# Patient Record
Sex: FEMALE | Race: WHITE | NOT HISPANIC OR LATINO | Employment: OTHER | URBAN - METROPOLITAN AREA
[De-identification: names, ages, dates, MRNs, and addresses within clinical notes are randomized per-mention and may not be internally consistent; named-entity substitution may affect disease eponyms.]

---

## 2017-01-18 ENCOUNTER — OFFICE VISIT (OUTPATIENT)
Dept: ENDOCRINOLOGY | Facility: MEDICAL CENTER | Age: 82
End: 2017-01-18
Payer: MEDICARE

## 2017-01-18 ENCOUNTER — TELEPHONE (OUTPATIENT)
Dept: MEDICAL GROUP | Age: 82
End: 2017-01-18

## 2017-01-18 VITALS
SYSTOLIC BLOOD PRESSURE: 118 MMHG | HEIGHT: 68 IN | WEIGHT: 136 LBS | HEART RATE: 61 BPM | DIASTOLIC BLOOD PRESSURE: 70 MMHG | BODY MASS INDEX: 20.61 KG/M2

## 2017-01-18 DIAGNOSIS — E55.9 VITAMIN D INSUFFICIENCY: ICD-10-CM

## 2017-01-18 DIAGNOSIS — S14.109A INJURY OF CERVICAL SPINE, INITIAL ENCOUNTER (HCC): ICD-10-CM

## 2017-01-18 DIAGNOSIS — E21.3 HYPERPARATHYROIDISM (HCC): Primary | ICD-10-CM

## 2017-01-18 DIAGNOSIS — M85.80 OSTEOPENIA: ICD-10-CM

## 2017-01-18 PROCEDURE — 99214 OFFICE O/P EST MOD 30 MIN: CPT | Performed by: INTERNAL MEDICINE

## 2017-01-18 NOTE — TELEPHONE ENCOUNTER
1. Caller Name: Lucita Babcock                                           Call Back Number: [phone] (home)         Patient approves a detailed voicemail message: no    Patient left a detailed message that she was wondering if she is suppose to still be taking her Amlodipine she is not sure why she stopped and was not sure if she should start taking them again

## 2017-01-18 NOTE — MR AVS SNAPSHOT
Lucita Babcock   2017 1:20 PM   Office Visit   MRN: [id number]    Department:  End[facility]inology [facility]   Dept Phone:  [phone]    Description:  Female : 1935   Provider:  Neo Andres M.D.           Allergies as of 2017     Allergen Noted Reactions    Ace Inhibitors 2013   Anaphylaxis    Augmentin 2009       Flu like sx    Erythromycin 2008   Rash    Lisinopril 2009   Anaphylaxis    Penicillins 2008   Hives    Epinephrine 2015   Unspecified    shaking    Percocet [Oxycodone-Acetaminophen] 2013   Vomiting    Sulfa Drugs 2010   Hives      You were diagnosed with     Hyperparathyroidism (CMS-[facility])   [9907048]  -  Primary     Osteopenia   [213250]       Vitamin D insufficiency   [831338]         Vital Signs     Smoking Status                   Former Smoker           Basic Information     Date Of Birth Sex Race Ethnicity Preferred Language    1935 Female White Non- English      Problem List              ICD-10-CM Priority Class Noted - Resolved    Essential hypertension I10 Low  Unknown - Present    Osteopenia M85.80   Unknown - Present    Anxiety F41.9 Low  Unknown - Present    HX: breast cancer Z85.3   2012 - Present    Primary osteoarthritis of both knees M17.0   2012 - Present    Fistula of gallbladder K82.3   2013 - Present    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   2014 - Present    Hypercalcemia E83.52   2015 - Present    Vitamin D insufficiency E55.9   2015 - Present    Chronic venous insufficiency I87.2   2015 - Present    Hyperparathyroidism (CMS-[facility]) E21.3   2015 - Present    Hyperlipidemia E78.5   2016 - Present    History of recurrent UTIs Z87.440   2016 - Present    Knee pain, right M25.561   2016 - Present    Central cord syndrome (CMS-[facility]) S14.129A High  2016 - Present    Elevated troponin R79.89 Low  2016 - Present    Elevated CPK R74.8 Low   7/14/2016 - Present    Weakness of right upper extremity M62.81 High  7/15/2016 - Present    DVT prophylaxis [id number] Medium  7/15/2016 - Present    Trauma T14.90 Low  7/15/2016 - Present    Right wrist pain M25.531 Low  7/16/2016 - Present    Rhabdomyolysis M62.82   7/18/2016 - Present    Upper extremity weakness M62.81   7/18/2016 - Present    Central cervical cord injury, without spinal bony injury, C1-4 (CMS-[facility]) S14.129A   7/27/2016 - Present    Fall W19.XXXA   9/22/2016 - Present    Cervical postlaminectomy syndrome M96.1   10/6/2016 - Present    Chronic neck pain M54.2, G89.29   10/6/2016 - Present    Nausea R11.0   10/18/2016 - Present    Acute conjunctivitis H10.30   10/24/2016 - Present    Preventative health care Z00.00   10/24/2016 - Present    Postmenopausal Z78.0   10/24/2016 - Present    Medication refill Z76.0   11/2/2016 - Present    Medication management Z79.899   11/2/2016 - Present    Gastroesophageal reflux disease without esophagitis K21.9   11/2/2016 - Present    Tension type headache G44.209   11/28/2016 - Present      Health Maintenance        Date Due Completion Dates    IMM DTaP/Tdap/Td Vaccine (1 - Tdap) 2/28/1955 ---    IMM ZOSTER VACCINE 2/28/1996 ---    IMM INFLUENZA (1) 9/1/2016 10/15/2015, 10/15/2012, 11/16/2006, 10/18/2005    MAMMOGRAM 3/14/2017 3/14/2016, 3/12/2015, 3/10/2014, 3/7/2013, 3/6/2012, 2/3/2011, 1/19/2010, 1/19/2010, 7/29/2009, 7/29/2009, 3/23/2009, 3/23/2009, 4/9/2008, 3/31/2008, 3/31/2008, 3/28/2007, 3/28/2007, 3/27/2006, 3/24/2005    IMM PNEUMOCOCCAL 65+ (ADULT) LOW/MEDIUM RISK SERIES (2 of 2 - PPSV23) 7/18/2017 7/18/2016    COLONOSCOPY 3/2/2018 3/2/2015    BONE DENSITY 1/8/2021 1/8/2016, 1/31/2013, 10/20/2008            Current Immunizations     13-VALENT PCV PREVNAR 7/18/2016  2:44 PM    Influenza TIV (IM) 10/15/2012 11:28 AM    Influenza Vaccine Adult HD 10/15/2015    Influenza Vaccine Pediatric 11/16/2006, 10/18/2005    Pneumococcal Vaccine (UF)Historical Data  11/16/2006      Below and/or attached are the medications your provider expects you to take. Review all of your home medications and newly ordered medications with your provider and/or pharmacist. Follow medication instructions as directed by your provider and/or pharmacist. Please keep your medication list with you and share with your provider. Update the information when medications are discontinued, doses are changed, or new medications (including over-the-counter products) are added; and carry medication information at all times in the event of emergency situations     Allergies:  ACE INHIBITORS - Anaphylaxis     AUGMENTIN - (reactions not documented)     ERYTHROMYCIN - Rash     LISINOPRIL - Anaphylaxis     PENICILLINS - Hives     EPINEPHRINE - Unspecified     PERCOCET - Vomiting     SULFA DRUGS - Hives               Medications  Valid as of: January 18, 2017 -  2:15 PM    Generic Name Brand Name Tablet Size Instructions for use    Acetaminophen (Suspension) TYLENOL 160 MG/5ML Take 15 mg/kg by mouth every 6 hours as needed.        ALPRAZolam (Tab) XANAX 0.25 MG TAKE 1 TABLET ORALLY AT BEDTIME AS NEEDED FOR SLEEP OR ANXIETY        Bacitracin-Polymyxin B (Ointment) POLYSPORIN 500-80113 UNIT/GM Apply 1 Each to affected area(s) 2 times a day.        Bethanechol Chloride (Tab) URECHOLINE 25 MG Take 1 Tab by mouth 3 times a day.        Calcium Carbonate-Vitamin D (Tab) OSCAL 500 +D 500-200 MG-UNIT Take 1 Tab by mouth 2 times a day, with meals.        Cholecalciferol (Tab) Cholecalciferol 2000 UNIT Take 1 Tab by mouth every day.        Cholecalciferol (Cap) CVS D3 2000 UNITS Take 1 Cap by mouth every day.        K Ph[place]-Sod Phos Di & Mono (Tab) K-PHOS-NEUTRAL, PHOSPHA 250 NEUTRAL 155-852-130 MG Take 1 Tab by mouth 2 times a day.        K Phos [place]-Sod Phos Di & Mono (Tab) PHOSPHA 250 NEUTRAL 155-852-130 MG TAKE 1 TAB BY MOUTH 2 TIMES A DAY.        Magnesium Oxide (Tab) MAGNESIUM-OXIDE 400 (241.3 MG) MG TAKE 1 TAB  BY MOUTH 2 TIMES A DAY FOR 30 DAYS.        Metoprolol Tartrate (Tab) LOPRESSOR 100 MG TAKE 1 TABLET BY MOUTH 2 TIMES A DAY.        Multiple Vitamins-Minerals (Tab) THERAGRAN-M  Take 1 Tab by mouth every day.        Omeprazole (CAPSULE DELAYED RELEASE) PRILOSEC 20 MG Take 1 Cap by mouth every day.        Polymyxin B-Trimethoprim (Solution) POLYTRIM 66268-6.1 UNIT/ML-% Place 1 Drop in both eyes every 4 hours.        Probiotic Product (Cap) Probiotic  Take  by mouth.        Sertraline HCl (Tab) ZOLOFT 50 MG Take 1 Tab by mouth every day.        Sertraline HCl (Tab) ZOLOFT 25 MG Take 1 Tab by mouth every day.        Teriparatide (Recombinant) (Solution) Teriparatide (Recombinant) 600 MCG/2.4ML Inject 20 mcg as instructed every day.        Teriparatide (Recombinant) (Solution) Teriparatide (Recombinant) 600 MCG/2.4ML Inject  as instructed.        Timolol Maleate (Solution) TIMOPTIC 0.5 % Place 1 Drop in both eyes every day.        .                 Medicines prescribed today were sent to:     [id number] - [place] - [address]    [address] [place] [address]    Phone: [phone] Fax: [phone]    Open 24 Hours?: No      Medication refill instructions:       If your prescription bottle indicates you have medication refills left, it is not necessary to call your provider’s office. Please contact your pharmacy and they will refill your medication.    If your prescription bottle indicates you do not have any refills left, you may request refills at any time through one of the following ways: The online [employer] system (except Urgent Care), by calling your provider’s office, or by asking your pharmacy to contact your provider’s office with a refill request. Medication refills are processed only during regular business hours and may not be available until the next business day. Your provider may request additional information or to have a follow-up visit with you prior to refilling your medication.     *Please Note: Medication refills are assigned a new Rx number when refilled electronically. Your pharmacy may indicate that no refills were authorized even though a new prescription for the same medication is available at the pharmacy. Please request the medicine by name with the pharmacy before contacting your provider for a refill.        Your To Do List     Future Labs/Procedures Complete By Expires    COMP METABOLIC PANEL  As directed 7/21/2017    VITAMIN D,25 HYDROXY  As directed 1/18/2018      Other Notes About Your Plan     Please assess the following diagnosis:  E21.3-hyperparathyroidism    -this patient has been on Zoloft since 11/2012, with diagnosis of anxiety and depression. ? If treating major depressive disorder, single episode, unspecified degree, code F32.9    Patient report to me that she was treated with Zoloft initially that she was depressed when she lost her  and she also has mild anxiety. Medication helps ocontrolled her mood. Now she denied feeling depressed. She is continuously taking it for her anxiety.  [place]                    [employer] Access Code: Activation code not generated  Current MyChart Status: Active

## 2017-01-18 NOTE — TELEPHONE ENCOUNTER
According to her chart the only blood pressure medication she should be on is metoprolol 100 mg twice a day. So no she should not still be taking her amlodipine.  Stephanie Gallardo M.D. covering for Farooq Sloan M.D.

## 2017-01-19 RX ORDER — METOPROLOL TARTRATE 100 MG/1
TABLET ORAL
Qty: 60 TAB | Refills: 0 | Status: SHIPPED | OUTPATIENT
Start: 2017-01-19 | End: 2017-02-15 | Stop reason: SDUPTHER

## 2017-01-19 NOTE — PROGRESS NOTES
Chief Complaint   Patient presents with   • Hyperparathyroidism     ?        HPI:      1. Question hyperparathyroidism.    I have seen the patient in the past for osteopenia and suspected hyperparathyroidism.  We never did establish that as a firm diagnosis.  She did have elevated parathyroid hormone but was not hypercalcemic.  Also a sestamibi parathyroid scan in 2015 was negative for identifying a potential adenoma.      Bone density a year ago, January 2016, indicated osteopenia.      In July last year, she had a syncopal episode at home which caused her to fall forward, hitting her face on the wall and then to the floor. She was unable to get off the floor for about ten hours until her daughter found her and rescued her to the hospital.  She had an element of rhabdomyolysis with elevated CPK.  She had C-spine injury which was ultimately diagnosed as having a simple cord syndrome and underwent a C-spine decompression and laminectomy.  She recovered from that beautifully and has healed very well.  As I speak with her today, she moves her head about spontaneously and without stiffness or obvious pain.  Apparently her rehab has been intensive and successful.      At some point, in August, she was started on Forteo I think through the rehab program.  I didn’t realize that when she was with me earlier today so I didn’t ask her if she was still taking it but I presume she is.      I have recent chemistries in August and September and she does not have hypercalcemia.  In August her calciums were 9.9 and 9.6.  In September when accounting for an albumin in the 3.8-3.6 range, her calciums are 10.5 and 10.3.  A parathyroid hormone level done last May prior to her injury was 92.7 and prior to that a year ago, 73 which is upper normal.  Her vitamin D last May was 30 and her PTH was elevated.      My plan is to update her chemistry panel and calciums.  Also get a vitamin D level and another parathyroid hormone level.  If that  is elevated, I probably will repeat her sestamibi parathyroid scan.  For this encounter I spent considerable time with patient reviewing her history and recent course and spent about 45 minutes reviewing the extensive hospital records related to her injury and recovery.   ROS:  Still recovering from her significant neck injury and surgery. She is making good progress. She is living independently again. Not yet driving.  Still a bit unsteady on her feet and I think somewhat insecure walking  All other systems reported as negative or unchanged since last exam        Allergies:   Allergies   Allergen Reactions   • Ace Inhibitors Anaphylaxis   • Augmentin      Flu like sx   • Erythromycin Rash   • Lisinopril Anaphylaxis   • Penicillins Hives   • Epinephrine Unspecified     shaking   • Percocet [Oxycodone-Acetaminophen] Vomiting   • Sulfa Drugs Hives       Current medicines including changes today:  Current Outpatient Prescriptions   Medication Sig Dispense Refill   • MAGNESIUM-OXIDE 400 (241.3 MG) MG Tab tablet TAKE 1 TAB BY MOUTH 2 TIMES A DAY FOR 30 DAYS. 60 Tab 0   • PHOSPHA 250 NEUTRAL 155-852-130 MG tablet TAKE 1 TAB BY MOUTH 2 TIMES A DAY. 60 Tab 1   • metoprolol (LOPRESSOR) 100 MG Tab TAKE 1 TABLET BY MOUTH 2 TIMES A DAY. 90 Tab 0   • alprazolam (XANAX) 0.25 MG Tab TAKE 1 TABLET ORALLY AT BEDTIME AS NEEDED FOR SLEEP OR ANXIETY 60 Tab 0   • phosphorus (K-PHOS-NEUTRAL, PHOSPHA 250 NEUTRAL) 155-852-130 MG tablet Take 1 Tab by mouth 2 times a day. 180 Tab 0   • omeprazole (PRILOSEC) 20 MG delayed-release capsule Take 1 Cap by mouth every day. 90 Cap 2   • polymixin-trimethoprim (POLYTRIM) 79217-1.1 UNIT/ML-% Solution Place 1 Drop in both eyes every 4 hours. 10 mL 0   • calcium-vitamin D (OSCAL 500 +D) 500-200 MG-UNIT Tab Take 1 Tab by mouth 2 times a day, with meals. 100 Tab 3   • sertraline (ZOLOFT) 25 MG tablet Take 1 Tab by mouth every day. 30 Tab 5   • bethanechol (URECHOLINE) 25 MG Tab Take 1 Tab by mouth 3  "times a day. 90 Tab 0   • vitamin D 2000 UNIT Tab Take 1 Tab by mouth every day. 30 Tab    • timolol (TIMOPTIC) 0.5 % Solution Place 1 Drop in both eyes every day. 1 Bottle 3   • therapeutic multivitamin-minerals (THERAGRAN-M) Tab Take 1 Tab by mouth every day. 30 Tab 11   • acetaminophen (TYLENOL) 160 MG/5ML Suspension Take 15 mg/kg by mouth every 6 hours as needed.     • bacitracin-polymyxin b (POLYSPORIN) 500-65772 UNIT/GM Ointment Apply 1 Each to affected area(s) 2 times a day. 1 Tube 0   • Teriparatide, Recombinant, (FORTEO) 600 MCG/2.4ML Solution Inject  as instructed.     • Probiotic Cap Take  by mouth.     • sertraline (ZOLOFT) 50 MG Tab Take 1 Tab by mouth every day. 30 Tab 5   • CVS D3 2000 UNITS Cap Take 1 Cap by mouth every day.  0   • Teriparatide, Recombinant, (FORTEO) 600 MCG/2.4ML Solution Inject 20 mcg as instructed every day. 1 PEN 0     No current facility-administered medications for this visit.        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   appears underweight but otherwise healthy for age    Skin   appropriate for sex and age    HEENT  unremarkable    Neck   no palpable nodules. I can't feel her thyroid. It might be substernal    Heart  regular    Extremities  no edema    Neuro  gait and station " normal               Alert coherent and conversant. Well-oriented    Psych  appropriate, pleasant, calm    ASSESSMENT AND RECOMMENDATIONS    1. Osteopenia           Patient has been started on Forteo in August 2016 during her rehabilitation. Following her C-spine injury and surgery in July  - COMP METABOLIC PANEL; Future    2. Vitamin D insufficiency              Reassess  - VITAMIN D,25 HYDROXY; Future    3. Hyperparathyroidism (CMS-HCC)              Questionable diagnosis could be reassessed  - PTH INTACT    4. Injury of cervical spine, initial encounter (CMS-HCC) July 2016             Central cord syndrome with C-spine decompression/laminectomy      DISPOSITION: Return in about 6 months (around 7/18/2017).       Neo Andres M.D.    Copies to: Farooq Sloan M.D. [phone]

## 2017-01-19 NOTE — TELEPHONE ENCOUNTER
Phone Number Called: [phone] (home)       Message: left message to call back    Left Message for patient to call back: yes

## 2017-01-30 ENCOUNTER — PATIENT OUTREACH (OUTPATIENT)
Dept: HEALTH INFORMATION MANAGEMENT | Facility: OTHER | Age: 82
End: 2017-01-30

## 2017-01-31 NOTE — PROGRESS NOTES
Outcome: SCHEDULED CM CALL    Attempt # FIRST    Care Coordination Enrollment (Attempt to Enroll in Care Coordination)   1. Is patient appropriate: YES; NO; YES   2. If does not qualify, why? NA  3. Is patient interested: YES; NO; YES  4. If not interested, declined reason: NA    Spoke with Lucita regarding [facility] program enrollment.      Is the patient interested in [facility]? Yes     Has the patient completed the [place] Questionnaire? Yes     Has the patient completed the New Member Activation Checklist? Yes     Patient Interested in Care Coordination:  Report Name: [facility] CM  Total Score from [place] Questionnaire:0-2 MINIMAL  9-Point Screening for Care Management    Scoring Scale:   0-2 Minimal   3-6 Moderate  7-9 Severe     NPQ Score:  0.75      Background:SURGERY ON DISCS, MIGRAINES, HAS A CARE GIVER EVERY DAY UNTIL NOON, HAS A CANE,     Telephone Visit with Care Manager scheduled for:     Care Gap Scheduling (Attempt to Schedule EACH Overdue Care Gap!)  Health Maintenance Due   Topic Date Due   • IMM DTaP/Tdap/Td Vaccine (1 - Tdap) SCHEDULED   • IMM ZOSTER VACCINE  SCHEDULED   • IMM INFLUENZA (1) 11/22/2016         [employer] Activation: ACTIVE  Already Active/Declined/Sent Activation Code

## 2017-02-01 ENCOUNTER — OFFICE VISIT (OUTPATIENT)
Dept: MEDICAL GROUP | Age: 82
End: 2017-02-01
Payer: MEDICARE

## 2017-02-01 VITALS
TEMPERATURE: 98.1 F | WEIGHT: 136 LBS | HEART RATE: 72 BPM | OXYGEN SATURATION: 93 % | SYSTOLIC BLOOD PRESSURE: 124 MMHG | BODY MASS INDEX: 21.35 KG/M2 | HEIGHT: 67 IN | DIASTOLIC BLOOD PRESSURE: 72 MMHG

## 2017-02-01 DIAGNOSIS — E21.3 HYPERPARATHYROIDISM (HCC): ICD-10-CM

## 2017-02-01 DIAGNOSIS — S14.109D INJURY OF CERVICAL SPINE, SUBSEQUENT ENCOUNTER (HCC): ICD-10-CM

## 2017-02-01 DIAGNOSIS — G89.29 CHRONIC NECK PAIN: ICD-10-CM

## 2017-02-01 DIAGNOSIS — Z23 NEED FOR TDAP VACCINATION: ICD-10-CM

## 2017-02-01 DIAGNOSIS — M54.2 CHRONIC NECK PAIN: ICD-10-CM

## 2017-02-01 DIAGNOSIS — I10 ESSENTIAL HYPERTENSION: ICD-10-CM

## 2017-02-01 DIAGNOSIS — M25.561 RIGHT KNEE PAIN, UNSPECIFIED CHRONICITY: ICD-10-CM

## 2017-02-01 PROCEDURE — 90471 IMMUNIZATION ADMIN: CPT | Performed by: FAMILY MEDICINE

## 2017-02-01 PROCEDURE — 90715 TDAP VACCINE 7 YRS/> IM: CPT | Performed by: FAMILY MEDICINE

## 2017-02-01 PROCEDURE — 99214 OFFICE O/P EST MOD 30 MIN: CPT | Mod: 25 | Performed by: FAMILY MEDICINE

## 2017-02-01 ASSESSMENT — PATIENT HEALTH QUESTIONNAIRE - PHQ9: CLINICAL INTERPRETATION OF PHQ2 SCORE: 0

## 2017-02-01 NOTE — PROGRESS NOTES
This medical record contains text that has been entered with the assistance of computer voice recognition and dictation software.  Therefore, it may contain unintended errors in text, spelling, punctuation, or grammar    Chief Complaint   Patient presents with   • Immunizations     tdap pending       Lucita Zulma Babcock is a 81 y.o. female here evaluation and management of: immunizations, routine follow up      HPI:     Injury of cervical spine (CMS-HCC)  Since her decompression/laminectomy she is recovering very well. She is able to turn head to both sides approximately 150°, normal flexion and extension. She feels she is ready to drive again.    Hyperlipidemia  Patient states that has not been complying with diet, she continues to enjoy ice cream and desserts.    Hyperparathyroidism  Continues to be managed by Dr. Andres  Normal recent PTH and calcium levels        Knee pain, right  She continues to walk with a cane especially outside where it's icy. She does not want to proceed with TKR, continues to use elipticle at home with no problem    Essential hypertension  Her BP is controlled with metoprolol 100 mg twice a day. She brought in a home blood pressure log which showed a normal range.   The patient is  on a Baby ASPIRIN and a  STATIN.  The patient has been tollerating the BP meds without any issues. No tunnel vision, no cough, no changes in vision, no lightheadedness, no fatigue, no syncopal or presyncopal episodes, no edema, no new rashes. Patient also  denied chest pain, headache, change in vision, dyspnea on exertion, shortness of breath, PND, back pain, numbness or tingling anywhere. He is tolerating his medication well, he denies any lightheadedness, no tunnel vision, no syncopal episodes, no fatigue, no leg weakness no falls.    Current medicines (including changes today)  Current Outpatient Prescriptions   Medication Sig Dispense Refill   • Misc. Devices Misc Please give patient shingles vaccine 1  Application 0   • MAGNESIUM-OXIDE 400 (241.3 MG) MG Tab tablet TAKE 1 TAB BY MOUTH 2 TIMES A DAY FOR 30 DAYS. 60 Tab 0   • metoprolol (LOPRESSOR) 100 MG Tab TAKE 1 TABLET BY MOUTH 2 TIMES A DAY. 60 Tab 0   • PHOSPHA 250 NEUTRAL 155-852-130 MG tablet TAKE 1 TAB BY MOUTH 2 TIMES A DAY. 60 Tab 1   • alprazolam (XANAX) 0.25 MG Tab TAKE 1 TABLET ORALLY AT BEDTIME AS NEEDED FOR SLEEP OR ANXIETY 60 Tab 0   • phosphorus (K-PHOS-NEUTRAL, PHOSPHA 250 NEUTRAL) 155-852-130 MG tablet Take 1 Tab by mouth 2 times a day. 180 Tab 0   • omeprazole (PRILOSEC) 20 MG delayed-release capsule Take 1 Cap by mouth every day. 90 Cap 2   • acetaminophen (TYLENOL) 160 MG/5ML Suspension Take 15 mg/kg by mouth every 6 hours as needed.     • polymixin-trimethoprim (POLYTRIM) 67399-4.1 UNIT/ML-% Solution Place 1 Drop in both eyes every 4 hours. 10 mL 0   • bacitracin-polymyxin b (POLYSPORIN) 500-12735 UNIT/GM Ointment Apply 1 Each to affected area(s) 2 times a day. 1 Tube 0   • calcium-vitamin D (OSCAL 500 +D) 500-200 MG-UNIT Tab Take 1 Tab by mouth 2 times a day, with meals. 100 Tab 3   • Teriparatide, Recombinant, (FORTEO) 600 MCG/2.4ML Solution Inject  as instructed.     • Probiotic Cap Take  by mouth.     • sertraline (ZOLOFT) 50 MG Tab Take 1 Tab by mouth every day. 30 Tab 5   • sertraline (ZOLOFT) 25 MG tablet Take 1 Tab by mouth every day. 30 Tab 5   • bethanechol (URECHOLINE) 25 MG Tab Take 1 Tab by mouth 3 times a day. 90 Tab 0   • CVS D3 2000 UNITS Cap Take 1 Cap by mouth every day.  0   • Teriparatide, Recombinant, (FORTEO) 600 MCG/2.4ML Solution Inject 20 mcg as instructed every day. 1 PEN 0   • vitamin D 2000 UNIT Tab Take 1 Tab by mouth every day. 30 Tab    • timolol (TIMOPTIC) 0.5 % Solution Place 1 Drop in both eyes every day. 1 Bottle 3   • therapeutic multivitamin-minerals (THERAGRAN-M) Tab Take 1 Tab by mouth every day. 30 Tab 11     No current facility-administered medications for this visit.     She  has a past medical history  "of Hypertension; Osteopenia; Sarcoid (CMS-HCC); Depression; Anxiety; Breast cancer (CMS-HCC) (); Aneurysm (CMS-HCC); Other specified disorder of intestines; Indigestion; Anesthesia; Arthritis; Cancer (CMS-HCC) (); Dental disorder; UTI (urinary tract infection) (); CATARACT; and Injury of cervical spine (CMS-HCC) (2016). She also has no past medical history of Stroke (CMS-HCC), Seizure (CMS-HCC), Diabetes (CMS-HCC), or Pacemaker.  She  has past surgical history that includes vein stripping; mass excision general (); breast biopsy (08); abdominal hysterectomy total (); cataract phaco with iol (2010); colonoscopy (); ercp in or (2012); samuel by laparoscopy (2013); lumpectomy (); radiation therapy plan simple (); cataract phaco with iol (2014); and cervical disk and fusion anterior (2016).  Social History   Substance Use Topics   • Smoking status: Former Smoker -- 0.50 packs/day for 45 years     Quit date: 10/09/1969   • Smokeless tobacco: Never Used   • Alcohol Use: 0.6 oz/week     1 Glasses of wine per week      Comment: 1/daily     Social History     Social History Narrative     Family History   Problem Relation Age of Onset   • Hypertension Mother    • Stroke Mother    • Cancer Neg Hx    • Diabetes Neg Hx    • Heart Disease Neg Hx    • Other Father      Dementia   • Other Sister      BIpolar   • Other Sister      Bipolar   • Other Sister      THyroid disease     Family Status   Relation Status Death Age   • Mother     • Son       suicide   • Father     • Sister     • Sister Alive    • Sister Alive    • Daughter Alive    • Daughter Alive          ROS  Please see hpi    All other systems reviewed and are negative     Objective:     Blood pressure 124/72, pulse 72, temperature 36.7 °C (98.1 °F), height 1.714 m (5' 7.48\"), weight 61.689 kg (136 lb), SpO2 93 %. Body mass index is 21 kg/(m^2).  Physical " Exam:    Constitutional: Alert, no distress.  Skin: Warm, dry, good turgor, no rashes in visible areas.  Eye: Equal, round and reactive, conjunctiva clear, lids normal.  ENMT: Lips without lesions, good dentition, oropharynx clear.  Neck: Trachea midline, no masses, no thyromegaly. No cervical or supraclavicular lymphadenopathy.  Respiratory: Unlabored respiratory effort, lungs clear to auscultation, no wheezes, no ronchi.  Cardiovascular: Normal S1, S2, no murmur, no edema.  Abdomen: Soft, non-tender, no masses, no hepatosplenomegaly.  Psych: Alert and oriented x3, normal affect and mood.  NECK--able to turn head 150 degrees to both sides, normal flexion and extension, cervical spine NTT        Assessment and Plan:   The following treatment plan was discussed, again this medical record contains text that has been entered with the assistance of computer voice recognition and dictation software.  Therefore, it may contain unintended errors in text, spelling, punctuation, or grammar      1. Need for Tdap vaccination  Given today    - TDAP VACCINE =>8YO IM  - Misc. Devices Misc; Please give patient shingles vaccine  Dispense: 1 Application; Refill: 0    2. Injury of cervical spine, subsequent encounter (CMS-[facility])  Much improved  I believe she can drive now    3. Hyperparathyroidism (CMS-[facility])  Stable and managed by Dr. Andres    5. Right knee pain, unspecified chronicity  Continue to use elipticle  Remain as active as possible    6. Essential hypertension  It is finally controlled with metoprolol 100mg po bid        Followup: Return in about 3 months (around 5/1/2017) for Reevaluation.

## 2017-02-01 NOTE — MR AVS SNAPSHOT
"Lucita Babcock   2017 8:20 AM   Office Visit   MRN: [id number]    Department:  [address] [place] [address]   Dept Phone:  [phone]    Description:  Female : 1935   Provider:  Farooq Sloan M.D.           Reason for Visit     Immunizations tdap pending      Allergies as of 2017     Allergen Noted Reactions    Ace Inhibitors 2013   Anaphylaxis    Augmentin 2009       Flu like sx    Erythromycin 2008   Rash    Lisinopril 2009   Anaphylaxis    Penicillins 2008   Hives    Epinephrine 2015   Unspecified    shaking    Percocet [Oxycodone-Acetaminophen] 2013   Vomiting    Sulfa Drugs 2010   Hives      You were diagnosed with     Need for Tdap vaccination   [658969]       Chronic neck pain   [071613]       Injury of cervical spine, subsequent encounter (CMS-HCC)   [1863951]       Hyperparathyroidism (CMS-HCC)   [1566566]         Vital Signs     Blood Pressure Pulse Temperature Height Weight Body Mass Index    124/72 mmHg 72 36.7 °C (98.1 °F) 1.714 m (5' 7.48\") 61.689 kg (136 lb) 21.00 kg/m2    Oxygen Saturation Smoking Status                93% Former Smoker          Basic Information     Date Of Birth Sex Race Ethnicity Preferred Language    1935 Female White Non- English      Your appointments     2017  9:00 AM   CARE MANAGEMENT OUTREACH with Jess Shelton R.N.   Population Health (--)    [address] [facility] [address] [phone]            Mar 22, 2017  1:00 PM   Established Patient with Neo Andres M.D.   [facility] ([facility])    [address], [address]  [facility] [address] [phone]           You will be receiving a confirmation call a few days before your appointment from our automated call confirmation system.              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   12/1/2014 - Present    Hypercalcemia E83.52   11/2/2015 - Present    Vitamin D insufficiency E55.9   11/5/2015 - Present    Chronic venous insufficiency I87.2   11/18/2015 - Present    Hyperparathyroidism (CMS-HCC) E21.3   12/2/2015 - Present    Hyperlipidemia E78.5   5/24/2016 - Present    History of recurrent UTIs Z87.440   5/24/2016 - Present    Knee pain, right M25.561   6/22/2016 - Present    Central cord syndrome (CMS-HCC) S14.129A High  7/14/2016 - Present    Elevated troponin R79.89 Low  7/14/2016 - Present    Elevated CPK R74.8 Low  7/14/2016 - Present    Weakness of right upper extremity M62.81 High  7/15/2016 - Present    DVT prophylaxis [id number] Medium  7/15/2016 - Present    Trauma T14.90 Low  7/15/2016 - Present    Right wrist pain M25.531 Low  7/16/2016 - Present    Rhabdomyolysis M62.82   7/18/2016 - Present    Upper extremity weakness M62.81   7/18/2016 - Present    Central cervical cord injury, without spinal bony injury, C1-4 (CMS-HCC) S14.129A   7/27/2016 - Present    Fall W19.XXXA   9/22/2016 - Present    Cervical postlaminectomy syndrome M96.1   10/6/2016 - Present    Chronic neck pain M54.2, G89.29   10/6/2016 - Present    Nausea R11.0   10/18/2016 - Present    Acute conjunctivitis H10.30   10/24/2016 - Present    Preventative health care Z00.00   10/24/2016 - Present    Postmenopausal Z78.0   10/24/2016 - Present    Medication refill Z76.0   11/2/2016 - Present    Medication management Z79.899   11/2/2016 - Present    Gastroesophageal reflux disease without esophagitis K21.9   11/2/2016 - Present    Tension type headache G44.209   11/28/2016 - Present    Injury of cervical spine (CMS-[facility]) S14.109A   7/1/2016 - Present         Health Maintenance        Date Due Completion Dates    IMM DTaP/Tdap/Td Vaccine (1 - Tdap) 2/28/1955 ---    IMM ZOSTER VACCINE 2/28/1996 ---    MAMMOGRAM 3/14/2017 3/14/2016, 3/12/2015, 3/10/2014, 3/7/2013, 3/6/2012, 2/3/2011, 1/19/2010, 1/19/2010, 7/29/2009, 7/29/2009, 3/23/2009, 3/23/2009, 4/9/2008, 3/31/2008, 3/31/2008, 3/28/2007, 3/28/2007, 3/27/2006, 3/24/2005    IMM PNEUMOCOCCAL 65+ (ADULT) LOW/MEDIUM RISK SERIES (2 of 2 - PPSV23) 7/18/2017 7/18/2016    COLONOSCOPY 3/2/2018 3/2/2015    BONE DENSITY 1/8/2021 1/8/2016, 1/31/2013, 10/20/2008            Current Immunizations     13-VALENT PCV PREVNAR 7/18/2016  2:44 PM    Influenza TIV (IM) 10/15/2012 11:28 AM    Influenza Vaccine Adult HD 11/22/2016, 10/15/2015    Influenza Vaccine Pediatric 11/16/2006, 10/18/2005    Pneumococcal Vaccine (UF)Historical Data 11/16/2006    Tdap Vaccine  Incomplete      Below and/or attached are the medications your provider expects you to take. Review all of your home medications and newly ordered medications with your provider and/or pharmacist. Follow medication instructions as directed by your provider and/or pharmacist. Please keep your medication list with you and share with your provider. Update the information when medications are discontinued, doses are changed, or new medications (including over-the-counter products) are added; and carry medication information at all times in the event of emergency situations     Allergies:  ACE INHIBITORS - Anaphylaxis     AUGMENTIN - (reactions not documented)     ERYTHROMYCIN - Rash     LISINOPRIL - Anaphylaxis     PENICILLINS - Hives     EPINEPHRINE - Unspecified     PERCOCET - Vomiting     SULFA DRUGS - Hives               Medications  Valid as of: February 01, 2017 -  8:46 AM    Generic Name Brand Name Tablet Size Instructions for use    Acetaminophen (Suspension) TYLENOL 160 MG/5ML Take 15 mg/kg by mouth every 6 hours as needed.        ALPRAZolam (Tab) XANAX 0.25 MG TAKE 1 TABLET  ORALLY AT BEDTIME AS NEEDED FOR SLEEP OR ANXIETY        Bacitracin-Polymyxin B (Ointment) POLYSPORIN 500-59947 UNIT/GM Apply 1 Each to affected area(s) 2 times a day.        Bethanechol Chloride (Tab) URECHOLINE 25 MG Take 1 Tab by mouth 3 times a day.        Calcium Carbonate-Vitamin D (Tab) OSCAL 500 +D 500-200 MG-UNIT Take 1 Tab by mouth 2 times a day, with meals.        Cholecalciferol (Tab) Cholecalciferol 2000 UNIT Take 1 Tab by mouth every day.        Cholecalciferol (Cap) CVS D3 2000 UNITS Take 1 Cap by mouth every day.        K Phos [place]ette-Sod Phos Di & Mono (Tab) K-PHOS-NEUTRAL, PHOSPHA 250 NEUTRAL 155-852-130 MG Take 1 Tab by mouth 2 times a day.        K Phos Payette-Sod Phos Di & Mono (Tab) PHOSPHA 250 NEUTRAL 155-852-130 MG TAKE 1 TAB BY MOUTH 2 TIMES A DAY.        Magnesium Oxide (Tab) MAGNESIUM-OXIDE 400 (241.3 MG) MG TAKE 1 TAB BY MOUTH 2 TIMES A DAY FOR 30 DAYS.        Metoprolol Tartrate (Tab) LOPRESSOR 100 MG TAKE 1 TABLET BY MOUTH 2 TIMES A DAY.        Misc. Devices (Misc) Misc. Devices  Please give patient shingles vaccine        Multiple Vitamins-Minerals (Tab) THERAGRAN-M  Take 1 Tab by mouth every day.        Omeprazole (CAPSULE DELAYED RELEASE) PRILOSEC 20 MG Take 1 Cap by mouth every day.        Polymyxin B-Trimethoprim (Solution) POLYTRIM 73505-9.1 UNIT/ML-% Place 1 Drop in both eyes every 4 hours.        Probiotic Product (Cap) Probiotic  Take  by mouth.        Sertraline HCl (Tab) ZOLOFT 50 MG Take 1 Tab by mouth every day.        Sertraline HCl (Tab) ZOLOFT 25 MG Take 1 Tab by mouth every day.        Teriparatide (Recombinant) (Solution) Teriparatide (Recombinant) 600 MCG/2.4ML Inject 20 mcg as instructed every day.        Teriparatide (Recombinant) (Solution) Teriparatide (Recombinant) 600 MCG/2.4ML Inject  as instructed.        Timolol Maleate (Solution) TIMOPTIC 0.5 % Place 1 Drop in both eyes every day.        .                 Medicines prescribed today were sent to:     [facility]  [id number] - [place], [place] - [address]    [address] [place] [address]    Phone: [phone] Fax: [phone]    Open 24 Hours?: No      Medication refill instructions:       If your prescription bottle indicates you have medication refills left, it is not necessary to call your provider’s office. Please contact your pharmacy and they will refill your medication.    If your prescription bottle indicates you do not have any refills left, you may request refills at any time through one of the following ways: The online [employer] system (except Urgent Care), by calling your provider’s office, or by asking your pharmacy to contact your provider’s office with a refill request. Medication refills are processed only during regular business hours and may not be available until the next business day. Your provider may request additional information or to have a follow-up visit with you prior to refilling your medication.   *Please Note: Medication refills are assigned a new Rx number when refilled electronically. Your pharmacy may indicate that no refills were authorized even though a new prescription for the same medication is available at the pharmacy. Please request the medicine by name with the pharmacy before contacting your provider for a refill.        Other Notes About Your Plan     Please assess the following diagnosis:  E21.3-hyperparathyroidism    -this patient has been on Zoloft since 11/2012, with diagnosis of anxiety and depression. ? If treating major depressive disorder, single episode, unspecified degree, code F32.9    Patient report to me that she was treated with Zoloft initially that she was depressed when she lost her  and she also has mild anxiety. Medication helps ocontrolled her mood. Now she denied feeling depressed. She is continuously taking it for her anxiety.  [place]                    [employer] Access Code: Activation code not generated  Current [employer] Status: Active

## 2017-02-01 NOTE — ASSESSMENT & PLAN NOTE
Patient states that has not been complying with diet, she continues to enjoy ice cream and desserts.

## 2017-02-01 NOTE — ASSESSMENT & PLAN NOTE
Since her decompression/laminectomy she is recovering very well. She is able to turn head to both sides approximately 150°, normal flexion and extension. She feels she is ready to drive again.

## 2017-02-01 NOTE — ASSESSMENT & PLAN NOTE
She continues to walk with a cane especially outside where it's icy. She does not want to proceed with TKR, continues to use elipticle at home with no problem

## 2017-02-01 NOTE — ASSESSMENT & PLAN NOTE
Her BP is controlled with metoprolol 100 mg twice a day. She brought in a home blood pressure log which showed a normal range.   The patient is  on a Baby ASPIRIN and a  STATIN.  The patient has been tollerating the BP meds without any issues. No tunnel vision, no cough, no changes in vision, no lightheadedness, no fatigue, no syncopal or presyncopal episodes, no edema, no new rashes. Patient also  denied chest pain, headache, change in vision, dyspnea on exertion, shortness of breath, PND, back pain, numbness or tingling anywhere. He is tolerating his medication well, he denies any lightheadedness, no tunnel vision, no syncopal episodes, no fatigue, no leg weakness no falls.

## 2017-02-02 ENCOUNTER — PATIENT OUTREACH (OUTPATIENT)
Dept: HEALTH INFORMATION MANAGEMENT | Facility: OTHER | Age: 82
End: 2017-02-02

## 2017-02-03 NOTE — PROGRESS NOTES
2/2/17  -  Outcome:CONFIRMED WEB IZ.         Care Gap Scheduling (Attempt to Schedule EACH Overdue Care Gap!)  Health Maintenance Due   Topic Date Due   • IMM ZOSTER VACCINE  Pt wants vaccine but needs to know the cost first.         [employer] Activation:  Already Active

## 2017-02-07 ENCOUNTER — PATIENT OUTREACH (OUTPATIENT)
Dept: HEALTH INFORMATION MANAGEMENT | Facility: OTHER | Age: 82
End: 2017-02-07

## 2017-02-07 ASSESSMENT — ENCOUNTER SYMPTOMS
OCCASIONAL FEELINGS OF UNSTEADINESS: 0
LOSS OF SENSATION IN FEET: 0
DEPRESSION: 0

## 2017-02-07 ASSESSMENT — PATIENT HEALTH QUESTIONNAIRE - PHQ9: CLINICAL INTERPRETATION OF PHQ2 SCORE: 0

## 2017-02-07 NOTE — PATIENT INSTRUCTIONS
Fall Prevention in the Home   Falls can cause injuries and can affect people from all age groups. There are many simple things that you can do to make your home safe and to help prevent falls.  WHAT CAN I DO ON THE OUTSIDE OF MY HOME?  · Regularly repair the edges of walkways and driveways and fix any cracks.  · Remove high doorway thresholds.  · Trim any shrubbery on the main path into your home.  · Use bright outdoor lighting.  · Clear walkways of debris and clutter, including tools and rocks.  · Regularly check that handrails are securely fastened and in good repair. Both sides of any steps should have handrails.  · Install guardrails along the edges of any raised decks or porches.  · Have leaves, snow, and ice cleared regularly.  · Use sand or salt on walkways during winter months.  · In the garage, clean up any spills right away, including grease or oil spills.  WHAT CAN I DO IN THE BATHROOM?  · Use night lights.  · Install grab bars by the toilet and in the tub and shower. Do not use towel bars as grab bars.  · Use non-skid mats or decals on the floor of the tub or shower.  · If you need to sit down while you are in the shower, use a plastic, non-slip stool..  · Keep the floor dry. Immediately clean up any water that spills on the floor.  · Remove soap buildup in the tub or shower on a regular basis.  · Attach bath mats securely with double-sided non-slip rug tape.  · Remove throw rugs and other tripping hazards from the floor.  WHAT CAN I DO IN THE BEDROOM?  · Use night lights.  · Make sure that a bedside light is easy to reach.  · Do not use oversized bedding that drapes onto the floor.  · Have a firm chair that has side arms to use for getting dressed.  · Remove throw rugs and other tripping hazards from the floor.  WHAT CAN I DO IN THE KITCHEN?   · Clean up any spills right away.  · Avoid walking on wet floors.  · Place frequently used items in easy-to-reach places.  · If you need to reach for something  above you, use a sturdy step stool that has a grab bar.  · Keep electrical cables out of the way.  · Do not use floor polish or wax that makes floors slippery. If you have to use wax, make sure that it is non-skid floor wax.  · Remove throw rugs and other tripping hazards from the floor.  WHAT CAN I DO IN THE STAIRWAYS?  · Do not leave any items on the stairs.  · Make sure that there are handrails on both sides of the stairs. Fix handrails that are broken or loose. Make sure that handrails are as long as the stairways.  · Check any carpeting to make sure that it is firmly attached to the stairs. Fix any carpet that is loose or worn.  · Avoid having throw rugs at the top or bottom of stairways, or secure the rugs with carpet tape to prevent them from moving.  · Make sure that you have a light switch at the top of the stairs and the bottom of the stairs. If you do not have them, have them installed.  WHAT ARE SOME OTHER FALL PREVENTION TIPS?  · Wear closed-toe shoes that fit well and support your feet. Wear shoes that have rubber soles or low heels.  · When you use a stepladder, make sure that it is completely opened and that the sides are firmly locked. Have someone hold the ladder while you are using it. Do not climb a closed stepladder.  · Add color or contrast paint or tape to grab bars and handrails in your home. Place contrasting color strips on the first and last steps.  · Use mobility aids as needed, such as canes, walkers, scooters, and crutches.  · Turn on lights if it is dark. Replace any light bulbs that burn out.  · Set up furniture so that there are clear paths. Keep the furniture in the same spot.  · Fix any uneven floor surfaces.  · Choose a carpet design that does not hide the edge of steps of a stairway.  · Be aware of any and all pets.  · Review your medicines with your healthcare provider. Some medicines can cause dizziness or changes in blood pressure, which increase your risk of falling.  Talk  with your health care provider about other ways that you can decrease your risk of falls. This may include working with a physical therapist or  to improve your strength, balance, and endurance.     This information is not intended to replace advice given to you by your health care provider. Make sure you discuss any questions you have with your health care provider.     Document Released: 12/08/2003 Document Revised: 05/03/2016 Document Reviewed: 01/22/2016  Elsevier Interactive Patient Education ©2016 [employer]

## 2017-02-07 NOTE — PROGRESS NOTES
CC outreach call to patient. Referral received from outreach team.  CC spoke to patient and provided information on CC role and program for chronic care management. Patient is agreeable to enroll in the program.  Reports her major health problem happened after a fall in July. Reports she had neck surgery by Dr. Taylor. Per notes in Epic:   Central cord syndrome, s/p Ant C4-C7 decompression fusion.  Patient reports she believes the fall occurred because of dizziness related to her blood pressure medications. States she is no longer taking her B/P medications in the morning. Reports she has not had problems with metoprolol. Reports she does not feel dizzy from her medications.   Patient reports she lives alone but has hired caregivers with [employer] coming in 5 days a week from 8:00am-12:00pm.  Reports she also has friends and a daughter that assist with her care as needed.  Patient reports she is doing great and is almost back to baseline. Reports she isn't driving yet but was released to drive by her PCP. States she is going to wait until the weather improves before resuming driving.  Patient reports history of knee pain. States she will take tylenol periodically for the pain.   Patient reports she has Life Alert emergency system.   Plan:  CC will enroll patient in chronic care management. Provide contact telephone number. Reviewed care plan and LPOC with patient. Next outreach in 30 days. Sooner if needed.

## 2017-02-23 ENCOUNTER — HOSPITAL ENCOUNTER (OUTPATIENT)
Dept: RADIOLOGY | Facility: MEDICAL CENTER | Age: 82
End: 2017-02-23
Attending: NEUROLOGICAL SURGERY
Payer: MEDICARE

## 2017-02-23 ENCOUNTER — TELEPHONE (OUTPATIENT)
Dept: MEDICAL GROUP | Age: 82
End: 2017-02-23

## 2017-02-23 DIAGNOSIS — M48.02 SPINAL STENOSIS IN CERVICAL REGION: ICD-10-CM

## 2017-02-23 PROCEDURE — 72050 X-RAY EXAM NECK SPINE 4/5VWS: CPT

## 2017-02-23 NOTE — TELEPHONE ENCOUNTER
ANNUAL WELLNESS VISIT PRE-VISIT PLANNING     1.  Reviewed last PCP office visit assessment and plan notes: Yes    2.  If any orders were placed last visit do we have Results/Consult Notes?        •  Labs? Yes order for 5/1/17 appointment       •  Imaging? Yes order mammogram for 5/1/17 appointment       •  Referrals? No     3.  Patient Care Coordination Note was updated with diagnosis information:  Yes    4.  Patient is due for these Health Maintenance Topics:   Health Maintenance Due   Topic Date Due   • IMM ZOSTER VACCINE  02/28/1996   • Annual Wellness Visit  02/05/2017             5.  Immunizations were updated in [facility]! using WebIZ?: Yes       •  Web Iz Recommendations:  Hep A, Hep B, Zoster       •  Is patient due for Tdap/Shingles? Yes.  If yes, was patient alerted of copay? Yes    6.  Patient has:       •   Diabetes: no       •   COPD: no       •   CHF: no       •   Depression: no    7.  Updated Care Team with [facility] and all specialists?        •   Gait devices, O2, CPAP, etc: yes        •   Eye professional: yes       •   Other specialists (GYN, cardiology, endo, etc): yes    8.  Is patient in need of any refills prior to office visit? No       •    Separate refill encounter created?: no    9.  Patient was informed to arrive 15 min prior to their scheduled appointment and bring in their medication bottles? yes    10.  Patient was advised: “This is a free wellness visit. The provider will screen for medical conditions to help you stay healthy. If you have other concerns to address you may be asked to discuss these at a separate visit or there may be an additional fee.”  Yes

## 2017-03-02 ENCOUNTER — OFFICE VISIT (OUTPATIENT)
Dept: MEDICAL GROUP | Age: 82
End: 2017-03-02
Payer: MEDICARE

## 2017-03-02 ENCOUNTER — HOSPITAL ENCOUNTER (OUTPATIENT)
Dept: LAB | Facility: MEDICAL CENTER | Age: 82
End: 2017-03-02
Attending: FAMILY MEDICINE
Payer: MEDICARE

## 2017-03-02 VITALS
WEIGHT: 137 LBS | BODY MASS INDEX: 21.5 KG/M2 | HEART RATE: 76 BPM | OXYGEN SATURATION: 93 % | SYSTOLIC BLOOD PRESSURE: 118 MMHG | DIASTOLIC BLOOD PRESSURE: 80 MMHG | TEMPERATURE: 97.1 F | HEIGHT: 67 IN

## 2017-03-02 DIAGNOSIS — E83.52 HYPERCALCEMIA: ICD-10-CM

## 2017-03-02 DIAGNOSIS — Z00.00 MEDICARE ANNUAL WELLNESS VISIT, INITIAL: ICD-10-CM

## 2017-03-02 DIAGNOSIS — Z13.6 ENCOUNTER FOR ABDOMINAL AORTIC ANEURYSM (AAA) SCREENING: ICD-10-CM

## 2017-03-02 DIAGNOSIS — Z23 NEED FOR VACCINATION: ICD-10-CM

## 2017-03-02 DIAGNOSIS — Z23 NEED FOR ZOSTER VACCINE: ICD-10-CM

## 2017-03-02 LAB
ALBUMIN SERPL BCP-MCNC: 4.2 G/DL (ref 3.2–4.9)
ALBUMIN/GLOB SERPL: 1.3 G/DL
ALP SERPL-CCNC: 73 U/L (ref 30–99)
ALT SERPL-CCNC: 13 U/L (ref 2–50)
ANION GAP SERPL CALC-SCNC: 7 MMOL/L (ref 0–11.9)
AST SERPL-CCNC: 22 U/L (ref 12–45)
BILIRUB SERPL-MCNC: 0.5 MG/DL (ref 0.1–1.5)
BUN SERPL-MCNC: 13 MG/DL (ref 8–22)
CALCIUM SERPL-MCNC: 10.7 MG/DL (ref 8.5–10.5)
CHLORIDE SERPL-SCNC: 103 MMOL/L (ref 96–112)
CO2 SERPL-SCNC: 28 MMOL/L (ref 20–33)
CREAT SERPL-MCNC: 0.56 MG/DL (ref 0.5–1.4)
GLOBULIN SER CALC-MCNC: 3.2 G/DL (ref 1.9–3.5)
GLUCOSE SERPL-MCNC: 77 MG/DL (ref 65–99)
POTASSIUM SERPL-SCNC: 4.6 MMOL/L (ref 3.6–5.5)
PROT SERPL-MCNC: 7.4 G/DL (ref 6–8.2)
SODIUM SERPL-SCNC: 138 MMOL/L (ref 135–145)

## 2017-03-02 PROCEDURE — 36415 COLL VENOUS BLD VENIPUNCTURE: CPT

## 2017-03-02 PROCEDURE — 90736 HZV VACCINE LIVE SUBQ: CPT | Performed by: FAMILY MEDICINE

## 2017-03-02 PROCEDURE — 80053 COMPREHEN METABOLIC PANEL: CPT

## 2017-03-02 PROCEDURE — 90471 IMMUNIZATION ADMIN: CPT | Performed by: FAMILY MEDICINE

## 2017-03-02 ASSESSMENT — PATIENT HEALTH QUESTIONNAIRE - PHQ9: CLINICAL INTERPRETATION OF PHQ2 SCORE: 0

## 2017-03-02 ASSESSMENT — PAIN SCALES - GENERAL: PAINLEVEL: NO PAIN

## 2017-03-02 NOTE — PROGRESS NOTES
Chief Complaint   Patient presents with   • Annual Wellness Visit         HPI:  Lucita is a 82 y.o. female here for Medicare Annual Wellness Visit        Patient Active Problem List    Diagnosis Date Noted   • Weakness of right upper extremity 07/15/2016     Priority: High   • Central cord syndrome (CMS-HCC) 07/14/2016     Priority: High   • DVT prophylaxis 07/15/2016     Priority: Medium   • Right wrist pain 07/16/2016     Priority: Low   • Trauma 07/15/2016     Priority: Low   • Elevated troponin 07/14/2016     Priority: Low   • Elevated CPK 07/14/2016     Priority: Low   • Essential hypertension      Priority: Low   • Anxiety      Priority: Low   • Tension type headache 11/28/2016   • Medication refill 11/02/2016   • Medication management 11/02/2016   • Gastroesophageal reflux disease without esophagitis 11/02/2016   • Acute conjunctivitis 10/24/2016   • Preventative health care 10/24/2016   • Postmenopausal 10/24/2016   • Nausea 10/18/2016   • Cervical postlaminectomy syndrome 10/06/2016   • Chronic neck pain 10/06/2016   • Fall 09/22/2016   • Central cervical cord injury, without spinal bony injury, C1-4 (CMS-HCC) 07/27/2016   • Rhabdomyolysis 07/18/2016   • Upper extremity weakness 07/18/2016   • Injury of cervical spine (CMS-HCC) 07/01/2016   • Knee pain, right 06/22/2016   • Hyperlipidemia 05/24/2016   • History of recurrent UTIs 05/24/2016   • Hyperparathyroidism (CMS-HCC) 12/02/2015   • Chronic venous insufficiency 11/18/2015   • Vitamin D insufficiency 11/05/2015   • Hypercalcemia 11/02/2015   • Dilation of thoracic aorta (CMS-HCC) 12/01/2014   • Fistula of gallbladder 01/08/2013   • HX: breast cancer 05/09/2012   • Primary osteoarthritis of both knees 05/09/2012   • Osteopenia        Current Outpatient Prescriptions   Medication Sig Dispense Refill   • MAGNESIUM-OXIDE 400 (241.3 MG) MG Tab tablet TAKE 1 TABLET BY MOUTH 2 TIMES A DAY 60 Tab 0   • metoprolol (LOPRESSOR) 100 MG Tab TAKE 1 TABLET BY MOUTH 2  TIMES A DAY. 60 Tab 0   • Misc. Devices Misc Please give patient shingles vaccine 1 Application 0   • PHOSPHA 250 NEUTRAL 155-852-130 MG tablet TAKE 1 TAB BY MOUTH 2 TIMES A DAY. 60 Tab 1   • phosphorus (K-PHOS-NEUTRAL, PHOSPHA 250 NEUTRAL) 155-852-130 MG tablet Take 1 Tab by mouth 2 times a day. 180 Tab 0   • acetaminophen (TYLENOL) 160 MG/5ML Suspension Take 15 mg/kg by mouth every 6 hours as needed.     • polymixin-trimethoprim (POLYTRIM) 14069-7.1 UNIT/ML-% Solution Place 1 Drop in both eyes every 4 hours. 10 mL 0   • calcium-vitamin D (OSCAL 500 +D) 500-200 MG-UNIT Tab Take 1 Tab by mouth 2 times a day, with meals. 100 Tab 3   • Probiotic Cap Take  by mouth.     • sertraline (ZOLOFT) 50 MG Tab Take 1 Tab by mouth every day. 30 Tab 5   • bethanechol (URECHOLINE) 25 MG Tab Take 1 Tab by mouth 3 times a day. 90 Tab 0   • [facility] D3 2000 UNITS Cap Take 1 Cap by mouth every day.  0   • vitamin D 2000 UNIT Tab Take 1 Tab by mouth every day. 30 Tab    • timolol (TIMOPTIC) 0.5 % Solution Place 1 Drop in both eyes every day. 1 Bottle 3   • therapeutic multivitamin-minerals (THERAGRAN-M) Tab Take 1 Tab by mouth every day. 30 Tab 11   • alprazolam (XANAX) 0.25 MG Tab TAKE 1 TABLET ORALLY AT BEDTIME AS NEEDED FOR SLEEP OR ANXIETY (Patient not taking: Reported on 3/2/2017) 60 Tab 0   • omeprazole (PRILOSEC) 20 MG delayed-release capsule Take 1 Cap by mouth every day. (Patient not taking: Reported on 3/2/2017) 90 Cap 2   • bacitracin-polymyxin b (POLYSPORIN) 500-23917 UNIT/GM Ointment Apply 1 Each to affected area(s) 2 times a day. (Patient not taking: Reported on 3/2/2017) 1 Tube 0   • Teriparatide, Recombinant, (FORTEO) 600 MCG/2.4ML Solution Inject  as instructed.     • sertraline (ZOLOFT) 25 MG tablet Take 1 Tab by mouth every day. (Patient not taking: Reported on 3/2/2017) 30 Tab 5   • Teriparatide, Recombinant, (FORTEO) 600 MCG/2.4ML Solution Inject 20 mcg as instructed every day. (Patient not taking: Reported on 3/2/2017)  1 PEN 0     No current facility-administered medications for this visit.        The patient reports adherence to this regimen   Current supplements as per medication list.   Chronic narcotic pain medicines: no    Allergies: Ace inhibitors; Augmentin; Erythromycin; Lisinopril; Penicillins; Epinephrine; Percocet; and Sulfa drugs    Current social contact/activities: book club, luis daniel,      Is patient current with immunizations?  no   If no, due for shingles YES, will give today    She  reports that she quit smoking about 47 years ago. She has never used smokeless tobacco. She reports that she drinks about 0.6 oz of alcohol per week. She reports that she does not use illicit drugs.  Counseling given: Yes        DPA/Advanced Directive:  Patient does not have an advanced directive.  If not on file, instructed to bring in a copy to scan into her chart. If no advanced directive exists, a packet and workshop information was provided    ROS:    Gait: Uses a cane   Ostomy: no   Other tubes: no   Amputations: no   Chronic oxygen use no   Last eye exam 2/4/2016   : Denies incontinence.   Depression Screening    Little interest or pleasure in doing things?  0 - not at all  Feeling down, depressed, or hopeless?  0 - not at all  Patient Health Questionnaire Score: 0    If depressive symptoms identified deferred to follow up visit unless specifically addressed in assessment and plan.    Screening for Cognitive Impairment    Three Minute Recall (banana, sunrise, fence)  3/3 Apple , pam, table  Draw clock face with all 12 numbers set to the hand to show 10 minutes past 11 o'clock  1 5/5  Cognitive concerns identified deferred for follow up unless specifically addressed in assessment and plan.    Fall Risk Assessment    Has the patient had two or more falls in the last year or any fall with injury in the last year?  No    Safety Assessment    Throw rugs on floor.  No  Handrails on all stairs.  Yes  Good lighting in all hallways.   Yes  Difficulty hearing.  No  Patient counseled about all safety risks that were identified.    Functional Assessment ADLs    Are there any barriers preventing you from cooking for yourself or meeting nutritional needs?  No.    Are there any barriers preventing you from driving safely or obtaining transportation?  No.    Are there any barriers preventing you from using a telephone or calling for help?  No.    Are there any barriers preventing you from shopping?  No.    Are there any barriers preventing you from taking care of your own finances?  No.    Are there any barriers preventing you from managing your medications?  No.    Are currently engaging any exercise or physical activity?  Yes.  Stationary bike, PT exercises    Health Maintenance Summary                IMM ZOSTER VACCINE Overdue 2/28/1996     Annual Wellness Visit Overdue 2/5/2017      Done 2/5/2016 Visit Dx: Medicare annual wellness visit, subsequent     Patient has more history with this topic...    MAMMOGRAM Next Due 3/14/2017      Done 3/14/2016 MA-SCREEN MAMMO W/CAD-BILAT     Patient has more history with this topic...    IMM PNEUMOCOCCAL 65+ (ADULT) LOW/MEDIUM RISK SERIES Next Due 7/18/2017      Done 7/18/2016 Imm Admin: Pneumococcal Conjugate Vaccine (Prevnar/PCV-13)    COLONOSCOPY Next Due 3/2/2018      Done 3/2/2015 AMB REFERRAL TO GI FOR COLONOSCOPY    BONE DENSITY Next Due 1/8/2021      Done 1/8/2016 DS-BONE DENSITY STUDY (DEXA)     Patient has more history with this topic...    IMM DTaP/Tdap/Td Vaccine Next Due 2/1/2027      Done 2/1/2017 Imm Admin: Tdap Vaccine          Patient Care Team:  Farooq Sloan M.D. as PCP - General (Family Medicine)  Neo Andres M.D. as Consulting Physician (Endocrinology)  Barbra Allen M.D. as Consulting Physician (Ophthalmology)  Jay Dubois M.D. as Consulting Physician (Gastroenterology)  Kamlesh Grider D.P.M. as Consulting Physician (Podiatry)  Jess Shelton R.N. as Care  Manager  Niles Khan M.D. as Consulting Physician (Neurosurgery)    Social History   Substance Use Topics   • Smoking status: Former Smoker -- 0.50 packs/day for 45 years     Quit date: 10/09/1969   • Smokeless tobacco: Never Used   • Alcohol Use: 0.6 oz/week     1 Glasses of wine per week      Comment: ocassionally     Family History   Problem Relation Age of Onset   • Hypertension Mother    • Stroke Mother    • Cancer Neg Hx    • Diabetes Neg Hx    • Heart Disease Neg Hx    • Other Father      Dementia   • Other Sister      BIpolar   • Other Sister      Bipolar   • Other Sister      THyroid disease     She  has a past medical history of Hypertension; Osteopenia; Sarcoid (CMS-HCC); Depression; Anxiety; Breast cancer (CMS-HCC) (2008); Aneurysm (CMS-HCC); Other specified disorder of intestines; Indigestion; Anesthesia; Arthritis; Cancer (CMS-HCC) (2008); Dental disorder; UTI (urinary tract infection) (); CATARACT; and Injury of cervical spine (CMS-HCC) (July 2016). She also has no past medical history of Stroke (CMS-HCC), Seizure (CMS-HCC), Diabetes (CMS-HCC), or Pacemaker.   Past Surgical History   Procedure Laterality Date   • Vein stripping       R leg   • Mass excision general  7/08     chest mass biopsy sarcoid   • Breast biopsy  5/27/08     Performed by KENRICK HOWELL at SURGERY SAME DAY [facility] ORS   • Abdominal hysterectomy total  1997   • Cataract phaco with iol  8/11/2010     Performed by AVANI ALLEN at SURGERY SAME DAY [facility] ORS   • Colonoscopy  2007     2 polyps   • Ercp in or  12/28/2012     Performed by Jay Dubois M.D. at SURGERY [facility] ORS   • Kimmie by laparoscopy  1/8/2013     Performed by Kenrick Howell M.D. at SURGERY [facility] ORS   • Lumpectomy  2008     right   • Pr radiation therapy plan simple  2008     right   • Cataract phaco with iol  8/27/2014     Performed by Avani Allen M.D. at SURGERY SAME DAY [facility]   • Cervical disk and fusion anterior   7/27/2016     Procedure: CERVICAL DISK AND FUSION ANTERIOR C4-7;  Surgeon: Niles Khan M.D.;  Location: SURGERY [facility];  Service:            Exam:     There were no vitals taken for this visit. There is no weight on file to calculate BMI.    Hearing good.    Dentition good  Alert, oriented in no acute distress.  Eye contact is good, speech goal directed, affect calm      Assessment and Plan. The following treatment and monitoring plan is recommended:      No diagnosis found.     Annual wellness visit      Services needed: Caregiver assistance, patient has care giver visit every wk (VISITING [facility]) (cost's her $410dollars)  Health Care Screening recommendations as per orders if indicated.  Referrals offered: PT/OT/Nutrition counseling/Behavioral Health/Smoking cessation as per orders if indicated.    Discussion today about general wellness and lifestyle habits:    · Prevent falls and reduce trip hazards; Cautioned about securing or removing rugs.  · Have a working fire alarm and carbon monoxide detector;   · Engage in regular physical activity and social activities       Follow-up: No Follow-up on file.

## 2017-03-02 NOTE — MR AVS SNAPSHOT
Lucita Babcock   3/2/2017 10:00 AM   Office Visit   MRN: [id number]    Department:  [address] [place] [address]   Dept Phone:  [phone]    Description:  Female : 1935   Provider:  Farooq Sloan M.D.; [facility]            Reason for Visit     Annual Wellness Visit           Allergies as of 3/2/2017     Allergen Noted Reactions    Ace Inhibitors 2013   Anaphylaxis    Augmentin 2009       Flu like sx    Erythromycin 2008   Rash    Lisinopril 2009   Anaphylaxis    Penicillins 2008   Hives    Epinephrine 2015   Unspecified    shaking    Percocet [Oxycodone-Acetaminophen] 2013   Vomiting    Sulfa Drugs 2010   Hives      You were diagnosed with     Need for vaccination   [738814]       Encounter for abdominal aortic aneurysm (AAA) screening   [6622926]       Need for zoster vaccine   [024849]       Hypercalcemia   [275.42.ICD-9-CM]         Vital Signs     Smoking Status                   Former Smoker           Basic Information     Date Of Birth Sex Race Ethnicity Preferred Language    1935 Female White Non- English      Your appointments     Mar 22, 2017  1:00 PM   Established Patient with Neo Andres M.D.   [facility] ([facility]    [address], [address]  [facility] [address] [phone]           You will be receiving a confirmation call a few days before your appointment from our automated call confirmation system.            2017  3:40 PM   MA SCRN10 with S CATIE MG 1   [facility] ([place])    [address]  [place] [address] [phone]           No deodorant, powder, perfume or lotion under the arm or breast area.            May 01, 2017  1:40 PM   Established Patient with Farooq Sloan M.D.   [address]    [address]  [place] [id number] [phone]           You will be receiving a confirmation call a few days before your appointment from our automated call confirmation system.              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   12/1/2014 - Present    Hypercalcemia E83.52   11/2/2015 - Present    Vitamin D insufficiency E55.9   11/5/2015 - Present    Chronic venous insufficiency I87.2   11/18/2015 - Present    Hyperparathyroidism (CMS-HCC) E21.3   12/2/2015 - Present    Hyperlipidemia E78.5   5/24/2016 - Present    History of recurrent UTIs Z87.440   5/24/2016 - Present    Knee pain, right M25.561   6/22/2016 - Present    Central cord syndrome (CMS-HCC) S14.129A High  7/14/2016 - Present    Elevated troponin R79.89 Low  7/14/2016 - Present    Elevated CPK R74.8 Low  7/14/2016 - Present    Weakness of right upper extremity M62.81 High  7/15/2016 - Present    DVT prophylaxis [id number] Medium  7/15/2016 - Present    Trauma T14.90 Low  7/15/2016 - Present    Right wrist pain M25.531 Low  7/16/2016 - Present    Rhabdomyolysis M62.82   7/18/2016 - Present    Upper extremity weakness M62.81   7/18/2016 - Present    Central cervical cord injury, without spinal bony injury, C1-4 (CMS-HCC) S14.129A   7/27/2016 - Present    Fall W19.XXXA   9/22/2016 - Present    Cervical postlaminectomy syndrome M96.1   10/6/2016 - Present    Chronic neck pain M54.2, G89.29   10/6/2016 - Present    Nausea R11.0   10/18/2016 - Present    Acute conjunctivitis H10.30   10/24/2016 - Present    Preventative health care Z00.00   10/24/2016 - Present    Postmenopausal Z78.0   10/24/2016 - Present    Medication refill Z76.0    11/2/2016 - Present    Medication management Z79.899   11/2/2016 - Present    Gastroesophageal reflux disease without esophagitis K21.9   11/2/2016 - Present    Tension type headache G44.209   11/28/2016 - Present    Injury of cervical spine (CMS-HCC) S14.109A   7/1/2016 - Present    Encounter for abdominal aortic aneurysm (AAA) screening Z13.6   3/2/2017 - Present      Health Maintenance        Date Due Completion Dates    MAMMOGRAM 3/14/2017 3/14/2016, 3/12/2015, 3/10/2014, 3/7/2013, 3/6/2012, 2/3/2011, 1/19/2010, 1/19/2010, 7/29/2009, 7/29/2009, 3/23/2009, 3/23/2009, 4/9/2008, 3/31/2008, 3/31/2008, 3/28/2007, 3/28/2007, 3/27/2006, 3/24/2005    IMM PNEUMOCOCCAL 65+ (ADULT) LOW/MEDIUM RISK SERIES (2 of 2 - PPSV23) 7/18/2017 7/18/2016    COLONOSCOPY 3/2/2018 3/2/2015    BONE DENSITY 1/8/2021 1/8/2016, 1/31/2013, 10/20/2008    IMM DTaP/Tdap/Td Vaccine (2 - Td) 2/1/2027 2/1/2017            Current Immunizations     13-VALENT PCV PREVNAR 7/18/2016  2:44 PM    Influenza TIV (IM) 10/15/2012 11:28 AM    Influenza Vaccine Adult HD 11/22/2016, 10/15/2015    Influenza Vaccine Pediatric 11/16/2006, 10/18/2005    Pneumococcal Vaccine (UF)Historical Data 11/16/2006    SHINGLES VACCINE 3/2/2017    Tdap Vaccine 2/1/2017      Below and/or attached are the medications your provider expects you to take. Review all of your home medications and newly ordered medications with your provider and/or pharmacist. Follow medication instructions as directed by your provider and/or pharmacist. Please keep your medication list with you and share with your provider. Update the information when medications are discontinued, doses are changed, or new medications (including over-the-counter products) are added; and carry medication information at all times in the event of emergency situations     Allergies:  ACE INHIBITORS - Anaphylaxis     AUGMENTIN - (reactions not documented)     ERYTHROMYCIN - Rash     LISINOPRIL - Anaphylaxis     PENICILLINS -  Hives     EPINEPHRINE - Unspecified     PERCOCET - Vomiting     SULFA DRUGS - Hives               Medications  Valid as of: March 02, 2017 - 10:57 AM    Generic Name Brand Name Tablet Size Instructions for use    Acetaminophen (Suspension) TYLENOL 160 MG/5ML Take 15 mg/kg by mouth every 6 hours as needed.        ALPRAZolam (Tab) XANAX 0.25 MG TAKE 1 TABLET ORALLY AT BEDTIME AS NEEDED FOR SLEEP OR ANXIETY        Bacitracin-Polymyxin B (Ointment) POLYSPORIN 500-43121 UNIT/GM Apply 1 Each to affected area(s) 2 times a day.        Bethanechol Chloride (Tab) URECHOLINE 25 MG Take 1 Tab by mouth 3 times a day.        Calcium Carbonate-Vitamin D (Tab) OSCAL 500 +D 500-200 MG-UNIT Take 1 Tab by mouth 2 times a day, with meals.        Cholecalciferol (Tab) Cholecalciferol 2000 UNIT Take 1 Tab by mouth every day.        Cholecalciferol (Cap) CVS D3 2000 UNITS Take 1 Cap by mouth every day.        K Phos [place]-Sod Phos Di & Mono (Tab) K-PHOS-NEUTRAL, PHOSPHA 250 NEUTRAL 155-852-130 MG Take 1 Tab by mouth 2 times a day.        K Phos [place]-Sod Phos Di & Mono (Tab) PHOSPHA 250 NEUTRAL 155-852-130 MG TAKE 1 TAB BY MOUTH 2 TIMES A DAY.        Magnesium Oxide (Tab) MAGNESIUM-OXIDE 400 (241.3 MG) MG TAKE 1 TABLET BY MOUTH 2 TIMES A DAY        Metoprolol Tartrate (Tab) LOPRESSOR 100 MG TAKE 1 TABLET BY MOUTH 2 TIMES A DAY.        Misc. Devices (Misc) Misc. Devices  Please give patient shingles vaccine        Multiple Vitamins-Minerals (Tab) THERAGRAN-M  Take 1 Tab by mouth every day.        Omeprazole (CAPSULE DELAYED RELEASE) PRILOSEC 20 MG Take 1 Cap by mouth every day.        Polymyxin B-Trimethoprim (Solution) POLYTRIM 79397-5.1 UNIT/ML-% Place 1 Drop in both eyes every 4 hours.        Probiotic Product (Cap) Probiotic  Take  by mouth.        Sertraline HCl (Tab) ZOLOFT 50 MG Take 1 Tab by mouth every day.        Sertraline HCl (Tab) ZOLOFT 25 MG Take 1 Tab by mouth every day.        Teriparatide (Recombinant) (Solution)  Teriparatide (Recombinant) 600 MCG/2.4ML Inject 20 mcg as instructed every day.        Teriparatide (Recombinant) (Solution) Teriparatide (Recombinant) 600 MCG/2.4ML Inject  as instructed.        Timolol Maleate (Solution) TIMOPTIC 0.5 % Place 1 Drop in both eyes every day.        .                 Medicines prescribed today were sent to:     [facility]Y #[id number] - [place], [place] - [address]    [address] [place] [address]    Phone: [phone] Fax: [phone]    Open 24 Hours?: No      Medication refill instructions:       If your prescription bottle indicates you have medication refills left, it is not necessary to call your provider’s office. Please contact your pharmacy and they will refill your medication.    If your prescription bottle indicates you do not have any refills left, you may request refills at any time through one of the following ways: The online [employer] system (except Urgent Care), by calling your provider’s office, or by asking your pharmacy to contact your provider’s office with a refill request. Medication refills are processed only during regular business hours and may not be available until the next business day. Your provider may request additional information or to have a follow-up visit with you prior to refilling your medication.   *Please Note: Medication refills are assigned a new Rx number when refilled electronically. Your pharmacy may indicate that no refills were authorized even though a new prescription for the same medication is available at the pharmacy. Please request the medicine by name with the pharmacy before contacting your provider for a refill.        Your To Do List     Future Labs/Procedures Complete By Expires    COMP METABOLIC PANEL  As directed 3/3/2018    US-ABDOMEN LIMITED  As directed 3/2/2018      Other Notes About Your Plan     Please assess the following diagnosis:  E21.3-hyperparathyroidism    -this patient has been on Zoloft since 11/2012, with  diagnosis of anxiety and depression. ? If treating major depressive disorder, single episode, unspecified degree, code F32.9    Patient report to me that she was treated with Zoloft initially that she was depressed when she lost her  and she also has mild anxiety. Medication helps ocontrolled her mood. Now she denied feeling depressed. She is continuously taking it for her anxiety.  NC                    [employer] Access Code: Activation code not generated  Current [employer] Status: Active

## 2017-03-07 ENCOUNTER — PATIENT OUTREACH (OUTPATIENT)
Dept: HEALTH INFORMATION MANAGEMENT | Facility: OTHER | Age: 82
End: 2017-03-07

## 2017-03-07 NOTE — PROGRESS NOTES
"RN Care Coordinator outreach call to patient for follow-up. CC spoke to patient and reviewed LPOC.  Patient reports she had recent visit with Dr. Sloan. States she is scheduled to have ultrasound of the aorta at the end of the month. States she isn't sure if she needs this test. Reports cardiologist said \"my heart was fine\". Patient states she will get exam completed as ordered.    Reports she is scheduled to see urologist on Friday. Reports she follows with urology for history of UTI. Reports she did have some abdominal cramping and lower pain in her abdomen over the weekend. States \"I feel fine now\". Reports no pain or burning with urination. Patient will plan on keeping appointment for Friday. If she notices any pain or problems with abdominal pain she will contact urology office before Friday.    Patient reports she is scheduled to see orthopedic provider on 4/4/17 for osteoarthritis of knees. States she has been trying to keep as active as possible and is walking and doing her exercise bike as tolerated. States she is using her cane to assist with mobility. Reports no recent falls.    Patient continues to have caregivers with visiting [place] helping her daily. States she hasn't resumed driving yet but plans on driving in the future.     Plan:  CC will follow-up in 30 days. Education reinforced on using cane at all times. Discussed follow-up with appointments as scheduled, sooner if needed. Patient voiced understanding.   "

## 2017-03-10 ENCOUNTER — HOSPITAL ENCOUNTER (OUTPATIENT)
Facility: MEDICAL CENTER | Age: 82
End: 2017-03-10
Attending: PHYSICIAN ASSISTANT
Payer: MEDICARE

## 2017-03-10 PROCEDURE — 87086 URINE CULTURE/COLONY COUNT: CPT

## 2017-03-12 LAB
BACTERIA UR CULT: NORMAL
SIGNIFICANT IND 70042: NORMAL
SOURCE SOURCE: NORMAL

## 2017-03-20 ENCOUNTER — TELEPHONE (OUTPATIENT)
Dept: MEDICAL GROUP | Age: 82
End: 2017-03-20

## 2017-03-20 NOTE — TELEPHONE ENCOUNTER
1. Caller Name: Lucita                                         Call Back Number: [phone] (home)       Patient approves a detailed voicemail message: no    2. What are the patient's symptoms (location & severity)? diarrhea    3. Is this a new symptom Yes    4. When did it start? One week ago    5. Action taken per Active Symptom Guide: patient states she has quit taking phospha yesterday.  Once she stopped, she states that she has not noticed diarrhea.  Please advise.

## 2017-03-20 NOTE — TELEPHONE ENCOUNTER
I spoke with patient and addressed all concerns. Her diarrhea is resolved, she was encouraged to obtain adequate hydration with water.  If diarrhea returns , she will come in to clinic.       Robbie Sloan MD  Diplomat, [facility]  [address] [place] [id number]

## 2017-03-20 NOTE — TELEPHONE ENCOUNTER
1. Caller Name: Lucita                                         Call Back Number: [phone] (home)       Patient approves a detailed voicemail message: no    Patient states she cancelled her abdomen US due to the other imaging test she just recently had done.  Patient wants to inform Dr. Sloan of this cancellation and if he is okay with that.  Please advise.

## 2017-03-21 ENCOUNTER — HOSPITAL ENCOUNTER (OUTPATIENT)
Dept: LAB | Facility: MEDICAL CENTER | Age: 82
End: 2017-03-21
Attending: INTERNAL MEDICINE
Payer: MEDICARE

## 2017-03-21 DIAGNOSIS — M85.80 OSTEOPENIA: ICD-10-CM

## 2017-03-21 DIAGNOSIS — E55.9 VITAMIN D INSUFFICIENCY: ICD-10-CM

## 2017-03-21 LAB
25(OH)D3 SERPL-MCNC: 22 NG/ML (ref 30–100)
ALBUMIN SERPL BCP-MCNC: 4.4 G/DL (ref 3.2–4.9)
ALBUMIN/GLOB SERPL: 1.4 G/DL
ALP SERPL-CCNC: 77 U/L (ref 30–99)
ALT SERPL-CCNC: 14 U/L (ref 2–50)
ANION GAP SERPL CALC-SCNC: 5 MMOL/L (ref 0–11.9)
AST SERPL-CCNC: 20 U/L (ref 12–45)
BILIRUB SERPL-MCNC: 0.5 MG/DL (ref 0.1–1.5)
BUN SERPL-MCNC: 10 MG/DL (ref 8–22)
CALCIUM SERPL-MCNC: 10.8 MG/DL (ref 8.5–10.5)
CHLORIDE SERPL-SCNC: 101 MMOL/L (ref 96–112)
CO2 SERPL-SCNC: 32 MMOL/L (ref 20–33)
CREAT SERPL-MCNC: 0.56 MG/DL (ref 0.5–1.4)
GLOBULIN SER CALC-MCNC: 3.2 G/DL (ref 1.9–3.5)
GLUCOSE SERPL-MCNC: 69 MG/DL (ref 65–99)
POTASSIUM SERPL-SCNC: 3.9 MMOL/L (ref 3.6–5.5)
PROT SERPL-MCNC: 7.6 G/DL (ref 6–8.2)
PTH-INTACT SERPL-MCNC: 67.3 PG/ML (ref 14–72)
SODIUM SERPL-SCNC: 138 MMOL/L (ref 135–145)

## 2017-03-21 PROCEDURE — 80053 COMPREHEN METABOLIC PANEL: CPT

## 2017-03-21 PROCEDURE — 83970 ASSAY OF PARATHORMONE: CPT

## 2017-03-21 PROCEDURE — 36415 COLL VENOUS BLD VENIPUNCTURE: CPT

## 2017-03-21 PROCEDURE — 82306 VITAMIN D 25 HYDROXY: CPT

## 2017-03-22 ENCOUNTER — OFFICE VISIT (OUTPATIENT)
Dept: ENDOCRINOLOGY | Facility: MEDICAL CENTER | Age: 82
End: 2017-03-22
Payer: MEDICARE

## 2017-03-22 VITALS
SYSTOLIC BLOOD PRESSURE: 120 MMHG | HEIGHT: 67 IN | HEART RATE: 60 BPM | WEIGHT: 135 LBS | BODY MASS INDEX: 21.19 KG/M2 | OXYGEN SATURATION: 91 % | DIASTOLIC BLOOD PRESSURE: 70 MMHG

## 2017-03-22 DIAGNOSIS — E21.3 HYPERPARATHYROIDISM (HCC): ICD-10-CM

## 2017-03-22 DIAGNOSIS — E55.9 VITAMIN D DEFICIENCY: ICD-10-CM

## 2017-03-22 DIAGNOSIS — E83.52 HYPERCALCEMIA: Primary | ICD-10-CM

## 2017-03-22 PROCEDURE — G8482 FLU IMMUNIZE ORDER/ADMIN: HCPCS | Performed by: INTERNAL MEDICINE

## 2017-03-22 PROCEDURE — 1101F PT FALLS ASSESS-DOCD LE1/YR: CPT | Performed by: INTERNAL MEDICINE

## 2017-03-22 PROCEDURE — 1036F TOBACCO NON-USER: CPT | Performed by: INTERNAL MEDICINE

## 2017-03-22 PROCEDURE — 4040F PNEUMOC VAC/ADMIN/RCVD: CPT | Performed by: INTERNAL MEDICINE

## 2017-03-22 PROCEDURE — 99214 OFFICE O/P EST MOD 30 MIN: CPT | Performed by: INTERNAL MEDICINE

## 2017-03-22 PROCEDURE — G8432 DEP SCR NOT DOC, RNG: HCPCS | Performed by: INTERNAL MEDICINE

## 2017-03-22 PROCEDURE — G8420 CALC BMI NORM PARAMETERS: HCPCS | Performed by: INTERNAL MEDICINE

## 2017-03-22 NOTE — MR AVS SNAPSHOT
"        Lucita Babcock   3/22/2017 1:00 PM   Office Visit   MRN: [id number]    Department:  Endocrinology Med [facility]   Dept Phone:  [phone]    Description:  Female : 1935   Provider:  Neo Andres M.D.           Allergies as of 3/22/2017     Allergen Noted Reactions    Ace Inhibitors 2013   Anaphylaxis    Augmentin 2009       Flu like sx    Erythromycin 2008   Rash    Lisinopril 2009   Anaphylaxis    Penicillins 2008   Hives    Epinephrine 2015   Unspecified    shaking    Percocet [Oxycodone-Acetaminophen] 2013   Vomiting    Sulfa Drugs 2010   Hives      You were diagnosed with     Hypercalcemia   [275.42.ICD-9-CM]  -  Primary     Hyperparathyroidism (CMS-HCC)   [2622966]       Vitamin D deficiency   [0529749]         Vital Signs     Blood Pressure Pulse Height Weight Body Mass Index Oxygen Saturation    120/70 mmHg 60 1.702 m (5' 7\") 61.236 kg (135 lb) 21.14 kg/m2 91%    Smoking Status                   Former Smoker           Basic Information     Date Of Birth Sex Race Ethnicity Preferred Language    1935 Female White Non- English      Your appointments     Mar 23, 2017  9:20 AM   Established Patient with Farooq Sloan M.D.   [facility] [address] [place] [address] [phone]           You will be receiving a confirmation call a few days before your appointment from our automated call confirmation system.            2017 11:30 AM   US AORTA 30 with VISTA US 2   IMAGING VISTA ([place])    [address] [facility] [address] [phone]           Adults: Fasting 8 hours. Water is okay. Encourage patient to take medications Children under 12 years: Fasting 4-6 hours Infants: Skip 1 feeding            2017  3:40 PM   MA SCRN10 with S MAXIMUSARRAN MG 1   [facility]AM[facility] ([place])    [address]  [facility] [address]   "   [phone]           No deodorant, powder, perfume or lotion under the arm or breast area.            May 01, 2017  1:40 PM   Established Patient with Farooq Sloan M.D.   [facility] [address] ([facility])    [address]  [place] [address] [phone]           You will be receiving a confirmation call a few days before your appointment from our automated call confirmation system.            Sep 25, 2017  1:20 PM   Established Patient with Neo Andres M.D.   [facility] & Endocrinology ([facility])    [address]  [place] [address] [phone]           You will be receiving a confirmation call a few days before your appointment from our automated call confirmation system.              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   12/1/2014 - Present    Hypercalcemia E83.52   11/2/2015 - Present    Vitamin D insufficiency E55.9   11/5/2015 - Present    Chronic venous insufficiency I87.2   11/18/2015 - Present    Hyperparathyroidism (CMS-HCC) E21.3   12/2/2015 - Present    Hyperlipidemia E78.5   5/24/2016 - Present    History of recurrent UTIs Z87.440   5/24/2016 - Present    Knee pain, right M25.561   6/22/2016 - Present    Central cord syndrome (CMS-[facility]) S14.129A High  7/14/2016 - Present    Elevated troponin R79.89 Low  7/14/2016 - Present    Elevated CPK R74.8 Low  7/14/2016 - Present    Trauma T14.90 Low  7/15/2016 - Present    Right wrist pain M25.531 Low  7/16/2016 - Present    Rhabdomyolysis M62.82   7/18/2016 - Present    Upper extremity weakness M62.81   7/18/2016  - Present    Central cervical cord injury, without spinal bony injury, C1-4 (CMS-HCC) S14.129A   7/27/2016 - Present    Fall W19.XXXA   9/22/2016 - Present    Cervical postlaminectomy syndrome M96.1   10/6/2016 - Present    Chronic neck pain M54.2, G89.29   10/6/2016 - Present    Nausea R11.0   10/18/2016 - Present    Acute conjunctivitis H10.30   10/24/2016 - Present    Preventative health care Z00.00   10/24/2016 - Present    Postmenopausal Z78.0   10/24/2016 - Present    Medication refill Z76.0   11/2/2016 - Present    Gastroesophageal reflux disease without esophagitis K21.9   11/2/2016 - Present    Tension type headache G44.209   11/28/2016 - Present    Injury of cervical spine (CMS-HCC) S14.109A   7/1/2016 - Present    Encounter for abdominal aortic aneurysm (AAA) screening Z13.6   3/2/2017 - Present    Medicare annual wellness visit, initial Z00.00   3/2/2017 - Present    Vitamin D deficiency E55.9   3/22/2017 - Present      Health Maintenance        Date Due Completion Dates    MAMMOGRAM 3/14/2017 3/14/2016, 3/12/2015, 3/10/2014, 3/7/2013, 3/6/2012, 2/3/2011, 1/19/2010, 1/19/2010, 7/29/2009, 7/29/2009, 3/23/2009, 3/23/2009, 4/9/2008, 3/31/2008, 3/31/2008, 3/28/2007, 3/28/2007, 3/27/2006, 3/24/2005    IMM PNEUMOCOCCAL 65+ (ADULT) LOW/MEDIUM RISK SERIES (2 of 2 - PPSV23) 7/18/2017 7/18/2016    COLONOSCOPY 3/2/2018 3/2/2015    BONE DENSITY 1/8/2021 1/8/2016, 1/31/2013, 10/20/2008    IMM DTaP/Tdap/Td Vaccine (2 - Td) 2/1/2027 2/1/2017            Current Immunizations     13-VALENT PCV PREVNAR 7/18/2016  2:44 PM    Influenza TIV (IM) 10/15/2012 11:28 AM    Influenza Vaccine Adult HD 11/22/2016, 10/15/2015    Influenza Vaccine Pediatric 11/16/2006, 10/18/2005    Pneumococcal Vaccine (UF)Historical Data 11/16/2006    SHINGLES VACCINE 3/2/2017    Tdap Vaccine 2/1/2017      Below and/or attached are the medications your provider expects you to take. Review all of your home medications and newly ordered medications  with your provider and/or pharmacist. Follow medication instructions as directed by your provider and/or pharmacist. Please keep your medication list with you and share with your provider. Update the information when medications are discontinued, doses are changed, or new medications (including over-the-counter products) are added; and carry medication information at all times in the event of emergency situations     Allergies:  ACE INHIBITORS - Anaphylaxis     AUGMENTIN - (reactions not documented)     ERYTHROMYCIN - Rash     LISINOPRIL - Anaphylaxis     PENICILLINS - Hives     EPINEPHRINE - Unspecified     PERCOCET - Vomiting     SULFA DRUGS - Hives               Medications  Valid as of: March 22, 2017 -  1:28 PM    Generic Name Brand Name Tablet Size Instructions for use    Acetaminophen (Suspension) TYLENOL 160 MG/5ML Take 15 mg/kg by mouth every 6 hours as needed.        Bethanechol Chloride (Tab) URECHOLINE 25 MG Take 1 Tab by mouth 3 times a day.        Calcium Carbonate-Vitamin D (Tab) OSCAL 500 +D 500-200 MG-UNIT Take 1 Tab by mouth 2 times a day, with meals.        Cholecalciferol (Tab) Cholecalciferol 2000 UNIT Take 1 Tab by mouth every day.        Cholecalciferol (Cap) CVS D3 2000 UNITS Take 1 Cap by mouth every day.        K Phos [place]-Sod Phos Di & Mono (Tab) K-PHOS-NEUTRAL, PHOSPHA 250 NEUTRAL 155-852-130 MG Take 1 Tab by mouth 2 times a day.        K Phos Christian-Sod Phos Di & Mono (Tab) PHOSPHA 250 NEUTRAL 155-852-130 MG TAKE 1 TAB BY MOUTH 2 TIMES A DAY.        Magnesium Oxide (Tab) MAGNESIUM-OXIDE 400 (241.3 MG) MG TAKE 1 TABLET BY MOUTH 2 TIMES A DAY        Metoprolol Tartrate (Tab) LOPRESSOR 100 MG TAKE 1 TABLET BY MOUTH 2 TIMES A DAY.        Multiple Vitamins-Minerals (Tab) THERAGRAN-M  Take 1 Tab by mouth every day.        Omeprazole (CAPSULE DELAYED RELEASE) PRILOSEC 20 MG Take 1 Cap by mouth every day.        Probiotic Product (Cap) Probiotic  Take  by mouth.        Sertraline HCl (Tab) ZOLOFT  50 MG Take 1 Tab by mouth every day.        Timolol Maleate (Solution) TIMOPTIC 0.5 % Place 1 Drop in both eyes every day.        .                 Medicines prescribed today were sent to:     [facility]/PHARMACY #[id number] - [place] - [address]    [address] [place] [address]    Phone: [phone] Fax: [phone]    Open 24 Hours?: No      Medication refill instructions:       If your prescription bottle indicates you have medication refills left, it is not necessary to call your provider’s office. Please contact your pharmacy and they will refill your medication.    If your prescription bottle indicates you do not have any refills left, you may request refills at any time through one of the following ways: The online [email or web address] system (except Urgent Care), by calling your provider’s office, or by asking your pharmacy to contact your provider’s office with a refill request. Medication refills are processed only during regular business hours and may not be available until the next business day. Your provider may request additional information or to have a follow-up visit with you prior to refilling your medication.   *Please Note: Medication refills are assigned a new Rx number when refilled electronically. Your pharmacy may indicate that no refills were authorized even though a new prescription for the same medication is available at the pharmacy. Please request the medicine by name with the pharmacy before contacting your provider for a refill.        Your To Do List     Future Labs/Procedures Complete By Expires    ALBUMIN  As directed 3/22/2018    CALCIUM (CA)  As directed 3/22/2018    PTH INTACT (PTH ONLY)  As directed 3/23/2018    VITAMIN D,25 HYDROXY  As directed 3/22/2018      Other Notes About Your Plan     Please assess the following diagnosis:  E21.3-hyperparathyroidism    -this patient has been on Zoloft since 11/2012, with diagnosis of anxiety and depression. ? If treating major depressive  disorder, single episode, unspecified degree, code F32.9    Patient report to me that she was treated with Zoloft initially that she was depressed when she lost her  and she also has mild anxiety. Medication helps ocontrolled her mood. Now she denied feeling depressed. She is continuously taking it for her anxiety.  NC                    MyChart Access Code: Activation code not generated  Current MyChart Status: Active

## 2017-03-22 NOTE — PROGRESS NOTES
Chief Complaint   Patient presents with   • Hyperparathyroidism      ?        HPI:        1. Hyperparathyroidism.    This issue keeps coming up from time to time.  On occasion, parathyroid hormone has been elevated, other times normal and the same with calcium going up and down.  On this occasion her adjusted calcium is high normal at 10.5 and her parathyroid hormone is normal at 67 (14-72). Perhaps we might consider this level of parathyroid hormone inappropriately high for a high normal calcium. Possibly it should be at the lower end of the range.  Interesting that after her neck injury and surgery, she was given Forteo daily for about two months.  She has not had any since October last year.     2. Vitamin D deficiency.    She is taking small doses of vitamin D combined with calcium every other day.  She was cautioned not to use vitamin D I think while in the hospital. I think they were concerned about hypercalcemia.  I am not altogether certain about that but that is what she was told.  Now her vitamin D level is down to 22.  I advised that she use at least 1000 units per day and we will check it next time I see her.    3. Hypercalcemia.    On her recent chemistry panel, her albumin is 4.4 and calcium is 10.8 and this corrects for albumin down to 10.5 (8.5-10.5).  She is taking a small calcium supplement probably about 600-800 mg every other day.      Bone density was very good for her age in 2016.  She is still in the osteopenia category and did not have a significant decrease in bone density compared to the previous.  We will repeat her bone density next year.     ROS:  The patient is in good spirits and is feeling very well. Her neck is healed without significant residual.  Both knees are troublesome. She is going to seek an opinion from a different orthopedic surgeon to see what might be done short of arthroplasty  All other systems reported as negative or unchanged since last exam      Allergies:  "  Allergies   Allergen Reactions   • Ace Inhibitors Anaphylaxis   • Augmentin      Flu like sx   • Erythromycin Rash   • Lisinopril Anaphylaxis   • Penicillins Hives   • Epinephrine Unspecified     shaking   • Percocet [Oxycodone-Acetaminophen] Vomiting   • Sulfa Drugs Hives       Current medicines including changes today:  Current Outpatient Prescriptions   Medication Sig Dispense Refill   • metoprolol (LOPRESSOR) 100 MG Tab TAKE 1 TABLET BY MOUTH 2 TIMES A DAY. 60 Tab 0   • MAGNESIUM-OXIDE 400 (241.3 MG) MG Tab tablet TAKE 1 TABLET BY MOUTH 2 TIMES A DAY 60 Tab 0   • PHOSPHA 250 NEUTRAL 155-852-130 MG tablet TAKE 1 TAB BY MOUTH 2 TIMES A DAY. 60 Tab 1   • phosphorus (K-PHOS-NEUTRAL, PHOSPHA 250 NEUTRAL) 155-852-130 MG tablet Take 1 Tab by mouth 2 times a day. 180 Tab 0   • Probiotic Cap Take  by mouth.     • sertraline (ZOLOFT) 50 MG Tab Take 1 Tab by mouth every day. 30 Tab 5   • timolol (TIMOPTIC) 0.5 % Solution Place 1 Drop in both eyes every day. 1 Bottle 3   • therapeutic multivitamin-minerals (THERAGRAN-M) Tab Take 1 Tab by mouth every day. 30 Tab 11   • omeprazole (PRILOSEC) 20 MG delayed-release capsule Take 1 Cap by mouth every day. (Patient not taking: Reported on 3/2/2017) 90 Cap 2   • acetaminophen (TYLENOL) 160 MG/5ML Suspension Take 15 mg/kg by mouth every 6 hours as needed.     • calcium-vitamin D (OSCAL 500 +D) 500-200 MG-UNIT Tab Take 1 Tab by mouth 2 times a day, with meals. 100 Tab 3   • bethanechol (URECHOLINE) 25 MG Tab Take 1 Tab by mouth 3 times a day. 90 Tab 0   • CVS D3 2000 UNITS Cap Take 1 Cap by mouth every day.  0   • vitamin D 2000 UNIT Tab Take 1 Tab by mouth every day. 30 Tab      No current facility-administered medications for this visit.        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   appears healthy in no distress    Skin   appropriate for sex and age    HEENT  unremarkable    Neck   no adenopathy    Lungs  clear    Heart  regular    Extremities  no edema    Neuro  gait and station normal    Psych  appropriate    ASSESSMENT AND RECOMMENDATIONS    1. Hyperparathyroidism (CMS-HCC)            Current level of PTH normal despite low vitamin D  - PTH INTACT (PTH ONLY); Future    2. Vitamin D deficiency          Increase vitamin D supplement to 1000 units per day  - VITAMIN D,25 HYDROXY; Future              3. Hypercalcemia, intermittent             High normal now on low-dose supplement  - CALCIUM (CA); Future  - ALBUMIN; Future      DISPOSITION:   Return in 6 months       Neo Andres M.D.    Copies to: Farooq Sloan M.D. [phone]  "

## 2017-03-23 NOTE — TELEPHONE ENCOUNTER
Farooq Sloan M.D.  Ana Gordon, Med Ass't        Caller: Unspecified (3 days ago, 11:54 AM)                     I spoke with patient and she agreed to proceed with US.

## 2017-03-24 ENCOUNTER — APPOINTMENT (OUTPATIENT)
Dept: RADIOLOGY | Facility: MEDICAL CENTER | Age: 82
End: 2017-03-24
Attending: FAMILY MEDICINE
Payer: MEDICARE

## 2017-03-27 ENCOUNTER — OFFICE VISIT (OUTPATIENT)
Dept: MEDICAL GROUP | Age: 82
End: 2017-03-27
Payer: MEDICARE

## 2017-03-27 VITALS
WEIGHT: 137.8 LBS | BODY MASS INDEX: 21.63 KG/M2 | OXYGEN SATURATION: 99 % | SYSTOLIC BLOOD PRESSURE: 118 MMHG | TEMPERATURE: 98.2 F | HEIGHT: 67 IN | DIASTOLIC BLOOD PRESSURE: 70 MMHG | HEART RATE: 64 BPM

## 2017-03-27 DIAGNOSIS — K59.1 FUNCTIONAL DIARRHEA: ICD-10-CM

## 2017-03-27 DIAGNOSIS — K21.9 GASTROESOPHAGEAL REFLUX DISEASE WITHOUT ESOPHAGITIS: ICD-10-CM

## 2017-03-27 DIAGNOSIS — I10 ESSENTIAL HYPERTENSION: ICD-10-CM

## 2017-03-27 DIAGNOSIS — F41.9 ANXIETY: ICD-10-CM

## 2017-03-27 PROBLEM — R19.7 DIARRHEA: Status: ACTIVE | Noted: 2017-03-27

## 2017-03-27 PROCEDURE — 99214 OFFICE O/P EST MOD 30 MIN: CPT | Performed by: FAMILY MEDICINE

## 2017-03-27 PROCEDURE — G8432 DEP SCR NOT DOC, RNG: HCPCS | Performed by: FAMILY MEDICINE

## 2017-03-27 PROCEDURE — 4040F PNEUMOC VAC/ADMIN/RCVD: CPT | Performed by: FAMILY MEDICINE

## 2017-03-27 PROCEDURE — G8482 FLU IMMUNIZE ORDER/ADMIN: HCPCS | Performed by: FAMILY MEDICINE

## 2017-03-27 PROCEDURE — 1101F PT FALLS ASSESS-DOCD LE1/YR: CPT | Performed by: FAMILY MEDICINE

## 2017-03-27 PROCEDURE — G8420 CALC BMI NORM PARAMETERS: HCPCS | Performed by: FAMILY MEDICINE

## 2017-03-27 PROCEDURE — 1036F TOBACCO NON-USER: CPT | Performed by: FAMILY MEDICINE

## 2017-03-27 RX ORDER — POLYMYXIN B SULFATE AND TRIMETHOPRIM 1; 10000 MG/ML; [USP'U]/ML
1 SOLUTION OPHTHALMIC EVERY 4 HOURS
Qty: 10 ML | Refills: 0 | Status: SHIPPED | OUTPATIENT
Start: 2017-03-27 | End: 2018-07-09

## 2017-03-27 NOTE — MR AVS SNAPSHOT
"Lucita Babcock   3/27/2017 9:40 AM   Office Visit   MRN: [id number]    Department:  [address] [place] [address]   Dept Phone:  [phone]    Description:  Female : 1935   Provider:  Farooq Sloan M.D.           Allergies as of 3/27/2017     Allergen Noted Reactions    Ace Inhibitors 2013   Anaphylaxis    Augmentin 2009       Flu like sx    Erythromycin 2008   Rash    Lisinopril 2009   Anaphylaxis    Penicillins 2008   Hives    Epinephrine 2015   Unspecified    shaking    Percocet [Oxycodone-Acetaminophen] 2013   Vomiting    Sulfa Drugs 2010   Hives      You were diagnosed with     Functional diarrhea   [564.5.ICD-9-CM]       Gastroesophageal reflux disease without esophagitis   [535807]         Vital Signs     Blood Pressure Pulse Temperature Height Weight Body Mass Index    118/70 mmHg 64 36.8 °C (98.2 °F) 1.702 m (5' 7.01\") 62.506 kg (137 lb 12.8 oz) 21.58 kg/m2    Oxygen Saturation Smoking Status                99% Former Smoker          Basic Information     Date Of Birth Sex Race Ethnicity Preferred Language    1935 Female White Non- English      Your appointments     2017 11:30 AM   US AORTA 30 with VISTA US 2   [facility] ([place]    [address]  [facility] [id number] [phone]           Adults: Fasting 8 hours. Water is okay. Encourage patient to take medications Children under 12 years: Fasting 4-6 hours Infants: Skip 1 feeding            2017  3:40 PM   MA [id number] with PATTIE HADDAD MG 1   [facility] ([place])    [address]  [place] [address] [phone]           No deodorant, powder, perfume or lotion under the arm or breast area.            Sep 25, 2017  1:20 PM   Established Patient with Neo Andres M.D.   [facility] ([facility])    [address]  [place] [address] [phone]          "    You will be receiving a confirmation call a few days before your appointment from our automated call confirmation system.            Oct 02, 2017 10:00 AM   Established Patient with Farooq Sloan M.D.   [facility] [address] ([facility])    [address] [address]   [phone]           You will be receiving a confirmation call a few days before your appointment from our automated call confirmation system.              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   12/1/2014 - Present    Hypercalcemia E83.52   11/2/2015 - Present    Vitamin D insufficiency E55.9   11/5/2015 - Present    Chronic venous insufficiency I87.2   11/18/2015 - Present    Hyperparathyroidism (CMS-HCC) E21.3   12/2/2015 - Present    Hyperlipidemia E78.5   5/24/2016 - Present    History of recurrent UTIs Z87.440   5/24/2016 - Present    Knee pain, right M25.561   6/22/2016 - Present    Central cord syndrome (CMS-HCC) S14.129A High  7/14/2016 - Present    Elevated troponin R79.89 Low  7/14/2016 - Present    Elevated CPK R74.8 Low  7/14/2016 - Present    Trauma T14.90 Low  7/15/2016 - Present    Right wrist pain M25.531 Low  7/16/2016 - Present    Rhabdomyolysis M62.82   7/18/2016 - Present    Upper extremity weakness M62.81   7/18/2016 - Present    Central cervical cord injury, without spinal bony injury, C1-4 (CMS-HCC) S14.129A   7/27/2016 - Present    Fall W19.XXXA   9/22/2016 - Present    Cervical postlaminectomy syndrome M96.1   10/6/2016 - Present    Chronic neck pain M54.2, G89.29   10/6/2016 - Present    Nausea R11.0   10/18/2016 -  Present    Acute conjunctivitis H10.30   10/24/2016 - Present    Preventative health care Z00.00   10/24/2016 - Present    Postmenopausal Z78.0   10/24/2016 - Present    Medication refill Z76.0   11/2/2016 - Present    Gastroesophageal reflux disease without esophagitis K21.9   11/2/2016 - Present    Tension type headache G44.209   11/28/2016 - Present    Injury of cervical spine (CMS-HCC) S14.109A   7/1/2016 - Present    Encounter for abdominal aortic aneurysm (AAA) screening Z13.6   3/2/2017 - Present    Medicare annual wellness visit, initial Z00.00   3/2/2017 - Present    Vitamin D deficiency E55.9   3/22/2017 - Present    Diarrhea R19.7   3/27/2017 - Present      Health Maintenance        Date Due Completion Dates    MAMMOGRAM 3/14/2017 3/14/2016, 3/12/2015, 3/10/2014, 3/7/2013, 3/6/2012, 2/3/2011, 1/19/2010, 1/19/2010, 7/29/2009, 7/29/2009, 3/23/2009, 3/23/2009, 4/9/2008, 3/31/2008, 3/31/2008, 3/28/2007, 3/28/2007, 3/27/2006, 3/24/2005    IMM PNEUMOCOCCAL 65+ (ADULT) LOW/MEDIUM RISK SERIES (2 of 2 - PPSV23) 7/18/2017 7/18/2016    COLONOSCOPY 3/2/2018 3/2/2015    BONE DENSITY 1/8/2021 1/8/2016, 1/31/2013, 10/20/2008    IMM DTaP/Tdap/Td Vaccine (2 - Td) 2/1/2027 2/1/2017            Current Immunizations     13-VALENT PCV PREVNAR 7/18/2016  2:44 PM    Influenza TIV (IM) 10/15/2012 11:28 AM    Influenza Vaccine Adult HD 11/22/2016, 10/15/2015    Influenza Vaccine Pediatric 11/16/2006, 10/18/2005    Pneumococcal Vaccine (UF)Historical Data 11/16/2006    SHINGLES VACCINE 3/2/2017    Tdap Vaccine 2/1/2017      Below and/or attached are the medications your provider expects you to take. Review all of your home medications and newly ordered medications with your provider and/or pharmacist. Follow medication instructions as directed by your provider and/or pharmacist. Please keep your medication list with you and share with your provider. Update the information when medications are discontinued, doses are changed, or  new medications (including over-the-counter products) are added; and carry medication information at all times in the event of emergency situations     Allergies:  ACE INHIBITORS - Anaphylaxis     AUGMENTIN - (reactions not documented)     ERYTHROMYCIN - Rash     LISINOPRIL - Anaphylaxis     PENICILLINS - Hives     EPINEPHRINE - Unspecified     PERCOCET - Vomiting     SULFA DRUGS - Hives               Medications  Valid as of: March 27, 2017 -  9:56 AM    Generic Name Brand Name Tablet Size Instructions for use    Acetaminophen (Suspension) TYLENOL 160 MG/5ML Take 15 mg/kg by mouth every 6 hours as needed.        Bethanechol Chloride (Tab) URECHOLINE 25 MG Take 1 Tab by mouth 3 times a day.        Calcium Carbonate-Vitamin D (Tab) OSCAL 500 +D 500-200 MG-UNIT Take 1 Tab by mouth 2 times a day, with meals.        Cholecalciferol (Tab) Cholecalciferol 2000 UNIT Take 1 Tab by mouth every day.        Cholecalciferol (Cap) CVS D3 2000 UNITS Take 1 Cap by mouth every day.        Cholecalciferol (Tab) cholecalciferol 1000 UNIT Take 1,000 Units by mouth every day.        K Phos [place]-Sod Phos Di & Mono (Tab) K-PHOS-NEUTRAL, PHOSPHA 250 NEUTRAL 155-852-130 MG Take 1 Tab by mouth 2 times a day.        K Phos [place]-Sod Phos Di & Mono (Tab) PHOSPHA 250 NEUTRAL 155-852-130 MG TAKE 1 TAB BY MOUTH 2 TIMES A DAY.        Magnesium Oxide (Tab) MAGNESIUM-OXIDE 400 (241.3 MG) MG TAKE 1 TABLET BY MOUTH 2 TIMES A DAY        Metoprolol Tartrate (Tab) LOPRESSOR 100 MG TAKE 1 TABLET BY MOUTH 2 TIMES A DAY.        Multiple Vitamins-Minerals (Tab) THERAGRAN-M  Take 1 Tab by mouth every day.        Omeprazole (CAPSULE DELAYED RELEASE) PRILOSEC 20 MG Take 1 Cap by mouth every day.        Polymyxin B-Trimethoprim (Solution) POLYTRIM 93945-4.1 UNIT/ML-% Place 1 Drop in both eyes every 4 hours.        Probiotic Product (Cap) Probiotic  Take  by mouth.        Sertraline HCl (Tab) ZOLOFT 50 MG Take 1 Tab by mouth every day.        Timolol Maleate  (Solution) TIMOPTIC 0.5 % Place 1 Drop in both eyes every day.        .                 Medicines prescribed today were sent to:     [id number] - [place], [place] - [address]    [address] [place] [address]    Phone: [phone] Fax: [phone]    Open 24 Hours?: No      Medication refill instructions:       If your prescription bottle indicates you have medication refills left, it is not necessary to call your provider’s office. Please contact your pharmacy and they will refill your medication.    If your prescription bottle indicates you do not have any refills left, you may request refills at any time through one of the following ways: The online [place] system (except Urgent Care), by calling your provider’s office, or by asking your pharmacy to contact your provider’s office with a refill request. Medication refills are processed only during regular business hours and may not be available until the next business day. Your provider may request additional information or to have a follow-up visit with you prior to refilling your medication.   *Please Note: Medication refills are assigned a new Rx number when refilled electronically. Your pharmacy may indicate that no refills were authorized even though a new prescription for the same medication is available at the pharmacy. Please request the medicine by name with the pharmacy before contacting your provider for a refill.        Other Notes About Your Plan     Please assess the following diagnosis:  E21.3-hyperparathyroidism    -this patient has been on Zoloft since 11/2012, with diagnosis of anxiety and depression. ? If treating major depressive disorder, single episode, unspecified degree, code F32.9    Patient report to me that she was treated with Zoloft initially that she was depressed when she lost her  and she also has mild anxiety. Medication helps ocontrolled her mood. Now she denied feeling depressed. She is continuously taking  it for her anxiety.  NC                    Split[employer] Access Code: Activation code not generated  Current [employer] Status: Active

## 2017-03-27 NOTE — ASSESSMENT & PLAN NOTE
New     The patient is an 82-year-old female who presents to clinic with a chief complaint of loose watery stool ×1 day. She denies any recent antibiotic use, no recent camping trips, no recent picnics, no new restaurants. She denies any fever no blood in the stool no abdominal pain. She states she took Imodium and believes his symptoms are improving. She is able to tolerate by mouth fluids.

## 2017-03-27 NOTE — ASSESSMENT & PLAN NOTE
She states is she is not taking her omeprazole because she does not have any heartburn anymore. Patient denies any difficulty swallowing, no regurgitation, no eructation, no weight loss, no nocturnal asthma no food getting stuck in the chest.

## 2017-03-27 NOTE — PROGRESS NOTES
This medical record contains text that has been entered with the assistance of computer voice recognition and dictation software.  Therefore, it may contain unintended errors in text, spelling, punctuation, or grammar    No chief complaint on file.      Lucita Babcock is a 82 y.o. female here evaluation and management of: Diarrhea, routine visit      HPI:     Diarrhea      New     The patient is an 82-year-old female who presents to clinic with a chief complaint of loose watery stool ×1 day. She denies any recent antibiotic use, no recent camping trips, no recent picnics, no new restaurants. She denies any fever no blood in the stool no abdominal pain. She states she took Imodium and believes his symptoms are improving. She is able to tolerate by mouth fluids.    Gastroesophageal reflux disease without esophagitis  She states is she is not taking her omeprazole because she does not have any heartburn anymore. Patient denies any difficulty swallowing, no regurgitation, no eructation, no weight loss, no nocturnal asthma no food getting stuck in the chest.      Anxiety  Patient has been stable on her Zoloft 50 mg by mouth daily. She has been compliant, denies any suicidal or homicidal ideations, she has good support through her daughters. They do not live with her, she does have visiting caregivers known as the visiting angels.    Essential hypertension  We have finally got her blood pressure control with metoprolol 100 mg by mouth twice a day. The patient has been tollerating the BP meds without any issues. No tunnel vision, no cough, no changes in vision, no lightheadedness, no fatigue, no syncopal or presyncopal episodes, no edema, no new rashes.       Current medicines (including changes today)  Current Outpatient Prescriptions   Medication Sig Dispense Refill   • vitamin D (CHOLECALCIFEROL) 1000 UNIT Tab Take 1,000 Units by mouth every day.     • polymixin-trimethoprim (POLYTRIM) 17882-2.1 UNIT/ML-% Solution Place  1 Drop in both eyes every 4 hours. 10 mL 0   • metoprolol (LOPRESSOR) 100 MG Tab TAKE 1 TABLET BY MOUTH 2 TIMES A DAY. 60 Tab 0   • MAGNESIUM-OXIDE 400 (241.3 MG) MG Tab tablet TAKE 1 TABLET BY MOUTH 2 TIMES A DAY 60 Tab 0   • phosphorus (K-PHOS-NEUTRAL, PHOSPHA 250 NEUTRAL) 155-852-130 MG tablet Take 1 Tab by mouth 2 times a day. 180 Tab 0   • calcium-vitamin D (OSCAL 500 +D) 500-200 MG-UNIT Tab Take 1 Tab by mouth 2 times a day, with meals. 100 Tab 3   • sertraline (ZOLOFT) 50 MG Tab Take 1 Tab by mouth every day. 30 Tab 5   • timolol (TIMOPTIC) 0.5 % Solution Place 1 Drop in both eyes every day. 1 Bottle 3   • therapeutic multivitamin-minerals (THERAGRAN-M) Tab Take 1 Tab by mouth every day. 30 Tab 11   • PHOSPHA 250 NEUTRAL 155-852-130 MG tablet TAKE 1 TAB BY MOUTH 2 TIMES A DAY. 60 Tab 1   • omeprazole (PRILOSEC) 20 MG delayed-release capsule Take 1 Cap by mouth every day. (Patient not taking: Reported on 3/2/2017) 90 Cap 2   • acetaminophen (TYLENOL) 160 MG/5ML Suspension Take 15 mg/kg by mouth every 6 hours as needed.     • Probiotic Cap Take  by mouth.     • bethanechol (URECHOLINE) 25 MG Tab Take 1 Tab by mouth 3 times a day. 90 Tab 0   • CVS D3 2000 UNITS Cap Take 1 Cap by mouth every day.  0   • vitamin D 2000 UNIT Tab Take 1 Tab by mouth every day. (Patient not taking: Reported on 3/27/2017) 30 Tab      No current facility-administered medications for this visit.     She  has a past medical history of Hypertension; Osteopenia; Sarcoid (CMS-HCC); Depression; Anxiety; Breast cancer (CMS-HCC) (2008); Aneurysm (CMS-HCC); Other specified disorder of intestines; Indigestion; Anesthesia; Arthritis; Cancer (CMS-HCC) (2008); Dental disorder; UTI (urinary tract infection) (); CATARACT; and Injury of cervical spine (CMS-HCC) (July 2016). She also has no past medical history of Stroke (CMS-HCC), Seizure (CMS-HCC), Diabetes (CMS-HCC), or Pacemaker.  She  has past surgical history that includes vein stripping;  "mass excision general (); breast biopsy (08); abdominal hysterectomy total (); cataract phaco with iol (2010); colonoscopy (); ercp in or (2012); samuel by laparoscopy (2013); lumpectomy (); radiation therapy plan simple (); cataract phaco with iol (2014); and cervical disk and fusion anterior (2016).  Social History   Substance Use Topics   • Smoking status: Former Smoker -- 0.50 packs/day for 45 years     Quit date: 10/09/1969   • Smokeless tobacco: Never Used   • Alcohol Use: 0.6 oz/week     1 Glasses of wine per week      Comment: ocassionally     Social History     Social History Narrative     Family History   Problem Relation Age of Onset   • Hypertension Mother    • Stroke Mother    • Cancer Neg Hx    • Diabetes Neg Hx    • Heart Disease Neg Hx    • Other Father      Dementia   • Other Sister      BIpolar   • Other Sister      Bipolar   • Other Sister      THyroid disease     Family Status   Relation Status Death Age   • Mother     • Son       suicide   • Father     • Sister     • Sister Alive    • Sister Alive    • Daughter Alive    • Daughter Alive          ROS  Please see history of present illness    All other systems reviewed and are negative     Objective:     Blood pressure 118/70, pulse 64, temperature 36.8 °C (98.2 °F), height 1.702 m (5' 7.01\"), weight 62.506 kg (137 lb 12.8 oz), SpO2 99 %. Body mass index is 21.58 kg/(m^2).  Physical Exam:    Constitutional: Alert, no distress.  Skin: Warm, dry, good turgor, no rashes in visible areas.  Eye: Equal, round and reactive, conjunctiva clear, lids normal.  ENMT: Lips without lesions, good dentition, oropharynx clear.  Neck: Trachea midline, no masses, no thyromegaly. No cervical or supraclavicular lymphadenopathy.  Respiratory: Unlabored respiratory effort, lungs clear to auscultation, no wheezes, no ronchi.  Cardiovascular: Normal S1, S2, no murmur, no edema.  Abdomen: " Soft, non-tender, no masses, no hepatosplenomegaly.  Psych: Alert and oriented x3, normal affect and mood.  BACK--2mm round raised,         Assessment and Plan:   The following treatment plan was discussed, again this medical record contains text that has been entered with the assistance of computer voice recognition and dictation software.  Therefore, it may contain unintended errors in text, spelling, punctuation, or grammar      1. Functional diarrhea  It was explained to patient that most cases of acute infectious gastroenteritis are viral and 90 percent are due to [place] virus. No indication for stool studies at this point, Instructed patient to hydrate po,  no signs of volume depletion,  good hygiene precautions given.        2. Gastroesophageal reflux disease without esophagitis  Resolved, not taking PPI    3. Anxiety  Patient has been stable with current management  We will make no changes for now      4. Essential hypertension  Patient has been stable with current management  We will make no changes for now    5. Pimple on back  We proceed with watchful waiting, she was given pilonidal cyst precautions      Followup: Return in about 6 months (around 9/27/2017) for Reevaluation.

## 2017-03-27 NOTE — ASSESSMENT & PLAN NOTE
Patient has been stable on her Zoloft 50 mg by mouth daily. She has been compliant, denies any suicidal or homicidal ideations, she has good support through her daughters. They do not live with her, she does have visiting caregivers known as the visiting angels.

## 2017-03-27 NOTE — ASSESSMENT & PLAN NOTE
We have finally got her blood pressure control with metoprolol 100 mg by mouth twice a day. The patient has been tollerating the BP meds without any issues. No tunnel vision, no cough, no changes in vision, no lightheadedness, no fatigue, no syncopal or presyncopal episodes, no edema, no new rashes.

## 2017-04-05 ENCOUNTER — TELEPHONE (OUTPATIENT)
Dept: MEDICAL GROUP | Age: 82
End: 2017-04-05

## 2017-04-05 NOTE — TELEPHONE ENCOUNTER
1. Caller Name: Lucita                                         Call Back Number: [phone] (home)       Patient approves a detailed voicemail message: no    Patient states she was to inform Dr. Sloan if her BP diastolic was in the 60's.  Patient states she takes med BID (morning and dinnertime).  Patient states her BP is fine in the morning (this morning was 136/71) but when taking the med at dinnertime her diastolic will drop to the 60's.  Patient states around 8 p.m. Her BP will be fine again.  Patient would like to know what Dr. Sloan suggest.  Please advise.

## 2017-04-06 NOTE — TELEPHONE ENCOUNTER
Farooq Sloan M.D.  Ana Gordon, Med Ass't        Caller: Unspecified (Yesterday, 8:24 AM)                     I called patient and left a voicemail message regarding results and plan of action.       Robbie Sloan MD   Diplomat, [facility]   [address] [place] [id number]

## 2017-04-06 NOTE — TELEPHONE ENCOUNTER
1. Caller Name: Lucita                                         Call Back Number: [phone] (home) or [phone]      Patient approves a detailed voicemail message: yes    Patient states she has been monitoring her BP and feeling concerned.  Please see readings below:    4/5/17  Around 7 p.m. 136/65    Around 9 p.m. 134/73 took metoprolol     4/6/17  8:30 a.m. 141/85 took metoprolol  10:27 a.m. 140/69  1:23 p.m. 133/61

## 2017-04-10 ENCOUNTER — TELEPHONE (OUTPATIENT)
Dept: MEDICAL GROUP | Age: 82
End: 2017-04-10

## 2017-04-10 ENCOUNTER — PATIENT OUTREACH (OUTPATIENT)
Dept: HEALTH INFORMATION MANAGEMENT | Facility: OTHER | Age: 82
End: 2017-04-10

## 2017-04-10 ENCOUNTER — TELEPHONE (OUTPATIENT)
Dept: HEALTH INFORMATION MANAGEMENT | Facility: OTHER | Age: 82
End: 2017-04-10

## 2017-04-10 DIAGNOSIS — Z78.0 POSTMENOPAUSAL: ICD-10-CM

## 2017-04-10 RX ORDER — IBUPROFEN 200 MG
200 TABLET ORAL
COMMUNITY
End: 2018-08-23

## 2017-04-10 NOTE — PROGRESS NOTES
Outbound call to Lucita for medication reconciliation.    Updated allergy and medication lists.    Patient demonstrates adherence to medication schedule and understanding of indications for medications.    Lucita is experiencing some lightheadedness with metoprolol. I encouraged patient to monitor HR and to take this medication with a meal. Advised patient to stand up slowly when sitting or laying flat. If lightheadedness continues, patient is to address with PCP.     Lucita monitors her BP twice daily. Most recent home BP reading is 136/73.     Patient feels satisfied with medication regimen.      Patient can afford her medications.    Lucita does not use tobacco.     Lucita is up to date with her immunizations.     Lab Results   Component Value Date/Time    CHOLESTEROL,* 05/19/2016 09:22 AM    * 05/19/2016 09:22 AM    HDL 86 05/19/2016 09:22 AM    TRIGLYCERIDES 86 05/19/2016 09:22 AM         Plan: Most recent lipid profile shows elevated LDL, may want to consider addition of a statin. Will send message to PCP.     Melania Schaefer, PHARMD

## 2017-04-10 NOTE — TELEPHONE ENCOUNTER
1. Caller Name: Lucita                                          Call Back Number: [phone] (home)         Patient approves a detailed voicemail message: no    Patient states she has been feeling dizzy possible due to her medication metoprolol.  She wants to know if this is normal with her medication.  Please advise.

## 2017-04-10 NOTE — TELEPHONE ENCOUNTER
Dear Dr. Sloan,     I had the pleasure to review medications with your patient Lucita. Upon conducting a review, it is noted that her LDL was elevated on lab work obtained May 2016. May want to consider addition of a statin.     Thank you for your consideration,   Melania Schaefer, PHARMD  [facility] Clinical Pharmacist  [address] [place] [address] [id number] [phone]

## 2017-04-14 ENCOUNTER — APPOINTMENT (OUTPATIENT)
Dept: RADIOLOGY | Facility: MEDICAL CENTER | Age: 82
End: 2017-04-14
Attending: FAMILY MEDICINE
Payer: MEDICARE

## 2017-04-14 ENCOUNTER — TELEPHONE (OUTPATIENT)
Dept: MEDICAL GROUP | Age: 82
End: 2017-04-14

## 2017-04-14 NOTE — TELEPHONE ENCOUNTER
Phone Number Called: [phone] (home)     Message: Patient states her BP was 193/91 on 4/13/17.  Patient states she started taking magnesium oxide and brought her BP down to 140's.  Patient states since she has started the magnesium oxide, her BP has been in a good range.  Patient wants to know if Dr. Sloan is okay with her taking magnesium oxide.  Please advise.    Left Message for patient to call back: no

## 2017-04-17 ENCOUNTER — TELEPHONE (OUTPATIENT)
Dept: MEDICAL GROUP | Age: 82
End: 2017-04-17

## 2017-04-17 NOTE — TELEPHONE ENCOUNTER
1. Caller Name: Lucita                                         Call Back Number: [phone] (home)       Patient approves a detailed voicemail message: no    2. What are the patient's symptoms (location & severity)? diarrhea    3. Is this a new symptom Yes    4. When did it start? 4/17/17    5. Action taken per Active Symptom Guide: Same day appointment scheduled  Scheduled for 4/18/17    6. Patient agrees to recommended action per active symptom guide     PATIENT IS REQUESTING DR. CHANG TO CALL HER BEFORE THE END OF THE WORK DAY.  PLEASE ADVISE.

## 2017-04-18 ENCOUNTER — OFFICE VISIT (OUTPATIENT)
Dept: MEDICAL GROUP | Age: 82
End: 2017-04-18
Payer: MEDICARE

## 2017-04-18 ENCOUNTER — APPOINTMENT (OUTPATIENT)
Dept: RADIOLOGY | Facility: MEDICAL CENTER | Age: 82
End: 2017-04-18
Attending: FAMILY MEDICINE
Payer: MEDICARE

## 2017-04-18 VITALS
WEIGHT: 136.2 LBS | HEART RATE: 72 BPM | HEIGHT: 67 IN | BODY MASS INDEX: 21.38 KG/M2 | SYSTOLIC BLOOD PRESSURE: 120 MMHG | TEMPERATURE: 98.5 F | DIASTOLIC BLOOD PRESSURE: 72 MMHG | OXYGEN SATURATION: 97 %

## 2017-04-18 DIAGNOSIS — M25.561 CHRONIC PAIN OF RIGHT KNEE: ICD-10-CM

## 2017-04-18 DIAGNOSIS — R19.7 DIARRHEA, UNSPECIFIED TYPE: ICD-10-CM

## 2017-04-18 DIAGNOSIS — G89.29 CHRONIC PAIN OF RIGHT KNEE: ICD-10-CM

## 2017-04-18 PROCEDURE — 99214 OFFICE O/P EST MOD 30 MIN: CPT | Performed by: FAMILY MEDICINE

## 2017-04-18 PROCEDURE — G8420 CALC BMI NORM PARAMETERS: HCPCS | Performed by: FAMILY MEDICINE

## 2017-04-18 PROCEDURE — 4040F PNEUMOC VAC/ADMIN/RCVD: CPT | Performed by: FAMILY MEDICINE

## 2017-04-18 PROCEDURE — 1036F TOBACCO NON-USER: CPT | Performed by: FAMILY MEDICINE

## 2017-04-18 PROCEDURE — 1101F PT FALLS ASSESS-DOCD LE1/YR: CPT | Performed by: FAMILY MEDICINE

## 2017-04-18 PROCEDURE — G8432 DEP SCR NOT DOC, RNG: HCPCS | Performed by: FAMILY MEDICINE

## 2017-04-18 NOTE — MR AVS SNAPSHOT
"Lucita Babcock   2017 2:40 PM   Office Visit   MRN: [id number]    Department:  [address] [place] [address]   Dept Phone:  [phone]    Description:  Female : 1935   Provider:  Farooq Sloan M.D.           Allergies as of 2017     Allergen Noted Reactions    Ace Inhibitors 2013   Anaphylaxis    Augmentin 2009       Flu like sx    Erythromycin 2008   Rash    Lisinopril 2009   Anaphylaxis    Penicillins 2008   Hives    Epinephrine 2015   Unspecified    shaking    Percocet [Oxycodone-Acetaminophen] 2013   Vomiting    Sulfa Drugs 2010   Hives      You were diagnosed with     Diarrhea, unspecified type   [6934881]       Chronic pain of right knee   [[id number]]         Vital Signs     Blood Pressure Pulse Temperature Height Weight Body Mass Index    120/72 mmHg 72 36.9 °C (98.5 °F) 1.702 m (5' 7.01\") 61.78 kg (136 lb 3.2 oz) 21.33 kg/m2    Oxygen Saturation Smoking Status                97% Former Smoker          Basic Information     Date Of Birth Sex Race Ethnicity Preferred Language    1935 Female White Non- English      Your appointments     May 23, 2017 11:50 AM   MA SCRN10 with S CATIE MG 1   [facility]    [address]  [place] [id number] [phone]           No deodorant, powder, perfume or lotion under the arm or breast area.            Sep 25, 2017  1:20 PM   Established Patient with Neo Andres M.D.   [facility] ([facility])    [address]  [place] [address] [phone]           You will be receiving a confirmation call a few days before your appointment from our automated call confirmation system.            Oct 02, 2017 10:00 AM   Established Patient with Farooq Sloan M.D.   [facility] [address] [place] [address]    [address]  [place] [address] [phone]      "     You will be receiving a confirmation call a few days before your appointment from our automated call confirmation system.              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   12/1/2014 - Present    Hypercalcemia E83.52   11/2/2015 - Present    Vitamin D insufficiency E55.9   11/5/2015 - Present    Chronic venous insufficiency I87.2   11/18/2015 - Present    Hyperparathyroidism (CMS-HCC) E21.3   12/2/2015 - Present    Hyperlipidemia E78.5   5/24/2016 - Present    History of recurrent UTIs Z87.440   5/24/2016 - Present    Knee pain, right M25.561   6/22/2016 - Present    Central cord syndrome (CMS-HCC) S14.129A High  7/14/2016 - Present    Elevated troponin R79.89 Low  7/14/2016 - Present    Elevated CPK R74.8 Low  7/14/2016 - Present    Trauma T14.90 Low  7/15/2016 - Present    Right wrist pain M25.531 Low  7/16/2016 - Present    Rhabdomyolysis M62.82   7/18/2016 - Present    Upper extremity weakness M62.81   7/18/2016 - Present    Central cervical cord injury, without spinal bony injury, C1-4 (CMS-HCC) S14.129A   7/27/2016 - Present    Fall W19.XXXA   9/22/2016 - Present    Cervical postlaminectomy syndrome M96.1   10/6/2016 - Present    Chronic neck pain M54.2, G89.29   10/6/2016 - Present    Nausea R11.0   10/18/2016 - Present    Acute conjunctivitis H10.30   10/24/2016 - Present    Preventative health care Z00.00   10/24/2016 - Present    Postmenopausal Z78.0   10/24/2016 - Present    Medication refill Z76.0   11/2/2016 - Present    Gastroesophageal reflux disease without esophagitis K21.9   11/2/2016 - Present    Tension type headache  G44.209   11/28/2016 - Present    Injury of cervical spine (CMS-HCC) S14.109A   7/1/2016 - Present    Encounter for abdominal aortic aneurysm (AAA) screening Z13.6   3/2/2017 - Present    Medicare annual wellness visit, initial Z00.00   3/2/2017 - Present    Vitamin D deficiency E55.9   3/22/2017 - Present    Diarrhea R19.7   3/27/2017 - Present      Health Maintenance        Date Due Completion Dates    MAMMOGRAM 3/14/2017 3/14/2016, 3/12/2015, 3/10/2014, 3/7/2013, 3/6/2012, 2/3/2011, 1/19/2010, 1/19/2010, 7/29/2009, 7/29/2009, 3/23/2009, 3/23/2009, 4/9/2008, 3/31/2008, 3/31/2008, 3/28/2007, 3/28/2007, 3/27/2006, 3/24/2005    IMM PNEUMOCOCCAL 65+ (ADULT) LOW/MEDIUM RISK SERIES (2 of 2 - PPSV23) 7/18/2017 7/18/2016    COLONOSCOPY 3/2/2018 3/2/2015    BONE DENSITY 1/8/2021 1/8/2016, 1/31/2013, 10/20/2008    IMM DTaP/Tdap/Td Vaccine (2 - Td) 2/1/2027 2/1/2017            Current Immunizations     13-VALENT PCV PREVNAR 7/18/2016  2:44 PM    Influenza TIV (IM) 10/15/2012 11:28 AM    Influenza Vaccine Adult HD 11/22/2016, 10/15/2015    Influenza Vaccine Pediatric 11/16/2006, 10/18/2005    Pneumococcal Vaccine (UF)Historical Data 11/16/2006    SHINGLES VACCINE 3/2/2017    Tdap Vaccine 2/1/2017      Below and/or attached are the medications your provider expects you to take. Review all of your home medications and newly ordered medications with your provider and/or pharmacist. Follow medication instructions as directed by your provider and/or pharmacist. Please keep your medication list with you and share with your provider. Update the information when medications are discontinued, doses are changed, or new medications (including over-the-counter products) are added; and carry medication information at all times in the event of emergency situations     Allergies:  ACE INHIBITORS - Anaphylaxis     AUGMENTIN - (reactions not documented)     ERYTHROMYCIN - Rash     LISINOPRIL - Anaphylaxis     PENICILLINS - Hives      EPINEPHRINE - Unspecified     PERCOCET - Vomiting     SULFA DRUGS - Hives               Medications  Valid as of: April 18, 2017 -  4:07 PM    Generic Name Brand Name Tablet Size Instructions for use    Acetaminophen (Suspension) TYLENOL 160 MG/5ML Take 15 mg/kg by mouth every 6 hours as needed.        Bethanechol Chloride (Tab) URECHOLINE 25 MG Take 1 Tab by mouth 3 times a day.        Calcium Carbonate-Vitamin D   Take 1 tablet by mouth every day.        Cholecalciferol (Tab) cholecalciferol 1000 UNIT Take 1,000 Units by mouth every day.        Ibuprofen (Tab) MOTRIN 200 MG Take 200 mg by mouth 1 time daily as needed. For pain        Magnesium Oxide (Tab) MAGNESIUM-OXIDE 400 (241.3 MG) MG         Metoprolol Tartrate (Tab) LOPRESSOR 100 MG TAKE 1 TABLET BY MOUTH 2 TIMES A DAY.        Polymyxin B-Trimethoprim (Solution) POLYTRIM 23628-8.1 UNIT/ML-% Place 1 Drop in both eyes every 4 hours.        Sertraline HCl (Tab) ZOLOFT 50 MG Take 1 Tab by mouth every day.        Timolol Maleate (Solution) TIMOPTIC 0.5 % Place 1 Drop in both eyes every day.        .                 Medicines prescribed today were sent to:     [id number] - [place], [place] - [address]    [address] [address]    Phone: [phone] Fax: [phone]    Open 24 Hours?: No      Medication refill instructions:       If your prescription bottle indicates you have medication refills left, it is not necessary to call your provider’s office. Please contact your pharmacy and they will refill your medication.    If your prescription bottle indicates you do not have any refills left, you may request refills at any time through one of the following ways: The online [employer] system (except Urgent Care), by calling your provider’s office, or by asking your pharmacy to contact your provider’s office with a refill request. Medication refills are processed only during regular business hours and may not be available until the next business  day. Your provider may request additional information or to have a follow-up visit with you prior to refilling your medication.   *Please Note: Medication refills are assigned a new Rx number when refilled electronically. Your pharmacy may indicate that no refills were authorized even though a new prescription for the same medication is available at the pharmacy. Please request the medicine by name with the pharmacy before contacting your provider for a refill.        Other Notes About Your Plan     Please assess the following diagnosis:  E21.3-hyperparathyroidism    -this patient has been on Zoloft since 11/2012, with diagnosis of anxiety and depression. ? If treating major depressive disorder, single episode, unspecified degree, code F32.9    Patient report to me that she was treated with Zoloft initially that she was depressed when she lost her  and she also has mild anxiety. Medication helps ocontrolled her mood. Now she denied feeling depressed. She is continuously taking it for her anxiety.  NC                    [employer] Access Code: Activation code not generated  Current MyChart Status: Active

## 2017-04-18 NOTE — PROGRESS NOTES
This medical record contains text that has been entered with the assistance of computer voice recognition and dictation software.  Therefore, it may contain unintended errors in text, spelling, punctuation, or grammar    No chief complaint on file.      Lucita Babcock is a 82 y.o. female here evaluation and management of: diarrhea, knee pain      HPI:     Diarrhea  Patient states that she was eating dinner with her boyfriend today and had profuse diarrhea. She thinks it could be related to her magnesium supplements. Denies any blood in the stool, she was given Imodium which resolved the diarrhea. She is able to tolerate by mouth, no nausea no vomiting no longer has diarrhea.    Knee pain, right  Patient continues to have some right knee pain. She is using a cane for stability however she can walk without the cane. She is able to use her elliptical machine at home with no problem. She states her orthopedic physician recommends that she has TKR. She would like to wait a couple more months.      Current medicines (including changes today)  Current Outpatient Prescriptions   Medication Sig Dispense Refill   • metoprolol (LOPRESSOR) 100 MG Tab TAKE 1 TABLET BY MOUTH 2 TIMES A DAY. 60 Tab 0   • Calcium Carbonate-Vitamin D (OS-THIAGO 500 + D PO) Take 1 tablet by mouth every day.     • ibuprofen (MOTRIN) 200 MG Tab Take 200 mg by mouth 1 time daily as needed. For pain     • vitamin D (CHOLECALCIFEROL) 1000 UNIT Tab Take 1,000 Units by mouth every day.     • polymixin-trimethoprim (POLYTRIM) 23543-0.1 UNIT/ML-% Solution Place 1 Drop in both eyes every 4 hours. 10 mL 0   • sertraline (ZOLOFT) 50 MG Tab Take 1 Tab by mouth every day. 30 Tab 5   • bethanechol (URECHOLINE) 25 MG Tab Take 1 Tab by mouth 3 times a day. 90 Tab 0   • timolol (TIMOPTIC) 0.5 % Solution Place 1 Drop in both eyes every day. 1 Bottle 3   • MAGNESIUM-OXIDE 400 (241.3 MG) MG Tab tablet      • acetaminophen (TYLENOL) 160 MG/5ML Suspension Take 15 mg/kg by  mouth every 6 hours as needed.       No current facility-administered medications for this visit.     She  has a past medical history of Hypertension; Osteopenia; Sarcoid (CMS-HCC); Depression; Anxiety; Breast cancer (CMS-HCC) (); Aneurysm (CMS-HCC); Other specified disorder of intestines; Indigestion; Anesthesia; Arthritis; Cancer (CMS-HCC) (); Dental disorder; UTI (urinary tract infection) (); CATARACT; and Injury of cervical spine (CMS-HCC) (2016). She also has no past medical history of Stroke (CMS-HCC), Seizure (CMS-HCC), Diabetes (CMS-HCC), or Pacemaker.  She  has past surgical history that includes vein stripping; mass excision general (); breast biopsy (08); abdominal hysterectomy total (); cataract phaco with iol (2010); colonoscopy (); ercp in or (2012); samuel by laparoscopy (2013); lumpectomy (); radiation therapy plan simple (); cataract phaco with iol (2014); and cervical disk and fusion anterior (2016).  Social History   Substance Use Topics   • Smoking status: Former Smoker -- 0.50 packs/day for 45 years     Quit date: 10/09/1969   • Smokeless tobacco: Never Used   • Alcohol Use: 0.6 oz/week     1 Glasses of wine per week      Comment: ocassionally     Social History     Social History Narrative     Family History   Problem Relation Age of Onset   • Hypertension Mother    • Stroke Mother    • Cancer Neg Hx    • Diabetes Neg Hx    • Heart Disease Neg Hx    • Other Father      Dementia   • Other Sister      BIpolar   • Other Sister      Bipolar   • Other Sister      THyroid disease     Family Status   Relation Status Death Age   • Mother     • Son       suicide   • Father     • Sister     • Sister Alive    • Sister Alive    • Daughter Alive    • Daughter Alive          ROS    All other systems reviewed and are negative     Objective:     Blood pressure 120/72, pulse 72, temperature 36.9 °C (98.5 °F),  "height 1.702 m (5' 7.01\"), weight 61.78 kg (136 lb 3.2 oz), SpO2 97 %. Body mass index is 21.33 kg/(m^2).  Physical Exam:    Constitutional: Alert, no distress.  Skin: Warm, dry, good turgor, no rashes in visible areas.  Eye: Equal, round and reactive, conjunctiva clear, lids normal.  ENMT: Lips without lesions, good dentition, oropharynx clear.  Neck: Trachea midline, no masses, no thyromegaly. No cervical or supraclavicular lymphadenopathy.  Respiratory: Unlabored respiratory effort, lungs clear to auscultation, no wheezes, no ronchi.  Cardiovascular: Normal S1, S2, no murmur, no edema.  Abdomen: Soft, non-tender, no masses, no hepatosplenomegaly.  Psych: Alert and oriented x3, normal affect and mood.  MSK--walks with normal gait, with and without cane        Assessment and Plan:   The following treatment plan was discussed, again this medical record contains text that has been entered with the assistance of computer voice recognition and dictation software.  Therefore, it may contain unintended errors in text, spelling, punctuation, or grammar      1. Diarrhea, unspecified type  Resolved      2. Chronic pain of right knee  Continue conservative management for now        Followup: Return in about 3 months (around 7/18/2017) for Reevaluation.           "

## 2017-04-18 NOTE — ASSESSMENT & PLAN NOTE
Patient continues to have some right knee pain. She is using a cane for stability however she can walk without the cane. She is able to use her elliptical machine at home with no problem. She states her orthopedic physician recommends that she has TKR. She would like to wait a couple more months.

## 2017-04-18 NOTE — ASSESSMENT & PLAN NOTE
Patient states that she was eating dinner with her boyfriend today and had profuse diarrhea. She thinks it could be related to her magnesium supplements. Denies any blood in the stool, she was given Imodium which resolved the diarrhea. She is able to tolerate by mouth, no nausea no vomiting no longer has diarrhea.

## 2017-04-19 ENCOUNTER — PATIENT OUTREACH (OUTPATIENT)
Dept: HEALTH INFORMATION MANAGEMENT | Facility: OTHER | Age: 82
End: 2017-04-19

## 2017-04-19 ASSESSMENT — ENCOUNTER SYMPTOMS: OCCASIONAL FEELINGS OF UNSTEADINESS: 0

## 2017-04-19 NOTE — PROGRESS NOTES
"CC received return call from patient. Patient reports recent visit with her PCP. States \"I had a reaction to the magnesium\". Reports she had a problem with diarrhea but states it is better today. Reports PCP changed some of her medications. Patient reports she will be needing knee surgery in the future related to chronic knee pain.     Patient reports she continues to have help coming to her house to assist with care 3 times a week. Reports she is utilizing [facility] M-W-Friday from 9:00am-12:00pm. Reports this is getting very expensive and she is questioning if there are other services she would qualify for based on her income. CC will send referral to  to determine if patient qualifies for any programs that could assist with her care in the home. Patient states her income is approximately $3000.00 a month.     Plan: referral to  for assistance with resources.    "

## 2017-04-19 NOTE — TELEPHONE ENCOUNTER
Farooq Sloan M.D.  Ana Gordon, Med Ass't        Caller: Unspecified (1 week ago)                     I called patient.

## 2017-04-20 ENCOUNTER — PATIENT OUTREACH (OUTPATIENT)
Dept: HEALTH INFORMATION MANAGEMENT | Facility: OTHER | Age: 82
End: 2017-04-20

## 2017-04-20 NOTE — PROGRESS NOTES
New referral to  from CC RN. CC RN indicates that pt needs assistance in the home. Pt is currently receiving services with [facility], but it has become too expensive for pt.    LSW outreach phone call to pt to introduce self and assist. No answer. LSW left voicemail for return phone call.

## 2017-04-24 ENCOUNTER — PATIENT OUTREACH (OUTPATIENT)
Dept: HEALTH INFORMATION MANAGEMENT | Facility: OTHER | Age: 82
End: 2017-04-24

## 2017-04-24 NOTE — PROGRESS NOTES
"Incoming telephone call to LSW. Pt returning LSW's previous phone call.    LSW introduced self and discussed referral from CC RN, Nikita. Pt reports that she does have assistance with [facility] for 9 hours weekly. PT reports that she pays approximately $300/weekly for their assistance. LSW inquired pt's monthly gross income. Pt reports that it is approximately $3,000/monthly, but pt did not know exact amount. LSW inquired what tasks [facility] assist with. Pt reports that they assist with Laundry, Transportation and Shopping. Pt reports that her washer/dryer are on the second floor and that pt has someone coming this week to move them into the garage. Pt reports that she will be able to do her own laundry once the machines are brought down stairs. Pt reports that now that the weather is better, she will be able to drive herself around. LSW discussed the option of utilizing [facility] in the future, should she not be able to drive. Pt reports not being interested at this time.    LSW discussed with pt that pt is over the income threshold for [facility], along with need requirements. LSW discussed the homemaker program through [facility], but discussed with pt that there is a long wait list. PT was worried about having any strangers in the home, as she reports that she is a \"private\" program. LSW discussed that a referral can be made and that pt can speak with the representative before making any decisions. Pt agreeable.    LSW inquired about pt's support system. Pt reports that she has a daughter and son-in-law who live in [place], but pt reports they travel often and that the drive is \"unsafe\", so pt does not depend on daughter often. LSW inquired if pt is part of a [employer] or community group and may be able to assist. Pt reports [facility] [employer] [facility], but that they do not have groups that assist there. PT reports she does have friends through [employer] that may be willing to " assist and that her  is a good friend of hers, who checks on pt regularly.    LSW inquired if pt was able to afford medications. Pt reports that she has not had any issues affording medications.    LSW and pt discussed that pt may be able to have [facility] assist for less hours due to laundry being moved and pt's ability to drive, now that the weather is better.     LSW to make referral to homemaker program for pt.    LSW reminded pt of contact information and encouraged pt to contact with any questions or concerns. Pt agreeable.

## 2017-05-01 NOTE — TELEPHONE ENCOUNTER
Please let patient know that Dr Sloan is out of office and he will be back next week. If she feels well and her BP is well controlled while she is taking magnesium oxide, she may continue it. She can discuss with Dr Sloan again next week when he is back.    Monserrat Dubois M.D., covering for Dr. Sloan.

## 2017-05-01 NOTE — TELEPHONE ENCOUNTER
Phone Number Called: [phone] (home)     Message: Pt notified of Dr. Dubois's message.    Left Message for patient to call back: no

## 2017-05-09 ENCOUNTER — TELEPHONE (OUTPATIENT)
Dept: MEDICAL GROUP | Age: 82
End: 2017-05-09

## 2017-05-09 NOTE — TELEPHONE ENCOUNTER
ESTABLISHED PATIENT PRE-VISIT PLANNING     Note: Patient will not be contacted if there is no indication to call.     1.  Reviewed note from last office visit with PCP and/or other med group provider: Yes    2.  If any orders were placed at last visit, do we have Results/Consult Notes?        •  Labs - Labs were not ordered at last office visit.       •  Imaging - Imaging was not ordered at last office visit.       •  Referrals - No referrals were ordered at last office visit.    3.  Immunizations were updated in [facility] using WebIZ?: Yes       •  Web Iz Recommendations: HEPATITIS A  HEPATITIS B TD    4.  Patient is due for the following Health Maintenance Topics:   Health Maintenance Due   Topic Date Due   • MAMMOGRAM  03/14/2017       - Patient has completed FLU, PREVNAR (PCV13) , TDAP and ZOSTAVAX (Shingles) Immunization(s) per WebIZ. Chart has been updated.    5.  Patient was not informed to arrive 15 min prior to their scheduled appointment and bring in their medication bottles.

## 2017-05-10 ENCOUNTER — OFFICE VISIT (OUTPATIENT)
Dept: MEDICAL GROUP | Age: 82
End: 2017-05-10
Payer: MEDICARE

## 2017-05-10 VITALS
RESPIRATION RATE: 16 BRPM | HEIGHT: 67 IN | BODY MASS INDEX: 21.19 KG/M2 | DIASTOLIC BLOOD PRESSURE: 70 MMHG | HEART RATE: 100 BPM | OXYGEN SATURATION: 98 % | TEMPERATURE: 99 F | SYSTOLIC BLOOD PRESSURE: 122 MMHG | WEIGHT: 135 LBS

## 2017-05-10 DIAGNOSIS — M25.561 CHRONIC PAIN OF RIGHT KNEE: ICD-10-CM

## 2017-05-10 DIAGNOSIS — E55.9 VITAMIN D DEFICIENCY: ICD-10-CM

## 2017-05-10 DIAGNOSIS — G89.29 CHRONIC PAIN OF RIGHT KNEE: ICD-10-CM

## 2017-05-10 DIAGNOSIS — I10 ESSENTIAL HYPERTENSION: ICD-10-CM

## 2017-05-10 PROCEDURE — 1100F PTFALLS ASSESS-DOCD GE2>/YR: CPT | Performed by: FAMILY MEDICINE

## 2017-05-10 PROCEDURE — 99214 OFFICE O/P EST MOD 30 MIN: CPT | Performed by: FAMILY MEDICINE

## 2017-05-10 PROCEDURE — 3288F FALL RISK ASSESSMENT DOCD: CPT | Performed by: FAMILY MEDICINE

## 2017-05-10 PROCEDURE — G8432 DEP SCR NOT DOC, RNG: HCPCS | Performed by: FAMILY MEDICINE

## 2017-05-10 PROCEDURE — 4040F PNEUMOC VAC/ADMIN/RCVD: CPT | Performed by: FAMILY MEDICINE

## 2017-05-10 PROCEDURE — 1036F TOBACCO NON-USER: CPT | Performed by: FAMILY MEDICINE

## 2017-05-10 PROCEDURE — G8420 CALC BMI NORM PARAMETERS: HCPCS | Performed by: FAMILY MEDICINE

## 2017-05-10 PROCEDURE — 0518F FALL PLAN OF CARE DOCD: CPT | Mod: 8P | Performed by: FAMILY MEDICINE

## 2017-05-10 NOTE — PROGRESS NOTES
This medical record contains text that has been entered with the assistance of computer voice recognition and dictation software.  Therefore, it may contain unintended errors in text, spelling, punctuation, or grammar    Chief Complaint   Patient presents with   • Hypertension     evaluation        Lucita Tobin is a 82 y.o. female here evaluation and management of: htn, OA of right knee, vitamin d def.      HPI:     Essential hypertension  Her blood pressure continues to be in normal range with metoprolol 100 mg by mouth twice a day. She is also given. Parameters for blood pressure lower than 120/70. She measures her blood pressure prior to taking her metoprolol. Sometimes she skips doses. The patient has been tollerating the BP meds without any issues. No tunnel vision, no cough, no changes in vision, no lightheadedness, no fatigue, no syncopal or presyncopal episodes, no edema, no new rashes.     Knee pain, right  Patient states that her orthopedic physician recommends that she has TKR. She plans on proceeding with this in the fall. She can walk but she has walk with a cane due to popping/locking sometimes. She can walk without a limp. She is able to the elliptical at home.    Vitamin D deficiency  Patient has been taking vitamin D 1000 units by mouth daily. She states that she is compliant with her medication dose.    Results for LUCITA TOBIN (MRN [id number]) as of 5/10/2017 10:17   Ref. Range 3/21/2017 09:44   25-Hydroxy   Vitamin D 25 Latest Ref Range:  ng/mL 22 (L)     Current medicines (including changes today)  Current Outpatient Prescriptions   Medication Sig Dispense Refill   • MAGNESIUM-OXIDE 400 (241.3 MG) MG Tab tablet      • metoprolol (LOPRESSOR) 100 MG Tab TAKE 1 TABLET BY MOUTH 2 TIMES A DAY. 60 Tab 0   • Calcium Carbonate-Vitamin D (OS-THIAGO 500 + D PO) Take 1 tablet by mouth every day.     • ibuprofen (MOTRIN) 200 MG Tab Take 200 mg by mouth 1 time daily as needed. For pain     • vitamin D  (CHOLECALCIFEROL) 1000 UNIT Tab Take 1,000 Units by mouth every day.     • polymixin-trimethoprim (POLYTRIM) 18984-0.1 UNIT/ML-% Solution Place 1 Drop in both eyes every 4 hours. 10 mL 0   • acetaminophen (TYLENOL) 160 MG/5ML Suspension Take 15 mg/kg by mouth every 6 hours as needed.     • sertraline (ZOLOFT) 50 MG Tab Take 1 Tab by mouth every day. 30 Tab 5   • bethanechol (URECHOLINE) 25 MG Tab Take 1 Tab by mouth 3 times a day. 90 Tab 0   • timolol (TIMOPTIC) 0.5 % Solution Place 1 Drop in both eyes every day. 1 Bottle 3   • omeprazole (PRILOSEC) 20 MG delayed-release capsule TAKE 1 CAP BY MOUTH 2 TIMES A DAY. (Patient not taking: Reported on 5/10/2017) 180 Cap 0     No current facility-administered medications for this visit.     She  has a past medical history of Hypertension; Osteopenia; Sarcoid (CMS-HCC); Depression; Anxiety; Breast cancer (CMS-HCC) (2008); Aneurysm (CMS-HCC); Other specified disorder of intestines; Indigestion; Anesthesia; Arthritis; Cancer (CMS-HCC) (2008); Dental disorder; UTI (urinary tract infection) (); CATARACT; and Injury of cervical spine (CMS-HCC) (July 2016). She also has no past medical history of Stroke (CMS-HCC), Seizure (CMS-HCC), Diabetes (CMS-HCC), or Pacemaker.  She  has past surgical history that includes vein stripping; mass excision general (7/08); breast biopsy (5/27/08); abdominal hysterectomy total (1997); cataract phaco with iol (8/11/2010); colonoscopy (2007); ercp in or (12/28/2012); samuel by laparoscopy (1/8/2013); lumpectomy (2008); radiation therapy plan simple (2008); cataract phaco with iol (8/27/2014); and cervical disk and fusion anterior (7/27/2016).  Social History   Substance Use Topics   • Smoking status: Former Smoker -- 0.50 packs/day for 45 years     Quit date: 10/09/1969   • Smokeless tobacco: Never Used   • Alcohol Use: 0.6 oz/week     1 Glasses of wine per week      Comment: ocassionally     Social History     Social History Narrative  "    Family History   Problem Relation Age of Onset   • Hypertension Mother    • Stroke Mother    • Cancer Neg Hx    • Diabetes Neg Hx    • Heart Disease Neg Hx    • Other Father      Dementia   • Other Sister      BIpolar   • Other Sister      Bipolar   • Other Sister      THyroid disease     Family Status   Relation Status Death Age   • Mother     • Son       suicide   • Father     • Sister     • Sister Alive    • Sister Alive    • Daughter Alive    • Daughter Alive          ROS  Please see hpi    All other systems reviewed and are negative     Objective:     Blood pressure 122/70, pulse 100, temperature 37.2 °C (99 °F), resp. rate 16, height 1.702 m (5' 7.01\"), weight 61.236 kg (135 lb), SpO2 98 %. Body mass index is 21.14 kg/(m^2).  Physical Exam:    Constitutional: Alert, no distress.  Skin: Warm, dry, good turgor, no rashes in visible areas.  Eye: Equal, round and reactive, conjunctiva clear, lids normal.  ENMT: Lips without lesions, good dentition, oropharynx clear.  Neck: Trachea midline, no masses, no thyromegaly. No cervical or supraclavicular lymphadenopathy.  Respiratory: Unlabored respiratory effort, lungs clear to auscultation, no wheezes, no ronchi.  Cardiovascular: Normal S1, S2, no murmur, no edema.  Abdomen: Soft, non-tender, no masses, no hepatosplenomegaly.  Psych: Alert and oriented x3, normal affect and mood.          Assessment and Plan:   The following treatment plan was discussed, again this medical record contains text that has been entered with the assistance of computer voice recognition and dictation software.  Therefore, it may contain unintended errors in text, spelling, punctuation, or grammar      1. Essential hypertension  Patient has been stable with current management  We will make no changes for now      2. Chronic pain of right knee  Continue using elipiticle  Increase strength of periarticular muscles    3. Vitamin D deficiency  Continue vitamin D " supplementation increased to 3000 units daily      HEALTH MAINTENANCE:    Followup: Return in about 3 months (around 8/10/2017) for Reevaluation.

## 2017-05-10 NOTE — ASSESSMENT & PLAN NOTE
Her blood pressure continues to be in normal range with metoprolol 100 mg by mouth twice a day. She is also given. Parameters for blood pressure lower than 120/70. She measures her blood pressure prior to taking her metoprolol. Sometimes she skips doses. The patient has been tollerating the BP meds without any issues. No tunnel vision, no cough, no changes in vision, no lightheadedness, no fatigue, no syncopal or presyncopal episodes, no edema, no new rashes.

## 2017-05-10 NOTE — MR AVS SNAPSHOT
"        Lucita Babcock   5/10/2017 9:40 AM   Office Visit   MRN: [id number]    Department:  [address] [place] [address]   Dept Phone:  [phone]    Description:  Female : 1935   Provider:  Farooq Sloan M.D.           Reason for Visit     Hypertension evaluation       Allergies as of 5/10/2017     Allergen Noted Reactions    Ace Inhibitors 2013   Anaphylaxis    Augmentin 2009       Flu like sx    Erythromycin 2008   Rash    Lisinopril 2009   Anaphylaxis    Penicillins 2008   Hives    Epinephrine 2015   Unspecified    shaking    Percocet [Oxycodone-Acetaminophen] 2013   Vomiting    Sulfa Drugs 2010   Hives      You were diagnosed with     Essential hypertension   [7244838]       Chronic pain of right knee   [7527131]       Vitamin D deficiency   [1741292]         Vital Signs     Blood Pressure Pulse Temperature Respirations Height Weight    122/70 mmHg 100 37.2 °C (99 °F) 16 1.702 m (5' 7.01\") 61.236 kg (135 lb)    Body Mass Index Oxygen Saturation Smoking Status             21.14 kg/m2 98% Former Smoker         Basic Information     Date Of Birth Sex Race Ethnicity Preferred Language    1935 Female White Non- English      Your appointments     May 19, 2017  1:30 PM   CARE MANAGEMENT OUTREACH with Jess Shelton R.N.   Population Health (--)    [address] [facility] [id number] [phone]           ***IMPORTANT**** This is a phone visit only. Do not come into the office. The Care Manager will call you at the scheduled time for Care Manager Telephone Visit.            May 23, 2017 11:50 AM   MA SCRN10 with PATTIE HADDAD MG 1   [facility] IMAGING [facility] MAMMOGRAPHY ([place])    [address]  [facility] [address] [phone]           No deodorant, powder, perfume or lotion under the arm or breast area.            Aug 14, 2017 11:00 AM   Established Patient with Farooq Sloan M.D.   [facility] " [address] ([facility])    [address] [facility] [id number] [phone]           You will be receiving a confirmation call a few days before your appointment from our automated call confirmation system.            Sep 25, 2017  1:20 PM   Established Patient with Neo Andres M.D.   [facility] ([facility]    [address], [address]  [facility] [address] [phone]           You will be receiving a confirmation call a few days before your appointment from our automated call confirmation system.            Oct 02, 2017 10:00 AM   Established Patient with Farooq Sloan M.D.   [facility] [address] ([facility])    [address] [facility] [address] [phone]           You will be receiving a confirmation call a few days before your appointment from our automated call confirmation system.              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   12/1/2014 - Present    Hypercalcemia E83.52   11/2/2015 - Present    Vitamin D insufficiency E55.9   11/5/2015 - Present    Chronic venous insufficiency I87.2   11/18/2015 - Present    Hyperparathyroidism (CMS-[facility]) E21.3   12/2/2015 - Present    Hyperlipidemia E78.5   5/24/2016 - Present    History of recurrent UTIs Z87.440   5/24/2016 - Present    Knee pain, right M25.561   6/22/2016 - Present    Central cord syndrome (CMS-[facility]) S14.129A High  7/14/2016 - Present    Elevated troponin R74.8 Low  7/14/2016 - Present    Elevated CPK R74.8 Low  7/14/2016 - Present    Trauma T14.90 Low  7/15/2016 - Present    Right wrist pain M25.531  Low  7/16/2016 - Present    Rhabdomyolysis M62.82   7/18/2016 - Present    Upper extremity weakness R29.898   7/18/2016 - Present    Central cervical cord injury, without spinal bony injury, C1-4 (CMS-HCC) S14.129A   7/27/2016 - Present    Fall W19.XXXA   9/22/2016 - Present    Cervical postlaminectomy syndrome M96.1   10/6/2016 - Present    Chronic neck pain M54.2, G89.29   10/6/2016 - Present    Nausea R11.0   10/18/2016 - Present    Acute conjunctivitis H10.30   10/24/2016 - Present    Preventative health care Z00.00   10/24/2016 - Present    Postmenopausal Z78.0   10/24/2016 - Present    Medication refill Z76.0   11/2/2016 - Present    Gastroesophageal reflux disease without esophagitis K21.9   11/2/2016 - Present    Tension type headache G44.209   11/28/2016 - Present    Injury of cervical spine (CMS-HCC) S14.109A   7/1/2016 - Present    Encounter for abdominal aortic aneurysm (AAA) screening Z13.6   3/2/2017 - Present    Medicare annual wellness visit, initial Z00.00   3/2/2017 - Present    Vitamin D deficiency E55.9   3/22/2017 - Present    Diarrhea R19.7   3/27/2017 - Present      Health Maintenance        Date Due Completion Dates    MAMMOGRAM 3/14/2017 3/14/2016, 3/12/2015, 3/10/2014, 3/7/2013, 3/6/2012, 2/3/2011, 1/19/2010, 1/19/2010, 7/29/2009, 7/29/2009, 3/23/2009, 3/23/2009, 4/9/2008, 3/31/2008, 3/31/2008, 3/28/2007, 3/28/2007, 3/27/2006, 3/24/2005    IMM PNEUMOCOCCAL 65+ (ADULT) LOW/MEDIUM RISK SERIES (2 of 2 - PPSV23) 7/18/2017 7/18/2016    COLONOSCOPY 3/2/2018 3/2/2015    BONE DENSITY 1/8/2021 1/8/2016, 1/31/2013, 10/20/2008    IMM DTaP/Tdap/Td Vaccine (2 - Td) 2/1/2027 2/1/2017            Current Immunizations     13-VALENT PCV PREVNAR 7/18/2016  2:44 PM    Influenza TIV (IM) 10/15/2012 11:28 AM    Influenza Vaccine Adult HD 11/22/2016, 10/15/2015    Influenza Vaccine Pediatric 11/16/2006, 10/18/2005    Pneumococcal Vaccine (UF)Historical Data 11/16/2006    SHINGLES VACCINE 3/2/2017    Tdap Vaccine  2/1/2017      Below and/or attached are the medications your provider expects you to take. Review all of your home medications and newly ordered medications with your provider and/or pharmacist. Follow medication instructions as directed by your provider and/or pharmacist. Please keep your medication list with you and share with your provider. Update the information when medications are discontinued, doses are changed, or new medications (including over-the-counter products) are added; and carry medication information at all times in the event of emergency situations     Allergies:  ACE INHIBITORS - Anaphylaxis     AUGMENTIN - (reactions not documented)     ERYTHROMYCIN - Rash     LISINOPRIL - Anaphylaxis     PENICILLINS - Hives     EPINEPHRINE - Unspecified     PERCOCET - Vomiting     SULFA DRUGS - Hives               Medications  Valid as of: May 10, 2017 - 10:24 AM    Generic Name Brand Name Tablet Size Instructions for use    Acetaminophen (Suspension) TYLENOL 160 MG/5ML Take 15 mg/kg by mouth every 6 hours as needed.        Bethanechol Chloride (Tab) URECHOLINE 25 MG Take 1 Tab by mouth 3 times a day.        Calcium Carbonate-Vitamin D   Take 1 tablet by mouth every day.        Cholecalciferol (Tab) cholecalciferol 1000 UNIT Take 1,000 Units by mouth every day.        Ibuprofen (Tab) MOTRIN 200 MG Take 200 mg by mouth 1 time daily as needed. For pain        Magnesium Oxide (Tab) MAGNESIUM-OXIDE 400 (241.3 MG) MG         Metoprolol Tartrate (Tab) LOPRESSOR 100 MG TAKE 1 TABLET BY MOUTH 2 TIMES A DAY.        Omeprazole (CAPSULE DELAYED RELEASE) PRILOSEC 20 MG TAKE 1 CAP BY MOUTH 2 TIMES A DAY.        Polymyxin B-Trimethoprim (Solution) POLYTRIM 16207-2.1 UNIT/ML-% Place 1 Drop in both eyes every 4 hours.        Sertraline HCl (Tab) ZOLOFT 50 MG Take 1 Tab by mouth every day.        Timolol Maleate (Solution) TIMOPTIC 0.5 % Place 1 Drop in both eyes every day.        .                 Medicines prescribed today  were sent to:     [id number] - [place], [place] - [address]    [address] [place] [address]    Phone: [phone] Fax: [phone]    Open 24 Hours?: No      Medication refill instructions:       If your prescription bottle indicates you have medication refills left, it is not necessary to call your provider’s office. Please contact your pharmacy and they will refill your medication.    If your prescription bottle indicates you do not have any refills left, you may request refills at any time through one of the following ways: The online shenzhoufu system (except Urgent Care), by calling your provider’s office, or by asking your pharmacy to contact your provider’s office with a refill request. Medication refills are processed only during regular business hours and may not be available until the next business day. Your provider may request additional information or to have a follow-up visit with you prior to refilling your medication.   *Please Note: Medication refills are assigned a new Rx number when refilled electronically. Your pharmacy may indicate that no refills were authorized even though a new prescription for the same medication is available at the pharmacy. Please request the medicine by name with the pharmacy before contacting your provider for a refill.        Other Notes About Your Plan     Please assess the following diagnosis:  E21.3-hyperparathyroidism    -this patient has been on Zoloft since 11/2012, with diagnosis of anxiety and depression. ? If treating major depressive disorder, single episode, unspecified degree, code F32.9    Patient report to me that she was treated with Zoloft initially that she was depressed when she lost her  and she also has mild anxiety. Medication helps ocontrolled her mood. Now she denied feeling depressed. She is continuously taking it for her anxiety.  [place]                    shenzhoufu Access Code: Activation code not generated  Current shenzhoufu  Status: Active

## 2017-05-10 NOTE — ASSESSMENT & PLAN NOTE
Patient has been taking vitamin D 1000 units by mouth daily. She states that she is compliant with her medication dose.    Results for KEANU TOBIN (MRN [id number]) as of 5/10/2017 10:17   Ref. Range 3/21/2017 09:44   25-Hydroxy   Vitamin D 25 Latest Ref Range:  ng/mL 22 (L)

## 2017-05-10 NOTE — ASSESSMENT & PLAN NOTE
Patient states that her orthopedic physician recommends that she has TKR. She plans on proceeding with this in the fall. She can walk but she has walk with a cane due to popping/locking sometimes. She can walk without a limp. She is able to the elliptical at home.

## 2017-05-11 RX ORDER — METOPROLOL TARTRATE 100 MG/1
TABLET ORAL
Qty: 60 TAB | Refills: 0 | Status: SHIPPED | OUTPATIENT
Start: 2017-05-11 | End: 2017-08-08

## 2017-05-19 ENCOUNTER — PATIENT OUTREACH (OUTPATIENT)
Dept: HEALTH INFORMATION MANAGEMENT | Facility: OTHER | Age: 82
End: 2017-05-19

## 2017-05-19 NOTE — PROGRESS NOTES
"RN outreach call.  CC reviewed LPOC. Patient reports she has been \"doing pretty good\". Reports no recent falls. States she is using her cane to assist with mobility.  States she is going to eventually need to have knee replacement surgery. Reports she is following with orthopedics.  Patient reports she resumed driving short distances. States she still has [facility] coming to her house three times a week.   Plan: CC will continue to follow. CC provided patient with contact telephone number.   "

## 2017-05-23 ENCOUNTER — HOSPITAL ENCOUNTER (OUTPATIENT)
Dept: RADIOLOGY | Facility: MEDICAL CENTER | Age: 82
End: 2017-05-23
Attending: FAMILY MEDICINE
Payer: MEDICARE

## 2017-05-23 DIAGNOSIS — Z13.9 SCREENING: ICD-10-CM

## 2017-05-23 PROCEDURE — 77063 BREAST TOMOSYNTHESIS BI: CPT

## 2017-06-02 ENCOUNTER — OFFICE VISIT (OUTPATIENT)
Dept: MEDICAL GROUP | Age: 82
End: 2017-06-02
Payer: MEDICARE

## 2017-06-02 VITALS
WEIGHT: 135 LBS | BODY MASS INDEX: 21.19 KG/M2 | HEART RATE: 59 BPM | TEMPERATURE: 98.4 F | SYSTOLIC BLOOD PRESSURE: 124 MMHG | DIASTOLIC BLOOD PRESSURE: 70 MMHG | OXYGEN SATURATION: 97 % | HEIGHT: 67 IN

## 2017-06-02 DIAGNOSIS — R53.83 FATIGUE, UNSPECIFIED TYPE: ICD-10-CM

## 2017-06-02 DIAGNOSIS — F41.9 ANXIETY: ICD-10-CM

## 2017-06-02 DIAGNOSIS — I10 ESSENTIAL HYPERTENSION: ICD-10-CM

## 2017-06-02 DIAGNOSIS — K21.9 GASTROESOPHAGEAL REFLUX DISEASE WITHOUT ESOPHAGITIS: ICD-10-CM

## 2017-06-02 PROCEDURE — 1036F TOBACCO NON-USER: CPT | Performed by: FAMILY MEDICINE

## 2017-06-02 PROCEDURE — 1100F PTFALLS ASSESS-DOCD GE2>/YR: CPT | Performed by: FAMILY MEDICINE

## 2017-06-02 PROCEDURE — G8432 DEP SCR NOT DOC, RNG: HCPCS | Performed by: FAMILY MEDICINE

## 2017-06-02 PROCEDURE — G8420 CALC BMI NORM PARAMETERS: HCPCS | Performed by: FAMILY MEDICINE

## 2017-06-02 PROCEDURE — 4040F PNEUMOC VAC/ADMIN/RCVD: CPT | Performed by: FAMILY MEDICINE

## 2017-06-02 PROCEDURE — 3288F FALL RISK ASSESSMENT DOCD: CPT | Performed by: FAMILY MEDICINE

## 2017-06-02 PROCEDURE — 99214 OFFICE O/P EST MOD 30 MIN: CPT | Performed by: FAMILY MEDICINE

## 2017-06-02 PROCEDURE — 0518F FALL PLAN OF CARE DOCD: CPT | Mod: 8P | Performed by: FAMILY MEDICINE

## 2017-06-02 RX ORDER — METOPROLOL SUCCINATE 50 MG/1
50 TABLET, EXTENDED RELEASE ORAL DAILY
Qty: 90 TAB | Refills: 0 | Status: SHIPPED | OUTPATIENT
Start: 2017-06-02 | End: 2017-08-08

## 2017-06-02 NOTE — ASSESSMENT & PLAN NOTE
Patient states that she can even eat at [employer] have no problems. She takes her omeprazole only when she needs it. She does not take it daily anymore. She denies any regurgitation of food getting stuck in the chest, eructation no difficulty swallowing no pain on swallowing.

## 2017-06-02 NOTE — ASSESSMENT & PLAN NOTE
Patient states that over the past couple of weeks she's been noticing that she becomes very tired in the afternoon. She is wondering if it does cause by her metoprolol 100 mg by mouth twice a day. She was tolerating just fine couple months ago. She would like to decrease the dose to see if it helps. She denies any syncopal episodes or presyncopal episodes.

## 2017-06-02 NOTE — ASSESSMENT & PLAN NOTE
The patient has been taking metoprolol 100 mg by mouth twice a day with good control of her blood pressure. We had issues and difficulties controlling it in the past however we found the right dose and she's been stable for a couple months now. However just over the past couple weeks she isn't complaining of severe fatigue in the afternoon and was wondering if this could be a side effect of her metoprolol.

## 2017-06-02 NOTE — ASSESSMENT & PLAN NOTE
50mg is enough, decreased from 100mg due to feeling confusion/dizziness when she was on the 100 mg dose. Overall she doesn't feel depressed or anxious. She does feel lonely she lives alone her boyfriend lives one [address] and cannot calm overall time. Her daughter lives in [place] and another daughter lives in [facility]. She's not sure if she can care for her dog because she would not be able to walk the dog due to her right knee pain. She denies any suicidal or homicidal ideations

## 2017-06-02 NOTE — PROGRESS NOTES
This medical record contains text that has been entered with the assistance of computer voice recognition and dictation software.  Therefore, it may contain unintended errors in text, spelling, punctuation, or grammar    Chief Complaint   Patient presents with   • Other     see reason for visit       Lucita Babcock is a 82 y.o. female here evaluation and management of: Fatigue, anxiety, GERD, hypertension      HPI:     Anxiety  50mg is enough, decreased from 100mg due to feeling confusion/dizziness when she was on the 100 mg dose. Overall she doesn't feel depressed or anxious. She does feel lonely she lives alone her boyfriend lives one [place] and cannot calm overall time. Her daughter lives in [place] and another daughter lives in [facility]. She's not sure if she can care for her dog because she would not be able to walk the dog due to her right knee pain. She denies any suicidal or homicidal ideations    Fatigue  Patient states that over the past couple of weeks she's been noticing that she becomes very tired in the afternoon. She is wondering if it does cause by her metoprolol 100 mg by mouth twice a day. She was tolerating just fine couple months ago. She would like to decrease the dose to see if it helps. She denies any syncopal episodes or presyncopal episodes.    Essential hypertension  The patient has been taking metoprolol 100 mg by mouth twice a day with good control of her blood pressure. We had issues and difficulties controlling it in the past however we found the right dose and she's been stable for a couple months now. However just over the past couple weeks she isn't complaining of severe fatigue in the afternoon and was wondering if this could be a side effect of her metoprolol.    Gastroesophageal reflux disease without esophagitis  Patient states that she can even eat at [employer] have no problems. She takes her omeprazole only when she needs it. She does not take it daily anymore. She denies  any regurgitation of food getting stuck in the chest, eructation no difficulty swallowing no pain on swallowing.      Current medicines (including changes today)  Current Outpatient Prescriptions   Medication Sig Dispense Refill   • metoprolol SR (TOPROL XL) 50 MG TABLET SR 24 HR Take 1 Tab by mouth every day. 90 Tab 0   • MAGNESIUM-OXIDE 400 (241.3 MG) MG Tab tablet TAKE 1 TABLET BY MOUTH 2 TIMES A DAY 60 Tab 0   • metoprolol (LOPRESSOR) 100 MG Tab TAKE 1 TABLET BY MOUTH 2 TIMES A DAY. 60 Tab 0   • omeprazole (PRILOSEC) 20 MG delayed-release capsule TAKE 1 CAP BY MOUTH 2 TIMES A DAY. 180 Cap 0   • MAGNESIUM-OXIDE 400 (241.3 MG) MG Tab tablet      • Calcium Carbonate-Vitamin D (OS-THIAGO 500 + D PO) Take 1 tablet by mouth every day.     • ibuprofen (MOTRIN) 200 MG Tab Take 200 mg by mouth 1 time daily as needed. For pain     • vitamin D (CHOLECALCIFEROL) 1000 UNIT Tab Take 1,000 Units by mouth every day.     • polymixin-trimethoprim (POLYTRIM) 44720-5.1 UNIT/ML-% Solution Place 1 Drop in both eyes every 4 hours. 10 mL 0   • acetaminophen (TYLENOL) 160 MG/5ML Suspension Take 15 mg/kg by mouth every 6 hours as needed.     • sertraline (ZOLOFT) 50 MG Tab Take 1 Tab by mouth every day. 30 Tab 5   • bethanechol (URECHOLINE) 25 MG Tab Take 1 Tab by mouth 3 times a day. 90 Tab 0   • timolol (TIMOPTIC) 0.5 % Solution Place 1 Drop in both eyes every day. 1 Bottle 3     No current facility-administered medications for this visit.     She  has a past medical history of Hypertension; Osteopenia; Sarcoid (CMS-HCC); Depression; Anxiety; Breast cancer (CMS-HCC) (2008); Aneurysm (CMS-HCC); Other specified disorder of intestines; Indigestion; Anesthesia; Arthritis; Cancer (CMS-HCC) (2008); Dental disorder; UTI (urinary tract infection) (); CATARACT; and Injury of cervical spine (CMS-HCC) (July 2016). She also has no past medical history of Stroke (CMS-HCC), Seizure (CMS-HCC), Diabetes (CMS-HCC), or Pacemaker.  She  has past  "surgical history that includes vein stripping; mass excision general (); breast biopsy (08); abdominal hysterectomy total (); cataract phaco with iol (2010); colonoscopy (); ercp in or (2012); samuel by laparoscopy (2013); lumpectomy (); radiation therapy plan simple (); cataract phaco with iol (2014); and cervical disk and fusion anterior (2016).  Social History   Substance Use Topics   • Smoking status: Former Smoker -- 0.50 packs/day for 45 years     Quit date: 10/09/1969   • Smokeless tobacco: Never Used   • Alcohol Use: 0.6 oz/week     1 Glasses of wine per week      Comment: ocassionally     Social History     Social History Narrative     Family History   Problem Relation Age of Onset   • Hypertension Mother    • Stroke Mother    • Cancer Neg Hx    • Diabetes Neg Hx    • Heart Disease Neg Hx    • Other Father      Dementia   • Other Sister      BIpolar   • Other Sister      Bipolar   • Other Sister      THyroid disease     Family Status   Relation Status Death Age   • Mother     • Son       suicide   • Father     • Sister     • Sister Alive    • Sister Alive    • Daughter Alive    • Daughter Alive          ROS  Please see history of present illness    All other systems reviewed and are negative     Objective:     Blood pressure 124/70, pulse 59, temperature 36.9 °C (98.4 °F), height 1.702 m (5' 7.01\"), weight 61.236 kg (135 lb), SpO2 97 %. Body mass index is 21.14 kg/(m^2).  Physical Exam:    Constitutional: Alert, no distress.  Skin: Warm, dry, good turgor, no rashes in visible areas.  Eye: Equal, round and reactive, conjunctiva clear, lids normal.  ENMT: Lips without lesions, good dentition, oropharynx clear.  Neck: Trachea midline, no masses, no thyromegaly. No cervical or supraclavicular lymphadenopathy.  Respiratory: Unlabored respiratory effort, lungs clear to auscultation, no wheezes, no ronchi.  Cardiovascular: Normal S1, " S2, no murmur, no edema.  Abdomen: Soft, non-tender, no masses, no hepatosplenomegaly.  Psych: Alert and oriented x3, normal affect and mood.  Gait--- she can walk without her cane, but prefers to walk with it to prevent falling        Assessment and Plan:   The following treatment plan was discussed, again this medical record contains text that has been entered with the assistance of computer voice recognition and dictation software.  Therefore, it may contain unintended errors in text, spelling, punctuation, or grammar      1. Essential hypertension  We will decrease metoprolol to 50 mg twice a day  She will make a 2 week BP log sent back to me  She will also notice if the fatigue has improved    - metoprolol SR (TOPROL XL) 50 MG TABLET SR 24 HR; Take 1 Tab by mouth every day.  Dispense: 90 Tab; Refill: 0    2. Anxiety  I recommended that she join more social groups  That meet more frequently than just once a month  She will ask her daughter to help her join    3. Fatigue, unspecified type  This is likely due to her metoprolol  We will decrease to 50 g by mouth twice a day  And monitor BP closely    4. Gastroesophageal reflux disease without esophagitis  Patient has been stable with current management  We will make no changes for now      HEALTH MAINTENANCE: Up-to-date    Followup: Return in about 3 months (around 9/2/2017) for Reevaluation.

## 2017-06-08 ENCOUNTER — TELEPHONE (OUTPATIENT)
Dept: MEDICAL GROUP | Age: 82
End: 2017-06-08

## 2017-06-08 NOTE — TELEPHONE ENCOUNTER
FYI ONLY    Phone Number Called: [phone] (home)     Message: Patient informed of Dr. Sloan's message, verbalized understanding.    Left Message for patient to call back: no

## 2017-06-08 NOTE — TELEPHONE ENCOUNTER
1. Caller Name: Lucita Babcock                                         Call Back Number: [phone] (home)       Patient approves a detailed voicemail message: N\A    Patient called regarding metoprolol SR (TOPROL XL) 50 MG TABLET SR 24 HR, stating she had a reaction from it. Shortly after she took the medication she almost passed out and her blood pressure got really high and then dropped low. Patient is wondering if tomorrow she should go back to taking the metoprolol 100 MG instead of the 50 MG SR. Please advise.

## 2017-06-08 NOTE — TELEPHONE ENCOUNTER
Farooq Sloan M.D.  Julia Lawrence, [facility]        Caller: Unspecified (Today, 4:30 PM)                     Yes go back to taking the original medication, and monitor BP.

## 2017-06-13 RX ORDER — METOPROLOL TARTRATE 100 MG/1
TABLET ORAL
Qty: 60 TAB | Refills: 0 | Status: SHIPPED | OUTPATIENT
Start: 2017-06-13 | End: 2017-07-11 | Stop reason: SDUPTHER

## 2017-06-22 ENCOUNTER — OFFICE VISIT (OUTPATIENT)
Dept: MEDICAL GROUP | Age: 82
End: 2017-06-22
Payer: MEDICARE

## 2017-06-22 VITALS
HEIGHT: 67 IN | BODY MASS INDEX: 20.94 KG/M2 | DIASTOLIC BLOOD PRESSURE: 68 MMHG | WEIGHT: 133.4 LBS | TEMPERATURE: 98.4 F | HEART RATE: 62 BPM | OXYGEN SATURATION: 96 % | SYSTOLIC BLOOD PRESSURE: 122 MMHG

## 2017-06-22 DIAGNOSIS — F41.9 ANXIETY: ICD-10-CM

## 2017-06-22 DIAGNOSIS — K58.1 IRRITABLE BOWEL SYNDROME WITH CONSTIPATION: ICD-10-CM

## 2017-06-22 DIAGNOSIS — I10 ESSENTIAL HYPERTENSION: ICD-10-CM

## 2017-06-22 PROCEDURE — 99214 OFFICE O/P EST MOD 30 MIN: CPT | Performed by: FAMILY MEDICINE

## 2017-06-22 NOTE — PROGRESS NOTES
This medical record contains text that has been entered with the assistance of computer voice recognition and dictation software.  Therefore, it may contain unintended errors in text, spelling, punctuation, or grammar    Chief Complaint   Patient presents with   • Other     see reason for visit       Lucita Babcock is a 82 y.o. female here evaluation and management of: Irritable bowel syndrome, hypertension, anxiety      HPI:     Anxiety  Sprinkler pipe broke, will cost atleast 2000 dollars    She began to have some stomach ace with constipation   Started one week ago, has resolved with BM  No SI, no HI    Essential hypertension      Blood pressure has been controlled but now patient complains of being fatigued in the middle of the day. Otherwise The patient has been tollerating the metoprolol 100 mg twice a day/  No tunnel vision, no cough, no changes in vision, no lightheadedness, does have the fatigue, no syncopal or presyncopal episodes, no edema, no new rashes. Will try 1/2 tab in am and full 100mg at night    Irritable bowel syndrome with constipation  Patient states that she notices that she gets constipated has abdominal pain/cramping every time she gets stress in her life. For example the water pipe broke and the sprinkler system which cost her $2000 this led to the above-stated symptoms for a week. She has had a bowel movement since and symptom has resolved.      Current medicines (including changes today)  Current Outpatient Prescriptions   Medication Sig Dispense Refill   • psyllium (METAMUCIL SMOOTH TEXTURE) 28 % packet Take 1 Packet by mouth 2 times a day. 30 Each 3   • metoprolol (LOPRESSOR) 100 MG Tab TAKE 1 TABLET BY MOUTH 2 TIMES A DAY. 60 Tab 0   • metoprolol SR (TOPROL XL) 50 MG TABLET SR 24 HR Take 1 Tab by mouth every day. 90 Tab 0   • MAGNESIUM-OXIDE 400 (241.3 MG) MG Tab tablet TAKE 1 TABLET BY MOUTH 2 TIMES A DAY 60 Tab 0   • metoprolol (LOPRESSOR) 100 MG Tab TAKE 1 TABLET BY MOUTH 2 TIMES A  DAY. 60 Tab 0   • omeprazole (PRILOSEC) 20 MG delayed-release capsule TAKE 1 CAP BY MOUTH 2 TIMES A DAY. 180 Cap 0   • MAGNESIUM-OXIDE 400 (241.3 MG) MG Tab tablet      • Calcium Carbonate-Vitamin D (OS-THIAGO 500 + D PO) Take 1 tablet by mouth every day.     • ibuprofen (MOTRIN) 200 MG Tab Take 200 mg by mouth 1 time daily as needed. For pain     • vitamin D (CHOLECALCIFEROL) 1000 UNIT Tab Take 1,000 Units by mouth every day.     • polymixin-trimethoprim (POLYTRIM) 81324-3.1 UNIT/ML-% Solution Place 1 Drop in both eyes every 4 hours. 10 mL 0   • acetaminophen (TYLENOL) 160 MG/5ML Suspension Take 15 mg/kg by mouth every 6 hours as needed.     • sertraline (ZOLOFT) 50 MG Tab Take 1 Tab by mouth every day. 30 Tab 5   • bethanechol (URECHOLINE) 25 MG Tab Take 1 Tab by mouth 3 times a day. 90 Tab 0   • timolol (TIMOPTIC) 0.5 % Solution Place 1 Drop in both eyes every day. 1 Bottle 3     No current facility-administered medications for this visit.     She  has a past medical history of Hypertension; Osteopenia; Sarcoid (CMS-HCC); Depression; Anxiety; Breast cancer (CMS-HCC) (2008); Aneurysm (CMS-HCC); Other specified disorder of intestines; Indigestion; Anesthesia; Arthritis; Cancer (CMS-HCC) (2008); Dental disorder; UTI (urinary tract infection) (); CATARACT; and Injury of cervical spine (CMS-HCC) (July 2016). She also has no past medical history of Stroke (CMS-HCC), Seizure (CMS-HCC), Diabetes (CMS-HCC), or Pacemaker.  She  has past surgical history that includes vein stripping; mass excision general (7/08); breast biopsy (5/27/08); abdominal hysterectomy total (1997); cataract phaco with iol (8/11/2010); colonoscopy (2007); ercp in or (12/28/2012); samuel by laparoscopy (1/8/2013); lumpectomy (2008); radiation therapy plan simple (2008); cataract phaco with iol (8/27/2014); and cervical disk and fusion anterior (7/27/2016).  Social History   Substance Use Topics   • Smoking status: Former Smoker -- 0.50 packs/day  "for 45 years     Quit date: 10/09/1969   • Smokeless tobacco: Never Used   • Alcohol Use: 0.6 oz/week     1 Glasses of wine per week      Comment: ocassionally     Social History     Social History Narrative     Family History   Problem Relation Age of Onset   • Hypertension Mother    • Stroke Mother    • Cancer Neg Hx    • Diabetes Neg Hx    • Heart Disease Neg Hx    • Other Father      Dementia   • Other Sister      BIpolar   • Other Sister      Bipolar   • Other Sister      THyroid disease     Family Status   Relation Status Death Age   • Mother     • Son       suicide   • Father     • Sister     • Sister Alive    • Sister Alive    • Daughter Alive    • Daughter Alive          ROS  See history of present illness    All other systems reviewed and are negative     Objective:     Blood pressure 122/68, pulse 62, temperature 36.9 °C (98.4 °F), height 1.702 m (5' 7.01\"), weight 60.51 kg (133 lb 6.4 oz), SpO2 96 %. Body mass index is 20.89 kg/(m^2).  Physical Exam:      Eye: Equal, round and reactive, conjunctiva clear, lids normal.  ENMT: Lips without lesions, good dentition, oropharynx clear.  Neck: Trachea midline, no masses, no thyromegaly. No cervical or supraclavicular lymphadenopathy.  Respiratory: Unlabored respiratory effort, lungs clear to auscultation, no wheezes, no ronchi.  Cardiovascular: Normal S1, S2, no murmur, no edema.  Abdomen: Soft, non-tender, no masses, no hepatosplenomegaly, negative Bunch sign, no rebound tenderness, no peritoneal signs  Psych: Alert and oriented x3, normal affect and mood.          Assessment and Plan:   The following treatment plan was discussed      1. Anxiety  Patient has been stable with current management  We will make no changes for now      2. Irritable bowel syndrome with constipation    - psyllium (METAMUCIL SMOOTH TEXTURE) 28 % packet; Take 1 Packet by mouth 2 times a day.  Dispense: 30 Each; Refill: 3    3. Essential " hypertension  Will change to 1/2 tab o metoprolol 100mg in AM  Then one full tab at night  Patient was given instructions to make a two-week blood pressure log  To measure his blood pressure morning and evening same time  Then send results back to us  We will consider specific management at that time        HEALTH MAINTENANCE: up to date    Instructed to Follow up in clinic or ER for worsening symptoms, difficulty breathing, lack of expected recovery, or should new symptoms or problems arise.    Followup: Return in about 4 weeks (around 7/20/2017) for Reevaluation, BP Check.       Once again this medical record contains text that has been entered with the assistance of computer voice recognition and dictation software.  Therefore, it may contain unintended errors in text, spelling, punctuation, or grammar

## 2017-06-22 NOTE — ASSESSMENT & PLAN NOTE
Josesito johnson, will cost atleast 2000 dollars    She began to have some stomach ace with constipation   Started one week ago, has resolved with BM  No SI, no HI

## 2017-06-22 NOTE — ASSESSMENT & PLAN NOTE
Blood pressure has been controlled but now patient complains of being fatigued in the middle of the day. Otherwise The patient has been tollerating the metoprolol 100 mg twice a day/  No tunnel vision, no cough, no changes in vision, no lightheadedness, does have the fatigue, no syncopal or presyncopal episodes, no edema, no new rashes. Will try 1/2 tab in am and full 100mg at night

## 2017-06-22 NOTE — ASSESSMENT & PLAN NOTE
Patient states that she notices that she gets constipated has abdominal pain/cramping every time she gets stress in her life. For example the water pipe broke and the sprinkler system which cost her $2000 this led to the above-stated symptoms for a week. She has had a bowel movement since and symptom has resolved.

## 2017-06-22 NOTE — MR AVS SNAPSHOT
"Lucita Babcock   2017 10:20 AM   Office Visit   MRN: [id number]    Department:  [address] [place] [address]   Dept Phone:  [phone]    Description:  Female : 1935   Provider:  Farooq Sloan M.D.           Reason for Visit     Other see reason for visit      Allergies as of 2017     Allergen Noted Reactions    Ace Inhibitors 2013   Anaphylaxis    Augmentin 2009       Flu like sx    Erythromycin 2008   Rash    Lisinopril 2009   Anaphylaxis    Penicillins 2008   Hives    Epinephrine 2015   Unspecified    shaking    Percocet [Oxycodone-Acetaminophen] 2013   Vomiting    Sulfa Drugs 2010   Hives      You were diagnosed with     Anxiety   [353907]       Irritable bowel syndrome with constipation   [547277]       Essential hypertension   [7094635]         Vital Signs     Blood Pressure Pulse Temperature Height Weight Body Mass Index    122/68 mmHg 62 36.9 °C (98.4 °F) 1.702 m (5' 7.01\") 60.51 kg (133 lb 6.4 oz) 20.89 kg/m2    Oxygen Saturation Smoking Status                96% Former Smoker          Basic Information     Date Of Birth Sex Race Ethnicity Preferred Language    1935 Female White Non- English      Your appointments     Aug 14, 2017 11:00 AM   Established Patient with Farooq Sloan M.D.   [address]    [address] [facility] [address]  [facility] [address] [phone]           You will be receiving a confirmation call a few days before your appointment from our automated call confirmation system.            Sep 25, 2017  1:20 PM   Established Patient with Neo Andres M.D.   [facility] ([facility])    [address], [address]  [facility] [address] [phone]           You will be receiving a confirmation call a few days before your appointment from our automated call confirmation system.            Oct 02, 2017 10:00 AM   Established Patient with " Farooq Sloan M.D.   [facility] [address] [facility] ([facility])    [address] [place] [address] [phone]           You will be receiving a confirmation call a few days before your appointment from our automated call confirmation system.              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   12/1/2014 - Present    Hypercalcemia E83.52   11/2/2015 - Present    Vitamin D insufficiency E55.9   11/5/2015 - Present    Chronic venous insufficiency I87.2   11/18/2015 - Present    Hyperparathyroidism (CMS-HCC) E21.3   12/2/2015 - Present    Hyperlipidemia E78.5   5/24/2016 - Present    History of recurrent UTIs Z87.440   5/24/2016 - Present    Knee pain, right M25.561   6/22/2016 - Present    Central cord syndrome (CMS-HCC) S14.129A High  7/14/2016 - Present    Elevated troponin R74.8 Low  7/14/2016 - Present    Elevated CPK R74.8 Low  7/14/2016 - Present    Trauma T14.90 Low  7/15/2016 - Present    Right wrist pain M25.531 Low  7/16/2016 - Present    Rhabdomyolysis M62.82   7/18/2016 - Present    Upper extremity weakness R29.898   7/18/2016 - Present    Central cervical cord injury, without spinal bony injury, C1-4 (CMS-HCC) S14.129A   7/27/2016 - Present    Fall W19.XXXA   9/22/2016 - Present    Cervical postlaminectomy syndrome M96.1   10/6/2016 - Present    Chronic neck pain M54.2, G89.29   10/6/2016 - Present    Nausea R11.0   10/18/2016 - Present    Acute conjunctivitis H10.30   10/24/2016 - Present    Preventative health care Z00.00   10/24/2016 - Present    Postmenopausal Z78.0   10/24/2016 - Present    Medication  refill Z76.0   11/2/2016 - Present    Gastroesophageal reflux disease without esophagitis K21.9   11/2/2016 - Present    Tension type headache G44.209   11/28/2016 - Present    Injury of cervical spine (CMS-HCC) S14.109A   7/1/2016 - Present    Encounter for abdominal aortic aneurysm (AAA) screening Z13.6   3/2/2017 - Present    Medicare annual wellness visit, initial Z00.00   3/2/2017 - Present    Vitamin D deficiency E55.9   3/22/2017 - Present    Diarrhea R19.7   3/27/2017 - Present    Fatigue R53.83   6/2/2017 - Present    Irritable bowel syndrome with constipation K58.1   6/22/2017 - Present      Health Maintenance        Date Due Completion Dates    IMM PNEUMOCOCCAL 65+ (ADULT) LOW/MEDIUM RISK SERIES (2 of 2 - PPSV23) 7/18/2017 7/18/2016    COLONOSCOPY 3/2/2018 3/2/2015    MAMMOGRAM 5/23/2018 5/23/2017, 3/14/2016, 3/12/2015, 3/10/2014, 3/7/2013, 3/6/2012, 2/3/2011, 1/19/2010, 1/19/2010, 7/29/2009, 7/29/2009, 3/23/2009, 3/23/2009, 4/9/2008, 3/31/2008, 3/31/2008, 3/28/2007, 3/28/2007, 3/27/2006, 3/24/2005    BONE DENSITY 1/8/2021 1/8/2016, 1/31/2013, 10/20/2008    IMM DTaP/Tdap/Td Vaccine (2 - Td) 2/1/2027 2/1/2017            Current Immunizations     13-VALENT PCV PREVNAR 7/18/2016  2:44 PM    Influenza TIV (IM) 10/15/2012 11:28 AM    Influenza Vaccine Adult HD 11/22/2016, 10/15/2015    Influenza Vaccine Pediatric 11/16/2006, 10/18/2005    Pneumococcal Vaccine (UF)Historical Data 11/16/2006    SHINGLES VACCINE 3/2/2017    Tdap Vaccine 2/1/2017      Below and/or attached are the medications your provider expects you to take. Review all of your home medications and newly ordered medications with your provider and/or pharmacist. Follow medication instructions as directed by your provider and/or pharmacist. Please keep your medication list with you and share with your provider. Update the information when medications are discontinued, doses are changed, or new medications (including over-the-counter products) are  added; and carry medication information at all times in the event of emergency situations     Allergies:  ACE INHIBITORS - Anaphylaxis     AUGMENTIN - (reactions not documented)     ERYTHROMYCIN - Rash     LISINOPRIL - Anaphylaxis     PENICILLINS - Hives     EPINEPHRINE - Unspecified     PERCOCET - Vomiting     SULFA DRUGS - Hives               Medications  Valid as of: June 22, 2017 - 11:20 AM    Generic Name Brand Name Tablet Size Instructions for use    Acetaminophen (Suspension) TYLENOL 160 MG/5ML Take 15 mg/kg by mouth every 6 hours as needed.        Bethanechol Chloride (Tab) URECHOLINE 25 MG Take 1 Tab by mouth 3 times a day.        Calcium Carbonate-Vitamin D   Take 1 tablet by mouth every day.        Cholecalciferol (Tab) cholecalciferol 1000 UNIT Take 1,000 Units by mouth every day.        Ibuprofen (Tab) MOTRIN 200 MG Take 200 mg by mouth 1 time daily as needed. For pain        Magnesium Oxide (Tab) MAGNESIUM-OXIDE 400 (241.3 MG) MG         Magnesium Oxide (Tab) MAGNESIUM-OXIDE 400 (241.3 MG) MG TAKE 1 TABLET BY MOUTH 2 TIMES A DAY        Metoprolol Succinate (TABLET SR 24 HR) TOPROL XL 50 MG Take 1 Tab by mouth every day.        Metoprolol Tartrate (Tab) LOPRESSOR 100 MG TAKE 1 TABLET BY MOUTH 2 TIMES A DAY.        Metoprolol Tartrate (Tab) LOPRESSOR 100 MG TAKE 1 TABLET BY MOUTH 2 TIMES A DAY.        Omeprazole (CAPSULE DELAYED RELEASE) PRILOSEC 20 MG TAKE 1 CAP BY MOUTH 2 TIMES A DAY.        Polymyxin B-Trimethoprim (Solution) POLYTRIM 06954-2.1 UNIT/ML-% Place 1 Drop in both eyes every 4 hours.        Psyllium (Pack) METAMUCIL SMOOTH TEXTURE 28 % Take 1 Packet by mouth 2 times a day.        Sertraline HCl (Tab) ZOLOFT 50 MG Take 1 Tab by mouth every day.        Timolol Maleate (Solution) TIMOPTIC 0.5 % Place 1 Drop in both eyes every day.        .                 Medicines prescribed today were sent to:     [id number] - [place] - [address]    [address] [address]     Phone: [phone] Fax: [phone]    Open 24 Hours?: No      Medication refill instructions:       If your prescription bottle indicates you have medication refills left, it is not necessary to call your provider’s office. Please contact your pharmacy and they will refill your medication.    If your prescription bottle indicates you do not have any refills left, you may request refills at any time through one of the following ways: The online [email or web address] system (except Urgent Care), by calling your provider’s office, or by asking your pharmacy to contact your provider’s office with a refill request. Medication refills are processed only during regular business hours and may not be available until the next business day. Your provider may request additional information or to have a follow-up visit with you prior to refilling your medication.   *Please Note: Medication refills are assigned a new Rx number when refilled electronically. Your pharmacy may indicate that no refills were authorized even though a new prescription for the same medication is available at the pharmacy. Please request the medicine by name with the pharmacy before contacting your provider for a refill.        Other Notes About Your Plan     Please assess the following diagnosis:  E21.3-hyperparathyroidism    -this patient has been on Zoloft since 11/2012, with diagnosis of anxiety and depression. ? If treating major depressive disorder, single episode, unspecified degree, code F32.9    Patient report to me that she was treated with Zoloft initially that she was depressed when she lost her  and she also has mild anxiety. Medication helps ocontrolled her mood. Now she denied feeling depressed. She is continuously taking it for her anxiety.  [place]                    [email or web address] Access Code: Activation code not generated  Current [email or web address] Status: Active

## 2017-06-23 ENCOUNTER — PATIENT OUTREACH (OUTPATIENT)
Dept: HEALTH INFORMATION MANAGEMENT | Facility: OTHER | Age: 82
End: 2017-06-23

## 2017-06-23 NOTE — PROGRESS NOTES
CC outreach call to patient.    Reviewed LPOC with patient.  Patient reports no recent falls.  States she is feeling good and has resumed some driving. States she has in home care 3 times a week for 1/2 days.  Reports recent visit with PCP.  CC will graduate patient as goals met.  Patient does have CC contact information if needs change.

## 2017-07-11 RX ORDER — METOPROLOL TARTRATE 100 MG/1
TABLET ORAL
Qty: 60 TAB | Refills: 0 | Status: SHIPPED | OUTPATIENT
Start: 2017-07-11 | End: 2017-08-08 | Stop reason: SDUPTHER

## 2017-07-12 NOTE — TELEPHONE ENCOUNTER
Phone Number Called: [phone] (home)     Message: Called patient notified of medication sent to the pharmacy    Left Message for patient to call back: N\A

## 2017-07-20 ENCOUNTER — TELEPHONE (OUTPATIENT)
Dept: MEDICAL GROUP | Age: 82
End: 2017-07-20

## 2017-08-08 RX ORDER — METOPROLOL TARTRATE 100 MG/1
TABLET ORAL
Qty: 60 TAB | Refills: 0 | Status: SHIPPED | OUTPATIENT
Start: 2017-08-08 | End: 2017-08-21

## 2017-08-14 ENCOUNTER — APPOINTMENT (OUTPATIENT)
Dept: MEDICAL GROUP | Age: 82
End: 2017-08-14
Payer: MEDICARE

## 2017-08-18 ENCOUNTER — TELEPHONE (OUTPATIENT)
Dept: MEDICAL GROUP | Age: 82
End: 2017-08-18

## 2017-08-18 NOTE — TELEPHONE ENCOUNTER
ESTABLISHED PATIENT PRE-VISIT PLANNING     Note: Patient will not be contacted if there is no indication to call.     1.  Reviewed notes from the last few office visits within the medical group: Yes    2.  If any orders were placed at last visit or intended to be done for this visit (i.e. 6 mos follow-up), do we have Results/Consult Notes?        •  Labs - Labs were not ordered at last office visit.       •  Imaging - Imaging was not ordered at last office visit.       •  Referrals - No referrals were ordered at last office visit.    3. Is this appointment scheduled as a Hospital Follow-Up? No    4.  Immunizations were updated in Epic using WebIZ?: Epic matches WebIZ       •  Web [employer] Recommendations: HEPATITIS A , HEPATITIS B and TD    5.  Patient is due for the following Health Maintenance Topics:   Health Maintenance Due   Topic Date Due   • IMM PNEUMOCOCCAL 65+ (ADULT) LOW/MEDIUM RISK SERIES (2 of 2 - PPSV23) 07/18/2017           6.  Patient was NOT informed to arrive 15 min prior to their scheduled appointment and bring in their medication bottles.

## 2017-08-21 ENCOUNTER — OFFICE VISIT (OUTPATIENT)
Dept: MEDICAL GROUP | Age: 82
End: 2017-08-21
Payer: MEDICARE

## 2017-08-21 ENCOUNTER — APPOINTMENT (OUTPATIENT)
Dept: MEDICAL GROUP | Age: 82
End: 2017-08-21
Payer: MEDICARE

## 2017-08-21 VITALS
SYSTOLIC BLOOD PRESSURE: 120 MMHG | OXYGEN SATURATION: 94 % | BODY MASS INDEX: 21.5 KG/M2 | HEART RATE: 76 BPM | WEIGHT: 137 LBS | HEIGHT: 67 IN | TEMPERATURE: 98.6 F | DIASTOLIC BLOOD PRESSURE: 68 MMHG

## 2017-08-21 DIAGNOSIS — Z23 NEED FOR VACCINATION: ICD-10-CM

## 2017-08-21 DIAGNOSIS — R26.89 BALANCE PROBLEM: ICD-10-CM

## 2017-08-21 DIAGNOSIS — R42 DIZZINESS: ICD-10-CM

## 2017-08-21 DIAGNOSIS — I10 ESSENTIAL HYPERTENSION: ICD-10-CM

## 2017-08-21 PROCEDURE — 99215 OFFICE O/P EST HI 40 MIN: CPT | Mod: 25 | Performed by: PHYSICIAN ASSISTANT

## 2017-08-21 PROCEDURE — G0009 ADMIN PNEUMOCOCCAL VACCINE: HCPCS | Performed by: PHYSICIAN ASSISTANT

## 2017-08-21 PROCEDURE — 90732 PPSV23 VACC 2 YRS+ SUBQ/IM: CPT | Performed by: PHYSICIAN ASSISTANT

## 2017-08-21 NOTE — PROGRESS NOTES
Subjective:     Chief Complaint   Patient presents with   • Hypertension     Wants a different BP med. SHe gets light headed, dizzyness and fatigue     Lucita Babcock is a 82 y.o. female here today for evaluation and management of:    Dizziness/HTN  New to me.  Reports dizziness started approx Sept of last year after being [facility] from rehabilitation [facility] after having surgery for broken neck.  Reports upon discharge her blood pressure medication was changed from bystolic to metoprolol.  Since she's been discharge she is in a constant state of dizziness.  Describes it as though she has had a couple drinks.  She has been working with her PCP to reduce her blood pressure dosing as she is adamant it is the metoprolol despite recent reduction in dosing by half (down from 100 mg BID to 50 mg BID) not impacting symptoms at all.   Does reports that her dizziness is better after eating.   She checks her blood pressure pretty regularly at home. Reports readings are  ranging in 120-146 over 64-83. Instructed to hold pm dose of metoprolol if diastolic reading is below 70.  Carotid US was performed with hospitalization last year which revealed very mild plaque carotid bifurcation.  Under care of endocrinology for hyperparathyroidism.  Reports that after her neck surgery and hospitalization last year she has not had any elevation to her PTH.  They think it is correlated to Forteo daily that she had for about 2 months.  Most recent calcium was slightly elevated but she is also supplementing about 600 mg daily.      ROS   Denies any recent fevers or chills. No nausea or vomiting. No diarrhea. No chest pains or shortness of breath. No lower extremity edema.    Allergies   Allergen Reactions   • Ace Inhibitors Anaphylaxis   • Augmentin      Flu like sx   • Erythromycin Rash   • Lisinopril Anaphylaxis   • Penicillins Hives   • Epinephrine Unspecified     shaking   • Percocet [Oxycodone-Acetaminophen] Vomiting   • Sulfa Drugs  Hives       Current medicines (including changes today)  Current Outpatient Prescriptions   Medication Sig Dispense Refill   • metoprolol (LOPRESSOR) 25 MG Tab 1 tab in the AM and 2 tabs in the PM 90 Tab 0   • MAGNESIUM-OXIDE 400 (241.3 MG) MG Tab tablet TAKE 1 TABLET BY MOUTH 2 TIMES A DAY 60 Tab 0   • sertraline (ZOLOFT) 50 MG Tab TAKE 1 TABLET BY MOUTH EVERY DAY. 90 Tab 0   • psyllium (METAMUCIL SMOOTH TEXTURE) 28 % packet Take 1 Packet by mouth 2 times a day. 30 Each 3   • Calcium Carbonate-Vitamin D (OS-THIAGO 500 + D PO) Take 1 tablet by mouth every day.     • vitamin D (CHOLECALCIFEROL) 1000 UNIT Tab Take 1,000 Units by mouth every day.     • acetaminophen (TYLENOL) 160 MG/5ML Suspension Take 15 mg/kg by mouth every 6 hours as needed.     • bethanechol (URECHOLINE) 25 MG Tab Take 1 Tab by mouth 3 times a day. 90 Tab 0   • timolol (TIMOPTIC) 0.5 % Solution Place 1 Drop in both eyes every day. 1 Bottle 3   • Linaclotide 290 MCG Cap Take 1 Cap by mouth every day. (Patient not taking: Reported on 8/21/2017) 60 Cap 0   • omeprazole (PRILOSEC) 20 MG delayed-release capsule TAKE 1 CAP BY MOUTH 2 TIMES A DAY. (Patient not taking: Reported on 8/21/2017) 180 Cap 0   • ibuprofen (MOTRIN) 200 MG Tab Take 200 mg by mouth 1 time daily as needed. For pain     • polymixin-trimethoprim (POLYTRIM) 50300-5.1 UNIT/ML-% Solution Place 1 Drop in both eyes every 4 hours. (Patient not taking: Reported on 8/21/2017) 10 mL 0     No current facility-administered medications for this visit.       Patient Active Problem List    Diagnosis Date Noted   • Central cord syndrome (CMS-HCC) 07/14/2016     Priority: High   • Essential hypertension      Priority: Low   • Anxiety      Priority: Low   • Irritable bowel syndrome with constipation 06/22/2017   • Fatigue 06/02/2017   • Diarrhea 03/27/2017   • Vitamin D deficiency 03/22/2017   • Tension type headache 11/28/2016   • Gastroesophageal reflux disease without esophagitis 11/02/2016   •  "Postmenopausal 10/24/2016   • Nausea 10/18/2016   • Cervical postlaminectomy syndrome 10/06/2016   • Chronic neck pain 10/06/2016   • Fall 09/22/2016   • Central cervical cord injury, without spinal bony injury, C1-4 (CMS-HCC) 07/27/2016   • Upper extremity weakness 07/18/2016   • Injury of cervical spine (CMS-HCC) 07/01/2016   • Knee pain, right 06/22/2016   • Hyperlipidemia 05/24/2016   • History of recurrent UTIs 05/24/2016   • Hyperparathyroidism (CMS-HCC) 12/02/2015   • Chronic venous insufficiency 11/18/2015   • Hypercalcemia 11/02/2015   • Dilation of thoracic aorta (CMS-HCC) 12/01/2014   • Fistula of gallbladder 01/08/2013   • HX: breast cancer 05/09/2012   • Primary osteoarthritis of both knees 05/09/2012   • Osteopenia        Family History   Problem Relation Age of Onset   • Hypertension Mother    • Stroke Mother    • Cancer Neg Hx    • Diabetes Neg Hx    • Heart Disease Neg Hx    • Other Father      Dementia   • Other Sister      BIpolar   • Other Sister      Bipolar   • Other Sister      THyroid disease          Objective:     Blood pressure 120/68, pulse 76, temperature 37 °C (98.6 °F), height 1.702 m (5' 7\"), weight 62.143 kg (137 lb), SpO2 94 %. Body mass index is 21.45 kg/(m^2).     Physical Exam:  Gen: Well developed, well nourished in no acute distress. Staggered gait slightly ataxic. Ambulating with cane.   Skin: Pink, warm, and dry  HEENT: conjunctiva non-injected, sclera non-icteric. EOMs intact.   Nasal mucosa without edema nor erythema. No facial tenderness  Pinna normal. TM pearly gray.   Oral mucous membranes pink and moist with no lesions.  Neck: Supple, trachea midline. No adenopathy or masses in the neck or supraclavicular regions.  Lungs: Effort is normal. Clear to auscultation bilaterally with good excursion.  CV: regular rate and rhythm.  Abdomen: soft, nontender, + BS. No HSM.  No CVAT  Ext: no edema, color normal, vascularity normal, temperature normal  Alert and oriented Eye " contact is good, speech goal directed, affect calm  Component      Latest Ref Rng 9/3/2016 3/2/2017 3/21/2017           5:47 AM 11:15 AM  9:44 AM   Glucose      65 - 99 mg/dL 83 77 69   Reviewed and discussed above labs with patient in office.     Assessment and Plan:   The following treatment plan was discussed:     1. Dizziness - see below    2. Essential hypertension- see below  metoprolol (LOPRESSOR) 25 MG Tab   3. Balance problem -see below REFERRAL TO PHYSICAL THERAPY Reason for Therapy: Eval/Treat/Report   4. Need for vaccination -VIS given. Informed consent obtained. Vaccine administered in office.   Pneumococal Polysaccharide Vaccine 23-Valent =>1yo SQ/IM     - We will reduce metoprolol to 25 mg in the morning and 50 mg at night.  We discussed the most recent J[employer] guidelines which suggest patients over the age of 60 to have a little bit higher systolic readings.  I am not convinced that the metoprolol is causing her dizziness as it is has been unchanged despite reducing dose and with some improvement in symptoms with eating.  However, since she is already dizzy and has some unstable D due to need for knee replacements, I feel it would be wise to back off her blood pressure medication to some degree to worsening of dizziness and reduce risk for falls.  - We discussed her most recent labs she completed for her endocrinologist back in March.  She does not think that they were fasting labs and her glucose was noted to be 69.  Advised her and her daughter to purchase an over-the-counter glucometer to check her blood sugar randomly throughout the.  Also encouraged her to keep more comprehensive BP log-- note symptoms and time. Encouraged at least twice daily and then PRN as symptoms worsen. Also on log, encouraged she track blood glucose readings.  Discussed symptoms of hypoglycemia as well as management. Patient is not eating consistently throughout the day.  Encouraged six small meals, discussed orange juice if  needed, and hard candies to have on hand.   - In general, her gait is affected by her need for knee replacements but is not fit for surgery. We will refer to physical therapy for some balance training.   -Any change or worsening of signs or symptoms, patient encouraged to follow-up or report to emergency room for further evaluation. Patient verbalizes understanding and agrees. Additional ED precautions: seek emergency evaluation for symptoms including but not limited to : crushing chest pain, chest pain associated with difficulty breathing, nausea, or sweats, heart rate irregular or too fast to count.     Followup: Return in about 2 weeks (around 9/4/2017) for BP follow-up with Dr. Sloan, sooner if needed.         Patient was seen for 45 minutes face to face of which > 50% of appointment time was spent on counseling and coordination of care regarding the above.

## 2017-08-21 NOTE — MR AVS SNAPSHOT
"        Lucita Babcock   2017 9:50 AM   Office Visit   MRN: [id number]    Department:  [address] [place] [address]   Dept Phone:  [phone]    Description:  Female : 1935   Provider:  Luna Sainz PA-C           Reason for Visit     Hypertension Wants a different BP med. SHe gets light headed, dizzyness and fatigue      Allergies as of 2017     Allergen Noted Reactions    Ace Inhibitors 2013   Anaphylaxis    Augmentin 2009       Flu like sx    Erythromycin 2008   Rash    Lisinopril 2009   Anaphylaxis    Penicillins 2008   Hives    Epinephrine 2015   Unspecified    shaking    Percocet [Oxycodone-Acetaminophen] 2013   Vomiting    Sulfa Drugs 2010   Hives      You were diagnosed with     Dizziness   [601185]       Essential hypertension   [5545148]       Need for vaccination   [744174]       Balance problem   [720065]         Vital Signs     Blood Pressure Pulse Temperature Height Weight Body Mass Index    120/68 mmHg 76 37 °C (98.6 °F) 1.702 m (5' 7\") 62.143 kg (137 lb) 21.45 kg/m2    Oxygen Saturation Smoking Status                94% Former Smoker          Basic Information     Date Of Birth Sex Race Ethnicity Preferred Language    1935 Female White Non- English      Your appointments     Sep 12, 2017 10:00 AM   Established Patient with Farooq Sloan M.D.   [facility] [address] [place] [address]    [address]  [facility] [address] [phone]           You will be receiving a confirmation call a few days before your appointment from our automated call confirmation system.            Sep 25, 2017  1:20 PM   Established Patient with Neo Andres M.D.   [facility] ([facility])    [address]  [facility] [address] [phone]           You will be receiving a confirmation call a few days before your appointment from our automated call confirmation system.              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   12/1/2014 - Present    Hypercalcemia E83.52   11/2/2015 - Present    Vitamin D insufficiency E55.9   11/5/2015 - Present    Chronic venous insufficiency I87.2   11/18/2015 - Present    Hyperparathyroidism (CMS-HCC) E21.3   12/2/2015 - Present    Hyperlipidemia E78.5   5/24/2016 - Present    History of recurrent UTIs Z87.440   5/24/2016 - Present    Knee pain, right M25.561   6/22/2016 - Present    Central cord syndrome (CMS-HCC) S14.129A High  7/14/2016 - Present    Elevated troponin R74.8 Low  7/14/2016 - Present    Elevated CPK R74.8 Low  7/14/2016 - Present    Trauma T14.90 Low  7/15/2016 - Present    Right wrist pain M25.531 Low  7/16/2016 - Present    Rhabdomyolysis M62.82   7/18/2016 - Present    Upper extremity weakness R29.898   7/18/2016 - Present    Central cervical cord injury, without spinal bony injury, C1-4 (CMS-HCC) S14.129A   7/27/2016 - Present    Fall W19.XXXA   9/22/2016 - Present    Cervical postlaminectomy syndrome M96.1   10/6/2016 - Present    Chronic neck pain M54.2, G89.29   10/6/2016 - Present    Nausea R11.0   10/18/2016 - Present    Acute conjunctivitis H10.30   10/24/2016 - Present    Preventative health care Z00.00   10/24/2016 - Present    Postmenopausal Z78.0   10/24/2016 - Present    Medication refill Z76.0   11/2/2016 - Present    Gastroesophageal reflux disease without esophagitis K21.9   11/2/2016 - Present    Tension type headache G44.209   11/28/2016 - Present    Injury of cervical spine (CMS-HCC) S14.109A   7/1/2016 - Present    Encounter for abdominal aortic  aneurysm (AAA) screening Z13.6   3/2/2017 - Present    Medicare annual wellness visit, initial Z00.00   3/2/2017 - Present    Vitamin D deficiency E55.9   3/22/2017 - Present    Diarrhea R19.7   3/27/2017 - Present    Fatigue R53.83   6/2/2017 - Present    Irritable bowel syndrome with constipation K58.1   6/22/2017 - Present      Health Maintenance        Date Due Completion Dates    IMM PNEUMOCOCCAL 65+ (ADULT) LOW/MEDIUM RISK SERIES (2 of 2 - PPSV23) 7/18/2017 7/18/2016    IMM INFLUENZA (1) 9/1/2017 11/22/2016, 10/15/2015, 10/15/2012, 11/16/2006, 10/18/2005    COLONOSCOPY 3/2/2018 3/2/2015    MAMMOGRAM 5/23/2018 5/23/2017, 3/14/2016, 3/12/2015, 3/10/2014, 3/7/2013, 3/6/2012, 2/3/2011, 1/19/2010, 1/19/2010, 7/29/2009, 7/29/2009, 3/23/2009, 3/23/2009, 4/9/2008, 3/31/2008, 3/31/2008, 3/28/2007, 3/28/2007, 3/27/2006, 3/24/2005    BONE DENSITY 1/8/2021 1/8/2016, 1/31/2013, 10/20/2008    IMM DTaP/Tdap/Td Vaccine (2 - Td) 2/1/2027 2/1/2017            Current Immunizations     13-VALENT PCV PREVNAR 7/18/2016  2:44 PM    Influenza TIV (IM) 10/15/2012 11:28 AM    Influenza Vaccine Adult HD 11/22/2016, 10/15/2015    Influenza Vaccine Pediatric 11/16/2006, 10/18/2005    Pneumococcal Vaccine (UF)Historical Data 11/16/2006    Pneumococcal polysaccharide vaccine (PPSV-23)  Incomplete    SHINGLES VACCINE 3/2/2017    Tdap Vaccine 2/1/2017      Below and/or attached are the medications your provider expects you to take. Review all of your home medications and newly ordered medications with your provider and/or pharmacist. Follow medication instructions as directed by your provider and/or pharmacist. Please keep your medication list with you and share with your provider. Update the information when medications are discontinued, doses are changed, or new medications (including over-the-counter products) are added; and carry medication information at all times in the event of emergency situations     Allergies:  ACE INHIBITORS -  Anaphylaxis     AUGMENTIN - (reactions not documented)     ERYTHROMYCIN - Rash     LISINOPRIL - Anaphylaxis     PENICILLINS - Hives     EPINEPHRINE - Unspecified     PERCOCET - Vomiting     SULFA DRUGS - Hives               Medications  Valid as of: August 21, 2017 - 10:44 AM    Generic Name Brand Name Tablet Size Instructions for use    Acetaminophen (Suspension) TYLENOL 160 MG/5ML Take 15 mg/kg by mouth every 6 hours as needed.        Bethanechol Chloride (Tab) URECHOLINE 25 MG Take 1 Tab by mouth 3 times a day.        Calcium Carbonate-Vitamin D   Take 1 tablet by mouth every day.        Cholecalciferol (Tab) cholecalciferol 1000 UNIT Take 1,000 Units by mouth every day.        Ibuprofen (Tab) MOTRIN 200 MG Take 200 mg by mouth 1 time daily as needed. For pain        Linaclotide (Cap) Linaclotide 290 MCG Take 1 Cap by mouth every day.        Magnesium Oxide (Tab) MAGNESIUM-OXIDE 400 (241.3 Mg) MG TAKE 1 TABLET BY MOUTH 2 TIMES A DAY        Metoprolol Tartrate (Tab) LOPRESSOR 25 MG 1 tab in the AM and 2 tabs in the PM        Omeprazole (CAPSULE DELAYED RELEASE) PRILOSEC 20 MG TAKE 1 CAP BY MOUTH 2 TIMES A DAY.        Polymyxin B-Trimethoprim (Solution) POLYTRIM 20765-8.1 UNIT/ML-% Place 1 Drop in both eyes every 4 hours.        Psyllium (Pack) METAMUCIL SMOOTH TEXTURE 28 % Take 1 Packet by mouth 2 times a day.        Sertraline HCl (Tab) ZOLOFT 50 MG TAKE 1 TABLET BY MOUTH EVERY DAY.        Timolol Maleate (Solution) TIMOPTIC 0.5 % Place 1 Drop in both eyes every day.        .                 Medicines prescribed today were sent to:     [id number] - [place] - [address]    [address] [place] [address]    Phone: [phone] Fax: [phone]    Open 24 Hours?: No      Medication refill instructions:       If your prescription bottle indicates you have medication refills left, it is not necessary to call your provider’s office. Please contact your pharmacy and they will refill your  medication.    If your prescription bottle indicates you do not have any refills left, you may request refills at any time through one of the following ways: The online [employer] system (except Urgent Care), by calling your provider’s office, or by asking your pharmacy to contact your provider’s office with a refill request. Medication refills are processed only during regular business hours and may not be available until the next business day. Your provider may request additional information or to have a follow-up visit with you prior to refilling your medication.   *Please Note: Medication refills are assigned a new Rx number when refilled electronically. Your pharmacy may indicate that no refills were authorized even though a new prescription for the same medication is available at the pharmacy. Please request the medicine by name with the pharmacy before contacting your provider for a refill.        Referral     A referral request has been sent to our patient care coordination department. Please allow 3-5 business days for us to process this request and contact you either by phone or mail. If you do not hear from us by the 5th business day, please call us at [phone].        Other Notes About Your Plan     Please assess the following diagnosis:  E21.3-hyperparathyroidism    -this patient has been on Zoloft since 11/2012, with diagnosis of anxiety and depression. ? If treating major depressive disorder, single episode, unspecified degree, code F32.9    Patient report to me that she was treated with Zoloft initially that she was depressed when she lost her  and she also has mild anxiety. Medication helps ocontrolled her mood. Now she denied feeling depressed. She is continuously taking it for her anxiety.  [place]                    [employer] Access Code: Activation code not generated  Current [employer] Status: Active

## 2017-08-22 PROBLEM — Z00.00 MEDICARE ANNUAL WELLNESS VISIT, INITIAL: Status: RESOLVED | Noted: 2017-03-02 | Resolved: 2017-08-22

## 2017-08-22 PROBLEM — Z13.6 ENCOUNTER FOR ABDOMINAL AORTIC ANEURYSM (AAA) SCREENING: Status: RESOLVED | Noted: 2017-03-02 | Resolved: 2017-08-22

## 2017-09-05 ENCOUNTER — OFFICE VISIT (OUTPATIENT)
Dept: MEDICAL GROUP | Age: 82
End: 2017-09-05
Payer: MEDICARE

## 2017-09-05 ENCOUNTER — HOSPITAL ENCOUNTER (OUTPATIENT)
Dept: LAB | Facility: MEDICAL CENTER | Age: 82
End: 2017-09-05
Attending: FAMILY MEDICINE
Payer: MEDICARE

## 2017-09-05 VITALS
SYSTOLIC BLOOD PRESSURE: 140 MMHG | HEIGHT: 67 IN | OXYGEN SATURATION: 94 % | WEIGHT: 136.2 LBS | HEART RATE: 77 BPM | BODY MASS INDEX: 21.38 KG/M2 | TEMPERATURE: 99.3 F | DIASTOLIC BLOOD PRESSURE: 80 MMHG

## 2017-09-05 DIAGNOSIS — L82.0 INFLAMED SEBORRHEIC KERATOSIS: ICD-10-CM

## 2017-09-05 DIAGNOSIS — I10 ESSENTIAL HYPERTENSION: ICD-10-CM

## 2017-09-05 DIAGNOSIS — K52.9 GASTROENTERITIS: ICD-10-CM

## 2017-09-05 DIAGNOSIS — R42 DIZZY: ICD-10-CM

## 2017-09-05 LAB
ANION GAP SERPL CALC-SCNC: 8 MMOL/L (ref 0–11.9)
BUN SERPL-MCNC: 10 MG/DL (ref 8–22)
CALCIUM SERPL-MCNC: 10.9 MG/DL (ref 8.5–10.5)
CHLORIDE SERPL-SCNC: 101 MMOL/L (ref 96–112)
CO2 SERPL-SCNC: 28 MMOL/L (ref 20–33)
CREAT SERPL-MCNC: 0.48 MG/DL (ref 0.5–1.4)
ERYTHROCYTE [DISTWIDTH] IN BLOOD BY AUTOMATED COUNT: 44.1 FL (ref 35.9–50)
GFR SERPL CREATININE-BSD FRML MDRD: >60 ML/MIN/1.73 M 2
GLUCOSE SERPL-MCNC: 84 MG/DL (ref 65–99)
HCT VFR BLD AUTO: 44.1 % (ref 37–47)
HGB BLD-MCNC: 14.6 G/DL (ref 12–16)
MCH RBC QN AUTO: 31.2 PG (ref 27–33)
MCHC RBC AUTO-ENTMCNC: 33.1 G/DL (ref 33.6–35)
MCV RBC AUTO: 94.2 FL (ref 81.4–97.8)
PLATELET # BLD AUTO: 274 K/UL (ref 164–446)
PMV BLD AUTO: 9.3 FL (ref 9–12.9)
POTASSIUM SERPL-SCNC: 4.1 MMOL/L (ref 3.6–5.5)
RBC # BLD AUTO: 4.68 M/UL (ref 4.2–5.4)
SODIUM SERPL-SCNC: 137 MMOL/L (ref 135–145)
WBC # BLD AUTO: 7.9 K/UL (ref 4.8–10.8)

## 2017-09-05 PROCEDURE — 80048 BASIC METABOLIC PNL TOTAL CA: CPT

## 2017-09-05 PROCEDURE — 17110 DESTRUCTION B9 LES UP TO 14: CPT | Performed by: FAMILY MEDICINE

## 2017-09-05 PROCEDURE — 85027 COMPLETE CBC AUTOMATED: CPT

## 2017-09-05 PROCEDURE — 99214 OFFICE O/P EST MOD 30 MIN: CPT | Mod: 25 | Performed by: FAMILY MEDICINE

## 2017-09-05 PROCEDURE — 36415 COLL VENOUS BLD VENIPUNCTURE: CPT

## 2017-09-05 RX ORDER — BETHANECHOL CHLORIDE 25 MG/1
25 TABLET ORAL
Qty: 90 TAB | Refills: 0 | Status: ON HOLD | OUTPATIENT
Start: 2017-09-05 | End: 2021-02-22

## 2017-09-05 NOTE — ASSESSMENT & PLAN NOTE
For the past 2 days patient's been complaining of feeling lightheaded/dizzy. She denies any falling or syncopal episodes. She currently is on metoprolol 50 mg by mouth twice a day. She also noticed that she's had some GI upset little bit of nausea. Denies any chest pain no vomiting or diaphoresis, no dyspnea on exertion.

## 2017-09-05 NOTE — PROGRESS NOTES
This medical record contains text that has been entered with the assistance of computer voice recognition and dictation software.  Therefore, it may contain unintended errors in text, spelling, punctuation, or grammar    Chief Complaint   Patient presents with   • Other     see reason for visit       Lucita Babcock is a 82 y.o. female here evaluation and management of: lightheadedness, ISK      HPI:     Inflamed seborrheic keratosis  Patient has been complaining that these oval lesions have been irritating,bleeding when washing, flaking,and causing discomfort so is interested in removal.        Dizzy  For the past 2 days patient's been complaining of feeling lightheaded/dizzy. She denies any falling or syncopal episodes. She currently is on metoprolol 50 mg by mouth twice a day. She also noticed that she's had some GI upset little bit of nausea. Denies any chest pain no vomiting or diaphoresis, no dyspnea on exertion.    Current medicines (including changes today)  Current Outpatient Prescriptions   Medication Sig Dispense Refill   • bethanechol (URECHOLINE) 25 MG Tab Take 1 Tab by mouth every 24 hours as needed. 90 Tab 0   • metoprolol (LOPRESSOR) 25 MG Tab 1 tab in the AM and 2 tabs in the PM 90 Tab 0   • MAGNESIUM-OXIDE 400 (241.3 MG) MG Tab tablet TAKE 1 TABLET BY MOUTH 2 TIMES A DAY 60 Tab 0   • sertraline (ZOLOFT) 50 MG Tab TAKE 1 TABLET BY MOUTH EVERY DAY. 90 Tab 0   • psyllium (METAMUCIL SMOOTH TEXTURE) 28 % packet Take 1 Packet by mouth 2 times a day. 30 Each 3   • Calcium Carbonate-Vitamin D (OS-THIAGO 500 + D PO) Take 1 tablet by mouth every day.     • vitamin D (CHOLECALCIFEROL) 1000 UNIT Tab Take 1,000 Units by mouth every day.     • acetaminophen (TYLENOL) 160 MG/5ML Suspension Take 15 mg/kg by mouth every 6 hours as needed.     • timolol (TIMOPTIC) 0.5 % Solution Place 1 Drop in both eyes every day. 1 Bottle 3   • omeprazole (PRILOSEC) 20 MG delayed-release capsule TAKE 1 CAP BY MOUTH 2 TIMES A DAY.  (Patient not taking: Reported on 2017) 180 Cap 0   • ibuprofen (MOTRIN) 200 MG Tab Take 200 mg by mouth 1 time daily as needed. For pain     • polymixin-trimethoprim (POLYTRIM) 92150-0.1 UNIT/ML-% Solution Place 1 Drop in both eyes every 4 hours. (Patient not taking: Reported on 2017) 10 mL 0     No current facility-administered medications for this visit.      She  has a past medical history of Anesthesia; Aneurysm (CMS-HCC); Anxiety; Arthritis; Breast cancer (CMS-HCC) (); Cancer (CMS-HCC) (); CATARACT; Dental disorder; Depression; Hypertension; Indigestion; Injury of cervical spine (CMS-HCC) (2016); Osteopenia; Other specified disorder of intestines; Sarcoid (CMS-HCC); and UTI (urinary tract infection) (). She also has no past medical history of Diabetes (CMS-HCC); Pacemaker; Seizure (CMS-HCC); or Stroke (CMS-HCC).  She  has a past surgical history that includes vein stripping; mass excision general (); breast biopsy (08); abdominal hysterectomy total (); cataract phaco with iol (2010); colonoscopy (); ercp in or (2012); samuel by laparoscopy (2013); lumpectomy (); radiation therapy plan simple (); cataract phaco with iol (2014); and cervical disk and fusion anterior (2016).  Social History   Substance Use Topics   • Smoking status: Former Smoker     Packs/day: 0.50     Years: 45.00     Quit date: 10/9/1969   • Smokeless tobacco: Never Used   • Alcohol use No      Comment: ocassionally     Social History     Social History Narrative   • No narrative on file     Family History   Problem Relation Age of Onset   • Hypertension Mother    • Stroke Mother    • Cancer Neg Hx    • Diabetes Neg Hx    • Heart Disease Neg Hx    • Other Father      Dementia   • Other Sister      BIpolar   • Other Sister      Bipolar   • Other Sister      THyroid disease     Family Status   Relation Status   • Mother    • Son     suicide   • Father  "   • Sister    • Sister Alive   • Sister Alive   • Daughter Alive   • Daughter Alive         ROS    Please see hpi     All other systems reviewed and are negative     Objective:     Blood pressure 140/80, pulse 77, temperature 37.4 °C (99.3 °F), height 1.702 m (5' 7.01\"), weight 61.8 kg (136 lb 3.2 oz), SpO2 94 %. Body mass index is 21.33 kg/m².  Physical Exam:        SKIN EXAM  Several well-demarcated, round/oval lesions with a dull, verrucous surface and irregular stuck-on appearance on back and chest      multiple lesions on bilateral forearms, hands, and chest, with evidence of of solar damage present , spotty hyperpigmentation, scattered telangiectasias, and  Xerosis      PROCEDURE: CRYOTHERAPY  Discussed risks and benefits of cryotherapy including but not limited to scarring, hyperpigmentation, hypopigmentation, hypertrophic scarring, keiloid scarring, incomplete or no resolution of lesions treated,pain, undesirable cosemetic result, blistering, potential need for additional treatment including more invasive treatment. Patient expresses understanding and verbally acknowledges risks and consent to treatment. 2  applications of cryotherapy with 3 second freeze thaw cycle was applied to all lesions 5 ISK's. Patient tolerated procedure well. There were no complications. Aftercare instructions given.      Eye: Equal, round and reactive, conjunctiva clear, lids normal.  ENMT: Lips without lesions, good dentition, oropharynx clear.  Neck: Trachea midline, no masses, no thyromegaly. No cervical or supraclavicular lymphadenopathy.  Respiratory: Unlabored respiratory effort, lungs clear to auscultation, no wheezes, no ronchi.  Cardiovascular: Normal S1, S2, no murmur, no edema.  Abdomen: Soft, non-tender, no masses, no hepatosplenomegaly.  Psych: Alert and oriented x3, normal affect and mood.          Assessment and Plan:   The following treatment plan was discussed      1. Gastroenteritis    It was " explained to patient that most cases of acute infectious gastroenteritis are viral and 90 percent are due to [place] virus. No indication for stool studies at this point, Instructed patient to hydrate po,  no signs of volume depletion,  good hygiene precautions given.      - BASIC METABOLIC PANEL; Future  - CBC WITHOUT DIFFERENTIAL    2. Essential hypertension    I reiterated that she should be taking one tablet in the morning  Then 2 tablets at night  We also reviewed hold instructions    - metoprolol (LOPRESSOR) 25 MG Tab; 1 tab in the AM and 2 tabs in the PM  Dispense: 90 Tab; Refill: 0    3. Inflamed seborrheic keratosis  Patient tollerrated procedure well  There were no adverse events  Patient was given post procedure precautions       4. Dizzy  May be due to current meds  She was taking 2 tablets of metoprolol in the morning and at night  We will decrease to one in the a.m. and 2 at night  Also decrease bethanechol one tablet daily    Patient was given instructions to make a two-week blood pressure log  To measure his blood pressure morning and evening same time  Then send results back to us  We will consider specific management at that time          HEALTH MAINTENANCE:     Instructed to Follow up in clinic or ER for worsening symptoms, difficulty breathing, lack of expected recovery, or should new symptoms or problems arise.    Followup: Return in about 4 weeks (around 10/3/2017) for Reevaluation.       Once again this medical record contains text that has been entered with the assistance of computer voice recognition and dictation software.  Therefore, it may contain unintended errors in text, spelling, punctuation, or grammar

## 2017-09-05 NOTE — ASSESSMENT & PLAN NOTE
Patient has been complaining that these oval lesions have been irritating,bleeding when washing, flaking,and causing discomfort so is interested in removal.

## 2017-09-11 ENCOUNTER — PATIENT MESSAGE (OUTPATIENT)
Dept: MEDICAL GROUP | Age: 82
End: 2017-09-11

## 2017-09-11 ENCOUNTER — APPOINTMENT (OUTPATIENT)
Dept: PHYSICAL THERAPY | Facility: MEDICAL CENTER | Age: 82
End: 2017-09-11
Attending: PHYSICIAN ASSISTANT
Payer: MEDICARE

## 2017-09-12 ENCOUNTER — OFFICE VISIT (OUTPATIENT)
Dept: MEDICAL GROUP | Age: 82
End: 2017-09-12
Payer: MEDICARE

## 2017-09-12 ENCOUNTER — APPOINTMENT (OUTPATIENT)
Dept: MEDICAL GROUP | Age: 82
End: 2017-09-12
Payer: MEDICARE

## 2017-09-12 VITALS
BODY MASS INDEX: 21.35 KG/M2 | HEART RATE: 73 BPM | TEMPERATURE: 99.1 F | WEIGHT: 136 LBS | DIASTOLIC BLOOD PRESSURE: 78 MMHG | HEIGHT: 67 IN | OXYGEN SATURATION: 94 % | SYSTOLIC BLOOD PRESSURE: 142 MMHG

## 2017-09-12 DIAGNOSIS — Z23 NEED FOR VACCINATION: ICD-10-CM

## 2017-09-12 DIAGNOSIS — I10 ESSENTIAL HYPERTENSION: ICD-10-CM

## 2017-09-12 DIAGNOSIS — L82.0 INFLAMED SEBORRHEIC KERATOSIS: ICD-10-CM

## 2017-09-12 DIAGNOSIS — R42 DIZZY: ICD-10-CM

## 2017-09-12 PROCEDURE — 99214 OFFICE O/P EST MOD 30 MIN: CPT | Mod: 25 | Performed by: FAMILY MEDICINE

## 2017-09-12 PROCEDURE — 17110 DESTRUCTION B9 LES UP TO 14: CPT | Performed by: FAMILY MEDICINE

## 2017-09-12 RX ORDER — NEBIVOLOL 5 MG/1
5 TABLET ORAL DAILY
Qty: 45 TAB | Refills: 0 | Status: SHIPPED | OUTPATIENT
Start: 2017-09-12 | End: 2017-09-13 | Stop reason: SDUPTHER

## 2017-09-12 NOTE — PROGRESS NOTES
This medical record contains text that has been entered with the assistance of computer voice recognition and dictation software.  Therefore, it may contain unintended errors in text, spelling, punctuation, or grammar    Chief Complaint   Patient presents with   • Other     see reason for visit       Lucita Babcock is a 82 y.o. female here evaluation and management of: dizziness, htn, isk      HPI:     Inflamed seborrheic keratosis  Patient has been complaining that these oval lesions have been irritating,bleeding when washing, flaking,and causing discomfort so is interested in removal.          Essential hypertension  Although we had difficulty controlling Lucita's blood pressure we finally found a form that worked. She has been taking metoprolol 25 mg in the morning and 50 mg at night. However she is now complaining of severe dizziness lightheadedness which prevents her from going outside the house and is causing very poor quality her lifestyle. She would like to go back on Bystolic that she took previously she states.    She could not tolerate amlodipine due to peripheral edema  She could not tolerate hydrochlorothiazide due to her long issue with hypercalcemia  Angioedema was noted with Ace inhibitors per patient    Dizzy  D the patient continues to complain of severe lightheadedness/dizziness and presyncopal episodes. She wants to change her metoprolol back to buy systolics she states that she did better with Bystolic. The dizziness is preventing her from leaving her house and causing severe dysfunction her life.    Current medicines (including changes today)  Current Outpatient Prescriptions   Medication Sig Dispense Refill   • MAGNESIUM-OXIDE 400 (241.3 Mg) MG Tab tablet TAKE 1 TABLET BY MOUTH 2 TIMES A DAY 60 Tab 0   • nebivolol (BYSTOLIC) 5 MG Tab tablet Take 1 Tab by mouth every day. 45 Tab 0   • bethanechol (URECHOLINE) 25 MG Tab Take 1 Tab by mouth every 24 hours as needed. 90 Tab 0   • sertraline  (ZOLOFT) 50 MG Tab TAKE 1 TABLET BY MOUTH EVERY DAY. 90 Tab 0   • psyllium (METAMUCIL SMOOTH TEXTURE) 28 % packet Take 1 Packet by mouth 2 times a day. 30 Each 3   • Calcium Carbonate-Vitamin D (OS-THIAGO 500 + D PO) Take 1 tablet by mouth every day.     • vitamin D (CHOLECALCIFEROL) 1000 UNIT Tab Take 1,000 Units by mouth every day.     • acetaminophen (TYLENOL) 160 MG/5ML Suspension Take 15 mg/kg by mouth every 6 hours as needed.     • timolol (TIMOPTIC) 0.5 % Solution Place 1 Drop in both eyes every day. 1 Bottle 3   • omeprazole (PRILOSEC) 20 MG delayed-release capsule TAKE 1 CAP BY MOUTH 2 TIMES A DAY. (Patient not taking: Reported on 8/21/2017) 180 Cap 0   • ibuprofen (MOTRIN) 200 MG Tab Take 200 mg by mouth 1 time daily as needed. For pain     • polymixin-trimethoprim (POLYTRIM) 19375-3.1 UNIT/ML-% Solution Place 1 Drop in both eyes every 4 hours. (Patient not taking: Reported on 8/21/2017) 10 mL 0     No current facility-administered medications for this visit.      She  has a past medical history of Anesthesia; Aneurysm (CMS-HCC); Anxiety; Arthritis; Breast cancer (CMS-HCC) (2008); Cancer (CMS-HCC) (2008); CATARACT; Dental disorder; Depression; Hypertension; Indigestion; Injury of cervical spine (CMS-HCC) (July 2016); Osteopenia; Other specified disorder of intestines; Sarcoid (CMS-HCC); and UTI (urinary tract infection) (). She also has no past medical history of Diabetes (CMS-HCC); Pacemaker; Seizure (CMS-HCC); or Stroke (CMS-HCC).  She  has a past surgical history that includes vein stripping; mass excision general (7/08); breast biopsy (5/27/08); abdominal hysterectomy total (1997); cataract phaco with iol (8/11/2010); colonoscopy (2007); ercp in or (12/28/2012); samuel by laparoscopy (1/8/2013); lumpectomy (2008); radiation therapy plan simple (2008); cataract phaco with iol (8/27/2014); and cervical disk and fusion anterior (7/27/2016).  Social History   Substance Use Topics   • Smoking status:  "Former Smoker     Packs/day: 0.50     Years: 45.00     Quit date: 10/9/1969   • Smokeless tobacco: Never Used   • Alcohol use No      Comment: ocassionally     Social History     Social History Narrative   • No narrative on file     Family History   Problem Relation Age of Onset   • Hypertension Mother    • Stroke Mother    • Cancer Neg Hx    • Diabetes Neg Hx    • Heart Disease Neg Hx    • Other Father      Dementia   • Other Sister      BIpolar   • Other Sister      Bipolar   • Other Sister      THyroid disease     Family Status   Relation Status   • Mother    • Son     suicide   • Father    • Sister    • Sister Alive   • Sister Alive   • Daughter Alive   • Daughter Alive         ROS    Please see hpi     All other systems reviewed and are negative     Objective:     Blood pressure 142/78, pulse 73, temperature 37.3 °C (99.1 °F), height 1.702 m (5' 7.01\"), weight 61.7 kg (136 lb), SpO2 94 %. Body mass index is 21.3 kg/m².  Physical Exam:      SKIN EXAM  Several well-demarcated, round/oval lesions with a dull, verrucous surface and irregular stuck-on appearance on back and chest      multiple lesions on bilateral forearms, hands, and chest, with evidence of of solar damage present , spotty hyperpigmentation, scattered telangiectasias, and  Xerosis      PROCEDURE: CRYOTHERAPY  Discussed risks and benefits of cryotherapy including but not limited to scarring, hyperpigmentation, hypopigmentation, hypertrophic scarring, keiloid scarring, incomplete or no resolution of lesions treated,pain, undesirable cosemetic result, blistering, potential need for additional treatment including more invasive treatment. Patient expresses understanding and verbally acknowledges risks and consent to treatment. 2  applications of cryotherapy with 3 second freeze thaw cycle was applied to   all SK's. Patient tolerated procedure well. There were no complications. Aftercare instructions given.      Eye: " Equal, round and reactive, conjunctiva clear, lids normal.  ENMT: Lips without lesions, good dentition, oropharynx clear.  Neck: Trachea midline, no masses, no thyromegaly. No cervical or supraclavicular lymphadenopathy.  Respiratory: Unlabored respiratory effort, lungs clear to auscultation, no wheezes, no ronchi.  Cardiovascular: Normal S1, S2, no murmur, no edema.  Abdomen: Soft, non-tender, no masses, no hepatosplenomegaly.  Psych: Alert and oriented x3, normal affect and mood.          Assessment and Plan:   The following treatment plan was discussed      1. Need for vaccination  Refused for now    - INFLUENZA VACCINE, HIGH DOSE (65+ ONLY)  - nebivolol (BYSTOLIC) 5 MG Tab tablet; Take 1 Tab by mouth every day.  Dispense: 45 Tab; Refill: 0    2. Inflamed seborrheic keratosis  Patient tollerrated procedure well  There were no adverse events  Patient was given post procedure precautions       3. Essential hypertension  Stop metoprolol  Start bystolic 5mg  Patient was given instructions to make a two-week blood pressure log  To measure his blood pressure morning and evening same time  Then send results back to us  We will consider specific management at that time      4. Dizzy  Same as #3  She believes that she did not experience dizziness with Bystolic.        HEALTH MAINTENANCE: No    Instructed to Follow up in clinic or ER for worsening symptoms, difficulty breathing, lack of expected recovery, or should new symptoms or problems arise.    Followup: Return in about 2 weeks (around 9/26/2017).       Once again this medical record contains text that has been entered with the assistance of computer voice recognition and dictation software.  Therefore, it may contain unintended errors in text, spelling, punctuation, or grammar

## 2017-09-12 NOTE — TELEPHONE ENCOUNTER
"From: Lucita Babcock  To: Farooq Sloan M.D.  Sent: 9/11/2017 3:06 PM PDT  Subject: Procedure Question    Hi Dr. Sloan - Didn't need metropolol until 2:30pm today (that was my (\"a.m.\") pill. Bottom number stayed in the sixties until 2:30pm.    At 2:30pm it was 150/82 - I took metropolol (1). Also 1/4 xanax. Now it is 135/75. I have a 1pm appt. with you tomorrow. Lucita"

## 2017-09-12 NOTE — ASSESSMENT & PLAN NOTE
Although we had difficulty controlling Lucita's blood pressure we finally found a form that worked. She has been taking metoprolol 25 mg in the morning and 50 mg at night. However she is now complaining of severe dizziness lightheadedness which prevents her from going outside the house and is causing very poor quality her lifestyle. She would like to go back on Bystolic that she took previously she states.    She could not tolerate amlodipine due to peripheral edema  She could not tolerate hydrochlorothiazide due to her long issue with hypercalcemia  Angioedema was noted with Ace inhibitors per patient

## 2017-09-12 NOTE — ASSESSMENT & PLAN NOTE
D the patient continues to complain of severe lightheadedness/dizziness and presyncopal episodes. She wants to change her metoprolol back to buy systolics she states that she did better with Bystolic. The dizziness is preventing her from leaving her house and causing severe dysfunction her life.

## 2017-09-13 ENCOUNTER — PATIENT MESSAGE (OUTPATIENT)
Dept: MEDICAL GROUP | Age: 82
End: 2017-09-13

## 2017-09-13 ENCOUNTER — TELEPHONE (OUTPATIENT)
Dept: MEDICAL GROUP | Age: 82
End: 2017-09-13

## 2017-09-13 DIAGNOSIS — Z23 NEED FOR VACCINATION: ICD-10-CM

## 2017-09-13 RX ORDER — NEBIVOLOL 5 MG/1
5 TABLET ORAL DAILY
Qty: 90 TAB | Refills: 0 | Status: SHIPPED | OUTPATIENT
Start: 2017-09-13 | End: 2017-10-04

## 2017-09-13 NOTE — TELEPHONE ENCOUNTER
Lucita Zulma Babcock  [phone] (home)     Pt has to pay a $45 co pay for a 30 day Rx, for the nebivolol she was prescribed a 45 day supply. She is requesting to get either a 60 or 90 day Rx so it could be less expensive.

## 2017-09-13 NOTE — TELEPHONE ENCOUNTER
From: Lucita Babcock  To: Farooq Sloan M.D.  Sent: 9/13/2017 9:11 AM PDT  Subject: Procedure Question    Hi Dr. Suárez - Last evening at 8pm took bystolic - woke up at 10:00pm - it was 153/83 - took 1/2 of med. Woke up at 6:30am - it was 120/67. Dizziness is a little less, but still present. Took 1/2 of magnesium after breakfast. So far, so good.    Thanks, Lucita

## 2017-09-14 ENCOUNTER — PATIENT MESSAGE (OUTPATIENT)
Dept: MEDICAL GROUP | Age: 82
End: 2017-09-14

## 2017-09-15 NOTE — TELEPHONE ENCOUNTER
From: Lucita Babcock  To: Farooq Sloan M.D.  Sent: 9/14/2017 6:05 PM PDT  Subject: Procedure Question    Dr. Suárez - Hopefully this will continue! Thanks for your care! Lucita

## 2017-09-25 ENCOUNTER — APPOINTMENT (OUTPATIENT)
Dept: ENDOCRINOLOGY | Facility: MEDICAL CENTER | Age: 82
End: 2017-09-25
Payer: MEDICARE

## 2017-10-02 ENCOUNTER — OFFICE VISIT (OUTPATIENT)
Dept: ENDOCRINOLOGY | Facility: MEDICAL CENTER | Age: 82
End: 2017-10-02
Payer: MEDICARE

## 2017-10-02 ENCOUNTER — HOSPITAL ENCOUNTER (OUTPATIENT)
Dept: LAB | Facility: MEDICAL CENTER | Age: 82
End: 2017-10-02
Attending: INTERNAL MEDICINE
Payer: MEDICARE

## 2017-10-02 VITALS
OXYGEN SATURATION: 92 % | DIASTOLIC BLOOD PRESSURE: 80 MMHG | HEART RATE: 78 BPM | SYSTOLIC BLOOD PRESSURE: 138 MMHG | BODY MASS INDEX: 20.61 KG/M2 | HEIGHT: 68 IN | WEIGHT: 136 LBS

## 2017-10-02 DIAGNOSIS — E55.9 VITAMIN D DEFICIENCY: ICD-10-CM

## 2017-10-02 DIAGNOSIS — E83.52 HYPERCALCEMIA: ICD-10-CM

## 2017-10-02 DIAGNOSIS — E21.3 HYPERPARATHYROIDISM (HCC): ICD-10-CM

## 2017-10-02 LAB
25(OH)D3 SERPL-MCNC: 21 NG/ML (ref 30–100)
ALBUMIN SERPL BCP-MCNC: 4.4 G/DL (ref 3.2–4.9)
CALCIUM SERPL-MCNC: 11.2 MG/DL (ref 8.5–10.5)
PTH-INTACT SERPL-MCNC: 83.3 PG/ML (ref 14–72)

## 2017-10-02 PROCEDURE — 82040 ASSAY OF SERUM ALBUMIN: CPT

## 2017-10-02 PROCEDURE — 36415 COLL VENOUS BLD VENIPUNCTURE: CPT

## 2017-10-02 PROCEDURE — 99213 OFFICE O/P EST LOW 20 MIN: CPT | Performed by: INTERNAL MEDICINE

## 2017-10-02 PROCEDURE — 83970 ASSAY OF PARATHORMONE: CPT

## 2017-10-02 PROCEDURE — 82310 ASSAY OF CALCIUM: CPT

## 2017-10-02 PROCEDURE — 82306 VITAMIN D 25 HYDROXY: CPT

## 2017-10-02 NOTE — PROGRESS NOTES
Chief Complaint   Patient presents with   • Hyperparathyroidism     hypercalcemia        HPI:         1. Hypercalcemia.    The patient is not taking calcium supplements but does eat reasonable calcium sources.  Calcium levels have been mildly elevated, most recent on September 5 the calcium was 10.9 but no albumin level with it.  In March, calcium 10.8 with an albumin of 4.4 which corrects to a calcium of about 10.5 which is upper normal.  Similarly a year ago, calcium 10.3 with an albumin 3.8 so this corrects back up to about an albumin of 10.4.  PTH levels have been mildly elevated right along until March of this past year when her PTH was 67 (14-72).  Prior to that 92, and a year ago 73 and two years ago 74.  So she may have mild hyperparathyroidism.     Her bone density done in January of this year was very favorable with a T-score at the lumbar spine of 1.8 and at the hip -1.9.  FRAX calculation unfavorable with a ten year probability of hip fracture at 8.7%.  Apart from that, her bone density does not call for a specific medication over and above what she is doing with regular exercise and vitamin D.    Plan is to update her calcium and albumin level again this time with a PTH and vitamin D and report by MyChart or by phone.    ROS:  Having trouble with blood pressure and blood pressure medicine. She occasionally has nausea and dizziness. She is working with PCP to get that corrected.  All other systems reported as negative or unchanged since last exam      Allergies:   Allergies   Allergen Reactions   • Ace Inhibitors Anaphylaxis   • Augmentin      Flu like sx   • Erythromycin Rash   • Lisinopril Anaphylaxis   • Penicillins Hives   • Epinephrine Unspecified     shaking   • Percocet [Oxycodone-Acetaminophen] Vomiting   • Sulfa Drugs Hives       Current medicines including changes today:  Current Outpatient Prescriptions   Medication Sig Dispense Refill   • nebivolol (BYSTOLIC) 5 MG Tab tablet Take 1 Tab by  "mouth every day. 90 Tab 0   • MAGNESIUM-OXIDE 400 (241.3 Mg) MG Tab tablet TAKE 1 TABLET BY MOUTH 2 TIMES A DAY 60 Tab 0   • bethanechol (URECHOLINE) 25 MG Tab Take 1 Tab by mouth every 24 hours as needed. 90 Tab 0   • sertraline (ZOLOFT) 50 MG Tab TAKE 1 TABLET BY MOUTH EVERY DAY. 90 Tab 0   • psyllium (METAMUCIL SMOOTH TEXTURE) 28 % packet Take 1 Packet by mouth 2 times a day. 30 Each 3   • Calcium Carbonate-Vitamin D (OS-THIAGO 500 + D PO) Take 1 tablet by mouth every day.     • vitamin D (CHOLECALCIFEROL) 1000 UNIT Tab Take 1,000 Units by mouth every day.     • polymixin-trimethoprim (POLYTRIM) 15376-2.1 UNIT/ML-% Solution Place 1 Drop in both eyes every 4 hours. 10 mL 0   • acetaminophen (TYLENOL) 160 MG/5ML Suspension Take 15 mg/kg by mouth every 6 hours as needed.     • timolol (TIMOPTIC) 0.5 % Solution Place 1 Drop in both eyes every day. 1 Bottle 3   • omeprazole (PRILOSEC) 20 MG delayed-release capsule TAKE 1 CAP BY MOUTH 2 TIMES A DAY. (Patient not taking: Reported on 8/21/2017) 180 Cap 0   • ibuprofen (MOTRIN) 200 MG Tab Take 200 mg by mouth 1 time daily as needed. For pain       No current facility-administered medications for this visit.         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   appears healthy     Skin   " appropriate for sex and age    HEENT  unremarkable    Neck   thyroid gland is small and difficult to palpate. No palpable nodules in the thyroid bed or elsewhere in the neck or supraclavicular areas.    Heart  regular    Extremities  no edema    Neuro  gait and station normal    Psych  appropriate    ASSESSMENT AND RECOMMENDATIONS    1. Hypercalcemia            - ALBUMIN; Future  - CALCIUM (CA); Future    2. Hyperparathyroidism (CMS-HCC)              Probably mild hyperparathyroidism. I think asymptomatic but I'm not positive. Occasionally has nausea. Blood pressures a problem  - PTH INTACT (PTH ONLY); Future    3. Vitamin D deficiency  Try and get Melania Martínez or her nurse on the phone  - VITAMIN D,25 HYDROXY; Future      DISPOSITION:   Follow-up lab by my chart. If stable return in 6 months.       Neo Andres M.D.    Copies to: Robbie Trivedi M.D. [phone]

## 2017-10-04 ENCOUNTER — OFFICE VISIT (OUTPATIENT)
Dept: MEDICAL GROUP | Age: 82
End: 2017-10-04
Payer: MEDICARE

## 2017-10-04 VITALS
OXYGEN SATURATION: 95 % | WEIGHT: 136.2 LBS | HEART RATE: 76 BPM | DIASTOLIC BLOOD PRESSURE: 82 MMHG | HEIGHT: 68 IN | BODY MASS INDEX: 20.64 KG/M2 | TEMPERATURE: 99.1 F | SYSTOLIC BLOOD PRESSURE: 146 MMHG

## 2017-10-04 DIAGNOSIS — I10 ESSENTIAL HYPERTENSION: ICD-10-CM

## 2017-10-04 DIAGNOSIS — Z23 NEED FOR VACCINATION: ICD-10-CM

## 2017-10-04 PROCEDURE — 99214 OFFICE O/P EST MOD 30 MIN: CPT | Performed by: FAMILY MEDICINE

## 2017-10-04 RX ORDER — AMLODIPINE BESYLATE 5 MG/1
5 TABLET ORAL DAILY
Qty: 60 TAB | Refills: 0 | Status: SHIPPED | OUTPATIENT
Start: 2017-10-04 | End: 2017-12-26 | Stop reason: SDUPTHER

## 2017-10-04 NOTE — ASSESSMENT & PLAN NOTE
Patient continues to complain about lightheadedness and dizziness from Bystolic 5 mg by mouth daily. She could not tolerate metoprolol because of the same side effects, lisinopril led to cough as well as angioedema she states. Hydrochlorothiazide was contraindicated due to her long-time issue with hypercalcemia. Amlodipine caused poor cosmetic result due to peripheral edema. She states the lightheadedness is causing dysfunction in her life. She wants to change the medication again.

## 2017-10-04 NOTE — PROGRESS NOTES
This medical record contains text that has been entered with the assistance of computer voice recognition and dictation software.  Therefore, it may contain unintended errors in text, spelling, punctuation, or grammar    Chief Complaint   Patient presents with   • Other     see reason for visit       Lucita Babcock is a 82 y.o. female here evaluation and management of: Change blood pressure medication, lightheadedness      HPI:     Essential hypertension  Patient continues to complain about lightheadedness and dizziness from Bystolic 5 mg by mouth daily. She could not tolerate metoprolol because of the same side effects, lisinopril led to cough as well as angioedema she states. Hydrochlorothiazide was contraindicated due to her long-time issue with hypercalcemia. Amlodipine caused poor cosmetic result due to peripheral edema. She states the lightheadedness is causing dysfunction in her life. She wants to change the medication again.    Current medicines (including changes today)  Current Outpatient Prescriptions   Medication Sig Dispense Refill   • amlodipine (NORVASC) 5 MG Tab Take 1 Tab by mouth every day. 60 Tab 0   • MAGNESIUM-OXIDE 400 (241.3 Mg) MG Tab tablet TAKE 1 TABLET BY MOUTH 2 TIMES A DAY 60 Tab 0   • bethanechol (URECHOLINE) 25 MG Tab Take 1 Tab by mouth every 24 hours as needed. 90 Tab 0   • sertraline (ZOLOFT) 50 MG Tab TAKE 1 TABLET BY MOUTH EVERY DAY. 90 Tab 0   • psyllium (METAMUCIL SMOOTH TEXTURE) 28 % packet Take 1 Packet by mouth 2 times a day. 30 Each 3   • Calcium Carbonate-Vitamin D (OS-THIAGO 500 + D PO) Take 1 tablet by mouth every day.     • vitamin D (CHOLECALCIFEROL) 1000 UNIT Tab Take 1,000 Units by mouth every day.     • polymixin-trimethoprim (POLYTRIM) 13625-3.1 UNIT/ML-% Solution Place 1 Drop in both eyes every 4 hours. 10 mL 0   • acetaminophen (TYLENOL) 160 MG/5ML Suspension Take 15 mg/kg by mouth every 6 hours as needed.     • timolol (TIMOPTIC) 0.5 % Solution Place 1 Drop in both  eyes every day. 1 Bottle 3   • omeprazole (PRILOSEC) 20 MG delayed-release capsule TAKE 1 CAP BY MOUTH 2 TIMES A DAY. (Patient not taking: Reported on 2017) 180 Cap 0   • ibuprofen (MOTRIN) 200 MG Tab Take 200 mg by mouth 1 time daily as needed. For pain       No current facility-administered medications for this visit.      She  has a past medical history of Anesthesia; Aneurysm (CMS-HCC); Anxiety; Arthritis; Breast cancer (CMS-HCC) (); Cancer (CMS-HCC) (); CATARACT; Dental disorder; Depression; Hypertension; Indigestion; Injury of cervical spine (CMS-HCC) (2016); Osteopenia; Other specified disorder of intestines; Sarcoid (CMS-HCC); and UTI (urinary tract infection) (). She also has no past medical history of Diabetes (CMS-HCC); Pacemaker; Seizure (CMS-HCC); or Stroke (CMS-HCC).  She  has a past surgical history that includes vein stripping; mass excision general (); breast biopsy (08); abdominal hysterectomy total (); cataract phaco with iol (2010); colonoscopy (); ercp in or (2012); samuel by laparoscopy (2013); lumpectomy (); radiation therapy plan simple (); cataract phaco with iol (2014); and cervical disk and fusion anterior (2016).  Social History   Substance Use Topics   • Smoking status: Former Smoker     Packs/day: 0.50     Years: 45.00     Quit date: 10/9/1969   • Smokeless tobacco: Never Used   • Alcohol use No      Comment: ocassionally     Social History     Social History Narrative   • No narrative on file     Family History   Problem Relation Age of Onset   • Hypertension Mother    • Stroke Mother    • Cancer Neg Hx    • Diabetes Neg Hx    • Heart Disease Neg Hx    • Other Father      Dementia   • Other Sister      BIpolar   • Other Sister      Bipolar   • Other Sister      THyroid disease     Family Status   Relation Status   • Mother    • Son     suicide   • Father    • Sister    • Sister  "Alive   • Sister Alive   • Daughter Alive   • Daughter Alive         ROS    Please see hpi     All other systems reviewed and are negative     Objective:     Blood pressure 146/82, pulse 76, temperature 37.3 °C (99.1 °F), height 1.715 m (5' 7.52\"), weight 61.8 kg (136 lb 3.2 oz), SpO2 95 %. Body mass index is 21 kg/m².  Physical Exam:    Constitutional: Alert, no distress.  Skin: Warm, dry, good turgor, no rashes in visible areas.  Eye: Equal, round and reactive, conjunctiva clear, lids normal.  ENMT: Lips without lesions, good dentition, oropharynx clear.  Neck: Trachea midline, no masses, no thyromegaly. No cervical or supraclavicular lymphadenopathy.  Respiratory: Unlabored respiratory effort, lungs clear to auscultation, no wheezes, no ronchi.  Cardiovascular: Normal S1, S2, no murmur, no edema.  Abdomen: Soft, non-tender, no masses, no hepatosplenomegaly.  Psych: Alert and oriented x3, normal affect and mood.          Assessment and Plan:   The following treatment plan was discussed      1. Need for vaccination  She states that she had this done at her pharmacy  Last wk    - INFLUENZA VACCINE, HIGH DOSE (65+ ONLY)    2. Essential hypertension  We will try amlodipine again  She states the peripheral edema was nothing compared to lightheadedness of the beta blockers  All side effects explained    Patient was given instructions to make a two-week blood pressure log  To measure his blood pressure morning and evening same time  Then send results back to us  We will consider specific management at that time          HEALTH MAINTENANCE:up to date    Instructed to Follow up in clinic or ER for worsening symptoms, difficulty breathing, lack of expected recovery, or should new symptoms or problems arise.    Followup: Return in about 2 weeks (around 10/18/2017) for BP Check, 2 week BP log.       Once again this medical record contains text that has been entered with the assistance of computer voice recognition and " dictation software.  Therefore, it may contain unintended errors in text, spelling, punctuation, or grammar

## 2017-10-09 DIAGNOSIS — E21.3 HYPERPARATHYROIDISM (HCC): ICD-10-CM

## 2017-10-09 DIAGNOSIS — E55.9 VITAMIN D DEFICIENCY: ICD-10-CM

## 2017-10-09 DIAGNOSIS — E83.52 HYPERCALCEMIA: ICD-10-CM

## 2017-10-11 ENCOUNTER — TELEPHONE (OUTPATIENT)
Dept: ENDOCRINOLOGY | Facility: MEDICAL CENTER | Age: 82
End: 2017-10-11

## 2017-10-11 NOTE — TELEPHONE ENCOUNTER
Pt called and she just want to thank you for all your hard work and she mentioned that she is very happy with your care.    Thank you  Muna

## 2017-10-13 ENCOUNTER — PATIENT MESSAGE (OUTPATIENT)
Dept: MEDICAL GROUP | Age: 82
End: 2017-10-13

## 2017-10-17 DIAGNOSIS — R42 DIZZINESS: ICD-10-CM

## 2017-10-18 DIAGNOSIS — I10 ESSENTIAL HYPERTENSION: ICD-10-CM

## 2017-11-01 ENCOUNTER — HOSPITAL ENCOUNTER (OUTPATIENT)
Dept: LAB | Facility: MEDICAL CENTER | Age: 82
End: 2017-11-01
Attending: INTERNAL MEDICINE
Payer: MEDICARE

## 2017-11-01 DIAGNOSIS — E83.52 HYPERCALCEMIA: ICD-10-CM

## 2017-11-01 DIAGNOSIS — E21.3 HYPERPARATHYROIDISM (HCC): ICD-10-CM

## 2017-11-01 DIAGNOSIS — E55.9 VITAMIN D DEFICIENCY: ICD-10-CM

## 2017-11-01 LAB
25(OH)D3 SERPL-MCNC: 18 NG/ML (ref 30–100)
ALBUMIN SERPL BCP-MCNC: 4.2 G/DL (ref 3.2–4.9)
CALCIUM SERPL-MCNC: 10.9 MG/DL (ref 8.5–10.5)
PTH-INTACT SERPL-MCNC: 68.9 PG/ML (ref 14–72)

## 2017-11-01 PROCEDURE — 83970 ASSAY OF PARATHORMONE: CPT

## 2017-11-01 PROCEDURE — 82310 ASSAY OF CALCIUM: CPT

## 2017-11-01 PROCEDURE — 82040 ASSAY OF SERUM ALBUMIN: CPT

## 2017-11-01 PROCEDURE — 36415 COLL VENOUS BLD VENIPUNCTURE: CPT

## 2017-11-01 PROCEDURE — 82306 VITAMIN D 25 HYDROXY: CPT

## 2017-11-04 ENCOUNTER — TELEPHONE (OUTPATIENT)
Dept: ENDOCRINOLOGY | Facility: MEDICAL CENTER | Age: 82
End: 2017-11-04

## 2017-11-04 DIAGNOSIS — E83.52 HYPERCALCEMIA: ICD-10-CM

## 2017-11-04 DIAGNOSIS — E55.9 VITAMIN D DEFICIENCY: Primary | ICD-10-CM

## 2017-11-04 DIAGNOSIS — E21.3 HYPERPARATHYROIDISM (HCC): ICD-10-CM

## 2017-11-04 NOTE — TELEPHONE ENCOUNTER
Telephone conversation with patient    Recent lab tests reviewed done November 1.    Calcium is still marginally elevated at 10.9 when corrected for albumin 4.2 is 10.7 (upper normal 10.5). At the same time parathyroid hormone is 68.9 (14-72). Vitamin D is 18. She is taking 2000 international units of vitamin D 3. I want to increase her vitamin D level but to do it slowly just in case it adversely affects her calcium level. She will increase vitamin D to 4000 units per day.    These levels are not too concerning because she does not have kidney stones and her bone density was excellent in January of this year.    We will recheck her lab in 2 months.    Neo Andres M.D.

## 2017-11-07 ENCOUNTER — TELEPHONE (OUTPATIENT)
Dept: MEDICAL GROUP | Age: 82
End: 2017-11-07

## 2017-11-07 DIAGNOSIS — M85.80 OSTEOPENIA, UNSPECIFIED LOCATION: ICD-10-CM

## 2017-11-07 NOTE — TELEPHONE ENCOUNTER
1. Caller Name: Elda Olguin                                         CallBack Number: [phone] (home)         Patient approves a detailed voicemail message: N\A    Patient's daughter Elda called asking if an order can be placed for a full body diagnostic scan. Patient had a big fall in July 2016 and they just want to make sure everything is okay.

## 2017-11-08 NOTE — TELEPHONE ENCOUNTER
Phone Number Called: [phone] (home)     Message: Spoke with Elda, pt daughter, she stated Lucita already had a bone density scan done and they were wanting a full body diagnostic test ordered but she will call back with the exact name of what they are looking for.    Left Message for patient to call back: no

## 2017-11-13 ENCOUNTER — HOSPITAL ENCOUNTER (OUTPATIENT)
Facility: MEDICAL CENTER | Age: 82
End: 2017-11-13
Attending: PHYSICIAN ASSISTANT
Payer: MEDICARE

## 2017-11-13 PROCEDURE — 87086 URINE CULTURE/COLONY COUNT: CPT

## 2017-11-16 LAB
BACTERIA UR CULT: NORMAL
SIGNIFICANT IND 70042: NORMAL
SOURCE SOURCE: NORMAL

## 2017-11-28 ENCOUNTER — PATIENT OUTREACH (OUTPATIENT)
Dept: HEALTH INFORMATION MANAGEMENT | Facility: OTHER | Age: 82
End: 2017-11-28

## 2017-11-28 NOTE — PROGRESS NOTES
Attempt #:1    WebIZ Checked & Epic Updated: Yes  · WebIZ Recommendations: Patient is up to date on all vaccines  · Is patient due for Tdap? NO  · Is patient due for Shingles? NO  [facility] Verified: yes  Verify PCP: yes    Communication Preference Obtained: yes     Review Care Team: yes    Annual Wellness Visit Scheduling  1. Scheduling Status:Scheduled        Care Gap Scheduling (Attempt to Schedule EACH Overdue Care Gap!)     There are no preventive care reminders to display for this patient.     Scheduled patient for Annual Wellness Visit/hcc update       [employer] Activation: already active  [employer] Enriqueta: no  Virtual Visits: no  Opt In to Text Messages: no

## 2017-11-28 NOTE — PROGRESS NOTES
Outcome: Left Message    Please transfer to Patient Outreach Team at [phone] when patient returns call.    WebIZ Checked & Epic Updated:  yes    [facility] Verified: yes    Attempt # 1

## 2017-11-30 ENCOUNTER — OFFICE VISIT (OUTPATIENT)
Dept: MEDICAL GROUP | Age: 82
End: 2017-11-30
Payer: MEDICARE

## 2017-11-30 VITALS
BODY MASS INDEX: 20.61 KG/M2 | TEMPERATURE: 99.7 F | OXYGEN SATURATION: 95 % | WEIGHT: 136 LBS | HEART RATE: 78 BPM | DIASTOLIC BLOOD PRESSURE: 82 MMHG | HEIGHT: 68 IN | SYSTOLIC BLOOD PRESSURE: 136 MMHG

## 2017-11-30 DIAGNOSIS — J06.9 VIRAL URI: ICD-10-CM

## 2017-11-30 DIAGNOSIS — N30.00 ACUTE CYSTITIS WITHOUT HEMATURIA: ICD-10-CM

## 2017-11-30 PROCEDURE — 99214 OFFICE O/P EST MOD 30 MIN: CPT | Performed by: FAMILY MEDICINE

## 2017-11-30 RX ORDER — ASCORBIC ACID 500 MG
500 TABLET ORAL 3 TIMES DAILY
Qty: 90 TAB | Refills: 0 | Status: SHIPPED | OUTPATIENT
Start: 2017-11-30 | End: 2018-03-06 | Stop reason: SDUPTHER

## 2017-11-30 NOTE — ASSESSMENT & PLAN NOTE
The patient states that she has woke up today feeling a little bit ill, she complained of generalized malaise, feeling feverish although temperature was unmeasured. She denies any cough, no new rashes, no new joint pain, no severe headache no change in vision no difficulty swallowing, no nausea or vomiting. She denies any chest pain, no shortness of breath or dyspnea on exertion, no contact with other sick humans.

## 2017-11-30 NOTE — PROGRESS NOTES
This medical record contains text that has been entered with the assistance of computer voice recognition and dictation software.  Therefore, it may contain unintended errors in text, spelling, punctuation, or grammar    Chief Complaint   Patient presents with   • Other     see reason for visit       Lucita Babcock is a 82 y.o. female here evaluation and management of: URI      HPI:     Viral URI  The patient states that she has woke up today feeling a little bit ill, she complained of generalized malaise, feeling feverish although temperature was unmeasured. She denies any cough, no new rashes, no new joint pain, no severe headache no change in vision no difficulty swallowing, no nausea or vomiting. She denies any chest pain, no shortness of breath or dyspnea on exertion, no contact with other sick humans.    Current medicines (including changes today)  Current Outpatient Prescriptions   Medication Sig Dispense Refill   • ascorbic acid (VITAMIN C) 500 MG tablet Take 1 Tab by mouth 3 times a day. 90 Tab 0   • metoprolol SR (TOPROL XL) 25 MG TABLET SR 24 HR Take 1 Tab by mouth every day. 60 Tab 0   • amlodipine (NORVASC) 5 MG Tab Take 1 Tab by mouth every day. 60 Tab 0   • MAGNESIUM-OXIDE 400 (241.3 Mg) MG Tab tablet TAKE 1 TABLET BY MOUTH 2 TIMES A DAY 60 Tab 0   • bethanechol (URECHOLINE) 25 MG Tab Take 1 Tab by mouth every 24 hours as needed. 90 Tab 0   • psyllium (METAMUCIL SMOOTH TEXTURE) 28 % packet Take 1 Packet by mouth 2 times a day. 30 Each 3   • Calcium Carbonate-Vitamin D (OS-THIAGO 500 + D PO) Take 1 tablet by mouth every day.     • vitamin D (CHOLECALCIFEROL) 1000 UNIT Tab Take 1,000 Units by mouth every day.     • polymixin-trimethoprim (POLYTRIM) 08752-9.1 UNIT/ML-% Solution Place 1 Drop in both eyes every 4 hours. 10 mL 0   • acetaminophen (TYLENOL) 160 MG/5ML Suspension Take 15 mg/kg by mouth every 6 hours as needed.     • timolol (TIMOPTIC) 0.5 % Solution Place 1 Drop in both eyes every day. 1 Bottle  3   • MAGNESIUM-OXIDE 400 (241.3 Mg) MG Tab tablet TAKE 1 TABLET BY MOUTH 2 TIMES A DAY 60 Tab 0   • sertraline (ZOLOFT) 50 MG Tab TAKE 1 TABLET BY MOUTH EVERY DAY. 90 Tab 0   • metoprolol (LOPRESSOR) 25 MG Tab 1 tab in the AM and 2 tabs in the PM 90 Tab 0   • omeprazole (PRILOSEC) 20 MG delayed-release capsule TAKE 1 CAP BY MOUTH 2 TIMES A DAY. (Patient not taking: Reported on 8/21/2017) 180 Cap 0   • ibuprofen (MOTRIN) 200 MG Tab Take 200 mg by mouth 1 time daily as needed. For pain       No current facility-administered medications for this visit.      She  has a past medical history of Anesthesia; Aneurysm (CMS-HCC); Anxiety; Arthritis; Breast cancer (CMS-HCC) (2008); Cancer (CMS-HCC) (2008); CATARACT; Dental disorder; Depression; Hypertension; Indigestion; Injury of cervical spine (CMS-HCC) (July 2016); Osteopenia; Other specified disorder of intestines; Sarcoid (CMS-HCC); and UTI (urinary tract infection) (). She also has no past medical history of Diabetes (CMS-HCC); Pacemaker; Seizure (CMS-HCC); or Stroke (CMS-HCC).  She  has a past surgical history that includes vein stripping; mass excision general (7/08); breast biopsy (5/27/08); abdominal hysterectomy total (1997); cataract phaco with iol (8/11/2010); colonoscopy (2007); ercp in or (12/28/2012); samuel by laparoscopy (1/8/2013); lumpectomy (2008); pr radiation therapy plan simple (2008); cataract phaco with iol (8/27/2014); and cervical disk and fusion anterior (7/27/2016).  Social History   Substance Use Topics   • Smoking status: Former Smoker     Packs/day: 0.50     Years: 45.00     Quit date: 10/9/1969   • Smokeless tobacco: Never Used   • Alcohol use No      Comment: ocassionally     Social History     Social History Narrative   • No narrative on file     Family History   Problem Relation Age of Onset   • Hypertension Mother    • Stroke Mother    • Cancer Neg Hx    • Diabetes Neg Hx    • Heart Disease Neg Hx    • Other Father      Dementia   •  "Other Sister      BIpolar   • Other Sister      Bipolar   • Other Sister      THyroid disease     Family Status   Relation Status   • Mother    • Son     suicide   • Father    • Sister    • Sister Alive   • Sister Alive   • Daughter Alive   • Daughter Alive         ROS    Please see hpi     All other systems reviewed and are negative     Objective:     Blood pressure 136/82, pulse 78, temperature 37.6 °C (99.7 °F), height 1.715 m (5' 7.52\"), weight 61.7 kg (136 lb), SpO2 95 %. Body mass index is 20.97 kg/m².  Physical Exam:    Constitutional: Alert, no distress.  Skin: Warm, dry, good turgor, no rashes in visible areas.  Eye: Equal, round and reactive, conjunctiva clear, lids normal.  ENMT: Lips without lesions, good dentition, oropharynx clear.  Neck: Trachea midline, no masses, no thyromegaly. No cervical or supraclavicular lymphadenopathy.  Respiratory: Unlabored respiratory effort, lungs clear to auscultation, no wheezes, no ronchi.  Cardiovascular: Normal S1, S2, no murmur, no edema.  Abdomen: Soft, non-tender, no masses, no hepatosplenomegaly.  Psych: Alert and oriented x3, normal affect and mood.          Assessment and Plan:   The following treatment plan was discussed      1. Viral URI  The patient is nontoxic in appearance  Afebrile and  hemodynamically stable  There is no clinical indication for antibiotics or imaging  We will proceed with conservative management    The patient was given instructions to RTC if no improvement with conservative management        - ascorbic acid (VITAMIN C) 500 MG tablet; Take 1 Tab by mouth 3 times a day.  Dispense: 90 Tab; Refill: 0    2. Acute cystitis without hematuria    - URINALYSIS,CULTURE IF INDICATED; Future        HEALTH MAINTENANCE:    Instructed to Follow up in clinic or ER for worsening symptoms, difficulty breathing, lack of expected recovery, or should new symptoms or problems arise.    Followup: Return in about 4 weeks (around " 12/28/2017) for Reevaluation.       Once again this medical record contains text that has been entered with the assistance of computer voice recognition and dictation software.  Therefore, it may contain unintended errors in text, spelling, punctuation, or grammar

## 2017-12-06 DIAGNOSIS — I10 ESSENTIAL HYPERTENSION: ICD-10-CM

## 2017-12-07 RX ORDER — METOPROLOL SUCCINATE 25 MG/1
TABLET, EXTENDED RELEASE ORAL
Qty: 60 TAB | Refills: 0 | Status: SHIPPED | OUTPATIENT
Start: 2017-12-07 | End: 2017-12-14 | Stop reason: SDUPTHER

## 2017-12-08 ENCOUNTER — TELEPHONE (OUTPATIENT)
Dept: MEDICAL GROUP | Age: 82
End: 2017-12-08

## 2017-12-08 NOTE — TELEPHONE ENCOUNTER
Phone Number Called: [phone] (home)       Message: left message for patient to call regarding [facility] PVP    Left Message for patient to call back: yes

## 2017-12-11 ENCOUNTER — HOSPITAL ENCOUNTER (OUTPATIENT)
Dept: LAB | Facility: MEDICAL CENTER | Age: 82
End: 2017-12-11
Attending: INTERNAL MEDICINE
Payer: MEDICARE

## 2017-12-11 DIAGNOSIS — E83.52 HYPERCALCEMIA: ICD-10-CM

## 2017-12-11 DIAGNOSIS — E21.3 HYPERPARATHYROIDISM (HCC): ICD-10-CM

## 2017-12-11 DIAGNOSIS — E55.9 VITAMIN D DEFICIENCY: ICD-10-CM

## 2017-12-11 LAB
25(OH)D3 SERPL-MCNC: 31 NG/ML (ref 30–100)
ALBUMIN SERPL BCP-MCNC: 4.3 G/DL (ref 3.2–4.9)
CALCIUM SERPL-MCNC: 11.3 MG/DL (ref 8.5–10.5)
PTH-INTACT SERPL-MCNC: 72.5 PG/ML (ref 14–72)

## 2017-12-11 PROCEDURE — 82040 ASSAY OF SERUM ALBUMIN: CPT

## 2017-12-11 PROCEDURE — 82306 VITAMIN D 25 HYDROXY: CPT

## 2017-12-11 PROCEDURE — 36415 COLL VENOUS BLD VENIPUNCTURE: CPT

## 2017-12-11 PROCEDURE — 83970 ASSAY OF PARATHORMONE: CPT

## 2017-12-11 PROCEDURE — 82310 ASSAY OF CALCIUM: CPT

## 2017-12-14 ENCOUNTER — TELEPHONE (OUTPATIENT)
Dept: MEDICAL GROUP | Age: 82
End: 2017-12-14

## 2017-12-14 DIAGNOSIS — I10 ESSENTIAL HYPERTENSION: ICD-10-CM

## 2017-12-14 RX ORDER — METOPROLOL SUCCINATE 25 MG/1
TABLET, EXTENDED RELEASE ORAL
Qty: 60 TAB | Refills: 1 | Status: SHIPPED | OUTPATIENT
Start: 2017-12-14 | End: 2018-02-21 | Stop reason: SDUPTHER

## 2017-12-14 NOTE — TELEPHONE ENCOUNTER
1. Caller Name: Lucita Babcock                                         Call Back Number: [phone] (home)         Patient approves a detailed voicemail message: N\A    Patient called and stated she has been taking 2 pills of metoprolol some days and it has become an issue with the insurance because the current rx states to take one pill once a day. She is wondering if she can have the rx changed to say 1-2 a day and have the day supply increased. Please advise if the rx can be updated.

## 2017-12-16 DIAGNOSIS — E21.3 HYPERPARATHYROIDISM (HCC): ICD-10-CM

## 2017-12-26 RX ORDER — AMLODIPINE BESYLATE 5 MG/1
5 TABLET ORAL DAILY
Qty: 90 TAB | Refills: 1 | Status: SHIPPED | OUTPATIENT
Start: 2017-12-26 | End: 2018-02-08

## 2017-12-29 ENCOUNTER — HOSPITAL ENCOUNTER (OUTPATIENT)
Dept: RADIOLOGY | Facility: MEDICAL CENTER | Age: 82
End: 2017-12-29
Attending: INTERNAL MEDICINE
Payer: MEDICARE

## 2017-12-29 DIAGNOSIS — E21.3 HYPERPARATHYROIDISM (HCC): ICD-10-CM

## 2017-12-29 PROCEDURE — 76536 US EXAM OF HEAD AND NECK: CPT

## 2017-12-30 DIAGNOSIS — E21.3 HYPERPARATHYROIDISM (HCC): ICD-10-CM

## 2017-12-30 DIAGNOSIS — M85.80 OSTEOPENIA, UNSPECIFIED LOCATION: Primary | ICD-10-CM

## 2017-12-30 DIAGNOSIS — E83.52 HYPERCALCEMIA: ICD-10-CM

## 2018-01-09 ENCOUNTER — NON-PROVIDER VISIT (OUTPATIENT)
Dept: MEDICAL GROUP | Age: 83
End: 2018-01-09
Payer: MEDICARE

## 2018-01-09 VITALS — DIASTOLIC BLOOD PRESSURE: 72 MMHG | SYSTOLIC BLOOD PRESSURE: 138 MMHG

## 2018-01-09 DIAGNOSIS — F51.01 PRIMARY INSOMNIA: ICD-10-CM

## 2018-01-09 RX ORDER — ALPRAZOLAM 0.25 MG/1
0.25 TABLET ORAL NIGHTLY PRN
Qty: 30 TAB | Refills: 0 | Status: SHIPPED
Start: 2018-01-09 | End: 2018-02-08

## 2018-01-09 NOTE — TELEPHONE ENCOUNTER
Was the patient seen in the last year in this department? Yes     Does patient have an active prescription for medications requested? No     Received Request Via: Patient through [employer]

## 2018-01-09 NOTE — PROGRESS NOTES
Lucita Babcock is a 82 y.o. female here for a non-provider visit for BP check.    Vitals:    01/09/18 1410 01/09/18 1415   BP: 140/70 138/72     If abnormal, was an in office provider notified today? Yes - Pt scheduled with Dr. Dubois tomorrow.  Routed to PCP? Yes

## 2018-01-10 ENCOUNTER — NON-PROVIDER VISIT (OUTPATIENT)
Dept: MEDICAL GROUP | Age: 83
End: 2018-01-10
Payer: MEDICARE

## 2018-01-10 VITALS — DIASTOLIC BLOOD PRESSURE: 60 MMHG | SYSTOLIC BLOOD PRESSURE: 138 MMHG

## 2018-01-10 NOTE — PROGRESS NOTES
Lucita Babcock is a 82 y.o. female here for a non-provider visit for BP check    Vitals:    01/10/18 1530   BP: 138/60     If abnormal, was an in office provider notified today? Yes  Routed to PCP? Yes

## 2018-01-12 ENCOUNTER — APPOINTMENT (OUTPATIENT)
Dept: MEDICAL GROUP | Age: 83
End: 2018-01-12
Payer: MEDICARE

## 2018-01-23 ENCOUNTER — HOSPITAL ENCOUNTER (OUTPATIENT)
Dept: RADIOLOGY | Facility: MEDICAL CENTER | Age: 83
End: 2018-01-23
Attending: INTERNAL MEDICINE
Payer: MEDICARE

## 2018-01-23 DIAGNOSIS — E83.52 HYPERCALCEMIA: ICD-10-CM

## 2018-01-23 DIAGNOSIS — E21.3 HYPERPARATHYROIDISM (HCC): ICD-10-CM

## 2018-01-23 DIAGNOSIS — M85.80 OSTEOPENIA, UNSPECIFIED LOCATION: ICD-10-CM

## 2018-01-23 PROCEDURE — 77080 DXA BONE DENSITY AXIAL: CPT

## 2018-01-29 ENCOUNTER — TELEPHONE (OUTPATIENT)
Dept: ENDOCRINOLOGY | Facility: MEDICAL CENTER | Age: 83
End: 2018-01-29

## 2018-01-31 ENCOUNTER — OFFICE VISIT (OUTPATIENT)
Dept: ENDOCRINOLOGY | Facility: MEDICAL CENTER | Age: 83
End: 2018-01-31
Payer: MEDICARE

## 2018-01-31 VITALS
HEART RATE: 87 BPM | SYSTOLIC BLOOD PRESSURE: 124 MMHG | BODY MASS INDEX: 21.37 KG/M2 | HEIGHT: 68 IN | WEIGHT: 141 LBS | DIASTOLIC BLOOD PRESSURE: 66 MMHG | OXYGEN SATURATION: 94 %

## 2018-01-31 DIAGNOSIS — M85.80 OSTEOPENIA, UNSPECIFIED LOCATION: ICD-10-CM

## 2018-01-31 DIAGNOSIS — E21.3 HYPERPARATHYROIDISM (HCC): ICD-10-CM

## 2018-01-31 DIAGNOSIS — E83.52 HYPERCALCEMIA: Primary | ICD-10-CM

## 2018-01-31 PROCEDURE — 99214 OFFICE O/P EST MOD 30 MIN: CPT | Performed by: INTERNAL MEDICINE

## 2018-01-31 NOTE — PROGRESS NOTES
Chief Complaint   Patient presents with   • Hyperparathyroidism        HPI:    This is been a difficult problem for decision making. The patient has very mild hypercalcemia. Most recently calcium is 11.3 with albumin 4.3 which calculates a free calcium 11.1 which is been one of the highest levels for her. Her parathyroid hormone is marginal at 72 (14-72). In the face of hypercalcemia we can say this is a definitely abnormal parathyroid hormone. Vitamin D level is adequate at 31. Her bone density has declined over the past 2 years by 4 0.8% in the lumbar spine and 8.4% in right hip. She still is in the osteopenia category. Her previous parathyroid nuclear medicine scan was negative for demonstrating an adenoma. Her current soft tissue ultrasound shows a small mass posterior to the right lobe of thyroid which could be a parathyroid adenoma.    Additionally she does have multiple thyroid nodules which have not been biopsied. There is a 1.4 cm nodule in the left lobe and a 1.1 cm nodule in the right lobe. If she had parathyroid surgery we would probably do a thyroidectomy and settle both problems.    However, the problem is she does not want surgery. She has a bad knee that might require a knee replacement which has got her attention right now.    An alternative approach would be to give her a medication like alendronate to preserve her bone density and do FNA biopsies of the 2 thyroid nodules for reassurance.    I'm going to have her do another nuclear medicine parathyroid scan if that shows a definite adenoma that would indicate a relatively limited surgery that might be done to solve that particular problem. If the nuclear medicine scan is negative then I think we will do the other 2 alternatives mentioned above.    ROS:  Orthopedic problems are limiting activity principally right knee which needs replacement but not right now.    All other systems reported as negative or unchanged since last exam      Allergies:    Allergies   Allergen Reactions   • Ace Inhibitors Anaphylaxis   • Augmentin      Flu like sx   • Erythromycin Rash   • Lisinopril Anaphylaxis   • Penicillins Hives   • Epinephrine Unspecified     shaking   • Percocet [Oxycodone-Acetaminophen] Vomiting   • Sulfa Drugs Hives       Current medicines including changes today:  Current Outpatient Prescriptions   Medication Sig Dispense Refill   • sertraline (ZOLOFT) 50 MG Tab TAKE 1 TABLET ORALLY DAILY 90 Tab 0   • alprazolam (XANAX) 0.25 MG Tab Take 1 Tab by mouth at bedtime as needed for Sleep for up to 30 days. 30 Tab 0   • metoprolol SR (TOPROL XL) 25 MG TABLET SR 24 HR Take 1 tab po q24h, if BP not at goal may take 2nd tab (max 50mg in 24 hours) 60 Tab 1   • MAGNESIUM-OXIDE 400 (241.3 Mg) MG Tab tablet TAKE 1 TABLET BY MOUTH 2 TIMES A DAY 60 Tab 0   • ascorbic acid (VITAMIN C) 500 MG tablet Take 1 Tab by mouth 3 times a day. 90 Tab 0   • bethanechol (URECHOLINE) 25 MG Tab Take 1 Tab by mouth every 24 hours as needed. 90 Tab 0   • psyllium (METAMUCIL SMOOTH TEXTURE) 28 % packet Take 1 Packet by mouth 2 times a day. 30 Each 3   • Calcium Carbonate-Vitamin D (OS-THIAGO 500 + D PO) Take 1 tablet by mouth every day.     • vitamin D (CHOLECALCIFEROL) 1000 UNIT Tab Take 4,000 Units by mouth every day.     • polymixin-trimethoprim (POLYTRIM) 99942-9.1 UNIT/ML-% Solution Place 1 Drop in both eyes every 4 hours. 10 mL 0   • acetaminophen (TYLENOL) 160 MG/5ML Suspension Take 15 mg/kg by mouth every 6 hours as needed.     • amlodipine (NORVASC) 5 MG Tab TAKE 1 TAB BY MOUTH EVERY DAY. 90 Tab 1   • omeprazole (PRILOSEC) 20 MG delayed-release capsule TAKE 1 CAP BY MOUTH 2 TIMES A DAY. (Patient not taking: Reported on 8/21/2017) 180 Cap 0   • ibuprofen (MOTRIN) 200 MG Tab Take 200 mg by mouth 1 time daily as needed. For pain     • timolol (TIMOPTIC) 0.5 % Solution Place 1 Drop in both eyes every day. 1 Bottle 3     No current facility-administered medications for this visit.      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   appears healthy     Skin   appropriate for sex and age    HEENT  unremarkable    Neck   Thyroid gland is palpable but not definite nodules in the gland or elsewhere in the neck for a clavicular areas.    Heart  regular    Extremities  no edema    Neuro  gait and station normal    Psych  appropriate      ASSESSMENT AND RECOMMENDATIONS    1. Hyperparathyroidism (CMS-HCC)    - NM-PARATHYROID (SESTAMIBI) SCAN; Future    2. Hypercalcemia            Probably asymptomatic but always difficult to know until surgical removal of presumed parathyroid adenoma      3. Osteopenia, unspecified location             If she elects not to do a parathyroidectomy then medication is indicated      DISPOSITION: follow-up parathyroid scan by phone and further discussion of the above       Neo Andres M.D.    Copies to: Robbie Trivedi M.D. [phone]  "

## 2018-02-08 ENCOUNTER — OFFICE VISIT (OUTPATIENT)
Dept: MEDICAL GROUP | Age: 83
End: 2018-02-08
Payer: MEDICARE

## 2018-02-08 VITALS
DIASTOLIC BLOOD PRESSURE: 82 MMHG | HEIGHT: 68 IN | OXYGEN SATURATION: 95 % | TEMPERATURE: 99 F | WEIGHT: 140.6 LBS | HEART RATE: 85 BPM | BODY MASS INDEX: 21.31 KG/M2 | SYSTOLIC BLOOD PRESSURE: 146 MMHG

## 2018-02-08 DIAGNOSIS — I10 ESSENTIAL HYPERTENSION: ICD-10-CM

## 2018-02-08 DIAGNOSIS — F41.9 ANXIETY: ICD-10-CM

## 2018-02-08 DIAGNOSIS — J06.9 VIRAL UPPER RESPIRATORY TRACT INFECTION: ICD-10-CM

## 2018-02-08 DIAGNOSIS — F51.01 PRIMARY INSOMNIA: ICD-10-CM

## 2018-02-08 LAB
FLUAV+FLUBV AG SPEC QL IA: NEGATIVE
INT CON NEG: NEGATIVE
INT CON POS: POSITIVE

## 2018-02-08 PROCEDURE — 99214 OFFICE O/P EST MOD 30 MIN: CPT | Performed by: FAMILY MEDICINE

## 2018-02-08 PROCEDURE — 87804 INFLUENZA ASSAY W/OPTIC: CPT | Performed by: FAMILY MEDICINE

## 2018-02-08 RX ORDER — SERTRALINE HYDROCHLORIDE 100 MG/1
100 TABLET, FILM COATED ORAL DAILY
Qty: 90 TAB | Refills: 0 | Status: SHIPPED | OUTPATIENT
Start: 2018-02-08 | End: 2018-06-22

## 2018-02-08 RX ORDER — CLONIDINE HYDROCHLORIDE 0.1 MG/1
TABLET ORAL
Qty: 10 TAB | Refills: 0 | Status: ON HOLD | OUTPATIENT
Start: 2018-02-08 | End: 2018-09-07

## 2018-02-08 NOTE — ASSESSMENT & PLAN NOTE
The patient states that her anxiety recently worsened when she realized that 2 of her friends diagnosed with some type of cancer. She states that this sometimes her heart race and she thinks the world will end.  She has been taking 75 mg of Zoloft. 10 Xanax 0.25 mg as needed.

## 2018-02-08 NOTE — ASSESSMENT & PLAN NOTE
Metoprolol 25mg XL po q24h    Patient states she recently had to call the paramedics because her blood pressure at home was 200/100. She took extra pill of metoprolol and Xanax which normalized her blood pressure.    The patient has been tollerating the BP meds without any issues. No tunnel vision, no cough, no changes in vision, no lightheadedness, no fatigue, no syncopal or presyncopal episodes, no edema, no new rashes.

## 2018-02-08 NOTE — PROGRESS NOTES
This medical record contains text that has been entered with the assistance of computer voice recognition and dictation software.  Therefore, it may contain unintended errors in text, spelling, punctuation, or grammar    Chief Complaint   Patient presents with   • Hypertension       Lucita Babcock is a 82 y.o. female here evaluation and management of: anxiety, HTN, URI      HPI:     Anxiety  The patient states that her anxiety recently worsened when she realized that 2 of her friends diagnosed with some type of cancer. She states that this sometimes her heart race and she thinks the world will end.  She has been taking 75 mg of Zoloft. 10 Xanax 0.25 mg as needed.    Essential hypertension  Metoprolol 25mg XL po q24h    Patient states she recently had to call the paramedics because her blood pressure at home was 200/100. She took extra pill of metoprolol and Xanax which normalized her blood pressure.    The patient has been tollerating the BP meds without any issues. No tunnel vision, no cough, no changes in vision, no lightheadedness, no fatigue, no syncopal or presyncopal episodes, no edema, no new rashes.       Viral upper respiratory tract infection  She states that she is really feeling hot and had a headache yesterday which was resolved with Tylenol. She states that she is wondering if this could be related to the flu. She denies any cough or shortness of breath and production of sputum, no neck ache, myalgias no generalized malaise.    Current medicines (including changes today)  Current Outpatient Prescriptions   Medication Sig Dispense Refill   • cloNIDine (CATAPRES) 0.1 MG Tab Take one tab po bp greater than 180/95, may repeat one tab in 30 minutes po q24h 10 Tab 0   • sertraline (ZOLOFT) 50 MG Tab TAKE 1 TABLET ORALLY DAILY 90 Tab 0   • alprazolam (XANAX) 0.25 MG Tab Take 1 Tab by mouth at bedtime as needed for Sleep for up to 30 days. 30 Tab 0   • metoprolol SR (TOPROL XL) 25 MG TABLET SR 24 HR Take 1 tab  po q24h, if BP not at goal may take 2nd tab (max 50mg in 24 hours) 60 Tab 1   • MAGNESIUM-OXIDE 400 (241.3 Mg) MG Tab tablet TAKE 1 TABLET BY MOUTH 2 TIMES A DAY 60 Tab 0   • ascorbic acid (VITAMIN C) 500 MG tablet Take 1 Tab by mouth 3 times a day. 90 Tab 0   • bethanechol (URECHOLINE) 25 MG Tab Take 1 Tab by mouth every 24 hours as needed. 90 Tab 0   • psyllium (METAMUCIL SMOOTH TEXTURE) 28 % packet Take 1 Packet by mouth 2 times a day. 30 Each 3   • omeprazole (PRILOSEC) 20 MG delayed-release capsule TAKE 1 CAP BY MOUTH 2 TIMES A DAY. (Patient not taking: Reported on 8/21/2017) 180 Cap 0   • Calcium Carbonate-Vitamin D (OS-TIHAGO 500 + D PO) Take 1 tablet by mouth every day.     • ibuprofen (MOTRIN) 200 MG Tab Take 200 mg by mouth 1 time daily as needed. For pain     • vitamin D (CHOLECALCIFEROL) 1000 UNIT Tab Take 4,000 Units by mouth every day.     • polymixin-trimethoprim (POLYTRIM) 95439-0.1 UNIT/ML-% Solution Place 1 Drop in both eyes every 4 hours. 10 mL 0   • acetaminophen (TYLENOL) 160 MG/5ML Suspension Take 15 mg/kg by mouth every 6 hours as needed.     • timolol (TIMOPTIC) 0.5 % Solution Place 1 Drop in both eyes every day. 1 Bottle 3     No current facility-administered medications for this visit.      She  has a past medical history of Anesthesia; Aneurysm (CMS-HCC); Anxiety; Arthritis; Breast cancer (CMS-HCC) (2008); Cancer (CMS-HCC) (2008); CATARACT; Dental disorder; Depression; Hypertension; Indigestion; Injury of cervical spine (CMS-HCC) (July 2016); Osteopenia; Other specified disorder of intestines; Sarcoid (CMS-HCC); and UTI (urinary tract infection) (). She also has no past medical history of Diabetes (CMS-HCC); Pacemaker; Seizure (CMS-HCC); or Stroke (CMS-HCC).  She  has a past surgical history that includes vein stripping; mass excision general (7/08); breast biopsy (5/27/08); abdominal hysterectomy total (1997); cataract phaco with iol (8/11/2010); colonoscopy (2007); ercp in or  "(2012); samuel by laparoscopy (2013); lumpectomy (); pr radiation therapy plan simple (); cataract phaco with iol (2014); and cervical disk and fusion anterior (2016).  Social History   Substance Use Topics   • Smoking status: Former Smoker     Packs/day: 0.50     Years: 45.00     Quit date: 10/9/1969   • Smokeless tobacco: Never Used   • Alcohol use No      Comment: ocassionally     Social History     Social History Narrative   • No narrative on file     Family History   Problem Relation Age of Onset   • Hypertension Mother    • Stroke Mother    • Cancer Neg Hx    • Diabetes Neg Hx    • Heart Disease Neg Hx    • Other Father      Dementia   • Other Sister      BIpolar   • Other Sister      Bipolar   • Other Sister      THyroid disease     Family Status   Relation Status   • Mother    • Son     suicide   • Father    • Sister    • Sister Alive   • Sister Alive   • Daughter Alive   • Daughter Alive         ROS    Please see hpi     All other systems reviewed and are negative     Objective:     Blood pressure 146/82, pulse 85, temperature 37.2 °C (99 °F), height 1.715 m (5' 7.52\"), weight 63.8 kg (140 lb 9.6 oz), SpO2 95 %. Body mass index is 21.68 kg/m².  Physical Exam:    Constitutional: Alert, no distress.  Skin: Warm, dry, good turgor, no rashes in visible areas.  Eye: Equal, round and reactive, conjunctiva clear, lids normal.  ENMT: Lips without lesions, good dentition, oropharynx clear.  Neck: Trachea midline, no masses, no thyromegaly. No cervical or supraclavicular lymphadenopathy.  Respiratory: Unlabored respiratory effort, lungs clear to auscultation, no wheezes, no ronchi.  Cardiovascular: Normal S1, S2, no murmur, no edema.  Abdomen: Soft, non-tender, no masses, no hepatosplenomegaly.  Psych: Alert and oriented x3, normal affect and mood.          Assessment and Plan:   The following treatment plan was discussed      1. Viral upper respiratory tract " infection        The patient is nontoxic in appearance  Afebrile and  hemodynamically stable  There is no clinical indication for antibiotics or imaging  We will proceed with conservative management      - POCT Influenza A/B    2. Essential hypertension    We will add clonidine 0.1 mg for very elevated home blood pressures of greater than 180/95  Hypotensive precautions given, as well as rebound hypertension    Continue metoprolol 25mg XL    - cloNIDine (CATAPRES) 0.1 MG Tab; Take one tab po bp greater than 180/95, may repeat one tab in 30 minutes po q24h  Dispense: 10 Tab; Refill: 0    3. Anxiety  Increase zoloft to 100 mg po q24h  Xanax prn          HEALTH MAINTENANCE:    Instructed to Follow up in clinic or ER for worsening symptoms, difficulty breathing, lack of expected recovery, or should new symptoms or problems arise.    Followup: Return in about 2 weeks (around 2/22/2018) for Reevaluation.       Once again this medical record contains text that has been entered with the assistance of computer voice recognition and dictation software.  Therefore, it may contain unintended errors in text, spelling, punctuation, or grammar

## 2018-02-08 NOTE — ASSESSMENT & PLAN NOTE
She states that she is really feeling hot and had a headache yesterday which was resolved with Tylenol. She states that she is wondering if this could be related to the flu. She denies any cough or shortness of breath and production of sputum, no neck ache, myalgias no generalized malaise.

## 2018-02-21 DIAGNOSIS — I10 ESSENTIAL HYPERTENSION: ICD-10-CM

## 2018-02-22 ENCOUNTER — TELEPHONE (OUTPATIENT)
Dept: MEDICAL GROUP | Age: 83
End: 2018-02-22

## 2018-02-22 NOTE — TELEPHONE ENCOUNTER
1. Caller Name: Lucita Babcock                                           Call Back Number: [phone] (home)         Patient approves a detailed voicemail message: N\A    Patient called and wants a call back from Dr. Sloan.  Message given.

## 2018-02-23 RX ORDER — METOPROLOL SUCCINATE 25 MG/1
TABLET, EXTENDED RELEASE ORAL
Qty: 60 TAB | Refills: 0 | Status: SHIPPED | OUTPATIENT
Start: 2018-02-23 | End: 2018-03-25 | Stop reason: SDUPTHER

## 2018-02-26 DIAGNOSIS — F51.01 PRIMARY INSOMNIA: ICD-10-CM

## 2018-02-27 RX ORDER — ALPRAZOLAM 0.25 MG/1
TABLET ORAL
Qty: 30 TAB | Refills: 0 | Status: SHIPPED
Start: 2018-02-27 | End: 2018-03-27

## 2018-02-27 NOTE — TELEPHONE ENCOUNTER
Prescription faxed to:    [id number] - [place] - [address]  [address] [place] [address]  Phone: [phone] Fax: [phone]  .

## 2018-03-06 DIAGNOSIS — J06.9 VIRAL URI: ICD-10-CM

## 2018-03-07 ENCOUNTER — HOSPITAL ENCOUNTER (OUTPATIENT)
Dept: RADIOLOGY | Facility: MEDICAL CENTER | Age: 83
End: 2018-03-07
Attending: NEUROLOGICAL SURGERY
Payer: MEDICARE

## 2018-03-07 DIAGNOSIS — M48.02 SPINAL STENOSIS IN CERVICAL REGION: ICD-10-CM

## 2018-03-07 PROCEDURE — 72050 X-RAY EXAM NECK SPINE 4/5VWS: CPT

## 2018-03-07 RX ORDER — LORATADINE 10 MG
TABLET ORAL
Qty: 90 TAB | Refills: 0 | Status: ON HOLD | OUTPATIENT
Start: 2018-03-07 | End: 2018-09-07

## 2018-03-13 ENCOUNTER — PHYSICAL THERAPY (OUTPATIENT)
Dept: PHYSICAL THERAPY | Facility: REHABILITATION | Age: 83
End: 2018-03-13
Attending: OTOLARYNGOLOGY
Payer: MEDICARE

## 2018-03-13 DIAGNOSIS — R42 DIZZINESS AND GIDDINESS: ICD-10-CM

## 2018-03-13 PROCEDURE — 97162 PT EVAL MOD COMPLEX 30 MIN: CPT

## 2018-03-13 PROCEDURE — G8979 MOBILITY GOAL STATUS: HCPCS | Mod: CI

## 2018-03-13 PROCEDURE — 95992 CANALITH REPOSITIONING PROC: CPT

## 2018-03-13 PROCEDURE — G8978 MOBILITY CURRENT STATUS: HCPCS | Mod: CK

## 2018-03-13 ASSESSMENT — ENCOUNTER SYMPTOMS
PAIN SCALE AT LOWEST: 2
PAIN SCALE: 4
PAIN SCALE AT HIGHEST: 6

## 2018-03-13 NOTE — OP THERAPY EVALUATION
"  Outpatient Physical Therapy  VESTIBULAR EVALUATION    [facility] Physical Therapy [address]  [address]  [place] [address]  Phone:  [phone]  Fax:  [phone]    Date of Evaluation: 2018    Patient: Lucita Babcock  YOB: 1935  MRN: [id number]     Referring Provider: Perry Daniel M.D.  [address] [place] [address]   Referring Diagnosis: Dizziness and giddiness [R42]     Time Calculation  Start time: 1400  Stop time: 1435 Time Calculation (min): 35 minutes     Physical Therapy Occurrence Codes    Date physical therapy care plan established or reviewed:  3/13/18          Chief Complaint: Dizziness    Visit Diagnoses     ICD-10-CM   1. Dizziness and giddiness R42         History of Present Illness:     Mechanism of injury:  84 y/o reports frequent episodes light-headedness with hx significant high BP, 11/2 years ago fell striking her head resulting in cervical surgery  Symptoms:     Current symptom ratin    At best symptom ratin    At worst symptom ratin    Symptom comments:  DGI 45/100= Mod sx  Diagnostic Tests:     Diagnostic tests comments:  States ENG (-)  Patient goals:     Other patient goals:  No light headedness      Past Medical History:   Diagnosis Date   • Anesthesia     nausea   • Aneurysm (CMS-HCC)     \"arch over the heart\"   • Anxiety    • Arthritis     neck and right knee   • Breast cancer (CMS-HCC)     DCIS Rt breast   • Cancer (CMS-HCC)     breast   • CATARACT     removed left eye and right eye   • Dental disorder     upper partial   • Depression    • Hypertension    • Indigestion    • Injury of cervical spine (CMS-HCC) 2016    C-spine decompression/laminectomy   • Osteopenia    • Other specified disorder of intestines     diarreha   • Sarcoid (CMS-HCC)    • UTI (urinary tract infection) -     Past Surgical History:   Procedure Laterality Date   • CERVICAL DISK AND FUSION ANTERIOR  2016    Procedure: CERVICAL " DISK AND FUSION ANTERIOR C4-7;  Surgeon: Niles Khan M.D.;  Location: SURGERY [facility];  Service:    • CATARACT PHACO WITH IOL  8/27/2014    Performed by Avani Allen M.D. at SURGERY SAME DAY [facility]   • AMBROCIO BY LAPAROSCOPY  1/8/2013    Performed by Kenrick Howell M.D. at SURGERY [facility]   • ERCP IN OR  12/28/2012    Performed by Jay Dubois M.D. at SURGERY [facility]   • CATARACT PHACO WITH IOL  8/11/2010    Performed by AVANI ALLEN at SURGERY SAME DAY [facility]   • MASS EXCISION GENERAL  7/08    chest mass biopsy sarcoid   • BREAST BIOPSY  5/27/08    Performed by KENRICK HOWELL at SURGERY SAME DAY [facility]   • LUMPECTOMY  2008    right   • PB RADIATION THERAPY PLAN SIMPLE  2008    right   • COLONOSCOPY  2007    2 polyps   • ABDOMINAL HYSTERECTOMY TOTAL  1997   • VEIN STRIPPING      R leg     Social History   Substance Use Topics   • Smoking status: Former Smoker     Packs/day: 0.50     Years: 45.00     Quit date: 10/9/1969   • Smokeless tobacco: Never Used   • Alcohol use No      Comment: ocassionally     Family and Occupational History     Social History   • Marital status:      Spouse name: N/A   • Number of children: N/A   • Years of education: N/A       Objective:    Vital Signs:     Comments: Sao2 88% HR70, /72  Oculomotor Exam:         Details: No spontaneous nystagmus central gaze          Details: No spontaneous nystagmus eccentric gaze    No saccadic eye movements    Smooth pursuit present    Convergence:        Normal convergence 7 cm    No skew  Vestibulo-Ocular Exam:     Normal head-shaking nystagmus test  Normal head thrust test  BPPV Exam:     Abnormal Lamy-Hallpike        Latency (sec): 1        Duration (sec): 15    Comments: Mild upbeat torsional  Cervicogenic Dizziness Exam:     Normal neck torsion test        Therapeutic Treatments and Modalities:     1. Canalith Repositioning (CPT 59936), (R) x2    Time-based treatments/modalities:  30' eval. CRM            Assessment:     Functional impairments:  Positive BPPV (right)    Assessment details:  (R) posterior canalithiesis form of BPPV  Goals:     Short term goals:  1)less dizziness with movement. 2) resolve BPPV 3) will assess balance next visit    Short term goal time span:  1-2 weeks    Long term goals:  1) no significant vertigo. 2) decrease fall risk    Long term goal time span:  2-4 weeks  Plan:     Planned therapy interventions:  Canalith Repositioning (CPT 43580) and Neuromuscular Re-education (CPT 88863)     Plan details:  [id number]      Functional Limitation G-Codes and Severity Modifiers  PT Functional Assessment Tool Used: DHI   Current: Mobility: Current (): [id number]   Goal: Mobility: Goal (): [id number]       Referring provider co-signature:  I have reviewed this plan of care and my co-signature certifies the need for services.  Certification Dates:   From 3/13/18    To 4/13/18    Physician Signature: ________________________________ Date: ______________

## 2018-03-21 ENCOUNTER — PHYSICAL THERAPY (OUTPATIENT)
Dept: PHYSICAL THERAPY | Facility: REHABILITATION | Age: 83
End: 2018-03-21
Attending: OTOLARYNGOLOGY
Payer: MEDICARE

## 2018-03-21 DIAGNOSIS — R42 DIZZINESS AND GIDDINESS: ICD-10-CM

## 2018-03-21 PROCEDURE — 97140 MANUAL THERAPY 1/> REGIONS: CPT

## 2018-03-21 PROCEDURE — 95992 CANALITH REPOSITIONING PROC: CPT

## 2018-03-21 NOTE — OP THERAPY DAILY TREATMENT
"  Outpatient Physical Therapy  DAILY TREATMENT     [facility] Physical [address].  [address]  [place] [address]  Phone:  [phone]  Fax:  [phone]    Date: 03/21/2018    Patient: Lucita Babcock  YOB: 1935  MRN: [id number]     Time Calculation  Start time: 1000  Stop time: 1025 Time Calculation (min): 25 minutes     Chief Complaint: Dizziness    Visit #: 2    SUBJECTIVE:  Feels a little light headed but no spinning    OBJECTIVE:  Current objective measures: Very mild BPPV (R) latenct 2\" duration 4\".Clearedwith CRMx3 Also states less lightheaded with manual C-traction          Therapeutic Treatments and Modalities:     1. Canalith Repositioning (CPT 12309), (R) x 3    Therapeutic Treatment and Modalities Summary: Manual traction c-spine    Time-based treatments/modalities:  Manual therapy minutes (CPT 97797): 8 minutes     ASSESSMENT:   Response to treatment: BPPV cleaed with tx. Some lightheadedness cervico-genic(?)    PLAN/RECOMMENDATIONS:   Plan for treatment: therapy treatment to continue next visit.  Planned interventions for next visit: canalith repositioning (CPT 08461) and manual therapy (CPT 36718).      "

## 2018-03-22 DIAGNOSIS — F41.9 ANXIETY: ICD-10-CM

## 2018-03-22 RX ORDER — SERTRALINE HYDROCHLORIDE 100 MG/1
100 TABLET, FILM COATED ORAL DAILY
Qty: 90 TAB | Refills: 0 | OUTPATIENT
Start: 2018-03-22

## 2018-03-25 DIAGNOSIS — I10 ESSENTIAL HYPERTENSION: ICD-10-CM

## 2018-03-27 ENCOUNTER — PHYSICAL THERAPY (OUTPATIENT)
Dept: PHYSICAL THERAPY | Facility: REHABILITATION | Age: 83
End: 2018-03-27
Attending: OTOLARYNGOLOGY
Payer: MEDICARE

## 2018-03-27 DIAGNOSIS — R42 DIZZINESS AND GIDDINESS: ICD-10-CM

## 2018-03-27 PROCEDURE — 95992 CANALITH REPOSITIONING PROC: CPT

## 2018-03-27 RX ORDER — METOPROLOL SUCCINATE 25 MG/1
TABLET, EXTENDED RELEASE ORAL
Qty: 60 TAB | Refills: 0 | Status: SHIPPED | OUTPATIENT
Start: 2018-03-27 | End: 2018-04-29 | Stop reason: SDUPTHER

## 2018-03-27 NOTE — OP THERAPY DAILY TREATMENT
"  Outpatient Physical Therapy  DAILY TREATMENT     [facility] Physical [address].  [address]  [place] [address]  Phone:  [phone]  Fax:  [phone]    Date: 03/27/2018    Patient: Lucita Babcock  YOB: 1935  MRN: [id number]     Time Calculation  Start time: 1315  Stop time: 1330 Time Calculation (min): 15 minutes     Chief Complaint: Dizziness    Visit #: 3    SUBJECTIVE:  15' late.  got lost    OBJECTIVE:  Current objective measures: (R) ibrahima hallpike (-). However, (L) head position produced significant upbeat-torsional nystagmus, latenct 1\" duration 10\". Pt was dizzy after and although improved, her friend advised to assist her as she is more of a fall risk. Pt rested and felt better          Therapeutic Treatments and Modalities:     1. Canalith Repositioning (CPT 98444), (L)    Time-based treatments/modalities:              ASSESSMENT:   Response to treatment:  See above    PLAN/RECOMMENDATIONS:   Plan for treatment: therapy treatment to continue next visit.  Planned interventions for next visit: canalith repositioning (CPT 17899).      "

## 2018-03-30 ENCOUNTER — TELEPHONE (OUTPATIENT)
Dept: MEDICAL GROUP | Age: 83
End: 2018-03-30

## 2018-03-30 DIAGNOSIS — F41.9 ANXIETY: ICD-10-CM

## 2018-03-30 NOTE — TELEPHONE ENCOUNTER
"2. Rx  paperwork received from [facility] requiring provider signature.     3. All appropriate fields completed by Medical Assistant: YES    4. Paperwork Fax received stating :\"Pt started in December taking 1&1/2 Tablets (75mg) QD.  Is Pt to go back to 1 (50mg) AD or continue on 1&1/2 (75mg).\"  Fax: [phone]    "

## 2018-04-03 ENCOUNTER — APPOINTMENT (OUTPATIENT)
Dept: PHYSICAL THERAPY | Facility: REHABILITATION | Age: 83
End: 2018-04-03
Attending: OTOLARYNGOLOGY
Payer: MEDICARE

## 2018-04-03 ENCOUNTER — OFFICE VISIT (OUTPATIENT)
Dept: MEDICAL GROUP | Age: 83
End: 2018-04-03
Payer: MEDICARE

## 2018-04-03 VITALS
SYSTOLIC BLOOD PRESSURE: 112 MMHG | OXYGEN SATURATION: 96 % | HEART RATE: 72 BPM | BODY MASS INDEX: 21.07 KG/M2 | WEIGHT: 139 LBS | DIASTOLIC BLOOD PRESSURE: 66 MMHG | HEIGHT: 68 IN | TEMPERATURE: 98.8 F

## 2018-04-03 DIAGNOSIS — R14.0 BLOATING: ICD-10-CM

## 2018-04-03 DIAGNOSIS — F41.9 ANXIETY: ICD-10-CM

## 2018-04-03 DIAGNOSIS — I10 ESSENTIAL HYPERTENSION: ICD-10-CM

## 2018-04-03 PROCEDURE — 99214 OFFICE O/P EST MOD 30 MIN: CPT | Performed by: FAMILY MEDICINE

## 2018-04-03 ASSESSMENT — PATIENT HEALTH QUESTIONNAIRE - PHQ9: CLINICAL INTERPRETATION OF PHQ2 SCORE: 0

## 2018-04-03 NOTE — ASSESSMENT & PLAN NOTE
Taking 75mg po q24h (50mg tabs 1 and half tab)  States her anxiety worsens during the holidays  We increased her Zoloft to 100 mg last time she did well however she stated it made her feel dizzy. She has good support with her daughter who often visits, also has a caretaker that she sees weekly.  She denies any suicidal or homicidal ideations

## 2018-04-03 NOTE — ASSESSMENT & PLAN NOTE
NEW PROBLEM    Patient states that she has been getting bloated, recently her daughter instructed her to try gluten-free food. She states that seems to work today was the first time she got a good response. She denies any nausea no vomiting, no blood in the stool, no diarrhea.

## 2018-04-03 NOTE — ASSESSMENT & PLAN NOTE
The patient has been taking metoprolol 25 mg XL at night.  We have tried many doses also taking it many different times a day this seems to be the most appropriate. The patient has been tollerating the BP meds without any issues. No tunnel vision, no cough, no changes in vision, no lightheadedness, no fatigue, no syncopal or presyncopal episodes, no edema, no new rashes.

## 2018-04-03 NOTE — PROGRESS NOTES
This medical record contains text that has been entered with the assistance of computer voice recognition and dictation software.  Therefore, it may contain unintended errors in text, spelling, punctuation, or grammar    Chief Complaint   Patient presents with   • GI Problem       Lucita Babcock is a 83 y.o. female here evaluation and management of: anxiety, bloating, htn      HPI:     Anxiety  Taking 75mg po q24h (50mg tabs 1 and half tab)  States her anxiety worsens during the holidays  We increased her Zoloft to 100 mg last time she did well however she stated it made her feel dizzy. She has good support with her daughter who often visits, also has a caretaker that she sees weekly.  She denies any suicidal or homicidal ideations        Bloating  NEW PROBLEM    Patient states that she has been getting bloated, recently her daughter instructed her to try gluten-free food. She states that seems to work today was the first time she got a good response. She denies any nausea no vomiting, no blood in the stool, no diarrhea.    Essential hypertension  The patient has been taking metoprolol 25 mg XL at night.  We have tried many doses also taking it many different times a day this seems to be the most appropriate. The patient has been tollerating the BP meds without any issues. No tunnel vision, no cough, no changes in vision, no lightheadedness, no fatigue, no syncopal or presyncopal episodes, no edema, no new rashes.     Current medicines (including changes today)  Current Outpatient Prescriptions   Medication Sig Dispense Refill   • metoprolol SR (TOPROL XL) 25 MG TABLET SR 24 HR TAKE 1 TAB ORALLY EVERY 24 HOURS IF BP NOT AT GOAL TAKE 1 ADDITIONAL TABLET MAX 2 TABS IN 24 HOURS 60 Tab 0   • sertraline (ZOLOFT) 50 MG Tab TAKE 1 TABLET ORALLY DAILY 90 Tab 0   • [facility] VITAMIN C 500 MG tablet TAKE 1 TABLET BY MOUTH 3 TIMES A DAY. 90 Tab 0   • MAGNESIUM-OXIDE 400 (241.3 Mg) MG Tab tablet TAKE 1 TABLET BY MOUTH 2 TIMES A DAY  60 Tab 0   • bethanechol (URECHOLINE) 25 MG Tab Take 1 Tab by mouth every 24 hours as needed. 90 Tab 0   • Calcium Carbonate-Vitamin D (OS-THIAGO 500 + D PO) Take 1 tablet by mouth every day.     • vitamin D (CHOLECALCIFEROL) 1000 UNIT Tab Take 4,000 Units by mouth every day.     • cloNIDine (CATAPRES) 0.1 MG Tab Take one tab po bp greater than 180/95, may repeat one tab in 30 minutes po q24h 10 Tab 0   • sertraline (ZOLOFT) 100 MG Tab Take 1 Tab by mouth every day. 90 Tab 0   • psyllium (METAMUCIL SMOOTH TEXTURE) 28 % packet Take 1 Packet by mouth 2 times a day. 30 Each 3   • omeprazole (PRILOSEC) 20 MG delayed-release capsule TAKE 1 CAP BY MOUTH 2 TIMES A DAY. 180 Cap 0   • ibuprofen (MOTRIN) 200 MG Tab Take 200 mg by mouth 1 time daily as needed. For pain     • polymixin-trimethoprim (POLYTRIM) 42060-6.1 UNIT/ML-% Solution Place 1 Drop in both eyes every 4 hours. (Patient not taking: Reported on 4/3/2018) 10 mL 0   • acetaminophen (TYLENOL) 160 MG/5ML Suspension Take 15 mg/kg by mouth every 6 hours as needed.     • timolol (TIMOPTIC) 0.5 % Solution Place 1 Drop in both eyes every day. (Patient not taking: Reported on 4/3/2018) 1 Bottle 3     No current facility-administered medications for this visit.      She  has a past medical history of Anesthesia; Aneurysm (CMS-HCC); Anxiety; Arthritis; Breast cancer (CMS-HCC) (2008); Cancer (CMS-HCC) (2008); CATARACT; Dental disorder; Depression; Hypertension; Indigestion; Injury of cervical spine (CMS-HCC) (July 2016); Osteopenia; Other specified disorder of intestines; Sarcoid (CMS-HCC); and UTI (urinary tract infection) (). She also has no past medical history of Diabetes (CMS-HCC); Pacemaker; Seizure (CMS-HCC); or Stroke (CMS-HCC).  She  has a past surgical history that includes vein stripping; mass excision general (7/08); breast biopsy (5/27/08); abdominal hysterectomy total (1997); cataract phaco with iol (8/11/2010); colonoscopy (2007); ercp in or (12/28/2012);  "samuel by laparoscopy (2013); lumpectomy (); pr radiation therapy plan simple (); cataract phaco with iol (2014); and cervical disk and fusion anterior (2016).  Social History   Substance Use Topics   • Smoking status: Former Smoker     Packs/day: 0.50     Years: 45.00     Quit date: 10/9/1969   • Smokeless tobacco: Never Used   • Alcohol use No      Comment: ocassionally     Social History     Social History Narrative   • No narrative on file     Family History   Problem Relation Age of Onset   • Hypertension Mother    • Stroke Mother    • Other Father      Dementia   • Other Sister      BIpolar   • Other Sister      Bipolar   • Other Sister      THyroid disease   • Cancer Neg Hx    • Diabetes Neg Hx    • Heart Disease Neg Hx      Family Status   Relation Status   • Mother    • Son     suicide   • Father    • Sister    • Sister Alive   • Sister Alive   • Daughter Alive   • Daughter Alive   • Neg Hx          ROS    Please see hpi     All other systems reviewed and are negative     Objective:     Blood pressure 112/66, pulse 72, temperature 37.1 °C (98.8 °F), height 1.715 m (5' 7.52\"), weight 63 kg (139 lb), SpO2 96 %, not currently breastfeeding. Body mass index is 21.44 kg/m².  Physical Exam:    Constitutional: Alert, no distress.  Skin: Warm, dry, good turgor, no rashes in visible areas.  Eye: Equal, round and reactive, conjunctiva clear, lids normal.  ENMT: Lips without lesions, good dentition, oropharynx clear.  Neck: Trachea midline, no masses, no thyromegaly. No cervical or supraclavicular lymphadenopathy.  Respiratory: Unlabored respiratory effort, lungs clear to auscultation, no wheezes, no ronchi.  Cardiovascular: Normal S1, S2, no murmur, no edema.  Abdomen: Soft, non-tender, no masses, no hepatosplenomegaly.  Psych: Alert and oriented x3, normal affect and mood.          Assessment and Plan:   The following treatment plan was discussed      1. " Bloating  Continue to avoid gluten since it appears to work  Increase walking daily  Adequate fluid intake    2. Essential hypertension  Patient has been stable with current management  We will make no changes for now      3. Anxiety  Patient has been stable with current management  We will make no changes for now          HEALTH MAINTENANCE:    Instructed to Follow up in clinic or ER for worsening symptoms, difficulty breathing, lack of expected recovery, or should new symptoms or problems arise.    Followup: Return in about 3 months (around 7/3/2018) for Reevaluation.       Once again this medical record contains text that has been entered with the assistance of computer voice recognition and dictation software.  Therefore, it may contain unintended errors in text, spelling, punctuation, or grammar

## 2018-04-06 ENCOUNTER — APPOINTMENT (OUTPATIENT)
Dept: MEDICAL GROUP | Age: 83
End: 2018-04-06
Payer: MEDICARE

## 2018-04-09 ENCOUNTER — APPOINTMENT (OUTPATIENT)
Dept: ENDOCRINOLOGY | Facility: MEDICAL CENTER | Age: 83
End: 2018-04-09
Payer: MEDICARE

## 2018-04-10 ENCOUNTER — PHYSICAL THERAPY (OUTPATIENT)
Dept: PHYSICAL THERAPY | Facility: REHABILITATION | Age: 83
End: 2018-04-10
Attending: OTOLARYNGOLOGY
Payer: MEDICARE

## 2018-04-10 DIAGNOSIS — R42 DIZZINESS AND GIDDINESS: ICD-10-CM

## 2018-04-10 PROCEDURE — 97110 THERAPEUTIC EXERCISES: CPT

## 2018-04-10 PROCEDURE — 97112 NEUROMUSCULAR REEDUCATION: CPT

## 2018-04-10 NOTE — OP THERAPY DAILY TREATMENT
Outpatient Physical Therapy  DAILY TREATMENT     [facility] Physical [address]  [address]  [place] [address]  Phone:  [phone]  Fax:  [phone]    Date: 04/10/2018    Patient: Lucita Babcock  YOB: 1935  MRN: [id number]     Time Calculation  Start time: 1300  Stop time: 1330 Time Calculation (min): 30 minutes     Chief Complaint: Dizziness    Visit #: 4    SUBJECTIVE:  States lightheaded today. Also anxious. Did not want to do Epley today, just work ojn balance    OBJECTIVE:  Current objective measures: Able to stand on foam with close (S)          Therapeutic Exercises (CPT 55682):     1. Rocker board E/o e/c, A/P lateral    2. Ball circles    3. Cone stepping    4. Nu step , level 3   8', 5'      Time-based treatments/modalities:  Therapeutic exercise minutes (CPT 54875): 15 minutes  Neuromusc re-ed, balance, coor, post minutes (CPT 26462): 15 minutes           ASSESSMENT:   Response to treatment: States actually felt a little less dizzy after tx    PLAN/RECOMMENDATIONS:   Plan for treatment: therapy treatment to continue next visit.  Planned interventions for next visit: canalith repositioning (CPT 48362) and neuromuscular re-education (CPT 51983).

## 2018-04-16 ENCOUNTER — OFFICE VISIT (OUTPATIENT)
Dept: ENDOCRINOLOGY | Facility: MEDICAL CENTER | Age: 83
End: 2018-04-16
Payer: MEDICARE

## 2018-04-16 VITALS
OXYGEN SATURATION: 94 % | BODY MASS INDEX: 21.07 KG/M2 | WEIGHT: 139 LBS | DIASTOLIC BLOOD PRESSURE: 78 MMHG | HEIGHT: 68 IN | SYSTOLIC BLOOD PRESSURE: 140 MMHG | HEART RATE: 78 BPM

## 2018-04-16 DIAGNOSIS — M85.80 OSTEOPENIA, UNSPECIFIED LOCATION: ICD-10-CM

## 2018-04-16 DIAGNOSIS — E21.3 HYPERPARATHYROIDISM (HCC): Primary | ICD-10-CM

## 2018-04-16 DIAGNOSIS — E83.52 HYPERCALCEMIA: ICD-10-CM

## 2018-04-16 PROCEDURE — 99214 OFFICE O/P EST MOD 30 MIN: CPT | Performed by: INTERNAL MEDICINE

## 2018-04-17 NOTE — PROGRESS NOTES
Chief Complaint   Patient presents with   • Osteopenia   • Hyperparathyroidism     hypercalcemia        HPI:    See assessment and recommendations below    ROS:   Balance remains a significant problem since her head and neck injury.    All other systems reported as negative or unchanged since last exam      Allergies:   Allergies   Allergen Reactions   • Ace Inhibitors Anaphylaxis   • Augmentin      Flu like sx   • Erythromycin Rash   • Lisinopril Anaphylaxis   • Penicillins Hives   • Epinephrine Unspecified     shaking   • Percocet [Oxycodone-Acetaminophen] Vomiting   • Sulfa Drugs Hives       Current medicines including changes today:  Current Outpatient Prescriptions   Medication Sig Dispense Refill   • metoprolol SR (TOPROL XL) 25 MG TABLET SR 24 HR TAKE 1 TAB ORALLY EVERY 24 HOURS IF BP NOT AT GOAL TAKE 1 ADDITIONAL TABLET MAX 2 TABS IN 24 HOURS 60 Tab 0   • sertraline (ZOLOFT) 50 MG Tab TAKE 1 TABLET ORALLY DAILY 90 Tab 0   • [facility]S VITAMIN C 500 MG tablet TAKE 1 TABLET BY MOUTH 3 TIMES A DAY. 90 Tab 0   • MAGNESIUM-OXIDE 400 (241.3 Mg) MG Tab tablet TAKE 1 TABLET BY MOUTH 2 TIMES A DAY 60 Tab 0   • bethanechol (URECHOLINE) 25 MG Tab Take 1 Tab by mouth every 24 hours as needed. 90 Tab 0   • Calcium Carbonate-Vitamin D (OS-THIAGO 500 + D PO) Take 1 tablet by mouth every day.     • vitamin D (CHOLECALCIFEROL) 1000 UNIT Tab Take 4,000 Units by mouth every day.     • cloNIDine (CATAPRES) 0.1 MG Tab Take one tab po bp greater than 180/95, may repeat one tab in 30 minutes po q24h 10 Tab 0   • sertraline (ZOLOFT) 100 MG Tab Take 1 Tab by mouth every day. 90 Tab 0   • psyllium (METAMUCIL SMOOTH TEXTURE) 28 % packet Take 1 Packet by mouth 2 times a day. 30 Each 3   • omeprazole (PRILOSEC) 20 MG delayed-release capsule TAKE 1 CAP BY MOUTH 2 TIMES A DAY. 180 Cap 0   • ibuprofen (MOTRIN) 200 MG Tab Take 200 mg by mouth 1 time daily as needed. For pain     • polymixin-trimethoprim (POLYTRIM) 12140-3.1 UNIT/ML-% Solution Place 1  "Drop in both eyes every 4 hours. (Patient not taking: Reported on 4/3/2018) 10 mL 0   • acetaminophen (TYLENOL) 160 MG/5ML Suspension Take 15 mg/kg by mouth every 6 hours as needed.     • timolol (TIMOPTIC) 0.5 % Solution Place 1 Drop in both eyes every day. (Patient not taking: Reported on 4/3/2018) 1 Bottle 3     No current facility-administered medications for this visit.         Past Medical History:   Diagnosis Date   • Anesthesia     nausea   • Aneurysm (CMS-HCC)     \"arch over the heart\"   • Anxiety    • Arthritis     neck and right knee   • Breast cancer (CMS-HCC) 2008    DCIS Rt breast   • Cancer (CMS-HCC) 2008    breast   • CATARACT     removed left eye and right eye   • Dental disorder     upper partial   • Depression    • Hypertension    • Indigestion    • Injury of cervical spine (CMS-HCC) July 2016    C-spine decompression/laminectomy   • Osteopenia    • Other specified disorder of intestines     diarreha   • Sarcoid (CMS-HCC)    • UTI (urinary tract infection)        PHYSICAL EXAM:    /78   Pulse 78   Ht 1.715 m (5' 7.5\")   Wt 63 kg (139 lb)   SpO2 94%   BMI 21.45 kg/m²      Gen.   appears healthy for age    Skin   appropriate for sex and age    HEENT  unremarkable    Neck   no palpable thyroid or other nodules in the neck or supraclavicular area    Heart  regular    Extremities  no edema    Neuro  gait and station normal    Psych  appropriate      ASSESSMENT AND RECOMMENDATIONS    1. Osteopenia,             Bone density done in January indicates that she is still in the osteopenic category but with a 4.8% decline in the lumbar spine and an 8.4 decline in the right femur over 2 years. Her FRAX calculation is significant with a 10 year probability for hip fracture of 9.7%.             I suspect this is aggravated by diagnosis # 3             If she does have hyperparathyroidism and we can't successfully get her through surgery we should consider medical management.  - VITAMIN D,25 " HYDROXY; Future    2. Hypercalcemia           Significant hypercalcemia as of last blood test with an adjusted calcium of 11.1  - COMP METABOLIC PANEL; Future  - PTH INTACT (PTH ONLY); Future    3. Hyperparathyroidism (CMS-HCC)             Parathyroid hormone levels have been elevated over the past year at 72.5, 83, and 92. Although not very elevated it is significantly elevated for having hypercalcemia. We will update that lab now          I will look again with a parathyroid nuclear medicine scan for solitary adenoma that might be removed with minimally invasive surgery.  - PTH INTACT (PTH ONLY); Future  - NM-PARATHYROID (SESTAMIBI) SCAN; Future      DISPOSITION: Return in one month       Neo Andres M.D.    Copies to: Robbie Trivedi M.D. [phone]

## 2018-04-24 ENCOUNTER — PHYSICAL THERAPY (OUTPATIENT)
Dept: PHYSICAL THERAPY | Facility: REHABILITATION | Age: 83
End: 2018-04-24
Attending: OTOLARYNGOLOGY
Payer: MEDICARE

## 2018-04-24 DIAGNOSIS — R42 DIZZINESS AND GIDDINESS: ICD-10-CM

## 2018-04-24 PROCEDURE — 95992 CANALITH REPOSITIONING PROC: CPT

## 2018-04-24 NOTE — OP THERAPY DAILY TREATMENT
Outpatient Physical Therapy  DAILY TREATMENT     [facility] Physical Therapy [address]  [address]  [place] [address]  Phone:  [phone]  Fax:  [phone]    Date: 04/24/2018    Patient: Lucita Babcock  YOB: 1935  MRN: [id number]     Time Calculation  Start time: 1400  Stop time: 1420 Time Calculation (min): 20 minutes     Chief Complaint: Dizziness    Visit #: 5    SUBJECTIVE:  States has been having episodes spining    OBJECTIVE:  Current objective measures: + (L) canalithiesis.           Therapeutic Treatments and Modalities:     1. Canalith Repositioning (CPT 63407), (L)    Therapeutic Treatment and Modalities Summary: Mild during manauver. However, very dizzy when sat up. Only wanted to do 1x today    Time-based treatments/modalities:              ASSESSMENT:   Response to treatment: Nothing on (R) but + (L)    PLAN/RECOMMENDATIONS:   Plan for treatment: therapy treatment to continue next visit.  Planned interventions for next visit: canalith repositioning (CPT 13564).

## 2018-04-29 DIAGNOSIS — I10 ESSENTIAL HYPERTENSION: ICD-10-CM

## 2018-05-01 ENCOUNTER — TELEPHONE (OUTPATIENT)
Dept: MEDICAL GROUP | Age: 83
End: 2018-05-01

## 2018-05-01 ENCOUNTER — PHYSICAL THERAPY (OUTPATIENT)
Dept: PHYSICAL THERAPY | Facility: REHABILITATION | Age: 83
End: 2018-05-01
Attending: OTOLARYNGOLOGY
Payer: MEDICARE

## 2018-05-01 DIAGNOSIS — R42 DIZZINESS AND GIDDINESS: ICD-10-CM

## 2018-05-01 PROCEDURE — 95992 CANALITH REPOSITIONING PROC: CPT

## 2018-05-01 RX ORDER — METOPROLOL SUCCINATE 25 MG/1
TABLET, EXTENDED RELEASE ORAL
Qty: 60 TAB | Refills: 0 | Status: SHIPPED | OUTPATIENT
Start: 2018-05-01 | End: 2018-05-29 | Stop reason: SDUPTHER

## 2018-05-01 NOTE — OP THERAPY DAILY TREATMENT
Outpatient Physical Therapy  DAILY TREATMENT     [facility] Physical Therapy [address]  [address]  [place] [address]  Phone:  [phone]  Fax:  [phone]    Date: 05/01/2018    Patient: Lucita Babcock  YOB: 1935  MRN: [id number]     Time Calculation  Start time: 1430  Stop time: 1500 Time Calculation (min): 30 minutes     Chief Complaint: Dizziness    Visit #: 6    SUBJECTIVE:   States dizzy today    OBJECTIVE:  Current objective measures: (R) ibrahima hallpike (-)but had brief vertigo position 3. Cleared with CRM          Therapeutic Treatments and Modalities:     1. Canalith Repositioning (CPT 99194), both R/L, (R) cleared with CRM    Time-based treatments/modalities:                ASSESSMENT:   Response to treatment: cleared with CRM x2    PLAN/RECOMMENDATIONS:   Plan for treatment: therapy treatment to continue next visit.  Planned interventions for next visit: canalith repositioning (CPT 29619).

## 2018-05-01 NOTE — TELEPHONE ENCOUNTER
Future Appointments       Provider Department Center    5/1/2018 2:30 PM Neo Ulloa, PT [facility] [address]    5/4/2018 9:40 AM Robbie Trivedi M.D. [facility] [address]    5/8/2018 12:00 PM [facility] RIVERA S. Rivera    5/8/2018 2:00 PM Neo Ulloa, PT [facility] [address] [facility] [address]    5/8/2018 3:00 PM [facility] [place] S. Rivera    5/15/2018 2:00 PM Neo Ulloa, PT [facility] [address] [facility] [address]    5/16/2018 10:00 AM Neo Andres M.D. [facility] S. Rivera    5/29/2018 11:40 AM S CATIE [facility] MAMMOGRAPHY [facility] PATIENT PRE-VISIT PLANNING     Note: Patient will not be contacted if there is no indication to call.     1.  Reviewed notes from the last few office visits within the medical group: Yes    2.  If any orders were placed at last visit or intended to be done for this visit (i.e. 6 mos follow-up), do we have Results/Consult Notes?        •  Labs - unknown   Note: If patient appointment is for lab review and patient did not complete labs, check with provider if OK to reschedule patient until labs completed.       •  Imaging - scheduled       •  Referrals - No referrals were ordered at last office visit.    3. Is this appointment scheduled as a Hospital Follow-Up? No    4.  Immunizations were updated in Epic using WebIZ?: Epic matches WebIZ       •  Web Iz Recommendations: Patient is up to date on all vaccines    5.  Patient is due for the following Health Maintenance Topics:   Health Maintenance Due   Topic Date Due   • COLONOSCOPY  03/02/2018           6.  MDX printed for Provider? YES    7.  Patient was NOT informed to arrive 15 min prior to their scheduled appointment and bring in their medication bottles.

## 2018-05-04 ENCOUNTER — OFFICE VISIT (OUTPATIENT)
Dept: MEDICAL GROUP | Age: 83
End: 2018-05-04
Payer: MEDICARE

## 2018-05-04 VITALS
OXYGEN SATURATION: 92 % | DIASTOLIC BLOOD PRESSURE: 64 MMHG | WEIGHT: 138 LBS | SYSTOLIC BLOOD PRESSURE: 118 MMHG | HEIGHT: 68 IN | HEART RATE: 78 BPM | TEMPERATURE: 98.6 F | BODY MASS INDEX: 20.92 KG/M2

## 2018-05-04 DIAGNOSIS — N64.59 INVERSION OF NIPPLE: ICD-10-CM

## 2018-05-04 DIAGNOSIS — Z85.3 HX: BREAST CANCER: ICD-10-CM

## 2018-05-04 PROCEDURE — 99213 OFFICE O/P EST LOW 20 MIN: CPT | Performed by: FAMILY MEDICINE

## 2018-05-04 NOTE — PROGRESS NOTES
This medical record contains text that has been entered with the assistance of computer voice recognition and dictation software.  Therefore, it may contain unintended errors in text, spelling, punctuation, or grammar    Chief Complaint   Patient presents with   • Breast Problem     RT nipple inverted       Lucita Babcock is a 83 y.o. female here evaluation and management of: nipple inversion concern      HPI:     Inversion of nipple  The patient is a 83-year-old female who presents to clinic with a chief complaint of inversion of right nipple approximately 3 weeks ago. She does have a history of breast cancer in the breast, she denies any nipple discharge. Denies any breast pain no new lumps.    S/p right Mastectomy followed by radiation therapy in 2008. In remission.    Current medicines (including changes today)  Current Outpatient Prescriptions   Medication Sig Dispense Refill   • sertraline (ZOLOFT) 50 MG Tab TAKE 1 TABLET ORALLY DAILY 90 Tab 0   • [facility] VITAMIN C 500 MG tablet TAKE 1 TABLET BY MOUTH 3 TIMES A DAY. 90 Tab 0   • cloNIDine (CATAPRES) 0.1 MG Tab Take one tab po bp greater than 180/95, may repeat one tab in 30 minutes po q24h 10 Tab 0   • MAGNESIUM-OXIDE 400 (241.3 Mg) MG Tab tablet TAKE 1 TABLET BY MOUTH 2 TIMES A DAY 60 Tab 0   • bethanechol (URECHOLINE) 25 MG Tab Take 1 Tab by mouth every 24 hours as needed. 90 Tab 0   • psyllium (METAMUCIL SMOOTH TEXTURE) 28 % packet Take 1 Packet by mouth 2 times a day. 30 Each 3   • Calcium Carbonate-Vitamin D (OS-THIAGO 500 + D PO) Take 1 tablet by mouth every day.     • vitamin D (CHOLECALCIFEROL) 1000 UNIT Tab Take 4,000 Units by mouth every day.     • metoprolol SR (TOPROL XL) 25 MG TABLET SR 24 HR TAKE 1 TAB ORALLY EVERY 24 HOURS IF BP NOT AT GOAL TAKE 1 ADDITIONAL TABLET MAX 2 TABS IN 24 HOURS 60 Tab 0   • MAGNESIUM-OXIDE 400 (241.3 Mg) MG Tab tablet TAKE 1 TABLET BY MOUTH 2 TIMES A DAY 60 Tab 0   • sertraline (ZOLOFT) 100 MG Tab Take 1 Tab by mouth every  day. 90 Tab 0   • omeprazole (PRILOSEC) 20 MG delayed-release capsule TAKE 1 CAP BY MOUTH 2 TIMES A DAY. (Patient not taking: Reported on 5/4/2018) 180 Cap 0   • ibuprofen (MOTRIN) 200 MG Tab Take 200 mg by mouth 1 time daily as needed. For pain     • polymixin-trimethoprim (POLYTRIM) 20352-1.1 UNIT/ML-% Solution Place 1 Drop in both eyes every 4 hours. (Patient not taking: Reported on 4/3/2018) 10 mL 0   • acetaminophen (TYLENOL) 160 MG/5ML Suspension Take 15 mg/kg by mouth every 6 hours as needed.     • timolol (TIMOPTIC) 0.5 % Solution Place 1 Drop in both eyes every day. (Patient not taking: Reported on 4/3/2018) 1 Bottle 3     No current facility-administered medications for this visit.      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 She also has no past medical history of Diabetes ([facility]); Pacemaker; Seizure ([facility]); or Stroke ([facility]).  She  has a past surgical history that includes vein stripping; mass excision general (7/08); breast biopsy (5/27/08); abdominal hysterectomy total (1997); cataract phaco with iol (8/11/2010); colonoscopy (2007); ercp in or (12/28/2012); samuel by laparoscopy (1/8/2013); lumpectomy (2008); pr radiation therapy plan simple (2008); cataract phaco with iol (8/27/2014); and cervical disk and fusion anterior (7/27/2016).  Social History   Substance Use Topics   • Smoking status: Former Smoker     Packs/day: 0.50     Years: 45.00     Quit date: 10/9/1969   • Smokeless tobacco: Never Used   • Alcohol use No      Comment: ocassionally     Social History     Social History Narrative   • No narrative on file     Family History   Problem Relation Age of Onset   • Hypertension Mother    •  "Stroke Mother    • Other Father      Dementia   • Other Sister      BIpolar   • Other Sister      Bipolar   • Other Sister      THyroid disease   • Cancer Neg Hx    • Diabetes Neg Hx    • Heart Disease Neg Hx      Family Status   Relation Status   • Mother    • Son     suicide   • Father    • Sister    • Sister Alive   • Sister Alive   • Daughter Alive   • Daughter Alive   • Neg Hx          ROS    Please see hpi     All other systems reviewed and are negative     Objective:     Blood pressure 118/64, pulse 78, temperature 37 °C (98.6 °F), height 1.715 m (5' 7.5\"), weight 62.6 kg (138 lb), SpO2 92 %, not currently breastfeeding. Body mass index is 21.29 kg/m².  Physical Exam:    Constitutional: Alert, no distress.  Skin: Warm, dry, good turgor, no rashes in visible areas.  Eye: Equal, round and reactive, conjunctiva clear, lids normal.  ENMT: Lips without lesions, good dentition, oropharynx clear.  Neck: Trachea midline, no masses, no thyromegaly. No cervical or supraclavicular lymphadenopathy.  Respiratory: Unlabored respiratory effort, lungs clear to auscultation, no wheezes, no ronchi.  Cardiovascular: Normal S1, S2, no murmur, no edema.  Abdomen: Soft, non-tender, no masses, no hepatosplenomegaly.  Psych: Alert and oriented x3, normal affect and mood.    Female MA at beside during pap     Breast:    NO asymmetry,  No skin changes, no discharge, no dimpling, no masses palpated, no axillary lymphadenopathy, no supraclavicular lymphadenopathy, no dimpling or retraction, edema, ulceration, erythema, or eczematous appearance, such as scaly, thickened, raw skin noted          Assessment and Plan:   The following treatment plan was discussed      1. Inversion of nipple    We will proceed with mammogram    Mammogram ordered (urgent)    HEALTH MAINTENANCE:    Instructed to Follow up in clinic or ER for worsening symptoms, difficulty breathing, lack of expected recovery, or should new " symptoms or problems arise.    Followup: Return in about 2 weeks (around 5/18/2018) for Reevaluation.       Once again this medical record contains text that has been entered with the assistance of computer voice recognition and dictation software.  Therefore, it may contain unintended errors in text, spelling, punctuation, or grammar

## 2018-05-04 NOTE — ASSESSMENT & PLAN NOTE
The patient is a 83-year-old female who presents to clinic with a chief complaint of inversion of right nipple approximately 3 weeks ago. She does have a history of breast cancer in the breast, she denies any nipple discharge. Denies any breast pain no new lumps.    S/p right Mastectomy followed by radiation therapy in 2008. In remission.

## 2018-05-08 ENCOUNTER — APPOINTMENT (OUTPATIENT)
Dept: RADIOLOGY | Facility: MEDICAL CENTER | Age: 83
End: 2018-05-08
Attending: INTERNAL MEDICINE
Payer: MEDICARE

## 2018-05-08 ENCOUNTER — APPOINTMENT (OUTPATIENT)
Dept: PHYSICAL THERAPY | Facility: REHABILITATION | Age: 83
End: 2018-05-08
Attending: OTOLARYNGOLOGY
Payer: MEDICARE

## 2018-05-15 ENCOUNTER — PHYSICAL THERAPY (OUTPATIENT)
Dept: PHYSICAL THERAPY | Facility: REHABILITATION | Age: 83
End: 2018-05-15
Attending: OTOLARYNGOLOGY
Payer: MEDICARE

## 2018-05-15 DIAGNOSIS — R42 DIZZINESS AND GIDDINESS: ICD-10-CM

## 2018-05-15 PROCEDURE — 95992 CANALITH REPOSITIONING PROC: CPT

## 2018-05-15 NOTE — OP THERAPY DAILY TREATMENT
Outpatient Physical Therapy  DAILY TREATMENT     [facility] [address]  [address]  [place] [address]  Phone:  [phone]  Fax:  [phone]    Date: 05/15/2018    Patient: Lucita Babcock  YOB: 1935  MRN: [id number]     Time Calculation  Start time: 1400  Stop time: 1425 Time Calculation (min): 25 minutes     Chief Complaint: Dizziness    Visit #: 8    SUBJECTIVE:  States she felt very dizzy today.States may have UTI    OBJECTIVE:  Current objective measures: Very mild upbeat torsional on (L) Cleared after CRMx3. Still unstable after tx. Caregiver assisted with ambulation          Therapeutic Treatments and Modalities:     1. Canalith Repositioning (CPT 24718), x3    Therapeutic Treatment and Modalities Summary: (L) + BPPV . No sx on (R)    Time-based treatments/modalities:              ASSESSMENT:   Response to treatment:(L) still present. Much better than initially but still present. Gait is still unstable    PLAN/RECOMMENDATIONS:   Plan for treatment: therapy treatment to continue next visit.  Planned interventions for next visit: canalith repositioning (CPT 49519).

## 2018-05-16 ENCOUNTER — APPOINTMENT (OUTPATIENT)
Dept: ENDOCRINOLOGY | Facility: MEDICAL CENTER | Age: 83
End: 2018-05-16
Payer: MEDICARE

## 2018-05-16 DIAGNOSIS — J06.9 VIRAL URI: ICD-10-CM

## 2018-05-17 ENCOUNTER — HOSPITAL ENCOUNTER (OUTPATIENT)
Dept: LAB | Facility: MEDICAL CENTER | Age: 83
End: 2018-05-17
Attending: PHYSICIAN ASSISTANT
Payer: MEDICARE

## 2018-05-17 PROCEDURE — 87086 URINE CULTURE/COLONY COUNT: CPT

## 2018-05-17 RX ORDER — LORATADINE 10 MG
TABLET ORAL
Qty: 90 TAB | Refills: 0 | Status: SHIPPED | OUTPATIENT
Start: 2018-05-17 | End: 2018-06-22

## 2018-05-17 NOTE — PROGRESS NOTES
Outcome: Requested A Call Back     Please transfer to Patient Outreach Team at [phone] when patient returns call.      [facility] Verified: yes    Attempt # 1

## 2018-05-18 LAB
AMBIGUOUS DTTM AMBI4: NORMAL
SIGNIFICANT IND 70042: NORMAL
SITE SITE: NORMAL
SOURCE SOURCE: NORMAL

## 2018-05-20 LAB
BACTERIA UR CULT: ABNORMAL
BACTERIA UR CULT: ABNORMAL
SIGNIFICANT IND 70042: ABNORMAL
SITE SITE: ABNORMAL
SOURCE SOURCE: ABNORMAL

## 2018-05-22 ENCOUNTER — PATIENT MESSAGE (OUTPATIENT)
Dept: MEDICAL GROUP | Age: 83
End: 2018-05-22

## 2018-05-22 ENCOUNTER — OFFICE VISIT (OUTPATIENT)
Dept: MEDICAL GROUP | Age: 83
End: 2018-05-22
Payer: MEDICARE

## 2018-05-22 ENCOUNTER — APPOINTMENT (OUTPATIENT)
Dept: PHYSICAL THERAPY | Facility: REHABILITATION | Age: 83
End: 2018-05-22
Attending: OTOLARYNGOLOGY
Payer: MEDICARE

## 2018-05-22 ENCOUNTER — HOSPITAL ENCOUNTER (OUTPATIENT)
Dept: RADIOLOGY | Facility: MEDICAL CENTER | Age: 83
End: 2018-05-22
Attending: FAMILY MEDICINE
Payer: MEDICARE

## 2018-05-22 VITALS
BODY MASS INDEX: 20.61 KG/M2 | DIASTOLIC BLOOD PRESSURE: 68 MMHG | HEART RATE: 73 BPM | SYSTOLIC BLOOD PRESSURE: 138 MMHG | WEIGHT: 136 LBS | OXYGEN SATURATION: 93 % | HEIGHT: 68 IN | TEMPERATURE: 100.4 F

## 2018-05-22 DIAGNOSIS — R14.0 BLOATING: ICD-10-CM

## 2018-05-22 DIAGNOSIS — R10.13 DYSPEPSIA: ICD-10-CM

## 2018-05-22 DIAGNOSIS — K59.01 SLOW TRANSIT CONSTIPATION: ICD-10-CM

## 2018-05-22 PROCEDURE — 74018 RADEX ABDOMEN 1 VIEW: CPT

## 2018-05-22 PROCEDURE — 99215 OFFICE O/P EST HI 40 MIN: CPT | Performed by: FAMILY MEDICINE

## 2018-05-22 RX ORDER — DOCUSATE SODIUM 100 MG/1
100 CAPSULE, LIQUID FILLED ORAL 2 TIMES DAILY
Qty: 60 CAP | Refills: 1 | Status: SHIPPED | OUTPATIENT
Start: 2018-05-22 | End: 2018-08-23

## 2018-05-22 RX ORDER — ONDANSETRON 4 MG/1
4 TABLET, FILM COATED ORAL EVERY 8 HOURS PRN
Qty: 20 TAB | Refills: 0 | Status: SHIPPED | OUTPATIENT
Start: 2018-05-22 | End: 2019-03-25

## 2018-05-22 RX ORDER — SIMETHICONE 80 MG
80 TABLET,CHEWABLE ORAL EVERY 6 HOURS PRN
Qty: 30 TAB | Refills: 3 | Status: SHIPPED | OUTPATIENT
Start: 2018-05-22 | End: 2018-06-22

## 2018-05-22 NOTE — PROGRESS NOTES
This medical record contains text that has been entered with the assistance of computer voice recognition and dictation software.  Therefore, it may contain unintended errors in text, spelling, punctuation, or grammar    Chief Complaint   Patient presents with   • Pain   • Bloated   • Nausea         Lucita Babcock is a 83 y.o. female here evaluation and management of: belly upset      HPI:     Dyspepsia  NEW PROBLEM    The patient is an 83-year-old female who presents to clinic with chief complaint of abdominal discomfort associated with gas buildup as well as distention.  She states that she is currently being treated for urinary tract infection and is wondering if this is related to that or not.  She states that she was tried on ciprofloxacin but this was not tolerated so her UTI was treated with Septra.  Denies any chest pain, she does have some mild nausea no vomiting, no headaches no changes in vision no numbness or tingling anywhere.    Current medicines (including changes today)  Current Outpatient Prescriptions   Medication Sig Dispense Refill   • simethicone (MYLICON) 80 MG Chew Tab Take 1 Tab by mouth every 6 hours as needed for Flatulence. 30 Tab 3   • ondansetron (ZOFRAN) 4 MG Tab tablet Take 1 Tab by mouth every 8 hours as needed for Nausea/Vomiting. 20 Tab 0   • docusate sodium (COLACE) 100 MG Cap Take 1 Cap by mouth 2 times a day. 60 Cap 1   • psyllium (METAMUCIL SMOOTH TEXTURE) 28 % packet Take 1 Packet by mouth 2 times a day. 30 Each 3   • sertraline (ZOLOFT) 50 MG Tab TAKE 1 TABLET ORALLY DAILY 90 Tab 0   • [facility]S VITAMIN C 500 MG tablet TAKE 1 TABLET BY MOUTH 3 TIMES A DAY. 90 Tab 0   • cloNIDine (CATAPRES) 0.1 MG Tab Take one tab po bp greater than 180/95, may repeat one tab in 30 minutes po q24h 10 Tab 0   • MAGNESIUM-OXIDE 400 (241.3 Mg) MG Tab tablet TAKE 1 TABLET BY MOUTH 2 TIMES A DAY 60 Tab 0   • bethanechol (URECHOLINE) 25 MG Tab Take 1 Tab by mouth every 24 hours as needed. 90 Tab 0   •  psyllium (METAMUCIL SMOOTH TEXTURE) 28 % packet Take 1 Packet by mouth 2 times a day. 30 Each 3   • omeprazole (PRILOSEC) 20 MG delayed-release capsule TAKE 1 CAP BY MOUTH 2 TIMES A DAY. 180 Cap 0   • Calcium Carbonate-Vitamin D (OS-THIAGO 500 + D PO) Take 1 tablet by mouth every day.     • vitamin D (CHOLECALCIFEROL) 1000 UNIT Tab Take 4,000 Units by mouth every day.     • CVS VITAMIN C 500 MG tablet TAKE 1 TABLET BY MOUTH 3 TIMES A DAY. 90 Tab 0   • metoprolol SR (TOPROL XL) 25 MG TABLET SR 24 HR TAKE 1 TAB ORALLY EVERY 24 HOURS IF BP NOT AT GOAL TAKE 1 ADDITIONAL TABLET MAX 2 TABS IN 24 HOURS 60 Tab 0   • MAGNESIUM-OXIDE 400 (241.3 Mg) MG Tab tablet TAKE 1 TABLET BY MOUTH 2 TIMES A DAY 60 Tab 0   • sertraline (ZOLOFT) 100 MG Tab Take 1 Tab by mouth every day. 90 Tab 0   • ibuprofen (MOTRIN) 200 MG Tab Take 200 mg by mouth 1 time daily as needed. For pain     • polymixin-trimethoprim (POLYTRIM) 93874-5.1 UNIT/ML-% Solution Place 1 Drop in both eyes every 4 hours. (Patient not taking: Reported on 4/3/2018) 10 mL 0   • acetaminophen (TYLENOL) 160 MG/5ML Suspension Take 15 mg/kg by mouth every 6 hours as needed.     • timolol (TIMOPTIC) 0.5 % Solution Place 1 Drop in both eyes every day. (Patient not taking: Reported on 4/3/2018) 1 Bottle 3     No current facility-administered medications for this visit.      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 She also has no past medical history of Diabetes ([facility]); Pacemaker; Seizure ([facility]); or Stroke ([facility]).  She  has a past surgical history that includes vein stripping; mass excision general (7/08); breast biopsy (5/27/08); abdominal hysterectomy total (1997); cataract  "phaco with iol (2010); colonoscopy (); ercp in or (2012); samuel by laparoscopy (2013); lumpectomy (); pr radiation therapy plan simple (); cataract phaco with iol (2014); and cervical disk and fusion anterior (2016).  Social History   Substance Use Topics   • Smoking status: Former Smoker     Packs/day: 0.50     Years: 45.00     Quit date: 10/9/1969   • Smokeless tobacco: Never Used   • Alcohol use No      Comment: ocassionally     Social History     Social History Narrative   • No narrative on file     Family History   Problem Relation Age of Onset   • Hypertension Mother    • Stroke Mother    • Other Father      Dementia   • Other Sister      BIpolar   • Other Sister      Bipolar   • Other Sister      THyroid disease   • Cancer Neg Hx    • Diabetes Neg Hx    • Heart Disease Neg Hx      Family Status   Relation Status   • Mother    • Son     suicide   • Father    • Sister    • Sister Alive   • Sister Alive   • Daughter Alive   • Daughter Alive   • Neg Hx          ROS    Please see hpi     All other systems reviewed and are negative     Objective:     Blood pressure 138/68, pulse 73, temperature 38 °C (100.4 °F), height 1.715 m (5' 7.5\"), weight 61.7 kg (136 lb), SpO2 93 %, not currently breastfeeding. Body mass index is 20.99 kg/m².  Physical Exam:    Constitutional: Alert, no distress.  Skin: Warm, dry, good turgor, no rashes in visible areas.  Eye: Equal, round and reactive, conjunctiva clear, lids normal.  ENMT: Lips without lesions, good dentition, oropharynx clear.  Neck: Trachea midline, no masses, no thyromegaly. No cervical or supraclavicular lymphadenopathy.  Respiratory: Unlabored respiratory effort, lungs clear to auscultation, no wheezes, no ronchi.  Cardiovascular: Normal S1, S2, no murmur, no edema.  Abdomen: Soft, non-tender, no masses, no hepatosplenomegaly.  Psych: Alert and oriented x3, normal affect and mood.      2018 1:49 " PM    HISTORY/REASON FOR EXAM:  Constipation and bloating and abdominal pain.      TECHNIQUE/EXAM DESCRIPTION AND NUMBER OF VIEWS:  1 views of the abdomen.    COMPARISON: None    FINDINGS:  There is no evidence of bowel obstruction.  There is no evidence of free intraperitoneal air.  No abnormal calcifications are seen. Surgical clips are noted in the right upper quadrant. Volume of stool in the colon appears mildly prominent.   Impression       No evidence of bowel obstruction.  Possible constipation.         Reading Provider Reading Date   Kain Albarado M.D. May 22, 2018             Assessment and Plan:   The following treatment plan was discussed      1. Constipation  Dx abdomen revealed stool  Encouraged patient to increase fluids in the diet  Avoid meat for the next 2 weeks  Increase fiber in your diet in addition to adding Metamucil and docusate      - [id number] VIEW; Future  - simethicone (MYLICON) 80 MG Chew Tab; Take 1 Tab by mouth every 6 hours as needed for Flatulence.  Dispense: 30 Tab; Refill: 3          Over 40 minutes spent with patient face to face, greater than 50% time spent with plan/cordination of care as above in my A/P.  We discussed the causes of constipation and the treatments for constipation.  We also discussed conservative management and other ways of treating it besides medication.  We also discussed her home life and socialization, she does have a caretaker who sees her 3 times weekly for 8 hours a day.      HEALTH MAINTENANCE:    Instructed to Follow up in clinic or ER for worsening symptoms, difficulty breathing, lack of expected recovery, or should new symptoms or problems arise.    Followup: Return in about 2 weeks (around 6/5/2018) for Reevaluation.       Once again this medical record contains text that has been entered with the assistance of computer voice recognition and dictation software.  Therefore, it may contain unintended errors in text, spelling, punctuation, or  grammar

## 2018-05-22 NOTE — ASSESSMENT & PLAN NOTE
NEW PROBLEM    The patient is an 83-year-old female who presents to clinic with chief complaint of abdominal discomfort associated with gas buildup as well as distention.  She states that she is currently being treated for urinary tract infection and is wondering if this is related to that or not.  She states that she was tried on ciprofloxacin but this was not tolerated so her UTI was treated with Septra.  Denies any chest pain, she does have some mild nausea no vomiting, no headaches no changes in vision no numbness or tingling anywhere.

## 2018-05-22 NOTE — TELEPHONE ENCOUNTER
1. Caller Name: Lucita Babcock                                           Call Back Number: [phone] (home)         Patient approves a detailed voicemail message: yes    Pt also called the office.  Pt states her pain is between 8-10 in addition to nausea and bloating. Pt would like to come in around 1:00pm.

## 2018-05-24 ENCOUNTER — TELEPHONE (OUTPATIENT)
Dept: MEDICAL GROUP | Age: 83
End: 2018-05-24

## 2018-05-24 NOTE — TELEPHONE ENCOUNTER
1. Caller Name: Lucita Babcock                                           Call Back Number: [phone] (home)         Patient approves a detailed voicemail message: yes    Pt states she has not yet had a bowel movement, despite the new medication she was given.  Pt's nause and pain are getting worse.  She is wondering if there is anything else she can try to get some relief.

## 2018-05-24 NOTE — TELEPHONE ENCOUNTER
1. Caller Name: Lucita Babcock                                           Call Back Number: [phone] (home)         Patient approves a detailed voicemail message: N\A    Pt was notified to use OTC Mag-Ox.  If the discomfort persists and there is no bowel movement, Pt can come back and see PCP.

## 2018-05-29 DIAGNOSIS — I10 ESSENTIAL HYPERTENSION: ICD-10-CM

## 2018-05-29 DIAGNOSIS — K59.00 CONSTIPATION, UNSPECIFIED CONSTIPATION TYPE: ICD-10-CM

## 2018-05-29 RX ORDER — METOPROLOL SUCCINATE 25 MG/1
TABLET, EXTENDED RELEASE ORAL
Qty: 60 TAB | Refills: 3 | Status: SHIPPED | OUTPATIENT
Start: 2018-05-29 | End: 2018-06-13 | Stop reason: SDUPTHER

## 2018-05-30 ENCOUNTER — PHYSICAL THERAPY (OUTPATIENT)
Dept: PHYSICAL THERAPY | Facility: REHABILITATION | Age: 83
End: 2018-05-30
Attending: OTOLARYNGOLOGY
Payer: MEDICARE

## 2018-05-30 DIAGNOSIS — R42 DIZZINESS AND GIDDINESS: ICD-10-CM

## 2018-05-30 PROCEDURE — 95992 CANALITH REPOSITIONING PROC: CPT

## 2018-05-30 RX ORDER — ONDANSETRON 4 MG/1
4 TABLET, FILM COATED ORAL EVERY 4 HOURS PRN
Qty: 20 TAB | Refills: 0 | Status: SHIPPED | OUTPATIENT
Start: 2018-05-30 | End: 2018-06-21 | Stop reason: SDUPTHER

## 2018-05-30 NOTE — OP THERAPY DAILY TREATMENT
Outpatient Physical Therapy  DAILY TREATMENT     [facility] Physical Therapy [address]  [address]  [place] [address]  Phone:  [phone]  Fax:  [phone]    Date: 05/30/2018    Patient: Lucita Babcock  YOB: 1935  MRN: [id number]     Time Calculation  Start time: 1030  Stop time: 1100 Time Calculation (min): 30 minutes     Chief Complaint: Dizziness    Visit #: 9    SUBJECTIVE:  States dizziness cont. As well  As anxiety    OBJECTIVE:  Current objective measures: + (L) BPPV but very mild. Cleared with CRM . Performed x3          Therapeutic Treatments and Modalities:     1. Canalith Repositioning (CPT 47028), (L)    Time-based treatments/modalities:            BPPV still present but much improved. Because BPPV was initially so severe it was interfering with general strength and balance program . At this point, I would like to concentrate on conditioning and balance program and Tx BPPV P.R.N.    ASSESSMENT:   Response to treatment: see above    PLAN/RECOMMENDATIONS:   Plan for treatment: therapy treatment to continue next visit.  Planned interventions for next visit: canalith repositioning (CPT 08895), neuromuscular re-education (CPT 54216) and therapeutic exercise (CPT 42345).

## 2018-05-30 NOTE — OP THERAPY PROGRESS SUMMARY
"  Outpatient Physical Therapy  PROGRESS SUMMARY NOTE      [facility] Physical Therapy [place] [address]  [address]  [place] [address]  Phone:  [phone]  Fax:  [phone]    Date of Visit: 05/30/2018    Patient: Lucita Babcock  YOB: 1935  MRN: [id number]     Referring Provider: Perry Daniel M.D.  [address] [place] [address]   Referring Diagnosis Dizziness and giddiness [R42]     Visit Diagnoses     ICD-10-CM   1. Dizziness and giddiness R42       Rehab Potential: fair    Physical Therapy Occurrence Codes    Date physical therapy care plan established or reviewed:  5/30/18          Cert Period: Progress Report Period: 3/21/18 to 5/30/18    Functional Limitation G-Codes and Severity Modifiers      Current status:  N/A   Goal status:  N/A         Goals:   Short Term Goals:   1) Pt will be able to tolerate nu step machine 10' level 4  2) Pt will be able to walk 300 ' with supervision only (vs.contact assist) 3) Pt will be able to up/down 6 steps with handrail (S)  Short term goal time span:  2-4 weeks      Long Term Goals:    1) improve DGI score/decrease fall risk 2) decrease fear of falling 3)Pt will be able to stand partial tandem E/C 20-30\"  Long term goal time span:  4-6 weeks    Plan:   Planned therapy interventions:  Gait Training (CPT 94414), Canalith Repositioning (CPT 37226), Neuromuscular Re-education (CPT 69241) and Therapeutic Exercise (CPT 58828)  Frequency:  2x week  Duration in weeks:  4  Plan details:  2x3-4 weeks depending on progress   Pt seen for 9 visits so far. She has been seen for severe (L) BPPV due to fall. At this point , it is still present but much improved and so I would like to concentrate efforts on improving overall strength, balance and conditioning as she was independent prior to fall.      Referring provider co-signature:  I have reviewed this plan of care and my co-signature certifies the need for services.    Physician Signature: " ________________________________ Date: ______________

## 2018-06-01 DIAGNOSIS — F51.01 PRIMARY INSOMNIA: ICD-10-CM

## 2018-06-01 RX ORDER — ALPRAZOLAM 0.25 MG/1
TABLET ORAL
Qty: 30 TAB | Refills: 0 | Status: SHIPPED
Start: 2018-06-01 | End: 2018-07-01

## 2018-06-05 ENCOUNTER — PHYSICAL THERAPY (OUTPATIENT)
Dept: PHYSICAL THERAPY | Facility: REHABILITATION | Age: 83
End: 2018-06-05
Attending: OTOLARYNGOLOGY
Payer: MEDICARE

## 2018-06-05 DIAGNOSIS — R42 DIZZINESS AND GIDDINESS: ICD-10-CM

## 2018-06-05 PROCEDURE — 97110 THERAPEUTIC EXERCISES: CPT

## 2018-06-05 NOTE — OP THERAPY DAILY TREATMENT
"  Outpatient Physical Therapy  DAILY TREATMENT     [facility] Physical [address].  [address]  [place] [address]  Phone:  [phone]  Fax:  [phone]    Date: 06/05/2018    Patient: Lucita Babcock  YOB: 1935  MRN: [id number]     Time Calculation  Start time: 1358  Stop time: 1430 Time Calculation (min): 32 minutes     Chief Complaint: Weakness and Vertigo    Visit #: 10    SUBJECTIVE:  No new complaints.  Still a little lightheaded.  Vertigo is \"minimal\"    OBJECTIVE:        Therapeutic Exercises (CPT 16081):     1. NuStep, L3 x 10 min, avg 43 spm    2. Bridges, 2x15    3. Ball rolls, x 20    4. STS , x 8, Poor ant WS, weak posterior chain causes push off with knees on chair.     5. Ankle sways, x 2 min, SBA due to poor confidence with EC.  Poor posterior vs ant WS in this position      Time-based treatments/modalities:  Therapeutic exercise minutes (CPT 03040): 32 minutes       ASSESSMENT:   Response to treatment: Transition to strength/endurance based program well tolerated.  Fatigues quickly.     PLAN/RECOMMENDATIONS:   Plan for treatment: therapy treatment to continue next visit.  Planned interventions for next visit: continue with current treatment. Consider fall recovery, step ups.       "

## 2018-06-06 ENCOUNTER — HOSPITAL ENCOUNTER (OUTPATIENT)
Dept: RADIOLOGY | Facility: MEDICAL CENTER | Age: 83
End: 2018-06-06
Attending: FAMILY MEDICINE
Payer: MEDICARE

## 2018-06-06 DIAGNOSIS — Z85.3 HX: BREAST CANCER: ICD-10-CM

## 2018-06-06 DIAGNOSIS — N64.59 INVERSION OF NIPPLE: ICD-10-CM

## 2018-06-06 PROCEDURE — G0279 TOMOSYNTHESIS, MAMMO: HCPCS

## 2018-06-07 ENCOUNTER — PHYSICAL THERAPY (OUTPATIENT)
Dept: PHYSICAL THERAPY | Facility: REHABILITATION | Age: 83
End: 2018-06-07
Attending: OTOLARYNGOLOGY
Payer: MEDICARE

## 2018-06-07 DIAGNOSIS — R42 DIZZINESS AND GIDDINESS: ICD-10-CM

## 2018-06-07 PROCEDURE — 97110 THERAPEUTIC EXERCISES: CPT

## 2018-06-07 NOTE — OP THERAPY DAILY TREATMENT
Outpatient Physical Therapy  DAILY TREATMENT     [facility] Physical Therapy [address].  [address]  [place] [address]  Phone:  [phone]  Fax:  [phone]    Date: 06/07/2018    Patient: Lucita Babcock  YOB: 1935  MRN: [id number]     Time Calculation  Start time: 1405  Stop time: 1440 Time Calculation (min): 35 minutes     Chief Complaint: Loss Of Balance    Visit #: 11    SUBJECTIVE:  Felt some muscle soreness after last session, but otherwise did ok.  Presents with her dtr today.     OBJECTIVE:  Current objective measures: motion sensitivity after rising from supine position.         Therapeutic Exercises (CPT 14230):     1. NuStep, L3 x 10 min    2. Bridges, 2x15    3. 90/90 TA alt lift, x 10 ea    4. STS , x 10    5. Ankle sways, x 2 min    6. [place], L2 x 15    7. Rows standing on airex (CGA), L2 x 15    8. Lateral stepping, x 2 laps HHA    9. Weaving laterally, x 2 laps HHA      Time-based treatments/modalities:  Therapeutic exercise minutes (CPT 42162): 35 minutes       ASSESSMENT:   Response to treatment: Improved confidence with ant WS for STS.  No assistance today and excellent form.  Difficulty holding neutral on airex- leans posterior during row task.     PLAN/RECOMMENDATIONS:   Plan for treatment: therapy treatment to continue next visit.  Planned interventions for next visit: continue with current treatment.

## 2018-06-12 ENCOUNTER — PHYSICAL THERAPY (OUTPATIENT)
Dept: PHYSICAL THERAPY | Facility: REHABILITATION | Age: 83
End: 2018-06-12
Attending: OTOLARYNGOLOGY
Payer: MEDICARE

## 2018-06-12 DIAGNOSIS — R42 DIZZINESS AND GIDDINESS: ICD-10-CM

## 2018-06-12 PROCEDURE — 97110 THERAPEUTIC EXERCISES: CPT

## 2018-06-12 NOTE — OP THERAPY DAILY TREATMENT
Outpatient Physical Therapy  DAILY TREATMENT     [facility] Physical [address].  [address]  [place] [address]  Phone:  [phone]  Fax:  [phone]    Date: 06/12/2018    Patient: Lucita Babcock  YOB: 1935  MRN: [id number]     Time Calculation  Start time: 1401  Stop time: 1430 Time Calculation (min): 29 minutes     Chief Complaint: Weakness and Loss Of Balance    Visit #: 12    SUBJECTIVE:  Didn't sleep well last night.  Tired and low motivation today. Questioning the source of her lightheadedness.     OBJECTIVE:        Therapeutic Exercises (CPT 01578):     1. NuStep, L3 x 10 min    2. Bridges, 2x15    3. Shuttle , 5C x 20    4. Airex balance, EC x 1 min.     5. Ankle sways, x 2 min    6. Pullbacks, L2 x 15    7. Rows, L2 x 15      Time-based treatments/modalities:  Therapeutic exercise minutes (CPT 79284): 29 minutes       ASSESSMENT:   Response to treatment: Limited therex tolerance today.  Pt has some concerns regarding meds causing lightheadedness.  To discuss with MD.      PLAN/RECOMMENDATIONS:   Plan for treatment: therapy treatment to continue next visit.  Planned interventions for next visit: continue with current treatment.

## 2018-06-13 ENCOUNTER — OFFICE VISIT (OUTPATIENT)
Dept: MEDICAL GROUP | Age: 83
End: 2018-06-13
Payer: MEDICARE

## 2018-06-13 VITALS
WEIGHT: 137 LBS | OXYGEN SATURATION: 91 % | HEIGHT: 68 IN | BODY MASS INDEX: 20.76 KG/M2 | TEMPERATURE: 98.8 F | DIASTOLIC BLOOD PRESSURE: 60 MMHG | HEART RATE: 72 BPM | SYSTOLIC BLOOD PRESSURE: 112 MMHG

## 2018-06-13 DIAGNOSIS — I10 ESSENTIAL HYPERTENSION: ICD-10-CM

## 2018-06-13 DIAGNOSIS — F41.9 ANXIETY: ICD-10-CM

## 2018-06-13 DIAGNOSIS — R42 DIZZINESS: ICD-10-CM

## 2018-06-13 PROCEDURE — 99214 OFFICE O/P EST MOD 30 MIN: CPT | Performed by: FAMILY MEDICINE

## 2018-06-13 RX ORDER — METOPROLOL SUCCINATE 25 MG/1
TABLET, EXTENDED RELEASE ORAL
Qty: 60 TAB | Refills: 3 | Status: SHIPPED | OUTPATIENT
Start: 2018-06-13 | End: 2018-06-22

## 2018-06-13 NOTE — PROGRESS NOTES
This medical record contains text that has been entered with the assistance of computer voice recognition and dictation software.  Therefore, it may contain unintended errors in text, spelling, punctuation, or grammar    Chief Complaint   Patient presents with   • Dizziness         Lucita Babcock is a 83 y.o. female here evaluation and management of: dizziness, anxiety, htn      HPI:     Dizziness  The patient states for the past 2 or 3 days she has been feeling lightheaded and dizzy.  She states her blood pressure has been on the lower side ranging from 115/ 5/86.  She does complain of shortness of breath, but  no chest pain no dyspnea on exertion.  She is currently on metoprolol 25 mg XL p.o. daily she has been taking the medication at night.      Anxiety  Patient states that she is taking Zoloft 75 mg p.o. q. daily.  She does feel lonely at times she lives alone but her daughter is often able to come and visit but not every week.  She denies any suicidal or homicidal ideations.    Essential hypertension  We had a difficult time controlling her blood pressure she could not tolerate several medications including calcium channel blockers ACE inhibitors hydrochlorothiazide.  We tried different beta-blockers but only metoprolol 25 mg was effective.  But now she is been complaining of dizziness.    Current medicines (including changes today)  Current Outpatient Prescriptions   Medication Sig Dispense Refill   • metoprolol SR (TOPROL XL) 25 MG TABLET SR 24 HR Take 1/2 tab po q24h 60 Tab 3   • sertraline (ZOLOFT) 50 MG Tab TAKE 1 TABLET ORALLY DAILY 90 Tab 0   • [facility] VITAMIN C 500 MG tablet TAKE 1 TABLET BY MOUTH 3 TIMES A DAY. 90 Tab 0   • cloNIDine (CATAPRES) 0.1 MG Tab Take one tab po bp greater than 180/95, may repeat one tab in 30 minutes po q24h 10 Tab 0   • MAGNESIUM-OXIDE 400 (241.3 Mg) MG Tab tablet TAKE 1 TABLET BY MOUTH 2 TIMES A DAY 60 Tab 0   • bethanechol (URECHOLINE) 25 MG Tab Take 1 Tab by mouth  every 24 hours as needed. 90 Tab 0   • psyllium (METAMUCIL SMOOTH TEXTURE) 28 % packet Take 1 Packet by mouth 2 times a day. 30 Each 3   • omeprazole (PRILOSEC) 20 MG delayed-release capsule TAKE 1 CAP BY MOUTH 2 TIMES A DAY. 180 Cap 0   • Calcium Carbonate-Vitamin D (OS-THIAGO 500 + D PO) Take 1 tablet by mouth every day.     • vitamin D (CHOLECALCIFEROL) 1000 UNIT Tab Take 4,000 Units by mouth every day.     • acetaminophen (TYLENOL) 160 MG/5ML Suspension Take 15 mg/kg by mouth every 6 hours as needed.     • ALPRAZolam (XANAX) 0.25 MG Tab TAKE 1 TABLET ORALLY AT BEDTIME AS NEEDED FOR SLEEP FOR UP TO 30 DAYS 30 Tab 0   • ondansetron (ZOFRAN) 4 MG Tab tablet Take 1 Tab by mouth every four hours as needed for Nausea/Vomiting. 20 Tab 0   • MAGNESIUM-OXIDE 400 (241.3 Mg) MG Tab tablet TAKE 1 TABLET BY MOUTH TWICE A DAY 60 Tab 0   • simethicone (MYLICON) 80 MG Chew Tab Take 1 Tab by mouth every 6 hours as needed for Flatulence. 30 Tab 3   • ondansetron (ZOFRAN) 4 MG Tab tablet Take 1 Tab by mouth every 8 hours as needed for Nausea/Vomiting. 20 Tab 0   • docusate sodium (COLACE) 100 MG Cap Take 1 Cap by mouth 2 times a day. 60 Cap 1   • psyllium (METAMUCIL SMOOTH TEXTURE) 28 % packet Take 1 Packet by mouth 2 times a day. 30 Each 3   • CVS VITAMIN C 500 MG tablet TAKE 1 TABLET BY MOUTH 3 TIMES A DAY. 90 Tab 0   • sertraline (ZOLOFT) 100 MG Tab Take 1 Tab by mouth every day. 90 Tab 0   • ibuprofen (MOTRIN) 200 MG Tab Take 200 mg by mouth 1 time daily as needed. For pain     • polymixin-trimethoprim (POLYTRIM) 48282-8.1 UNIT/ML-% Solution Place 1 Drop in both eyes every 4 hours. (Patient not taking: Reported on 4/3/2018) 10 mL 0   • timolol (TIMOPTIC) 0.5 % Solution Place 1 Drop in both eyes every day. (Patient not taking: Reported on 4/3/2018) 1 Bottle 3     No current facility-administered medications for this visit.      She  has a past medical history of Anesthesia; Aneurysm (HCC); Anxiety; Arthritis; Breast cancer (HCC)  "(); Cancer (HCC) (); CATARACT; Dental disorder; Depression; Hypertension; Indigestion; Injury of cervical spine (HCC) (2016); Osteopenia; Other specified disorder of intestines; Sarcoid (HCC); and UTI (urinary tract infection) (). She also has no past medical history of Diabetes (HCC); Pacemaker; Seizure (HCC); or Stroke (HCC).  She  has a past surgical history that includes vein stripping; mass excision general (); breast biopsy (08); abdominal hysterectomy total (); cataract phaco with iol (2010); colonoscopy (); ercp in or (2012); samuel by laparoscopy (2013); lumpectomy (); pr radiation therapy plan simple (); cataract phaco with iol (2014); and cervical disk and fusion anterior (2016).  Social History   Substance Use Topics   • Smoking status: Former Smoker     Packs/day: 0.50     Years: 45.00     Quit date: 10/9/1969   • Smokeless tobacco: Never Used   • Alcohol use No      Comment: ocassionally     Social History     Social History Narrative   • No narrative on file     Family History   Problem Relation Age of Onset   • Hypertension Mother    • Stroke Mother    • Other Father      Dementia   • Other Sister      BIpolar   • Other Sister      Bipolar   • Other Sister      THyroid disease   • Cancer Neg Hx    • Diabetes Neg Hx    • Heart Disease Neg Hx      Family Status   Relation Status   • Mother    • Son     suicide   • Father    • Sister    • Sister Alive   • Sister Alive   • Daughter Alive   • Daughter Alive   • Neg Hx          ROS    Please see hpi     All other systems reviewed and are negative     Objective:     Blood pressure 112/60, pulse 72, temperature 37.1 °C (98.8 °F), height 1.715 m (5' 7.5\"), weight 62.1 kg (137 lb), SpO2 91 %, not currently breastfeeding. Body mass index is 21.14 kg/m².  Physical Exam:    Constitutional: Alert, no distress.  Skin: Warm, dry, good turgor, no rashes in visible " areas.  Eye: Equal, round and reactive, conjunctiva clear, lids normal.  ENMT: Lips without lesions, good dentition, oropharynx clear.  Neck: Trachea midline, no masses, no thyromegaly. No cervical or supraclavicular lymphadenopathy.  Respiratory: Unlabored respiratory effort, lungs clear to auscultation, no wheezes, no ronchi.  Cardiovascular: Normal S1, S2, no murmur, no edema.  Abdomen: Soft, non-tender, no masses, no hepatosplenomegaly.  Psych: Alert and oriented x3, normal affect and mood.          Assessment and Plan:   The following treatment plan was discussed      1. Essential hypertension    Change to 1/2 tab po qhs  Patient was given instructions to make a two-week blood pressure log  To measure his blood pressure morning and evening same time  Then send results back to us  We will consider specific management at that time    - metoprolol SR (TOPROL XL) 25 MG TABLET SR 24 HR; Take 1/2 tab po q24h  Dispense: 60 Tab; Refill: 3    2. Dizziness    Lower dose to 1/2 tab po q24h    - REFERRAL TO CARDIOLOGY    3. Anxiety  Recommended that he family come in to be with her  No change in meds        HEALTH MAINTENANCE:    Instructed to Follow up in clinic or ER for worsening symptoms, difficulty breathing, lack of expected recovery, or should new symptoms or problems arise.    Followup: Return in about 2 weeks (around 6/27/2018) for Reevaluation, 2 week BP log.       Once again this medical record contains text that has been entered with the assistance of computer voice recognition and dictation software.  Therefore, it may contain unintended errors in text, spelling, punctuation, or grammar

## 2018-06-13 NOTE — ASSESSMENT & PLAN NOTE
We had a difficult time controlling her blood pressure she could not tolerate several medications including calcium channel blockers ACE inhibitors hydrochlorothiazide.  We tried different beta-blockers but only metoprolol 25 mg was effective.  But now she is been complaining of dizziness.

## 2018-06-13 NOTE — ASSESSMENT & PLAN NOTE
Patient states that she is taking Zoloft 75 mg p.o. q. daily.  She does feel lonely at times she lives alone but her daughter is often able to come and visit but not every week.  She denies any suicidal or homicidal ideations.

## 2018-06-13 NOTE — ASSESSMENT & PLAN NOTE
The patient states for the past 2 or 3 days she has been feeling lightheaded and dizzy.  She states her blood pressure has been on the lower side ranging from 115/ 5/86.  She does complain of shortness of breath, but  no chest pain no dyspnea on exertion.  She is currently on metoprolol 25 mg XL p.o. daily she has been taking the medication at night.

## 2018-06-14 ENCOUNTER — PHYSICAL THERAPY (OUTPATIENT)
Dept: PHYSICAL THERAPY | Facility: REHABILITATION | Age: 83
End: 2018-06-14
Attending: OTOLARYNGOLOGY
Payer: MEDICARE

## 2018-06-14 DIAGNOSIS — R42 DIZZINESS AND GIDDINESS: ICD-10-CM

## 2018-06-14 PROCEDURE — 95992 CANALITH REPOSITIONING PROC: CPT

## 2018-06-14 PROCEDURE — 97110 THERAPEUTIC EXERCISES: CPT | Mod: XU

## 2018-06-14 NOTE — OP THERAPY DAILY TREATMENT
"  Outpatient Physical Therapy  DAILY TREATMENT     [facility] Physical [address].  [address]  [place] [address]  Phone:  [phone]  Fax:  [phone]    Date: 06/14/2018    Patient: Lucita Babcock  YOB: 1935  MRN: [id number]     Time Calculation  Start time: 1031  Stop time: 1103 Time Calculation (min): 32 minutes     Chief Complaint: Vertigo and Weakness    Visit #: 13    SUBJECTIVE:  Had MD appt.  Did decide to change BP meds.  Waiting to see if the lightheadedness decreases.  Dtr is here.  Pt working through just some exercises daily.  Full program only when dtr can assist.     OBJECTIVE:      Therapeutic Exercises (CPT 00668):     1. NuStep, L3 x 8 min    Therapeutic Treatments and Modalities:     1. Canalith Repositioning (CPT 28827), L eply x 1.  HO given for home maneuvre with dtr assist.      Time-based treatments/modalities:  Therapeutic exercise minutes (CPT 56805): 8 minutes       ASSESSMENT:   Response to treatment: Cont torsional nystagmus in L ibrahima hallpike lasting 8\". Looks to be at baseline that was noted previously.  Lightheadedness likely does have a vestibular component, but will monitor response to med change.     PLAN/RECOMMENDATIONS:   Plan for treatment: therapy treatment to continue next visit.  Planned interventions for next visit: continue with current treatment. Gaining indep with caregiver assist with HEP.  Work toward d/c.       "

## 2018-06-19 ENCOUNTER — APPOINTMENT (OUTPATIENT)
Dept: PHYSICAL THERAPY | Facility: REHABILITATION | Age: 83
End: 2018-06-19
Attending: OTOLARYNGOLOGY
Payer: MEDICARE

## 2018-06-21 RX ORDER — ONDANSETRON 4 MG/1
4 TABLET, FILM COATED ORAL EVERY 4 HOURS PRN
Qty: 20 TAB | Refills: 0 | Status: SHIPPED | OUTPATIENT
Start: 2018-06-21 | End: 2018-06-22

## 2018-06-22 ENCOUNTER — OFFICE VISIT (OUTPATIENT)
Dept: CARDIOLOGY | Facility: MEDICAL CENTER | Age: 83
End: 2018-06-22
Payer: MEDICARE

## 2018-06-22 ENCOUNTER — PHYSICAL THERAPY (OUTPATIENT)
Dept: PHYSICAL THERAPY | Facility: REHABILITATION | Age: 83
End: 2018-06-22
Attending: OTOLARYNGOLOGY
Payer: MEDICARE

## 2018-06-22 VITALS
BODY MASS INDEX: 20.88 KG/M2 | SYSTOLIC BLOOD PRESSURE: 110 MMHG | DIASTOLIC BLOOD PRESSURE: 62 MMHG | OXYGEN SATURATION: 94 % | WEIGHT: 137.8 LBS | HEIGHT: 68 IN | HEART RATE: 74 BPM

## 2018-06-22 DIAGNOSIS — I10 ESSENTIAL HYPERTENSION: ICD-10-CM

## 2018-06-22 DIAGNOSIS — R42 DIZZINESS AND GIDDINESS: ICD-10-CM

## 2018-06-22 DIAGNOSIS — I34.0 NON-RHEUMATIC MITRAL REGURGITATION: ICD-10-CM

## 2018-06-22 PROCEDURE — 99214 OFFICE O/P EST MOD 30 MIN: CPT | Performed by: INTERNAL MEDICINE

## 2018-06-22 PROCEDURE — 97110 THERAPEUTIC EXERCISES: CPT

## 2018-06-22 RX ORDER — DILTIAZEM HYDROCHLORIDE 240 MG/1
240 CAPSULE, COATED, EXTENDED RELEASE ORAL DAILY
Qty: 30 CAP | Refills: 11 | Status: SHIPPED | OUTPATIENT
Start: 2018-06-22 | End: 2018-07-09

## 2018-06-22 ASSESSMENT — ENCOUNTER SYMPTOMS
CHILLS: 0
PND: 0
EYE DISCHARGE: 0
PALPITATIONS: 0
FEVER: 0
HEADACHES: 0
COUGH: 0
NAUSEA: 0
NERVOUS/ANXIOUS: 1
SHORTNESS OF BREATH: 0
BLURRED VISION: 0
HEARTBURN: 0
DIZZINESS: 1
MYALGIAS: 0
DEPRESSION: 0
BRUISES/BLEEDS EASILY: 0

## 2018-06-22 NOTE — PROGRESS NOTES
"Chief Complaint   Patient presents with   • Dizziness       Subjective:   Lucita Babcock is a 83 y.o. female who presents today referred here by Dr. Sloan for follow-up of hypertension and mitral regurgitation.  I saw this lady after neck fracture couple years ago.  She now has extreme anxiety, but possibly posttraumatic stress disorder.  She lives alone but has constant visitors including her daughter.  Prior to her trauma with her neck she was taking Bystolic with good blood pressure control.  She has been on a variety of drugs since then for blood pressure control.  Currently taking 100 mg of long-acting metoprolol a day and reports lightheadedness and dizziness which also seem to occur with bystolic  Also amlodipine caused unacceptable edema  Lisinopril caused angioedema    She did have mitral regurgitation and her last echo in November 2016 demonstrated mild leakage and the PA pressure of 30 mmHg.    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   • Cancer ([facility]) 2008    breast   • CATARACT     removed left eye and right eye   • Dental disorder     upper partial   • Depression    • Hypertension    • Indigestion    • Injury of cervical spine ([facility]) July 2016    C-spine decompression/laminectomy   • Osteopenia    • Other specified disorder of intestines     diarreha   • Sarcoid ([facility])    • UTI (urinary tract infection)      Past Surgical History:   Procedure Laterality Date   • CERVICAL DISK AND FUSION ANTERIOR  7/27/2016    Procedure: CERVICAL DISK AND FUSION ANTERIOR C4-7;  Surgeon: Niles Khan M.D.;  Location: SURGERY [facility];  Service:    • CATARACT PHACO WITH IOL  8/27/2014    Performed by Barbra Allen M.D. at SURGERY SAME DAY [facility]   • AMBROCIO BY LAPAROSCOPY  1/8/2013    Performed by Neo Howell M.D. at [facility] [place] ORS   • ERCP IN OR  12/28/2012    Performed by Jay Dubois M.D. at SURGERY [facility] ORS   • CATARACT PHACO WITH IOL  8/11/2010    Performed by AVANI INGRAM at SURGERY SAME DAY [facility] ORS   • MASS EXCISION GENERAL  7/08    chest mass biopsy sarcoid   • BREAST BIOPSY  5/27/08    Performed by KENRICK LOPEZ at SURGERY SAME DAY [facility] ORS   • LUMPECTOMY  2008    right   • PB RADIATION THERAPY PLAN SIMPLE  2008    right   • COLONOSCOPY  2007    2 polyps   • ABDOMINAL HYSTERECTOMY TOTAL  1997   • VEIN STRIPPING      R leg     Family History   Problem Relation Age of Onset   • Hypertension Mother    • Stroke Mother    • Other Father      Dementia   • Other Sister      BIpolar   • Other Sister      Bipolar   • Other Sister      THyroid disease   • Cancer Neg Hx    • Diabetes Neg Hx    • Heart Disease Neg Hx      Social History     Social History   • Marital status:      Spouse name: N/A   • Number of children: N/A   • Years of education: N/A     Occupational History   • Not on file.     Social History Main Topics   • Smoking status: Former Smoker     Packs/day: 0.50     Years: 45.00     Quit date: 10/9/1969   • Smokeless tobacco: Never Used   • Alcohol use No      Comment: ocassionally   • Drug use: No   • Sexual activity: Not Currently     Partners: Male      Comment:       Other Topics Concern   • Not on file     Social History Narrative   • No narrative on file     Allergies   Allergen Reactions   • Ace Inhibitors Anaphylaxis   • Augmentin      Flu like sx   • Erythromycin Rash   • Lisinopril Anaphylaxis   • Penicillins Hives   • Epinephrine Unspecified     shaking   • Percocet [Oxycodone-Acetaminophen] Vomiting   • Sulfa Drugs Hives     Outpatient Encounter Prescriptions as of 6/22/2018   Medication Sig Dispense Refill   • DILTIAZem CD (DILTIAZEM CD) 240 MG CAPSULE SR 24 HR Take 1 Cap by mouth every day. 30 Cap 11   • ondansetron (ZOFRAN) 4 MG Tab tablet Take 1 Tab by mouth every 8 hours as  needed for Nausea/Vomiting. 20 Tab 0   • sertraline (ZOLOFT) 50 MG Tab TAKE 1 TABLET ORALLY DAILY (Patient taking differently: Take 75 mg by mouth every day. TAKE 1 TABLET ORALLY DAILY) 90 Tab 0   • [facility]S VITAMIN C 500 MG tablet TAKE 1 TABLET BY MOUTH 3 TIMES A DAY. 90 Tab 0   • MAGNESIUM-OXIDE 400 (241.3 Mg) MG Tab tablet TAKE 1 TABLET BY MOUTH 2 TIMES A DAY 60 Tab 0   • bethanechol (URECHOLINE) 25 MG Tab Take 1 Tab by mouth every 24 hours as needed. 90 Tab 0   • psyllium (METAMUCIL SMOOTH TEXTURE) 28 % packet Take 1 Packet by mouth 2 times a day. 30 Each 3   • Calcium Carbonate-Vitamin D (OS-THIAGO 500 + D PO) Take 1 tablet by mouth every day.     • vitamin D (CHOLECALCIFEROL) 1000 UNIT Tab Take 4,000 Units by mouth every day.     • [DISCONTINUED] ondansetron (ZOFRAN) 4 MG Tab tablet TAKE 1 TAB BY MOUTH EVERY FOUR HOURS AS NEEDED FOR NAUSEA/VOMITING. 20 Tab 0   • [DISCONTINUED] MAGNESIUM-OXIDE 400 (241.3 Mg) MG Tab tablet TAKE 1 TABLET BY MOUTH TWICE A DAY 60 Tab 2   • [DISCONTINUED] metoprolol SR (TOPROL XL) 25 MG TABLET SR 24 HR Take 1/2 tab po q24h 60 Tab 3   • ALPRAZolam (XANAX) 0.25 MG Tab TAKE 1 TABLET ORALLY AT BEDTIME AS NEEDED FOR SLEEP FOR UP TO 30 DAYS 30 Tab 0   • [DISCONTINUED] MAGNESIUM-OXIDE 400 (241.3 Mg) MG Tab tablet TAKE 1 TABLET BY MOUTH TWICE A DAY 60 Tab 0   • docusate sodium (COLACE) 100 MG Cap Take 1 Cap by mouth 2 times a day. 60 Cap 1   • [DISCONTINUED] simethicone (MYLICON) 80 MG Chew Tab Take 1 Tab by mouth every 6 hours as needed for Flatulence. 30 Tab 3   • [DISCONTINUED] psyllium (METAMUCIL SMOOTH TEXTURE) 28 % packet Take 1 Packet by mouth 2 times a day. 30 Each 3   • [DISCONTINUED] [facility]S VITAMIN C 500 MG tablet TAKE 1 TABLET BY MOUTH 3 TIMES A DAY. 90 Tab 0   • cloNIDine (CATAPRES) 0.1 MG Tab Take one tab po bp greater than 180/95, may repeat one tab in 30 minutes po q24h 10 Tab 0   • [DISCONTINUED] sertraline (ZOLOFT) 100 MG Tab Take 1 Tab by mouth every day. 90 Tab 0   • [DISCONTINUED]  "omeprazole (PRILOSEC) 20 MG delayed-release capsule TAKE 1 CAP BY MOUTH 2 TIMES A DAY. 180 Cap 0   • ibuprofen (MOTRIN) 200 MG Tab Take 200 mg by mouth 1 time daily as needed. For pain     • polymixin-trimethoprim (POLYTRIM) 95436-3.1 UNIT/ML-% Solution Place 1 Drop in both eyes every 4 hours. (Patient not taking: Reported on 4/3/2018) 10 mL 0   • acetaminophen (TYLENOL) 160 MG/5ML Suspension Take 15 mg/kg by mouth every 6 hours as needed.     • [DISCONTINUED] timolol (TIMOPTIC) 0.5 % Solution Place 1 Drop in both eyes every day. 1 Bottle 3     No facility-administered encounter medications on file as of 6/22/2018.      Review of Systems   Constitutional: Negative for chills, fever and malaise/fatigue.   Eyes: Negative for blurred vision and discharge.   Respiratory: Negative for cough and shortness of breath.    Cardiovascular: Negative for chest pain, palpitations, leg swelling and PND.   Gastrointestinal: Negative for heartburn and nausea.   Genitourinary: Negative for dysuria and urgency.   Musculoskeletal: Negative for myalgias.   Skin: Negative for itching and rash.   Neurological: Positive for dizziness. Negative for headaches.   Endo/Heme/Allergies: Negative for environmental allergies. Does not bruise/bleed easily.   Psychiatric/Behavioral: Negative for depression. The patient is nervous/anxious.         Objective:   /62   Pulse 74   Ht 1.715 m (5' 7.5\")   Wt 62.5 kg (137 lb 12.8 oz)   SpO2 94%   BMI 21.26 kg/m²     Physical Exam   Constitutional: She is oriented to person, place, and time.   Pleasant but anxious lady accompanied by her daughter   Neck: No JVD present.   Cardiovascular: Normal rate and regular rhythm.  Exam reveals no gallop and no friction rub.    Murmur heard.   Systolic murmur is present with a grade of 1/6   Murmur is at the apex and heard only in the left lateral decubitus position   Pulmonary/Chest: Effort normal and breath sounds normal.   Abdominal: Soft. "   Musculoskeletal: She exhibits edema.   Neurological: She is alert and oriented to person, place, and time.   Skin: Skin is warm and dry.       Assessment:     1. Essential hypertension     2. Non-rheumatic mitral regurgitation         Medical Decision Making:  Today's Assessment / Status / Plan:   I do not think she needs follow-up on her mitral regurgitation.  With respect to hypertension, stop metoprolol  Prescribe diltiazem 240 mg per day  Follow-up in 2 weeks or sooner as needed

## 2018-06-22 NOTE — LETTER
June 22, 2018    Perry Daniel M.D.  [address] [place] [address]      Re:  Lucita Babcock          Dear Dr. Daniel,    It was a pleasure seeing your patient, Lucita Babcock, on 6/22/2018 for her final therapy visit.     Please find enclosed a copy of the patient's discharge summary from outpatient physical therapy services.          On behalf of the staff at [facility], we would like to thank you for your referrals.  We appreciate your confidence in our clinic and will continue to work hard to provide the best outcomes for your patients.    We sincerely enjoy treating your patients and hope that you have been happy with their care.  Thank you again, and please call with any questions, concerns or ways that we can best meet your needs.        Sincerely,          Catalina Navarro, PT, DPT    No Recipients              Outpatient Physical Therapy  DISCHARGE SUMMARY NOTE      [address]  [address]  [facility] [address]  Phone:  [phone]  Fax:  [phone]    Date of Visit: 06/22/2018    Patient: Lucita Babcock  YOB: 1935  MRN: [id number]     Referring Provider: Perry Daniel M.D.  [address] [place] [address]   Referring Diagnosis Dizziness and giddiness [R42]     Physical Therapy Occurrence Codes    Date of onset of impairment:  6/5/15   Date physical therapy care plan established or reviewed:  5/30/18   Date physical therapy treatment started:  5/30/18          Your patient is being discharged from Physical Therapy with the following comments:   · Goals partially met    Comments:  Ms. Babcock was seen for 14 PT sessions treating her dizziness and imbalance.  Treatment mainly aimed to resolve the L BPPV.  She showed positive response to treatment, but not 100% resolution of the BPPV.  Due to plateau, tx since last PN was aimed at strength and balance training.  She has met 4 of 6 goals from PN.  Shows improved confidence in  balance and ability to walk indep with SPC, but still has good and bad days related to the lightheadedness.  Anxiety does appear to be a contributing factor.  Pt is not showing further progress or need for PT.  She is indep with HEP with encouragement and guarding from caregivers.      Recommendations:  D/C to HEP.  May call with further questions or return if status changes.     Thank you for the referral!    Catalina Navarro, PT, DPT    Date: 6/22/2018

## 2018-06-22 NOTE — LETTER
[facility]   [address]   [address]   [place] [address]  Phone: [phone]  Fax: [phone]              Lucita Zulma Babcock  1935    Encounter Date: 6/22/2018    Neil Guadalupe M.D.          PROGRESS NOTE:  No notes on file      Robbei Trivedi M.D.  [address] [place] [address] [place] [address]  VIA In Basket

## 2018-06-22 NOTE — OP THERAPY DAILY TREATMENT
Outpatient Physical Therapy  DAILY TREATMENT     [facility] [address]  [address]  [place] [address]  Phone:  [phone]  Fax:  [phone]    Date: 06/22/2018    Patient: Lucita Babcock  YOB: 1935  MRN: [id number]     Time Calculation  Start time: 1100  Stop time: 1130 Time Calculation (min): 30 minutes     Chief Complaint: Loss Of Balance and Vertigo    Visit #: 14    SUBJECTIVE:  Presents with dtr.  BP had gone up with med change and has gone back to a full pill.  Is taking more xanax to see if that might help her lightheadedness.  Today is a fairly good day.  Dtr reports pt has little motivation to do her HEP indep, but does complete with caregiver assist.  Did the epley last week x 2 and seemed to help    OBJECTIVE:        Therapeutic Exercises (CPT 35686):     1. NuStep, L3 x 10 min    2. Bridges, 2x15    3. 90/90 TA alt lift, x 10 ea    4. STS , x 10    5. Ankle sways, x 2 min    6. [place], L2 x 15    7. Rows standing on airex (CGA), L2 x 15    8. Lateral stepping, x 2 laps HHA    9. Weaving laterally, x 2 laps HHA      Time-based treatments/modalities:  Therapeutic exercise minutes (CPT 39995): 30 minutes       ASSESSMENT:   Response to treatment: Pt is indep with HEP with help from caregivers.  Her progress has plateaued.  Ok to d/c    PLAN/RECOMMENDATIONS:   Plan for treatment: no further treatment needed.  Planned interventions for next visit: none, d/c.

## 2018-06-22 NOTE — OP THERAPY DISCHARGE SUMMARY
Outpatient Physical Therapy  DISCHARGE SUMMARY NOTE      [facility] [place] [address]  [address]  [place] [address]  Phone:  [phone]  Fax:  [phone]    Date of Visit: 06/22/2018    Patient: Lucita Babcock  YOB: 1935  MRN: [id number]     Referring Provider: Perry Daniel M.D.  [address] [place] [address]   Referring Diagnosis Dizziness and giddiness [R42]     Physical Therapy Occurrence Codes    Date of onset of impairment:  6/5/15   Date physical therapy care plan established or reviewed:  5/30/18   Date physical therapy treatment started:  5/30/18          Your patient is being discharged from Physical Therapy with the following comments:   · Goals partially met    Comments:  Ms. Babcock was seen for 14 PT sessions treating her dizziness and imbalance.  Treatment mainly aimed to resolve the L BPPV.  She showed positive response to treatment, but not 100% resolution of the BPPV.  Due to plateau, tx since last PN was aimed at strength and balance training.  She has met 4 of 6 goals from PN.  Shows improved confidence in balance and ability to walk indep with SPC, but still has good and bad days related to the lightheadedness.  Anxiety does appear to be a contributing factor.  Pt is not showing further progress or need for PT.  She is indep with HEP with encouragement and guarding from caregivers.      Recommendations:  D/C to HEP.  May call with further questions or return if status changes.     Thank you for the referral!    Catalina Navarro, PT, DPT    Date: 6/22/2018

## 2018-06-25 NOTE — PROGRESS NOTES
1. Attempt #: 2    2. HealthConnect Verified: yes    3. Verify PCP: yes    4. Care Team Updated:       •   DME Company (gait device, O2, CPAP, etc.): NO       •   Other Specialists (eye doctor, derm, GYN, cardiology, endo, etc): NO    5.  Reviewed/Updated the following with patient:       •   Communication Preference Obtained? YES       •   Preferred Pharmacy? NO       •   Preferred Lab? NO       •   Family History (document living status of immediate family members and if + hx of cancer, diabetes, hypertension, hyperlipidemia, heart attack, stroke) NO    6. LIN TV Activation: already active    7. LIN TV Enriqueta: no        9. Care Gap Scheduling (Attempt to Schedule EACH Overdue Care Gap!)     There are no preventive care reminders to display for this patient.     Patient declined Colonoscopy/FIT.    10. Patient was NOT advised: “This is a free wellness visit. The provider will screen for medical conditions to help you stay healthy. If you have other concerns to address you may be asked to discuss these at a separate visit or there may be an additional fee.”     11. Patient was NOT informed to arrive 15 min prior to their scheduled appointment and bring in their medication bottles.

## 2018-07-05 ENCOUNTER — TELEPHONE (OUTPATIENT)
Dept: CARDIOLOGY | Facility: MEDICAL CENTER | Age: 83
End: 2018-07-05

## 2018-07-05 NOTE — TELEPHONE ENCOUNTER
medication concerns   Received: Today   Message Contents   Jossie Vázquez R.N.   Phone Number: [phone]             DEVIN/tiera     Pt calling to explain what concerns & worries she has about taking diltiazem.  She is already on metoprolol 25 mg as prescribed by Robbie Trivedi M.D.   Please call Lucita [phone].      Contacted patient.  She states she's apprehensive to take the Diltiazem after she read it has the same side effects as metoprolol.  Patient can't tolerate metoprolol side effects (light-headedness/dizziness).  Explained that although BP meds may have same side effects doesn't mean she will experience the same effects.  Advised to start new medication as prescribed.  Patient states she would like to wait to discuss this further with APN at her next follow up visit on 7/9.

## 2018-07-09 ENCOUNTER — OFFICE VISIT (OUTPATIENT)
Dept: CARDIOLOGY | Facility: MEDICAL CENTER | Age: 83
End: 2018-07-09
Payer: MEDICARE

## 2018-07-09 VITALS
WEIGHT: 136 LBS | SYSTOLIC BLOOD PRESSURE: 120 MMHG | BODY MASS INDEX: 20.61 KG/M2 | HEART RATE: 72 BPM | OXYGEN SATURATION: 97 % | DIASTOLIC BLOOD PRESSURE: 64 MMHG | HEIGHT: 68 IN

## 2018-07-09 DIAGNOSIS — M96.1 CERVICAL POSTLAMINECTOMY SYNDROME: ICD-10-CM

## 2018-07-09 DIAGNOSIS — E78.2 MIXED HYPERLIPIDEMIA: ICD-10-CM

## 2018-07-09 DIAGNOSIS — I10 ESSENTIAL HYPERTENSION: ICD-10-CM

## 2018-07-09 DIAGNOSIS — F41.9 ANXIETY: ICD-10-CM

## 2018-07-09 DIAGNOSIS — S14.121S: ICD-10-CM

## 2018-07-09 PROBLEM — N30.00 ACUTE CYSTITIS: Status: RESOLVED | Noted: 2017-11-30 | Resolved: 2018-07-09

## 2018-07-09 PROBLEM — R19.7 DIARRHEA: Status: RESOLVED | Noted: 2017-03-27 | Resolved: 2018-07-09

## 2018-07-09 PROBLEM — J06.9 VIRAL UPPER RESPIRATORY TRACT INFECTION: Status: RESOLVED | Noted: 2018-02-08 | Resolved: 2018-07-09

## 2018-07-09 PROBLEM — J06.9 VIRAL URI: Status: RESOLVED | Noted: 2017-11-30 | Resolved: 2018-07-09

## 2018-07-09 PROBLEM — R14.0 BLOATING: Status: RESOLVED | Noted: 2018-04-03 | Resolved: 2018-07-09

## 2018-07-09 PROCEDURE — 99214 OFFICE O/P EST MOD 30 MIN: CPT | Performed by: NURSE PRACTITIONER

## 2018-07-09 RX ORDER — DILTIAZEM HYDROCHLORIDE 120 MG/1
120 CAPSULE, COATED, EXTENDED RELEASE ORAL DAILY
Qty: 30 CAP | Refills: 6 | Status: SHIPPED | OUTPATIENT
Start: 2018-07-09 | End: 2018-07-25

## 2018-07-09 ASSESSMENT — ENCOUNTER SYMPTOMS
NAUSEA: 0
ORTHOPNEA: 0
PND: 0
BRUISES/BLEEDS EASILY: 0
DIZZINESS: 1
HEADACHES: 0
LOSS OF CONSCIOUSNESS: 0
FEVER: 0
CHILLS: 0
PALPITATIONS: 0
ABDOMINAL PAIN: 0
MYALGIAS: 0
COUGH: 0
SHORTNESS OF BREATH: 0

## 2018-07-09 NOTE — LETTER
"     [facility]   [address]   [address]   [place] [address]  Phone: [phone]  Fax: [phone]              Lucita Babcock  1935    Encounter Date: 7/9/2018    SHIRLEY Pinon          PROGRESS NOTE:  Chief Complaint   Patient presents with   • Follow-Up   • HTN (Controlled)       Subjective:   Lucita Babcock is a 83 y.o. female who presents today for two week follow-up of hypertension.    Lucita is an 83 year old female with history of hypertension, hyperlipidemia, and anxiety, previously followed by Dr. Guadalupe. She was first seen, after a fall, which required neck surgery.    At last follow-up, Metoprolol was stopped, and Diltiazem 240mg was added. She is here today for follow-up.    BP is better, but she is quite dizzy and lightheaded; she also has a lot of nausea, but she admits to being very anxious too. No chest pain, pressure or discomfort; no palpitations; no shortness of breath, orthopnea or PND; no syncope; no edema.    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   • Cancer (HCC) 2008    breast   • CATARACT     removed left eye and right eye   • Dental disorder     upper partial   • Depression    • Hypertension    • Indigestion    • Injury of cervical spine (HCC) July 2016    C-spine decompression/laminectomy   • Osteopenia    • Other specified disorder of intestines     diarreha   • Sarcoid (HCC)    • UTI (urinary tract infection)      Past Surgical History:   Procedure Laterality Date   • CERVICAL DISK AND FUSION ANTERIOR  7/27/2016    Procedure: CERVICAL DISK AND FUSION ANTERIOR C4-7;  Surgeon: Niles Khan M.D.;  Location: SURGERY [facility];  Service:    • CATARACT PHACO WITH IOL  8/27/2014    Performed by Barbra Allen M.D. at SURGERY SAME " DAY [facility] ORS   • AMBROCIO BY LAPAROSCOPY  1/8/2013    Performed by Kenrick Howell M.D. at SURGERY [facility] ORS   • ERCP IN OR  12/28/2012    Performed by Jay Dubois M.D. at SURGERY [facility] ORS   • CATARACT PHACO WITH IOL  8/11/2010    Performed by AVANI INGRAM at SURGERY SAME DAY [facility] ORS   • MASS EXCISION GENERAL  7/08    chest mass biopsy sarcoid   • BREAST BIOPSY  5/27/08    Performed by KENRICK HOWELL at SURGERY SAME DAY [facility] ORS   • LUMPECTOMY  2008    right   • PB RADIATION THERAPY PLAN SIMPLE  2008    right   • COLONOSCOPY  2007    2 polyps   • ABDOMINAL HYSTERECTOMY TOTAL  1997   • VEIN STRIPPING      R leg     Family History   Problem Relation Age of Onset   • Hypertension Mother    • Stroke Mother    • Other Father      Dementia   • Other Sister      BIpolar   • Other Sister      Bipolar   • Other Sister      THyroid disease   • Cancer Neg Hx    • Diabetes Neg Hx    • Heart Disease Neg Hx      Social History     Social History   • Marital status:      Spouse name: N/A   • Number of children: N/A   • Years of education: N/A     Occupational History   • Not on file.     Social History Main Topics   • Smoking status: Former Smoker     Packs/day: 0.50     Years: 45.00     Quit date: 10/9/1969   • Smokeless tobacco: Never Used   • Alcohol use No      Comment: ocassionally   • Drug use: No   • Sexual activity: Not Currently     Partners: Male      Comment:       Other Topics Concern   • Not on file     Social History Narrative   • No narrative on file     Allergies   Allergen Reactions   • Ace Inhibitors Anaphylaxis   • Augmentin      Flu like sx   • Erythromycin Rash   • Lisinopril Anaphylaxis   • Penicillins Hives   • Epinephrine Unspecified     shaking   • Percocet [Oxycodone-Acetaminophen] Vomiting   • Sulfa Drugs Hives     Outpatient Encounter Prescriptions as of 7/9/2018   Medication Sig Dispense Refill   • DILTIAZem CD (CARDIZEM CD) 120 MG CAPSULE SR 24 HR Take 1 Cap by  mouth every day. 30 Cap 6   • ondansetron (ZOFRAN) 4 MG Tab tablet Take 1 Tab by mouth every 8 hours as needed for Nausea/Vomiting. 20 Tab 0   • sertraline (ZOLOFT) 50 MG Tab TAKE 1 TABLET ORALLY DAILY (Patient taking differently: Take 75 mg by mouth every day. TAKE 1 TABLET ORALLY DAILY) 90 Tab 0   • CVS VITAMIN C 500 MG tablet TAKE 1 TABLET BY MOUTH 3 TIMES A DAY. 90 Tab 0   • Calcium Carbonate-Vitamin D (OS-THIAGO 500 + D PO) Take 1 tablet by mouth every day.     • vitamin D (CHOLECALCIFEROL) 1000 UNIT Tab Take 4,000 Units by mouth every day.     • [DISCONTINUED] DILTIAZem CD (DILTIAZEM CD) 240 MG CAPSULE SR 24 HR Take 1 Cap by mouth every day. 30 Cap 11   • docusate sodium (COLACE) 100 MG Cap Take 1 Cap by mouth 2 times a day. 60 Cap 1   • cloNIDine (CATAPRES) 0.1 MG Tab Take one tab po bp greater than 180/95, may repeat one tab in 30 minutes po q24h 10 Tab 0   • MAGNESIUM-OXIDE 400 (241.3 Mg) MG Tab tablet TAKE 1 TABLET BY MOUTH 2 TIMES A DAY 60 Tab 0   • bethanechol (URECHOLINE) 25 MG Tab Take 1 Tab by mouth every 24 hours as needed. 90 Tab 0   • [DISCONTINUED] psyllium (METAMUCIL SMOOTH TEXTURE) 28 % packet Take 1 Packet by mouth 2 times a day. (Patient not taking: Reported on 7/9/2018) 30 Each 3   • ibuprofen (MOTRIN) 200 MG Tab Take 200 mg by mouth 1 time daily as needed. For pain     • [DISCONTINUED] polymixin-trimethoprim (POLYTRIM) 58836-0.1 UNIT/ML-% Solution Place 1 Drop in both eyes every 4 hours. (Patient not taking: Reported on 4/3/2018) 10 mL 0   • acetaminophen (TYLENOL) 160 MG/5ML Suspension Take 15 mg/kg by mouth every 6 hours as needed.       No facility-administered encounter medications on file as of 7/9/2018.      Review of Systems   Constitutional: Positive for malaise/fatigue. Negative for chills and fever.   Respiratory: Negative for cough and shortness of breath.    Cardiovascular: Negative for chest pain, palpitations, orthopnea, leg swelling and PND.   Gastrointestinal: Negative for  "abdominal pain and nausea.   Musculoskeletal: Negative for myalgias.   Neurological: Positive for dizziness. Negative for loss of consciousness and headaches.   Endo/Heme/Allergies: Does not bruise/bleed easily.        Objective:   /64   Pulse 72   Ht 1.715 m (5' 7.5\")   Wt 61.7 kg (136 lb)   SpO2 97%   BMI 20.99 kg/m²      Physical Exam   Constitutional: She is oriented to person, place, and time. She appears well-developed and well-nourished.   Tall, thin (BMI 20.99)  She is quite anxious   HENT:   Head: Normocephalic.   Eyes: EOM are normal.   Neck: Normal range of motion. Neck supple. No JVD present.   Cardiovascular: Normal rate, regular rhythm and normal heart sounds.    Pulmonary/Chest: Effort normal and breath sounds normal. No respiratory distress. She has no wheezes. She has no rales.   Abdominal: Soft. Bowel sounds are normal. She exhibits no distension. There is no tenderness.   Musculoskeletal: Normal range of motion. She exhibits no edema.   Neurological: She is alert and oriented to person, place, and time.   Skin: Skin is warm and dry. No rash noted.   Psychiatric: She has a normal mood and affect.       Assessment:     1. Essential hypertension  DILTIAZem CD (CARDIZEM CD) 120 MG CAPSULE SR 24 HR   2. Mixed hyperlipidemia     3. Cervical postlaminectomy syndrome     4. Central cervical cord injury, without spinal bony injury, C1-4, sequela (HCC)     5. Anxiety         Medical Decision Making:  Today's Assessment / Status / Plan:     1. Hypertension, treated. Repeat BP by me in 102/60, so will decrease Diltiazem from 240mg to 120mg once daily. FU with me in 3-4 weeks.    2. Hyperlipidemia, not currently on any medications.    3. Fall, with subsequent neck surgery, resolved. She is still quite anxious about this event.    4. Anxiety, treated with Zoloft.    Plan as above. FU in 3-4 weeks for BP recheck. Monitor BP at home too.    Collaborating MD: Collins Manning              "

## 2018-07-09 NOTE — PROGRESS NOTES
"Chief Complaint   Patient presents with   • Follow-Up   • HTN (Controlled)       Subjective:   Lucita Babcock is a 83 y.o. female who presents today for two week follow-up of hypertension.    Lucita is an 83 year old female with history of hypertension, hyperlipidemia, and anxiety, previously followed by Dr. Guadalupe. She was first seen, after a fall, which required neck surgery.    At last follow-up, Metoprolol was stopped, and Diltiazem 240mg was added. She is here today for follow-up.    BP is better, but she is quite dizzy and lightheaded; she also has a lot of nausea, but she admits to being very anxious too. No chest pain, pressure or discomfort; no palpitations; no shortness of breath, orthopnea or PND; no syncope; no edema.    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   • Cancer (HCC) 2008    breast   • CATARACT     removed left eye and right eye   • Dental disorder     upper partial   • Depression    • Hypertension    • Indigestion    • Injury of cervical spine (HCC) July 2016    C-spine decompression/laminectomy   • Osteopenia    • Other specified disorder of intestines     diarreha   • Sarcoid (HCC)    • UTI (urinary tract infection)      Past Surgical History:   Procedure Laterality Date   • CERVICAL DISK AND FUSION ANTERIOR  7/27/2016    Procedure: CERVICAL DISK AND FUSION ANTERIOR C4-7;  Surgeon: Niles Khan M.D.;  Location: [facility];  Service:    • CATARACT PHACO WITH IOL  8/27/2014    Performed by Avani Allen M.D. at SURGERY SAME DAY [facility] ORS   • AMBROCIO BY LAPAROSCOPY  1/8/2013    Performed by Neo Howell M.D. at SURGERY [facility]   • ERCP IN OR  12/28/2012    Performed by Jay Dubois M.D. at SURGERY [facility]   • CATARACT PHACO WITH IOL  8/11/2010    Performed by AVANI ALLEN at " SURGERY SAME DAY [facility] ORS   • MASS EXCISION GENERAL  7/08    chest mass biopsy sarcoid   • BREAST BIOPSY  5/27/08    [facility]Z [facility]   • LUMPECTOMY  2008    right   • PB RADIATION THERAPY PLAN SIMPLE  2008    right   • COLONOSCOPY  2007    2 polyps   • ABDOMINAL HYSTERECTOMY TOTAL  1997   • VEIN STRIPPING      R leg     Family History   Problem Relation Age of Onset   • Hypertension Mother    • Stroke Mother    • Other Father      Dementia   • Other Sister      BIpolar   • Other Sister      Bipolar   • Other Sister      THyroid disease   • Cancer Neg Hx    • Diabetes Neg Hx    • Heart Disease Neg Hx      Social History     Social History   • Marital status:      Spouse name: N/A   • Number of children: N/A   • Years of education: N/A     Occupational History   • Not on file.     Social History Main Topics   • Smoking status: Former Smoker     Packs/day: 0.50     Years: 45.00     Quit date: 10/9/1969   • Smokeless tobacco: Never Used   • Alcohol use No      Comment: ocassionally   • Drug use: No   • Sexual activity: Not Currently     Partners: Male      Comment:       Other Topics Concern   • Not on file     Social History Narrative   • No narrative on file     Allergies   Allergen Reactions   • Ace Inhibitors Anaphylaxis   • Augmentin      Flu like sx   • Erythromycin Rash   • Lisinopril Anaphylaxis   • Penicillins Hives   • Epinephrine Unspecified     shaking   • Percocet [Oxycodone-Acetaminophen] Vomiting   • Sulfa Drugs Hives     Outpatient Encounter Prescriptions as of 7/9/2018   Medication Sig Dispense Refill   • DILTIAZem CD (CARDIZEM CD) 120 MG CAPSULE SR 24 HR Take 1 Cap by mouth every day. 30 Cap 6   • ondansetron (ZOFRAN) 4 MG Tab tablet Take 1 Tab by mouth every 8 hours as needed for Nausea/Vomiting. 20 Tab 0   • sertraline (ZOLOFT) 50 MG Tab TAKE 1 TABLET ORALLY DAILY (Patient taking differently: Take 75 mg by mouth every day. TAKE 1 TABLET  "ORALLY DAILY) 90 Tab 0   • CVS VITAMIN C 500 MG tablet TAKE 1 TABLET BY MOUTH 3 TIMES A DAY. 90 Tab 0   • Calcium Carbonate-Vitamin D (OS-THIAGO 500 + D PO) Take 1 tablet by mouth every day.     • vitamin D (CHOLECALCIFEROL) 1000 UNIT Tab Take 4,000 Units by mouth every day.     • [DISCONTINUED] DILTIAZem CD (DILTIAZEM CD) 240 MG CAPSULE SR 24 HR Take 1 Cap by mouth every day. 30 Cap 11   • docusate sodium (COLACE) 100 MG Cap Take 1 Cap by mouth 2 times a day. 60 Cap 1   • cloNIDine (CATAPRES) 0.1 MG Tab Take one tab po bp greater than 180/95, may repeat one tab in 30 minutes po q24h 10 Tab 0   • MAGNESIUM-OXIDE 400 (241.3 Mg) MG Tab tablet TAKE 1 TABLET BY MOUTH 2 TIMES A DAY 60 Tab 0   • bethanechol (URECHOLINE) 25 MG Tab Take 1 Tab by mouth every 24 hours as needed. 90 Tab 0   • [DISCONTINUED] psyllium (METAMUCIL SMOOTH TEXTURE) 28 % packet Take 1 Packet by mouth 2 times a day. (Patient not taking: Reported on 7/9/2018) 30 Each 3   • ibuprofen (MOTRIN) 200 MG Tab Take 200 mg by mouth 1 time daily as needed. For pain     • [DISCONTINUED] polymixin-trimethoprim (POLYTRIM) 42150-3.1 UNIT/ML-% Solution Place 1 Drop in both eyes every 4 hours. (Patient not taking: Reported on 4/3/2018) 10 mL 0   • acetaminophen (TYLENOL) 160 MG/5ML Suspension Take 15 mg/kg by mouth every 6 hours as needed.       No facility-administered encounter medications on file as of 7/9/2018.      Review of Systems   Constitutional: Positive for malaise/fatigue. Negative for chills and fever.   Respiratory: Negative for cough and shortness of breath.    Cardiovascular: Negative for chest pain, palpitations, orthopnea, leg swelling and PND.   Gastrointestinal: Negative for abdominal pain and nausea.   Musculoskeletal: Negative for myalgias.   Neurological: Positive for dizziness. Negative for loss of consciousness and headaches.   Endo/Heme/Allergies: Does not bruise/bleed easily.        Objective:   /64   Pulse 72   Ht 1.715 m (5' 7.5\")   " Wt 61.7 kg (136 lb)   SpO2 97%   BMI 20.99 kg/m²     Physical Exam   Constitutional: She is oriented to person, place, and time. She appears well-developed and well-nourished.   Tall, thin (BMI 20.99)  She is quite anxious   HENT:   Head: Normocephalic.   Eyes: EOM are normal.   Neck: Normal range of motion. Neck supple. No JVD present.   Cardiovascular: Normal rate, regular rhythm and normal heart sounds.    Pulmonary/Chest: Effort normal and breath sounds normal. No respiratory distress. She has no wheezes. She has no rales.   Abdominal: Soft. Bowel sounds are normal. She exhibits no distension. There is no tenderness.   Musculoskeletal: Normal range of motion. She exhibits no edema.   Neurological: She is alert and oriented to person, place, and time.   Skin: Skin is warm and dry. No rash noted.   Psychiatric: She has a normal mood and affect.       Assessment:     1. Essential hypertension  DILTIAZem CD (CARDIZEM CD) 120 MG CAPSULE SR 24 HR   2. Mixed hyperlipidemia     3. Cervical postlaminectomy syndrome     4. Central cervical cord injury, without spinal bony injury, C1-4, sequela (HCC)     5. Anxiety         Medical Decision Making:  Today's Assessment / Status / Plan:     1. Hypertension, treated. Repeat BP by me in 102/60, so will decrease Diltiazem from 240mg to 120mg once daily. FU with me in 3-4 weeks.    2. Hyperlipidemia, not currently on any medications.    3. Fall, with subsequent neck surgery, resolved. She is still quite anxious about this event.    4. Anxiety, treated with Zoloft.    Plan as above. FU in 3-4 weeks for BP recheck. Monitor BP at home too.    Collaborating MD: Collins

## 2018-07-10 ENCOUNTER — APPOINTMENT (OUTPATIENT)
Dept: MEDICAL GROUP | Age: 83
End: 2018-07-10
Payer: MEDICARE

## 2018-07-12 ENCOUNTER — TELEPHONE (OUTPATIENT)
Dept: CARDIOLOGY | Facility: MEDICAL CENTER | Age: 83
End: 2018-07-12

## 2018-07-12 DIAGNOSIS — R11.0 NAUSEA: ICD-10-CM

## 2018-07-12 RX ORDER — ONDANSETRON 4 MG/1
4 TABLET, FILM COATED ORAL EVERY 4 HOURS PRN
Qty: 20 TAB | Refills: 0 | Status: SHIPPED | OUTPATIENT
Start: 2018-07-12 | End: 2018-07-25 | Stop reason: SDUPTHER

## 2018-07-12 NOTE — TELEPHONE ENCOUNTER
Was the patient seen in the last year in this department? YES      Does patient have an active prescription for medications requested? YES     Received Request Via: PHARM

## 2018-07-12 NOTE — TELEPHONE ENCOUNTER
----- Message from Zayda Rodriguez sent at 7/12/2018  2:27 PM PDT -----  Regarding: Patient having reaction to Diltiazem  AB/Norma    Patient said that she is having a strong reaction to Diltiazem and wants a call back at [phone].

## 2018-07-12 NOTE — TELEPHONE ENCOUNTER
"She says that she has had very good luck with the Diltiazem helping with her BP and does not want to go off of the drug; however, she is also getting indigestion, nausea, and pains in her stomach in the am with breakfast and again at lunchtime.  Her stomach feels \"like it swells up and turns hard, and then about 1/2 hour later she is lightheaded and nauseated.\"  She takes Zofran but that is not helping.  The nausea goes away by 3 pm and she takes her Diltiazem at hs.    The symptoms have been more tolerable on the 120 dose than the 240.  BP has been 132/66 and she is very happy about that.      She does not have a solution about what she should do (as I said above, she does not want to go off of the drug).  I told her I will discuss with Rea and let her know.      "

## 2018-07-13 RX ORDER — DILTIAZEM HYDROCHLORIDE 60 MG/1
TABLET, FILM COATED ORAL
Qty: 90 TAB | Refills: 0 | Status: SHIPPED | OUTPATIENT
Start: 2018-07-13 | End: 2018-08-23

## 2018-07-13 NOTE — TELEPHONE ENCOUNTER
She says the symptoms are a bit better today.  She has had a problem with constipation and is pending a colonscopy.  I encouraged her to see GI or talk to them about the issues. She will comply and is going to stay on the Diltiazem if she can.

## 2018-07-20 ENCOUNTER — HOSPITAL ENCOUNTER (OUTPATIENT)
Dept: LAB | Facility: MEDICAL CENTER | Age: 83
End: 2018-07-20
Attending: NURSE PRACTITIONER
Payer: MEDICARE

## 2018-07-20 PROCEDURE — 82784 ASSAY IGA/IGD/IGG/IGM EACH: CPT

## 2018-07-20 PROCEDURE — 36415 COLL VENOUS BLD VENIPUNCTURE: CPT

## 2018-07-20 PROCEDURE — 83516 IMMUNOASSAY NONANTIBODY: CPT

## 2018-07-22 LAB
IGA SERPL-MCNC: 183 MG/DL (ref 68–408)
TTG IGA SER IA-ACNC: 1 U/ML (ref 0–3)

## 2018-07-24 ENCOUNTER — TELEPHONE (OUTPATIENT)
Dept: MEDICAL GROUP | Age: 83
End: 2018-07-24

## 2018-07-24 NOTE — TELEPHONE ENCOUNTER
Phone Number Called: [phone]    Message: Pt's last thyroid and calcium tests were done on 12/11/2017.  Pt's calcium was elevated at 11.3mg/dL - normal range is 8.5-10.5mg/dL.  Pt's parathyroid was overactive and had a value of 72.5pg/mL - the normal range is 14.0-72.0pg/mL.  There is an active order is that has not been completed yet for a PTH check that can be done at any [facility] lab.     Left Message for patient to call back: yes

## 2018-07-24 NOTE — TELEPHONE ENCOUNTER
VOICEMAIL  1. Caller Name:Elad Ribera  Address: [address]            [id number] [place] [address] [place]  Home Phone: [phone]  Mobile Phone: [phone]  Relation: Daughter      2. Message: Pt has an appointment with a surgeon to talk about thyroid surgery.  Pt's daughter would like to know when her last calcium and thyroid tests were and what the values were.     3. Patient approves office to leave a detailed voicemail/MyChart message: yes

## 2018-07-25 ENCOUNTER — OFFICE VISIT (OUTPATIENT)
Dept: MEDICAL GROUP | Age: 83
End: 2018-07-25
Payer: MEDICARE

## 2018-07-25 ENCOUNTER — TELEPHONE (OUTPATIENT)
Dept: MEDICAL GROUP | Age: 83
End: 2018-07-25

## 2018-07-25 VITALS
HEIGHT: 68 IN | OXYGEN SATURATION: 95 % | DIASTOLIC BLOOD PRESSURE: 56 MMHG | HEART RATE: 77 BPM | WEIGHT: 137 LBS | BODY MASS INDEX: 20.76 KG/M2 | SYSTOLIC BLOOD PRESSURE: 102 MMHG | TEMPERATURE: 99.8 F

## 2018-07-25 DIAGNOSIS — R42 DIZZINESS: ICD-10-CM

## 2018-07-25 DIAGNOSIS — R11.0 NAUSEA: ICD-10-CM

## 2018-07-25 PROCEDURE — 99214 OFFICE O/P EST MOD 30 MIN: CPT | Performed by: FAMILY MEDICINE

## 2018-07-25 NOTE — LETTER
July 25, 2018         Patient: Lucita Babcock   YOB: 1935   Date of Visit: 7/25/2018           To Whom it May Concern:    Hold vitamin D for 1 wk

## 2018-07-25 NOTE — ASSESSMENT & PLAN NOTE
Patient states that ever since she started vitamin D supplementation she has been feeling dizzy.  She states she looked up online and she saw that all of the side effects were similar to what she was experiencing with vitamin D.  We have cut her blood pressure and a half from diltiazem 120 mg to 60 mg daily.  She has not had any syncopal episodes.

## 2018-07-25 NOTE — ASSESSMENT & PLAN NOTE
Patient has a chronic history of nausea, she states this nausea started when she started her vitamin D supplementation.  She states that so she has not been feeling dizzy, she has not passed out nor is she had her head.  She states she has been adequately hydrating.

## 2018-07-25 NOTE — TELEPHONE ENCOUNTER
1. Caller Name: Lucita Babcock                                           Call Back Number: [phone] (home)         Patient approves a detailed voicemail message: N\A    Pt would like some clarification on her recent lab results and has some questions she would like to talk to her PCP about.     Pt did not want to discuss anything with MA.

## 2018-07-25 NOTE — PROGRESS NOTES
This medical record contains text that has been entered with the assistance of computer voice recognition and dictation software.  Therefore, it may contain unintended errors in text, spelling, punctuation, or grammar    Chief Complaint   Patient presents with   • Dizziness         Lucita Babcock is a 83 y.o. female here evaluation and management of: nausea and dizziness      HPI:     Dizziness  Patient states that ever since she started vitamin D supplementation she has been feeling dizzy.  She states she looked up online and she saw that all of the side effects were similar to what she was experiencing with vitamin D.  We have cut her blood pressure and a half from diltiazem 120 mg to 60 mg daily.  She has not had any syncopal episodes.    Nausea  Patient has a chronic history of nausea, she states this nausea started when she started her vitamin D supplementation.  She states that so she has not been feeling dizzy, she has not passed out nor is she had her head.  She states she has been adequately hydrating.    Current medicines (including changes today)  Current Outpatient Prescriptions   Medication Sig Dispense Refill   • DILTIAZem (CARDIZEM) 60 MG Tab Take one tab po q24h STOP METOPROLOL 90 Tab 0   • ondansetron (ZOFRAN) 4 MG Tab tablet Take 1 Tab by mouth every 8 hours as needed for Nausea/Vomiting. 20 Tab 0   • sertraline (ZOLOFT) 50 MG Tab TAKE 1 TABLET ORALLY DAILY (Patient taking differently: Take 75 mg by mouth every day. TAKE 1 TABLET ORALLY DAILY) 90 Tab 0   • MAGNESIUM-OXIDE 400 (241.3 Mg) MG Tab tablet TAKE 1 TABLET BY MOUTH 2 TIMES A DAY 60 Tab 0   • ondansetron (ZOFRAN) 4 MG Tab tablet TAKE 1 TAB BY MOUTH EVERY FOUR HOURS AS NEEDED FOR NAUSEA/VOMITING. 20 Tab 0   • docusate sodium (COLACE) 100 MG Cap Take 1 Cap by mouth 2 times a day. 60 Cap 1   • [employer] VITAMIN C 500 MG tablet TAKE 1 TABLET BY MOUTH 3 TIMES A DAY. 90 Tab 0   • cloNIDine (CATAPRES) 0.1 MG Tab Take one tab po bp greater than 180/95,  may repeat one tab in 30 minutes po q24h 10 Tab 0   • bethanechol (URECHOLINE) 25 MG Tab Take 1 Tab by mouth every 24 hours as needed. 90 Tab 0   • Calcium Carbonate-Vitamin D (OS-THIAGO 500 + D PO) Take 1 tablet by mouth every day.     • ibuprofen (MOTRIN) 200 MG Tab Take 200 mg by mouth 1 time daily as needed. For pain     • vitamin D (CHOLECALCIFEROL) 1000 UNIT Tab Take 4,000 Units by mouth every day.     • acetaminophen (TYLENOL) 160 MG/5ML Suspension Take 15 mg/kg by mouth every 6 hours as needed.       No current facility-administered medications for this visit.      She  has a past medical history of Anesthesia; Aneurysm ([facility]); Anxiety; Arthritis; Breast cancer ([facility]) (2008); Cancer (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); and UTI (urinary tract infection) (). She also has no past medical history of Diabetes ([facility]); Pacemaker; Seizure ([facility]); or Stroke ([facility]).  She  has a past surgical history that includes vein stripping; mass excision general (7/08); breast biopsy (5/27/08); abdominal hysterectomy total (1997); cataract phaco with iol (8/11/2010); colonoscopy (2007); ercp in or (12/28/2012); samuel by laparoscopy (1/8/2013); lumpectomy (2008); pr radiation therapy plan simple (2008); cataract phaco with iol (8/27/2014); and cervical disk and fusion anterior (7/27/2016).  Social History   Substance Use Topics   • Smoking status: Former Smoker     Packs/day: 0.50     Years: 45.00     Quit date: 10/9/1969   • Smokeless tobacco: Never Used   • Alcohol use No      Comment: ocassionally     Social History     Social History Narrative   • No narrative on file     Family History   Problem Relation Age of Onset   • Hypertension Mother    • Stroke Mother    • Other Father         Dementia   • Other Sister         BIpolar   • Other Sister         Bipolar   • Other Sister   "       THyroid disease   • Cancer Neg Hx    • Diabetes Neg Hx    • Heart Disease Neg Hx      Family Status   Relation Status   • Mo    • Son         suicide   • Fa    • Sis    • Sis Alive   • Sis Alive   • Marcelino Alive   • Marcelino Alive   • Neg Hx (Not Specified)         ROS    Please see hpi     All other systems reviewed and are negative     Objective:     Blood pressure 136/68, pulse 77, temperature 37.7 °C (99.8 °F), height 1.715 m (5' 7.5\"), weight 62.1 kg (137 lb), SpO2 95 %, not currently breastfeeding. Body mass index is 21.14 kg/m².  Physical Exam:    Constitutional: Alert, no distress.  Skin: Warm, dry, good turgor, no rashes in visible areas.  Eye: Equal, round and reactive, conjunctiva clear, lids normal.  ENMT: Lips without lesions, good dentition, oropharynx clear.  Neck: Trachea midline, no masses, no thyromegaly. No cervical or supraclavicular lymphadenopathy.  Respiratory: Unlabored respiratory effort, lungs clear to auscultation, no wheezes, no ronchi.  Cardiovascular: Normal S1, S2, no murmur, no edema.  Abdomen: Soft, non-tender, no masses, no hepatosplenomegaly.  Psych: Alert and oriented x3, normal affect and mood.    Blood pressure laying down 136/68                               Sitting down 108/62                                                     Standing up 102/56    HR was not obtained    Assessment and Plan:   The following treatment plan was discussed      1. Dizziness  We will decrease diltiazem further to 30mg (half tab of 60mg)  Patient  was given instructions to make a two-week home BP log  Then provide this to our clinic, we will compare to our office manual measurement       instructed patient that the BP should be taken with the patient in a seated position with the back supported and legs uncrossed. The diastolic pressure may be higher by 6 mmHg if the back is unsupported, and the systolic pressure may be raised by 2 to 8 mmHg if the legs are " crossed.  The arm should be supported at the level of the heart . If the arm is allowed to hang down unsupported, the measured BP will be elevated by 10 to 12 mmHg due to the added hydrostatic pressure induced by gravity        We will then consider appropriate treatment        2. Nausea  We will hold vitamin D for now  As she is convinced this what caused this  Although that is an unlikely cause    3. Postural hypotension    We will decrease diltiazem further to 30 mg p.o. Daily  She was also given the following lifestyle modifications    #1Arising slowly, in stages, from supine to seated to standing. This maneuver is most important in the morning, when orthostatic tolerance is lowest.    #2 Avoiding straining, coughing, and walking in hot weather; these activities reduce venous return and worsen orthostatic hypotension.    #3 Maintaining hydration and avoiding over-heating.    #4 Raising the head of the bed 10 to 20 degrees decreases renal perfusion, thereby activating the renin-angiotensin-aldosterone system and decreasing nocturnal diuresis, which can be pronounced in these patients. These changes relieve orthostatic hypotension by expanding extracellular fluid volume and may reduce end organ damage by reducing supine hypertension.         HEALTH MAINTENANCE:    Instructed to Follow up in clinic or ER for worsening symptoms, difficulty breathing, lack of expected recovery, or should new symptoms or problems arise.    Followup: Return in about 4 weeks (around 8/22/2018) for Reevaluation, With PCP.       Once again this medical record contains text that has been entered with the assistance of computer voice recognition and dictation software.  Therefore, it may contain unintended errors in text, spelling, punctuation, or grammar

## 2018-07-26 NOTE — TELEPHONE ENCOUNTER
Phone Number Called: [phone] (home)       Message: Diltiazam was further reduced to 30mg.  Please cut the 60mg in half and take one half.  Also, Dr. Sloan would like you to keep a 2 week BP log and bring it in when it is finished.     Left Message for patient to call back: yes

## 2018-07-26 NOTE — TELEPHONE ENCOUNTER
VOICEMAIL  1. Caller Name: Lucita Babcock                        Call Back Number: [phone] (home)       2. Message: Pt is unclear about new diltiazam doasage    3. Patient approves office to leave a detailed voicemail/MyChart message: yes

## 2018-07-27 ENCOUNTER — TELEPHONE (OUTPATIENT)
Dept: MEDICAL GROUP | Age: 83
End: 2018-07-27

## 2018-07-27 DIAGNOSIS — E83.52 HYPERCALCEMIA: ICD-10-CM

## 2018-07-27 DIAGNOSIS — E02 SUBCLINICAL IODINE-DEFICIENCY HYPOTHYROIDISM: ICD-10-CM

## 2018-07-27 DIAGNOSIS — E21.3 HYPERPARATHYROIDISM (HCC): ICD-10-CM

## 2018-07-27 NOTE — TELEPHONE ENCOUNTER
1. Caller Name: Lucita Babcock                                           Call Back Number: [phone] (home)         Patient approves a detailed voicemail message: yes    2. SPECIFIC Action To Be Taken: Orders pending, please sign.    3. Diagnosis/Clinical Reason for Request: hyperparathyroidism    4. Specialty & Provider Name/Lab/Imaging Location: [facility] lab and imaging    5. Is appointment scheduled for requested order/referral: no    Patient was informed they will receive a return phone call from the office ONLY if there are any questions before processing their request. Advised to call back if they haven't received a call from the referral department in 5 days.    Lab will not complete orders that were ordered in April.

## 2018-07-30 ENCOUNTER — HOSPITAL ENCOUNTER (OUTPATIENT)
Dept: LAB | Facility: MEDICAL CENTER | Age: 83
End: 2018-07-30
Attending: PHYSICIAN ASSISTANT
Payer: MEDICARE

## 2018-07-30 ENCOUNTER — TELEPHONE (OUTPATIENT)
Dept: ENDOCRINOLOGY | Facility: MEDICAL CENTER | Age: 83
End: 2018-07-30

## 2018-07-30 PROCEDURE — 87086 URINE CULTURE/COLONY COUNT: CPT

## 2018-07-30 NOTE — TELEPHONE ENCOUNTER
1. Caller Name: Elda Guymon                   Call Back Number: [phone]        Patient approves a detailed voicemail message: yes      Elda called and LVM in regards to Lucita. She wanted to know if she needed to have any labs done. She does not have any scheduled appointments with you at this time.

## 2018-07-31 LAB
AMBIGUOUS DTTM AMBI4: NORMAL
SIGNIFICANT IND 70042: NORMAL
SITE SITE: NORMAL
SOURCE SOURCE: NORMAL

## 2018-08-03 ENCOUNTER — HOSPITAL ENCOUNTER (OUTPATIENT)
Dept: RADIOLOGY | Facility: MEDICAL CENTER | Age: 83
End: 2018-08-03
Attending: INTERNAL MEDICINE
Payer: MEDICARE

## 2018-08-03 DIAGNOSIS — K59.09 CHRONIC CONSTIPATION: ICD-10-CM

## 2018-08-03 DIAGNOSIS — R68.81 EARLY SATIETY: ICD-10-CM

## 2018-08-03 DIAGNOSIS — R63.4 LOSS OF WEIGHT: ICD-10-CM

## 2018-08-03 PROCEDURE — A9541 TC99M SULFUR COLLOID: HCPCS

## 2018-08-06 ENCOUNTER — TELEPHONE (OUTPATIENT)
Dept: ENDOCRINOLOGY | Facility: MEDICAL CENTER | Age: 83
End: 2018-08-06

## 2018-08-07 ENCOUNTER — HOSPITAL ENCOUNTER (OUTPATIENT)
Dept: RADIOLOGY | Facility: MEDICAL CENTER | Age: 83
End: 2018-08-07
Attending: SURGERY
Payer: MEDICARE

## 2018-08-07 ENCOUNTER — TELEPHONE (OUTPATIENT)
Dept: MEDICAL GROUP | Age: 83
End: 2018-08-07

## 2018-08-07 DIAGNOSIS — E07.9 THYROID MASS: ICD-10-CM

## 2018-08-07 PROCEDURE — 10022 IR-FINE NEEDLE ASPIR W/GUIDE(FL): CPT

## 2018-08-07 PROCEDURE — 88112 CYTOPATH CELL ENHANCE TECH: CPT

## 2018-08-07 ASSESSMENT — PAIN SCALES - GENERAL: PAINLEVEL_OUTOF10: 1

## 2018-08-07 NOTE — TELEPHONE ENCOUNTER
VOICEMAIL  1. Caller Name: Lucita Babcock                        Call Back Number: [phone] (home)       2. Message: Pt needs clarification on upcoming appointments.    3. Patient approves office to leave a detailed voicemail/MyChart message: yes

## 2018-08-07 NOTE — PROGRESS NOTES
"Patient given [facility] \"Preventing the Spread of Infection\" brochure upon arrival.     US guided Left thyroid nodule fine needle aspiration done by Dr. Jurado; Left anterior aspect of neck access site; 1 jar of cytolyt obtained and sent to pathology lab; pt tolerated the procedure well; pt hemodynamically stable pre/intra/post procedure; all questions and concerns answered prior to being d/c; patient provided with appropriate education for procedure; pt d/c home.  "

## 2018-08-07 NOTE — TELEPHONE ENCOUNTER
Spoke with patient about her decline in the bone density. I indicated I am interested in pursuing the hyperparathyroid question again. She has a suspicious nodule showing up on her ultrasound that could be the parathyroid adenoma. I see that a nuclear medicine parathyroid scan has been ordered. Also she is going to have an FNA thyroid nodule. I will try to keep my eye out for these reports.    Neo Andres M.D.

## 2018-08-07 NOTE — TELEPHONE ENCOUNTER
Phone Number Called: [phone] (home)       Message: Please call [phone] or message on Yi De for clarification.     Left Message for patient to call back: yes

## 2018-08-09 ENCOUNTER — TELEPHONE (OUTPATIENT)
Dept: MEDICAL GROUP | Age: 83
End: 2018-08-09

## 2018-08-13 ENCOUNTER — HOSPITAL ENCOUNTER (OUTPATIENT)
Dept: RADIOLOGY | Facility: MEDICAL CENTER | Age: 83
End: 2018-08-13
Attending: FAMILY MEDICINE
Payer: MEDICARE

## 2018-08-13 ENCOUNTER — TELEPHONE (OUTPATIENT)
Dept: ENDOCRINOLOGY | Facility: MEDICAL CENTER | Age: 83
End: 2018-08-13

## 2018-08-13 DIAGNOSIS — E83.52 HYPERCALCEMIA: ICD-10-CM

## 2018-08-13 DIAGNOSIS — E21.3 HYPERPARATHYROIDISM (HCC): ICD-10-CM

## 2018-08-13 PROCEDURE — A9500 TC99M SESTAMIBI: HCPCS

## 2018-08-14 ENCOUNTER — TELEPHONE (OUTPATIENT)
Dept: ENDOCRINOLOGY | Facility: MEDICAL CENTER | Age: 83
End: 2018-08-14

## 2018-08-14 NOTE — TELEPHONE ENCOUNTER
"Telephone conversation with patient    Her thyroid nodule FNA biopsy is \"benign\". I discussed the limitations of the method and the nodule still have to be followed.    Sestamibi parathyroid scan does not find a probable parathyroid adenoma.    She has taken a long time to get around to doing these tests. We have no updated lab since last December. She is going to be seeing Dr. Devora Gonsalez and I think Dr. Gonsalez is going to probably want to update her lab before making any decisions. I will wait for that discussion.    Neo Andres M.D.  "

## 2018-08-16 ENCOUNTER — TELEPHONE (OUTPATIENT)
Dept: MEDICAL GROUP | Age: 83
End: 2018-08-16

## 2018-08-16 NOTE — TELEPHONE ENCOUNTER
1. Caller Name: Lucita Babcock                                           Call Back Number: [phone] (home)         Patient approves a detailed voicemail message: yes    Pt dropped off a BP log 7/26 - 8/8/18. See

## 2018-08-18 DIAGNOSIS — R11.0 NAUSEA: ICD-10-CM

## 2018-08-21 RX ORDER — ONDANSETRON 4 MG/1
4 TABLET, FILM COATED ORAL EVERY 8 HOURS PRN
Qty: 20 TAB | Refills: 0 | Status: SHIPPED | OUTPATIENT
Start: 2018-08-21 | End: 2018-08-23

## 2018-08-23 ENCOUNTER — HOSPITAL ENCOUNTER (OUTPATIENT)
Dept: LAB | Facility: MEDICAL CENTER | Age: 83
End: 2018-08-23
Attending: PHYSICIAN ASSISTANT
Payer: MEDICARE

## 2018-08-23 ENCOUNTER — TELEPHONE (OUTPATIENT)
Dept: ENDOCRINOLOGY | Facility: MEDICAL CENTER | Age: 83
End: 2018-08-23

## 2018-08-23 DIAGNOSIS — Z01.810 PRE-OPERATIVE CARDIOVASCULAR EXAMINATION: ICD-10-CM

## 2018-08-23 DIAGNOSIS — Z01.812 PRE-OPERATIVE LABORATORY EXAMINATION: ICD-10-CM

## 2018-08-23 LAB
ANION GAP SERPL CALC-SCNC: 8 MMOL/L (ref 0–11.9)
BUN SERPL-MCNC: 13 MG/DL (ref 8–22)
CALCIUM SERPL-MCNC: 11.6 MG/DL (ref 8.5–10.5)
CHLORIDE SERPL-SCNC: 101 MMOL/L (ref 96–112)
CO2 SERPL-SCNC: 31 MMOL/L (ref 20–33)
CREAT SERPL-MCNC: 0.59 MG/DL (ref 0.5–1.4)
EKG IMPRESSION: NORMAL
ERYTHROCYTE [DISTWIDTH] IN BLOOD BY AUTOMATED COUNT: 45.3 FL (ref 35.9–50)
GLUCOSE SERPL-MCNC: 102 MG/DL (ref 65–99)
HCT VFR BLD AUTO: 45.3 % (ref 37–47)
HGB BLD-MCNC: 14.2 G/DL (ref 12–16)
MCH RBC QN AUTO: 29.8 PG (ref 27–33)
MCHC RBC AUTO-ENTMCNC: 31.3 G/DL (ref 33.6–35)
MCV RBC AUTO: 95 FL (ref 81.4–97.8)
PLATELET # BLD AUTO: 276 K/UL (ref 164–446)
PMV BLD AUTO: 9.3 FL (ref 9–12.9)
POTASSIUM SERPL-SCNC: 4.2 MMOL/L (ref 3.6–5.5)
RBC # BLD AUTO: 4.77 M/UL (ref 4.2–5.4)
SODIUM SERPL-SCNC: 140 MMOL/L (ref 135–145)
WBC # BLD AUTO: 9.3 K/UL (ref 4.8–10.8)

## 2018-08-23 PROCEDURE — 36415 COLL VENOUS BLD VENIPUNCTURE: CPT

## 2018-08-23 PROCEDURE — 93005 ELECTROCARDIOGRAM TRACING: CPT

## 2018-08-23 PROCEDURE — 80048 BASIC METABOLIC PNL TOTAL CA: CPT

## 2018-08-23 PROCEDURE — 87086 URINE CULTURE/COLONY COUNT: CPT

## 2018-08-23 PROCEDURE — 85027 COMPLETE CBC AUTOMATED: CPT

## 2018-08-23 PROCEDURE — 93010 ELECTROCARDIOGRAM REPORT: CPT | Performed by: INTERNAL MEDICINE

## 2018-08-24 NOTE — TELEPHONE ENCOUNTER
Telephone conversation with Dr. Gonsalez and patient    Hypercalcemia persists. PTH levels are  high normal and slightly elevated persistently which is definitely abnormal for hypercalcemia. Ultrasound shows a suggestive nodule that could be a parathyroid adenoma. We are hoping that that is the case and removal of a single adenoma will be all it's necessary and therefore allow limited surgery. Patient is very anxious to get on with it. I did emphasize that we don't know for sure what will be found until the surgery is done and we don't know which of her symptoms will improve until successful surgery is done.    Neo Andres M.D.

## 2018-09-07 ENCOUNTER — HOSPITAL ENCOUNTER (OUTPATIENT)
Facility: MEDICAL CENTER | Age: 83
End: 2018-09-07
Attending: SURGERY | Admitting: SURGERY
Payer: MEDICARE

## 2018-09-07 VITALS
OXYGEN SATURATION: 94 % | RESPIRATION RATE: 18 BRPM | TEMPERATURE: 97.7 F | HEIGHT: 67 IN | WEIGHT: 135.14 LBS | BODY MASS INDEX: 21.21 KG/M2 | HEART RATE: 83 BPM

## 2018-09-07 DIAGNOSIS — G89.18 POSTOPERATIVE PAIN: ICD-10-CM

## 2018-09-07 LAB
COLLECT TME BLD: 1128 HH:MM
COLLECT TME BLD: 1150 HH:MM
COLLECT TME BLD: 1156 HH:MM
COLLECT TME BLD: 1229 HH:MM
PTH-INTACT IO % DIF SERPL: 44 %
PTH-INTACT IO % DIF SERPL: 50 %
PTH-INTACT IO % DIF SERPL: 60 %
PTH-INTACT SP1 SERPL-MCNC: 109.2 PG/ML (ref 14–72)
PTH-INTACT SP2 SERPL-MCNC: 60.3 PG/ML
PTH-INTACT SP3 SERPL-MCNC: 61.6 PG/ML
PTH-INTACT SP4 SERPL-MCNC: 54.2 PG/ML
PTH-INTACT SP5 SERPL-MCNC: 43.2 PG/ML

## 2018-09-07 PROCEDURE — 83970 ASSAY OF PARATHORMONE: CPT | Mod: 91

## 2018-09-07 PROCEDURE — 700101 HCHG RX REV CODE 250

## 2018-09-07 PROCEDURE — 700102 HCHG RX REV CODE 250 W/ 637 OVERRIDE(OP)

## 2018-09-07 PROCEDURE — 160002 HCHG RECOVERY MINUTES (STAT): Performed by: SURGERY

## 2018-09-07 PROCEDURE — 160009 HCHG ANES TIME/MIN: Performed by: SURGERY

## 2018-09-07 PROCEDURE — 160035 HCHG PACU - 1ST 60 MINS PHASE I: Performed by: SURGERY

## 2018-09-07 PROCEDURE — 160036 HCHG PACU - EA ADDL 30 MINS PHASE I: Performed by: SURGERY

## 2018-09-07 PROCEDURE — 88305 TISSUE EXAM BY PATHOLOGIST: CPT | Mod: 59

## 2018-09-07 PROCEDURE — 160029 HCHG SURGERY MINUTES - 1ST 30 MINS LEVEL 4: Performed by: SURGERY

## 2018-09-07 PROCEDURE — 700111 HCHG RX REV CODE 636 W/ 250 OVERRIDE (IP)

## 2018-09-07 PROCEDURE — 160048 HCHG OR STATISTICAL LEVEL 1-5: Performed by: SURGERY

## 2018-09-07 PROCEDURE — A9270 NON-COVERED ITEM OR SERVICE: HCPCS

## 2018-09-07 PROCEDURE — 88331 PATH CONSLTJ SURG 1 BLK 1SPC: CPT | Mod: 91

## 2018-09-07 PROCEDURE — 501838 HCHG SUTURE GENERAL: Performed by: SURGERY

## 2018-09-07 PROCEDURE — 502594 HCHG SCISSOR HANDLE, HARMONIC ACE: Performed by: SURGERY

## 2018-09-07 PROCEDURE — 160041 HCHG SURGERY MINUTES - EA ADDL 1 MIN LEVEL 4: Performed by: SURGERY

## 2018-09-07 RX ORDER — REMIFENTANIL HYDROCHLORIDE 1 MG/ML
INJECTION, POWDER, LYOPHILIZED, FOR SOLUTION INTRAVENOUS
Status: DISCONTINUED
Start: 2018-09-07 | End: 2018-09-07 | Stop reason: HOSPADM

## 2018-09-07 RX ORDER — HYDROMORPHONE HYDROCHLORIDE 2 MG/ML
INJECTION, SOLUTION INTRAMUSCULAR; INTRAVENOUS; SUBCUTANEOUS
Status: COMPLETED
Start: 2018-09-07 | End: 2018-09-07

## 2018-09-07 RX ORDER — SODIUM CHLORIDE, SODIUM LACTATE, POTASSIUM CHLORIDE, CALCIUM CHLORIDE 600; 310; 30; 20 MG/100ML; MG/100ML; MG/100ML; MG/100ML
INJECTION, SOLUTION INTRAVENOUS CONTINUOUS
Status: DISCONTINUED | OUTPATIENT
Start: 2018-09-07 | End: 2018-09-07 | Stop reason: HOSPADM

## 2018-09-07 RX ORDER — MEPERIDINE HYDROCHLORIDE 25 MG/ML
INJECTION INTRAMUSCULAR; INTRAVENOUS; SUBCUTANEOUS
Status: COMPLETED
Start: 2018-09-07 | End: 2018-09-07

## 2018-09-07 RX ORDER — HYDROCODONE BITARTRATE AND ACETAMINOPHEN 5; 325 MG/1; MG/1
1-2 TABLET ORAL EVERY 4 HOURS PRN
Qty: 30 TAB | Refills: 0 | Status: SHIPPED | OUTPATIENT
Start: 2018-09-07 | End: 2018-09-14

## 2018-09-07 RX ADMIN — SODIUM CHLORIDE, SODIUM LACTATE, POTASSIUM CHLORIDE, CALCIUM CHLORIDE: 600; 310; 30; 20 INJECTION, SOLUTION INTRAVENOUS at 09:50

## 2018-09-07 RX ADMIN — MEPERIDINE HYDROCHLORIDE 12.5 MG: 25 INJECTION INTRAMUSCULAR; INTRAVENOUS; SUBCUTANEOUS at 14:42

## 2018-09-07 RX ADMIN — FENTANYL CITRATE 50 MCG: 50 INJECTION, SOLUTION INTRAMUSCULAR; INTRAVENOUS at 13:36

## 2018-09-07 RX ADMIN — FENTANYL CITRATE 25 MCG: 50 INJECTION, SOLUTION INTRAMUSCULAR; INTRAVENOUS at 13:45

## 2018-09-07 RX ADMIN — HYDROCODONE BITARTRATE AND ACETAMINOPHEN 30 ML: 2.5; 108 SOLUTION ORAL at 14:40

## 2018-09-07 RX ADMIN — HYDROMORPHONE HYDROCHLORIDE 0.5 MG: 2 INJECTION INTRAMUSCULAR; INTRAVENOUS; SUBCUTANEOUS at 13:50

## 2018-09-07 RX ADMIN — FENTANYL CITRATE 25 MCG: 50 INJECTION, SOLUTION INTRAMUSCULAR; INTRAVENOUS at 13:30

## 2018-09-07 ASSESSMENT — PAIN SCALES - GENERAL
PAINLEVEL_OUTOF10: 8
PAINLEVEL_OUTOF10: 4
PAINLEVEL_OUTOF10: 9
PAINLEVEL_OUTOF10: 8
PAINLEVEL_OUTOF10: 8
PAINLEVEL_OUTOF10: 0
PAINLEVEL_OUTOF10: 1
PAINLEVEL_OUTOF10: 9
PAINLEVEL_OUTOF10: 6
PAINLEVEL_OUTOF10: 8

## 2018-09-07 NOTE — OR NURSING
1353-Assumed care, Slightly moaning, but medicated for pain.  RR even, unlabored.  Dressing to anterior mid neck area, D/I.  Daughter at bedside.    1400-Resting    1415-A/A/O x3 ice to neck    1430-Taking ice cream PO    1440-Medicated for C/O pain    1500-Resting    1510-Void large amount per bedpan.  States comfort    1540-D/C instructions discussed & signed, questions answered    1550-Daughter in, dressing slowly per request    1610-up to W/C gait steady with assist    1620-D/C via W/C to car.  States comfort

## 2018-09-07 NOTE — OR NURSING
1440 Report from CARLOS A Jansen for break coverage. Pt medicated per MAR for shivering. Pt complains of nausea - aromatherapy provided. Pt placed on room air.    1501 Report back to CARLOS A Jansen.

## 2018-09-07 NOTE — OR NURSING
1320 Patient arrived from OR on [facility]. Report received from RN and Anesthesia. Patient's VS WNL, patient arousable, stable, breathing even/non labored.   1330 Per  Corby, may remove arterial line. Pt c/o sever pain, fentanyl given as ordered  1350 Pt stated that pain is still severe and not improving dilaudid IV given.  1351 Report to RN Justyna

## 2018-09-07 NOTE — PROGRESS NOTES
Narcotic  check, risk stratification, and patient informed consent undertaken in the office, does not need to be repeated in hospital setting.    Devora Gonsalez M.D.  [facility]  [phone]

## 2018-09-07 NOTE — OP REPORT
Operative Report     Date: 9/7/2018    Surgeon: Devora Gonsalez M.D.     Assistant: SLADE Chavira    Anesthesiologist: Corby DE PAZ    Pre-operative Diagnosis: E 21.0 primary hyperparathyroidism     Post-operative Diagnosis: same, right superior parathyroid adenoma     Procedure:   1. right minimally invasive parathyroid exploration with intraoperative PTH monitoring (18048 Parathyroidectomy or exploration of parathyroid)  2. unilateral recurrent laryngeal nerve monitoring     Findings: Enlarged right superior parathyroid adenoma. Intraoperative PTH monitoring was performed with levels drawn at appropriate intervals. There was a 60% drop from the highest level, with the final level being 41 picograms/deciliter.     Procedure in detail: The patient was identified in the pre-operative holding area and brought to the operating room. Correct side and site were identified. GETA was smoothly induced. The patient was prepped and draped in the usual sterile fashion.     With the patient in the supine position, the head of the bed slightly elevated, the neck slightly extended, and the patient intubated with a NIM endotracheal tube, the precordial electrodes were placed by the surgical assistant, who then monitored the recurrent laryngeal nerve throughout the procedure.     The neck was prepared with betadiene and draped in the usual fashion. The neck was entered through a short lower transverse cervical incision and carried down through the platysma. Subplatysmal flaps were dissected superiorly and inferiorly down to the sternal notch. The midline cervical fascia was incised down to the capsule of the thyroid.   The strap muscles were mobilized off the right thyroid lobe, and with careful dissection we identified an enlarged appearing right superior gland. The vascular pedicles were divided with the Ligasure device. The gland was sent for immediate pathologic exam, which revealed a hypercellular enlarged parathyroid gland.      The inferior gland was slightly enlarged and globular, but much less so than the superior. A portion of this was sent for biopsy, which revealed normocellular parathyroid tissue.    Intraoperative PTH monitoring was performed with levels drawn at appropriate intervals. There was a 60% drop from the highest level, with the final level being right picograms/deciliter.     The integrity of the right laryngeal nerve was documented. Small pieces of Surgicel Fibrillar were placed in the right side of the neck. The midline cervical fascia was reapproximated with running 3-0 Vicryl. Platysma was reapproximated with interrupted 3-0 Vicryl. The skin was closed with running monocryl.     The patient was awakened from general anesthetic, and was taken to the recovery room in stable condition.     Sponge and needle counts were correct at the end of the case.     Specimen:   1. right superior parathyroid adenoma   2. Portion right inferior parathyroid    EBL: minimal     Dispo: stable, extubated, to PACU     Devora Gonsalez M.D.   [facility]   [phone]

## 2018-09-07 NOTE — DISCHARGE INSTRUCTIONS
ACTIVITY: Rest and take it easy for the first 24 hours.  A responsible adult is recommended to remain with you during that time.  It is normal to feel sleepy.  We encourage you to not do anything that requires balance, judgment or coordination.    MILD FLU-LIKE SYMPTOMS ARE NORMAL. YOU MAY EXPERIENCE GENERALIZED MUSCLE ACHES, THROAT IRRITATION, HEADACHE AND/OR SOME NAUSEA.    FOR 24 HOURS DO NOT:  Drive, operate machinery or run household appliances.  Drink beer or alcoholic beverages.   Make important decisions or sign legal documents.    SPECIAL INSTRUCTIONS: *FOLLOW DR SOLARES'S INSTRUCTIONS**    DIET: To avoid nausea, slowly advance diet as tolerated, avoiding spicy or greasy foods for the first day.  Add more substantial food to your diet according to your physician's instructions.  Babies can be fed formula or breast milk as soon as they are hungry.  INCREASE FLUIDS AND FIBER TO AVOID CONSTIPATION.    SURGICAL DRESSING/BATHING: *KEEP CLEAN & DRY, MAY SHOWER, BUT NO SWIMMING, BATHS, OR HOT TUB ( DO NOT SUBMERGE INCISION LINE IN WATER)*MAY CHANGE DRESSING TO BANDAID IF IT  BECOMES WET*    FOLLOW-UP APPOINTMENT:  A follow-up appointment should be arranged with your doctor  *TUESDAY 9/18/18 130PM**;PLEASE HAVE CALCIUM LEVEL CHECKED 2 DAYS PRIOR     You should CALL YOUR PHYSICIAN if you develop:  Fever greater than 101 degrees F.  Pain not relieved by medication, or persistent nausea or vomiting.  Excessive bleeding (blood soaking through dressing) or unexpected drainage from the wound.  Extreme redness or swelling around the incision site, drainage of pus or foul smelling drainage.  Inability to urinate or empty your bladder within 8 hours.  Problems with breathing or chest pain.    You should call 911 if you develop problems with breathing or chest pain.  If you are unable to contact your doctor or surgical center, you should go to the nearest emergency room or urgent care center.  Physician's telephone #:  *[phone]**    If any questions arise, call your doctor.  If your doctor is not available, please feel free to call the Surgical Center at [phone].  The Center is open Monday through Friday from 7AM to 7PM.  You can also call the HEALTH HOTLINE open 24 hours/day, 7 days/week and speak to a nurse at [phone], or toll free at [phone].    A registered nurse may call you a few days after your surgery to see how you are doing after your procedure.    MEDICATIONS: Resume taking daily medication.  Take prescribed pain medication with food.  If no medication is prescribed, you may take non-aspirin pain medication if needed.  PAIN MEDICATION CAN BE VERY CONSTIPATING.  Take a stool softener or laxative such as senokot, pericolace, or milk of magnesia if needed.    Prescription given for *NORCO 5/325, & CALCIUM CARBONATE*ANTACID (TUMS ULTRA 1000)*.  Last pain medication given at **hydrocodone with APAP (7.5/325) given at 240pm*.    If your physician has prescribed pain medication that includes Acetaminophen (Tylenol), do not take additional Acetaminophen (Tylenol) while taking the prescribed medication.    Depression / Suicide Risk    As you are discharged from this [facility], it is important to learn how to keep safe from harming yourself.    Recognize the warning signs:  · Abrupt changes in personality, positive or negative- including increase in energy   · Giving away possessions  · Change in eating patterns- significant weight changes-  positive or negative  · Change in sleeping patterns- unable to sleep or sleeping all the time   · Unwillingness or inability to communicate  · Depression  · Unusual sadness, discouragement and loneliness  · Talk of wanting to die  · Neglect of personal appearance   · Rebelliousness- reckless behavior  · Withdrawal from people/activities they love  · Confusion- inability to concentrate     If you or a loved one observes any of these behaviors or has concerns  about self-harm, here's what you can do:  · Talk about it- your feelings and reasons for harming yourself  · Remove any means that you might use to hurt yourself (examples: pills, rope, extension cords, firearm)  · Get professional help from the community (Mental Health, Substance Abuse, psychological counseling)  · Do not be alone:Call your Safe Contact- someone whom you trust who will be there for you.  · Call your local CRISIS HOTLINE 578-8279 or 590-522-6553  · Call your local Children's Mobile Crisis Response Team [place] (566) 219-8863 or www.[employer]  · Call the toll free National Suicide Prevention Hotlines   · National Suicide Prevention Lifeline 253-435-AFRI (7162)  · National Hope Line Network 800-SUICIDE (166-5218)

## 2018-09-07 NOTE — PROGRESS NOTES
Post-Operative Discharge Instructions for Parathyroidectomy     ACTIVITY:   • Most patients are able to return to a full-time work schedule in 1-2 weeks; however this may vary according to your job. It may take longer to return to heavy physical or other demanding work, or shorter if you are feeling well.    • Do NOT drive a car until you are able to turn the neck side to side, which may take 1-2 weeks.     • Do NOT drive while you are taking pain medicines.     DIET:   • You may have temporary throat discomfort or difficulty swallowing. This is due to the surgery around your larynx (voice box) and esophagus (swallowing tube). These symptoms will gradually improve over the course of several weeks.     • Drink and eat foods that can be swallowed easily, e.g. juice, soup, gelatin, applesauce, scrambled eggs or mashed potatoes.     • You may be able to return to your usual diet in a couple of days.     INCISION CARE:   • You may remove the outer dressing 48 hours after surgery.    • You may shower 48 hours after surgery but please do not swim or soak in a tub for at least 2 weeks. After you finish showering, just pat your incision dry. If it is draining clear fluid, you can cover it with a dry dressing (such as gauze). Do NOT scrub with soap or washcloth for the first 10 days.     • Mild swelling at the incision site will go away in 4-6 weeks. The pink line will slowly fade to white during the next 6-12 months.     • Use a sunscreen (SPF#30 or higher) or wear a scarf for protection if in the sun for the first 6 months to a year as the sun can darken your scar.     • You may begin to use a hypoallergenic moisturizing cream (no vitamin E, Mederma, or other “scar” creams) along the incision after 2 weeks.     COMMON PROBLEMS:   • Numbness of the skin under the chin or above the incision is normal and should go away in a few weeks.     • You may feel a lump or pressure in your throat sensation when swallowing for a few  weeks.     • Your incision may feel itchy while it heals. Avoid rubbing or scratching if possible.     • You may feel neck stiffness, tightness, a pulling feeling, mild aching chest discomfort, headache, ear pain or congestion. Take a mild pain medicine such as Tylenol or Advil. Put heat on the area using a hot water bottle, heating pad or warm shower.     • Some people prefer to sleep with an extra pillow for the first few days after the surgery, this helps keep swelling around your incision to a minimum.     • Your voice may be hoarse or weak. Pitch or tone may change. You may have difficulty singing. This usually goes back to normal over 6 weeks to 6 months.     • After surgery, you may notice a change in your mood, emotional ups and downs, depression, irritability or fatigue and weakness. These changes will get better as time passes.     • You do not need to be at bed rest, being active is normally well tolerated within reason.     MEDICATIONS:   • Take 2000 mg of TUMS over-the-counter after your surgery (two tablets). If you begin experiencing symptoms of low calcium such as numbness or tingling in your fingertips or around your mouth, you may increase the amount of TUMS you take up to 10 tablets per day. If symptoms persist on 10 TUMS a day, call my office to inform me.     Please note that it is common for the calcium level to be low following removal of the parathyroid, and you may experience numbness or tingling, which is a sign of low calcium. If this happens, it should improve when you take your calcium supplements. If it does not, please call your doctor.     • Please resume your pre-hospital medications. You should follow-up with your primary care physician regarding new prescriptions and refills.     • We will supply you with a prescription for a mild narcotic pain medication. You are not required to take it. If you do take it, please do not drive or drink alcohol as these in combination may make you  drowsy. Most patients do not need strong pain medicine by the time you leave the hospital. You can take Tylenol (acetaminophen) or ibuprofen (e.g. Advil) as needed.     • If you are taking supplemental calcium or narcotics, you may also want to take a stool softener, such as over-the-counter Colace or Senna, to avoid constipation. Stay hydrated by drinking some extra water.     LABORATORY DRAW:  You have been provided with a prescription to have you calcium levels drawn prior to your follow up visit. Have your calcium level checked at your laboratory of choice.     CALL YOUR DOCTOR IF:   • Call your doctor or go to the Emergency Room if you have fever (temperature greater than 100.5), chills, lightheadedness, shortness of breath, difficulty breathing, nausea, vomiting, numbness or tingling in your fingers, hands, or mouth, muscle spasms, or if you notice signs of wound infection (redness, tenderness, or drainage from the incision). Please also call or go to the Emergency Room if you have any other urgent concerns.     FOLLOW-UP:   With Dr. Gonsalez in one week, call to schedule, [phone].    Devora Gonsalez M.D.  [facility]    [address]  [place] [address]

## 2018-09-11 ENCOUNTER — HOSPITAL ENCOUNTER (OUTPATIENT)
Dept: LAB | Facility: MEDICAL CENTER | Age: 83
End: 2018-09-11
Attending: SURGERY
Payer: MEDICARE

## 2018-09-11 LAB — CALCIUM SERPL-MCNC: 11.6 MG/DL (ref 8.5–10.5)

## 2018-09-11 PROCEDURE — 36415 COLL VENOUS BLD VENIPUNCTURE: CPT

## 2018-09-11 PROCEDURE — 82310 ASSAY OF CALCIUM: CPT

## 2018-09-14 ENCOUNTER — HOSPITAL ENCOUNTER (OUTPATIENT)
Dept: LAB | Facility: MEDICAL CENTER | Age: 83
End: 2018-09-14
Attending: SURGERY
Payer: MEDICARE

## 2018-09-14 LAB — CALCIUM SERPL-MCNC: 10.5 MG/DL (ref 8.5–10.5)

## 2018-09-14 PROCEDURE — 36415 COLL VENOUS BLD VENIPUNCTURE: CPT

## 2018-09-14 PROCEDURE — 82310 ASSAY OF CALCIUM: CPT

## 2018-09-17 DIAGNOSIS — R11.0 NAUSEA: ICD-10-CM

## 2018-09-18 RX ORDER — ONDANSETRON 4 MG/1
4 TABLET, FILM COATED ORAL EVERY 8 HOURS PRN
Qty: 20 TAB | Refills: 0 | Status: SHIPPED | OUTPATIENT
Start: 2018-09-18 | End: 2018-10-09 | Stop reason: SDUPTHER

## 2018-09-21 ENCOUNTER — HOSPITAL ENCOUNTER (OUTPATIENT)
Dept: LAB | Facility: MEDICAL CENTER | Age: 83
End: 2018-09-21
Attending: SURGERY
Payer: MEDICARE

## 2018-09-21 LAB — CALCIUM SERPL-MCNC: 10.3 MG/DL (ref 8.5–10.5)

## 2018-09-21 PROCEDURE — 36415 COLL VENOUS BLD VENIPUNCTURE: CPT

## 2018-09-21 PROCEDURE — 82310 ASSAY OF CALCIUM: CPT

## 2018-09-29 DIAGNOSIS — J06.9 VIRAL URI: ICD-10-CM

## 2018-10-01 RX ORDER — LORATADINE 10 MG
TABLET ORAL
Qty: 90 TAB | Refills: 0 | Status: SHIPPED | OUTPATIENT
Start: 2018-10-01 | End: 2018-11-05 | Stop reason: SDUPTHER

## 2018-10-09 ENCOUNTER — PATIENT OUTREACH (OUTPATIENT)
Dept: HEALTH INFORMATION MANAGEMENT | Facility: OTHER | Age: 83
End: 2018-10-09

## 2018-10-09 DIAGNOSIS — R11.0 NAUSEA: ICD-10-CM

## 2018-10-09 NOTE — PROGRESS NOTES
1. Attempt #:1 patient wants to know when zoster will be available    2. HealthConnect Verified: yes  Effective Date: 1/1/2018     3. Verify PCP: yes    5. WebIZ Checked & Epic Updated: Yes   Td (adult), adsorbed   Influenza w/preserv.   Zoster Walter (Shingrix)   WebIZ Recommendations: FLU, TD and SHINGRIX (Shingles)  · Is patient due for Tdap? NO  · Is patient due for Shingles? YES. Patient was not notified of copay/out of pocket cost.    6. Reviewed/Updated the following with patient:       •   Communication Preference Obtained? YES    7. Annual Wellness Visit Scheduling  · Scheduling Status: Not scheduled, will discuss with provider.     9. Care Gap Scheduling (Attempt to Schedule EACH Overdue Care Gap!)     Health Maintenance Due   Topic Date Due   • IMM ZOSTER VACCINES (2 of 3) 04/27/2017   • Annual Wellness Visit  08/23/2018   • IMM INFLUENZA (1) 09/01/2018        Scheduled patient for Immunizations: FLU

## 2018-10-10 RX ORDER — ONDANSETRON 4 MG/1
4 TABLET, FILM COATED ORAL EVERY 8 HOURS PRN
Qty: 20 TAB | Refills: 0 | Status: SHIPPED | OUTPATIENT
Start: 2018-10-10 | End: 2019-01-22

## 2018-10-11 ENCOUNTER — TELEPHONE (OUTPATIENT)
Dept: MEDICAL GROUP | Age: 83
End: 2018-10-11

## 2018-10-12 NOTE — TELEPHONE ENCOUNTER
1. Caller Name: Lucita Babcock                                           Call Back Number: [phone] (home)         Patient approves a detailed voicemail message: N\A    Pt has increased her diltiazem to 1 tab daily.  Pt took 1 tab during the afternoon, and her blood pressure was 177/82 at 4:30pm.  Pt says she thinks her higher blood pressure is due to being in the house all day and not being able to get out.  She says taking 1/4 or 1/2 of a xanax helps when she is feeling especially anxious.     Pt is wondering if she should continue taking 1 tab of diltiazem every night.

## 2018-10-12 NOTE — TELEPHONE ENCOUNTER
1. Caller Name: Lucita Babcock                                           Call Back Number: [phone] (home)         Patient approves a detailed voicemail message: yes    Pt informed of PCP's note.    Pt states she took a whole diltiazem last night and this morning her BP was 174/82.  Pt took an additional 1/2 of a diltiazem tab, and it brought it down to 143/74.    Pt was advised to try breathing deeply for 30 seconds, and taking BP again in 2 minutes.    Pt will keep provider informed whether this helps or not.    Pt is also wondering if there is a medication to help with her anxiety that different than the xanax that she is taking currently.

## 2018-10-12 NOTE — TELEPHONE ENCOUNTER
VOICEMAIL  1. Caller Name: Lucita Babcock                        Call Back Number: [phone] (home)       2. Message: Pt has been experiencing higher BP than usual, and has increased her diltiazem to 1 tab daily.   Pt's BP was 173/88 in the morning and 168/82 in the afternoon of 10/11/18.    3. Patient approves office to leave a detailed voicemail/MyChart message: N\A

## 2018-10-16 NOTE — TELEPHONE ENCOUNTER
Phone Number Called: [phone] (home)       Message: Was unable to LVM for patient, VM not set up, unable to let patient know of message below.     Left Message for patient to call back: no

## 2018-10-17 NOTE — TELEPHONE ENCOUNTER
1. Caller Name: Lucita Babcock                                           Call Back Number: [phone] (home)         Patient approves a detailed voicemail message: yes    Pt advised of provider's message.  Pt is ok with stopping xanax and trying valium as the xanax makes her too sleepy

## 2018-10-23 ENCOUNTER — TELEPHONE (OUTPATIENT)
Dept: MEDICAL GROUP | Age: 83
End: 2018-10-23

## 2018-10-23 NOTE — TELEPHONE ENCOUNTER
ESTABLISHED PATIENT PRE-VISIT PLANNING     Note: Patient will not be contacted if there is no indication to call.     1.  Reviewed notes from the last few office visits within the medical group: Yes    2.  If any orders were placed at last visit or intended to be done for this visit (i.e. 6 mos follow-up), do we have Results/Consult Notes?        •  Labs -    Note: If patient appointment is for lab review and patient did not complete labs, check with provider if OK to reschedule patient until labs completed.       •  Imaging - Imaging was not ordered at last office visit.       •  Referrals - Referral ordered, patient has NOT been seen.    3. Is this appointment scheduled as a Hospital Follow-Up? No    4.  Immunizations were updated in Epic using WebIZ?: Epic matches WebIZ       •  Web Iz Recommendations: FLU    5.  Patient is due for the following Health Maintenance Topics:   Health Maintenance Due   Topic Date Due   • IMM ZOSTER VACCINES (2 of 3) 04/27/2017   • Annual Wellness Visit  08/23/2018   • IMM INFLUENZA (1) 09/01/2018       - Patient is up-to-date on all Health Maintenance topics. No records have been requested at this time.    6.  MDX printed for Provider? YES    7.  Patient was NOT informed to arrive 15 min prior to their scheduled appointment and bring in their medication bottles.

## 2018-10-24 ENCOUNTER — OFFICE VISIT (OUTPATIENT)
Dept: MEDICAL GROUP | Age: 83
End: 2018-10-24
Payer: MEDICARE

## 2018-10-24 VITALS
HEIGHT: 67 IN | OXYGEN SATURATION: 93 % | BODY MASS INDEX: 21.35 KG/M2 | HEART RATE: 78 BPM | SYSTOLIC BLOOD PRESSURE: 136 MMHG | WEIGHT: 136 LBS | DIASTOLIC BLOOD PRESSURE: 66 MMHG | TEMPERATURE: 99.3 F

## 2018-10-24 DIAGNOSIS — Z23 NEED FOR VACCINATION: ICD-10-CM

## 2018-10-24 DIAGNOSIS — R42 DIZZINESS: ICD-10-CM

## 2018-10-24 PROCEDURE — 90662 IIV NO PRSV INCREASED AG IM: CPT | Performed by: FAMILY MEDICINE

## 2018-10-24 PROCEDURE — 99213 OFFICE O/P EST LOW 20 MIN: CPT | Mod: 25 | Performed by: FAMILY MEDICINE

## 2018-10-24 PROCEDURE — G0008 ADMIN INFLUENZA VIRUS VAC: HCPCS | Performed by: FAMILY MEDICINE

## 2018-10-24 RX ORDER — ALPRAZOLAM 0.25 MG/1
TABLET ORAL
COMMUNITY
End: 2018-11-20 | Stop reason: SDUPTHER

## 2018-10-24 NOTE — PROGRESS NOTES
This medical record contains text that has been entered with the assistance of computer voice recognition and dictation software.  Therefore, it may contain unintended errors in text, spelling, punctuation, or grammar    Chief Complaint   Patient presents with   • Dizziness   • Anxiety         Lucita Babcock is a 83 y.o. female here evaluation and management of: dizziness      HPI:     Dizziness  Patient continues to complain of dizziness when she takes her blood pressure medications.  We have tried many different medications many categories she seems to be doing the best on diltiazem 60 mg p.o. daily  She does have improvement when she does her vertigo exercises.  She has been more sedentary lately and not getting out as much.  She notices she becomes more dizzy when she is isolated and feels lonely.  Her daughter who is accompanied her today states that it all appears to be related to anxiety.  Which is worsens when she does not exercise.    Current medicines (including changes today)  Current Outpatient Prescriptions   Medication Sig Dispense Refill   • ALPRAZolam (XANAX) 0.25 MG Tab alprazolam 0.25 mg tablet   1 tablet by mouth daily at bedtime     • DILTIAZem (CARDIZEM) 30 MG Tab Take 1 Tab by mouth every day. (Patient taking differently: Take 60 mg by mouth every day.) 90 Tab 0   • [employer] VITAMIN C 500 MG tablet TAKE 1 TABLET BY MOUTH THREE TIMES A DAY 90 Tab 0   • Calcium Carbonate Antacid (TUMS ULTRA 1000) 1000 MG Chew Tab Take 2 Tabs by mouth every morning. 60 Tab 3   • sertraline (ZOLOFT) 50 MG Tab TAKE 1 TABLET ORALLY DAILY 90 Tab 0   • ondansetron (ZOFRAN) 4 MG Tab tablet Take 1 Tab by mouth every 8 hours as needed for Nausea/Vomiting. 20 Tab 0   • bethanechol (URECHOLINE) 25 MG Tab Take 1 Tab by mouth every 24 hours as needed. 90 Tab 0   • acetaminophen (TYLENOL) 160 MG/5ML Suspension Take 15 mg/kg by mouth every 6 hours as needed.     • ondansetron (ZOFRAN) 4 MG Tab tablet TAKE 1 TAB BY MOUTH EVERY 8  HOURS AS NEEDED FOR NAUSEA/VOMITING. (Patient not taking: Reported on 10/24/2018) 20 Tab 0     No current facility-administered medications for this visit.      She  has a past medical history of Anesthesia; Aneurysm (HCC); Anxiety; Arthritis; Breast cancer (HCC) (); Cancer (HCC) (); CATARACT; Dental disorder; Depression; Heart burn; Hypertension; Indigestion; Injury of cervical spine (HCC) (2016); Osteopenia; 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HCC); Sarcoid (HCC); Urinary bladder disorder; and UTI (urinary tract infection).  She  has a past surgical history that includes vein stripping; mass excision general (); breast biopsy (08); abdominal hysterectomy total (); cataract phaco with iol (2010); colonoscopy (); ercp in or (2012); samuel by laparoscopy (2013); lumpectomy (); pr radiation therapy plan simple (); cataract phaco with iol (2014); cervical disk and fusion anterior (2016); and parathyroid exploration (2018).  Social History   Substance Use Topics   • Smoking status: Former Smoker     Packs/day: 0.50     Years: 45.00     Quit date: 10/9/1969   • Smokeless tobacco: Never Used   • Alcohol use No     Social History     Social History Narrative   • No narrative on file     Family History   Problem Relation Age of Onset   • Hypertension Mother    • Stroke Mother    • Other Father         Dementia   • Other Sister         BIpolar   • Other Sister         Bipolar   • Other Sister         THyroid disease   • Cancer Neg Hx    • Diabetes Neg Hx    • Heart Disease Neg Hx      Family Status   Relation Status   • Mo    • Son         suicide   • Fa    • Sis    • Sis Alive   • Sis Alive   • Marcelino Alive   • Marcelino Alive   • Neg Hx (Not Specified)         ROS    Please see hpi     All other systems reviewed and are negative     Objective:     Blood pressure 136/66, pulse 78, temperature 37.4 °C (99.3 °F), temperature source Temporal, height  "1.702 m (5' 7\"), weight 61.7 kg (136 lb), SpO2 93 %, not currently breastfeeding. Body mass index is 21.3 kg/m².  Physical Exam:    Constitutional: Alert, no distress.  Skin: Warm, dry, good turgor, no rashes in visible areas.  Eye: Equal, round and reactive, conjunctiva clear, lids normal.  ENMT: Lips without lesions, good dentition, oropharynx clear.  Neck: Trachea midline, no masses, no thyromegaly. No cervical or supraclavicular lymphadenopathy.  Respiratory: Unlabored respiratory effort, lungs clear to auscultation, no wheezes, no ronchi.  Cardiovascular: Normal S1, S2, no murmur, no edema.  Abdomen: Soft, non-tender, no masses, no hepatosplenomegaly.  Psych: Alert and oriented x3, normal affect and mood.          Assessment and Plan:   The following treatment plan was discussed      1. Need for vaccination  Vaccine was administered today without adverse event.    - INFLUENZA VACCINE, HIGH DOSE (65+ ONLY)    2. Dizziness  Will obtain MRI of head  Continue repositioning techniques    - MR-BRAIN-W/O; Future        HEALTH MAINTENANCE:    Instructed to Follow up in clinic or ER for worsening symptoms, difficulty breathing, lack of expected recovery, or should new symptoms or problems arise.    Followup: Return in about 2 months (around 12/24/2018) for Reevaluation.       Once again this medical record contains text that has been entered with the assistance of computer voice recognition and dictation software.  Therefore, it may contain unintended errors in text, spelling, punctuation, or grammar         "

## 2018-10-24 NOTE — ASSESSMENT & PLAN NOTE
Patient continues to complain of dizziness when she takes her blood pressure medications.  We have tried many different medications many categories she seems to be doing the best on diltiazem 60 mg p.o. daily  She does have improvement when she does her vertigo exercises.  She has been more sedentary lately and not getting out as much.  She notices she becomes more dizzy when she is isolated and feels lonely.  Her daughter who is accompanied her today states that it all appears to be related to anxiety.  Which is worsens when she does not exercise.

## 2018-10-29 DIAGNOSIS — J06.9 VIRAL URI: ICD-10-CM

## 2018-10-30 RX ORDER — ASCORBIC ACID 500 MG
500 TABLET ORAL 3 TIMES DAILY
Qty: 90 TAB | Refills: 0 | Status: SHIPPED | OUTPATIENT
Start: 2018-10-30 | End: 2019-08-15 | Stop reason: SDUPTHER

## 2018-11-05 DIAGNOSIS — J06.9 VIRAL URI: ICD-10-CM

## 2018-11-06 DIAGNOSIS — I10 ESSENTIAL HYPERTENSION: ICD-10-CM

## 2018-11-06 RX ORDER — ASCORBIC ACID 500 MG
TABLET ORAL
Qty: 90 TAB | Refills: 0 | Status: SHIPPED | OUTPATIENT
Start: 2018-11-06 | End: 2019-03-25

## 2018-11-13 ENCOUNTER — HOSPITAL ENCOUNTER (OUTPATIENT)
Dept: RADIOLOGY | Facility: MEDICAL CENTER | Age: 83
End: 2018-11-13
Attending: FAMILY MEDICINE
Payer: MEDICARE

## 2018-11-13 DIAGNOSIS — R42 DIZZINESS: ICD-10-CM

## 2018-11-13 PROCEDURE — 70551 MRI BRAIN STEM W/O DYE: CPT

## 2018-11-14 ENCOUNTER — TELEPHONE (OUTPATIENT)
Dept: MEDICAL GROUP | Age: 83
End: 2018-11-14

## 2018-11-14 NOTE — TELEPHONE ENCOUNTER
1. Caller Name: Lucita Babcock                                           Call Back Number: [phone] (home)         Patient approves a detailed voicemail message: yes    Pt had imaging done on 11/13/18 and they advised her that she would receive the results today from her PCP.  Pt is very anxious to get these results.

## 2018-11-15 NOTE — PROGRESS NOTES
I discussed results and plan with patient, who stated understanding.       Robbie Sloan MD  [facility], [address] [address]

## 2018-11-15 NOTE — TELEPHONE ENCOUNTER
Phone Number Called: [phone] (home)       Message: Called spoke with patient to inform of her results. Patient thankful for call back.     Left Message for patient to call back: no

## 2018-11-15 NOTE — TELEPHONE ENCOUNTER
The radiologist stated that it was normal for her age.  That she had age-related changes in there.

## 2018-11-20 ENCOUNTER — TELEPHONE (OUTPATIENT)
Dept: MEDICAL GROUP | Age: 83
End: 2018-11-20

## 2018-11-20 DIAGNOSIS — F41.9 ANXIETY: ICD-10-CM

## 2018-11-20 DIAGNOSIS — R29.898 UPPER EXTREMITY WEAKNESS: ICD-10-CM

## 2018-11-20 NOTE — TELEPHONE ENCOUNTER
1. Caller Name: Lucita Babcock                                           Call Back Number: [phone] (home)         Patient approves a detailed voicemail message: yes    2. SPECIFIC Action To Be Taken: Referral pending, please sign.    3. Diagnosis/Clinical Reason for Request: upper extremity weakness    4. Specialty & Provider Name/Lab/Imaging Location: occupational therapy    5. Is appointment scheduled for requested order/referral: no    Patient was informed they will receive a return phone call from the office ONLY if there are any questions before processing their request. Advised to call back if they haven't received a call from the referral department in 5 days.

## 2018-11-22 NOTE — TELEPHONE ENCOUNTER
Was the patient seen in the last year in this department? Yes lov 10/24/18    Does patient have an active prescription for medications requested? No     Received Request Via: Pharmacy

## 2018-11-27 DIAGNOSIS — F41.9 ANXIETY: ICD-10-CM

## 2018-11-27 RX ORDER — ALPRAZOLAM 0.25 MG/1
0.25 TABLET ORAL NIGHTLY PRN
Qty: 30 TAB | Refills: 2 | Status: SHIPPED
Start: 2018-11-27 | End: 2018-12-27

## 2019-01-08 ENCOUNTER — TELEPHONE (OUTPATIENT)
Dept: MEDICAL GROUP | Age: 84
End: 2019-01-08

## 2019-01-09 ENCOUNTER — OCCUPATIONAL THERAPY (OUTPATIENT)
Dept: OCCUPATIONAL THERAPY | Facility: REHABILITATION | Age: 84
End: 2019-01-09
Attending: FAMILY MEDICINE
Payer: MEDICARE

## 2019-01-09 DIAGNOSIS — R29.898 UPPER EXTREMITY WEAKNESS: ICD-10-CM

## 2019-01-09 PROCEDURE — 97166 OT EVAL MOD COMPLEX 45 MIN: CPT

## 2019-01-09 PROCEDURE — 97530 THERAPEUTIC ACTIVITIES: CPT

## 2019-01-09 ASSESSMENT — BALANCE ASSESSMENTS
BALANCE - STANDING STATIC: FAIR +
BALANCE - SITTING STATIC: GOOD
BALANCE - SITTING DYNAMIC: GOOD
BALANCE - STANDING DYNAMIC: FAIR

## 2019-01-09 ASSESSMENT — ENCOUNTER SYMPTOMS: PAIN SCALE: 0

## 2019-01-09 ASSESSMENT — ACTIVITIES OF DAILY LIVING (ADL): POOR_BALANCE: 1

## 2019-01-09 NOTE — OP THERAPY EVALUATION
"  Outpatient Occupational Therapy  INITIAL EVALUATION    [facility] Occupational Therapy [address]  [address]  [place] [address]  Phone:  [phone]  Fax:  [phone]    Date of Evaluation: 2019    Patient: Lucita Babcock  YOB: 1935  MRN: [id number]     Referring Provider: [place] [address] [address]   Referring Diagnosis Upper extremity weakness [R29.898]     Time Calculation  Start time: 1030  Stop time: 1130 Time Calculation (min): 60 minutes     Occupational Therapy Occurrence Codes    Date of onset of impairment:  18   Date of occupational therapy care plan established or reviewed:  19   Date of occupational therapy treatment started:  19          Chief Complaint: Extremity Weakness and Loss Of Balance    Visit Diagnoses     ICD-10-CM   1. Upper extremity weakness R29.898       Subjective   History of Present Illness:     Date of onset:  2018    Date of surgery:  2018    History of chief complaint:  Pt reports progressive weakness and dizziness since thyroid surgery on 18 affecting her ability to perform IADLs and functional mobility    Prior level of function:  Pt has required intermittent supervision at home    Pain:     Current pain ratin    Activity Tolerance:     Current activity tolerance / Recreational activities:  Watches TV, spends time with family    Work:  Retired     Social Support:     Lives in:  Multiple-level home (stays on 1st floor)    Lives with:  Alone    Hand Dominance:     Hand dominance:  Left    Diagnostic Tests:     MRI studies: normal      Treatments:     Treatments to date:  PT in     Patient Goals:     Patient goals for therapy:  \"To be less dizzy and stronger\"      Past Medical History:   Diagnosis Date   • Anesthesia     nausea (pt declines)    • Aneurysm (HCC)     \"arch over the heart\"   • Anxiety    • Arthritis    • 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   • Cancer (HCC) 2008    breast   • CATARACT     bilat IOL   • Dental disorder     upper partial   • Depression    • Heart burn    • Hypertension    • Indigestion    • Injury of cervical spine (HCC) July 2016    C-spine decompression/laminectomy   • Osteopenia    • 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HCC)    • Sarcoid (HCC)    • Urinary bladder disorder     frequency    • UTI (urinary tract infection)     recurrent     Past Surgical History:   Procedure Laterality Date   • PARATHYROID EXPLORATION  9/7/2018    Procedure: PARATHYROID EXPLORATION- MINIMALLY INVASIVE,   NIMS RECURRENT LARYNGEAL NERVE MONITORING RIGHT;  Surgeon: Devora Gonsalez M.D.;  Location: SURGERY SAME DAY [facility];  Service: General   • CERVICAL DISK AND FUSION ANTERIOR  7/27/2016    Procedure: CERVICAL DISK AND FUSION ANTERIOR C4-7;  Surgeon: Niles Khan M.D.;  Location: SURGERY [facility];  Service:    • CATARACT PHACO WITH IOL  8/27/2014    Performed by Avani Allen M.D. at SURGERY SAME DAY [facility]   • AMBROCIO BY LAPAROSCOPY  1/8/2013    Performed by Kenrick Howell M.D. at SURGERY [facility]   • ERCP IN OR  12/28/2012    Performed by Jay Dubois M.D. at SURGERY [facility]   • CATARACT PHACO WITH IOL  8/11/2010    Performed by AVANI ALLEN at SURGERY SAME DAY [facility]   • MASS EXCISION GENERAL  7/08    chest mass biopsy sarcoid   • BREAST BIOPSY  5/27/08    Performed by KENRICK HOWELL at SURGERY SAME DAY [facility]   • LUMPECTOMY  2008    right   • PB RADIATION THERAPY PLAN SIMPLE  2008    right   • COLONOSCOPY  2007    2 polyps   • ABDOMINAL HYSTERECTOMY TOTAL  1997   • VEIN STRIPPING      R leg       Precautions: fall       Objective   Observation and functional movement:  Well nourished appearing elderly female in no apparent distress.  FHP, rounded shoulders.    Range of motion and strength:    Active range of motion is within functional limits.    Strength is within functional limits.    BUE strength 4/5.    strength: Left: 35lbs; Right: 32lbs    Sensation and reflexes:     Sensation is intact.    Palpation and joint mobility:     No tenderness to palpation noted.    Special tests:      Left [place]-Hallpike: positive for upbeating leftward torsional nystagmus of short duration along with subjective c/o spinning sensation.  Right Desi-Hallpike: negative.  Repeated Left Desi-Hallpike with no further symptoms.      Balance:     Sitting (static): Good    Sitting (dynamic): Good    Standing (static): Fair +    Standing (dynamic): Fair    Romberg EO/EC: negative for LOB, mild postural sway    Gait:      Mod I with quad cane    Coordination and tone:     Coordination is intact.    Tone is normal.    Basic self care and IADL's:     Independent with all self care.    Independent feeding, grooming, UB/LB dressing, occasionally supervised standing shower with grab bar.  Independent simple meal prep.  Hired help for heavy cleaning.  Has PCA 12 hours/wk (M/Tu/Wed/Fri) primarily for homemaking.  Mod I funct mobility with quad cane.    Cognition and visual perception:     A & O x 4, able to follow 3-step directions.  Mild impairments in attention and memory. Fair insight, mildly anxious affect.  Full ocular ROM.  Eyeglasses worn for reading/distance.             Therapeutic Treatments and Modalities:    1. Therapeutic Activities (CPT 37449)    Therapeutic Treatments and Modalities Summary: Left Desi-Hallpike: positive for upbeating leftward torsional nystagmus of short duration along with subjective c/o spinning sensation.  Right Desi-Hallpike: negative.  Repeated Left [place]-Hallpike with no further symptoms.    Time-based treatments/modalities:  Therapeutic activity minutes (CPT 64136): 15 minutes        Assessment, Response and Plan:   Impairments: activity intolerance, impaired functional mobility, impaired balance, impaired physical strength, lacks appropriate home exercise program and safety issue    Other Impairments:   Vertigo  Assessment details:  Pt is an 83 y.o female who presents to outpatient OT functioning below baseline secondary to reports progressive weakness and dizziness since thyroid surgery on 9/7/18 affecting her ability to perform IADLs and functional mobility.  Pt tested positive for left posterior semi-circular canal BPPV, canalithiasis type.  Upon re-test to left side, no further symptoms were present, therefore she did not require a CRM.  Pt will benefit from further vestibular and balance therapy to reduce her risk of falling and improve her quality of life.    Prognosis: good    Goals:   Short Term Goals:   See LTG's    Long Term Goals:   Pt will demonstrate ability to change positions from supine <-> sit in bed without reports of dizziness.  Pt will demonstrate ability to transport objects from bottom to top kitchen shelves without reports of dizziness  Pt will walk across parking lot while incorporating head turns with minimal postural adjustments and without LOB.  Pt will increase her bilateral  strength to perform IADLs without fatigue  Pt/family will be independent HEP for strengthening and habituation exercises  Long term goal timespan:  4-6 weeks    Plan:   Therapy options:  Occupational therapy treatment to continue  Planned therapy interventions:  Therapeutic Activities (CPT 02485), Therapeutic Exercise (CPT 49754), Neuromuscular Re-education (CPT 01099) and Functional Training, Self Care (CPT 32461)  Other planned therapy interventions:  Vestibular therapy  Frequency:  1x week  Duration in weeks:  6  Duration in visits:  6  Discussed with:  Patient and caregiver      Functional Limitations and Severity Modifiers  OT Functional Assessment Tool Used: UEFI  OT Functional Assessment Score: 58/80   Current:     Goal:         Referring provider co-signature:  I have reviewed this plan of care and my co-signature certifies the need for services.  Certification Dates:   From 1/9/19    To  3/6/19    Physician Signature: ________________________________ Date: ______________

## 2019-01-09 NOTE — TELEPHONE ENCOUNTER
VOICEMAIL  1. Caller Name: Lucita Babcock                        Call Back Number: [phone] (home)       2. Message: Pt left VM stating she has a rash on her chest.  Pt is unsure if it is from anxiety and would like to know what she can do for it.  Pt says she is down to 1 zoloft daily as well.    If Pt needs to be seen in clinic- is it ok to double book or schedule first available?     3. Patient approves office to leave a detailed voicemail/MyChart message: yes

## 2019-01-11 NOTE — TELEPHONE ENCOUNTER
1. Caller Name: Lucita Babcock                                           Call Back Number: [phone] (home)         Patient approves a detailed voicemail message: N\A    Pt notified of provider's note.  Pt says the rash is starting to dissipate and she does not  Feel it is necessary to come in at this time.  Pt advised if symptoms worsen that she can come in on Tuesday when PCP is in the office, or seek immediate medical attention

## 2019-01-16 ENCOUNTER — OCCUPATIONAL THERAPY (OUTPATIENT)
Dept: OCCUPATIONAL THERAPY | Facility: REHABILITATION | Age: 84
End: 2019-01-16
Attending: FAMILY MEDICINE
Payer: MEDICARE

## 2019-01-16 DIAGNOSIS — R42 VERTIGO: ICD-10-CM

## 2019-01-16 DIAGNOSIS — R29.898 UPPER EXTREMITY WEAKNESS: ICD-10-CM

## 2019-01-16 PROCEDURE — 97530 THERAPEUTIC ACTIVITIES: CPT

## 2019-01-16 NOTE — OP THERAPY DAILY TREATMENT
"  Outpatient Occupational Therapy  DAILY TREATMENT     [facility] Occupational [address].  [address]  [place] [address]  Phone:  [phone]  Fax:  [phone]    Date: 01/16/2019    Patient: Lucita Babcock  YOB: 1935  MRN: [id number]     Time Calculation  Start time: 1000  Stop time: 1100 Time Calculation (min): 60 minutes     Chief Complaint: Loss Of Balance (vertigo)    Visit #: 2    SUBJECTIVE: \"I still feel a little lightheaded\"    OBJECTIVE:  Current objective measures:   Positive for left posterior SCC BPPV, canalithiasis type        Therapeutic Treatments and Modalities:    1. Therapeutic Activities (CPT 32576)    Therapeutic Treatments and Modalities Summary: Left [place]-Hallpike: observable upbeating leftward torsional nystagmus of short duration along with subjective c/o spinning sensation.  Right Desi-Hallpike: negative  Performed CRM for left posterior SCC BPPV, canalithiasis type.  Upon re-test, negative for observable nystagmus or c/o spinning sensation except when returning to upright position.  Repeated CRM x 2 combined with mastoid stimulation.  Issued and instructed pt/PCA on Manjarrez Daroff habituation exercises to perform 3 cycles each day as long as there is a second person present.    Time-based treatments/modalities:  Therapeutic activity minutes (CPT 17029): 60 minutes        ASSESSMENT:   Response to treatment: Pt returns with a positive test for left posterior SCC BPPV, canalithiasis type.  Performed CRM x 3 with mastoid stimulation with success.  Mild reports of dizziness and moderate \"fatigue\" following maneuvers.  Limited cervical ROM and high level of anxiety appear to be contributing factors.  Issued HEP with good understanding.    PLAN/RECOMMENDATIONS:   Plan for treatment: therapy treatment to continue next visit.  Planned interventions for next visit: continue with current treatment, neuromuscular re-education (CPT 65067), therapeutic activities " (CPT 22797) and therapeutic exercise (CPT 68265)

## 2019-01-22 ENCOUNTER — OFFICE VISIT (OUTPATIENT)
Dept: MEDICAL GROUP | Age: 84
End: 2019-01-22
Payer: MEDICARE

## 2019-01-22 VITALS
OXYGEN SATURATION: 92 % | DIASTOLIC BLOOD PRESSURE: 62 MMHG | SYSTOLIC BLOOD PRESSURE: 132 MMHG | BODY MASS INDEX: 22.44 KG/M2 | TEMPERATURE: 98.9 F | WEIGHT: 143 LBS | HEART RATE: 77 BPM | HEIGHT: 67 IN

## 2019-01-22 DIAGNOSIS — I10 ESSENTIAL HYPERTENSION: ICD-10-CM

## 2019-01-22 DIAGNOSIS — H81.13 BENIGN PAROXYSMAL POSITIONAL VERTIGO DUE TO BILATERAL VESTIBULAR DISORDER: ICD-10-CM

## 2019-01-22 PROCEDURE — 99214 OFFICE O/P EST MOD 30 MIN: CPT | Performed by: FAMILY MEDICINE

## 2019-01-22 ASSESSMENT — PATIENT HEALTH QUESTIONNAIRE - PHQ9: CLINICAL INTERPRETATION OF PHQ2 SCORE: 0

## 2019-01-22 NOTE — ASSESSMENT & PLAN NOTE
The patient states that she has been going to physical therapy for vestibular rehabilitation.  She states that she felt worse after the first time and she is wondering if she should go again.  She usually gets dizzy upon turning her head right.  She is able to walk without any dizziness.  She does have hypertensive meds specifically diltiazem 30 mg 2 tablets a day.

## 2019-01-22 NOTE — PROGRESS NOTES
This medical record contains text that has been entered with the assistance of computer voice recognition and dictation software.  Therefore, it may contain unintended errors in text, spelling, punctuation, or grammar    Chief Complaint   Patient presents with   • Vertigo         Lucita Babcock is a 83 y.o. 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  She states that she felt worse after the first time and she is wondering if she should go again.  She usually gets dizzy upon turning her head right.  She is able to walk without any dizziness.  She does have hypertensive meds specifically diltiazem 30 mg 2 tablets a day.    Essential hypertension  Patient has been able to tolerate diltiazem 30 mg 2 tablets a day.  We have had a difficult time controlling her blood pressure and avoiding undesirable side effects.  We have tried ACE inhibitors , ARBS, BB, clonidine, all were untollerable.    Current medicines (including changes today)  Current Outpatient Prescriptions   Medication Sig Dispense Refill   • DILTIAZem (CARDIZEM) 30 MG Tab Take 2 Tabs by mouth every day. 180 Tab 1   • Diclofenac Sodium 1 % Gel AAA bid prn 50 g 1   • sertraline (ZOLOFT) 50 MG Tab TAKE 1 TABLET ORALLY DAILY 90 Tab 0   • ascorbic acid (ASCORBIC ACID) 500 MG Tab TAKE 1 TABLET BY MOUTH THREE TIMES A DAY 90 Tab 0   • ascorbic acid (VITAMIN C) 500 MG tablet Take 1 Tab by mouth 3 times a day. 90 Tab 0   • Calcium Carbonate Antacid (TUMS ULTRA 1000) 1000 MG Chew Tab Take 2 Tabs by mouth every morning. 60 Tab 3   • ondansetron (ZOFRAN) 4 MG Tab tablet Take 1 Tab by mouth every 8 hours as needed for Nausea/Vomiting. 20 Tab 0   • bethanechol (URECHOLINE) 25 MG Tab Take 1 Tab by mouth every 24 hours as needed. 90 Tab 0   • acetaminophen (TYLENOL) 160 MG/5ML Suspension  "Take 15 mg/kg by mouth every 6 hours as needed.       No current facility-administered medications for this visit.      She  has a past medical history of Anesthesia; Aneurysm (HCC); Anxiety; Arthritis; Breast cancer (HCC) (); Cancer (HCC) (); CATARACT; Dental disorder; Depression; Heart burn; Hypertension; Indigestion; Injury of cervical spine (HCC) (2016); Osteopenia; 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HCC); Sarcoid (HCC); Urinary bladder disorder; and UTI (urinary tract infection).  She  has a past surgical history that includes vein stripping; mass excision general (); breast biopsy (08); abdominal hysterectomy total (); cataract phaco with iol (2010); colonoscopy (); ercp in or (2012); samuel by laparoscopy (2013); lumpectomy (); pr radiation therapy plan simple (); cataract phaco with iol (2014); cervical disk and fusion anterior (2016); and parathyroid exploration (2018).  Social History   Substance Use Topics   • Smoking status: Former Smoker     Packs/day: 0.50     Years: 45.00     Quit date: 10/9/1969   • Smokeless tobacco: Never Used   • Alcohol use No     Social History     Social History Narrative   • No narrative on file     Family History   Problem Relation Age of Onset   • Hypertension Mother    • Stroke Mother    • Other Father         Dementia   • Other Sister         BIpolar   • Other Sister         Bipolar   • Other Sister         THyroid disease   • Cancer Neg Hx    • Diabetes Neg Hx    • Heart Disease Neg Hx      Family Status   Relation Status   • Mo    • Son         suicide   • Fa    • Sis    • Sis Alive   • Sis Alive   • Marcelino Alive   • Marcelino Alive   • Neg Hx (Not Specified)         ROS    Please see hpi     All other systems reviewed and are negative     Objective:     Blood pressure 132/62, pulse 77, temperature 37.2 °C (98.9 °F), temperature source Temporal, height 1.702 m (5' 7\"), weight 64.9 kg (143 lb), " SpO2 92 %, not currently breastfeeding. Body mass index is 22.4 kg/m².  Physical Exam:    Constitutional: Alert, no distress.  Skin: Warm, dry, good turgor, no rashes in visible areas.  Eye: Equal, round and reactive, conjunctiva clear, lids normal.  ENMT: Lips without lesions, good dentition, oropharynx clear.  Neck: Trachea midline, no masses, no thyromegaly. No cervical or supraclavicular lymphadenopathy.  Respiratory: Unlabored respiratory effort, lungs clear to auscultation, no wheezes, no ronchi.  Cardiovascular: Normal S1, S2, no murmur, no edema.  Abdomen: Soft, non-tender, no masses, no hepatosplenomegaly.  Psych: Alert and oriented x3, normal affect and mood.          Assessment and Plan:   The following treatment plan was discussed      1. Essential hypertension    Patient has been stable with current management  We will make no changes for now    - DILTIAZem (CARDIZEM) 30 MG Tab; Take 2 Tabs by mouth every day.  Dispense: 180 Tab; Refill: 1    2. Benign paroxysmal positional vertigo due to bilateral vestibular disorder  We reviewed several particle repositioning maneuvers   Such as modified eppaulina, [place], Deandra      Instructed  to take meclizine as prescribed  Encouraged her to continue PT for vestibular rehabilitation          Instructed to Follow up in clinic or ER for worsening symptoms, difficulty breathing, lack of expected recovery, or should new symptoms or problems arise.    Followup: Return in about 3 months (around 4/22/2019) for Reevaluation.       Once again this medical record contains text that has been entered with the assistance of computer voice recognition and dictation software.  Therefore, it may contain unintended errors in text, spelling, punctuation, or grammar

## 2019-01-22 NOTE — ASSESSMENT & PLAN NOTE
Patient has been able to tolerate diltiazem 30 mg 2 tablets a day.  We have had a difficult time controlling her blood pressure and avoiding undesirable side effects.  We have tried ACE inhibitors , ARBS, BB, clonidine, all were untollerable.

## 2019-01-23 ENCOUNTER — OCCUPATIONAL THERAPY (OUTPATIENT)
Dept: OCCUPATIONAL THERAPY | Facility: REHABILITATION | Age: 84
End: 2019-01-23
Attending: FAMILY MEDICINE
Payer: MEDICARE

## 2019-01-23 ENCOUNTER — TELEPHONE (OUTPATIENT)
Dept: MEDICAL GROUP | Age: 84
End: 2019-01-23

## 2019-01-23 DIAGNOSIS — R29.898 UPPER EXTREMITY WEAKNESS: ICD-10-CM

## 2019-01-23 DIAGNOSIS — R42 VERTIGO: ICD-10-CM

## 2019-01-23 PROCEDURE — 97112 NEUROMUSCULAR REEDUCATION: CPT

## 2019-01-23 NOTE — TELEPHONE ENCOUNTER
ESTABLISHED PATIENT PRE-VISIT PLANNING     Patient was NOT contacted to complete PVP.     Note: Patient will not be contacted if there is no indication to call.     1.  Reviewed notes from the last few office visits within the medical group: Yes    2.  If any orders were placed at last visit or intended to be done for this visit (i.e. 6 mos follow-up), do we have Results/Consult Notes?        •  Labs - Labs were not ordered at last office visit.   Note: If patient appointment is for lab review and patient did not complete labs, check with provider if OK to reschedule patient until labs completed.       •  Imaging - Imaging was not ordered at last office visit.       •  Referrals - Referral ordered, patient has NOT been seen.    3. Is this appointment scheduled as a Hospital Follow-Up? No    4.  Immunizations were updated in Epic using WebIZ?: Epic matches WebIZ       •  Web Iz Recommendations: SHINGRIX (Shingles)    5.  Patient is due for the following Health Maintenance Topics:   Health Maintenance Due   Topic Date Due   • IMM ZOSTER VACCINES (2 of 3) 04/27/2017   • COLONOSCOPY  03/02/2018   • Annual Wellness Visit  08/23/2018           6. Orders for overdue Health Maintenance topics pended in Pre-Charting? YES    7.  AHA (MDX) form printed for Provider? YES    8.  Patient was NOT informed to arrive 15 min prior to their scheduled appointment and bring in their medication bottles.

## 2019-01-23 NOTE — OP THERAPY DAILY TREATMENT
"  Outpatient Occupational Therapy  DAILY TREATMENT     [facility] Occupational Therapy [address].  [address]  [place] [address]  Phone:  [phone]  Fax:  [phone]    Date: 01/23/2019    Patient: Lucita Babcock  YOB: 1935  MRN: [id number]     Time Calculation  Start time: 1000  Stop time: 1100 Time Calculation (min): 60 minutes     Chief Complaint: Other (vertigo) and Loss Of Balance    Visit #: 3    SUBJECTIVE: \"I felt very lightheaded and tired after last visit for 2 days but now it's better\"    OBJECTIVE:  Current objective measures:   Positive for left posterior semicircular canal BPPV, canalithiasis type        Therapeutic Treatments and Modalities:    1. Neuromuscular Re-education (CPT 28406)    Therapeutic Treatments and Modalities Summary: Pt's daughter was present for entire session for education and to assist in carryover of home program.  Performed 3 cycles of Manjarrez-Daroff habituation exercises with a positive response of upbeating leftward torsional nystagmus of short duration along with subjective c/o spinning sensation during the first cycle.  Upon second and third cycle of exercise, there were no further symptoms.  Pt required \"hands-on\" head for correct positioning upon side-lying.   Performed left Desi-Hallpike: negative for nystagmus or subjective c/o spinning sensation.  Performed CRM for left posterior SCC BPPV, canalithiasis type per protocol with no reports of dizziness.    Time-based treatments/modalities:  Neuromusc re-ed minutes (CPT 75819): 60 minutes        ASSESSMENT:   Response to treatment:  While pt tested positive for left P-SCC BPPV, canalithiasis type during the initial Manjarrez-Daroff exercise, there were no further reports of spinning sensation or observable nystagmus on subsequent cycles nor upon left [place]-Hallpike.  Pt/dtr instructed on correct technique for pt to perform B-D exercises 1-2x day but only with supervision for safety reasons.  Pt's " daughter observed the correct technique for performing a CRM should her vertigo persist despite consistent performance of habituation exercises.  Pt/dtr educated on BPPV, treatment, as well as the importance of pt incorporating more head/trunk movements during her daily routine as she has been avoiding bending and moving her head due to anxiety, which is a contributing factor to her dizziness.  Pt/dtr will benefit from further training for home management and habituation exercises.    PLAN/RECOMMENDATIONS:   Plan for treatment: therapy treatment to continue next visit.  Planned interventions for next visit: continue with current treatment, functional training/self care (CPT 06720), neuromuscular re-education (CPT 02332) and therapeutic activities (CPT 49424)

## 2019-01-30 ENCOUNTER — OFFICE VISIT (OUTPATIENT)
Dept: BEHAVIORAL HEALTH | Facility: CLINIC | Age: 84
End: 2019-01-30
Payer: MEDICARE

## 2019-01-30 VITALS
WEIGHT: 142 LBS | HEART RATE: 68 BPM | HEIGHT: 68 IN | DIASTOLIC BLOOD PRESSURE: 70 MMHG | SYSTOLIC BLOOD PRESSURE: 130 MMHG | BODY MASS INDEX: 21.52 KG/M2

## 2019-01-30 DIAGNOSIS — F06.4 ANXIETY DISORDER DUE TO MEDICAL CONDITION: ICD-10-CM

## 2019-01-30 PROCEDURE — 99204 OFFICE O/P NEW MOD 45 MIN: CPT | Performed by: PSYCHIATRY & NEUROLOGY

## 2019-01-30 RX ORDER — ALPRAZOLAM 0.25 MG/1
0.25 TABLET ORAL NIGHTLY PRN
COMMUNITY
End: 2019-04-16 | Stop reason: SDUPTHER

## 2019-01-30 ASSESSMENT — PATIENT HEALTH QUESTIONNAIRE - PHQ9
CLINICAL INTERPRETATION OF PHQ2 SCORE: 1
5. POOR APPETITE OR OVEREATING: 0 - NOT AT ALL
SUM OF ALL RESPONSES TO PHQ QUESTIONS 1-9: 2

## 2019-01-30 NOTE — PROGRESS NOTES
INITIAL PSYCHIATRY EVALUATION      Chief Complaint   Patient presents with   • Anxiety         History Of Present Illness:  Lucita Babcock is a 83 y.o. old female with history of anxiety disorder,spinal stenosis, hypertension, chronic neck pain, osteopenia comes in today for evaluation of anxiety.  She reports onset of anxiety about 2 years ago.  She had a fall which caused neck pain and subsequent dizziness.  She is in physical therapy for her dizziness and has noticed a slow but steady improvement in her symptoms.  She has been physically active for most of her life and was really involved in her [employer] until she had a fall 2 years ago.  She quit driving and has home health assistance about 4 days a week.  She misses driving and not being able to go to [employer].  She lives alone and has good support from her boyfriend and HER-2 daughters.  She has been on Xanax for the last 2 years and takes half a tablet 2-3 times a week when she is feeling dizzy and anxious.  She denies struggling with anxiety prior to 2 years ago.  She has been on Zoloft for the last 6 years.  She was  to her  for over 50 years and he  about 6 years ago.  She had some sadness when he passed away and was started on Zoloft by her then PCP.  She has noticed benefit on her mood as well as her anxiety.  She denies any current or recent symptoms of depression.  She denies any side effects that she has noticed from either Zoloft or Xanax.  She uses a walker or cane for ambulation and denies any falls in the last 2 years.  She is independent in taking her medications as well as handling her finances.  She does need assistance with driving because of her dizziness.  She does notice some problem with recollecting information but denies any impairments in her ADLs or IADLs.  She is hopeful that she will be able to go back to driving once her dizziness has completely resolved.  She likes to spend her time at home watching TV or being on the  "computer since she is not physically active.  She denies prior or current symptoms of hypomania, luma or psychosis.  She denies having active or passive thoughts of wanting to hurt herself or others.    Current psychiatric medications - Zoloft 50 mg (x 5-6 years ago), Xanax 0.25 mg daily as needed for anxiety    Past Psychiatric History:  Denies history of suicide attempt or prior inpatient psychiatry hospitalization.  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   • Cancer (HCC) 2008    breast   • CATARACT     bilat IOL   • Dental disorder     upper partial   • Depression    • Heart burn    • Hypertension    • Indigestion    • Injury of cervical spine ([facility]) July 2016    C-spine decompression/laminectomy   • Osteopenia    • 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HCC)    • Sarcoid (HCC)    • Urinary bladder disorder     frequency    • UTI (urinary tract infection)     recurrent     Past Surgical History:   Procedure Laterality Date   • PARATHYROID EXPLORATION  9/7/2018    Procedure: PARATHYROID EXPLORATION- MINIMALLY INVASIVE,   NIMS RECURRENT LARYNGEAL NERVE MONITORING RIGHT;  Surgeon: Devora Gonsalez M.D.;  Location: SURGERY SAME DAY [facility];  Service: General   • CERVICAL DISK AND FUSION ANTERIOR  7/27/2016    Procedure: CERVICAL DISK AND FUSION ANTERIOR C4-7;  Surgeon: Niles Khan M.D.;  Location: [facility];  Service:    • CATARACT PHACO WITH IOL  8/27/2014    Performed by Avani Allen M.D. at SURGERY SAME DAY [facility]   • AMBROCIO BY LAPAROSCOPY  1/8/2013    Performed by Neo Howell M.D. at SURGERY [facility]   • ERCP IN OR  12/28/2012    Performed by Jay Dubois M.D. at [facility]   • CATARACT PHACO WITH IOL  8/11/2010    Performed by AVANI ALLEN at " SURGERY SAME DAY [facility] ORS   • MASS EXCISION GENERAL      chest mass biopsy sarcoid   • BREAST BIOPSY  08    Performed by KENRICK LOPEZ at SURGERY SAME DAY [facility] ORS   • LUMPECTOMY  2008    right   • PB RADIATION THERAPY PLAN SIMPLE      right   • COLONOSCOPY      2 polyps   • ABDOMINAL HYSTERECTOMY TOTAL     • VEIN STRIPPING      R leg       Family Psychiatric History:  Sister () - bipolar mood disorder  Sister - bipolar mood disorder  Son () - bipolar mood disorder, suicide attempt at age 32    Substance Use/Addiction History:  Alcohol - Denies  Nicotine - Denies  Illicit drugs - Denies    Social History:  She has been  and  x 1. She was  to her  for 50+ years until he passed away 6 years ago. She had 3 biological kids.  She lost one daughter at birth due to complications.  Her son committed suicide when he was 32 years old.  She and her  adopted a daughter when she was 2 months old.  Her biological daughter lives in [place] and her adopted daughter lives in [place] and are extremely supportive.  She lives alone in [place].  She has been in a relationship for the last 2 years and her boyfriend lives in [place].    Allergies:  Ace inhibitors; Augmentin; Erythromycin; Lisinopril; Penicillins; Epinephrine; Percocet [oxycodone-acetaminophen]; and Sulfa drugs    Medications:  Current Outpatient Prescriptions   Medication Sig Dispense Refill   • ALPRAZolam (XANAX) 0.25 MG Tab Take 0.25 mg by mouth at bedtime as needed for Sleep.     • DILTIAZem (CARDIZEM) 30 MG Tab Take 2 Tabs by mouth every day. 180 Tab 1   • sertraline (ZOLOFT) 50 MG Tab TAKE 1 TABLET ORALLY DAILY 90 Tab 0   • ascorbic acid (ASCORBIC ACID) 500 MG Tab TAKE 1 TABLET BY MOUTH THREE TIMES A DAY 90 Tab 0   • ascorbic acid (VITAMIN C) 500 MG tablet Take 1 Tab by mouth 3 times a day. 90 Tab 0   • Calcium Carbonate Antacid (TUMS ULTRA 1000) 1000 MG Chew Tab  "Take 2 Tabs by mouth every morning. 60 Tab 3   • bethanechol (URECHOLINE) 25 MG Tab Take 1 Tab by mouth every 24 hours as needed. 90 Tab 0   • acetaminophen (TYLENOL) 160 MG/5ML Suspension Take 15 mg/kg by mouth every 6 hours as needed.     • Diclofenac Sodium 1 % Gel AAA bid prn (Patient not taking: Reported on 1/30/2019) 50 g 1   • ondansetron (ZOFRAN) 4 MG Tab tablet Take 1 Tab by mouth every 8 hours as needed for Nausea/Vomiting. (Patient not taking: Reported on 1/30/2019) 20 Tab 0     No current facility-administered medications for this visit.        Review of Symptoms:  Constitutional - Positive for infrequent fatigue  Eyes - Negative for blurry vision  HEENT - Negative for sore throat  Respiratory - Negative for shortness of breath, cough  CVS - Negative for chest pain, palpitations  GI - Negative for nausea, vomiting, abdominal pain, diarrhea, constipation  Skin - Negative for rash  Musculoskeletal - Positive for neck pain, right knee pain  Neurological - Negative for headaches. Positive for infrequent dizziness   Psychiatric - Positive for infrequent anxiety    Physical Examination:  Vital signs: /70 (BP Location: Left arm, Patient Position: Sitting)   Pulse 68   Ht 1.727 m (5' 8\")   Wt 64.4 kg (142 lb)   BMI 21.59 kg/m²     Musculoskeletal: Slow but stable gait, with assistance of a cane. No abnormal movements.     Mental Status Evaluation:   General: Elderly white female, dressed in casual attire, good grooming and hygiene, in no apparent distress, calm and cooperative, good eye contact, no psychomotor agitation or retardation  Orientation: Alert and oriented to person, place and time  Recent and remote memory: Intact  Attention span and concentration: Intact  Speech: Spontaneous, normal rate, rhythm and tone  Thought Process: Linear, logical and goal directed  Thought Content: Denies suicidal or homicidal ideations, intent or plan  Perception: Denies auditory or visual hallucinations. No " "delusions noted  Associations: Intact  Language: Appropriate  Fund of knowledge and vocabulary: Adequate  Mood: \"good\"  Affect: Euthymic, mood congruent  Insight: Good  Judgment: Good    Depression screening:  Depression Screen (PHQ-2/PHQ-9) 4/3/2018 1/22/2019 1/30/2019   PHQ-2 Total Score - - -   PHQ-2 Total Score 0 0 1   PHQ-9 Total Score - - 2       Interpretation of PHQ-9 Total Score   Score Severity   1-4 No Depression   5-9 Mild Depression   10-14 Moderate Depression   15-19 Moderately Severe Depression   20-27 Severe Depression    Medical Records/Labs/Diagnostic Tests Reviewed:  [place]  records - appropriate refills, no abuse suspected     Impression:  83-year-old female with anxiety symptoms secondary to dizziness which started about 2 years ago after she had a fall.  She has been on Zoloft for about 6 years which was started after she lost her  of over 50 years.  She uses half a tablet of Xanax few times a week for her anxiety related to dizziness.    1. 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  Continue Zoloft 50 mg daily for anxiety.  She is doing good in regards to both her anxiety and dizziness and is benefiting from Zoloft.  I do not recommend increasing the dose as a higher dose could worsen her dizziness.  2.  Continue Xanax 0.125 mg daily as needed for anxiety.  3.  Provided supportive psychotherapy (> 16 minutes).  Discussed her anxiety and advised her to be more active throughout the day especially when her dizziness is under control and to take new hobbies like reading to minimize her anxiety symptoms.  4.  She feels comfortable following up with her PCP - Dr. Sloan for further management but has been advised to schedule a follow-up appointment if she notices any decline in her symptoms in the future.    Return to clinic in on as-needed basis or sooner if symptoms worsen    The proposed treatment plan was discussed with the patient who was provided the opportunity to ask " questions and make suggestions regarding alternative treatment. Patient verbalized understanding and expressed agreement with the plan.     Genny Kat M.D.  01/30/19    CC:   Robbie Trivedi M.D.    This note was created using voice recognition software (Dragon). The accuracy of the dictation is limited by the abilities of the software. I have reviewed the note prior to signing, however some errors in grammar and context are still possible. If you have any questions related to this note please do not hesitate to contact our office.

## 2019-02-04 ENCOUNTER — OCCUPATIONAL THERAPY (OUTPATIENT)
Dept: OCCUPATIONAL THERAPY | Facility: REHABILITATION | Age: 84
End: 2019-02-04
Attending: FAMILY MEDICINE
Payer: MEDICARE

## 2019-02-04 DIAGNOSIS — R42 VERTIGO: ICD-10-CM

## 2019-02-04 DIAGNOSIS — R29.898 UPPER EXTREMITY WEAKNESS: ICD-10-CM

## 2019-02-04 PROCEDURE — 97112 NEUROMUSCULAR REEDUCATION: CPT

## 2019-02-04 NOTE — OP THERAPY DAILY TREATMENT
"  Outpatient Occupational Therapy  DAILY TREATMENT     [facility] Occupational [address]  [address]  [place] [address]  Phone:  [phone]  Fax:  [phone]    Date: 02/04/2019    Patient: Lucita Babcock  YOB: 1935  MRN: [id number]     Time Calculation  Start time: 1000  Stop time: 1100 Time Calculation (min): 60 minutes     Chief Complaint: Loss Of Balance (vertigo)    Visit #: 4    SUBJECTIVE: \"I did 2 maneuvers with my caregiver and was only dizzy once\"    OBJECTIVE:  Current objective measures:   Left Fairhope-Hallpike: negative for observable nystagmus or subjective reports of spinning sensation.  Right Desi-Hallpike: positive for mild upbeating rightward torsional nystagmus of short duration along with subjective reports of spinning sensation.        Therapeutic Treatments and Modalities:    1. Neuromuscular Re-education (CPT 58109)    Therapeutic Treatments and Modalities Summary: Manjarrez-Daroff habituation exercises x 3 cycles with mild brief observable upbeating rightward torsional nystagmus of short duration along with subjective c/o spinning sensation during the first right sidelying position.  Continued exercise to complete 3 cycles with no further symptoms.  Head position when performed these exercises on her own was not optimal and mod cues were required for coordination of head/trunk movements.  Tested in Desi-Hallpike following exercises, see above.  Performed CRM for right posterior SCC BPPV, canalithiasis type.   Initiated Florencio-Cooksey habituation exercises for eye movements up/down, left/right, head movements up/down, left/right all at slow speed x 20 reps, and shoulder shrugs x 20 reps.  To perform for 10 minutes 2 x day.    Time-based treatments/modalities:  Neuromusc re-ed minutes (CPT 10655): 60 minutes        ASSESSMENT:   Response to treatment:  Pt returns with mild signs and symptoms of now right posterior semi-circular canal BPPV, canalithiasis " type.  She responded well to CRM and Manjarrez-Daroff habituation exercises.  Due to the frequent re-occurrence of vertigo, pt needs to perform B-D exercises at least 1x day, preferably supervised level, to maintain her SCC's free of calcium carbonate crystals.  Discussed this with her PCA and reviewed new habituation exercises with good understanding.    PLAN/RECOMMENDATIONS:   Plan for treatment: therapy treatment to continue next visit.  Planned interventions for next visit: continue with current treatment, functional training/self care (CPT 34173), neuromuscular re-education (CPT 89926) and therapeutic activities (CPT 26413)

## 2019-02-06 ENCOUNTER — TELEPHONE (OUTPATIENT)
Dept: MEDICAL GROUP | Age: 84
End: 2019-02-06

## 2019-02-06 NOTE — TELEPHONE ENCOUNTER
called Dr. Ohara office. They will fax over last eye exam from 1/21/18. They stated patient has not scheduled appt for this year.

## 2019-02-06 NOTE — TELEPHONE ENCOUNTER
"1. Caller Name: Lucita Babcock                                           Call Back Number: [phone] (home)         Patient approves a detailed voicemail message: yes    Spoke with Pt about her DMV paperwork.  Pt states she was recently seen at the \"eye doctor\" and we can get her eye exam information from them    "

## 2019-02-06 NOTE — TELEPHONE ENCOUNTER
1. Caller Name: Dr. Jayy Allen's office                                           Call Back Number: [phone]      Patient approves a detailed voicemail message: N\A    Spoke with Dr Allen's office.  Pt has a prescription for glasses, but it is .  PT needs an updated eye exam     Pt needs to be called to come in for an MA visit to have an eye exam done in office so her DMV paperwork can be completed.  She can have paperwork once this is done.

## 2019-02-11 ENCOUNTER — OCCUPATIONAL THERAPY (OUTPATIENT)
Dept: OCCUPATIONAL THERAPY | Facility: REHABILITATION | Age: 84
End: 2019-02-11
Attending: FAMILY MEDICINE
Payer: MEDICARE

## 2019-02-11 DIAGNOSIS — R42 VERTIGO: ICD-10-CM

## 2019-02-11 DIAGNOSIS — R29.898 UPPER EXTREMITY WEAKNESS: ICD-10-CM

## 2019-02-11 PROCEDURE — 97530 THERAPEUTIC ACTIVITIES: CPT

## 2019-02-11 PROCEDURE — 97112 NEUROMUSCULAR REEDUCATION: CPT

## 2019-02-11 NOTE — OP THERAPY DAILY TREATMENT
"  Outpatient Occupational Therapy  DAILY TREATMENT     [facility] Occupational [address].  [address]  [place] [address]  Phone:  [phone]  Fax:  [phone]    Date: 02/11/2019    Patient: Lucita Babcock  YOB: 1935  MRN: [id number]     Time Calculation  Start time: 0940  Stop time: 1035 Time Calculation (min): 55 minutes     Chief Complaint: Loss Of Balance (vertigo)    Visit #: 5    SUBJECTIVE: \"My dizziness seems less\"    OBJECTIVE:  Current objective measures:   Loki-Alexander exercise: mild c/o brief spinning sensation when in side-lying position left and right.        Therapeutic Treatments and Modalities:    1. Neuromuscular Re-education (CPT 86363)    2. Therapeutic Activities (CPT 41262)    Therapeutic Treatments and Modalities Summary: Cawthorne-Cooksey habituation exercises for eye movements up/down, left/right x 20 reps, head movements up/down, left/right slow speed x 10 reps, and shoulder shrugs x 20 reps.  Seated position bending forward to  cones from floor in semi-circular pattern, return to upright while rotating trunk to place cones to left/right side of mat 2 cycles each side. Standing at counter with unilateral UE support to  cones with RUE from floor to place on overhead shelf, then return to floor using 10 cones.  Repeated sequence with LUE.  Functional mobility while incorporating horizontal then vertical head turns every 3 steps, 2 cycles each with moderate postural adjustments.  Michela habituation exercises reviewed with PCA per her request.  Pt required mod cues for correct technique.  Time-based treatments/modalities:  Therapeutic activity minutes (CPT 59473): 30 minutes  Neuromusc re-ed minutes (CPT 48893): 25 minutes      ASSESSMENT:   Response to treatment:  Pt making gains in her postural stabilization and spatial orientation in response to graded habituation exercises with decreased reports of dizziness.  Michela " exercises reviewed per PCA request.  Pt required mod cues to perform correctly.  Once technique was explained further for pt/PCA, both verbalized good understanding of head position.  Instructed to perform 2 x day as well as encourage more head/trunk movements during her daily routine as per above.  HEP updated with good understanding.    PLAN/RECOMMENDATIONS:   Plan for treatment: therapy treatment to continue next visit.  Planned interventions for next visit: continue with current treatment, neuromuscular re-education (CPT 60630) and therapeutic activities (CPT 21236)

## 2019-02-18 ENCOUNTER — APPOINTMENT (OUTPATIENT)
Dept: OCCUPATIONAL THERAPY | Facility: REHABILITATION | Age: 84
End: 2019-02-18
Attending: FAMILY MEDICINE
Payer: MEDICARE

## 2019-03-19 ENCOUNTER — TELEPHONE (OUTPATIENT)
Dept: MEDICAL GROUP | Age: 84
End: 2019-03-19

## 2019-03-19 NOTE — TELEPHONE ENCOUNTER
ESTABLISHED PATIENT PRE-VISIT PLANNING     Patient was NOT contacted to complete PVP.     Note: Patient will not be contacted if there is no indication to call.     1.  Reviewed notes from the last few office visits within the medical group: Yes    2.  If any orders were placed at last visit or intended to be done for this visit (i.e. 6 mos follow-up), do we have Results/Consult Notes?        •  Labs - Labs were not ordered at last office visit.   Note: If patient appointment is for lab review and patient did not complete labs, check with provider if OK to reschedule patient until labs completed.       •  Imaging - Imaging was not ordered at last office visit.       •  Referrals - No referrals were ordered at last office visit.    3. Is this appointment scheduled as a Hospital Follow-Up? No    4.  Immunizations were updated in Epic using WebIZ?: Epic matches WebIZ       •  Web Iz Recommendations: SHINGRIX (Shingles)    5.  Patient is due for the following Health Maintenance Topics:   Health Maintenance Due   Topic Date Due   • IMM ZOSTER VACCINES (2 of 3) 04/27/2017   • COLONOSCOPY  03/02/2018   • Annual Wellness Visit  08/23/2018           6. Orders for overdue Health Maintenance topics pended in Pre-Charting? N\A    7.  AHA (MDX) form printed for Provider? YES    8.  Patient was NOT informed to arrive 15 min prior to their scheduled appointment and bring in their medication bottles.

## 2019-03-25 ENCOUNTER — OFFICE VISIT (OUTPATIENT)
Dept: MEDICAL GROUP | Age: 84
End: 2019-03-25
Payer: MEDICARE

## 2019-03-25 VITALS
SYSTOLIC BLOOD PRESSURE: 138 MMHG | DIASTOLIC BLOOD PRESSURE: 66 MMHG | BODY MASS INDEX: 22.46 KG/M2 | TEMPERATURE: 99.6 F | HEIGHT: 68 IN | HEART RATE: 71 BPM | OXYGEN SATURATION: 93 % | WEIGHT: 148.2 LBS

## 2019-03-25 DIAGNOSIS — F06.4 ANXIETY DISORDER DUE TO MEDICAL CONDITION: ICD-10-CM

## 2019-03-25 DIAGNOSIS — I10 ESSENTIAL HYPERTENSION: Primary | ICD-10-CM

## 2019-03-25 PROBLEM — R42 DIZZINESS: Status: RESOLVED | Noted: 2017-09-05 | Resolved: 2019-03-25

## 2019-03-25 PROCEDURE — 99214 OFFICE O/P EST MOD 30 MIN: CPT | Performed by: FAMILY MEDICINE

## 2019-03-25 RX ORDER — SERTRALINE HYDROCHLORIDE 100 MG/1
100 TABLET, FILM COATED ORAL DAILY
Qty: 90 TAB | Refills: 1 | Status: SHIPPED | OUTPATIENT
Start: 2019-03-25 | End: 2019-09-16 | Stop reason: SDUPTHER

## 2019-03-25 NOTE — PROGRESS NOTES
Subjective:   CC: HTN     HPI:     Lucita Babcock is a 84 y.o. female who is an established patient of Dr. Sloan, presents with the following concerns:     For her hypertension, she takes two 30 mg diltiazem at night as directed by PCP. However, pt obscessively checked her BP at home multiple times per day and takes additional Diltiazem (up to 4 pills per day) for elevated BP. Per chart review, she has tried lisinopril, losartan, clonidine without improvement. At 12:00 pm today her systolic blood pressure was 156 and she took another diltiazem. Her blood pressure is 138/66 in the exam room today. She states she takes her blood pressure readings often because she lives by herself and this makes her very anxious. The more anxiety she has, the more frequent she checks her BP. She brought BP logs for review today. Not surprisingly, her BP readings are mostly elevated.     She has hx of HANY, currently taking Zoloft 50 mg qd. She tried psychology in the past, but she was told that she is doing ok. However, her daughter states that she is on edge all the time. She lives alone and concerns of elevated BP leading to falling or stroke. She tries to call everyone to help calming her down. Her daughter lives in [facility] [place] and unable to be with her all the time. She takes Xanax as needed for anxiety. She is active, but does not exercises regularly. She is interested in different therapist for counseling.     Current medicines (including changes today)  Current Outpatient Prescriptions   Medication Sig Dispense Refill   • Cholecalciferol (VITAMIN D PO) Take  by mouth.     • sertraline (ZOLOFT) 100 MG Tab Take 1 Tab by mouth every day. 90 Tab 1   • ALPRAZolam (XANAX) 0.25 MG Tab Take 0.25 mg by mouth at bedtime as needed for Sleep.     • DILTIAZem (CARDIZEM) 30 MG Tab Take 2 Tabs by mouth every day. 180 Tab 1   • ascorbic acid (VITAMIN C) 500 MG tablet Take 1 Tab by mouth 3 times a day. 90 Tab 0   • bethanechol (URECHOLINE)  "25 MG Tab Take 1 Tab by mouth every 24 hours as needed. 90 Tab 0   • acetaminophen (TYLENOL) 160 MG/5ML Suspension Take 15 mg/kg by mouth every 6 hours as needed.       No current facility-administered medications for this visit.      She  has a past medical history of Anesthesia; Aneurysm (HCC); Anxiety; Arthritis; Breast cancer (HCC) (2008); Cancer (HCC) (2008); CATARACT; Dental disorder; Depression; Heart burn; Hypertension; Indigestion; Injury of cervical spine ([facility]) (July 2016); Osteopenia; 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[facility]); Sarcoid ([facility]); Urinary bladder disorder; and UTI (urinary tract infection).    I personally reviewed patient's problem list, allergies, medications, family hx, social hx with patient and update EPIC.     REVIEW OF SYSTEMS:  CONSTITUTIONAL:  Denies night sweats, fatigue, malaise, lethargy, fever or chills.  RESPIRATORY:  Denies cough, wheeze, hemoptysis, or shortness of breath.  CARDIOVASCULAR:  Denies chest pains, palpitations, pedal edema     Objective:     Blood pressure 138/66, pulse 71, temperature 37.6 °C (99.6 °F), temperature source Temporal, height 1.727 m (5' 8\"), weight 67.2 kg (148 lb 3.2 oz), SpO2 93 %, not currently breastfeeding. Body mass index is 22.53 kg/m².    Physical Exam:  Constitutional: awake, alert, in no distress. Uses a cane for ambulatory aid.  Skin: Warm, dry, good turgor, no rashes, bruises, ulcers in visible areas.  Eye: conjunctiva clear, lids neg for edema or lesions.  ENMT: TM and auditory canals wnl. Oral and nasal mucosa wnl. Lips without lesions, good dentition, oropharynx clear.  Neck: Trachea midline, no masses, no thyromegaly. No cervical or supraclavicular lymphadenopathy  Respiratory: Unlabored respiratory effort, lungs clear to auscultation, no wheezes, no rales.  Cardiovascular: Normal S1, S2, no murmur, no pedal edema.  Psych: Oriented x3, affect and mood wnl, intact judgement and insight.       Assessment and Plan:   The following treatment plan was " discussed    1. Anxiety disorder due to medical condition  Chronic, poorly control, currently taking Zoloft 50 mg qd and Xanax 0.25 mg PRN. Plans:   - increased Zoloft to 100 mg qd.   - REFERRAL TO BEHAVIORAL HEALTH  - regular exercise, well-balanced diet, multi-vitamin daily, weight loss, adequate sleep (8 hours per day), meditation, stress management.   - Avoid excessive consumption of stimulants, alcohol, illicit drugs, tobacco    2. Essential hypertension  Chronic, currently taking Diltiazem 60 mg qd. Her BP is 138/66. Pt checks her blood pressure multiple times per day due to fear of elevated BP leading to stroke or fall. Poorly controlled anxiety is likely contributing to elevated BP. Plans:   - treat anxiety as above  - avoid frequent BP check as it does not improve quality of life but leads to more frequent OV and ER visits.   - continue Diltiazem 60 mg qd as directed by PCP.   - regular exercises, pt's daughter will sign her up for susan chi.   - Pt was counseled on dietary modification, smoking cessation, and avoidance of excessive alcohol consumption.    - f/u with PCP PRN.     Danna Lozano M.D.    Followup: Return for As needed.    Please note that this dictation was created using voice recognition software and/or scribes. I have made every reasonable attempt to correct obvious errors, but I expect that there are errors of grammar and possibly content that I did not discover before finalizing the note.     Esequiel WHITE (Scribe), am scribing for, and in the presence of, Danna Lozano M.D.    Electronically signed by: Esequiel Edge (Santiibe), 3/25/2019    Danna WHITE M.D. personally performed the services described in this documentation, as scribed by Esequiel Edge in my presence, and it is both accurate and complete.

## 2019-04-04 ENCOUNTER — TELEPHONE (OUTPATIENT)
Dept: MEDICAL GROUP | Age: 84
End: 2019-04-04

## 2019-04-04 NOTE — TELEPHONE ENCOUNTER
ESTABLISHED PATIENT PRE-VISIT PLANNING     Patient was NOT contacted to complete PVP.     Note: Patient will not be contacted if there is no indication to call.     1.  Reviewed notes from the last few office visits within the medical group: Yes    2.  If any orders were placed at last visit or intended to be done for this visit (i.e. 6 mos follow-up), do we have Results/Consult Notes?        •  Labs - Labs were not ordered at last office visit.   Note: If patient appointment is for lab review and patient did not complete labs, check with provider if OK to reschedule patient until labs completed.       •  Imaging - Imaging was not ordered at last office visit.       •  Referrals - Referral ordered, patient has NOT been seen.    3. Is this appointment scheduled as a Hospital Follow-Up? No    4.  Immunizations were updated in Epic using WebIZ?: Epic matches WebIZ       •  Web Iz Recommendations: SHINGRIX (Shingles)    5.  Patient is due for the following Health Maintenance Topics:   Health Maintenance Due   Topic Date Due   • IMM ZOSTER VACCINES (2 of 3) 04/27/2017   • COLONOSCOPY  03/02/2018   • Annual Wellness Visit  08/23/2018           6. Orders for overdue Health Maintenance topics pended in Pre-Charting? N\A    7.  AHA (MDX) form printed for Provider? YES    8.  Patient was NOT informed to arrive 15 min prior to their scheduled appointment and bring in their medication bottles.

## 2019-04-09 ENCOUNTER — OCCUPATIONAL THERAPY (OUTPATIENT)
Dept: OCCUPATIONAL THERAPY | Facility: REHABILITATION | Age: 84
End: 2019-04-09
Attending: FAMILY MEDICINE
Payer: MEDICARE

## 2019-04-09 DIAGNOSIS — R29.898 UPPER EXTREMITY WEAKNESS: ICD-10-CM

## 2019-04-09 DIAGNOSIS — R42 VERTIGO: ICD-10-CM

## 2019-04-09 PROCEDURE — 97112 NEUROMUSCULAR REEDUCATION: CPT

## 2019-04-09 NOTE — OP THERAPY DAILY TREATMENT
"  Outpatient Occupational Therapy  DAILY TREATMENT     [facility] Occupational Therapy [address]  [address]  [place] [address]  Phone:  [phone]  Fax:  [phone]    Date: 04/09/2019    Patient: Lucita Babcock  YOB: 1935  MRN: [id number]     Time Calculation  Start time: 0200  Stop time: 0300 Time Calculation (min): 60 minutes     Chief Complaint: Loss Of Balance (vertigo)    Visit #: 6    SUBJECTIVE: \"I've been doing the exercises and they help a lot\", \"I haven't had any spinning, just a little lightheadedness once in a while\"    OBJECTIVE:  Current objective measures:   Left [place]-Hallpike: positive for observable upbeating leftward torsional nystagmus of short duration along with subjective reports of spinning sensation.  Right [place]-Hallpike: negative for observable nystagmus or subjective reports of spinning sensation.   strength: 32lbs bilaterally        Therapeutic Treatments and Modalities:    1. Neuromuscular Re-education (CPT 71071)    Therapeutic Treatments and Modalities Summary: Performed CRM for left posterior SCC BPPV, canalithiasis type with a positive response.  No further observable nystagmus or reports of spinning sensation upon initiation or completion of CRM.  Postural stabilization exercises: static standing on non-compliant surface feet apart/feet together EO/EC x 30 seconds, feet apart EO/EC with static changes in head orientation left/right x 30 seconds with mild postural sway during EC, standing on compliant surface feet apart arms crossed EO/EC x 30 seconds with mild postural sway during EC.  Locomotor balance via walking while incorporating slow horizontal heads turns with mild postural adjustments.       Time-based treatments/modalities:  Neuromusc re-ed minutes (CPT 91684): 60 minutes        ASSESSMENT:   Response to treatment: Pt returns to outpatient OT after approximately 2 months due to scheduling conflicts and weather conditions.  Pt tested " positive for left posterior semi-circular canal BPPV, canalithiasis type with a positive response to CRM x 1.  Pt also making progress with her standing and locomotor postural stabilization in response to graded challenges to her visual, somatosensory, and vestibular systems.  Pt with a noticeable decrease in overall anxiety.  Reviewed HEP with pt/caregiver with good understanding including daily performance of Manjarrez-Daroff habituation exercises.  Anticipate d/c next visit.    PLAN/RECOMMENDATIONS:   Plan for treatment: therapy treatment to continue next visit.  Planned interventions for next visit: continue with current treatment, neuromuscular re-education (CPT 23797) and therapeutic activities (CPT 41257)

## 2019-04-15 ENCOUNTER — OCCUPATIONAL THERAPY (OUTPATIENT)
Dept: OCCUPATIONAL THERAPY | Facility: REHABILITATION | Age: 84
End: 2019-04-15
Attending: FAMILY MEDICINE
Payer: MEDICARE

## 2019-04-15 DIAGNOSIS — R29.898 UPPER EXTREMITY WEAKNESS: ICD-10-CM

## 2019-04-15 DIAGNOSIS — R42 VERTIGO: ICD-10-CM

## 2019-04-15 PROCEDURE — 97530 THERAPEUTIC ACTIVITIES: CPT

## 2019-04-15 NOTE — OP THERAPY DISCHARGE SUMMARY
Outpatient Occupational Therapy  DISCHARGE SUMMARY NOTE    [facility] Occupational Therapy [address]  [address]  [place] [address]  Phone:  [phone]  Fax:  [phone]    Date of Visit: 04/15/2019    Patient: Lucita Babcock  YOB: 1935  MRN: [id number]     Referring Provider: [place] [address] [address]   Referring Diagnosis: Other symptoms and signs involving the musculoskeletal system [R29.898]     Occupational Therapy Occurrence Codes    Date of onset of impairment:  9/7/18   Date of occupational therapy care plan established or reviewed:  1/9/19   Date of occupational therapy treatment started:  1/9/19          Functional Limitations and Severity Modifiers  OT Functional Assessment Tool Used: UEFI  OT Functional Assessment Score: 72/80   Goal:     Discharge:         Your patient is being discharged from Occupational Therapy with the following comments:   · Goals met    Comments:  Pt has actively participated in skilled OT program and has made good progress to fully meet the LTG's set in her initial POC. Pt was primarily treated for recurrent left posterior semi-circular canal BPPV, canalitiasis type and 1 episode of right P-SCC.  Pt was also treated for disuse dysequilibrium, which was further affected by her baseline high level of anxiety.  While pt's intermittent vertigo is in part due to cervical spine limitations, she has responded very well to both CRM's performed by therapist and Manjarrez-Daroff habituation exercises.  Pt/caregiver are independent with her HEP and she is safe for d/c.     Limitations Remaining:  See daily note from 4/15/19 for details    Recommendations:  D/C to HEP.  D/C OT.    Olivre Wilson MS,OTR/L    Date: 4/15/2019

## 2019-04-16 DIAGNOSIS — F41.9 ANXIETY DISORDER, UNSPECIFIED TYPE: ICD-10-CM

## 2019-04-16 RX ORDER — ALPRAZOLAM 0.25 MG/1
0.25 TABLET ORAL NIGHTLY PRN
Qty: 30 TAB | Refills: 0 | Status: SHIPPED
Start: 2019-04-16 | End: 2019-05-16

## 2019-04-17 ENCOUNTER — APPOINTMENT (OUTPATIENT)
Dept: MEDICAL GROUP | Age: 84
End: 2019-04-17
Payer: MEDICARE

## 2019-05-03 RX ORDER — GABAPENTIN 100 MG/1
CAPSULE ORAL
Qty: 90 CAP | Refills: 0 | Status: SHIPPED | OUTPATIENT
Start: 2019-05-03 | End: 2019-05-14

## 2019-05-14 ENCOUNTER — OFFICE VISIT (OUTPATIENT)
Dept: MEDICAL GROUP | Age: 84
End: 2019-05-14
Payer: MEDICARE

## 2019-05-14 VITALS
HEIGHT: 68 IN | BODY MASS INDEX: 22.28 KG/M2 | DIASTOLIC BLOOD PRESSURE: 68 MMHG | WEIGHT: 147 LBS | SYSTOLIC BLOOD PRESSURE: 134 MMHG | HEART RATE: 75 BPM | OXYGEN SATURATION: 97 % | TEMPERATURE: 98 F

## 2019-05-14 DIAGNOSIS — I10 ESSENTIAL HYPERTENSION: ICD-10-CM

## 2019-05-14 DIAGNOSIS — H81.13 BENIGN PAROXYSMAL POSITIONAL VERTIGO DUE TO BILATERAL VESTIBULAR DISORDER: ICD-10-CM

## 2019-05-14 PROCEDURE — 99214 OFFICE O/P EST MOD 30 MIN: CPT | Performed by: FAMILY MEDICINE

## 2019-05-14 NOTE — ASSESSMENT & PLAN NOTE
Patient has tried several different medications including ACE inhibitors beta-blockers hydrochlorothiazide calcium channel blockers, she had difficulty tolerating them.  She has had difficult complaints of lightheadedness uncontrolled blood pressure headaches dizziness and cough.  We currently have her on diltiazem 60 mg p.o. nightly.  She had been complaining of some lightheadedness/vertigo symptoms.  She denies any chest pain, no shortness of breath no headaches.  She has had vertigo for several years and has done well with repositioning techniques with physical therapy.  She denies any new rashes, no new joint pain, no syncopal or episodes.

## 2019-05-14 NOTE — PROGRESS NOTES
This medical record contains text that has been entered with the assistance of computer voice recognition and dictation software.  Therefore, it may contain unintended errors in text, spelling, punctuation, or grammar    Chief Complaint   Patient presents with   • Vertigo         Lucita Babcock is a 84 y.o. female here evaluation and management of: dizziness, and bp      HPI:     Essential hypertension  Patient has tried several different medications including ACE inhibitors beta-blockers hydrochlorothiazide calcium channel blockers, she had difficulty tolerating them.  She has had difficult complaints of lightheadedness uncontrolled blood pressure headaches dizziness and cough.  We currently have her on diltiazem 60 mg p.o. nightly.  She had been complaining of some lightheadedness/vertigo symptoms.  She denies any chest pain, no shortness of breath no headaches.  She has had vertigo for several years and has done well with repositioning techniques with physical therapy.  She denies any new rashes, no new joint pain, no syncopal or episodes.    Benign paroxysmal positional vertigo due to bilateral vestibular disorder  Patient states that some of her vertigo symptoms have returned.  She states that she usually gets dizzy now turning her head to the right even if he sitting down or laying down in bed.  She denies any headache no nausea or vomiting no syncopal episodes.  She states that physical therapy exercises in the past for repositioning techniques were helpful.  Denies any changes in vision, no numbness or tingling anywhere, no weakness in any extremity.    Current medicines (including changes today)  Current Outpatient Prescriptions   Medication Sig Dispense Refill   • DILTIAZem (CARDIZEM) 30 MG Tab Take 1 Tab by mouth every day. 90 Tab 0   • Cholecalciferol (VITAMIN D PO) Take  by mouth.     • sertraline (ZOLOFT) 100 MG Tab Take 1 Tab by mouth every day. 90 Tab 1   • ascorbic acid (VITAMIN C) 500 MG tablet  Take 1 Tab by mouth 3 times a day. 90 Tab 0   • bethanechol (URECHOLINE) 25 MG Tab Take 1 Tab by mouth every 24 hours as needed. 90 Tab 0   • ALPRAZolam (XANAX) 0.25 MG Tab Take 1 Tab by mouth at bedtime as needed for Sleep for up to 30 days. 30 Tab 0   • acetaminophen (TYLENOL) 160 MG/5ML Suspension Take 15 mg/kg by mouth every 6 hours as needed.       No current facility-administered medications for this visit.      She  has a past medical history of Anesthesia; Aneurysm ([facility]); Anxiety; Arthritis; Breast cancer ([facility]) (2008); Cancer ([facility]) (2008); CATARACT; Dental disorder; Depression; Heart burn; Hypertension; Indigestion; Injury of cervical spine ([facility]) (July 2016); Osteopenia; 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[facility]); Sarcoid ([facility]); Urinary bladder disorder; and UTI (urinary tract infection).  She  has a past surgical history that includes vein stripping; mass excision general (7/08); breast biopsy (5/27/08); abdominal hysterectomy total (1997); cataract phaco with iol (8/11/2010); colonoscopy (2007); ercp in or (12/28/2012); samuel by laparoscopy (1/8/2013); lumpectomy (2008); pr radiation therapy plan simple (2008); cataract phaco with iol (8/27/2014); cervical disk and fusion anterior (7/27/2016); and parathyroid exploration (9/7/2018).  Social History   Substance Use Topics   • Smoking status: Former Smoker     Packs/day: 0.50     Years: 45.00     Quit date: 10/9/1969   • Smokeless tobacco: Never Used   • Alcohol use No     Social History     Social History Narrative   • No narrative on file     Family History   Problem Relation Age of Onset   • Hypertension Mother    • Stroke Mother    • Suicide Attempts Son    • Bipolar disorder Son    • Other Father         Dementia   • Other Sister         BIpolar   • Bipolar disorder Sister    • Other Sister         Bipolar   • Other Sister         THyroid disease   • Bipolar disorder Sister    • Cancer Neg Hx    • Diabetes Neg Hx    • Heart Disease Neg Hx      Family Status   Relation  "Status   • Mo    • Son  at age 32        suicide   • Fa    • Sis    • Sis Alive   • Sis Alive   • Marcelino Alive   • Marcelino Alive   • Marcelino         at birth   • Neg Hx (Not Specified)         ROS    Please see hpi     All other systems reviewed and are negative     Objective:     /68 (BP Location: Left arm, Patient Position: Sitting, BP Cuff Size: Adult)   Pulse 75   Temp 36.7 °C (98 °F) (Temporal)   Ht 1.727 m (5' 8\")   Wt 66.7 kg (147 lb)   SpO2 97%  Body mass index is 22.35 kg/m².  Physical Exam:    Constitutional: Alert, no distress.  Skin: normal skin turgor  Eye: Equal, round and reactive, conjunctiva clear, lids normal.  ENMT: Lips without lesions, good dentition, oropharynx clear.  Neck: Trachea midline, no masses, no thyromegaly. No cervical or supraclavicular lymphadenopathy.  Respiratory: Unlabored respiratory effort, lungs clear to auscultation, no wheezes, no ronchi.  Cardiovascular: Normal S1, S2, no murmur, no edema  Abdomen: Soft, non-tender, no masses, no hepatosplenomegaly.  Psych: Alert and oriented x3, normal affect and mood.          Assessment and Plan:   The following treatment plan was discussed      1. Essential hypertension  Given her recent dizziness  We will decrease Cardizem to 30 mg nightly.  She was instructed to make a 2-week blood pressure log.      - DILTIAZem (CARDIZEM) 30 MG Tab; Take 1 Tab by mouth every day.  Dispense: 90 Tab; Refill: 0    2. Benign paroxysmal positional vertigo due to bilateral vestibular disorder  We reviewed several particle repositioning maneuvers   Such as modified eply, Semont, and Manjarrez-Zaidoff  She will also benefit from vestibular rehabilitation in physical therapy.  Instructed  to take meclizine as prescribed  Establish care in PT for vestibular rehabilitation    - REFERRAL TO PHYSICAL THERAPY Reason for Therapy: Eval/Treat/Report      Instructed to Follow up in clinic or ER for worsening symptoms, difficulty " breathing, lack of expected recovery, or should new symptoms or problems arise.    Followup: Return in about 2 weeks (around 5/28/2019) for 2 week BP log.       Once again this medical record contains text that has been entered with the assistance of computer voice recognition and dictation software.  Therefore, it may contain unintended errors in text, spelling, punctuation, or grammar

## 2019-05-14 NOTE — ASSESSMENT & PLAN NOTE
Patient states that some of her vertigo symptoms have returned.  She states that she usually gets dizzy now turning her head to the right even if he sitting down or laying down in bed.  She denies any headache no nausea or vomiting no syncopal episodes.  She states that physical therapy exercises in the past for repositioning techniques were helpful.  Denies any changes in vision, no numbness or tingling anywhere, no weakness in any extremity.

## 2019-05-28 RX ORDER — GABAPENTIN 100 MG/1
CAPSULE ORAL
Qty: 90 CAP | Refills: 0 | Status: SHIPPED | OUTPATIENT
Start: 2019-05-28 | End: 2019-07-25

## 2019-06-17 ENCOUNTER — APPOINTMENT (OUTPATIENT)
Dept: PHYSICAL THERAPY | Facility: REHABILITATION | Age: 84
End: 2019-06-17
Attending: FAMILY MEDICINE
Payer: MEDICARE

## 2019-06-18 ENCOUNTER — TELEPHONE (OUTPATIENT)
Dept: MEDICAL GROUP | Age: 84
End: 2019-06-18

## 2019-06-18 ENCOUNTER — PHYSICAL THERAPY (OUTPATIENT)
Dept: PHYSICAL THERAPY | Facility: REHABILITATION | Age: 84
End: 2019-06-18
Attending: FAMILY MEDICINE
Payer: MEDICARE

## 2019-06-18 DIAGNOSIS — H81.13 BENIGN PAROXYSMAL POSITIONAL VERTIGO DUE TO BILATERAL VESTIBULAR DISORDER: ICD-10-CM

## 2019-06-18 PROCEDURE — 95992 CANALITH REPOSITIONING PROC: CPT

## 2019-06-18 PROCEDURE — 92540 BASIC VESTIBULAR EVALUATION: CPT

## 2019-06-18 ASSESSMENT — ENCOUNTER SYMPTOMS
PAIN SCALE AT HIGHEST: 8
PAIN SCALE AT LOWEST: 3
PAIN SCALE: 5

## 2019-06-18 NOTE — TELEPHONE ENCOUNTER
Phone Number Called: [phone] (home)       Call outcome: spoke to patient regarding message below

## 2019-06-18 NOTE — OP THERAPY EVALUATION
Outpatient Physical Therapy  VESTIBULAR EVALUATION    [address]  [address]  [place] [address]  Phone:  [phone]  Fax:  [phone]    Date of Evaluation: 2019    Patient: Lucita Babcock  YOB: 1935  MRN: [id number]     Referring Provider: No referring provider defined for this encounter.   Referring Diagnosis: Dizziness and giddiness [R42]     Time Calculation  Start time: 1114  Stop time: 1159 Time Calculation (min): 45 minutes     Physical Therapy Occurrence Codes    Date of onset of impairment:  19   Date physical therapy care plan established or reviewed:  19   Date physical therapy treatment started:  19          Chief Complaint: Vertigo    Visit Diagnoses     ICD-10-CM   1. Benign paroxysmal positional vertigo due to bilateral vestibular disorder H81.13         History of Present Illness:     Mechanism of injury:  This is a known patient returning to  with complaint of vertigo.  She was seen last from March-2018 for 14 sessions treating vertigo and imbalance. She was noted to have mixed bilateral posterior canalithasis type BPPV, which improved with CRM but did not resolve.  She was discharged with continued imbalance and need for AD/caregiver support.  Was treated again in 2019 with OT regarding the BPPV.      Ms. Babcock returns today reporting her vertigo has worsened since d/c from OT.        Prior level of function:  Sedentary.  Caregiver support 3-4 hrs/day. Community ambulator with SPC. 4WW when at home alone.   Symptoms:     Current symptom ratin    At best symptom rating:  3    At worst symptom ratin    Exacerbated by: Triggers: anxiety (being alone), unable to I.D a specific movement trigger.    Alleviating factors: Having company, the CRM, 'prayers', drinking tea, xanax.    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   • Cancer (HCC) 2008    breast   • CATARACT     bilat IOL   • Dental disorder     upper partial   • Depression    • Heart burn    • Hypertension    • Indigestion    • Injury of cervical spine (HCC) July 2016    C-spine decompression/laminectomy   • Osteopenia    • 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HCC)    • Sarcoid (HCC)    • Urinary bladder disorder     frequency    • UTI (urinary tract infection)     recurrent     Past Surgical History:   Procedure Laterality Date   • PARATHYROID EXPLORATION  9/7/2018    Procedure: PARATHYROID EXPLORATION- MINIMALLY INVASIVE,   NIMS RECURRENT LARYNGEAL NERVE MONITORING RIGHT;  Surgeon: Devora Gonsalez M.D.;  Location: SURGERY SAME DAY [facility];  Service: General   • CERVICAL DISK AND FUSION ANTERIOR  7/27/2016    Procedure: CERVICAL DISK AND FUSION ANTERIOR C4-7;  Surgeon: Niles Khan M.D.;  Location: SURGERY [facility];  Service:    • CATARACT PHACO WITH IOL  8/27/2014    Performed by Avani Allen M.D. at SURGERY SAME DAY [facility]   • AMBROCIO BY LAPAROSCOPY  1/8/2013    Performed by Kenrick Howell M.D. at SURGERY [facility]   • ERCP IN OR  12/28/2012    Performed by Jay Dubois M.D. at SURGERY [facility]   • CATARACT PHACO WITH IOL  8/11/2010    Performed by AVANI ALLEN at SURGERY SAME DAY [facility]   • MASS EXCISION GENERAL  7/08    chest mass biopsy sarcoid   • BREAST BIOPSY  5/27/08    Performed by KENRICK HOWELL at SURGERY SAME DAY [facility]   • LUMPECTOMY  2008    right   • PB RADIATION THERAPY PLAN SIMPLE  2008    right   • COLONOSCOPY  2007    2 polyps   • ABDOMINAL " "HYSTERECTOMY TOTAL  1997   • VEIN STRIPPING      R leg     Social History   Substance Use Topics   • Smoking status: Former Smoker     Packs/day: 0.50     Years: 45.00     Quit date: 10/9/1969   • Smokeless tobacco: Never Used   • Alcohol use No     Family and Occupational History     Social History   • Marital status:      Spouse name: N/A   • Number of children: N/A   • Years of education: N/A       Objective:    Gait:     Assessment: wide-based gait, improved gait with assistive device and difficulty with concurrent head rotation  Vestibulospinal Exam:     Dynamic Gait Index: 16/24    Fall Risk: yes (has life alert)  Oculomotor Exam:         Details: No spontaneous nystagmus central gaze          Details: No spontaneous nystagmus eccentric gaze    No saccadic eye movements    Smooth pursuit present    Convergence:        Normal convergence    No skew  Active Range of Motion:     Active range of motion comments: Limited c/s AROM 50% due to chronic fusion.   Limb Ataxia Exam:     Finger-to-Nose:         Intention tremor: none    Dysdiadochokinesia: none.  Strength Exam:     Comments: LE strength grossly 4/5.   Reflex Exam:       Deep Tendon Reflexes:         Left L4 (Patellar): normal (2+)        Right L4 (Patellar): normal (2+)  Sensation Tests:     Left Light Touch Sensation:         All left lumbar dermatomes intact      Right Light Touch Sensation:         All right lumbar dermatomes intact      Vestibulo-Ocular Exam:   Normal head thrust test  BPPV Exam:     Abnormal [place]-Hallpike        Findings: positive bilateral    Comments: L hallpike: 3\" latent, 10\" duration, L upward torsional  R hallpike: 2\" latent, 20\" duration, mild R upward torsional        Therapeutic Treatments and Modalities:     1. Canalith Repositioning (CPT 50262), L epley completed x 2.  Home instruction provided for activity/sleep modifications post tx.     Time-based treatments/modalities:            Assessment:     Functional " impairments:  Positive BPPV (left), positive BPPV (right), gait abnormality/instability, decreased utilization of VIS/vest/somato, decreased range of motion/strength, decreased limits of stability and decreased postural stability    Assessment details:  Ms. Babcock is an 84 y.o female who returns to PT for treatment of her vertigo.  She has been seen for 2 previous treatment episodes for the same problem, always consistent with BPPV and never fully resolving with CRM, but finds the problem more manageable with treatment.  Vestibular evaluation today again revealed L>R posterior canalithasis type BPPV.  She does show increased instability, risk of falls and anxiety related to the problem.  Nystagmus did resolve with CRM today. A trial of skilled PT services are indicated to address the mentioned functional limitations and enhance QOL, but may be limited due to previous PT history.         Prognosis: fair    Goals:     Short term goals:  - Resolve nystagmus with L hallpike  - Resolve nystagmus with R hallpike  - Able to VORx1 for 1 min without increased symptoms.     Short term goal time span:  1-2 weeks    Long term goals:  - Improve DHI at least 20%  - DGI of at least 19/24 with SPC  - Indep with HEP    Long term goal time span:  6-8 weeks  Plan:     Therapy options:  Physical therapy treatment to continue    Planned therapy interventions:  Canalith Repositioning (CPT 12298), Gait Training (CPT 68433), Functional Training, Self Care (CPT 63225), Neuromuscular Re-education (CPT 80904), Therapeutic Exercise (CPT 04349) and Therapeutic Activities (CPT 86018)    Frequency: 1-2x/week.    Duration in weeks:  8    Discussed with:  Patient      Functional Limitations and Severity Modifiers    DHI= 68%    Referring provider co-signature:  I have reviewed this plan of care and my co-signature certifies the need for services.  Certification Dates:   From 6/18/19     To 8/13/19    Physician Signature:  ________________________________ Date: ______________

## 2019-06-18 NOTE — TELEPHONE ENCOUNTER
VOICEMAIL  1. Caller Name: Lucita Babcock      Call Back Number: [phone] (home)       2. Message: Pt left voicemail stating that she's been having nausea and dizziness in results of the setraline. Followed up with patient and she wants to know to go back to 50 mg instead of 100mg.     3. Patient approves office to leave a detailed voicemail/MyChart message: N\A

## 2019-06-21 ENCOUNTER — APPOINTMENT (OUTPATIENT)
Dept: PHYSICAL THERAPY | Facility: REHABILITATION | Age: 84
End: 2019-06-21
Payer: MEDICARE

## 2019-06-22 DIAGNOSIS — I10 ESSENTIAL HYPERTENSION: ICD-10-CM

## 2019-06-24 ENCOUNTER — APPOINTMENT (OUTPATIENT)
Dept: PHYSICAL THERAPY | Facility: REHABILITATION | Age: 84
End: 2019-06-24
Payer: MEDICARE

## 2019-06-27 ENCOUNTER — APPOINTMENT (OUTPATIENT)
Dept: PHYSICAL THERAPY | Facility: REHABILITATION | Age: 84
End: 2019-06-27
Payer: MEDICARE

## 2019-06-28 ENCOUNTER — HOSPITAL ENCOUNTER (OUTPATIENT)
Dept: RADIOLOGY | Facility: MEDICAL CENTER | Age: 84
End: 2019-06-28
Attending: FAMILY MEDICINE
Payer: MEDICARE

## 2019-06-28 DIAGNOSIS — Z12.31 VISIT FOR SCREENING MAMMOGRAM: ICD-10-CM

## 2019-06-28 PROCEDURE — 77063 BREAST TOMOSYNTHESIS BI: CPT

## 2019-07-01 ENCOUNTER — APPOINTMENT (OUTPATIENT)
Dept: PHYSICAL THERAPY | Facility: REHABILITATION | Age: 84
End: 2019-07-01
Payer: MEDICARE

## 2019-07-05 ENCOUNTER — APPOINTMENT (OUTPATIENT)
Dept: PHYSICAL THERAPY | Facility: REHABILITATION | Age: 84
End: 2019-07-05
Payer: MEDICARE

## 2019-07-09 ENCOUNTER — APPOINTMENT (OUTPATIENT)
Dept: PHYSICAL THERAPY | Facility: REHABILITATION | Age: 84
End: 2019-07-09
Attending: FAMILY MEDICINE
Payer: MEDICARE

## 2019-07-12 ENCOUNTER — HOSPITAL ENCOUNTER (OUTPATIENT)
Dept: RADIOLOGY | Facility: MEDICAL CENTER | Age: 84
End: 2019-07-12
Attending: INTERNAL MEDICINE
Payer: MEDICARE

## 2019-07-12 DIAGNOSIS — R14.0 ABDOMINAL DISTENTION: ICD-10-CM

## 2019-07-12 PROCEDURE — 74021 RADEX ABDOMEN 3+ VIEWS: CPT

## 2019-07-13 ENCOUNTER — APPOINTMENT (OUTPATIENT)
Dept: RADIOLOGY | Facility: MEDICAL CENTER | Age: 84
End: 2019-07-13
Attending: EMERGENCY MEDICINE
Payer: MEDICARE

## 2019-07-13 ENCOUNTER — HOSPITAL ENCOUNTER (EMERGENCY)
Facility: MEDICAL CENTER | Age: 84
End: 2019-07-13
Attending: EMERGENCY MEDICINE
Payer: MEDICARE

## 2019-07-13 VITALS
WEIGHT: 140 LBS | RESPIRATION RATE: 16 BRPM | DIASTOLIC BLOOD PRESSURE: 59 MMHG | OXYGEN SATURATION: 95 % | BODY MASS INDEX: 21.22 KG/M2 | TEMPERATURE: 99.1 F | HEART RATE: 77 BPM | HEIGHT: 68 IN | SYSTOLIC BLOOD PRESSURE: 139 MMHG

## 2019-07-13 DIAGNOSIS — K59.00 CONSTIPATION, UNSPECIFIED CONSTIPATION TYPE: ICD-10-CM

## 2019-07-13 DIAGNOSIS — R11.0 NAUSEA: ICD-10-CM

## 2019-07-13 DIAGNOSIS — N30.00 ACUTE CYSTITIS WITHOUT HEMATURIA: ICD-10-CM

## 2019-07-13 LAB
ALBUMIN SERPL BCP-MCNC: 4.3 G/DL (ref 3.2–4.9)
ALBUMIN/GLOB SERPL: 1.5 G/DL
ALP SERPL-CCNC: 58 U/L (ref 30–99)
ALT SERPL-CCNC: 17 U/L (ref 2–50)
ANION GAP SERPL CALC-SCNC: 10 MMOL/L (ref 0–11.9)
APPEARANCE UR: ABNORMAL
AST SERPL-CCNC: 18 U/L (ref 12–45)
BACTERIA #/AREA URNS HPF: ABNORMAL /HPF
BASOPHILS # BLD AUTO: 0.4 % (ref 0–1.8)
BASOPHILS # BLD: 0.03 K/UL (ref 0–0.12)
BILIRUB SERPL-MCNC: 0.4 MG/DL (ref 0.1–1.5)
BILIRUB UR QL STRIP.AUTO: NEGATIVE
BUN SERPL-MCNC: 12 MG/DL (ref 8–22)
CALCIUM SERPL-MCNC: 10.5 MG/DL (ref 8.5–10.5)
CHLORIDE SERPL-SCNC: 103 MMOL/L (ref 96–112)
CO2 SERPL-SCNC: 28 MMOL/L (ref 20–33)
COLOR UR: YELLOW
CREAT SERPL-MCNC: 0.56 MG/DL (ref 0.5–1.4)
EKG IMPRESSION: NORMAL
EOSINOPHIL # BLD AUTO: 0.15 K/UL (ref 0–0.51)
EOSINOPHIL NFR BLD: 2.1 % (ref 0–6.9)
EPI CELLS #/AREA URNS HPF: NEGATIVE /HPF
ERYTHROCYTE [DISTWIDTH] IN BLOOD BY AUTOMATED COUNT: 44.7 FL (ref 35.9–50)
GLOBULIN SER CALC-MCNC: 2.9 G/DL (ref 1.9–3.5)
GLUCOSE SERPL-MCNC: 91 MG/DL (ref 65–99)
GLUCOSE UR STRIP.AUTO-MCNC: NEGATIVE MG/DL
HCT VFR BLD AUTO: 41.8 % (ref 37–47)
HGB BLD-MCNC: 13.4 G/DL (ref 12–16)
HYALINE CASTS #/AREA URNS LPF: ABNORMAL /LPF
IMM GRANULOCYTES # BLD AUTO: 0.01 K/UL (ref 0–0.11)
IMM GRANULOCYTES NFR BLD AUTO: 0.1 % (ref 0–0.9)
KETONES UR STRIP.AUTO-MCNC: NEGATIVE MG/DL
LEUKOCYTE ESTERASE UR QL STRIP.AUTO: ABNORMAL
LIPASE SERPL-CCNC: 28 U/L (ref 11–82)
LYMPHOCYTES # BLD AUTO: 1.32 K/UL (ref 1–4.8)
LYMPHOCYTES NFR BLD: 18.6 % (ref 22–41)
MCH RBC QN AUTO: 29.8 PG (ref 27–33)
MCHC RBC AUTO-ENTMCNC: 32.1 G/DL (ref 33.6–35)
MCV RBC AUTO: 93.1 FL (ref 81.4–97.8)
MICRO URNS: ABNORMAL
MONOCYTES # BLD AUTO: 0.4 K/UL (ref 0–0.85)
MONOCYTES NFR BLD AUTO: 5.6 % (ref 0–13.4)
NEUTROPHILS # BLD AUTO: 5.2 K/UL (ref 2–7.15)
NEUTROPHILS NFR BLD: 73.2 % (ref 44–72)
NITRITE UR QL STRIP.AUTO: POSITIVE
NRBC # BLD AUTO: 0 K/UL
NRBC BLD-RTO: 0 /100 WBC
PH UR STRIP.AUTO: 8 [PH]
PLATELET # BLD AUTO: 252 K/UL (ref 164–446)
PMV BLD AUTO: 9.3 FL (ref 9–12.9)
POTASSIUM SERPL-SCNC: 3.7 MMOL/L (ref 3.6–5.5)
PROT SERPL-MCNC: 7.2 G/DL (ref 6–8.2)
PROT UR QL STRIP: NEGATIVE MG/DL
RBC # BLD AUTO: 4.49 M/UL (ref 4.2–5.4)
RBC # URNS HPF: ABNORMAL /HPF
RBC UR QL AUTO: ABNORMAL
SODIUM SERPL-SCNC: 141 MMOL/L (ref 135–145)
SP GR UR STRIP.AUTO: 1.01
T4 FREE SERPL-MCNC: 1.03 NG/DL (ref 0.53–1.43)
TSH SERPL DL<=0.005 MIU/L-ACNC: 0.6 UIU/ML (ref 0.38–5.33)
UROBILINOGEN UR STRIP.AUTO-MCNC: 0.2 MG/DL
WBC # BLD AUTO: 7.1 K/UL (ref 4.8–10.8)
WBC #/AREA URNS HPF: ABNORMAL /HPF

## 2019-07-13 PROCEDURE — A9270 NON-COVERED ITEM OR SERVICE: HCPCS | Performed by: EMERGENCY MEDICINE

## 2019-07-13 PROCEDURE — 74177 CT ABD & PELVIS W/CONTRAST: CPT

## 2019-07-13 PROCEDURE — 99285 EMERGENCY DEPT VISIT HI MDM: CPT

## 2019-07-13 PROCEDURE — 87086 URINE CULTURE/COLONY COUNT: CPT

## 2019-07-13 PROCEDURE — 84443 ASSAY THYROID STIM HORMONE: CPT

## 2019-07-13 PROCEDURE — 84439 ASSAY OF FREE THYROXINE: CPT

## 2019-07-13 PROCEDURE — 93005 ELECTROCARDIOGRAM TRACING: CPT | Performed by: EMERGENCY MEDICINE

## 2019-07-13 PROCEDURE — 51798 US URINE CAPACITY MEASURE: CPT

## 2019-07-13 PROCEDURE — 700105 HCHG RX REV CODE 258: Performed by: EMERGENCY MEDICINE

## 2019-07-13 PROCEDURE — 83690 ASSAY OF LIPASE: CPT

## 2019-07-13 PROCEDURE — 700111 HCHG RX REV CODE 636 W/ 250 OVERRIDE (IP): Performed by: EMERGENCY MEDICINE

## 2019-07-13 PROCEDURE — 81001 URINALYSIS AUTO W/SCOPE: CPT

## 2019-07-13 PROCEDURE — 93005 ELECTROCARDIOGRAM TRACING: CPT

## 2019-07-13 PROCEDURE — 36415 COLL VENOUS BLD VENIPUNCTURE: CPT

## 2019-07-13 PROCEDURE — 700102 HCHG RX REV CODE 250 W/ 637 OVERRIDE(OP): Performed by: EMERGENCY MEDICINE

## 2019-07-13 PROCEDURE — 80053 COMPREHEN METABOLIC PANEL: CPT

## 2019-07-13 PROCEDURE — 96365 THER/PROPH/DIAG IV INF INIT: CPT | Mod: XU

## 2019-07-13 PROCEDURE — 700117 HCHG RX CONTRAST REV CODE 255: Performed by: EMERGENCY MEDICINE

## 2019-07-13 PROCEDURE — 85025 COMPLETE CBC W/AUTO DIFF WBC: CPT

## 2019-07-13 RX ORDER — CEFDINIR 300 MG/1
300 CAPSULE ORAL 2 TIMES DAILY
Qty: 14 CAP | Refills: 0 | Status: SHIPPED | OUTPATIENT
Start: 2019-07-13 | End: 2019-07-20

## 2019-07-13 RX ADMIN — IOHEXOL 100 ML: 350 INJECTION, SOLUTION INTRAVENOUS at 13:08

## 2019-07-13 RX ADMIN — MAGNESIUM CITRATE 296 ML: 1.75 LIQUID ORAL at 13:47

## 2019-07-13 RX ADMIN — CEFTRIAXONE SODIUM 2 G: 2 INJECTION, POWDER, FOR SOLUTION INTRAMUSCULAR; INTRAVENOUS at 12:59

## 2019-07-13 NOTE — ED PROVIDER NOTES
"ED Provider Note    Scribed for Gertrudis Pereira M.D. by Cherry Leahy. 7/13/2019  10:46 AM    Primary care provider: Robbie Trivedi M.D.  Means of arrival: ambulance  History obtained from: patient  History limited by: none  CHIEF COMPLAINT  Chief Complaint   Patient presents with   • Abdominal Pain   • Bloating       [facility]  Lucita Babcock is a 84 y.o. female who presents to the ED with worsening abdominal pain, bloating, and nausea onset 3 years ago after a cervical spine surgery, but worse over the last 2 weeks. The patient states that she broke her neck 3 years ago and since then has had symptoms of intermittent abdominal distension, abdominal pain, and nausea.  She also reports that she has had vertigo since her neck surgery.  She also reports decreased bowel movements, reduced appetite, and occasional mild dysuria.  She says she has a bowel movement every day but only \"a small amount comes out.\"  Her nausea is worsened by positional changes such as lying down. She denies chest pain, hematochezia, cloudy urine, foul-smelling urine, dramatic weight loss, fever, chlils, melena, depression.  She called her GI doctor yesterday to tell him she was having increased bloating and nausea, and her gastroenterologist ordered an X-ray, which showed constipation. She has a colonoscopy scheduled for next month. She states that she called EMS 2 days ago and was given Zofran sublingually. The patient also states she used to get migraines with associated abdominal pain, would resolve after emesis and rest.The patient states that she is homebound, doesn't move much. She does not go out much due to pain in her knee as well as falling risk. She had a parathyroid gland removed last fall. Her daughter also reports some increase in memory issues over the last several weeks.  She notes that she has a caregiver who takes care of her 3 times per week.  Patient is a poor historian.      REVIEW OF SYSTEMS  See HPI for further " "details. Pertinent positives include: abdominal pain, bloating, vertigo, nausea, decreased appetite. Pertinent negatives include no: chest pain, fever, chlils, melena, hematochezia, cloudy urine, foul-smelling urine, dramatic weight loss, or depression.  All other systems are negative.    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   • Cancer (HCC) 2008    breast   • CATARACT     bilat IOL   • Dental disorder     upper partial   • Depression    • Heart burn    • Hypertension    • Indigestion    • Injury of cervical spine (HCC) July 2016    C-spine decompression/laminectomy   • Osteopenia    • 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HCC)    • Sarcoid (HCC)    • Urinary bladder disorder     frequency    • UTI (urinary tract infection)     recurrent       FAMILY HISTORY  Family History   Problem Relation Age of Onset   • Hypertension Mother    • Stroke Mother    • Suicide Attempts Son    • Bipolar disorder Son    • Other Father         Dementia   • Other Sister         BIpolar   • Bipolar disorder Sister    • Other Sister         Bipolar   • Other Sister         THyroid disease   • Bipolar disorder Sister    • Cancer Neg Hx    • Diabetes Neg Hx    • Heart Disease Neg Hx        SOCIAL HISTORY  Social History     Social History   • Marital status:      Spouse name: N/A   • Number of children: N/A   • Years of education: N/A     Social History Main Topics   • Smoking status: Former Smoker     Packs/day: 0.50     Years: 45.00     Quit date: 10/9/1969   • Smokeless tobacco: Never Used   • Alcohol use No   • Drug use: No   • Sexual activity: Not Currently     Partners: Male      Comment:       Other Topics Concern   • None noted     Social History Narrative   • None noted       SURGICAL HISTORY  Past Surgical History:   Procedure Laterality Date   • " PARATHYROID EXPLORATION  9/7/2018    Procedure: PARATHYROID EXPLORATION- MINIMALLY INVASIVE,   NIMS RECURRENT LARYNGEAL NERVE MONITORING RIGHT;  Surgeon: Devora Gonsalez M.D.;  Location: SURGERY SAME DAY [facility];  Service: General   • CERVICAL DISK AND FUSION ANTERIOR  7/27/2016    Procedure: CERVICAL DISK AND FUSION ANTERIOR C4-7;  Surgeon: Niles Khan M.D.;  Location: SURGERY [facility];  Service:    • CATARACT PHACO WITH IOL  8/27/2014    Performed by Avani Allen M.D. at SURGERY SAME DAY [facility] ORS   • AMBROCIO BY LAPAROSCOPY  1/8/2013    Performed by Kenrick Howell M.D. at SURGERY [facility]   • ERCP IN OR  12/28/2012    Performed by Jay Dubois M.D. at SURGERY [facility]   • CATARACT PHACO WITH IOL  8/11/2010    Performed by AVANI ALLEN at SURGERY SAME DAY [facility]   • MASS EXCISION GENERAL  7/08    chest mass biopsy sarcoid   • BREAST BIOPSY  5/27/08    Performed by KENRICK HOWELL at SURGERY SAME DAY [facility]   • LUMPECTOMY  2008    right   • PB RADIATION THERAPY PLAN SIMPLE  2008    right   • COLONOSCOPY  2007    2 polyps   • ABDOMINAL HYSTERECTOMY TOTAL  1997   • VEIN STRIPPING      R leg       CURRENT MEDICATIONS  No current facility-administered medications for this encounter.     Current Outpatient Prescriptions:   •  cefdinir (OMNICEF) 300 MG Cap, Take 1 Cap by mouth 2 times a day for 7 days., Disp: 14 Cap, Rfl: 0  •  DILTIAZem (CARDIZEM) 30 MG Tab, TAKE 2 TABLETS BY MOUTH EVERY DAY, Disp: 180 Tab, Rfl: 2  •  gabapentin (NEURONTIN) 100 MG Cap, TAKE ONE CAP ORALLY TWICE DAILY, IF NONAFFECTIVE YOU MAY TAKE 1 CAP 3 TIMES DAILY, Disp: 90 Cap, Rfl: 0  •  DILTIAZem (CARDIZEM) 30 MG Tab, Take 1 Tab by mouth every day., Disp: 90 Tab, Rfl: 0  •  Cholecalciferol (VITAMIN D PO), Take  by mouth., Disp: , Rfl:   •  sertraline (ZOLOFT) 100 MG Tab, Take 1 Tab by mouth every day., Disp: 90 Tab, Rfl: 1  •  ascorbic acid (VITAMIN C) 500 MG tablet, Take 1 Tab by mouth 3 times a  "day., Disp: 90 Tab, Rfl: 0  •  bethanechol (URECHOLINE) 25 MG Tab, Take 1 Tab by mouth every 24 hours as needed., Disp: 90 Tab, Rfl: 0  •  acetaminophen (TYLENOL) 160 MG/5ML Suspension, Take 15 mg/kg by mouth every 6 hours as needed., Disp: , Rfl:       ALLERGIES  Allergies   Allergen Reactions   • Ace Inhibitors Anaphylaxis   • Augmentin      Flu like sx   • Erythromycin Rash   • Lisinopril Anaphylaxis   • Penicillins Hives   • Epinephrine Unspecified     shaking   • Percocet [Oxycodone-Acetaminophen] Vomiting   • Sulfa Drugs Hives       PHYSICAL EXAM  VITAL SIGNS: /61   Pulse 65   Temp 37.3 °C (99.1 °F) (Temporal)   Resp 16   Ht 1.727 m (5' 8\")   Wt 63.5 kg (140 lb)   SpO2 100%   BMI 21.29 kg/m²    Constitutional: Well developed, well nourished; No acute distress; Non-toxic appearance.   HENT: Normocephalic, atraumatic; Bilateral external ears normal; Oropharynx with moist mucous membranes; No erythema or exudates in the posterior oropharynx.   Eyes: PERRL, EOMI, Conjunctiva normal. No discharge.   Neck:  Supple, nontender midline; No stridor; No nuchal rigidity.   Lymphatic: No cervical lymphadenopathy noted.   Cardiovascular: Regular rate and rhythm without murmurs, rubs, or gallop.   Thorax & Lungs: No respiratory distress, breath sounds clear to auscultation bilaterally without wheezing, rales or rhonchi. Nontender chest wall. No crepitus or subcutaneous air  Abdomen: Firmness in upper abdomen which may be palpable stool, nontender, bowel sounds normal. No obvious masses; No pulsatile masses; no rebound, guarding, or peritoneal signs.   Skin: Good color; warm and dry without rash or petechia.  Back: Nontender, No CVA tenderness.   Extremities: Distal radial, dorsalis pedis, posterior tibial pulses are equal bilaterally; No edema; Nontender calves or saphenous, No cyanosis, No clubbing.   Musculoskeletal: Good range of motion in all major joints. No tenderness to palpation or major deformities " noted.   Neurologic: Alert & oriented x 4, clear speech      EKG  EKG reviewed by me as shown below.    LABS/RADIOLOGY/PROCEDURES  Results for orders placed or performed during the hospital encounter of 07/13/19   CBC WITH DIFFERENTIAL   Result Value Ref Range    WBC 7.1 4.8 - 10.8 K/uL    RBC 4.49 4.20 - 5.40 M/uL    Hemoglobin 13.4 12.0 - 16.0 g/dL    Hematocrit 41.8 37.0 - 47.0 %    MCV 93.1 81.4 - 97.8 fL    MCH 29.8 27.0 - 33.0 pg    MCHC 32.1 (L) 33.6 - 35.0 g/dL    RDW 44.7 35.9 - 50.0 fL    Platelet Count 252 164 - 446 K/uL    MPV 9.3 9.0 - 12.9 fL    Neutrophils-Polys 73.20 (H) 44.00 - 72.00 %    Lymphocytes 18.60 (L) 22.00 - 41.00 %    Monocytes 5.60 0.00 - 13.40 %    Eosinophils 2.10 0.00 - 6.90 %    Basophils 0.40 0.00 - 1.80 %    Immature Granulocytes 0.10 0.00 - 0.90 %    Nucleated RBC 0.00 /100 WBC    Neutrophils (Absolute) 5.20 2.00 - 7.15 K/uL    Lymphs (Absolute) 1.32 1.00 - 4.80 K/uL    Monos (Absolute) 0.40 0.00 - 0.85 K/uL    Eos (Absolute) 0.15 0.00 - 0.51 K/uL    Baso (Absolute) 0.03 0.00 - 0.12 K/uL    Immature Granulocytes (abs) 0.01 0.00 - 0.11 K/uL    NRBC (Absolute) 0.00 K/uL   COMP METABOLIC PANEL   Result Value Ref Range    Sodium 141 135 - 145 mmol/L    Potassium 3.7 3.6 - 5.5 mmol/L    Chloride 103 96 - 112 mmol/L    Co2 28 20 - 33 mmol/L    Anion Gap 10.0 0.0 - 11.9    Glucose 91 65 - 99 mg/dL    Bun 12 8 - 22 mg/dL    Creatinine 0.56 0.50 - 1.40 mg/dL    Calcium 10.5 8.5 - 10.5 mg/dL    AST(SGOT) 18 12 - 45 U/L    ALT(SGPT) 17 2 - 50 U/L    Alkaline Phosphatase 58 30 - 99 U/L    Total Bilirubin 0.4 0.1 - 1.5 mg/dL    Albumin 4.3 3.2 - 4.9 g/dL    Total Protein 7.2 6.0 - 8.2 g/dL    Globulin 2.9 1.9 - 3.5 g/dL    A-G Ratio 1.5 g/dL   LIPASE   Result Value Ref Range    Lipase 28 11 - 82 U/L   URINALYSIS,CULTURE IF INDICATED   Result Value Ref Range    Color Yellow     Character Cloudy (A)     Specific Gravity 1.010 <1.035    Ph 8.0 5.0 - 8.0    Glucose Negative Negative mg/dL     Ketones Negative Negative mg/dL    Protein Negative Negative mg/dL    Bilirubin Negative Negative    Urobilinogen, Urine 0.2 Negative    Nitrite Positive (A) Negative    Leukocyte Esterase Large (A) Negative    Occult Blood Trace (A) Negative    Micro Urine Req Microscopic    TSH   Result Value Ref Range    TSH 0.600 0.380 - 5.330 uIU/mL   ESTIMATED GFR   Result Value Ref Range    GFR If African American >60 >60 mL/min/1.73 m 2    GFR If Non African American >60 >60 mL/min/1.73 m 2   FREE THYROXINE   Result Value Ref Range    Free T-4 1.03 0.53 - 1.43 ng/dL   URINE MICROSCOPIC (W/UA)   Result Value Ref Range    WBC Packed (A) /hpf    RBC 0-2 /hpf    Bacteria Moderate (A) None /hpf    Epithelial Cells Negative /hpf    Hyaline Cast 0-2 /lpf   EKG (NOW)   Result Value Ref Range    Report       [facility] Emergency Dept.    Test Date:  2019  Pt Name:    KEANU TOBIN                   Department: ER  MRN:        [id number]                      Room:       [facility]  Gender:     Female                       Technician: [id number]  :        [id number]                   Requested By:ER TRIAGE PROTOCOL  Order #:    [id number]                    Reading MD: Gertrudis Pereira    Measurements  Intervals                                Axis  Rate:       66                           P:          60  RI:         212                          QRS:        73  QRSD:       80                           T:          74  QT:         408  QTc:        428    Interpretive Statements  SINUS RHYTHM rate of 66  Normal axis  1st degree AV block  T wave inversion V2  No ST elevation or depression  BORDERLINE AV CONDUCTION DELAY  NONSPECIFIC T ABNORMALITIES, ANT-LAT LEADS  Compared to ECG 2018 10:32:19  T-wave abnormality now present  First degree AV block no longer present    Electronically S igned On 2019 12:07:44 PDT by Gertrudis Pereira       *Note: Due to a large number of results and/or encounters for the requested time period, some  results have not been displayed. A complete set of results can be found in Results Review.           CT-ABDOMEN-PELVIS WITH   Final Result      1.  No acute intra-abdominal findings.      2.  Moderate to large amount of colonic stool.      3.  Diverticulosis.      4.  Fat-containing umbilical hernia.      5.  Atherosclerosis.      6.  Stable indeterminate small splenic lesion, possibly hemangioma.          COURSE & MEDICAL DECISION MAKING  Pertinent Labs & Imaging studies reviewed. (See chart for details)    10:46 AM - Patient seen and examined at bedside. Discussed plan of care, including imaging and laboratory studies. Patient agrees to the plan of care. Ordered for CT-abdomen pelvis, EKG, TSH, CBC w/, CMP, lipase, UA to evaluate her symptoms.     11:48 AM - I ordered urine microscopic. I also ordered Rocephin 2 g in  mL    1:20 PM - I ordered magnesium citrate solution 296 mL,     3:27 PM - Recheck: Patient re-evaluated at beside. Patient reports feeling improved. Discussed patient's condition and treatment plan. Patient's lab and radiology results discussed, including her CAT scan which shows constipation as well as UTI. At this time the patient states had a UTI 3 months ago. Patient informed me she has an appointment with her urologist this upcoming Tuesday. I informed the patient to return for any new or worsening symptoms such as fever, shaking chills, increased vomiting. I discussed the plan for discharge as outline below.The patient understood and is in agreement.     Patient presents to the ER complaining of abdominal bloating and nausea for the last 3 years since she underwent a cervical spine surgery after falling and breaking her neck.  She reports that over the last 2 weeks her abdominal bloating and nausea is gotten worse.  She also says that over the last 2 weeks she has been having lower abdominal cramping.  She contacted Dr. Dubois, her gastroenterologist, yesterday, and he ordered a plain  "x-ray.  Plain x-ray was performed and showed constipation.  Her daughter states that she has not been feeling well over the last several weeks.  She has been more fatigued than usual.  Occasionally the last 2 weeks she is been getting more forgetful.  Patient denies vomiting.  No fevers or chills.  She has no abdominal pain here in the ER.  No complaints of back pain.  She denies urinary symptoms.  However, urinalysis is positive for a urinary tract infection.  She has history of urinary tract infections and states that she is under the care of urologist for \"not completely being able to empty her bladder.\"  She has been given medications for this bladder issue but states she does not take them as prescribed.  She supposed to take them 3 times a day but only takes them once a day.  Here in the ER we did a bladder scan.  After voiding she had a residual of about 250.  Shortly after the repeat bladder scan she went to the bathroom again and was able to urinate out some more urine.  At this time I do not think she is retaining per se especially with her history, and I do not think she needs a Lopez catheter.  Patient was given IV Rocephin here in the ER for her UTI.  With respect to her intermittent abdominal cramping over the last 2 weeks with increase in her chronic bloating and nausea, CT scan was performed which revealed moderate to large amount of stool, otherwise unremarkable.  Patient was given some magnesium citrate here in the ER.  She has been instructed to use some Dulcolax suppositories and get on a stool softener such as Colace after she evacuates her bowels with the magnesium citrate.  She is set to have a colonoscopy with Dr. Dubois next month.  I have asked that she follow-up with Dr. Dubois regarding her constipation issues as he may have some other suggestions for her to help avoid constipation in the future.  With respect to her urinary tract infection, plan is to send her home with Omnicef.  She has an " appointment with her urologist this coming Tuesday (3 days from now.)  She is well-appearing overall.  Her vital signs are normal and stable.  She is not septic or toxic.  She is not in any distress.  She has no abdominal pain upon reevaluation and has not had any pain while here in the ER.  Her daughters go to be staying with her tonight.  She would like to go home and she is comfortable doing so.  She has been given strict return precautions and discharge instructions.  Patient and daughter both understand treatment plan and follow-up understanding that if she gets worse in any way she is to return to ER immediately.    The patient will return for new or worsening symptoms and is stable at the time of discharge.    The patient is referred to a primary physician for blood pressure management, diabetic screening, and for all other preventative health concerns.    DISPOSITION:  Patient will be discharged home in stable condition.    FOLLOW UP:  Robbie Trivedi M.D.  [address] [place] [address] [address] [place] [address]  [phone]            OUTPATIENT MEDICATIONS:  Discharge Medication List as of 7/13/2019  4:09 PM      START taking these medications    Details   cefdinir (OMNICEF) 300 MG Cap Take 1 Cap by mouth 2 times a day for 7 days., Disp-14 Cap, R-0, Print Rx Paper               FINAL IMPRESSION  1. Acute cystitis without hematuria Acute   2. Constipation, unspecified constipation type Acute   3. Nausea Acute      This dictation has been created using voice recognition software. The accuracy of the dictation is limited by the abilities of the software. I expect there may be some errors of grammar and possibly content. I made every attempt to manually correct the errors within my dictation. However, errors related to voice recognition software may still exist and should be interpreted within the appropriate context.     I, Cherry Leahy (Scribe), am scribing for, and in the presence of, Gertrudis Pereira,  M.D..    Electronically signed by: Cherry Leahy (Scribe), 7/13/2019    IGertrudis M.D. personally performed the services described in this documentation, as scribed by Cherry Leahy in my presence, and it is both accurate and complete. C    The note accurately reflects work and decisions made by me.  Gertrudis Pereira  7/13/2019  6:04 PM

## 2019-07-13 NOTE — ED TRIAGE NOTES
BIB [facility] to rm 24  Chief Complaint   Patient presents with   • Abdominal Pain   • Bloating     Sx for 2 days, last BM was yesterday morning, denies any vomiting or diarrhea, VSS, currently denies any abd pain, but does feel bloated.

## 2019-07-13 NOTE — DISCHARGE INSTRUCTIONS
Return to ER for increased bloating, worsening pain, back pain, fever over 100.4, shaking chills, worsening nausea, vomiting, pain with urination, blood in urine, or for any concerns.     Increase the amount of fruits, vegetables and fiber in diet.     Follow up with your urologist as scheduled on Tuesday. Please tell them that a urine culture is pending.     Also follow up with your regular doctor on Monday. Please call for appt.

## 2019-07-15 LAB
BACTERIA UR CULT: NORMAL
SIGNIFICANT IND 70042: NORMAL
SITE SITE: NORMAL
SOURCE SOURCE: NORMAL

## 2019-07-16 ENCOUNTER — APPOINTMENT (OUTPATIENT)
Dept: LAB | Facility: MEDICAL CENTER | Age: 84
End: 2019-07-16
Attending: INTERNAL MEDICINE
Payer: MEDICARE

## 2019-07-16 ENCOUNTER — PATIENT OUTREACH (OUTPATIENT)
Dept: HEALTH INFORMATION MANAGEMENT | Facility: OTHER | Age: 84
End: 2019-07-16

## 2019-07-16 ENCOUNTER — HOSPITAL ENCOUNTER (OUTPATIENT)
Facility: MEDICAL CENTER | Age: 84
End: 2019-07-16
Attending: INTERNAL MEDICINE
Payer: MEDICARE

## 2019-07-16 ENCOUNTER — HOSPITAL ENCOUNTER (OUTPATIENT)
Dept: LAB | Facility: MEDICAL CENTER | Age: 84
End: 2019-07-16
Attending: PHYSICIAN ASSISTANT
Payer: MEDICARE

## 2019-07-16 LAB
C DIFF DNA SPEC QL NAA+PROBE: NEGATIVE
C DIFF TOX GENS STL QL NAA+PROBE: NEGATIVE

## 2019-07-16 PROCEDURE — 87493 C DIFF AMPLIFIED PROBE: CPT

## 2019-07-16 PROCEDURE — 87086 URINE CULTURE/COLONY COUNT: CPT

## 2019-07-16 NOTE — PROGRESS NOTES
1. HealthConnect Verified: yes    2. Verify PCP: yes    3. Review and add  to Care Team: yes    5. Reviewed/Updated the following with patient:       •   Communication Preference Obtained? YES  • MyChart Activation: already active       •   E-Mail Address Obtained? YES       •   Appointment Day and Time Preferences? no       •   Preferred Pharmacy? YES       •   Preferred Lab? YES    informe of care gaps

## 2019-07-17 ENCOUNTER — TELEPHONE (OUTPATIENT)
Dept: MEDICAL GROUP | Age: 84
End: 2019-07-17

## 2019-07-17 NOTE — TELEPHONE ENCOUNTER
1. Caller Name: ARCHIE                                           Call Back Number: [phone]        Patient approves a detailed voicemail message: N\A    Patients daughter called reguarding pt who was recently seen in ER for vomiting and constipation. Daughter states that pt has now got diarreha and would like something sent to pharmacy. She does not think that OTC Imodium will not work. Please advise

## 2019-07-18 ENCOUNTER — TELEPHONE (OUTPATIENT)
Dept: MEDICAL GROUP | Age: 84
End: 2019-07-18

## 2019-07-18 NOTE — TELEPHONE ENCOUNTER
ESTABLISHED PATIENT PRE-VISIT PLANNING     Patient was NOT contacted to complete PVP.     Note: Patient will not be contacted if there is no indication to call.     1.  Reviewed notes from the last few office visits within the medical group: Yes    2.  If any orders were placed at last visit or intended to be done for this visit (i.e. 6 mos follow-up), do we have Results/Consult Notes?        •  Labs - Labs were not ordered at last office visit.   Note: If patient appointment is for lab review and patient did not complete labs, check with provider if OK to reschedule patient until labs completed.       •  Imaging - Imaging ordered, completed and results are in chart.       •  Referrals - Referral ordered, patient has NOT been seen.    3. Is this appointment scheduled as a Hospital Follow-Up? Yes, visit was at [facility].     4.  Immunizations were updated in Epic using WebIZ?: Epic matches WebIZ       •  Web Iz Recommendations: SHINGRIX (Shingles)    5.  Patient is due for the following Health Maintenance Topics:   Health Maintenance Due   Topic Date Due   • IMM ZOSTER VACCINES (2 of 3) 04/27/2017   • COLONOSCOPY  03/02/2018   • Annual Wellness Visit  08/23/2018           6. Orders for overdue Health Maintenance topics pended in Pre-Charting? N\A    7.  AHA (MDX) form printed for Provider? YES    8.  Patient was NOT informed to arrive 15 min prior to their scheduled appointment and bring in their medication bottles.

## 2019-07-18 NOTE — TELEPHONE ENCOUNTER
Call patient and give the following avice.    The most critical therapy in diarrheal illness is rehydration, preferably by the oral route, with solutions that contain water, salt, and sugar. Diluted fruit juices and flavored soft drinks along with saltine crackers and broths or soups may meet the fluid and salt needs in patients with mild illness     Boiled starches and cereals (eg, potatoes, noodles, rice, wheat, and oat) with salt are indicated in patients with watery diarrhea; crackers, bananas, soup, and boiled vegetables may also be consumed. Foods with high fat content should be avoided until the gut function returns to normal after a severe bout of diarrhea.

## 2019-07-19 LAB
BACTERIA UR CULT: NORMAL
SIGNIFICANT IND 70042: NORMAL
SITE SITE: NORMAL
SOURCE SOURCE: NORMAL

## 2019-07-22 NOTE — TELEPHONE ENCOUNTER
Phone Number Called: [phone] (home)       Call outcome: spoke to patient regarding message below    Message: Spoke with Pt.  Diarrhea has stopped, but Pt says her stomach is still very sensitive.  Pt advised to stay hydrated.  Pt made appt to see PCP on 7/25/19 as a hospital follow up

## 2019-07-25 ENCOUNTER — OFFICE VISIT (OUTPATIENT)
Dept: MEDICAL GROUP | Age: 84
End: 2019-07-25
Payer: MEDICARE

## 2019-07-25 VITALS
WEIGHT: 143.2 LBS | HEART RATE: 79 BPM | SYSTOLIC BLOOD PRESSURE: 120 MMHG | OXYGEN SATURATION: 93 % | DIASTOLIC BLOOD PRESSURE: 60 MMHG | TEMPERATURE: 98.6 F | HEIGHT: 68 IN | BODY MASS INDEX: 21.7 KG/M2

## 2019-07-25 DIAGNOSIS — S14.129D CENTRAL CORD SYNDROME, SUBSEQUENT ENCOUNTER (HCC): ICD-10-CM

## 2019-07-25 DIAGNOSIS — F32.0 CURRENT MILD EPISODE OF MAJOR DEPRESSIVE DISORDER WITHOUT PRIOR EPISODE (HCC): ICD-10-CM

## 2019-07-25 DIAGNOSIS — E21.3 HYPERPARATHYROIDISM (HCC): ICD-10-CM

## 2019-07-25 DIAGNOSIS — Z92.89 HISTORY OF RECENT HOSPITALIZATION: ICD-10-CM

## 2019-07-25 DIAGNOSIS — S14.121A: ICD-10-CM

## 2019-07-25 DIAGNOSIS — N30.00 ACUTE CYSTITIS WITHOUT HEMATURIA: ICD-10-CM

## 2019-07-25 PROCEDURE — 99214 OFFICE O/P EST MOD 30 MIN: CPT | Performed by: FAMILY MEDICINE

## 2019-07-25 PROCEDURE — 8041 PR SCP AHA: Performed by: FAMILY MEDICINE

## 2019-07-25 RX ORDER — NITROFURANTOIN 25; 75 MG/1; MG/1
100 CAPSULE ORAL 2 TIMES DAILY
Qty: 14 CAP | Refills: 0 | Status: SHIPPED | OUTPATIENT
Start: 2019-07-25 | End: 2019-07-25 | Stop reason: SDUPTHER

## 2019-07-25 RX ORDER — NITROFURANTOIN 25; 75 MG/1; MG/1
100 CAPSULE ORAL 2 TIMES DAILY
Qty: 14 CAP | Refills: 0 | Status: SHIPPED | OUTPATIENT
Start: 2019-07-25 | End: 2019-08-01

## 2019-07-25 RX ORDER — PHENAZOPYRIDINE HYDROCHLORIDE 200 MG/1
200 TABLET, FILM COATED ORAL 3 TIMES DAILY PRN
Qty: 6 TAB | Refills: 0 | Status: SHIPPED | OUTPATIENT
Start: 2019-07-25 | End: 2020-02-13

## 2019-07-25 NOTE — PROGRESS NOTES
This medical record contains text that has been entered with the assistance of computer voice recognition and dictation software.  Therefore, it may contain unintended errors in text, spelling, punctuation, or grammar        Chief Complaint   Patient presents with   • Hospital Follow-up     needs urine sample and new antibiotic         Lucita Babcock is a 84 y.o. female here evaluation and management of: UTI/hospital follow up      HPI:     1. Acute cystitis without hematuria  Patient states that since being discharged she is been having this is associated with suprapubic discomfort.  She denies any back pain no nausea no vomiting no new rashes no confusion.  She is able to tolerate p.o.  She was recently prescribed Omnicef but she has not been tolerating it due to nausea.    2. History of recent hospitalization  Patient was recently evaluated and observed in the emergency room for abdominal pain bloating dizziness which she had been experiencing for the past 3 years since her cervical spine surgery.  However the last 3 days it was worsening so she was seen in the ER.  She was given IV fluids thorough evaluation was unrevealing.  She was also treated for UTI with Omnicef.    3. Hyperparathyroidism (HCC)  This is a stable and chronic condition being documented for AHA purposes.  Patient is seen and managed in endocrinology.    4. Central cord syndrome, subsequent encounter ([facility])  This is also a stable and chronic condition being documented for AHA purposes.  Overall the patient has some mild episodic neck pain but has done much better since the surgery although she complains of dizziness which we think is related to vertigo.    5. 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 daily.  The patient only feels anxiety and loneliness when she is alone.  She lives alone but has good support from her daughter who lives in  [place].  Also the patient's boyfriend comes to visit her from [place] every Saturday.  He calls her every night on the dot..  She denies any suicide or homicidal ideations.    Current medicines (including changes today)  Current Outpatient Prescriptions   Medication Sig Dispense Refill   • nitrofurantoin monohyd macro (MACROBID) 100 MG Cap Take 1 Cap by mouth 2 times a day for 7 days. 14 Cap 0   • phenazopyridine (PYRIDIUM) 200 MG Tab Take 1 Tab by mouth 3 times a day as needed. 6 Tab 0   • DILTIAZem (CARDIZEM) 30 MG Tab Take 1 Tab by mouth every day. 90 Tab 0   • Cholecalciferol (VITAMIN D PO) Take  by mouth.     • sertraline (ZOLOFT) 100 MG Tab Take 1 Tab by mouth every day. 90 Tab 1   • ascorbic acid (VITAMIN C) 500 MG tablet Take 1 Tab by mouth 3 times a day. 90 Tab 0   • bethanechol (URECHOLINE) 25 MG Tab Take 1 Tab by mouth every 24 hours as needed. 90 Tab 0   • acetaminophen (TYLENOL) 160 MG/5ML Suspension Take 15 mg/kg by mouth every 6 hours as needed.       No current facility-administered medications for this visit.      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  She  has a past surgical history that includes vein stripping; mass excision general (7/08); breast biopsy (5/27/08); abdominal hysterectomy total (1997); cataract phaco with iol (8/11/2010); colonoscopy (2007); ercp in or (12/28/2012); samuel by laparoscopy (1/8/2013); lumpectomy (2008); pr radiation therapy plan simple (2008); cataract phaco with iol (8/27/2014); cervical disk and fusion anterior (7/27/2016); and parathyroid exploration (9/7/2018).  Social History   Substance Use Topics   • Smoking status: Former  "Smoker     Packs/day: 0.50     Years: 45.00     Quit date: 10/9/1969   • Smokeless tobacco: Never Used   • Alcohol use No     Social History     Social History Narrative   • No narrative on file     Family History   Problem Relation Age of Onset   • Hypertension Mother    • Stroke Mother    • Suicide Attempts Son    • Bipolar disorder Son    • Other Father         Dementia   • Other Sister         BIpolar   • Bipolar disorder Sister    • Other Sister         Bipolar   • Other Sister         THyroid disease   • Bipolar disorder Sister    • Cancer Neg Hx    • Diabetes Neg Hx    • Heart Disease Neg Hx      Family Status   Relation Status   • Mo    • Son  at age 32        suicide   • Fa    • Sis    • Sis Alive   • Sis Alive   • Marcelino Alive   • Marcelino Alive   • Marcelino         at birth   • Neg Hx (Not Specified)         ROS    The pertinent  ROS findings can be seen in the HPI above.     All other systems reviewed and are negative     Objective:     /60 (BP Location: Left arm, Patient Position: Sitting, BP Cuff Size: Adult)   Pulse 79   Temp 37 °C (98.6 °F) (Temporal)   Ht 1.727 m (5' 8\")   Wt 65 kg (143 lb 3.2 oz)   SpO2 93%  Body mass index is 21.77 kg/m².      Physical Exam:    Constitutional: Alert, no distress.  Skin: no rashes  Eye: Equal, round and reactive, conjunctiva clear, lids normal.  ENMT: Lips without lesions, good dentition, oropharynx clear.  Neck: Trachea midline, no masses, no thyromegaly. No cervical or supraclavicular lymphadenopathy.  Respiratory: Unlabored respiratory effort, lungs clear to auscultation, no wheezes, no ronchi.  Cardiovascular: Normal S1, S2, no murmur, no edema  Abdomen: Soft, non-tender, no masses, no hepatosplenomegaly.  Psych: Alert and oriented x3, normal affect and mood.          Assessment and Plan:   The following treatment plan was discussed      1. Acute cystitis without hematuria    [place] fluid intake    Also educated patient on " Abstinence or a decrease or elimination of the usage of spermicide-containing products would be expected to reduce the risk of UTI. Also suggested  early postcoital voiding and more liberal fluid intake to increase, trying cranberry juice will not be harmful although studies have not proven its effectiveness. Wipe front to back, and do not hold in urine.  - nitrofurantoin monohyd macro (MACROBID) 100 MG Cap; Take 1 Cap by mouth 2 times a day for 7 days.  Dispense: 14 Cap; Refill: 0  - phenazopyridine (PYRIDIUM) 200 MG Tab; Take 1 Tab by mouth 3 times a day as needed.  Dispense: 6 Tab; Refill: 0    2. History of recent hospitalization  Resolved, overall doing well    3. Hyperparathyroidism (HCC)  Patient has been stable with current management  We will make no changes for now      4. Central cord syndrome, subsequent encounter ([facility])  Patient has been stable with current management  We will make no changes for now      5. Current mild episode of major depressive disorder without prior episode ([facility])  Patient has been stable with current management  We will make no changes for now        AHA completed    Instructed to Follow up in clinic or ER for worsening symptoms, difficulty breathing, lack of expected recovery, or should new symptoms or problems arise.    Followup: Return in about 3 months (around 10/25/2019) for Reevaluation.       Once again this medical record contains text that has been entered with the assistance of computer voice recognition and dictation software.  Therefore, it may contain unintended errors in text, spelling, punctuation, or grammar

## 2019-07-26 ENCOUNTER — APPOINTMENT (OUTPATIENT)
Dept: PHYSICAL THERAPY | Facility: REHABILITATION | Age: 84
End: 2019-07-26
Attending: FAMILY MEDICINE
Payer: MEDICARE

## 2019-08-09 ENCOUNTER — TELEPHONE (OUTPATIENT)
Dept: PHYSICAL THERAPY | Facility: REHABILITATION | Age: 84
End: 2019-08-09

## 2019-08-09 NOTE — OP THERAPY DISCHARGE SUMMARY
Outpatient Physical Therapy  DISCHARGE SUMMARY NOTE      [facility] [address]  [address]  [place] [address]  Phone:  [phone]  Fax:  [phone]    Date of Visit: 08/09/2019    Patient: Lucita Babcock  YOB: 1935  MRN: [id number]     Referring Provider: No referring provider defined for this encounter.   Referring Diagnosis No admission diagnoses are documented for this encounter.     Physical Therapy Occurrence Codes    Date of onset of impairment:  5/18/19   Date physical therapy care plan established or reviewed:  6/18/19   Date physical therapy treatment started:  6/18/19          Functional Assessment Used        Your patient is being discharged from Physical Therapy with the following comments:   · Patient has failed to schedule or reschedule follow-up visits    Comments:  Ms. Babcock was seen for only 1 visit treating her vertigo this episode.  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  She has had limited results with CRM treatment in the past. Unfortunately, her attendance was poor during this episode.  Multiple late cancels/no shows which may be related to memory impairment and reliance on caregiver transport.      Recommendations:  D/C per facility protocol due to lapse in care.     Catalina Navarro, PT, DPT    Date: 8/9/2019

## 2019-08-14 NOTE — TELEPHONE ENCOUNTER
We can try a longer acting one such as valium.  She would have to change from xanax.   O-Z Flap Text: The defect edges were debeveled with a #15 scalpel blade.  Given the location of the defect, shape of the defect and the proximity to free margins an O-Z flap was deemed most appropriate.  Using a sterile surgical marker, an appropriate transposition flap was drawn incorporating the defect and placing the expected incisions within the relaxed skin tension lines where possible. The area thus outlined was incised deep to adipose tissue with a #15 scalpel blade.  The skin margins were undermined to an appropriate distance in all directions utilizing iris scissors.

## 2019-08-15 DIAGNOSIS — J06.9 VIRAL URI: ICD-10-CM

## 2019-08-15 RX ORDER — ASCORBIC ACID 500 MG
500 TABLET ORAL 3 TIMES DAILY
Qty: 270 TAB | Refills: 2 | Status: SHIPPED | OUTPATIENT
Start: 2019-08-15 | End: 2020-09-17

## 2019-08-16 ENCOUNTER — TELEPHONE (OUTPATIENT)
Dept: MEDICAL GROUP | Age: 84
End: 2019-08-16

## 2019-08-16 NOTE — TELEPHONE ENCOUNTER
1. Caller Name: Lucita Babcock                                         Call Back Number: [phone] (home)       Patient approves a detailed voicemail message: N\A    pt called to have her alprazolam refilled. she has not been seen since april and has an appointment on 08/20/2019. advised pt to request her refills at her appointment.

## 2019-08-19 ENCOUNTER — TELEPHONE (OUTPATIENT)
Dept: MEDICAL GROUP | Age: 84
End: 2019-08-19

## 2019-08-19 NOTE — TELEPHONE ENCOUNTER
ESTABLISHED PATIENT PRE-VISIT PLANNING     Patient was NOT contacted to complete PVP.     Note: Patient will not be contacted if there is no indication to call.     1.  Reviewed notes from the last few office visits within the medical group: Yes    2.  If any orders were placed at last visit or intended to be done for this visit (i.e. 6 mos follow-up), do we have Results/Consult Notes?        •  Labs - Labs were not ordered at last office visit.   Note: If patient appointment is for lab review and patient did not complete labs, check with provider if OK to reschedule patient until labs completed.       •  Imaging - Imaging was not ordered at last office visit.       •  Referrals - No referrals were ordered at last office visit.    3. Is this appointment scheduled as a Hospital Follow-Up? No    4.  Immunizations were updated in Epic using WebIZ?: Epic matches WebIZ       •  Web Iz Recommendations: FLU and SHINGRIX (Shingles)    5.  Patient is due for the following Health Maintenance Topics:   Health Maintenance Due   Topic Date Due   • IMM ZOSTER VACCINES (2 of 3) 04/27/2017   • Annual Wellness Visit  08/23/2018   • IMM INFLUENZA (1) 09/01/2019           6. Orders for overdue Health Maintenance topics pended in Pre-Charting? N\A    7.  AHA (MDX) form printed for Provider? No, already completed    8.  Patient was NOT informed to arrive 15 min prior to their scheduled appointment and bring in their medication bottles.

## 2019-08-20 ENCOUNTER — HOSPITAL ENCOUNTER (OUTPATIENT)
Dept: LAB | Facility: MEDICAL CENTER | Age: 84
End: 2019-08-20
Attending: FAMILY MEDICINE
Payer: MEDICARE

## 2019-08-20 ENCOUNTER — OFFICE VISIT (OUTPATIENT)
Dept: MEDICAL GROUP | Age: 84
End: 2019-08-20
Payer: MEDICARE

## 2019-08-20 VITALS
BODY MASS INDEX: 22.16 KG/M2 | HEIGHT: 68 IN | OXYGEN SATURATION: 95 % | HEART RATE: 76 BPM | TEMPERATURE: 98.4 F | SYSTOLIC BLOOD PRESSURE: 138 MMHG | WEIGHT: 146.2 LBS | DIASTOLIC BLOOD PRESSURE: 64 MMHG

## 2019-08-20 DIAGNOSIS — E53.8 VITAMIN B12 DEFICIENCY: ICD-10-CM

## 2019-08-20 DIAGNOSIS — I10 ESSENTIAL HYPERTENSION: ICD-10-CM

## 2019-08-20 DIAGNOSIS — R42 DIZZINESS: ICD-10-CM

## 2019-08-20 DIAGNOSIS — H81.13 BENIGN PAROXYSMAL POSITIONAL VERTIGO DUE TO BILATERAL VESTIBULAR DISORDER: ICD-10-CM

## 2019-08-20 DIAGNOSIS — R53.83 OTHER FATIGUE: ICD-10-CM

## 2019-08-20 PROBLEM — R53.82 CHRONIC FATIGUE: Status: ACTIVE | Noted: 2017-06-02

## 2019-08-20 LAB
ERYTHROCYTE [DISTWIDTH] IN BLOOD BY AUTOMATED COUNT: 46.5 FL (ref 35.9–50)
HCT VFR BLD AUTO: 48.2 % (ref 37–47)
HGB BLD-MCNC: 14.7 G/DL (ref 12–16)
MCH RBC QN AUTO: 29.1 PG (ref 27–33)
MCHC RBC AUTO-ENTMCNC: 30.5 G/DL (ref 33.6–35)
MCV RBC AUTO: 95.4 FL (ref 81.4–97.8)
PLATELET # BLD AUTO: 275 K/UL (ref 164–446)
PMV BLD AUTO: 10 FL (ref 9–12.9)
RBC # BLD AUTO: 5.05 M/UL (ref 4.2–5.4)
WBC # BLD AUTO: 9.9 K/UL (ref 4.8–10.8)

## 2019-08-20 PROCEDURE — 99214 OFFICE O/P EST MOD 30 MIN: CPT | Performed by: FAMILY MEDICINE

## 2019-08-20 PROCEDURE — 36415 COLL VENOUS BLD VENIPUNCTURE: CPT

## 2019-08-20 PROCEDURE — 85027 COMPLETE CBC AUTOMATED: CPT

## 2019-08-20 RX ORDER — CYANOCOBALAMIN 1000 UG/ML
1000 INJECTION, SOLUTION INTRAMUSCULAR; SUBCUTANEOUS ONCE
Status: COMPLETED | OUTPATIENT
Start: 2019-08-20 | End: 2019-08-20

## 2019-08-20 RX ADMIN — CYANOCOBALAMIN 1000 MCG: 1000 INJECTION, SOLUTION INTRAMUSCULAR; SUBCUTANEOUS at 14:21

## 2019-08-20 NOTE — PROGRESS NOTES
This medical record contains text that has been entered with the assistance of computer voice recognition and dictation software.  Therefore, it may contain unintended errors in text, spelling, punctuation, or grammar    Chief Complaint   Patient presents with   • Follow-Up     discuss colonoscopy        Lucita Babcock is a 84 y.o. female here evaluation and management of: s/p colonoscopy    HPI:     1. Dizziness  Patient recently had a colonoscopy done. She states since having the procedure she has been feeling increasingly weak and fatigued. She also complains of dizziness described as lightheadedness.  She does have a history of hypotension due to blood pressure medication.  We have tried many different meds and finally find the right combination.    2. Fatigue  Patient takes vitamin B supplements. She denies side effects from it but states that she has been feeling fatigued and dizzy.  She is done some research on vitamin B12 and would like to give it a try.    3. HTN  The patient has tried several different medications she was not able to tolerate several meds including beta-blockers ACE inhibitors arms, and diuretics.  She could not tolerate any 1 of those.  She is finally had blood pressure control with diltiazem 30 mg p.o. daily.  She denies any chest pain, no shortness of breath, no headaches she does have dizziness.  The dizziness is a chronic issue.    Current medicines (including changes today)  Current Outpatient Medications   Medication Sig Dispense Refill   • ascorbic acid (VITAMIN C) 500 MG tablet Take 1 Tab by mouth 3 times a day. 270 Tab 2   • DILTIAZem (CARDIZEM) 30 MG Tab Take 1 Tab by mouth every day. 90 Tab 0   • Cholecalciferol (VITAMIN D PO) Take  by mouth.     • sertraline (ZOLOFT) 100 MG Tab Take 1 Tab by mouth every day. 90 Tab 1   • bethanechol (URECHOLINE) 25 MG Tab Take 1 Tab by mouth every 24 hours as needed. 90 Tab 0   • acetaminophen (TYLENOL) 160 MG/5ML Suspension Take 15 mg/kg by  mouth every 6 hours as needed.     • phenazopyridine (PYRIDIUM) 200 MG Tab Take 1 Tab by mouth 3 times a day as needed. 6 Tab 0     No current facility-administered medications for this visit.      She  has a past medical history of Anesthesia, Aneurysm (HCC), Anxiety, Arthritis, Breast cancer (HCC) (), Cancer (HCC) (), CATARACT, Dental disorder, Depression, Heart burn, Hypertension, Indigestion, Injury of cervical spine (HCC) (2016), Osteopenia, 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HCC), Sarcoid (HCC), Urinary bladder disorder, and UTI (urinary tract infection).  She  has a past surgical history that includes vein stripping; mass excision general (); breast biopsy (08); abdominal hysterectomy total (); cataract phaco with iol (2010); colonoscopy (); ercp in or (2012); samuel by laparoscopy (2013); lumpectomy (); pr radiation therapy plan simple (); cataract phaco with iol (2014); cervical disk and fusion anterior (2016); and parathyroid exploration (2018).  Social History     Tobacco Use   • Smoking status: Former Smoker     Packs/day: 0.50     Years: 45.00     Pack years: 22.50     Last attempt to quit: 10/9/1969     Years since quittin.8   • Smokeless tobacco: Never Used   Substance Use Topics   • Alcohol use: No   • Drug use: No     Social History     Social History Narrative   • Not on file     Family History   Problem Relation Age of Onset   • Hypertension Mother    • Stroke Mother    • Suicide Attempts Son    • Bipolar disorder Son    • Other Father         Dementia   • Other Sister         BIpolar   • Bipolar disorder Sister    • Other Sister         Bipolar   • Other Sister         THyroid disease   • Bipolar disorder Sister    • Cancer Neg Hx    • Diabetes Neg Hx    • Heart Disease Neg Hx      Family Status   Relation Name Status   • Mo     • Son   at age 32        suicide   • Fa     • Sis     • Sis Paola Alive   •  "Mary Beth Melchor Alive   • Marcelino Ewing Alive   • Marcelino Smith (Adopted) Alive   • Marcelino          at birth   • Neg Hx  (Not Specified)       ROS  Please see hpi     All other systems reviewed and are negative     Objective:     /64 (BP Location: Left arm, Patient Position: Sitting, BP Cuff Size: Adult)   Pulse 76   Temp 36.9 °C (98.4 °F) (Temporal)   Ht 1.727 m (5' 8\")   Wt 66.3 kg (146 lb 3.2 oz)   SpO2 95%  Body mass index is 22.23 kg/m².    Physical Exam:  Constitutional: Alert, no distress.  Skin: Warm, dry, good turgor, no rashes in visible areas.  Eye: Equal, round and reactive, conjunctiva clear, lids normal.  ENMT: Lips without lesions, good dentition, oropharynx clear.  Neck: Trachea midline, no masses, no thyromegaly. No cervical or supraclavicular lymphadenopathy.  Respiratory: Unlabored respiratory effort, lungs clear to auscultation, no wheezes, no ronchi.  Cardiovascular: Normal S1, S2, no murmur, no edema.  Psych: Alert and oriented x3, normal affect and mood.      Assessment and Plan:   The following treatment plan was discussed    1. Dizziness   Discussed ordering labs to rule out anemia. Patient is agreeable with this plan.     - CBC WITHOUT DIFFERENTIAL; Future    2. Fatigue  - Patient is feeling fatigued and weak. She is advised to continue Vitamin B supplements, 1000 mcg.     - cyanocobalamin (VITAMIN B-12) injection 1,000 mcg    3. HTN    Patient has been stable with current management  We will make no changes for now       - REFERRAL TO PHYSICAL THERAPY Reason for Therapy: Eval/Treat/Report        HEALTH MAINTENANCE: Patient due for shingles vaccine. She understands this is not available in clinic today due to shortage and she will follow up at [facility] or [facility].     Instructed to Follow up in clinic or ER for worsening symptoms, difficulty breathing, lack of expected recovery, or should new symptoms or problems arise.    Followup: Return in about 2 months (around 10/20/2019) for " Reevaluation.      Once again this medical record contains text that has been entered with the assistance of computer voice recognition, dictation software, and medical scribes.  Therefore, it may contain unintended errors in text, spelling, punctuation, or grammar.    ICookie (Scribe), am scribing for, and in the presence of, Robbie Sloan M.D.    Electronically signed by: Cookie Guzman (Scribe), 8/20/2019     IRobbie M.D. personally performed the services described in this documentation, as scribed by Cookie Guzman in my presence, and it is both accurate and complete.

## 2019-09-16 ENCOUNTER — PHYSICAL THERAPY (OUTPATIENT)
Dept: PHYSICAL THERAPY | Facility: REHABILITATION | Age: 84
End: 2019-09-16
Attending: FAMILY MEDICINE
Payer: MEDICARE

## 2019-09-16 DIAGNOSIS — I10 ESSENTIAL HYPERTENSION: ICD-10-CM

## 2019-09-16 DIAGNOSIS — H81.13 BENIGN PAROXYSMAL POSITIONAL VERTIGO DUE TO BILATERAL VESTIBULAR DISORDER: ICD-10-CM

## 2019-09-16 PROCEDURE — 95992 CANALITH REPOSITIONING PROC: CPT

## 2019-09-16 PROCEDURE — 92540 BASIC VESTIBULAR EVALUATION: CPT

## 2019-09-16 PROCEDURE — 97162 PT EVAL MOD COMPLEX 30 MIN: CPT

## 2019-09-16 SDOH — ECONOMIC STABILITY: GENERAL: QUALITY OF LIFE: GOOD

## 2019-09-16 ASSESSMENT — ENCOUNTER SYMPTOMS
PAIN SCALE AT HIGHEST: 7
MIGRAINE HEADACHES: 1
PAIN SCALE: 4
PAIN SCALE AT LOWEST: 4

## 2019-09-16 NOTE — OP THERAPY EVALUATION
Outpatient Physical Therapy  VESTIBULAR EVALUATION    [address]  [address]  [place] [address]  Phone:  [phone]  Fax:  [phone]    Date of Evaluation: 09/16/2019    Patient: Lucita Babcock  YOB: 1935  MRN: [id number]     Referring Provider: [place] [address] [address]   Referring Diagnosis: Benign paroxysmal positional vertigo due to bilateral vestibular disorder [H81.13]     Time Calculation  Start time: 1000  Stop time: 1105 Time Calculation (min): 65 minutes       Physical Therapy Occurrence Codes    Date physical therapy care plan established or reviewed:  9/16/19   Date physical therapy treatment started:  9/16/19          Chief Complaint: Other (Dizziness )    Visit Diagnoses     ICD-10-CM   1. Benign paroxysmal positional vertigo due to bilateral vestibular disorder H81.13         History of Present Illness:     Date of onset:  7/13/2016    Mechanism of injury:  Patient is an 84 year old female who has been referred for bilateral BPPV. Patient reports that symptoms started in July 2016 when she had a fall resulting in central cord syndrome with subsequent anterior fusion to C4-C7. Patient states that since this surgery, she has struggled with dizziness. Patient has now received 3 bouts of vestibular therapy with [facility] for bilateral BPPV, which has not been successful at eliminating her symptoms. She currently complains of lightheadedness that is constant. States that her symptoms are exacerbated with going upstairs to do laundry, putting dishes away, and when she is anxious about being alone. Symptoms subside with lying down and relaxing, especially when she notices that her anxiety is increasing. Report that her symptoms seem to be slightly better since last PT evaluation in June.     Patient currently has a caregiver 4 days a week for 3 hours each day to assist with the activities mentioned above  "that increase her symptoms as well as provide transportation to medical appointments.     Quality of life:  Good    Prior level of function:  Assisted    Headaches: migraine headaches   (Only has had 3 in the past year. Seems to be managed with medication. )    Ear problems:  Hearing loss (Since time of fall R>L)    Sleep disturbance:  Not disrupted  Symptoms:     Current symptom ratin    At best symptom ratin    At worst symptom ratin    Progression:  Improving  Social Support:     Lives in:  Multiple-level home (Bedroom is downstairs. )    Lives with:  Alone  Treatments:     Previous treatment:  Physical therapy    Treatment comments:  Vestibular therapy has been attempted 3 times without significant improvement in symptoms due to poor follow through or lack of attendance per documentation.   Activities of daily living:     Patient reported ADL status:  Independent with all ADLs. IADLs performed by caregivers including transportation.   Patient goals:     Other patient goals:  Resolve feeling of lightheadedness       Past Medical History:   Diagnosis Date   • Anesthesia     nausea (pt declines)    • Aneurysm (HCC)     \"arch over the heart\"   • Anxiety    • Arthritis    • Breast cancer (HCC)     DCIS Rt breast   • Cancer (HCC)     breast   • CATARACT     bilat IOL   • Dental disorder     upper partial   • Depression    • Heart burn    • Hypertension    • Indigestion    • Injury of cervical spine (HCC) 2016    C-spine decompression/laminectomy   • Osteopenia    • 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HCC)    • Sarcoid (HCC)    • Urinary bladder disorder     frequency    • UTI (urinary tract infection)     recurrent     Past Surgical History:   Procedure Laterality Date   • PARATHYROID EXPLORATION  2018    Procedure: PARATHYROID EXPLORATION- MINIMALLY INVASIVE,   NIMS RECURRENT LARYNGEAL NERVE MONITORING RIGHT;  Surgeon: Devora Gonsalez M.D.;  Location: SURGERY SAME DAY [facility];  Service: General "   • CERVICAL DISK AND FUSION ANTERIOR  2016    Procedure: CERVICAL DISK AND FUSION ANTERIOR C4-7;  Surgeon: Niles Khan M.D.;  Location: SURGERY [facility];  Service:    • CATARACT PHACO WITH IOL  2014    Performed by Avani Allen M.D. at SURGERY SAME DAY [facility]   • AMBROCIO BY LAPAROSCOPY  2013    Performed by Kenrick Howell M.D. at SURGERY [facility]   • ERCP IN OR  2012    Performed by Jay Dubois M.D. at SURGERY [facility]   • CATARACT PHACO WITH IOL  2010    Performed by AVANI ALLEN at SURGERY SAME DAY [facility]   • MASS EXCISION GENERAL      chest mass biopsy sarcoid   • BREAST BIOPSY  08    Performed by KENRICK HOWELL at SURGERY SAME DAY [facility]   • LUMPECTOMY      right   • PB RADIATION THERAPY PLAN SIMPLE      right   • COLONOSCOPY      2 polyps   • ABDOMINAL HYSTERECTOMY TOTAL     • VEIN STRIPPING      R leg     Social History     Tobacco Use   • Smoking status: Former Smoker     Packs/day: 0.50     Years: 45.00     Pack years: 22.50     Last attempt to quit: 10/9/1969     Years since quittin.9   • Smokeless tobacco: Never Used   Substance Use Topics   • Alcohol use: No     Family and Occupational History     Socioeconomic History   • Marital status:      Spouse name: Not on file   • Number of children: Not on file   • Years of education: Not on file   • Highest education level: Not on file   Occupational History   • Not on file       Objective:    Vital Signs:     Sit to stand: lightheadedness    Comments: No drop in blood pressure or change in SpO2 with ambulation (remained at 95%)  Gait:     Comments: Ambulates with modified single point cane on the right. Noted wide DARLIN.   Oculomotor Exam:         Details: No spontaneous nystagmus central gaze          Details: No spontaneous nystagmus eccentric gaze    No saccadic eye movements    Smooth pursuit present    Convergence:        Normal  convergence  Vestibulo-Ocular Exam:   Normal head thrust test  BPPV Exam:     Abnormal Desi-Hallpike        Findings: positive right  Cervicogenic Dizziness Exam:     Normal neck torsion test    Comments: Repeated horizontal head rotation - no change in symptoms  Repeated vertical head movements - slight increase 5/10  Breathing-Related Tests:     Abnormal hyperventilation test       Positive findings: dizziness    Normal Valsalva test    Comments: Hyperventilation increased symptoms to 5/10        Therapeutic Treatments and Modalities:     1. Canalith Repositioning (CPT 19179), Performed Epley maneuver with patient x 2 for R BPPV, Increase in torsional right nystagmus with Desi-Hallpike. Significant increase in dizziness with return to sitting position, requiring PT and patient's caregiver to prevent her from feeling as though she would fall. , Home instruction provided for activity/sleep modifications post tx.     Time-based treatments/modalities:            Assessment:     Functional impairments:  Positive BPPV (left), decreased postural stability, gait abnormality/instability and decreased utilization of VIS/vest/somato    Assessment details:  Patient is an 84 year old female who was referred for chronic BPPV. She has had 3 previous episodes of physical therapy to address her vertigo with limited success. She present with the following impairments: positive left posterior canalithasis type BPPV as well as anxiety with hyperventilation increasing her symptoms. Patient was treated today using a left Epley maneuver, but reported a significant increase in symptoms with treatment upon returning to upright.  A trial of skilled PT services are indicated to address the mentioned functional limitations and enhance QOL, but may be limited due to previous PT history.    Barriers to therapy:  Comprehension and transportation    Prognosis: fair    Goals:     Short term goals:  1. Educate in self-management of BPPV using Semont  maneuver at home with caregiver to assist.   2. Instruct in breathing techniques to assist with management of anxiety.     Short term goal time span:  2-4 weeks    Long term goals:  1. Decrease baseline level of dizziness to 2/10 at rest.   2. Decrease score on DHI to 20 or less to indicate improved quality of life.     Long term goal time span:  4-6 weeks  Plan:     Therapy options:  Physical therapy treatment to continue    Planned therapy interventions:  Neuromuscular Re-education (CPT 44272), Therapeutic Exercise (CPT 15490), Therapeutic Activities (CPT 90639), Sensory Integration Techniques (CPT 95407) and Canalith Repositioning (CPT 12592)    Frequency:  2x week    Duration in weeks:  6    Discussed with:  Patient and caregiver     Plan details:  Follow up on tolerance to Epley maneuver. Repeat Epley and educate patient in Semont Maneuver due to impaired comprehension and anxiety. Instruct in breathing techniques.       Functional Assessment Used  Dizziness Handicap Inventory - Physical Items Score: 4  Dizziness Handicap Inventory - Emotional Items Score: 4  Dizziness Handicap Inventory - Functional Items Score: 28  Dizziness Handicap Inventory - Total Score: 36       Referring provider co-signature:  I have reviewed this plan of care and my co-signature certifies the need for services.  Certification Dates:   From 09/16/19     To 10/28/19    Physician Signature: ________________________________ Date: ______________

## 2019-09-17 RX ORDER — SERTRALINE HYDROCHLORIDE 100 MG/1
TABLET, FILM COATED ORAL
Qty: 90 TAB | Refills: 0 | Status: SHIPPED | OUTPATIENT
Start: 2019-09-17 | End: 2019-12-10 | Stop reason: SDUPTHER

## 2019-09-21 ENCOUNTER — HOSPITAL ENCOUNTER (EMERGENCY)
Facility: MEDICAL CENTER | Age: 84
End: 2019-09-21
Attending: EMERGENCY MEDICINE
Payer: MEDICARE

## 2019-09-21 ENCOUNTER — APPOINTMENT (OUTPATIENT)
Dept: RADIOLOGY | Facility: MEDICAL CENTER | Age: 84
End: 2019-09-21
Attending: EMERGENCY MEDICINE
Payer: MEDICARE

## 2019-09-21 VITALS
OXYGEN SATURATION: 99 % | RESPIRATION RATE: 16 BRPM | BODY MASS INDEX: 21.98 KG/M2 | TEMPERATURE: 98.7 F | SYSTOLIC BLOOD PRESSURE: 164 MMHG | HEART RATE: 77 BPM | WEIGHT: 145 LBS | HEIGHT: 68 IN | DIASTOLIC BLOOD PRESSURE: 71 MMHG

## 2019-09-21 DIAGNOSIS — N30.00 ACUTE CYSTITIS WITHOUT HEMATURIA: ICD-10-CM

## 2019-09-21 LAB
ALBUMIN SERPL BCP-MCNC: 4.7 G/DL (ref 3.2–4.9)
ALBUMIN/GLOB SERPL: 1.7 G/DL
ALP SERPL-CCNC: 60 U/L (ref 30–99)
ALT SERPL-CCNC: 20 U/L (ref 2–50)
ANION GAP SERPL CALC-SCNC: 9 MMOL/L (ref 0–11.9)
APPEARANCE UR: ABNORMAL
AST SERPL-CCNC: 34 U/L (ref 12–45)
BACTERIA #/AREA URNS HPF: ABNORMAL /HPF
BASOPHILS # BLD AUTO: 0.6 % (ref 0–1.8)
BASOPHILS # BLD: 0.05 K/UL (ref 0–0.12)
BILIRUB SERPL-MCNC: 0.4 MG/DL (ref 0.1–1.5)
BILIRUB UR QL STRIP.AUTO: NEGATIVE
BUN SERPL-MCNC: 14 MG/DL (ref 8–22)
CALCIUM SERPL-MCNC: 10.3 MG/DL (ref 8.5–10.5)
CHLORIDE SERPL-SCNC: 106 MMOL/L (ref 96–112)
CO2 SERPL-SCNC: 26 MMOL/L (ref 20–33)
COLOR UR: YELLOW
CREAT SERPL-MCNC: 0.52 MG/DL (ref 0.5–1.4)
EOSINOPHIL # BLD AUTO: 0.1 K/UL (ref 0–0.51)
EOSINOPHIL NFR BLD: 1.1 % (ref 0–6.9)
EPI CELLS #/AREA URNS HPF: ABNORMAL /HPF
ERYTHROCYTE [DISTWIDTH] IN BLOOD BY AUTOMATED COUNT: 45.2 FL (ref 35.9–50)
GLOBULIN SER CALC-MCNC: 2.7 G/DL (ref 1.9–3.5)
GLUCOSE SERPL-MCNC: 114 MG/DL (ref 65–99)
GLUCOSE UR STRIP.AUTO-MCNC: NEGATIVE MG/DL
HCT VFR BLD AUTO: 46 % (ref 37–47)
HGB BLD-MCNC: 14.8 G/DL (ref 12–16)
IMM GRANULOCYTES # BLD AUTO: 0.02 K/UL (ref 0–0.11)
IMM GRANULOCYTES NFR BLD AUTO: 0.2 % (ref 0–0.9)
KETONES UR STRIP.AUTO-MCNC: NEGATIVE MG/DL
LEUKOCYTE ESTERASE UR QL STRIP.AUTO: ABNORMAL
LYMPHOCYTES # BLD AUTO: 1.58 K/UL (ref 1–4.8)
LYMPHOCYTES NFR BLD: 17.8 % (ref 22–41)
MCH RBC QN AUTO: 29.5 PG (ref 27–33)
MCHC RBC AUTO-ENTMCNC: 32.2 G/DL (ref 33.6–35)
MCV RBC AUTO: 91.6 FL (ref 81.4–97.8)
MICRO URNS: ABNORMAL
MONOCYTES # BLD AUTO: 0.46 K/UL (ref 0–0.85)
MONOCYTES NFR BLD AUTO: 5.2 % (ref 0–13.4)
NEUTROPHILS # BLD AUTO: 6.67 K/UL (ref 2–7.15)
NEUTROPHILS NFR BLD: 75.1 % (ref 44–72)
NITRITE UR QL STRIP.AUTO: POSITIVE
NRBC # BLD AUTO: 0 K/UL
NRBC BLD-RTO: 0 /100 WBC
PH UR STRIP.AUTO: 8.5 [PH] (ref 5–8)
PLATELET # BLD AUTO: 263 K/UL (ref 164–446)
PMV BLD AUTO: 9.4 FL (ref 9–12.9)
POTASSIUM SERPL-SCNC: 4.2 MMOL/L (ref 3.6–5.5)
PROT SERPL-MCNC: 7.4 G/DL (ref 6–8.2)
PROT UR QL STRIP: NEGATIVE MG/DL
RBC # BLD AUTO: 5.02 M/UL (ref 4.2–5.4)
RBC # URNS HPF: ABNORMAL /HPF
RBC UR QL AUTO: NEGATIVE
SODIUM SERPL-SCNC: 141 MMOL/L (ref 135–145)
SP GR UR STRIP.AUTO: 1.01
TROPONIN T SERPL-MCNC: 7 NG/L (ref 6–19)
UROBILINOGEN UR STRIP.AUTO-MCNC: 0.2 MG/DL
WBC # BLD AUTO: 8.9 K/UL (ref 4.8–10.8)
WBC #/AREA URNS HPF: ABNORMAL /HPF

## 2019-09-21 PROCEDURE — 87077 CULTURE AEROBIC IDENTIFY: CPT

## 2019-09-21 PROCEDURE — 99285 EMERGENCY DEPT VISIT HI MDM: CPT

## 2019-09-21 PROCEDURE — 87086 URINE CULTURE/COLONY COUNT: CPT

## 2019-09-21 PROCEDURE — 93005 ELECTROCARDIOGRAM TRACING: CPT | Performed by: EMERGENCY MEDICINE

## 2019-09-21 PROCEDURE — 71045 X-RAY EXAM CHEST 1 VIEW: CPT

## 2019-09-21 PROCEDURE — 87186 SC STD MICRODIL/AGAR DIL: CPT

## 2019-09-21 PROCEDURE — 84484 ASSAY OF TROPONIN QUANT: CPT

## 2019-09-21 PROCEDURE — 36415 COLL VENOUS BLD VENIPUNCTURE: CPT

## 2019-09-21 PROCEDURE — 700111 HCHG RX REV CODE 636 W/ 250 OVERRIDE (IP): Performed by: EMERGENCY MEDICINE

## 2019-09-21 PROCEDURE — 80053 COMPREHEN METABOLIC PANEL: CPT

## 2019-09-21 PROCEDURE — 96365 THER/PROPH/DIAG IV INF INIT: CPT

## 2019-09-21 PROCEDURE — 81001 URINALYSIS AUTO W/SCOPE: CPT

## 2019-09-21 PROCEDURE — 85025 COMPLETE CBC W/AUTO DIFF WBC: CPT

## 2019-09-21 PROCEDURE — 700105 HCHG RX REV CODE 258: Performed by: EMERGENCY MEDICINE

## 2019-09-21 RX ORDER — CEFDINIR 300 MG/1
300 CAPSULE ORAL 2 TIMES DAILY
Qty: 20 CAP | Refills: 0 | Status: SHIPPED | OUTPATIENT
Start: 2019-09-21 | End: 2020-02-13

## 2019-09-21 RX ADMIN — CEFTRIAXONE SODIUM 1 G: 1 INJECTION, POWDER, FOR SOLUTION INTRAMUSCULAR; INTRAVENOUS at 14:44

## 2019-09-21 ASSESSMENT — LIFESTYLE VARIABLES: DO YOU DRINK ALCOHOL: NO

## 2019-09-21 NOTE — DISCHARGE INSTRUCTIONS
Return here at once if you feel you are having new or worsening symptoms.  Call your doctor Monday morning and arrange office recheck as soon as possible during the week

## 2019-09-21 NOTE — ED PROVIDER NOTES
"ED Provider Note    CHIEF COMPLAINT  Chief Complaint   Patient presents with   • Blood Pressure Problem     Pt woke up with BP elevated in the 200's systolicly. Pt reports HA earlier but none now and slight dizziness now.        HPI  Lucita Babcock is a 84 y.o. female who presents to the emergency department stating that she is concerned about her blood pressure.  The patient has a history of hypertension this morning she checked her blood pressure and was alarmed to find a reading of about 200 systolic, she does not recall the diastolic number.  In addition to this the patient says recently she had a sore throat it went away after gargling with salt water and taking aspirin and this morning she felt a little bit dizzy but she has chronic vertigo after a fall several years ago and she just attributed her dizziness to that.  She does not have any other new symptoms or complaints.  The patient states that she has not had a fever.    REVIEW OF SYSTEMS no fever or chills no shortness of breath no chest pain no abdominal pain.  All other systems negative    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   • Cancer (HCC) 2008    breast   • CATARACT     bilat IOL   • Dental disorder     upper partial   • Depression    • Heart burn    • Hypertension    • Indigestion    • Injury of cervical spine (HCC) July 2016    C-spine decompression/laminectomy   • Osteopenia    • 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HCC)    • Sarcoid (HCC)    • Urinary bladder disorder     frequency    • UTI (urinary tract infection)     recurrent       FAMILY HISTORY  Family History   Problem Relation Age of Onset   • Hypertension Mother    • Stroke Mother    • Suicide Attempts Son    • Bipolar disorder Son    • Other Father         Dementia   • Other Sister         BIpolar   • Bipolar disorder " Sister    • Other Sister         Bipolar   • Other Sister         THyroid disease   • Bipolar disorder Sister    • Cancer Neg Hx    • Diabetes Neg Hx    • Heart Disease Neg Hx        SOCIAL HISTORY  Social History     Socioeconomic History   • Marital status:      Spouse name: Not on file   • Number of children: Not on file   • Years of education: Not on file   • Highest education level: Not on file   Occupational History   • Not on file   Social Needs   • Financial resource strain: Not on file   • Food insecurity:     Worry: Not on file     Inability: Not on file   • Transportation needs:     Medical: Not on file     Non-medical: Not on file   Tobacco Use   • Smoking status: Former Smoker     Packs/day: 0.50     Years: 45.00     Pack years: 22.50     Last attempt to quit: 10/9/1969     Years since quittin.9   • Smokeless tobacco: Never Used   Substance and Sexual Activity   • Alcohol use: No   • Drug use: No   • Sexual activity: Not Currently     Partners: Male     Comment:     Lifestyle   • Physical activity:     Days per week: Not on file     Minutes per session: Not on file   • Stress: Not on file   Relationships   • Social connections:     Talks on phone: Not on file     Gets together: Not on file     Attends Gnosticism service: Not on file     Active member of club or organization: Not on file     Attends meetings of clubs or organizations: Not on file     Relationship status: Not on file   • Intimate partner violence:     Fear of current or ex partner: Not on file     Emotionally abused: Not on file     Physically abused: Not on file     Forced sexual activity: Not on file   Other Topics Concern   • Not on file   Social History Narrative   • Not on file       SURGICAL HISTORY  Past Surgical History:   Procedure Laterality Date   • PARATHYROID EXPLORATION  2018    Procedure: PARATHYROID EXPLORATION- MINIMALLY INVASIVE,   NIMS RECURRENT LARYNGEAL NERVE MONITORING RIGHT;  Surgeon: Devora SINGH  BALDEV Gonsalez;  Location: SURGERY SAME DAY [facility];  Service: General   • CERVICAL DISK AND FUSION ANTERIOR  7/27/2016    Procedure: CERVICAL DISK AND FUSION ANTERIOR C4-7;  Surgeon: Niles Khan M.D.;  Location: SURGERY [facility];  Service:    • CATARACT PHACO WITH IOL  8/27/2014    Performed by Avani Allne M.D. at SURGERY SAME DAY [facility]   • AMBROCIO BY LAPAROSCOPY  1/8/2013    Performed by Kenrick Howell M.D. at SURGERY [facility]   • ERCP IN OR  12/28/2012    Performed by Jay Dubois M.D. at SURGERY [facility]   • CATARACT PHACO WITH IOL  8/11/2010    Performed by AVANI ALLEN at SURGERY SAME DAY [facility]   • MASS EXCISION GENERAL  7/08    chest mass biopsy sarcoid   • BREAST BIOPSY  5/27/08    Performed by KENRICK HOWELL at SURGERY SAME DAY [facility]   • LUMPECTOMY  2008    right   • PB RADIATION THERAPY PLAN SIMPLE  2008    right   • COLONOSCOPY  2007    2 polyps   • ABDOMINAL HYSTERECTOMY TOTAL  1997   • VEIN STRIPPING      R leg       CURRENT MEDICATIONS  Home Medications     Reviewed by Jacquelnie Rivera R.N. (Registered Nurse) on 09/21/19 at 1158  Med List Status: Partial   Medication Last Dose Status   acetaminophen (TYLENOL) 160 MG/5ML Suspension  Active   ALPRAZolam (XANAX) 0.25 MG Tab  Active   ascorbic acid (VITAMIN C) 500 MG tablet  Active   bethanechol (URECHOLINE) 25 MG Tab  Active   Cholecalciferol (VITAMIN D PO)  Active   DILTIAZem (CARDIZEM) 30 MG Tab 9/21/2019 Active   phenazopyridine (PYRIDIUM) 200 MG Tab not taking Active   sertraline (ZOLOFT) 100 MG Tab  Active                ALLERGIES  Allergies   Allergen Reactions   • Ace Inhibitors Anaphylaxis   • Augmentin      Flu like sx   • Erythromycin Rash   • Lisinopril Anaphylaxis   • Penicillins Hives   • Epinephrine Unspecified     shaking   • Percocet [Oxycodone-Acetaminophen] Vomiting   • Sulfa Drugs Hives       PHYSICAL EXAM  VITAL SIGNS: /66   Pulse 68   Temp 37.8 °C (100 °F) (Temporal)    "Resp 16   Ht 1.727 m (5' 8\")   Wt 65.8 kg (145 lb)   SpO2 95%   BMI 22.05 kg/m²    Oxygen saturation is interpreted as adequate  Constitutional: Awake verbal talkative nontoxic-appearing  HENT: Mucous membranes are moist throat clear  Eyes: No erythema discharge or jaundice  Neck: Trachea midline no JVD no meningeal findings  Cardiovascular: Regular rate and rhythm  Lungs: Clear and equal bilaterally with no apparent difficulty breathing  Abdomen/Back: Soft nondistended nontender no rebound guarding or peritoneal findings bowel sounds are normally active  Skin: Warm and dry  Musculoskeletal: No leg edema or calf tenderness  Neurologic: Awake verbal moving all extremities    Laboratory  CBC shows white blood cell count of 8.9 hemoglobin is adequate at 14.8 basic metabolic panel is unremarkable troponin is normal at 7 LFTs are unremarkable urinalysis is positive for nitrite and leukocyte esterase with  white blood cells noted in the microscopic exam.    EKG interpretation  Twelve-lead EKG shows sinus rhythm 68 bpm there is no pathologic ST elevation or depression findings are similar to the cardiogram from July 13, 2019    Radiology  DX-CHEST-PORTABLE (1 VIEW)   Final Result      No acute cardiac or pulmonary abnormalities are identified.        MEDICAL DECISION MAKING and DISPOSITION  In the emergency department the patient remains hemodynamically stable, her blood pressure readings are quite good I do not think that she needs additional antihypertensive therapy at this time.  I reviewed all the findings with the patient and her family she was given 1 g of intravenous ceftriaxone in the emergency department for urinary tract infection and I think it is safe for her to go home I written her a prescription for Omnicef.  The patient is instructed to call her doctor Monday morning and arrange office recheck as soon as possible during the week and if she feels she is having new or worsening symptoms she is to " return here for recheck    IMPRESSION  1.  Urinary tract infection  2.  History of hypertension      Electronically signed by: Daniel Godoy, 9/21/2019 3:31 PM

## 2019-09-21 NOTE — ED TRIAGE NOTES
"Chief Complaint   Patient presents with   • Blood Pressure Problem     Pt woke up with BP elevated in the 200's systolicly. Pt reports HA earlier but none now and slight dizziness now.      Pt is AOx4, PERRL and states she took an extra Diltiazem this morning.   /63   Pulse 93   Temp 37.8 °C (100 °F) (Temporal)   Resp 16   Ht 1.727 m (5' 8\")   Wt 65.8 kg (145 lb)   SpO2 94%   BMI 22.05 kg/m²   Pt placed back in lobby, educated on triage process, and told to inform staff of any change in condition.     "

## 2019-09-21 NOTE — ED NOTES
Antibiotics done infusing. Pt ambulated to bathroom and changed into clothing. Pt given discharge instructions and prescription. Pt verbalized understanding. RN to answer any questions pt had. Pt anxious about her medications being missing; [facility] contacted and assured home medications left at home; pt informed. VSS. Pt wheeled out to front lobby.

## 2019-09-22 LAB — EKG IMPRESSION: NORMAL

## 2019-09-23 ENCOUNTER — APPOINTMENT (OUTPATIENT)
Dept: PHYSICAL THERAPY | Facility: REHABILITATION | Age: 84
End: 2019-09-23
Attending: FAMILY MEDICINE
Payer: MEDICARE

## 2019-09-23 ENCOUNTER — TELEPHONE (OUTPATIENT)
Dept: MEDICAL GROUP | Age: 84
End: 2019-09-23

## 2019-09-23 ENCOUNTER — PATIENT OUTREACH (OUTPATIENT)
Dept: HEALTH INFORMATION MANAGEMENT | Facility: OTHER | Age: 84
End: 2019-09-23

## 2019-09-23 NOTE — TELEPHONE ENCOUNTER
ESTABLISHED PATIENT PRE-VISIT PLANNING     Patient was NOT contacted to complete PVP.     Note: Patient will not be contacted if there is no indication to call.     1.  Reviewed notes from the last few office visits within the medical group: Yes    2.  If any orders were placed at last visit or intended to be done for this visit (i.e. 6 mos follow-up), do we have Results/Consult Notes?        •  Labs - Labs ordered, completed on 8/20/19 and results are in chart.   Note: If patient appointment is for lab review and patient did not complete labs, check with provider if OK to reschedule patient until labs completed.       •  Imaging - Imaging was not ordered at last office visit.       •  Referrals - Referral ordered, patient has NOT been seen.    3. Is this appointment scheduled as a Hospital Follow-Up? Yes, visit was at [facility].     4.  Immunizations were updated in Epic using WebIZ?: Epic matches WebIZ       •  Web Iz Recommendations: FLU and SHINGRIX (Shingles)    5.  Patient is due for the following Health Maintenance Topics:   Health Maintenance Due   Topic Date Due   • IMM ZOSTER VACCINES (2 of 3) 04/27/2017   • Annual Wellness Visit  08/23/2018   • IMM INFLUENZA (1) 09/01/2019           6. Orders for overdue Health Maintenance topics pended in Pre-Charting? N\A    7.  AHA (MDX) form printed for Provider? No, already completed    8.  Patient was NOT informed to arrive 15 min prior to their scheduled appointment and bring in their medication bottles.

## 2019-09-24 ENCOUNTER — APPOINTMENT (OUTPATIENT)
Dept: MEDICAL GROUP | Age: 84
End: 2019-09-24
Payer: MEDICARE

## 2019-09-24 ENCOUNTER — TELEPHONE (OUTPATIENT)
Dept: HEALTH INFORMATION MANAGEMENT | Facility: OTHER | Age: 84
End: 2019-09-24

## 2019-09-24 ENCOUNTER — OFFICE VISIT (OUTPATIENT)
Dept: MEDICAL GROUP | Age: 84
End: 2019-09-24
Payer: MEDICARE

## 2019-09-24 VITALS
HEART RATE: 77 BPM | BODY MASS INDEX: 22.73 KG/M2 | SYSTOLIC BLOOD PRESSURE: 114 MMHG | TEMPERATURE: 98.5 F | DIASTOLIC BLOOD PRESSURE: 58 MMHG | WEIGHT: 150 LBS | HEIGHT: 68 IN | OXYGEN SATURATION: 95 %

## 2019-09-24 DIAGNOSIS — Z23 NEED FOR VACCINATION: Primary | ICD-10-CM

## 2019-09-24 DIAGNOSIS — Z92.89 HISTORY OF RECENT HOSPITALIZATION: ICD-10-CM

## 2019-09-24 DIAGNOSIS — R53.81 PHYSICAL DECONDITIONING: ICD-10-CM

## 2019-09-24 PROBLEM — R32 URINARY INCONTINENCE: Status: ACTIVE | Noted: 2019-09-24

## 2019-09-24 PROCEDURE — G0008 ADMIN INFLUENZA VIRUS VAC: HCPCS | Performed by: FAMILY MEDICINE

## 2019-09-24 PROCEDURE — 90662 IIV NO PRSV INCREASED AG IM: CPT | Performed by: FAMILY MEDICINE

## 2019-09-24 PROCEDURE — 99214 OFFICE O/P EST MOD 30 MIN: CPT | Mod: 25 | Performed by: FAMILY MEDICINE

## 2019-09-24 NOTE — TELEPHONE ENCOUNTER
I spoke to this pt and she canceled her appointment with you today due to having diarrhea. She stated that it may be due to her medication and she may possibly need a medication change. Please advise.    Thank you

## 2019-09-24 NOTE — PROGRESS NOTES
This medical record contains text that has been entered with the assistance of computer voice recognition and dictation software.  Therefore, it may contain unintended errors in text, spelling, punctuation, or grammar    Chief Complaint   Patient presents with   • Hospital Follow-up     high blood pressure due to uti    • Other     light headed and tired        Lucita Babcock is a 84 y.o. female here evaluation and management of: Hospital follow-up       HPI:     1. Physical deconditioning  NEW PROBLEM  Patient has been going to physical therapy for vestibular rehabilitation. She states that she is still experiencing lightheadedness and usually gets dizzy upon turning her head to the right and with bending over. She is able to walk without dizziness however states when she stands up she feels like she had two glasses of wine. Physical therapy has helped her symptoms slightly. Daughter states that due to her dizziness she has not been going out and doing activities as much as she would like and as a result has become more weak. Patient is on hypertensive meds specifically Diltiazem 30 mg 2 tablets a day.     2. History of recent hospitalization    Patient was seen at [facility] ED on 9/21/19 for concerns about her blood pressure. She states that she was also having difficulties with emptying her bladder. Per patient, it will feel like she is done voiding however she is not. She was diagnosed with a UTI and discharged home with a prescription for Omnicef in addition to intravenous Rocephin.  Which patient has been compliant with taking. She is supposed to be taking this twice a day however she states she has only been taking this once a day. She endorses associated diarrhea with taking Omnicef. She has a history of recurrent UTI's and states that she has never been able to tolerate an antibiotic without side effects.    3. Need for vaccination    Patient due for influenza and shingles vaccine.       Current medicines  (including changes today)  Current Outpatient Medications   Medication Sig Dispense Refill   • cefdinir (OMNICEF) 300 MG Cap Take 1 Cap by mouth 2 times a day. 20 Cap 0   • sertraline (ZOLOFT) 100 MG Tab TAKE 1 TABLET BY MOUTH EVERY DAY 90 Tab 0   • ALPRAZolam (XANAX) 0.25 MG Tab TAKE 1 TABLET ORALLY AT BEDTIME AS NEEDED FOR SLEEP 30 Tab 2   • ascorbic acid (VITAMIN C) 500 MG tablet Take 1 Tab by mouth 3 times a day. 270 Tab 2   • phenazopyridine (PYRIDIUM) 200 MG Tab Take 1 Tab by mouth 3 times a day as needed. 6 Tab 0   • DILTIAZem (CARDIZEM) 30 MG Tab Take 1 Tab by mouth every day. 90 Tab 0   • Cholecalciferol (VITAMIN D PO) Take  by mouth.     • bethanechol (URECHOLINE) 25 MG Tab Take 1 Tab by mouth every 24 hours as needed. 90 Tab 0   • acetaminophen (TYLENOL) 160 MG/5ML Suspension Take 15 mg/kg by mouth every 6 hours as needed.       No current facility-administered medications for this visit.      She  has a past medical history of Anesthesia, Aneurysm ([facility]), Anxiety, Arthritis, Breast cancer ([facility]) (), Cancer (HCC) (), CATARACT, Dental disorder, Depression, Heart burn, Hypertension, Indigestion, Injury of cervical spine ([facility]) (2016), Osteopenia, 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[facility]), Sarcoid ([facility]), Urinary bladder disorder, and UTI (urinary tract infection).  She  has a past surgical history that includes vein stripping; mass excision general (); breast biopsy (08); abdominal hysterectomy total (); cataract phaco with iol (2010); colonoscopy (); ercp in or (2012); samuel by laparoscopy (2013); lumpectomy (); pr radiation therapy plan simple (); cataract phaco with iol (2014); cervical disk and fusion anterior (2016); and parathyroid exploration (2018).  Social History     Tobacco Use   • Smoking status: Former Smoker     Packs/day: 0.50     Years: 45.00     Pack years: 22.50     Last attempt to quit: 10/9/1969     Years since quittin.9   • Smokeless  "tobacco: Never Used   Substance Use Topics   • Alcohol use: No   • Drug use: No     Social History     Social History Narrative   • Not on file     Family History   Problem Relation Age of Onset   • Hypertension Mother    • Stroke Mother    • Suicide Attempts Son    • Bipolar disorder Son    • Other Father         Dementia   • Other Sister         BIpolar   • Bipolar disorder Sister    • Other Sister         Bipolar   • Other Sister         THyroid disease   • Bipolar disorder Sister    • Cancer Neg Hx    • Diabetes Neg Hx    • Heart Disease Neg Hx      Family Status   Relation Name Status   • Mo     • Son   at age 32        suicide   • Fa     • Sis     • Sis Paola Alive   • Sis Jill Alive   • Marcelinosylvester Ewing Alive   • Marcelinosylvester Smith (Adopted) Alive   • Marcelino          at birth   • Neg Hx  (Not Specified)       ROS  Please see HPI    All other systems reviewed and are negative     Objective:     /58 (BP Location: Left arm, Patient Position: Sitting, BP Cuff Size: Adult)   Pulse 77   Temp 36.9 °C (98.5 °F) (Temporal)   Ht 1.727 m (5' 8\")   Wt 68 kg (150 lb)   SpO2 95%  Body mass index is 22.81 kg/m².    Physical Exam:  Constitutional: Alert, no distress.  Skin: Warm, dry, good turgor, no rashes in visible areas.  Eye: Equal, round and reactive, conjunctiva clear, lids normal.  ENMT: Lips without lesions, good dentition, oropharynx clear.  Neck: Trachea midline, no masses, no thyromegaly. No cervical or supraclavicular lymphadenopathy.  Respiratory: Unlabored respiratory effort, lungs clear to auscultation, no wheezes, no ronchi.  Cardiovascular: Normal S1, S2, no murmur, no edema.  Psych: Alert and oriented x3, normal affect and mood.      Assessment and Plan:   The following treatment plan was discussed    1. Physical deconditioning    - Patient has been doing physical therapy for vestibular rehabilitation. She is given a referral to a new physical therapist specifically " for physical deconditioning.  We discussed the importance of daily activity which is safe.  - Advised that she continue physical therapy.   - Counseled on adequate hydration.   Also discussed getting up slowly so as to prevent orthostatic hypotension.  - REFERRAL TO PHYSICAL THERAPY Reason for Therapy: Eval/Treat/Report    2. History of recent hospitalization  Since patient has been having diarrhea and appears to not tolerate the Omnicef I asked her to hold that she did receive 3 days of Omnicef in addition to the intravenous Rocephin in the ER.  - Patient is here today for hospital follow up on her UTI.  - She was advised on adequate hydration and encouraged to try cranberry juice.    - She is advised not to hold in her urination and reminded to wipe front to back to prevent infection.   - Further recommended kegel exercises.     3. Need for vaccination    - Patient due for the flu shot. She would like to have this given today in clinic.  - High-dose influenza vaccine was administered today without adverse event.    - Influenza Vaccine, High Dose (65+ Only)        HEALTH MAINTENANCE: Patient due for shingles vaccine. She understands she needs to follow-up with her local pharmacy for Shingrix as we do not have this available in clinic today due to shortage.    Instructed to Follow up in clinic or ER for worsening symptoms, difficulty breathing, lack of expected recovery, or should new symptoms or problems arise.    Followup: Return in about 3 months (around 12/24/2019) for Reevaluation.      Once again this medical record contains text that has been entered with the assistance of computer voice recognition, dictation software, and medical scribes.  Therefore, it may contain unintended errors in text, spelling, punctuation, or grammar.    Cookie WHITE (Dee), am scribing for, and in the presence of, Robbie Sloan M.D.    Electronically signed by: Cookie Guzman (Dee), 9/24/2019     Robbie WHITE  Luther DE PAZ personally performed the services described in this documentation, as scribed by Cookie Guzman in my presence, and it is both accurate and complete.

## 2019-09-26 NOTE — ED NOTES
ED Positive Culture Follow-up/Notification Note:    Date: 9/26/19     Patient seen in the ED on 9/21/2019 for hypertension with dizziness. .   1. Acute cystitis without hematuria     Given Rocephin 1 g IV in the ER.    Discharge Medication List as of 9/21/2019  3:18 PM      START taking these medications    Details   cefdinir (OMNICEF) 300 MG Cap Take 1 Cap by mouth 2 times a day., Disp-20 Cap, R-0, Print Rx Paper             Allergies: Ace inhibitors; Augmentin; Erythromycin; Lisinopril; Penicillins; Epinephrine; Percocet [oxycodone-acetaminophen]; and Sulfa drugs     Vitals:    09/21/19 1300 09/21/19 1331 09/21/19 1400 09/21/19 1500   BP: 137/56 134/61 142/66 (!) 164/71   Pulse: 65 70 68 77   Resp:    16   Temp:    37.1 °C (98.7 °F)   TempSrc:    Temporal   SpO2: 95% 92% 95% 99%   Weight:       Height:           Final cultures:   Results     Procedure Component Value Units Date/Time    URINE CULTURE(NEW) [[id number]]  (Abnormal)  (Susceptibility) Collected:  09/21/19 1335    Order Status:  Completed Specimen:  Urine Updated:  09/24/19 0839     Significant Indicator POS     Source UR     Site -     Culture Result -      Citrobacter murliniae  ,000 cfu/mL      Susceptibility     Citrobacter murliniae (1)     Antibiotic Interpretation Microscan Method Status    Ampicillin Resistant >16 mcg/mL ROBERTO Final    Ceftriaxone Resistant >32 mcg/mL ROBERTO Final    Ceftazidime Sensitive <=1 mcg/mL ROBERTO Final    Cefotaxime Resistant >32 mcg/mL ROBERTO Final    Cefazolin Resistant >16 mcg/mL ROBERTO Final    Ciprofloxacin Resistant >2 mcg/mL ROBERTO Final    Ampicillin/sulbactam Resistant >16/8 mcg/mL ROBERTO Final    Cefepime Resistant >16 mcg/mL ROBERTO Final    Tobramycin Sensitive <=4 mcg/mL ROBERTO Final    Cefotetan Sensitive <=16 mcg/mL ROBERTO Final    Nitrofurantoin Intermediate 64 mcg/mL ROBERTO Final    Gentamicin Sensitive <=4 mcg/mL ROBERTO Final    Levofloxacin Resistant >4 mcg/mL ROBERTO Final    Pip/Tazobactam Sensitive <=16 mcg/mL ROBERTO Final     Trimeth/Sulfa Resistant >2/38 mcg/mL ROBERTO Final                   URINALYSIS CULTURE, IF INDICATED [[id number]]  (Abnormal) Collected:  09/21/19 1335    Order Status:  Completed Specimen:  Urine Updated:  09/21/19 1431     Color Yellow     Character Cloudy     Specific Gravity 1.010     Ph 8.5     Glucose Negative mg/dL      Ketones Negative mg/dL      Protein Negative mg/dL      Bilirubin Negative     Urobilinogen, Urine 0.2     Nitrite Positive     Leukocyte Esterase Moderate     Occult Blood Negative     Micro Urine Req Microscopic          Plan:   Currently prescribed antimicrobial will not be effective in treating the organism identified on C&S. I have discussed the result with the patient, who states that she is not having any pain with urination or trouble urinating. She is on bethanocol and is trying to empty her bladder fully to avoid future UTIs. She saw her PCP yesterday and was told to stop antibiotics. I agree with this plan and have also encouraged this.        Cierra Myers

## 2019-09-27 ENCOUNTER — PHYSICAL THERAPY (OUTPATIENT)
Dept: PHYSICAL THERAPY | Facility: REHABILITATION | Age: 84
End: 2019-09-27
Attending: FAMILY MEDICINE
Payer: MEDICARE

## 2019-09-27 DIAGNOSIS — H81.13 BENIGN PAROXYSMAL POSITIONAL VERTIGO DUE TO BILATERAL VESTIBULAR DISORDER: ICD-10-CM

## 2019-09-27 PROCEDURE — 95992 CANALITH REPOSITIONING PROC: CPT

## 2019-09-27 NOTE — OP THERAPY DAILY TREATMENT
Outpatient Physical Therapy  DAILY TREATMENT     [facility] Physical [address].  [address]  [place] [address]  Phone:  [phone]  Fax:  [phone]    Date: 09/27/2019    Patient: Lucita Babcock  YOB: 1935  MRN: [id number]     Time Calculation  Start time: 1030  Stop time: 1115 Time Calculation (min): 45 minutes       Chief Complaint: Other (Dizziness)    Visit #: 2    SUBJECTIVE:  Patient reports that she left last session feeling very weak. However, does report that the next day and into the next week was feeling much better. Continues to complain of some mild dizziness and is prepared to repeat Epley maneuver today. Patient present with caregiver, Alexei.      OBJECTIVE:  Current objective measures:           Therapeutic Treatments and Modalities:     1. Canalith Repositioning (CPT 44849), Performed right Epley x 3 to address right posterior canal BPPV. , Patient had a decrease intensity and duration with each repetition of this maneuver. However, for the first 2 of 3 maneuvers, patient reported feeling as though she was falling backward, requiring both PT and caregiver due to patient pushing herself posteriorly. Therapist did not observe any nystagmus in this position and discussed how anxiety can reproduce symptoms. Deep breathing techniques discussed throughout. During final bout of Epley maneuver, patient did not have this response.     Time-based treatments/modalities:          ASSESSMENT:   Response to treatment: Patient demonstrated good response to Epley maneuver today but continues to have right beating nystagmus during a R ibrahima-hallpike positioning that lasts 15-20 seconds suggestive that treatment was not fully effective. Patient will return next week for further treatment.     PLAN/RECOMMENDATIONS:   Plan for treatment: therapy treatment to continue next visit.  Planned interventions for next visit: continue with current treatment.

## 2019-10-02 ENCOUNTER — OFFICE VISIT (OUTPATIENT)
Dept: MEDICAL GROUP | Age: 84
End: 2019-10-02
Payer: MEDICARE

## 2019-10-02 VITALS
BODY MASS INDEX: 21.95 KG/M2 | HEART RATE: 74 BPM | TEMPERATURE: 98.7 F | OXYGEN SATURATION: 97 % | HEIGHT: 68 IN | SYSTOLIC BLOOD PRESSURE: 130 MMHG | DIASTOLIC BLOOD PRESSURE: 66 MMHG | WEIGHT: 144.8 LBS

## 2019-10-02 DIAGNOSIS — I10 ESSENTIAL HYPERTENSION: ICD-10-CM

## 2019-10-02 PROCEDURE — 99212 OFFICE O/P EST SF 10 MIN: CPT | Performed by: FAMILY MEDICINE

## 2019-10-02 NOTE — PROGRESS NOTES
This medical record contains text that has been entered with the assistance of computer voice recognition and dictation software.  Therefore, it may contain unintended errors in text, spelling, punctuation, or grammar    Chief Complaint   Patient presents with   • Other     pt states she has had high blood pressure        Lucita Babcock is a 84 y.o. female here evaluation and management of: Blood pressure, anxiety    HPI:      Patient is currently taking Diltiazem 30 mg daily. She denies any side effects. Patient presents with BP reading of 130/66 today. She reports her blood pressure readings have been elevated when she exercises.    Home BP readings--- BP readings ranging from 120-165/80-95 patient states he 165 was after she was exercising on her stationary bike.    Patient does get episodic lightheadedness dizziness when she stands up too quickly.  Today she denies any double vision.  She denies any recent syncopal episodes, no double vision no back pain no shortness of breath no chest pain.        Current medicines (including changes today)  Current Outpatient Medications   Medication Sig Dispense Refill   • DILTIAZem (CARDIZEM) 30 MG Tab Take 1 tab po q24h 90 Tab 0   • cefdinir (OMNICEF) 300 MG Cap Take 1 Cap by mouth 2 times a day. 20 Cap 0   • sertraline (ZOLOFT) 100 MG Tab TAKE 1 TABLET BY MOUTH EVERY DAY 90 Tab 0   • ascorbic acid (VITAMIN C) 500 MG tablet Take 1 Tab by mouth 3 times a day. 270 Tab 2   • Cholecalciferol (VITAMIN D PO) Take  by mouth.     • bethanechol (URECHOLINE) 25 MG Tab Take 1 Tab by mouth every 24 hours as needed. 90 Tab 0   • acetaminophen (TYLENOL) 160 MG/5ML Suspension Take 15 mg/kg by mouth every 6 hours as needed.     • phenazopyridine (PYRIDIUM) 200 MG Tab Take 1 Tab by mouth 3 times a day as needed. (Patient not taking: Reported on 10/2/2019) 6 Tab 0   • DILTIAZem (CARDIZEM) 30 MG Tab Take 1 Tab by mouth every day. (Patient not taking: Reported on 10/2/2019) 90 Tab 0     No  current facility-administered medications for this visit.      She  has a past medical history of Anesthesia, Aneurysm (HCC), Anxiety, Arthritis, Breast cancer (HCC) (), Cancer (HCC) (), CATARACT, Dental disorder, Depression, Heart burn, Hypertension, Indigestion, Injury of cervical spine (HCC) (2016), Osteopenia, 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HCC), Sarcoid (HCC), Urinary bladder disorder, and UTI (urinary tract infection).  She  has a past surgical history that includes vein stripping; mass excision general (); breast biopsy (08); abdominal hysterectomy total (); cataract phaco with iol (2010); colonoscopy (); ercp in or (2012); samuel by laparoscopy (2013); lumpectomy (); pr radiation therapy plan simple (); cataract phaco with iol (2014); cervical disk and fusion anterior (2016); and parathyroid exploration (2018).  Social History     Tobacco Use   • Smoking status: Former Smoker     Packs/day: 0.50     Years: 45.00     Pack years: 22.50     Last attempt to quit: 10/9/1969     Years since quittin.0   • Smokeless tobacco: Never Used   Substance Use Topics   • Alcohol use: No   • Drug use: No     Social History     Social History Narrative   • Not on file     Family History   Problem Relation Age of Onset   • Hypertension Mother    • Stroke Mother    • Suicide Attempts Son    • Bipolar disorder Son    • Other Father         Dementia   • Other Sister         BIpolar   • Bipolar disorder Sister    • Other Sister         Bipolar   • Other Sister         THyroid disease   • Bipolar disorder Sister    • Cancer Neg Hx    • Diabetes Neg Hx    • Heart Disease Neg Hx      Family Status   Relation Name Status   • Mo     • Son   at age 32        suicide   • Fa     • Sis     • Sis Paola Alive   • Sis Jill Alive   • Marcelino Kaylin Alive   • Marcelinosylvester Smith (Adopted) Alive   • Marcelino          at birth   • Neg Hx  (Not Specified)  "        ROS  Please see hpi     All other systems reviewed and are negative     Objective:     /66 (BP Location: Left arm, Patient Position: Sitting, BP Cuff Size: Adult)   Pulse 74   Temp 37.1 °C (98.7 °F) (Temporal)   Ht 1.727 m (5' 8\")   Wt 65.7 kg (144 lb 12.8 oz)   SpO2 97%  Body mass index is 22.02 kg/m².  Physical Exam:    Constitutional: Alert, no distress.  Skin: Warm, dry, good turgor, no rashes in visible areas.  Eye: Equal, round and reactive, conjunctiva clear, lids normal.  ENMT: Lips without lesions, good dentition, oropharynx clear.  Neck: Trachea midline, no masses, no thyromegaly. No cervical or supraclavicular lymphadenopathy.  Respiratory: Unlabored respiratory effort, lungs clear to auscultation, no wheezes, no ronchi.  Cardiovascular: Normal S1, S2, no murmur, no edema.  Psych: Alert and oriented x3, normal affect and mood.      Assessment and Plan:   The following treatment plan was discussed      1. Essential hypertension    I explained that the home BP readings were not concerning especially after exercise.  We will continue current dose of diltiazem 30 mg we will refill her medications.  - DILTIAZem (CARDIZEM) 30 MG Tab; Take 1 tab po q24h  Dispense: 90 Tab; Refill: 0      HEALTH MAINTENANCE:    Instructed to Follow up in clinic or ER for worsening symptoms, difficulty breathing, lack of expected recovery, or should new symptoms or problems arise.    Followup: Return in about 2 months (around 12/2/2019) for Reevaluation.      Once again this medical record contains text that has been entered with the assistance of computer voice recognition, dictation software, and medical scribes.  Therefore, it may contain unintended errors in text, spelling, punctuation, or grammar.    Justin WHITE (Scribe), am scribing for, and in the presence of, Robbie Sloan M.D.    Electronically signed by: Justin Galarza (Scribe), 10/2/2019     Robbie WHITE M.D. personally performed the services " described in this documentation, as scribed by Justin Galarza in my presence, and it is both accurate and complete.

## 2019-10-03 ENCOUNTER — TELEPHONE (OUTPATIENT)
Dept: HEALTH INFORMATION MANAGEMENT | Facility: OTHER | Age: 84
End: 2019-10-03

## 2019-10-03 ENCOUNTER — PHYSICAL THERAPY (OUTPATIENT)
Dept: PHYSICAL THERAPY | Facility: REHABILITATION | Age: 84
End: 2019-10-03
Attending: FAMILY MEDICINE
Payer: MEDICARE

## 2019-10-03 DIAGNOSIS — F41.9 ANXIETY: Primary | ICD-10-CM

## 2019-10-03 DIAGNOSIS — H81.13 BENIGN PAROXYSMAL POSITIONAL VERTIGO DUE TO BILATERAL VESTIBULAR DISORDER: ICD-10-CM

## 2019-10-03 PROCEDURE — 95992 CANALITH REPOSITIONING PROC: CPT

## 2019-10-03 NOTE — TELEPHONE ENCOUNTER
1. Caller Name: Lucita Babcock  Call Back Number: [phone] (home)         Patient approves a detailed voicemail message: yes    2. SPECIFIC Action To Be Taken: Orders pending, please sign. and Referral pending, please sign.    3. Diagnosis/Clinical Reason for Request: anxiety    4. Specialty & Provider Name/Lab/Imaging Location: Psychologist    5. Is appointment scheduled for requested order/referral: no    Patient was informed they will receive a return phone call from the office ONLY if there are any questions before processing their request. Advised to call back if they haven't received a call from the referral department in 5 days.      Pt stated that per her physical therapist advised her to see a psychologist as her anxiety may be causing her vertigo.

## 2019-10-03 NOTE — OP THERAPY DAILY TREATMENT
Outpatient Physical Therapy  DAILY TREATMENT     [facility] Physical [address].  [address]  [place] [address]  Phone:  [phone]  Fax:  [phone]    Date: 10/03/2019    Patient: Lucita Babcock  YOB: 1935  MRN: [id number]     Time Calculation  Start time: 1004  Stop time: 1030 Time Calculation (min): 26 minutes       Chief Complaint: Other (Dizziness)    Visit #: 3    SUBJECTIVE:  Patient reports feeling more stable since last maneuver and no longer complains of dizziness with positional changes. Only has dizziness when alone and knows that it is associated with anxiety.     OBJECTIVE:  Current objective measures:           Therapeutic Treatments and Modalities:     1. Canalith Repositioning (CPT 80446), Performed right Epley x 1 to address right posterior canal BPPV. , Patient had a decrease intensity and duration with lying back in right Desi-Hallpike position but no other positions. Patient did not exhibit the posterior trunk lean with return back up to sitting nor nystagmus. As patient had minimal dizziness with this maneuver and overall reports a significant improvement in function, discussed movement toward discharge.     Therapeutic Treatment and Modalities Summary: With discussion of discharge, engaged in conversation about addressing dizziness associated with anxiety. Patient states that she previously met with a psychologist who just recommended taking Xanax and did not see patient again. Encouraged to obtain a second referral and resources provided.     Time-based treatments/modalities:          ASSESSMENT:   Response to treatment: Patient's positional vertigo has significantly improved. Will follow up with one additional visit to discuss symptoms.     PLAN/RECOMMENDATIONS:   Plan for treatment: Discharge testing. .  Planned interventions for next visit: continue with current treatment.

## 2019-10-08 ENCOUNTER — PHYSICAL THERAPY (OUTPATIENT)
Dept: PHYSICAL THERAPY | Facility: REHABILITATION | Age: 84
End: 2019-10-08
Attending: FAMILY MEDICINE
Payer: MEDICARE

## 2019-10-08 DIAGNOSIS — H81.13 BENIGN PAROXYSMAL POSITIONAL VERTIGO DUE TO BILATERAL VESTIBULAR DISORDER: ICD-10-CM

## 2019-10-08 DIAGNOSIS — R42 DIZZINESS AND GIDDINESS: ICD-10-CM

## 2019-10-08 PROCEDURE — 95992 CANALITH REPOSITIONING PROC: CPT

## 2019-10-08 NOTE — PROGRESS NOTES
called to follow up and inform referral was sent and is ready to schedule. Gave pt referral information and transferred her to the department

## 2019-10-08 NOTE — OP THERAPY DAILY TREATMENT
Outpatient Physical Therapy  DAILY TREATMENT     [facility] Physical Therapy [address]  [address]  [place] [address]  Phone:  [phone]  Fax:  [phone]    Date: 10/08/2019    Patient: Lucita Babcock  YOB: 1935  MRN: [id number]     Time Calculation  Start time: 1001  Stop time: 1030 Time Calculation (min): 29 minutes       Chief Complaint: Other (Instability )    Visit #: 4    SUBJECTIVE:  Patient reports that she continues to notice improved stability with walking. She attempted to schedule an appointment with behavioral health but unable to get an appointment until January.     OBJECTIVE:  Current objective measures:          Therapeutic Treatments and Modalities:     1. Canalith Repositioning (CPT 74793), Patient was re-educated in use of Semont Maneuver for R BPPV to perform at home with the assistance of her caregiver to help with anxiety management, Reviewed with patient - provided written handout and took patient through positioning to ensure understanding.     Time-based treatments/modalities:       ASSESSMENT:   Response to treatment: As patient's dizziness symptoms have greatly resolved, she has been instructed in self-management care for her long standing BPPV. She will also continue to follow up with Behavior Health to assist with managing her anxiety-related symptoms.     PLAN/RECOMMENDATIONS:   Plan for treatment: Discharge with goals met. .  Planned interventions for next visit: N/A.

## 2019-10-08 NOTE — OP THERAPY DISCHARGE SUMMARY
Outpatient Physical Therapy  DISCHARGE SUMMARY NOTE      [facility] Therapy [address]  [address]  [place] [address]  Phone:  [phone]  Fax:  [phone]    Date of Visit: 10/08/2019    Patient: Lucita Babcock  YOB: 1935  MRN: [id number]     Referring Provider: [place] [address] [address]   Referring Diagnosis Benign paroxysmal vertigo, bilateral [H81.13]     Physical Therapy Occurrence Codes    Date physical therapy care plan established or reviewed:  9/16/19   Date physical therapy treatment started:  9/16/19          Functional Assessment Used        Your patient is being discharged from Physical Therapy with the following comments:   · Goals met    Comments:   Patient has been instructed in self-management techniques to address R BPPV symptoms, which have greatly resolved following 4 treatment sessions in which the Epley was performed. Discussed and provided resources for anxiety-related dizziness.     Limitations Remaining:  Continued symptoms associated with anxiety; to be addressed by another specialist.     Recommendations:  Repeat Semont Maneuver if BPPV returns.     Tracey Kelly, PT, DPT    Date: 10/8/2019

## 2019-10-10 ENCOUNTER — APPOINTMENT (OUTPATIENT)
Dept: PHYSICAL THERAPY | Facility: REHABILITATION | Age: 84
End: 2019-10-10
Attending: FAMILY MEDICINE
Payer: MEDICARE

## 2019-10-15 ENCOUNTER — APPOINTMENT (OUTPATIENT)
Dept: PHYSICAL THERAPY | Facility: REHABILITATION | Age: 84
End: 2019-10-15
Attending: FAMILY MEDICINE
Payer: MEDICARE

## 2019-10-17 ENCOUNTER — APPOINTMENT (OUTPATIENT)
Dept: PHYSICAL THERAPY | Facility: REHABILITATION | Age: 84
End: 2019-10-17
Attending: FAMILY MEDICINE
Payer: MEDICARE

## 2019-10-22 ENCOUNTER — TELEPHONE (OUTPATIENT)
Dept: HEALTH INFORMATION MANAGEMENT | Facility: OTHER | Age: 84
End: 2019-10-22

## 2019-10-22 DIAGNOSIS — R53.81 PHYSICAL DECONDITIONING: Primary | ICD-10-CM

## 2019-10-22 NOTE — TELEPHONE ENCOUNTER
I spoke to this patient and she stated that she is using a home health agency due to her vertigo. She is paying out of pocket at the moment. Per [facility], they need a prior authorization to prove that it is medically necessary to have a $0 coinsurance. Please advise.    Thank you

## 2019-10-23 NOTE — TELEPHONE ENCOUNTER
1. Caller Name: Lucita Babcock                                           Call Back Number: [phone]        Patient approves a detailed voicemail message: yes    2. SPECIFIC Action To Be Taken: Referral pending, please sign.    3. Diagnosis/Clinical Reason for Request: Physical deconditioning, homebound    4. Specialty & Provider Name/Lab/Imaging Location: [facility]    5. Is appointment scheduled for requested order/referral: no    Patient was informed they will receive a return phone call from the office ONLY if there are any questions before processing their request. Advised to call back if they haven't received a call from the referral department in 5 days.

## 2019-10-23 NOTE — TELEPHONE ENCOUNTER
Vida,  As the SCP , could you kindly find out from the health plan what we need to complete this request--(is there a form? Do they need a progress note? Do they need an order?)

## 2019-11-06 ENCOUNTER — PHYSICAL THERAPY (OUTPATIENT)
Dept: PHYSICAL THERAPY | Facility: REHABILITATION | Age: 84
End: 2019-11-06
Attending: FAMILY MEDICINE
Payer: MEDICARE

## 2019-11-06 DIAGNOSIS — R53.81 PHYSICAL DECONDITIONING: ICD-10-CM

## 2019-11-06 DIAGNOSIS — H81.13 BENIGN PAROXYSMAL POSITIONAL VERTIGO DUE TO BILATERAL VESTIBULAR DISORDER: ICD-10-CM

## 2019-11-06 PROCEDURE — 92540 BASIC VESTIBULAR EVALUATION: CPT

## 2019-11-06 PROCEDURE — 95992 CANALITH REPOSITIONING PROC: CPT

## 2019-11-06 ASSESSMENT — ACTIVITIES OF DAILY LIVING (ADL): POOR_BALANCE: 1

## 2019-11-06 NOTE — OP THERAPY EVALUATION
Outpatient Physical Therapy  INITIAL EVALUATION    [facility] Physical Therapy [address].  [address]  [place] [address]  Phone:  [phone]  Fax:  [phone]    Date of Evaluation: 11/06/2019    Patient: Lucita Babcock  YOB: 1935  MRN: [id number]     Referring Provider: [place] [address] [address]   Referring Diagnosis Physical deconditioning [R53.81]     Time Calculation  Start time: 0941  Stop time: 1031 Time Calculation (min): 50 minutes       Physical Therapy Occurrence Codes    Date of onset of impairment:  11/6/15   Date physical therapy care plan established or reviewed:  11/6/19   Date physical therapy treatment started:  11/6/19          Chief Complaint: Loss Of Balance    Visit Diagnoses     ICD-10-CM   1. Benign paroxysmal positional vertigo due to bilateral vestibular disorder H81.13   2. Physical deconditioning R53.81         Subjective:   History of Present Illness:     Mechanism of injury:    Patient is an 84 year old female who has been referred for strength and endurance training.  She reports she is happy with her strength and ability to exercise with her home bicycle.  She is here with hopes of resolving her dizziness.  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 Since this surgery, she has struggled with recurrent BPPV. Patient has received 4 bouts of vestibular therapy with [facility] for bilateral BPPV, which has not been successful at eliminating her symptoms. D/C'd just recently (10/9/19) due to lack of progress. In her previous treatment, anxiety and fear has been a primary contributor to her symptoms and limiter to progress. Her symptoms are worse when she is home alone. They are also exacerbated with going upstairs to do laundry and putting dishes away. Symptoms subside with lying down and relaxing and gaining control of  "her anxiety.         Prior level of function:  Patient currently has a caregiver 4 days a week for 3 hours each day to assist with the activities mentioned above that increase her symptoms as well as provide transportation to medical appointments.   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   • Cancer (HCC) 2008    breast   • CATARACT     bilat IOL   • Dental disorder     upper partial   • Depression    • Heart burn    • Hypertension    • Indigestion    • Injury of cervical spine (HCC) July 2016    C-spine decompression/laminectomy   • Osteopenia    • 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HCC)    • Sarcoid (HCC)    • Urinary bladder disorder     frequency    • UTI (urinary tract infection)     recurrent     Past Surgical History:   Procedure Laterality Date   • PARATHYROID EXPLORATION  9/7/2018    Procedure: PARATHYROID EXPLORATION- MINIMALLY INVASIVE,   NIMS RECURRENT LARYNGEAL NERVE MONITORING RIGHT;  Surgeon: Devora Gonsalez M.D.;  Location: SURGERY SAME DAY [facility];  Service: General   • CERVICAL DISK AND FUSION ANTERIOR  7/27/2016    Procedure: CERVICAL DISK AND FUSION ANTERIOR C4-7;  Surgeon: Niles Khan M.D.;  Location: SURGERY [facility];  Service:    • CATARACT PHACO WITH IOL  8/27/2014    Performed by Avani Allen M.D. at SURGERY SAME DAY [facility]   • AMBROCIO BY LAPAROSCOPY  1/8/2013    Performed by Neo Howell M.D. at SURGERY [facility]   • ERCP IN OR  12/28/2012    Performed by Jay Dubois M.D. at SURGERY [facility]   • CATARACT PHACO WITH IOL  8/11/2010    Performed by AVANI ALLEN at SURGERY SAME DAY " [facility]   • MASS EXCISION GENERAL      chest mass biopsy sarcoid   • BREAST BIOPSY  08    Performed by KENRICK LOPEZ at SURGERY SAME DAY [facility]   • LUMPECTOMY  2008    right   • PB RADIATION THERAPY PLAN SIMPLE      right   • COLONOSCOPY      2 polyps   • ABDOMINAL HYSTERECTOMY TOTAL     • VEIN STRIPPING      R leg     Social History     Tobacco Use   • Smoking status: Former Smoker     Packs/day: 0.50     Years: 45.00     Pack years: 22.50     Last attempt to quit: 10/9/1969     Years since quittin.1   • Smokeless tobacco: Never Used   Substance Use Topics   • Alcohol use: No     Family and Occupational History     Patient does not qualify to have social determinant information on file (likely too young).   Socioeconomic History   • Marital status:      Spouse name: Not on file   • Number of children: Not on file   • Years of education: Not on file   • Highest education level: Not on file   Occupational History   • Not on file       Objective  Gait:     Assessment: wide-based gait, improved gait with assistive device (SPC) and difficulty with concurrent head rotation    Vestibulospinal Exam:     Dynamic Gait Index: 16/24    Fall Risk: yes (has life alert)  Oculomotor Exam:         Details: No spontaneous nystagmus central gaze          Details: No spontaneous nystagmus eccentric gaze    No saccadic eye movements    Smooth pursuit present    Convergence:        Normal convergence    No skew  Active Range of Motion:     Active range of motion comments: Limited c/s AROM 50% due to chronic fusion.   Limb Ataxia Exam:     Finger-to-Nose:         Intention tremor: none    Dysdiadochokinesia: none.  Strength Exam:     Comments: LE strength grossly 4/5.   Reflex Exam:        Deep Tendon Reflexes:         Left L4 (Patellar): normal (2+)        Right L4 (Patellar): normal (2+)  Sensation Tests:     Left Light Touch Sensation:         All left lumbar dermatomes intact       Right  "Light Touch Sensation:         All right lumbar dermatomes intact        Vestibulo-Ocular Exam:   Normal head thrust test  BPPV Exam:     Abnormal Desi-Hallpike        Findings: positive right.  Latent 5\", mild amplitude, duration= 18 sec.         Exercises/Treatment       1. Canalith Repositioning (CPT 44899), R epley completed x 2. Both with nystagmus in position 1, none in 2&3, severe dizziness and retropulsion upon sitting up (as has been noted in past treatments.     Time-based treatments/modalities:          Assessment, Response and Plan:   Impairments: abnormal gait and impaired balance    Other Impairments:  Mixed BPPV (chronic)  Assessment details:  Ms. Babcock is an 84 y.o female who presents to PT with complaint of dizziness.  Her referral is for general strength and conditioning; however, she prefers not the be treated for that at this time.  She has been seen for her dizziness upwards of 25 sessions, without ability to resolve the problem entirely.  Vestibular evaluation is, as has been noted in the past, positive for R posterior canalithasis today.  She shows poor resolution with CRM and continued retropulsion upon sitting up.  CRM is somewhat limited by poor cervical ROM and difficulties with bed mobility (roll). She is indep with home CRM with assist from dtr and feel it would not benefit her any further to continue PT for this problem.  We reviewed compensation strategies in regard to continue sleep on incline, be cautious with quick movements and transitions, cont use of AD.  I am happy to see her for strength and endurance, but pt declines this option.  As a result, this will be an eval only visit.  Welcomed to return for strength and balance training with new Rx if she chooses in the future.    Goals:   Short Term Goals:   N/a      Long Term Goals:    N/a    Plan:   Therapy options:  No further treatment needed  Discussed with:  Patient and caregiver  Plan details:  Eval only      Functional " Assessment Used        Referring provider co-signature:  I have reviewed this plan of care and my co-signature certifies the need for services.  Certification Dates:   From 11/06/19   To 01/01/20    Physician Signature: ________________________________ Date: ______________

## 2019-11-08 ENCOUNTER — APPOINTMENT (OUTPATIENT)
Dept: PHYSICAL THERAPY | Facility: REHABILITATION | Age: 84
End: 2019-11-08
Attending: FAMILY MEDICINE
Payer: MEDICARE

## 2019-11-13 ENCOUNTER — APPOINTMENT (OUTPATIENT)
Dept: PHYSICAL THERAPY | Facility: REHABILITATION | Age: 84
End: 2019-11-13
Attending: FAMILY MEDICINE
Payer: MEDICARE

## 2019-11-15 ENCOUNTER — APPOINTMENT (OUTPATIENT)
Dept: PHYSICAL THERAPY | Facility: REHABILITATION | Age: 84
End: 2019-11-15
Attending: FAMILY MEDICINE
Payer: MEDICARE

## 2019-11-19 DIAGNOSIS — R42 DIZZINESS: ICD-10-CM

## 2019-11-20 ENCOUNTER — APPOINTMENT (OUTPATIENT)
Dept: PHYSICAL THERAPY | Facility: REHABILITATION | Age: 84
End: 2019-11-20
Attending: FAMILY MEDICINE
Payer: MEDICARE

## 2019-11-22 ENCOUNTER — APPOINTMENT (OUTPATIENT)
Dept: PHYSICAL THERAPY | Facility: REHABILITATION | Age: 84
End: 2019-11-22
Attending: FAMILY MEDICINE
Payer: MEDICARE

## 2019-12-09 ENCOUNTER — PHYSICAL THERAPY (OUTPATIENT)
Dept: PHYSICAL THERAPY | Facility: REHABILITATION | Age: 84
End: 2019-12-09
Attending: FAMILY MEDICINE
Payer: MEDICARE

## 2019-12-09 DIAGNOSIS — R53.1 GENERALIZED WEAKNESS: ICD-10-CM

## 2019-12-09 DIAGNOSIS — R42 DIZZINESS: ICD-10-CM

## 2019-12-09 PROCEDURE — 97162 PT EVAL MOD COMPLEX 30 MIN: CPT

## 2019-12-09 ASSESSMENT — BALANCE ASSESSMENTS
BALANCE - STANDING DYNAMIC: POOR
WEIGHT SHIFT SITTING: GOOD
WEIGHT SHIFT STANDING: FAIR
BALANCE - STANDING STATIC: FAIR
BALANCE - SITTING DYNAMIC: FAIR +
BALANCE - SITTING STATIC: NORMAL

## 2019-12-09 ASSESSMENT — ENCOUNTER SYMPTOMS
PAIN SCALE AT LOWEST: 6
QUALITY: ACHING
PAIN LOCATION: BILATERAL KNEES R>L
ALLEVIATING FACTORS: REST
EXACERBATED BY: WALKING
EXACERBATED BY: STANDING
PAIN TIMING: INTERMITTENT
QUALITY: STABBING
PAIN SCALE: 5
PAIN SCALE AT HIGHEST: 7

## 2019-12-09 ASSESSMENT — ACTIVITIES OF DAILY LIVING (ADL): AMBULATION_WITH_ASSISTIVE_DEVICE: INDEPENDENT

## 2019-12-09 NOTE — OP THERAPY EVALUATION
Outpatient Physical Therapy  INITIAL  EVALUATION    [facility] Physical Therapy [address]  [address]  [place] [address]  Phone:  [phone]  Fax:  [phone]    Date of Evaluation: 12/09/2019    Patient: Lucita Babcock  YOB: 1935  MRN: [id number]     Referring Provider: [place] [address] [address]   Referring Diagnosis Dizziness [R42]     Time Calculation  Start time: 1031  Stop time: 1127 Time Calculation (min): 56 minutes       Physical Therapy Occurrence Codes    Date of onset of impairment:  11/19/19   Date physical therapy care plan established or reviewed:  12/9/19   Date physical therapy treatment started:  12/9/19          Chief Complaint: No chief complaint on file.    Visit Diagnoses     ICD-10-CM   1. Dizziness R42       Subjective:   History of Present Illness:     Mechanism of injury:  The patient has a history of BPPV and has been treated in this clinic for that condition for >25 visits. Most recently evaluated on 11/6/2019 for her dizziness, and discharged to home program by the vestibular specialist PT in this clinic.      Presents today with chief complaint of generalized weakness and balance issues, as well as lightheadedness.       Ambulates with quad cane in the community and 2WW at home.  Last fall 3 years ago, which patient describes as being due to taking her BP medication in the morning, instead of in the evening.       Caregiver help 3 hours per day M-F. Housekeeping, fixing meals, laundry, errands, washer and dryer are upstairs. Currently living on one level in her home. Step in shower, grab bars, shower chair. Independent in dressing. Independent in bathing, [facility]        Most limited in getting up/down from chairs, standing still > 5 minutes due to bilateral knee pain. Limited in walking due to fatigue/muscle weakness as well as feeling of unsteadiness.  Prior level of function:  Retired.  "Bikes on stationary bike 8-10 min 3 x per day. likes to get out to walk, but walks less in the winter.   Sleep disturbance:  Not disrupted  Pain:     Current pain ratin    At best pain ratin    At worst pain ratin    Location:  Bilateral knees R>L    Quality:  Aching and stabbing    Pain timing:  Intermittent    Relieving factors:  Rest    Aggravating factors:  Standing and walking    Progression:  Worsening  Social Support:     Lives in:  Multiple-level home (live on one level)    Lives with:  Alone (with caregiver support as detailed above)  Treatments:     Previous treatment:  Physical therapy  Activities of Daily Living:     Patient reported ADL status: Independent in ADLs. Assistance for IADLs as listed above.  Patient Goals:     Patient goals for therapy:  Decreased pain, improved balance and increased strength    Other patient goals:  To stand >5 minutes. To get out of chairs with less difficulty. To walk without getting as tired.       Past Medical History:   Diagnosis Date   • Anesthesia     nausea (pt declines)    • Aneurysm (HCC)     \"arch over the heart\"   • Anxiety    • Arthritis    • Breast cancer (HCC)     DCIS Rt breast   • Cancer (HCC)     breast   • CATARACT     bilat IOL   • Dental disorder     upper partial   • Depression    • Heart burn    • Hypertension    • Indigestion    • Injury of cervical spine (HCC) 2016    C-spine decompression/laminectomy   • Osteopenia    • 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HCC)    • Sarcoid (HCC)    • Urinary bladder disorder     frequency    • UTI (urinary tract infection)     recurrent     Past Surgical History:   Procedure Laterality Date   • PARATHYROID EXPLORATION  2018    Procedure: PARATHYROID EXPLORATION- MINIMALLY INVASIVE,   NIMS RECURRENT LARYNGEAL NERVE MONITORING RIGHT;  Surgeon: Devora Gonsalez M.D.;  Location: SURGERY SAME DAY [facility];  Service: General   • CERVICAL DISK AND FUSION ANTERIOR  2016    Procedure: CERVICAL DISK " AND FUSION ANTERIOR C4-7;  Surgeon: Niles Khan M.D.;  Location: SURGERY [facility];  Service:    • CATARACT PHACO WITH IOL  2014    Performed by Avani Allen M.D. at SURGERY SAME DAY [facility]   • AMBROCIO BY LAPAROSCOPY  2013    Performed by Kenrick Howell M.D. at SURGERY [facility]   • ERCP IN OR  2012    Performed by Jay Dubois M.D. at SURGERY [facility]   • CATARACT PHACO WITH IOL  2010    Performed by AVANI ALLEN at SURGERY SAME DAY [facility]   • MASS EXCISION GENERAL      chest mass biopsy sarcoid   • BREAST BIOPSY  08    Performed by KENRICK HOWELL at SURGERY SAME DAY [facility]   • LUMPECTOMY      right   • PB RADIATION THERAPY PLAN SIMPLE      right   • COLONOSCOPY      2 polyps   • ABDOMINAL HYSTERECTOMY TOTAL     • VEIN STRIPPING      R leg     Social History     Tobacco Use   • Smoking status: Former Smoker     Packs/day: 0.50     Years: 45.00     Pack years: 22.50     Last attempt to quit: 10/9/1969     Years since quittin.2   • Smokeless tobacco: Never Used   Substance Use Topics   • Alcohol use: No     Family and Occupational History     Patient does not qualify to have social determinant information on file (likely too young).   Socioeconomic History   • Marital status:      Spouse name: Not on file   • Number of children: Not on file   • Years of education: Not on file   • Highest education level: Not on file   Occupational History   • Not on file       Objective:   Active Range of Motion:   Upper extremity (left):     All left upper extremity active range of motion: All within functional limits  Upper extremity (right):     All right upper extremity active range of motion: All within functional limits  Lower extremity (left):     All left lower extremity active range of motion: All within functional limits  Lower extremity (right):     All right lower extremity active range of motion: All within  functional limits      Strength:     Left upper extremity strength within functional limits.      Right upper extremity strength within functional limits.    Lower extremity (left):     Hip flexion: 3+    Left hip extension strength: observed difficulty arising STS, presumed glute weakness.    Hip abduction: 4-    Hip adduction: 4-    Hip external rotation: 3+    Hip internal rotation: 4    Knee flexion: 4+    Knee extension: 4+    Ankle dorsiflexion: 4+    Ankle plantar flexion: 3  Lower extremity (right):     Hip flexion: 3+    Right hip extension strength: observed difficulty arising STS, presumed glute weakness.    Hip abduction: 4-    Hip adduction: 4-    Hip external rotation: 3+    Hip internal rotation: 4    Knee flexion: 4    Knee extension: 4    Ankle dorsiflexion: 4+    Ankle plantar flexion: 3    , Prehension, Pinch:  /Prehension (left):      strength: Within functional limits  /Prehension (right):      strength: Within functional limits    Tone, Sensation and Coordination:     Sensation   Upper extremity (left):     Light touch: Intact  Upper extremity (right):     Light touch: Intact  Lower extremity (left):     Light touch: Intact  Lower extremity (right):     Light touch: Intact    Coordination   Upper extremity (left):     Slow alternating movements: Within functional limits    Rapid alternating movements: Within functional limits    Finger touch to nose: Within functional limits  Upper extremity (right):     Slow alternating movements: Within functional limits    Rapid alternating movements: Within functional limits    Finger touch to nose: Within functional limits  Lower extremity (left):     Slow alternating movements: Within functional limits    Rapid alternating movements: Within functional limits    Toe tapping: Within functional limits    Heel to shin: Within functional limits  Lower extremity (right):     Slow alternating movements: Within functional limits    Rapid  alternating movements: Within functional limits    Toe tapping: Within functional limits    Heel to shin: Within functional limits    Cognition:     Orientation: normal to time, normal to situation, normal to place and normal to person    Direction following: one step    Attention span: impaired    Postural Control (Balance)     Sitting (static): Normal    Sitting (dynamic): Fair +    Standing (static): Fair    Standing (dynamic): Poor    Weight shift (sitting): Good    Weight shift (standing): Fair    Postural control (balance) comments:  Bilateral genuvalgus R>L    Weight-Bearing Status   Assistive device used: quad cane    Ambulation: Level Surfaces   Ambulation with assistive device: independent    Observational Gait   Gait: antalgic     Decreased right stance time.   Left foot contact pattern: foot flat  Right foot contact pattern: foot flat    Activities of Daily Living:   Transfers/Mobility:     Sit to stand: independent       Sit to stand equipment used: Heavy reliance BUE, backs of legs pushing into chair    ADL Comments:  Independent in dressing, bathing, toileting per patient and her caregiver        Therapeutic Exercises (CPT 35223):     20. UPOC 2/3/2020 - 8 visits      Time-based treatments/modalities:          Assessment, Response and Plan:   Assessment details:  84 year old patient who has been seen numerous times in this facility for treatment of BPPV presents with chief complaint of generalized weakness and unsteadiness. Exam findings show impairments of muscle performance, functional endurance, and balance. The patient will benefit from skilled PT to address the above to work towards the following goals. Of note, the patient has previously been seen for 3 recent trials of therapy for BPPV by vestibular specialist OTs and PTs in this clinic. Most recently discharged from BPPV treatment on 11/6/2019. The focus of care for this episode of PT will be strengthening and general balance training. Patient  is in agreement.   Barriers to therapy:  Cognition and transportation  Other barriers to therapy:  At times had difficulty attending follow up appointments in the past due to transportation issues and possible due to cognitive impairments. Patient presents at this appointment with a new regular caregiver, so may have less difficulty with compliance for this round of of PT.   Prognosis: fair    Goals:   Short Term Goals:   Patient is independent/compliant with HEP with the assistance of her caregiver  Administer Baumann Balance or TUG    Short term goal time span:  2-4 weeks      Long Term Goals:    30 sec. STS score improved to 8  6MWT improved by 200'  Patient reports no falls  Patient reports 40% reduction in bilateral knee pain frequency/intensity  Long term goal time span:  6-8 weeks    Plan:   Planned therapy interventions:  Neuromuscular Re-education (CPT 26802), Manual Therapy (CPT 39823), Therapeutic Activities (CPT 94644) and Therapeutic Exercise (CPT 71703)  Frequency:  2x week  Duration in visits:  8  Discussed with:  Patient and caregiver      Functional Assessment Used  PT Functional Assessment Tool Used: 6MWT(quad cane)  PT Functional Assessment Score: 727'   30 sec. STS test: 4 (heavy BUE reliance)    Referring provider co-signature:  I have reviewed this plan of care and my co-signature certifies the need for services.  Certification Dates:   From 12/09/19     To 02/03/20    Physician Signature: ________________________________ Date: ______________

## 2019-12-10 DIAGNOSIS — I10 ESSENTIAL HYPERTENSION: ICD-10-CM

## 2019-12-11 RX ORDER — SERTRALINE HYDROCHLORIDE 100 MG/1
TABLET, FILM COATED ORAL
Qty: 90 TAB | Refills: 0 | Status: SHIPPED | OUTPATIENT
Start: 2019-12-11 | End: 2020-03-05

## 2019-12-12 ENCOUNTER — PHYSICAL THERAPY (OUTPATIENT)
Dept: PHYSICAL THERAPY | Facility: REHABILITATION | Age: 84
End: 2019-12-12
Attending: FAMILY MEDICINE
Payer: MEDICARE

## 2019-12-12 DIAGNOSIS — R53.1 GENERALIZED WEAKNESS: ICD-10-CM

## 2019-12-12 DIAGNOSIS — R42 DIZZINESS: ICD-10-CM

## 2019-12-12 PROCEDURE — 97110 THERAPEUTIC EXERCISES: CPT

## 2019-12-12 NOTE — OP THERAPY DAILY TREATMENT
f  Outpatient Physical Therapy  DAILY TREATMENT     [facility] Physical [facility]  [address]  [address]  [place] [address]  Phone:  [phone]  Fax:  [phone]    Date: 12/12/2019    Patient: Lucita Babcock  YOB: 1935  MRN: [id number]     Time Calculation  Start time: 1030  Stop time: 1059 Time Calculation (min): 29 minutes       Chief Complaint: Weakness    Visit #: 2    SUBJECTIVE:  I'm feeling a little lightheaded today, but my blood pressure was normal this morning.    OBJECTIVE:  Current objective measures:          Therapeutic Exercises (CPT 65848):     1. Nustepper L4, 5 min    2. HEP below performed and also reviewed with caregiver      Therapeutic Exercise Summary: Access Code: [email or web address]   URL: [email or web address]   Date: 12/12/2019   Prepared by: Anne Kingston      Exercises  · Seated Long Arc Quad - 10 reps - 2 sets - 5 second hold - 1x daily - 7x weekly  · Seated Hip Abduction with Resistance - 10 reps - 2 sets - 5 second hold - 1x daily - 7x weekly  · Mini Squat with Counter Support - 10 reps - 2 sets - 1x daily - 7x weekly          Time-based treatments/modalities:  Therapeutic exercise minutes (CPT 44386): 28 minutes         ASSESSMENT:   Response to treatment: Good carryover with second repetition of HEP. Caregiver also present and educated in the exercises. Tendency to WB L>R in mini-squat, improved with cueing.     PLAN/RECOMMENDATIONS:   Plan for treatment: therapy treatment to continue next visit.  Planned interventions for next visit: continue with current treatment. Review HEP. STS body mechanics. Seated marching. Heel raises.

## 2019-12-18 ENCOUNTER — APPOINTMENT (OUTPATIENT)
Dept: PHYSICAL THERAPY | Facility: REHABILITATION | Age: 84
End: 2019-12-18
Attending: FAMILY MEDICINE
Payer: MEDICARE

## 2019-12-27 ENCOUNTER — APPOINTMENT (OUTPATIENT)
Dept: PHYSICAL THERAPY | Facility: REHABILITATION | Age: 84
End: 2019-12-27
Attending: FAMILY MEDICINE
Payer: MEDICARE

## 2019-12-27 NOTE — OP THERAPY DAILY TREATMENT
f  Outpatient Physical Therapy  DAILY TREATMENT     [facility] Physical Therapy [address]  [address]  [place] [address]  Phone:  [phone]  Fax:  [phone]    Date: 12/27/2019    Patient: Lucita Babcock  YOB: 1935  MRN: [id number]     Time Calculation               Chief Complaint: No chief complaint on file.    Visit #: 3    SUBJECTIVE:      OBJECTIVE:  Current objective measures:          Therapeutic Exercises (CPT 52659):     1. Nustepper L4, 5 min    2. HEP below performed and also reviewed with caregiver      Therapeutic Exercise Summary: Access Code: [email or web address]   URL: [email or web address]   Date: 12/12/2019   Prepared by: Anne Kingston      Exercises  · Seated Long Arc Quad - 10 reps - 2 sets - 5 second hold - 1x daily - 7x weekly  · Seated Hip Abduction with Resistance - 10 reps - 2 sets - 5 second hold - 1x daily - 7x weekly  · Mini Squat with Counter Support - 10 reps - 2 sets - 1x daily - 7x weekly          Time-based treatments/modalities:            ASSESSMENT:   Response to treatment:     PLAN/RECOMMENDATIONS:   Plan for treatment: therapy treatment to continue next visit.  Planned interventions for next visit: continue with current treatment. Review HEP. STS body mechanics. Seated marching. Heel raises.

## 2019-12-31 ENCOUNTER — PHYSICAL THERAPY (OUTPATIENT)
Dept: PHYSICAL THERAPY | Facility: REHABILITATION | Age: 84
End: 2019-12-31
Attending: FAMILY MEDICINE
Payer: MEDICARE

## 2019-12-31 DIAGNOSIS — R53.1 GENERALIZED WEAKNESS: ICD-10-CM

## 2019-12-31 PROCEDURE — 97140 MANUAL THERAPY 1/> REGIONS: CPT

## 2019-12-31 NOTE — OP THERAPY DAILY TREATMENT
f  Outpatient Physical Therapy  DAILY TREATMENT     [facility] Physical [address].  [address]  [place] [address]  Phone:  [phone]  Fax:  [phone]    Date: 12/31/2019    Patient: Lucita Babcock  YOB: 1935  MRN: [id number]     Time Calculation  Start time: 1045(patient arrived late)  Stop time: 1100 Time Calculation (min): 15 minutes       Chief Complaint: Weakness    Visit #: 3    SUBJECTIVE:  Knees have been feeling good from doing the home exercises.     OBJECTIVE:  Current objective measures:          Therapeutic Exercises (CPT 73126):     2. HEP below performed and also reviewed with caregiver      Therapeutic Exercise Summary: Access Code: [email or web address]   URL: [email or web address]   Date: 12/31/2019   Prepared by: Anne Kingston      Exercises  · Seated Long Arc Quad - 10 reps - 2 sets - 5 second hold - 1x daily - 7x weekly  · Seated Hip Abduction with Resistance - 10 reps - 2 sets - 5 second hold - 1x daily - 7x weekly  · Sit to Stand with Armchair - 10 reps - 2 sets - 1x daily - 7x weekly  · Seated March - 10 reps - 2 sets - 1x daily - 7x weekly  · Seated Heel Toe Raises - 10 reps - 2 sets - 1x daily - 7x weekly            Time-based treatments/modalities:  Manual therapy minutes (CPT 37367): 14 minutes     Patient education: Reinforced clinic policy re: d/c. Informed patient that unfortunately, if she late cancels of no shows next appointment we will need to d/c her to work on her home program until such time as she is better able to attend appointments. Patient and her caregiver were in agreement and expressed understanding of the policy.     ASSESSMENT:   Response to treatment: Good carryover of HEP. Tolerated additional strengthening activities well.     PLAN/RECOMMENDATIONS:   Plan for treatment: therapy treatment to continue next visit.  Planned interventions for next visit: continue with current treatment. jose Ayala, review HEP.

## 2020-01-03 ENCOUNTER — PHYSICAL THERAPY (OUTPATIENT)
Dept: PHYSICAL THERAPY | Facility: REHABILITATION | Age: 85
End: 2020-01-03
Attending: FAMILY MEDICINE
Payer: MEDICARE

## 2020-01-03 DIAGNOSIS — R53.1 GENERALIZED WEAKNESS: ICD-10-CM

## 2020-01-03 PROCEDURE — 97110 THERAPEUTIC EXERCISES: CPT

## 2020-01-03 NOTE — OP THERAPY DAILY TREATMENT
f  Outpatient Physical Therapy  DAILY TREATMENT     [facility] Physical [address].  [address]  [place] [address]  Phone:  [phone]  Fax:  [phone]    Date: 01/03/2020    Patient: Lucita Babcock  YOB: 1935  MRN: [id number]     Time Calculation  Start time: 1007(patient arrived late)  Stop time: 1032 Time Calculation (min): 25 minutes       Chief Complaint: Weakness    Visit #: 4    SUBJECTIVE:  Knees have been feeling good from doing the home exercises.     OBJECTIVE:  Current objective measures:          Therapeutic Exercises (CPT 37123):     1. Nustepper L2 5 min    2. HEP reviewed verbally    3. Ball roll AROM knees in hooklying, x 20    4. Adduction squeeze with glute set towel roll between knees, x 20    5. Alternating horizontal abduction in hooklying with white band, x 20 da. side    6. STS, x 10 2 sets cueing for body mechanics    7. White band bilateral shoulder extension in hookyig, 15 x 2    8. LTR, 2 min      Therapeutic Exercise Summary: Access Code: [email or web address]   URL: [email or web address]   Date: 12/31/2019   Prepared by: Anne Kingston      Exercises  · Seated Long Arc Quad - 10 reps - 2 sets - 5 second hold - 1x daily - 7x weekly  · Seated Hip Abduction with Resistance - 10 reps - 2 sets - 5 second hold - 1x daily - 7x weekly  · Sit to Stand with Armchair - 10 reps - 2 sets - 1x daily - 7x weekly  · Seated March - 10 reps - 2 sets - 1x daily - 7x weekly  · Seated Heel Toe Raises - 10 reps - 2 sets - 1x daily - 7x weekly            Time-based treatments/modalities:  Therapeutic exercise minutes (CPT 90798): 23 minutes     Patient education: Again reviewed clinic policy re: d/c. Informed patient that unfortunately, if she late cancels of no shows next appointment we will need to d/c her to work on her home program until such time as she is better able to attend appointments. Patient and her caregiver were in agreement and expressed understanding of the  policy.     Education; Provided flyer for community resources for exercise. Discussed patient's interest in getting a , her adult children will pay for this. Would like recommendations.     ASSESSMENT:   Response to treatment: Good carryover of HEP. Tolerated additional strengthening activities well.     PLAN/RECOMMENDATIONS:   Plan for treatment: therapy treatment to continue next visit.  Planned interventions for next visit: continue with current treatment. Lucy, jose, review HEP.

## 2020-01-06 ENCOUNTER — PHYSICAL THERAPY (OUTPATIENT)
Dept: PHYSICAL THERAPY | Facility: REHABILITATION | Age: 85
End: 2020-01-06
Attending: FAMILY MEDICINE
Payer: MEDICARE

## 2020-01-06 DIAGNOSIS — R53.1 GENERALIZED WEAKNESS: ICD-10-CM

## 2020-01-06 PROCEDURE — 97110 THERAPEUTIC EXERCISES: CPT

## 2020-01-06 NOTE — OP THERAPY DAILY TREATMENT
f  Outpatient Physical Therapy  DAILY TREATMENT     [facility] Physical [address]  [address]  [place] [address]  Phone:  [phone]  Fax:  [phone]    Date: 01/06/2020    Patient: Lucita Babcock  YOB: 1935  MRN: [id number]     Time Calculation  Start time: 1000  Stop time: 1029 Time Calculation (min): 29 minutes       Chief Complaint: Weakness    Visit #: 5    SUBJECTIVE:  Knees were sore after last visit, but the HEP helped to relieve.      OBJECTIVE:  Current objective measures:          Therapeutic Exercises (CPT 51431):     1. Nustepper L2 5 min    2. Shuttle, L4 then L5 x 20 ea. orange band at knees to promote neutral hip abduction, yellow ball for TKE    3. Hip abduction to orange band, x 20    4. Gastroc stretch on wedge, 30 sec. x 2    5. Heel raise bilateral BUE support, x 10, x 10 2 sets cueing for body mechanics    7. Orange band bilateral shoulder extension in hookyig, 15 x 2    18. Start of session: patient asked to take a moment at start of session to perform self-treatment for BPPV on mat. Reported reduced  after  performing this independently       Therapeutic Exercise Summary: Access Code: [email or web address]   URL: [email or web address]   Date: 12/31/2019   Prepared by: Anne Kingston      Exercises  · Seated Long Arc Quad - 10 reps - 2 sets - 5 second hold - 1x daily - 7x weekly  · Seated Hip Abduction with Resistance - 10 reps - 2 sets - 5 second hold - 1x daily - 7x weekly  · Sit to Stand with Armchair - 10 reps - 2 sets - 1x daily - 7x weekly  · Seated March - 10 reps - 2 sets - 1x daily - 7x weekly  · Seated Heel Toe Raises - 10 reps - 2 sets - 1x daily - 7x weekly            Time-based treatments/modalities:  Therapeutic exercise minutes (CPT 57843): 24 minutes    ASSESSMENT:   Response to treatment: Reduction of knee pain with today's treatment. Fair carryover of body mechanics/transfers education. Follow up in one week to assess carryover of HEP.      PLAN/RECOMMENDATIONS:   Plan for treatment: therapy treatment to continue next visit.  Planned interventions for next visit: continue with current treatment. Nustepper,  review HEP. NMR

## 2020-01-15 ENCOUNTER — APPOINTMENT (OUTPATIENT)
Dept: PHYSICAL THERAPY | Facility: REHABILITATION | Age: 85
End: 2020-01-15
Attending: FAMILY MEDICINE
Payer: MEDICARE

## 2020-01-21 ENCOUNTER — APPOINTMENT (OUTPATIENT)
Dept: PHYSICAL THERAPY | Facility: REHABILITATION | Age: 85
End: 2020-01-21
Attending: FAMILY MEDICINE
Payer: MEDICARE

## 2020-01-28 ENCOUNTER — OFFICE VISIT (OUTPATIENT)
Dept: BEHAVIORAL HEALTH | Facility: CLINIC | Age: 85
End: 2020-01-28
Payer: MEDICARE

## 2020-01-28 DIAGNOSIS — F06.4 ANXIETY DISORDER DUE TO MEDICAL CONDITION: ICD-10-CM

## 2020-01-28 PROCEDURE — 90791 PSYCH DIAGNOSTIC EVALUATION: CPT | Performed by: PSYCHOLOGIST

## 2020-01-28 NOTE — BH THERAPY
[facility]  INITIAL ASSESSMENT    Name: Lucita Babcock  MRN: [id number]  : 1935  Age: 84 y.o.  Date of assessment: 2020  PCP: Robbie Sloan M.D.  Persons in attendance: Patient and Children  Total session time: 38 minutes      CHIEF COMPLAINT AND HISTORY OF PRESENTING PROBLEM:  (as stated by Patient):  Lucita Babcock is a 84 y.o.,   Chief Complaint   Patient presents with   • Anxiety     The patient ID/Chief Complaint:  The patient is a 39 year old female, , .  The patient self-referred and voluntarily presented for an individual intake to address concerns related to increasing anxiety and decreased ability to go out in public.  Reviewed limits of confidentiality and [facility] policies; patient expressed understanding and agreed to voluntarily proceed with evaluation and treatment.     History of Present Illness: Since the patient's hospitalization and rehabilitation for a cervical fracture she is experienced increased difficulties with anxiety and has started limiting her activities.  The patient also reports some decline in memory functioning and her daughter reports some decreased in activities of daily living.  The patient is also been diagnosed with vertigo and while undergoing physical therapy to treat her vertigo she indicated she experienced significant anxiety and discomfort.  Based on information provided by the patient and her daughter it does not appear that the patient's vertigo has been completely worked up by an otolaryngologist specializing in vertigo.  It appears that the patient is having a lot of anxiety associated with her vertigo which may be 1 of the reason she is experiencing decreased interest in engaging in other activities and experiencing anxiety.      Relevant History:    Social History:  The patient has been  and  x 1. She was  to her  for 50+ years until he passed away 6 years ago. She had 3  "biological kids. She lost one daughter at birth due to complications. Her son committed suicide when he was 32 years old. She and her  adopted a daughter when she was 2 months old. Her biological daughter lives in [place] and her adopted daughter lives in [place] and are extremely supportive. She lives alone in [place]. She has been in a relationship for the last 3 years and her boyfriend lives in [place].      Past Psychiatric History: The patient denies a history of psychiatric hospitalization, suicide attempt, suicidal ideation, and self-injurious behavior.  Approximately 6 months ago she was evaluated by a psychiatrist but her care was referred back to her primary care provider she continues to take Zoloft 50 mg for the past 6 to 7 years and Xanax 0.25 mg daily as needed for anxiety and indicates frequently cuts her Xanax in half.      Family Psychiatric History:  The patient reports that her  sister was diagnosed with bipolar disorder her other sister has also been diagnosed with bipolar disorder and her son is  as a result of a suicide at the age of 32 he was also diagnosed with bipolar disorder    Substance Use/Addiction History:  Alcohol denies  Cannabis denies  Nicotine denies  Illicit drugs denies    The patient denies history of substance abuse treatment and denies history of substance abuse arrest and/or convict    MEDICAL HISTORY:    Past medical/surgical history:   Past Medical History:   Diagnosis Date   • Anesthesia     nausea (pt declines)    • 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\"   • Anxiety    • Arthritis    • 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   • Cancer ([facility])     breast   • CATARACT     bilat IOL   • Dental disorder     upper partial   • Depression    • Heart burn    • Hypertension    • Indigestion    • Injury of cervical spine ([facility]) 2016    C-spine decompression/laminectomy   • Osteopenia    • 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[facility])    • " Sarcoid ([facility])    • Urinary bladder disorder     frequency    • UTI (urinary tract infection)     recurrent      Past Surgical History:   Procedure Laterality Date   • PARATHYROID EXPLORATION  9/7/2018    Procedure: PARATHYROID EXPLORATION- MINIMALLY INVASIVE,   NIMS RECURRENT LARYNGEAL NERVE MONITORING RIGHT;  Surgeon: Devora Gonsalez M.D.;  Location: SURGERY SAME DAY [facility];  Service: General   • CERVICAL DISK AND FUSION ANTERIOR  7/27/2016    Procedure: CERVICAL DISK AND FUSION ANTERIOR C4-7;  Surgeon: Niles Khan M.D.;  Location: SURGERY [facility];  Service:    • CATARACT PHACO WITH IOL  8/27/2014    Performed by Avani Allen M.D. at SURGERY SAME DAY [facility]   • AMBROCIO BY LAPAROSCOPY  1/8/2013    Performed by Kenrick Howell M.D. at SURGERY [facility]   • ERCP IN OR  12/28/2012    Performed by Jay Dubois M.D. at SURGERY [facility]   • CATARACT PHACO WITH IOL  8/11/2010    Performed by AVANI ALLEN at SURGERY SAME DAY [facility]   • MASS EXCISION GENERAL  7/08    chest mass biopsy sarcoid   • BREAST BIOPSY  5/27/08    Performed by KENRICK HOWELL at SURGERY SAME DAY [facility]   • LUMPECTOMY  2008    right   • PB RADIATION THERAPY PLAN SIMPLE  2008    right   • COLONOSCOPY  2007    2 polyps   • ABDOMINAL HYSTERECTOMY TOTAL  1997   • VEIN STRIPPING      R leg        Medication Allergies:  Ace inhibitors; Augmentin; Erythromycin; Lisinopril; Penicillins; Epinephrine; Percocet [oxycodone-acetaminophen]; and Sulfa drugs            SAFETY ASSESSMENT - SELF  The patient denied any suicide-related ideation and/or behaviors and intent/plan, denied thoughts about death and dying both in session and during the period since last appointment, or past 2 weeks.    Current Suicide Risk: Low    Risk Level: Not Currently at Clinically Significant Risk  Hospitalization is not deemed necessary at this time as the patient does not present a clear or imminent danger to self or others. No  indication for pursuing higher level of care. Outpatient management is currently most appropriate and least restrictive level of care.    Crisis Safety Plan completed and copy given to patient: No    SAFETY ASSESSMENT - OTHERS  Does report past symptoms of aggressive behavior or risk to others? No  Does parent/significant othtient acknowledge current or past symptoms of aggressive behavior or risk to others? No  Does parent/significant other report patient has current or past symptoms of aggressive behavior or risk to others? No    Recent change in frequency/specificity/intensity of thoughts or threats to harm others? No  Current access to firearms/other identified means of harm? No  If yes, willing to restrict access to weapons/means of harm? No  Protective factors present: Well-developed sense of empathy    Current Homicide Risk:  Not applicable  Crisis Safety Plan completed and copy given to patient? No  Based on information provided during the current assessment, is a mandated “duty to warn” being exercised? No      MENTAL STATUS/OBSERVATIONS     Patient did not present in acute distress. Patient was appropriately groomed. Patient was alert and oriented x4. Eye contact was appropriate. No abnormalities in attention or concentration were noted. No abnormalities of movement present; psychomotor activity was normal. Speech was fluent and regular in rhythm, rate, volume, and tone. Thought processes Linear, Logical and Goal Directed. There was no evidence of thought disorder. No auditory or visual hallucinations. Long and short term memory appeared to be mildly impaired and insight, judgment, and impulse control were deemed to be good.  Reported mood was “concerned.” Affect was full-ranging and appropriate to thought content and conversation.  Patient denied past and current suicidal and homicidal ideation in plan, intent, and preparatory behavior.      RESULTS OF SCREENING MEASURES:  [facility]  status examination indicated a total score of 17 suggesting possible dementia however since the patient did experience a traumatic brain injury with her cerebral spine fracture additional psychological testing is necessary in order to rule in or rule out dementia        DIAGNOSTIC IMPRESSION(S):  1. Anxiety disorder due to medical condition          PLAN:    IDENTIFIED NEEDS/PLAN:  [If any of these marked, trigger DISPOSITION list]  Mood/anxiety  Refer to [facility]: Outpatient Therapy      1) The patient will return to the clinic 2-3 weeks.  2) Crisis Response Plan:  Reviewed emergency resources with the patient and the patient expressed understanding including:  If feeling suicidal, patient will call or present to the [facility] during duty hours or present to closest ED ([facility] or [facility], call 911 or crisis hotline ([phone]-VMID) after duty hours.  3) Referrals/Consults:  The patient is going to be referred for a neuropsychological screening and also referred to the [facility] otolaryngology department for an evaluation her her vertigo for comanagement of anxiety.  4) Referral appointment(s) scheduled? No  5) Barriers to Learning:  No   6) Readiness to Learn:  Yes   7) Cultural Concerns:  No   8) Patient voiced understanding of, and agreement with, plan and goals as annotated above Yes .  9) Declare these services are medically necessary and appropriate to the patient’s diagnosis and needs  10) The point of contact at the [facility] regarding this evaluation is Dr. Montes, Psychologist.    Pete Montes III, Ed.D.    This note was created using voice recognition software (Dragon). The accuracy of the dictation is limited by the abilities of the software. I have reviewed the note prior to signing, however some errors in grammar and context are still possible. If you have any questions related to  this note please do not hesitate to contact our office.

## 2020-01-29 ENCOUNTER — APPOINTMENT (OUTPATIENT)
Dept: PHYSICAL THERAPY | Facility: REHABILITATION | Age: 85
End: 2020-01-29
Attending: FAMILY MEDICINE
Payer: MEDICARE

## 2020-02-06 ENCOUNTER — TELEPHONE (OUTPATIENT)
Dept: MEDICAL GROUP | Age: 85
End: 2020-02-06

## 2020-02-06 NOTE — TELEPHONE ENCOUNTER
ESTABLISHED PATIENT PRE-VISIT PLANNING     Patient was NOT contacted to complete PVP.     Note: Patient will not be contacted if there is no indication to call.     1.  Reviewed notes from the last few office visits within the medical group: Yes    2.  If any orders were placed at last visit or intended to be done for this visit (i.e. 6 mos follow-up), do we have Results/Consult Notes?        •  Labs - Labs were not ordered at last office visit.   Note: If patient appointment is for lab review and patient did not complete labs, check with provider if OK to reschedule patient until labs completed.       •  Imaging - Imaging was not ordered at last office visit.       •  Referrals - Referral ordered, patient was seen and consult notes are in chart. Care Teams updated  YES.    3. Is this appointment scheduled as a Hospital Follow-Up? No    4.  Immunizations were updated in Epic using WebIZ?: Epic matches WebIZ       •  Web Iz Recommendations: SHINGRIX (Shingles)    5.  Patient is due for the following Health Maintenance Topics:   Health Maintenance Due   Topic Date Due   • IMM ZOSTER VACCINES (2 of 3) 04/27/2017   • Annual Wellness Visit  08/23/2018           6. Orders for overdue Health Maintenance topics pended in Pre-Charting? N\A    7.  AHA (MDX) form printed for Provider? YES    8.  Patient was NOT informed to arrive 15 min prior to their scheduled appointment and bring in their medication bottles.

## 2020-02-13 ENCOUNTER — OFFICE VISIT (OUTPATIENT)
Dept: MEDICAL GROUP | Age: 85
End: 2020-02-13
Payer: MEDICARE

## 2020-02-13 VITALS
DIASTOLIC BLOOD PRESSURE: 68 MMHG | OXYGEN SATURATION: 94 % | TEMPERATURE: 99.9 F | WEIGHT: 145 LBS | HEIGHT: 68 IN | BODY MASS INDEX: 21.98 KG/M2 | SYSTOLIC BLOOD PRESSURE: 130 MMHG | HEART RATE: 72 BPM

## 2020-02-13 DIAGNOSIS — S14.129D CENTRAL CORD SYNDROME, SUBSEQUENT ENCOUNTER (HCC): ICD-10-CM

## 2020-02-13 DIAGNOSIS — N30.00 ACUTE CYSTITIS WITHOUT HEMATURIA: ICD-10-CM

## 2020-02-13 DIAGNOSIS — E21.3 HYPERPARATHYROIDISM (HCC): ICD-10-CM

## 2020-02-13 DIAGNOSIS — B35.1 ONYCHOMYCOSIS OF GREAT TOE: ICD-10-CM

## 2020-02-13 DIAGNOSIS — F41.1 GENERALIZED ANXIETY DISORDER: ICD-10-CM

## 2020-02-13 DIAGNOSIS — M79.676 PAIN AROUND TOENAIL: ICD-10-CM

## 2020-02-13 DIAGNOSIS — S14.121D: ICD-10-CM

## 2020-02-13 PROCEDURE — 99214 OFFICE O/P EST MOD 30 MIN: CPT | Performed by: FAMILY MEDICINE

## 2020-02-13 PROCEDURE — 8041 PR SCP AHA: Performed by: FAMILY MEDICINE

## 2020-02-13 RX ORDER — CIPROFLOXACIN HYDROCHLORIDE 500 MG/1
500 TABLET, FILM COATED ORAL 2 TIMES DAILY
Qty: 6 TAB | Refills: 0 | Status: SHIPPED | OUTPATIENT
Start: 2020-02-13 | End: 2020-12-26

## 2020-02-13 RX ORDER — PHENAZOPYRIDINE HYDROCHLORIDE 200 MG/1
200 TABLET, FILM COATED ORAL 3 TIMES DAILY PRN
Qty: 6 TAB | Refills: 0 | Status: SHIPPED | OUTPATIENT
Start: 2020-02-13 | End: 2020-02-15

## 2020-02-13 RX ORDER — CYCLOSPORINE 0.5 MG/ML
1 EMULSION OPHTHALMIC 2 TIMES DAILY
COMMUNITY
End: 2021-01-26

## 2020-02-13 NOTE — PROGRESS NOTES
Annual Health Assessment Questions:    1.  Are you currently engaging in any exercise or physical activity? Yes    2.  How would you describe your mood or emotional well-being today? good    3.  Have you had any falls in the last year? No    4.  Have you noticed any problems with your balance or had difficulty walking? Yes    5.  In the last six months have you experienced any leakage of urine? Yes    6. DPA/Advanced Directive: Patient does not have an Advanced Directive.  A packet and workshop information was given on Advanced Directives.

## 2020-02-13 NOTE — PROGRESS NOTES
This medical record contains text that has been entered with the assistance of computer voice recognition and dictation software.  Therefore, it may contain unintended errors in text, spelling, punctuation, or grammar    Chief Complaint   Patient presents with   • Arm Pain   • Toe Pain     falling off right foot    • Hypertension         Lucita Babcock is a 84 y.o. female here evaluation and management of: arm pain, toe pain, and hypertension.        HPI:       1. Acute cystitis without hematuria  NEW PROBLEM    Patient states she has been getting up 4 times in the middle of the night to urinate for the past few days. She has associated dysuria and foul odor with urine.  She has been having some discomfort with urination as well as burning on urination.  Denies any hematuria or fever.  Is any back pain no nausea no vomiting no fevers or chills.    2. Pain around toenail  3. Onychomycosis of great toe    Patient states she caught her toenail on a thin shoe while going down escalator 4 years ago. She reports new pain and was seen by a podiatrist who prescribed her topical ointment. Denies any bleeding, purulent drainage, numbness or tingling.       4. Generalized anxiety disorder    Patient has always been very anxious due to hypertension. She tends to check her blood pressure 5-10 times a day which raises her blood pressure and causes vicious cycle about her health. As a result, she will take a Xanax in addition to blood pressure medication leading to sedation and not wanting to go outside due to fatigue. This also leads to dizziness, and at one points she has also fallen against her L arm when trying to get up from bathroom. She discontinued PT due to jeff tot being able to tolerate neck pain with one of the exercises.  Patient is currently seeing a psychologist; per patient report, they state her vertigo symptoms are secondary to her anxiety.     Current medicines (including changes today)  Current Outpatient  Medications   Medication Sig Dispense Refill   • cyclosporin (RESTASIS) 0.05 % ophthalmic emulsion Place 1 Drop in both eyes 2 times a day.     • CIPRO 500 MG Tab Take 1 Tab by mouth 2 times a day. 6 Tab 0   • phenazopyridine (PYRIDIUM) 200 MG Tab Take 1 Tab by mouth 3 times a day as needed for up to 2 days. 6 Tab 0   • Diclofenac Sodium 1 % Gel APPLY TO AFFECTED AREA TWICE A DAY AS NEEDED 100 g 0   • sertraline (ZOLOFT) 100 MG Tab TAKE 1 TABLET BY MOUTH EVERY DAY 90 Tab 0   • DILTIAZem (CARDIZEM) 30 MG Tab Take 1 tab po q24h 90 Tab 0   • ascorbic acid (VITAMIN C) 500 MG tablet Take 1 Tab by mouth 3 times a day. 270 Tab 2   • Cholecalciferol (VITAMIN D PO) Take  by mouth.     • bethanechol (URECHOLINE) 25 MG Tab Take 1 Tab by mouth every 24 hours as needed. 90 Tab 0   • acetaminophen (TYLENOL) 160 MG/5ML Suspension Take 15 mg/kg by mouth every 6 hours as needed.     • DILTIAZem (CARDIZEM) 30 MG Tab Take 1 Tab by mouth every day. (Patient not taking: Reported on 10/2/2019) 90 Tab 0     No current facility-administered medications for this visit.      She  has a past medical history of Anesthesia, Aneurysm ([facility]), Anxiety, Arthritis, Breast cancer (HCC) (2008), Cancer (HCC) (2008), CATARACT, Dental disorder, Depression, Heart burn, Hypertension, Indigestion, Injury of cervical spine ([facility]) (July 2016), Osteopenia, 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[facility]), Sarcoid ([facility]), Urinary bladder disorder, and UTI (urinary tract infection).  She  has a past surgical history that includes vein stripping; mass excision general (7/08); breast biopsy (5/27/08); abdominal hysterectomy total (1997); cataract phaco with iol (8/11/2010); colonoscopy (2007); ercp in or (12/28/2012); samuel by laparoscopy (1/8/2013); lumpectomy (2008); pr radiation therapy plan simple (2008); cataract phaco with iol (8/27/2014); cervical disk and fusion anterior (7/27/2016); and parathyroid exploration (9/7/2018).  Social History     Tobacco Use   • Smoking status: Former  "Smoker     Packs/day: 0.50     Years: 45.00     Pack years: 22.50     Last attempt to quit: 10/9/1969     Years since quittin.3   • Smokeless tobacco: Never Used   Substance Use Topics   • Alcohol use: No   • Drug use: No     Social History     Social History Narrative   • Not on file     Family History   Problem Relation Age of Onset   • Hypertension Mother    • Stroke Mother    • Suicide Attempts Son    • Bipolar disorder Son    • Other Father         Dementia   • Other Sister         BIpolar   • Bipolar disorder Sister    • Other Sister         Bipolar   • Other Sister         THyroid disease   • Bipolar disorder Sister    • Cancer Neg Hx    • Diabetes Neg Hx    • Heart Disease Neg Hx      Family Status   Relation Name Status   • Mo     • Son   at age 32        suicide   • Fa     • Sis     • Sis Paola Alive   • Sis Jill Alive   • Marcelino Kaylin Alive   • Marcelino Sarah (Adopted) Alive   • Marcelino          at birth   • Neg Hx  (Not Specified)         ROS    Please see hpi     All other systems reviewed and are negative     Objective:     /68 (BP Location: Right arm, Patient Position: Sitting, BP Cuff Size: Adult)   Pulse 72   Temp 37.7 °C (99.9 °F) (Temporal)   Ht 1.727 m (5' 8\")   Wt 65.8 kg (145 lb)   SpO2 94%  Body mass index is 22.05 kg/m².  Physical Exam:    Constitutional: Alert, no distress.  Skin: no abnormalities  Eye: Equal, round and reactive, conjunctiva clear, lids nomal.  ENMT: Lips without lesions, good dentition, oropharynx clear.  Neck: Trachea midline, no masses, no thyromegaly. No cervical or supraclavicular lymphadenopathy.  Respiratory: Unlabored respiratory effort, lungs clear to auscultation, no wheezes, no ronchi  Cardiovascular: Normal S1, S2, no murmur, no edema.  Abdomen: Soft, non-tender, no masses, no hepatosplenomegaly.  Psych: Alert and oriented x3, normal affect and mood.      Assessment and Plan:   The following treatment plan was " discussed      1. Acute cystitis without hematuria    Patient presented with acute complaint of urinary frequency during the night with associated dysuria and urine with foul odor. She will be prescribed the following medications for her symptoms.    - CIPRO 500 MG Tab; Take 1 Tab by mouth 2 times a day.  Dispense: 6 Tab; Refill: 0  - phenazopyridine (PYRIDIUM) 200 MG Tab; Take 1 Tab by mouth 3 times a day as needed for up to 2 days.  Dispense: 6 Tab; Refill: 0    2. Pain around toenail  3. Onychomycosis of great toe    Chronic. States she injured her toenail several years ago and has used a topical ointment previously prescribed by podiatry without relief Patient is provided with referral to podiatry for follow up. I did not feel the reward of using oral lamisil is worth the risk of heptotoxicity.     - REFERRAL TO PODIATRY    4. Generalized anxiety disorder    Patient has always been anxious about her hypertension which causes her to check her blood pressure 5-10 times a day. She is advised to check her blood pressure once every morning instead of 5-10 times. Patient is advised to restart PT and continue seeing her psychologist.       HEALTH MAINTENANCE:Due for annual wellness visit and Shingrix vaccine.    Instructed to Follow up in clinic or ER for worsening symptoms, difficulty breathing, lack of expected recovery, or should new symptoms or problems arise.    Followup: Return in about 3 months (around 5/13/2020) for Reevaluation.      Once again this medical record contains text that has been entered with the assistance of computer voice recognition, dictation software, and medical scribes.  Therefore, it may contain unintended errors in text, spelling, punctuation, or grammar.    ISharon (Dee), am scribing for, and in the presence of, Robbie Sloan M.D.    Electronically signed by: Sharon Jimenes (Dee), 2/13/2020     Robbie WHITE M.D. personally performed the services described in  this documentation, as scribed by Sharon Jimenes in my presence, and it is both accurate and complete.

## 2020-02-26 ENCOUNTER — TELEPHONE (OUTPATIENT)
Dept: PHYSICAL THERAPY | Facility: REHABILITATION | Age: 85
End: 2020-02-26

## 2020-02-27 ENCOUNTER — HOSPITAL ENCOUNTER (OUTPATIENT)
Facility: MEDICAL CENTER | Age: 85
End: 2020-02-27
Attending: PHYSICIAN ASSISTANT
Payer: MEDICARE

## 2020-02-27 PROCEDURE — 87077 CULTURE AEROBIC IDENTIFY: CPT

## 2020-02-27 PROCEDURE — 87086 URINE CULTURE/COLONY COUNT: CPT

## 2020-02-27 PROCEDURE — 87186 SC STD MICRODIL/AGAR DIL: CPT

## 2020-02-27 NOTE — OP THERAPY DISCHARGE SUMMARY
Outpatient Physical Therapy  DISCHARGE SUMMARY NOTE      [facility] [address]  [address]  [place] [address]  Phone:  [phone]  Fax:  [phone]    Date of Visit: 02/26/2020    Patient: Lucita Babcock  YOB: 1935  MRN: [id number]     Referring Provider: No referring provider defined for this encounter.   Referring Diagnosis No admission diagnoses are documented for this encounter.     Physical Therapy Occurrence Codes    Date of onset of impairment:  11/19/19   Date physical therapy care plan established or reviewed:  12/9/19   Date physical therapy treatment started:  12/9/19                  Your patient is being discharged from Physical Therapy with the following comments:   · Patient cancelled or missed more than 2 scheduled appointments/non-compliant        Recommendations:  D/c per clinic policy    Anne Kingston PT, DPT    Date: 2/26/2020

## 2020-03-05 DIAGNOSIS — I10 ESSENTIAL HYPERTENSION: ICD-10-CM

## 2020-03-05 RX ORDER — SERTRALINE HYDROCHLORIDE 100 MG/1
TABLET, FILM COATED ORAL
Qty: 90 TAB | Refills: 0 | Status: SHIPPED | OUTPATIENT
Start: 2020-03-05 | End: 2020-03-23

## 2020-03-12 ENCOUNTER — OFFICE VISIT (OUTPATIENT)
Dept: BEHAVIORAL HEALTH | Facility: CLINIC | Age: 85
End: 2020-03-12
Payer: MEDICARE

## 2020-03-12 DIAGNOSIS — F06.4 ANXIETY DISORDER DUE TO MEDICAL CONDITION: ICD-10-CM

## 2020-03-12 DIAGNOSIS — F41.1 GENERALIZED ANXIETY DISORDER: ICD-10-CM

## 2020-03-12 PROCEDURE — 90834 PSYTX W PT 45 MINUTES: CPT | Performed by: PSYCHOLOGIST

## 2020-03-12 RX ORDER — ALPRAZOLAM 0.25 MG/1
0.25 TABLET ORAL NIGHTLY PRN
Qty: 30 TAB | Refills: 2 | Status: SHIPPED
Start: 2020-03-12 | End: 2020-03-12 | Stop reason: SDUPTHER

## 2020-03-12 RX ORDER — ALPRAZOLAM 0.25 MG/1
0.25 TABLET ORAL NIGHTLY PRN
Qty: 30 TAB | Refills: 2 | Status: SHIPPED
Start: 2020-03-12 | End: 2020-04-11

## 2020-03-23 DIAGNOSIS — I10 ESSENTIAL HYPERTENSION: ICD-10-CM

## 2020-03-23 NOTE — TELEPHONE ENCOUNTER
Received request via: Pharmacy    Was the patient seen in the last year in this department? Yes    Does the patient have an active prescription (recently filled or refills available) for medication(s) requested? Yes

## 2020-03-24 RX ORDER — SERTRALINE HYDROCHLORIDE 100 MG/1
TABLET, FILM COATED ORAL
Qty: 90 TAB | Refills: 0 | Status: SHIPPED | OUTPATIENT
Start: 2020-03-24 | End: 2020-06-16

## 2020-04-30 ENCOUNTER — APPOINTMENT (OUTPATIENT)
Dept: BEHAVIORAL HEALTH | Facility: CLINIC | Age: 85
End: 2020-04-30
Payer: MEDICARE

## 2020-05-07 ENCOUNTER — APPOINTMENT (OUTPATIENT)
Dept: BEHAVIORAL HEALTH | Facility: CLINIC | Age: 85
End: 2020-05-07
Payer: MEDICARE

## 2020-05-14 ENCOUNTER — APPOINTMENT (OUTPATIENT)
Dept: BEHAVIORAL HEALTH | Facility: CLINIC | Age: 85
End: 2020-05-14
Payer: MEDICARE

## 2020-05-29 ENCOUNTER — TELEMEDICINE (OUTPATIENT)
Dept: BEHAVIORAL HEALTH | Facility: CLINIC | Age: 85
End: 2020-05-29
Payer: MEDICARE

## 2020-05-29 DIAGNOSIS — F06.4 ANXIETY DISORDER DUE TO MEDICAL CONDITION: ICD-10-CM

## 2020-05-29 PROCEDURE — 99999 PR NO CHARGE: CPT | Mod: 95,CR | Performed by: PSYCHOLOGIST

## 2020-05-29 NOTE — BH THERAPY
[facility]  Therapy Progress Note  Met with the patient per their request via (HIPPA compliant) Zoom video conference to accommodate state imposed restrictions on social contact due to the COVID-19 pandemic.      Patient Name: Lucita Babcock  Patient MRN: [id number]  Today's Date: 5/29/2020     Type of session:Individual psychotherapy  Length of session: 25 minutes  Persons in attendance:Patient    Subjective/New Info: The patient ID/Chief Complaint:  The patient is a 85 year old female, , .  The patient self-referred and voluntarily presented for an individual intake to address concerns related to increasing anxiety and decreased ability to go out in public.  Reviewed limits of confidentiality and [facility] policies; patient expressed understanding and agreed to voluntarily proceed with evaluation and treatment.    Interval History:   Session Focus: The patient's estimated global assessment of functioning appeared reasonably good with only short lived and expectable reactions to everyday stressful events. The patient shows only slight difficulty in relational and independent functioning.  The patient follow through with recommendations related to managing her anxiety she reports a decrease in anxiety and that she is no longer using Xanax.  Help the patient to continue focusing on those things that she could control.  Also discussed with the patient how her decreased anxiety has resulted in a decrease concerns related to her vertigo.  The patient has not followed up with physical therapy for vestibular treatment as a result of the COVID-19 she was encouraged to follow through.      Therapeutic Intervention: Cognitive Behavioral Therapy      Planned Intervention: Continue to provide support and encouragement related to her ability to manage her anxiety and her vertigo symptoms.      Objective/Observations:    Patient did not present in acute distress. Patient was  appropriately groomed. Patient was alert and oriented x4. Eye contact was appropriate. No abnormalities in attention or concentration were noted. No abnormalities of movement present; psychomotor activity was normal. Speech was fluent and regular in rhythm, rate, volume, and tone. Thought processes linear, logical and goal directed. There was no evidence of thought disorder. No auditory or visual hallucinations. Long and short term memory appeared to be intact. Insight, judgment, and impulse control were deemed to be good.  Reported mood was “happy.” Affect was full-ranging and appropriate to thought content and conversation.  Patient denied past and current suicidal and homicidal ideation in plan, intent, and preparatory behavior.      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    Risk Level: Not Currently at Clinically Significant Risk  Hospitalization is not deemed necessary at this time as the patient does not present a clear or imminent danger to self or others. No indication for pursuing higher level of care. Outpatient management is currently most appropriate and least restrictive level of care.    Current risk:   SUICIDE: Low   Homicide: Not applicable   Self-harm: Not applicable   Relapse: Not applicable   Other:    Safety Plan reviewed? No   If evidence of imminent risk is present, intervention/plan:         Treatment Goal(s)/Objective(s) addressed:   Anxiety   Goal: Develop strategies to reduce symptoms, or   Reduce anxiety and improve coping skills     Be free of panic episodes (100%)   Learn two new ways of coping with routine stressors   Report feeling more positive about self and abilities during therapy sessions       Progress toward Treatment Goals: Moderate  improvement    Plan:      1) The patient will return to the clinic 4-6 weeks.  2) Crisis Response Plan:  Reviewed emergency resources with the patient and the patient expressed understanding including:  If feeling suicidal, patient will call or present to the [facility] during duty hours or present to closest ED ([facility] or [facility], call 911 or crisis hotline (4-985-898-ABKS) after duty hours.  3) Referrals/Consults:  N/A  4) Barriers to Learning:  No  5) Readiness to Learn:  Yes  6) Cultural Concerns:  No  7) Patient voiced understanding of, and agreement with, plan and goals as annotated above.  8) Declare these services are medically necessary and appropriate to the patient’s diagnosis and needs  9) The point of contact at the [facility] regarding this evaluation is Dr. Montes, Psychologist.    Pete Montes III, Ed.D.

## 2020-05-31 DIAGNOSIS — I10 ESSENTIAL HYPERTENSION: ICD-10-CM

## 2020-06-03 NOTE — BH THERAPY
[facility] Therapy Progress Note   Patient Name: Lucita Babcock   Patient MRN: [id number]   Today's Date: 3/12/2020   Type of session:Individual psychotherapy   Length of session: 38 minutes   Persons in attendance:Patient     Subjective/New Info: The patient ID/Chief Complaint: The patient is a 85 year old female, , . The patient self-referred and voluntarily presented for an individual intake to address concerns related to increasing anxiety and decreased ability to go out in public. Reviewed limits of confidentiality and [facility] policies; patient expressed understanding and agreed to voluntarily proceed with evaluation and treatment.   Interval History:   Session Focus: The patient's estimated global assessment of functioning appeared reasonably good with only short lived and expectable reactions to everyday stressful events.  Continue to provide the patient psychoeducation about anxiety and ways of dealing with the anxiety.  Also discussed with the patient concerns about the use of benzodiazepines for the management of her anxiety and potential consequences.  Started introducing the patient to behavioral activation in the 3 parameters associated with anxiety management.      Therapeutic Intervention: Cognitive Behavioral Therapy   Planned Intervention: Continue to provide support and encouragement related to her ability to manage her anxiety and her vertigo symptoms.     Objective/Observations:   Patient did not present in acute distress. Patient was appropriately groomed. Patient was alert and oriented x4. Eye contact was appropriate. No abnormalities in attention or concentration were noted. No abnormalities of movement present; psychomotor activity was normal. Speech was fluent and regular in rhythm, rate, volume, and tone. Thought processes linear, logical and goal directed. There was no evidence of thought disorder. No auditory or visual hallucinations.  Long and short term memory appeared to be intact. Insight, judgment, and impulse control were deemed to be good. Reported mood was “ comfortable.” Affect was full-ranging and appropriate to thought content and conversation. Patient denied past and current suicidal and homicidal ideation in plan, intent, and preparatory behavior.     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   Risk Level: Not Currently at Clinically Significant Risk   Hospitalization is not deemed necessary at this time as the patient does not present a clear or imminent danger to self or others. No indication for pursuing higher level of care. Outpatient management is currently most appropriate and least restrictive level of care.   Current risk:   SUICIDE: Low   Homicide: Not applicable   Self-harm: Not applicable   Relapse: Not applicable   Other:   Safety Plan reviewed? No   If evidence of imminent risk is present, intervention/plan:   Treatment Goal(s)/Objective(s) addressed:   Anxiety   Goal: Develop strategies to reduce symptoms, or   Reduce anxiety and improve coping skills   Be free of panic episodes (100%)   Learn two new ways of coping with routine stressors   Report feeling more positive about self and abilities during therapy sessions     Progress toward Treatment Goals: Mild improvement   Plan:   1. The patient will return to the clinic 4-6 weeks.  2. Crisis Response Plan: Reviewed emergency resources with the patient and the patient expressed understanding including: If feeling suicidal, patient will call or present to the [facility] during duty hours or present to closest ED ([facility] or [facility], call [phone] or crisis hotline (0-856-556-JZLS) after duty  hours.  3. Referrals/Consults: N/A  4. Barriers to Learning: No  5. Readiness to Learn: Yes  6. Cultural Concerns: No  7. Patient voiced understanding of, and agreement with, plan and goals as annotated above.  8. Declare these services are medically necessary and appropriate to the patient’s diagnosis and needs  9. The point of contact at the [facility] regarding this evaluation is Dr. Montes, Psychologist.  Pete Montes III, Eleanor.

## 2020-06-16 DIAGNOSIS — I10 ESSENTIAL HYPERTENSION: ICD-10-CM

## 2020-06-16 RX ORDER — SERTRALINE HYDROCHLORIDE 100 MG/1
TABLET, FILM COATED ORAL
Qty: 90 TAB | Refills: 0 | Status: SHIPPED | OUTPATIENT
Start: 2020-06-16 | End: 2020-09-01

## 2020-06-16 NOTE — TELEPHONE ENCOUNTER
Received request via: Pharmacy    Was the patient seen in the last year in this department? Yes    Does the patient have an active prescription (recently filled or refills available) for medication(s) requested? No      Last OV: 02/13/2020

## 2020-06-17 DIAGNOSIS — H81.13 BENIGN PAROXYSMAL POSITIONAL VERTIGO DUE TO BILATERAL VESTIBULAR DISORDER: ICD-10-CM

## 2020-06-18 ENCOUNTER — TELEMEDICINE (OUTPATIENT)
Dept: BEHAVIORAL HEALTH | Facility: CLINIC | Age: 85
End: 2020-06-18
Payer: MEDICARE

## 2020-06-18 DIAGNOSIS — F41.1 GENERALIZED ANXIETY DISORDER: ICD-10-CM

## 2020-06-18 DIAGNOSIS — F06.4 ANXIETY DISORDER DUE TO MEDICAL CONDITION: ICD-10-CM

## 2020-06-18 PROCEDURE — 98968 PH1 ASSMT&MGMT NQHP 21-30: CPT | Mod: CR | Performed by: PSYCHOLOGIST

## 2020-06-19 NOTE — BH THERAPY
[facility]  Therapy Progress Note    As a means of avoiding the spread of COVID-19, this visit is being conducted by telephone. This telephone call was initiated by the provider, consented and requested by the patient.  Start time:1300  Stop time: 1340    Use of zoom was attempted multiple times with no success transferred to the phone with the consent of the patient.    Patient Name: Lucita Babcock  Patient MRN: [id number]  Today's Date: 6/18/2020     Type of session:Individual psychotherapy  Length of session: 40minutes  Persons in attendance:Patient and Children    Subjective/New Info: The patient ID/Chief Complaint:  The patient is a 85 year old female, , .  The patient self-referred and voluntarily presented for an individual intake to address concerns related to increasing anxiety and decreased ability to go out in public.  Reviewed limits of confidentiality and [facility] policies; patient expressed understanding and agreed to voluntarily proceed with evaluation and treatment.    Interval History:   Session Focus: The patient's estimated global assessment of functioning appeared reasonably good with only short lived and expectable reactions to everyday stressful events.  Patient reports that she is doing well with her anxiety she indicates that she has not used any Xanax in the past month making her 9 Xanax use approximately 10 weeks total.  The patient has increased her physical activity but is still experiencing some symptoms of vertigo she is undergoing an audiological evaluation with the hope of additional treatment to address her vertigo symptoms.  During the course of the session also discussed with the patient ways of moving forward in addressing her anxiety symptoms associated with vertigo.    Therapeutic Intervention: Cognitive Behavioral Therapy      Planned Intervention: Continue to provide support and encouragement related to her ability to manage  her anxiety and her vertigo symptoms.      Objective/Observations:    Patient did not present in acute distress. Patient was appropriately groomed. Patient was alert and oriented x4. Eye contact was appropriate. No abnormalities in attention or concentration were noted. No abnormalities of movement present; psychomotor activity was normal. Speech was fluent and regular in rhythm, rate, volume, and tone. Thought processes linear, logical and goal directed. There was no evidence of thought disorder. No auditory or visual hallucinations. Long and short term memory appeared to be intact. Insight, judgment, and impulse control were deemed to be good.  Reported mood was “happy.” Affect was full-ranging and appropriate to thought content and conversation.  Patient denied past and current suicidal and homicidal ideation in plan, intent, and preparatory behavior.      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 Generalized anxiety disorder         The patient denied any suicide-related ideation and/or behaviors and intent/plan, denied thoughts about death and dying both in session and during the period since last appointment, or past 2 weeks.    Risk Level: Not Currently at Clinically Significant Risk  Hospitalization is not deemed necessary at this time as the patient does not present a clear or imminent danger to self or others. No indication for pursuing higher level of care. Outpatient management is currently most appropriate and least restrictive level of care.    Current risk:   SUICIDE: Low   Homicide: Not applicable   Self-harm: Not applicable   Relapse: Not applicable   Other:    Safety Plan reviewed? No   If evidence of imminent risk is present, intervention/plan:         Treatment Goal(s)/Objective(s) addressed:   Anxiety   Goal: Develop strategies to reduce symptoms, or   Reduce anxiety and improve coping skills     Be free of panic episodes (100%)   Learn two new ways of coping with routine  stressors   Report feeling more positive about self and abilities during therapy sessions       Progress toward Treatment Goals: Moderate improvement    Plan:      1) The patient will return to the clinic 4-6 weeks.  2) Crisis Response Plan:  Reviewed emergency resources with the patient and the patient expressed understanding including:  If feeling suicidal, patient will call or present to the [facility] during duty hours or present to closest ED ([facility] or [facility], call 911 or crisis hotline (9-424-954-RGYW) after duty hours.  3) Referrals/Consults:  N/A  4) Barriers to Learning:  No  5) Readiness to Learn:  Yes  6) Cultural Concerns:  No  7) Patient voiced understanding of, and agreement with, plan and goals as annotated above.  8) Declare these services are medically necessary and appropriate to the patient’s diagnosis and needs  9) The point of contact at the [facility] regarding this evaluation is Dr. Montes, Psychologist.    Pete Montes III, Ed.D.

## 2020-07-14 ENCOUNTER — TELEMEDICINE (OUTPATIENT)
Dept: BEHAVIORAL HEALTH | Facility: CLINIC | Age: 85
End: 2020-07-14
Payer: MEDICARE

## 2020-07-14 DIAGNOSIS — F41.1 GENERALIZED ANXIETY DISORDER: ICD-10-CM

## 2020-07-14 PROCEDURE — 90834 PSYTX W PT 45 MINUTES: CPT | Mod: 95,CR | Performed by: PSYCHOLOGIST

## 2020-07-14 NOTE — BH THERAPY
[facility]  Therapy Progress Note    Met with the patient per their request via (HIPPA compliant) Zoom video conference to accommodate state imposed restrictions on social contact due to the COVID-19 pandemic.      Patient Name: Lucita Babcock  Patient MRN: [id number]  Today's Date: 7/14/2020    Type of session:Individual psychotherapy  Length of session: 40minutes  Persons in attendance:Patient    Subjective/New Info: The patient ID/Chief Complaint:  The patient is a 85 year old female, , .  The patient self-referred and voluntarily presented for an individual intake to address concerns related to increasing anxiety and decreased ability to go out in public.  Reviewed limits of confidentiality and [facility] policies; patient expressed understanding and agreed to voluntarily proceed with evaluation and treatment.    Interval History:   Session Focus: The patient's estimated global assessment of functioning appeared reasonably good with only short lived and expectable reactions to everyday stressful events.  The patient is excited about starting on physical therapy to address her vertigo she recognizes how her vertigo is interrelated with her anxiety.  She does feel like she is in a better place with her anxiety to undergo physical therapy for vertigo.  The patient also talked about her romantic relationship and activities that she is engaging in home she reports increased quality of life based on activities that she is currently engaging.    Therapeutic Intervention: Cognitive Behavioral Therapy      Planned Intervention: Continue to provide support and encouragement related to her ability to manage her anxiety and her vertigo symptoms.      Objective/Observations:    Patient did not present in acute distress. Patient was appropriately groomed. Patient was alert and oriented x4. Eye contact was appropriate. No abnormalities in attention or concentration were noted.  No abnormalities of movement present; psychomotor activity was normal. Speech was fluent and regular in rhythm, rate, volume, and tone. Thought processes linear, logical and goal directed. There was no evidence of thought disorder. No auditory or visual hallucinations. Long and short term memory appeared to be intact. Insight, judgment, and impulse control were deemed to be good.  Reported mood was “happy.” Affect was full-ranging and appropriate to thought content and conversation.  Patient denied past and current suicidal and homicidal ideation in plan, intent, and preparatory behavior.      Diagnoses:   1. Generalized anxiety disorder         The patient denied any suicide-related ideation and/or behaviors and intent/plan, denied thoughts about death and dying both in session and during the period since last appointment, or past 2 weeks.    Risk Level: Not Currently at Clinically Significant Risk  Hospitalization is not deemed necessary at this time as the patient does not present a clear or imminent danger to self or others. No indication for pursuing higher level of care. Outpatient management is currently most appropriate and least restrictive level of care.    Current risk:   SUICIDE: Low   Homicide: Not applicable   Self-harm: Not applicable   Relapse: Not applicable   Other:    Safety Plan reviewed? No   If evidence of imminent risk is present, intervention/plan:         Treatment Goal(s)/Objective(s) addressed:   Anxiety   Goal: Develop strategies to reduce symptoms, or   Reduce anxiety and improve coping skills     Be free of panic episodes (100%)   Learn two new ways of coping with routine stressors   Report feeling more positive about self and abilities during therapy sessions       Progress toward Treatment Goals: Moderate improvement    Plan:      1) The patient will return to the clinic 4-6 weeks.  2) Crisis Response Plan:  Reviewed emergency resources with the patient and the patient expressed  understanding including:  If feeling suicidal, patient will call or present to the [facility] during duty hours or present to closest ED ([facility] or [facility], call 911 or crisis hotline (5-357-294-JOJS) after duty hours.  3) Referrals/Consults:  N/A  4) Barriers to Learning:  No  5) Readiness to Learn:  Yes  6) Cultural Concerns:  No  7) Patient voiced understanding of, and agreement with, plan and goals as annotated above.  8) Declare these services are medically necessary and appropriate to the patient’s diagnosis and needs  9) The point of contact at the [facility] regarding this evaluation is Dr. Montes, Psychologist.    Pete Montes III, Ed.D.

## 2020-07-20 ENCOUNTER — TELEPHONE (OUTPATIENT)
Dept: MEDICAL GROUP | Age: 85
End: 2020-07-20

## 2020-07-20 NOTE — TELEPHONE ENCOUNTER
VOICEMAIL  1. Caller Name: Lucita Babcock                        Call Back Number: [phone] (home)       2. Message: Pt left vm stating that she needs a refill on Sulfamethoxazole to prevent UTI's please advise..     3. Patient approves office to leave a detailed voicemail/MyChart message: yes

## 2020-07-22 RX ORDER — NITROFURANTOIN 25; 75 MG/1; MG/1
CAPSULE ORAL
Qty: 5 CAP | Refills: 2 | Status: ON HOLD | OUTPATIENT
Start: 2020-07-22 | End: 2020-12-30

## 2020-07-23 NOTE — TELEPHONE ENCOUNTER
Phone Number Called: [phone] (home)     Call outcome: Spoke to patient regarding message below.    Message: Patient was confused why this was prescribed. Called and told patient what Dr. Sloan had written on her script. Patient understood.

## 2020-08-03 ENCOUNTER — APPOINTMENT (OUTPATIENT)
Dept: PHYSICAL THERAPY | Facility: REHABILITATION | Age: 85
End: 2020-08-03
Payer: MEDICARE

## 2020-08-06 ENCOUNTER — APPOINTMENT (OUTPATIENT)
Dept: PHYSICAL THERAPY | Facility: REHABILITATION | Age: 85
End: 2020-08-06
Attending: FAMILY MEDICINE
Payer: MEDICARE

## 2020-08-10 ENCOUNTER — APPOINTMENT (OUTPATIENT)
Dept: PHYSICAL THERAPY | Facility: REHABILITATION | Age: 85
End: 2020-08-10
Payer: MEDICARE

## 2020-08-12 ENCOUNTER — PHYSICAL THERAPY (OUTPATIENT)
Dept: PHYSICAL THERAPY | Facility: REHABILITATION | Age: 85
End: 2020-08-12
Attending: FAMILY MEDICINE
Payer: MEDICARE

## 2020-08-12 DIAGNOSIS — H81.13 BENIGN PAROXYSMAL POSITIONAL VERTIGO DUE TO BILATERAL VESTIBULAR DISORDER: ICD-10-CM

## 2020-08-12 PROCEDURE — 97161 PT EVAL LOW COMPLEX 20 MIN: CPT

## 2020-08-12 PROCEDURE — 97535 SELF CARE MNGMENT TRAINING: CPT

## 2020-08-12 ASSESSMENT — BALANCE ASSESSMENTS
STANDING ON ONE LEG: 0
TURN 360 DEGREES: 3
LONG VERSION TOTAL SCORE (MAX 56): 38
STANDING UNSUPPORTED WITH FEET TOGETHER: 3
STANDING UNSUPPORTED WITH EYES CLOSED: 4
LONG VERSION TOTAL SCORE (MAX 56): 38
PICK UP OBJECT FROM THE FLOOR FROM A STANDING POSITION: 3
TRANSFERS: 3
PLACE ALTERNATE FOOT ON STEP OR STOOL WHILE STANDING UNSUPPORTED: 2
STANDING UNSUPPORTED ONE FOOT IN FRONT: 1
SITTING TO STANDING: 3
REACHING FORWARD WITH OUTSTRETCHED ARM WHILE STANDING: 3
SITTING UNSUPPORTED: 4
LOOK OVER LEFT AND RIGHT SHOULDERS WHILE STANDING: 3
STANDING UNSUPPORTED: 4
STANDING TO SITTING: 2

## 2020-08-12 ASSESSMENT — ENCOUNTER SYMPTOMS
PAIN SCALE AT HIGHEST: 3
PAIN SCALE: 0
PAIN SCALE AT LOWEST: 0

## 2020-08-12 NOTE — OP THERAPY EVALUATION
Outpatient Physical Therapy  VESTIBULAR EVALUATION    [address]  [address]  [place] [address]  Phone:  [phone]  Fax:  [phone]    Date of Evaluation: 2020    Patient: Lucita Babcock  YOB: 1935  MRN: [id number]     Referring Provider: [place] [address]   Referring Diagnosis: Benign paroxysmal positional vertigo due to bilateral vestibular disorder [H81.13]     Time Calculation    Start time: 1200  Stop time: 1305 Time Calculation (min): 65 minutes           Chief Complaint: Vertigo    Visit Diagnoses     ICD-10-CM   1. Benign paroxysmal positional vertigo due to bilateral vestibular disorder  H81.13         History of Present Illness:     Mechanism of injury:  This is a familiar pt, who has been seen for several episodes in the past: 25+ for BPPV and general balance/strength training for 5 visits.      Pt returns to PT with complaint of weakness and lightheadedness. States she was working with an ENT locally for epley maneuvers (about 3 visits) and states her vertigo is much better.  Dtr states the the onoging symptoms seem to be related to decreased activity for the last couple years, anxiety and fear/apprehension.  Dtr reports the pt was found to have chronic UTIs, which was causing some delirium and has improved now that medicated.  Feels she is in a better place to participate in PT. No recent falls.  Previus hx of non-compliance with attending PT.  Dtr relates she intends to be present for sessions this time.     Prior level of function:  Home indep.  Caregiver  through friday for cleaning, laundry, etc.  Dtr around on the weekends. Using [facility] for community, but FWW anytime she is alone.      Headaches: no headaches      Ear problems:  Hearing loss (chronic)    Sleep disturbance:  Not disrupted  Symptoms:     Current symptom ratin    At best symptom ratin    At worst symptom  "rating:  3    Exacerbated by: stress, getting into a shower, tipping the head, turning quickly.    Alleviating factors: relaxation techniques, moving slow, using AD.    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[facility])    • Sarcoid    • Urinary bladder disorder     frequency    • UTI (urinary tract infection)     recurrent     Past Surgical History:   Procedure Laterality Date   • PARATHYROID EXPLORATION  9/7/2018    Procedure: PARATHYROID EXPLORATION- MINIMALLY INVASIVE,   NIMS RECURRENT LARYNGEAL NERVE MONITORING RIGHT;  Surgeon: Devora Gonsalez M.D.;  Location: SURGERY SAME DAY [facility];  Service: General   • CERVICAL DISK AND FUSION ANTERIOR  7/27/2016    Procedure: CERVICAL DISK AND FUSION ANTERIOR C4-7;  Surgeon: Niles Khan M.D.;  Location: SURGERY [facility];  Service:    • CATARACT PHACO WITH IOL  8/27/2014    Performed by Avani Allen M.D. at SURGERY SAME DAY [facility]   • AMBROCIO BY LAPAROSCOPY  1/8/2013    Performed by Kenrick Howell M.D. at SURGERY [facility]   • ERCP IN OR  12/28/2012    Performed by Jay Dubois M.D. at SURGERY [facility]   • CATARACT PHACO WITH IOL  8/11/2010    Performed by AVANI ALLEN at SURGERY SAME DAY [facility]   • MASS EXCISION GENERAL  7/08    chest mass biopsy sarcoid   • BREAST BIOPSY  5/27/08    Performed by KENRICK HOWELL at SURGERY SAME DAY [facility]   • LUMPECTOMY  2008    right   • PB RADIATION THERAPY PLAN SIMPLE  2008    " "right   • COLONOSCOPY  2007    2 polyps   • ABDOMINAL HYSTERECTOMY TOTAL     • VEIN STRIPPING      R leg     Social History     Tobacco Use   • Smoking status: Former Smoker     Packs/day: 0.50     Years: 45.00     Pack years: 22.50     Quit date: 10/9/1969     Years since quittin.8   • Smokeless tobacco: Never Used   Substance Use Topics   • Alcohol use: No     Family and Occupational History     Socioeconomic History   • Marital status:      Spouse name: Not on file   • Number of children: Not on file   • Years of education: Not on file   • Highest education level: Not on file   Occupational History   • Not on file       Objective:    Gait:     Assessment: wide-based gait, improved gait with assistive device and difficulty with concurrent head rotation  Oculomotor Exam:         Details: No spontaneous nystagmus central gaze          Details: No spontaneous nystagmus eccentric gaze    No saccadic eye movements    Smooth pursuit present    Convergence:        Normal convergence    No skew  Active Range of Motion:     Within functional limits    Active range of motion comments: C/S ext limited to 10 deg, rotation= 60 deg bilat  Limb Ataxia Exam:     Finger-to-Nose:         Intention tremor: none    Dysdiadochokinesia: none.  Strength Exam:     Comments: LE strength grossly 4/5 except glute strength 3/5.  Strong reliance on UEs for STS. 30\" STS= 4 reps.  Unable without use of UEs.   Reflex Exam:       Deep Tendon Reflexes:         Left L4 (Patellar): normal (2+)        Right L4 (Patellar): normal (2+)  Sensation Tests:     Left Light Touch Sensation:         All left lumbar dermatomes intact      Right Light Touch Sensation:         All right lumbar dermatomes intact      Vestibulo-Ocular Exam:   Normal head thrust test  BPPV Exam:     Abnormal [place] (mild R>L, sustained.  Difficulty getting adequate extension. Opted to not treat as this is more stable that previously witnessed.)        " "Findings: positive bilateral        Therapeutic Treatments and Modalities:     1. Functional Training, Self Care (CPT 81289), Education: balance systems, impact of anxiety on balance, goals of PT and importance of HEP/consistent attendance to create change. HEP: eccentric controlled STS    Time-based treatments/modalities:    Physical Therapy Timed Treatment Charges  Functional training, self care minutes (CPT 50180): 20 minutes        Assessment:     Functional impairments:  Positive BPPV (bilateral), gait abnormality/instability, decreased range of motion/strength, decreased limits of stability, decreased postural stability, decreased utilization of VIS/vest/somato and pain    Other impairments:  Knee pain, mild BPPV (chronic)    Assessment details:  Ms. Babcock is an 85 y.o female who presents with her dtr due to complaint of lightheadedness and imbalance.  Reports her vertigo to be well controlled over the last few months and chronic UTIs to be controlled, so feels she will be better able to participate in PT than she was in the past.  PT evaluation does reveal decreased strength for functional STS and increased risk of falls on the ARELLANO.  She has mild BPPV that is residual, but not treated today as it was nearly asx to her and has not been able to fully resolve in the past (perhaps due to cervical ROM limits).  Skilled PT services are indicated to address the mentioned functional limitations and enhance QOL.       Prognosis: good    Goals:     Short term goals:  - Improve STS to without the use of UEs x 1  - Able to ambulate 40 feet in clinic with CGA    Short term goal time span:  1-2 weeks    Long term goals:  - Improve DHI at least 15%  - Improve ARELLANO to 46/56 without AD  - Improve 30\" STS with UEs to 8 reps.   - Indep with HEP    Long term goal time span:  6-8 weeks  Plan:     Therapy options:  Physical therapy treatment to continue    Planned therapy interventions:  Canalith Repositioning (CPT 62968), " Functional Training, Self Care (CPT 16486), Neuromuscular Re-education (CPT 70604), Therapeutic Exercise (CPT 42740) and Therapeutic Activities (CPT 35990)    Frequency: 1-2x/weel.    Duration in weeks:  8    Discussed with:  Patient and family      Functional Assessment Used  Baumann Balance Total Score (0-56): 38    DHI= 76%      Referring provider co-signature:  I have reviewed this plan of care and my co-signature certifies the need for services.    Certification Period: 08/12/2020 to  10/07/20    Physician Signature: ________________________________ Date: ______________

## 2020-08-13 ENCOUNTER — APPOINTMENT (OUTPATIENT)
Dept: PHYSICAL THERAPY | Facility: REHABILITATION | Age: 85
End: 2020-08-13
Payer: MEDICARE

## 2020-08-17 ENCOUNTER — APPOINTMENT (OUTPATIENT)
Dept: PHYSICAL THERAPY | Facility: REHABILITATION | Age: 85
End: 2020-08-17
Payer: MEDICARE

## 2020-08-17 ENCOUNTER — PHYSICAL THERAPY (OUTPATIENT)
Dept: PHYSICAL THERAPY | Facility: REHABILITATION | Age: 85
End: 2020-08-17
Attending: FAMILY MEDICINE
Payer: MEDICARE

## 2020-08-17 DIAGNOSIS — H81.13 BENIGN PAROXYSMAL POSITIONAL VERTIGO DUE TO BILATERAL VESTIBULAR DISORDER: ICD-10-CM

## 2020-08-17 PROCEDURE — 97112 NEUROMUSCULAR REEDUCATION: CPT

## 2020-08-17 NOTE — OP THERAPY DAILY TREATMENT
"  Outpatient Physical Therapy  DAILY TREATMENT     [facility] [address].  [address]  [place] [address]  Phone:  [phone]  Fax:  [phone]    Date: 08/17/2020    Patient: Lucita Babcock  YOB: 1935  MRN: [id number]     Time Calculation    Start time: 1130  Stop time: 1204 Time Calculation (min): 34 minutes         Chief Complaint: Loss Of Balance and Vertigo    Visit #: 2    SUBJECTIVE:  My knees are a little sore from doing the STS at home.     OBJECTIVE:      Therapeutic Exercises (CPT 14513):     1. NuStep, L2 x 5 min    2. Standard bridge, ball bw knees: 3\" hold x 20    3. Core set: seated, 5\" x 10    4. STS: hands up, eccent lower, 22\" x 3    5. Standing march, x 15 ea side    6. Religious pews, x 2 min    7. Airex, standing balance EC, 2x1 min      Time-based treatments/modalities:    Physical Therapy Timed Treatment Charges  Neuromusc re-ed, balance, coor, post minutes (CPT 73934): 34 minutes    ASSESSMENT:   Response to treatment: Pt pleasant and participatory. Discussed using a more elevated surface for STS at home.  HEP progressed with bridge, pews and march.  HO provided and reviewed with pt and dtr.     PLAN/RECOMMENDATIONS:   Plan for treatment: therapy treatment to continue next visit.  Planned interventions for next visit: continue with current treatment.       "

## 2020-08-18 NOTE — OP THERAPY DAILY TREATMENT
"  Outpatient Physical Therapy  DAILY TREATMENT     [facility] Physical Therapy [address].  [address]  [place] [address]  Phone:  [phone]  Fax:  [phone]    Date: 08/19/2020    Patient: Lucita Babcock  YOB: 1935  MRN: [id number]     Time Calculation    Start time: 1005  Stop time: 1031 Time Calculation (min): 26 minutes         Chief Complaint: Vertigo and Loss Of Balance    Visit #: 3    SUBJECTIVE:  Presents 5 min late.  States it was hard for her to come today due to her sister's passing.  Presents with caregiver \"Palmira.\"  \"I feel shakey today\"    OBJECTIVE:      Therapeutic Exercises (CPT 04863):     1. NuStep, L2 x 5 min    2. Standard bridge, ball bw knees: 3\" hold x 20    3. Core set: seated, 5\" x 10    4. STS: hands up, eccent lower, not today    5. Standing march, x 15 ea side    6. Restorationism pews, x 2 min    7. Standing hip abd, 1 UE support: x 10 ea side    8. Heel raises, x 15 with UE support    9. Airex: , EC x 1 min, with HTs.       Time-based treatments/modalities:    Physical Therapy Timed Treatment Charges  Therapeutic exercise minutes (CPT 59809): 26 minutes    ASSESSMENT:   Response to treatment: Low motivation today, req encouragement, but ended up completing more in less time due to less rest breaks. Will req more motivation from caregivers to be compliant with HEP.     PLAN/RECOMMENDATIONS:   Plan for treatment: therapy treatment to continue next visit.  Planned interventions for next visit: continue with current treatment.       "

## 2020-08-19 ENCOUNTER — APPOINTMENT (OUTPATIENT)
Dept: PHYSICAL THERAPY | Facility: REHABILITATION | Age: 85
End: 2020-08-19
Payer: MEDICARE

## 2020-08-19 ENCOUNTER — PHYSICAL THERAPY (OUTPATIENT)
Dept: PHYSICAL THERAPY | Facility: REHABILITATION | Age: 85
End: 2020-08-19
Attending: FAMILY MEDICINE
Payer: MEDICARE

## 2020-08-19 DIAGNOSIS — H81.13 BENIGN PAROXYSMAL POSITIONAL VERTIGO DUE TO BILATERAL VESTIBULAR DISORDER: ICD-10-CM

## 2020-08-19 PROCEDURE — 97110 THERAPEUTIC EXERCISES: CPT

## 2020-08-20 ENCOUNTER — OFFICE VISIT (OUTPATIENT)
Dept: BEHAVIORAL HEALTH | Facility: CLINIC | Age: 85
End: 2020-08-20
Payer: MEDICARE

## 2020-08-20 DIAGNOSIS — F06.4 ANXIETY DISORDER DUE TO MEDICAL CONDITION: ICD-10-CM

## 2020-08-20 DIAGNOSIS — F32.0 CURRENT MILD EPISODE OF MAJOR DEPRESSIVE DISORDER WITHOUT PRIOR EPISODE (HCC): ICD-10-CM

## 2020-08-20 DIAGNOSIS — F41.1 GENERALIZED ANXIETY DISORDER: ICD-10-CM

## 2020-08-20 PROCEDURE — 98968 PH1 ASSMT&MGMT NQHP 21-30: CPT | Mod: CR | Performed by: PSYCHOLOGIST

## 2020-08-20 NOTE — BH THERAPY
[facility]  Therapy Progress Note    As a means of avoiding the spread of COVID-19, this visit is being conducted by telephone. This telephone call was initiated by the provider, consented and requested by the patient.  Start time: 1400  Stop time: 1415        Patient Name: Lucita Babcock  Patient MRN: [id number]  Today's Date: 8/20/2020    Type of session:Individual psychotherapy  Length of session: 15 minutes  Persons in attendance:Patient    Subjective/New Info: The patient ID/Chief Complaint:  The patient is a 85 year old female, , .  The patient self-referred and voluntarily presented for an individual intake to address concerns related to increasing anxiety and decreased ability to go out in public.  Reviewed limits of confidentiality and [facility] policies; patient expressed understanding and agreed to voluntarily proceed with evaluation and treatment.    Interval History:   Session Focus: The patient's estimated global assessment of functioning appeared reasonably good with only short lived and expectable reactions to everyday stressful events.  The patient was unable to connect using the [employer] platform as a result did a phone session of 15 minutes and will have the patient in the future come into the office for visits.  The patient reports that she has started physical therapy and reports some increased anxiety but she does feel like she is getting stronger and she is less anxious about balance concerns.  The patient reported that she recently lost her sister who is living in assisted living in [place].  Reviewed the process of grief and encouraged the patient to take care of herself during this process.    Therapeutic Intervention: Cognitive Behavioral Therapy      Planned Intervention: Continue to provide support and encouragement related to her ability to manage her anxiety and her vertigo symptoms.      Objective/Observations:    Patient did not present  in acute distress. Patient was appropriately groomed. Patient was alert and oriented x4. Eye contact was appropriate. No abnormalities in attention or concentration were noted. No abnormalities of movement present; psychomotor activity was normal. Speech was fluent and regular in rhythm, rate, volume, and tone. Thought processes linear, logical and goal directed. There was no evidence of thought disorder. No auditory or visual hallucinations. Long and short term memory appeared to be intact. Insight, judgment, and impulse control were deemed to be good.  Reported mood was “happy and sad.” Affect was full-ranging and appropriate to thought content and conversation.  Patient denied past and current suicidal and homicidal ideation in plan, intent, and preparatory behavior.      Diagnoses:   1. 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 Current mild episode of major depressive disorder without prior episode (HCC)         The patient denied any suicide-related ideation and/or behaviors and intent/plan, denied thoughts about death and dying both in session and during the period since last appointment, or past 2 weeks.    Risk Level: Not Currently at Clinically Significant Risk  Hospitalization is not deemed necessary at this time as the patient does not present a clear or imminent danger to self or others. No indication for pursuing higher level of care. Outpatient management is currently most appropriate and least restrictive level of care.    Current risk:   SUICIDE: Low   Homicide: Not applicable   Self-harm: Not applicable   Relapse: Not applicable   Other:    Safety Plan reviewed? No   If evidence of imminent risk is present, intervention/plan:         Treatment Goal(s)/Objective(s) addressed:   Anxiety   Goal: Develop strategies to reduce symptoms, or   Reduce anxiety and improve coping skills     Be free of panic episodes (100%)   Learn two new ways of coping with routine  stressors   Report feeling more positive about self and abilities during therapy sessions       Progress toward Treatment Goals: Mild decline    Plan:      1) The patient will return to the clinic 4-6 weeks.  2) Crisis Response Plan:  Reviewed emergency resources with the patient and the patient expressed understanding including:  If feeling suicidal, patient will call or present to the [facility] during duty hours or present to closest ED ([facility] or [facility], call 911 or crisis hotline (5-317-096-DXUG) after duty hours.  3) Referrals/Consults:  N/A  4) Barriers to Learning:  No  5) Readiness to Learn:  Yes  6) Cultural Concerns:  No  7) Patient voiced understanding of, and agreement with, plan and goals as annotated above.  8) Declare these services are medically necessary and appropriate to the patient’s diagnosis and needs  9) The point of contact at the [facility] regarding this evaluation is Dr. Montes, Psychologist.    Pete Montes III, Ed.D.

## 2020-08-22 DIAGNOSIS — I10 ESSENTIAL HYPERTENSION: ICD-10-CM

## 2020-08-24 ENCOUNTER — PHYSICAL THERAPY (OUTPATIENT)
Dept: PHYSICAL THERAPY | Facility: REHABILITATION | Age: 85
End: 2020-08-24
Attending: FAMILY MEDICINE
Payer: MEDICARE

## 2020-08-24 ENCOUNTER — APPOINTMENT (OUTPATIENT)
Dept: PHYSICAL THERAPY | Facility: REHABILITATION | Age: 85
End: 2020-08-24
Payer: MEDICARE

## 2020-08-24 DIAGNOSIS — R53.1 GENERALIZED WEAKNESS: ICD-10-CM

## 2020-08-24 DIAGNOSIS — H81.13 BENIGN PAROXYSMAL POSITIONAL VERTIGO DUE TO BILATERAL VESTIBULAR DISORDER: ICD-10-CM

## 2020-08-24 PROCEDURE — 97110 THERAPEUTIC EXERCISES: CPT

## 2020-08-24 NOTE — OP THERAPY DAILY TREATMENT
"  Outpatient Physical Therapy  DAILY TREATMENT     [facility] Therapy [address]  [address]  [place] [address]  Phone:  [phone]  Fax:  [phone]    Date: 08/24/2020    Patient: Lucita Babcock  YOB: 1935  MRN: [id number]     Time Calculation    Start time: 1130  Stop time: 1209 Time Calculation (min): 39 minutes         Chief Complaint: Loss Of Balance    Visit #: 4    SUBJECTIVE:  No new complaints. \"I feel good about being here today.\"    OBJECTIVE:      Therapeutic Exercises (CPT 96972):     1. NuStep, L3 x 5 min    2. Ball bridge, 5\" x 20    3. Ball roll, x 20    4. STS to airex, x 3, limited by knee pain.  Pushes off through back of knee    5. Standing march on airex, x 15 ea side    6. Mandaen pews, x 2 min    9. Airex: , EC x 1 min, with HTs.       Time-based treatments/modalities:    Physical Therapy Timed Treatment Charges  Therapeutic exercise minutes (CPT 78921): 39 minutes      ASSESSMENT:   Response to treatment: Poor compliance with HEP.  Reviewed importance and discussed need for self initiation vs prodding from dtr/caregivers. Fear avoidance cont to be the biggest barrier.  Participatory in clinic.     PLAN/RECOMMENDATIONS:   Plan for treatment: therapy treatment to continue next visit.  Planned interventions for next visit: continue with current treatment.       "

## 2020-08-27 ENCOUNTER — PHYSICAL THERAPY (OUTPATIENT)
Dept: PHYSICAL THERAPY | Facility: REHABILITATION | Age: 85
End: 2020-08-27
Attending: FAMILY MEDICINE
Payer: MEDICARE

## 2020-08-27 ENCOUNTER — APPOINTMENT (OUTPATIENT)
Dept: PHYSICAL THERAPY | Facility: REHABILITATION | Age: 85
End: 2020-08-27
Payer: MEDICARE

## 2020-08-27 DIAGNOSIS — R53.1 GENERALIZED WEAKNESS: ICD-10-CM

## 2020-08-27 DIAGNOSIS — H81.13 BENIGN PAROXYSMAL POSITIONAL VERTIGO DUE TO BILATERAL VESTIBULAR DISORDER: ICD-10-CM

## 2020-08-27 PROCEDURE — 97110 THERAPEUTIC EXERCISES: CPT

## 2020-08-27 NOTE — OP THERAPY DAILY TREATMENT
"  Outpatient Physical Therapy  DAILY TREATMENT     [facility] Physical Therapy [address]  [address]  [place] [address]  Phone:  [phone]  Fax:  [phone]    Date: 08/27/2020    Patient: Lucita Babcock  YOB: 1935  MRN: [id number]     Time Calculation    Start time: 1045  Stop time: 1118 Time Calculation (min): 33 minutes         Chief Complaint: Vertigo and Loss Of Balance    Visit #: 5    SUBJECTIVE:  I woke up feeling really full of energy today.     OBJECTIVE:      Therapeutic Exercises (CPT 57366):     1. NuStep, L3 x 5 min    2. Ball bridge, 5\" x 20    3. Ball roll, x 20    4. STS to airex, x 3, limited by knee pain.  Pushes off through back of knee    5. Trampoline:, marching, mini bounce, EC balance    6. Gait: , side steps, fwd/back steps with SBA and no AD. x 2 lap ea      Time-based treatments/modalities:    Physical Therapy Timed Treatment Charges  Therapeutic exercise minutes (CPT 40911): 33 minutes    ASSESSMENT:   Response to treatment: Drastic change in participation today.  Expected apprehension with intro of trampoline, but pt actually quite enthusiastic about it and having fun bouncing.  Able to navigate clinic with SBA and no AD today.  Motivated by dancing/music.     PLAN/RECOMMENDATIONS:   Plan for treatment: therapy treatment to continue next visit.  Planned interventions for next visit: continue with current treatment.       "

## 2020-08-28 ENCOUNTER — HOSPITAL ENCOUNTER (OUTPATIENT)
Facility: MEDICAL CENTER | Age: 85
End: 2020-08-28
Attending: PHYSICIAN ASSISTANT
Payer: MEDICARE

## 2020-08-28 PROCEDURE — 87186 SC STD MICRODIL/AGAR DIL: CPT

## 2020-08-28 PROCEDURE — 87086 URINE CULTURE/COLONY COUNT: CPT

## 2020-08-28 PROCEDURE — 87077 CULTURE AEROBIC IDENTIFY: CPT

## 2020-08-31 DIAGNOSIS — I10 ESSENTIAL HYPERTENSION: ICD-10-CM

## 2020-09-01 RX ORDER — SERTRALINE HYDROCHLORIDE 100 MG/1
TABLET, FILM COATED ORAL
Qty: 90 TAB | Refills: 0 | Status: SHIPPED | OUTPATIENT
Start: 2020-09-01 | End: 2020-12-22

## 2020-09-02 NOTE — OP THERAPY DAILY TREATMENT
"  Outpatient Physical Therapy  DAILY TREATMENT     [facility] Physical Therapy [address].  [address]  [place] [address]  Phone:  [phone]  Fax:  [phone]    Date: 09/03/2020    Patient: Lucita Babcock  YOB: 1935  MRN: [id number]     Time Calculation                   Chief Complaint: No chief complaint on file.    Visit #: 6    SUBJECTIVE:  ***    OBJECTIVE:      Therapeutic Exercises (CPT 12963):     1. NuStep, L3 x 5 min    2. Ball bridge, 5\" x 20    3. Ball roll, x 20    4. STS to airex, x 3, limited by knee pain.  Pushes off through back of knee    5. Trampoline:, marching, mini bounce, EC balance    6. Gait: , side steps, fwd/back steps with SBA and no AD. x 2 lap ea      Time-based treatments/modalities:         ASSESSMENT:   Response to treatment: ***    PLAN/RECOMMENDATIONS:   Plan for treatment: therapy treatment to continue next visit.  Planned interventions for next visit: continue with current treatment.       "

## 2020-09-03 ENCOUNTER — APPOINTMENT (OUTPATIENT)
Dept: PHYSICAL THERAPY | Facility: REHABILITATION | Age: 85
End: 2020-09-03
Attending: FAMILY MEDICINE
Payer: MEDICARE

## 2020-09-13 DIAGNOSIS — J06.9 VIRAL URI: ICD-10-CM

## 2020-09-16 ENCOUNTER — PHYSICAL THERAPY (OUTPATIENT)
Dept: PHYSICAL THERAPY | Facility: REHABILITATION | Age: 85
End: 2020-09-16
Attending: FAMILY MEDICINE
Payer: MEDICARE

## 2020-09-16 DIAGNOSIS — H81.13 BENIGN PAROXYSMAL POSITIONAL VERTIGO DUE TO BILATERAL VESTIBULAR DISORDER: ICD-10-CM

## 2020-09-16 DIAGNOSIS — R53.1 GENERALIZED WEAKNESS: ICD-10-CM

## 2020-09-16 PROCEDURE — 97110 THERAPEUTIC EXERCISES: CPT

## 2020-09-16 NOTE — OP THERAPY DAILY TREATMENT
"  Outpatient Physical Therapy  DAILY TREATMENT     [facility] Physical Therapy [address]  [address]  [place] [address]  Phone:  [phone]  Fax:  [phone]    Date: 09/16/2020    Patient: Lucita Babcock  YOB: 1935  MRN: [id number]     Time Calculation    Start time: 1246  Stop time: 1315 Time Calculation (min): 29 minutes         Chief Complaint: Weakness and Loss Of Balance    Visit #: 6    SUBJECTIVE:  Slight break in PT due to having another UTI. Back on antibiotics for this and has been feeling better.     OBJECTIVE:      Therapeutic Exercises (CPT 46659):     1. NuStep, L3 x 5 min    2. 4 square stepping, x 3 min, gait belt and CGA    3. Fwd/back, lateral gait with tempo.  Having to hold each step 2\", x 4 laps ea    4. Step overs, 1/2 roll- fwd and return, lateral and return, full step over fwd and latera.l.  x 5 ea      Time-based treatments/modalities:    Physical Therapy Timed Treatment Charges  Therapeutic exercise minutes (CPT 82678): 29 minutes      ASSESSMENT:   Response to treatment: Pt participatory.  Having to rest several times due to feeling hot and winded.  Cites the mask being a problem. Poor control with multidirectional stepping.  Utilizes momentum, but improved with cuing.     PLAN/RECOMMENDATIONS:   Plan for treatment: therapy treatment to continue next visit.  Planned interventions for next visit: continue with current treatment. Cont to work on increased balance confidence and decreased reliance on AD.        "

## 2020-09-17 ENCOUNTER — OFFICE VISIT (OUTPATIENT)
Dept: BEHAVIORAL HEALTH | Facility: CLINIC | Age: 85
End: 2020-09-17
Payer: MEDICARE

## 2020-09-17 DIAGNOSIS — F32.0 CURRENT MILD EPISODE OF MAJOR DEPRESSIVE DISORDER WITHOUT PRIOR EPISODE (HCC): ICD-10-CM

## 2020-09-17 DIAGNOSIS — F06.4 ANXIETY DISORDER DUE TO MEDICAL CONDITION: ICD-10-CM

## 2020-09-17 DIAGNOSIS — F41.1 GENERALIZED ANXIETY DISORDER: ICD-10-CM

## 2020-09-17 PROCEDURE — 90834 PSYTX W PT 45 MINUTES: CPT | Performed by: PSYCHOLOGIST

## 2020-09-17 RX ORDER — ASCORBIC ACID 500 MG
TABLET ORAL
Qty: 270 TAB | Refills: 2 | Status: ON HOLD | OUTPATIENT
Start: 2020-09-17 | End: 2021-02-22 | Stop reason: SDUPTHER

## 2020-09-17 NOTE — BH THERAPY
[facility] Progress Note    Patient Name: Lucita Babcock  Patient MRN: [id number]  Today's Date: 9/17/2020    Type of session:Individual psychotherapy  Length of session: 45 minutes  Persons in attendance:Patient and Children    Subjective/New Info: The patient ID/Chief Complaint:  The patient is a 85 year old female, , .  The patient self-referred and voluntarily presented for an individual intake to address concerns related to increasing anxiety and decreased ability to go out in public.  Reviewed limits of confidentiality and [facility] policies; patient expressed understanding and agreed to voluntarily proceed with evaluation and treatment.    Interval History:   Session Focus: The patient's estimated global assessment of functioning indicates a mild level of difficulty with some problems in relationships and activities of daily living. The patient is nonetheless functioning well and has some significant relationships.  The patient today was accompanied by her daughter who reports some concerns about the patient's ability to persist with physical exercise as recommended by the physical therapist.  Discussed with the patient and her daughter ways in which to increase her on task performance associated with exercise and other activities that will facilitate decreased vestibular concerns.  The patient does indicate that she continues to have significant fear associated with falling again as a result of her injury in 2016 in which she fell and fractured her neck.  The daughter reported that the patient has been told that she needs to decrease her use of the walker in the home environment.    Therapeutic Intervention: Cognitive Behavioral Therapy      Planned Intervention: Continue to provide support and encouragement related to her ability to manage her anxiety and her vertigo symptoms.      Objective/Observations:    Patient did not present in acute  distress. Patient was appropriately groomed. Patient was alert and oriented x4. Eye contact was appropriate. No abnormalities in attention or concentration were noted. No abnormalities of movement present; psychomotor activity was normal. Speech was fluent and regular in rhythm, rate, volume, and tone. Thought processes linear, logical and goal directed. There was no evidence of thought disorder. No auditory or visual hallucinations. Long and short term memory appeared to be intact. Insight, judgment, and impulse control were deemed to be good.  Reported mood was “happy and sad.” Affect was full-ranging and appropriate to thought content and conversation.  Patient denied past and current suicidal and homicidal ideation in plan, intent, and preparatory behavior.      Diagnoses:   1. 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 Current mild episode of major depressive disorder without prior episode (HCC)         The patient denied any suicide-related ideation and/or behaviors and intent/plan, denied thoughts about death and dying both in session and during the period since last appointment, or past 2 weeks.    Risk Level: Not Currently at Clinically Significant Risk  Hospitalization is not deemed necessary at this time as the patient does not present a clear or imminent danger to self or others. No indication for pursuing higher level of care. Outpatient management is currently most appropriate and least restrictive level of care.    Current risk:   SUICIDE: Low   Homicide: Not applicable   Self-harm: Not applicable   Relapse: Not applicable   Other:    Safety Plan reviewed? No   If evidence of imminent risk is present, intervention/plan:         Treatment Goal(s)/Objective(s) addressed:   Anxiety   Goal: Develop strategies to reduce symptoms, or   Reduce anxiety and improve coping skills     Be free of panic episodes (100%)   Learn two new ways of coping with routine stressors    Report feeling more positive about self and abilities during therapy sessions       Progress toward Treatment Goals: Mild improvement    Plan:      1) The patient will return to the clinic 4-6 weeks.  2) Crisis Response Plan:  Reviewed emergency resources with the patient and the patient expressed understanding including:  If feeling suicidal, patient will call or present to the [facility] during duty hours or present to closest ED ([facility] or [facility], call 911 or crisis hotline (1-102-637-MFEE) after duty hours.  3) Referrals/Consults:  N/A  4) Barriers to Learning:  No  5) Readiness to Learn:  Yes  6) Cultural Concerns:  No  7) Patient voiced understanding of, and agreement with, plan and goals as annotated above.  8) Declare these services are medically necessary and appropriate to the patient’s diagnosis and needs  9) The point of contact at the [facility] regarding this evaluation is Dr. Montes, Psychologist.    Pete Montes III, Ed.D.

## 2020-09-22 ENCOUNTER — PHYSICAL THERAPY (OUTPATIENT)
Dept: PHYSICAL THERAPY | Facility: REHABILITATION | Age: 85
End: 2020-09-22
Attending: FAMILY MEDICINE
Payer: MEDICARE

## 2020-09-22 DIAGNOSIS — R53.1 GENERALIZED WEAKNESS: ICD-10-CM

## 2020-09-22 DIAGNOSIS — H81.13 BENIGN PAROXYSMAL POSITIONAL VERTIGO DUE TO BILATERAL VESTIBULAR DISORDER: ICD-10-CM

## 2020-09-22 PROCEDURE — 97110 THERAPEUTIC EXERCISES: CPT

## 2020-09-22 NOTE — OP THERAPY DAILY TREATMENT
"  Outpatient Physical Therapy  DAILY TREATMENT     [facility] Physical Therapy [address].  [address]  [place] [address]  Phone:  [phone]  Fax:  [phone]    Date: 09/22/2020    Patient: Lucita Babcock  YOB: 1935  MRN: [id number]     Time Calculation    Start time: 1147  Stop time: 1220 Time Calculation (min): 33 minutes         Chief Complaint: Loss Of Balance    Visit #: 7    SUBJECTIVE:  I was a little achy in my low back after LV. Feeling a little lightheaded today. Riding bike at home 10 min a day and reports she has been doing her HEP without caregivers (as ok'd to do). Feeling more confident. Using the SPC about 40% around the home.     OBJECTIVE:      Therapeutic Exercises (CPT 79292):     1. NuStep, L3 x 5 min    2. STS, to airex x 10, with EC to airex x 10, 24\" height to decrease load on the knees    3. Airex step up and over, x 10     4. Obstacle course: 1/2 roll step over, airex, cone weave, fwd and back x 3 laps ea, on AD, SBA      Time-based treatments/modalities:    Physical Therapy Timed Treatment Charges  Therapeutic exercise minutes (CPT 03230): 33 minutes    ASSESSMENT:   Response to treatment: Good participation and better initiation of HEP without caregiver assist. Improved control of step pattern over obstacles.     PLAN/RECOMMENDATIONS:   Plan for treatment: therapy treatment to continue next visit.  Planned interventions for next visit: continue with current treatment.       "

## 2020-09-28 ENCOUNTER — APPOINTMENT (OUTPATIENT)
Dept: PHYSICAL THERAPY | Facility: REHABILITATION | Age: 85
End: 2020-09-28
Attending: FAMILY MEDICINE
Payer: MEDICARE

## 2020-10-01 ENCOUNTER — PHYSICAL THERAPY (OUTPATIENT)
Dept: PHYSICAL THERAPY | Facility: REHABILITATION | Age: 85
End: 2020-10-01
Attending: FAMILY MEDICINE
Payer: MEDICARE

## 2020-10-01 DIAGNOSIS — H81.13 BENIGN PAROXYSMAL POSITIONAL VERTIGO DUE TO BILATERAL VESTIBULAR DISORDER: ICD-10-CM

## 2020-10-01 PROCEDURE — 97112 NEUROMUSCULAR REEDUCATION: CPT

## 2020-10-01 ASSESSMENT — BALANCE ASSESSMENTS
SITTING TO STANDING: 4
PLACE ALTERNATE FOOT ON STEP OR STOOL WHILE STANDING UNSUPPORTED: 3
TRANSFERS: 3
STANDING UNSUPPORTED WITH EYES CLOSED: 4
STANDING ON ONE LEG: 2
STANDING UNSUPPORTED: 4
LONG VERSION TOTAL SCORE (MAX 56): 46
LONG VERSION TOTAL SCORE (MAX 56): 46
REACHING FORWARD WITH OUTSTRETCHED ARM WHILE STANDING: 3
LOOK OVER LEFT AND RIGHT SHOULDERS WHILE STANDING: 3
TURN 360 DEGREES: 3
STANDING UNSUPPORTED ONE FOOT IN FRONT: 2
STANDING UNSUPPORTED WITH FEET TOGETHER: 3
SITTING UNSUPPORTED: 4
PICK UP OBJECT FROM THE FLOOR FROM A STANDING POSITION: 4
STANDING TO SITTING: 4

## 2020-10-01 NOTE — OP THERAPY DAILY TREATMENT
Outpatient Physical Therapy  DAILY TREATMENT     [facility] Physical Therapy [place] [address]  [address]  [place] [address]  Phone:  [phone]  Fax:  [phone]    Date: 10/01/2020    Patient: Lucita Babcock  YOB: 1935  MRN: [id number]     Time Calculation    Start time: 1445  Stop time: 1515 Time Calculation (min): 30 minutes         Chief Complaint: Loss Of Balance    Visit #: 8    SUBJECTIVE:  Reports she has been decreasing reliance on her walker by pushing it ahead and then walking to it in her home. Still makes her nervous to be fully without it when she is home alone. States last night she noticed that she was feeling really good.    OBJECTIVE:  Current objective measures:  Baumann Balance Total Score (0-56): 46  Dizziness Handicap Inventory - Physical Items Score: 4  Dizziness Handicap Inventory - Emotional Items Score: 22  Dizziness Handicap Inventory - Functional Items Score: 28  Dizziness Handicap Inventory - Total Score: 54     Therapeutic Exercises (CPT 35247):     1. NuStep, L3 x 5 min    Therapeutic Treatments and Modalities:     1. Neuromuscular Re-education (CPT 90365)    Therapeutic Treatment and Modalities Summary:   - Re-assement as above  - Gait without AD x 200 feet, SBA.  Cuing to increase trunk/arm swing  - STS with eccentric control down.  Was able to complete x 1 without UE assist      Time-based treatments/modalities:    Physical Therapy Timed Treatment Charges  Neuromusc re-ed, balance, coor, post minutes (CPT 42509): 30 minutes    ASSESSMENT:   Response to treatment:   See PN.     PLAN/RECOMMENDATIONS:   Plan for treatment: therapy treatment to continue next visit.  Planned interventions for next visit: continue with current treatment.

## 2020-10-02 NOTE — OP THERAPY PROGRESS SUMMARY
Outpatient Physical Therapy  PROGRESS SUMMARY NOTE      [facility] Physical Therapy [place] [address]  [address]  [place] [address]  Phone:  [phone]  Fax:  [phone]    Date of Visit: 10/01/2020    Patient: Lucita Babcock  YOB: 1935  MRN: [id number]     Referring Provider: [place] [address] [address]   Referring Diagnosis Benign paroxysmal positional vertigo due to bilateral vestibular disorder [H81.13]     Visit Diagnoses     ICD-10-CM   1. Benign paroxysmal positional vertigo due to bilateral vestibular disorder  H81.13       Rehab Potential: good    Progress Report Period: 8/12/20-10/1/20    Functional Assessment Used  Arellano Balance Total Score (0-56): 46  Dizziness Handicap Inventory - Physical Items Score: 4  Dizziness Handicap Inventory - Emotional Items Score: 22  Dizziness Handicap Inventory - Functional Items Score: 28  Dizziness Handicap Inventory - Total Score: 54       Objective Findings and Assessment:   Patient progression towards goals: Ms. Babcock has been seen for 8 PT sessions treating her debility and imbalance.  She has been seen for this issue in the past, but showed little overall progress.  During this episode, pt has shown drastic improvements in attendance, participation and compliance.  This may be in part due to ongoing sessions with psychology, as well as increased support by her daughter. Over the course of this episode, pt has improved by 20% in perceived disability on the DHI, her balance improved on the ARELLANO from 38/56 to 46/56 (without AD), she is now able to walk short distances without an AD and is decreasing reliance on external support at home.  Due to pt still on the cusp of fall risk on the ARELLANO, recommending continued use of SPC for ambulation independently.  Pt would benefit from continued skilled PT services to assist with further regaining strength/balance to enhance QOL and decrease risk of falls.  Will decrease  frequency to 1x/week with hope that we will transition to HEP upon completion of these visits.       Goals:   Short Term Goals:   - Improve DHI to less than 42%  - Able to ambulate clinic with SBA, no AD and HTs without LOB    Short term goal time span:  1-2 weeks      Long Term Goals:    - Improve ARELLANO to at least 48/56  - Pt indep and complaint with HEP progressions.     Long term goal time span:  6-8 weeks    Plan:   Planned therapy interventions:  E Stim Unattended (CPT 90868), Functional Training, Self Care (CPT 82336), Therapeutic Activities (CPT 87797), Therapeutic Exercise (CPT 58029), Hot or Cold Pack Therapy (CPT 63495) and Neuromuscular Re-education (CPT 27291)  Frequency:  1x week  Duration in weeks:  8      Referring provider co-signature:  I have reviewed this plan of care and my co-signature certifies the need for services.     Certification Period: 10/01/2020 to 11/26/20    Physician Signature: ________________________________ Date: ______________

## 2020-10-06 ENCOUNTER — APPOINTMENT (OUTPATIENT)
Dept: PHYSICAL THERAPY | Facility: REHABILITATION | Age: 85
End: 2020-10-06
Attending: FAMILY MEDICINE
Payer: MEDICARE

## 2020-10-15 ENCOUNTER — OFFICE VISIT (OUTPATIENT)
Dept: MEDICAL GROUP | Age: 85
End: 2020-10-15
Payer: MEDICARE

## 2020-10-15 VITALS
BODY MASS INDEX: 23.02 KG/M2 | TEMPERATURE: 99.8 F | OXYGEN SATURATION: 95 % | DIASTOLIC BLOOD PRESSURE: 70 MMHG | SYSTOLIC BLOOD PRESSURE: 130 MMHG | HEART RATE: 74 BPM | HEIGHT: 68 IN | WEIGHT: 151.9 LBS

## 2020-10-15 DIAGNOSIS — Z87.898 H/O DIZZINESS: ICD-10-CM

## 2020-10-15 DIAGNOSIS — I10 ESSENTIAL HYPERTENSION: ICD-10-CM

## 2020-10-15 DIAGNOSIS — N39.0 RECURRENT UTI: ICD-10-CM

## 2020-10-15 DIAGNOSIS — Z23 FLU VACCINE NEED: ICD-10-CM

## 2020-10-15 DIAGNOSIS — R53.81 PHYSICAL DECONDITIONING: ICD-10-CM

## 2020-10-15 PROCEDURE — 99214 OFFICE O/P EST MOD 30 MIN: CPT | Mod: 25 | Performed by: FAMILY MEDICINE

## 2020-10-15 PROCEDURE — 90662 IIV NO PRSV INCREASED AG IM: CPT | Performed by: FAMILY MEDICINE

## 2020-10-15 PROCEDURE — G0008 ADMIN INFLUENZA VIRUS VAC: HCPCS | Performed by: FAMILY MEDICINE

## 2020-10-15 ASSESSMENT — FIBROSIS 4 INDEX: FIB4 SCORE: 2.43

## 2020-10-22 ENCOUNTER — OFFICE VISIT (OUTPATIENT)
Dept: BEHAVIORAL HEALTH | Facility: CLINIC | Age: 85
End: 2020-10-22
Payer: MEDICARE

## 2020-10-22 DIAGNOSIS — F06.4 ANXIETY DISORDER DUE TO MEDICAL CONDITION: ICD-10-CM

## 2020-10-22 DIAGNOSIS — F41.1 GENERALIZED ANXIETY DISORDER: ICD-10-CM

## 2020-10-22 DIAGNOSIS — F32.0 CURRENT MILD EPISODE OF MAJOR DEPRESSIVE DISORDER WITHOUT PRIOR EPISODE (HCC): ICD-10-CM

## 2020-10-22 PROCEDURE — 90834 PSYTX W PT 45 MINUTES: CPT | Performed by: PSYCHOLOGIST

## 2020-10-22 NOTE — BH THERAPY
[facility] Progress Note    Patient Name: Lucita Babcock  Patient MRN: [id number]  Today's Date: 9/17/2020    Type of session:Individual psychotherapy  Length of session: 45 minutes  Persons in attendance:Patient and Children    Subjective/New Info: The patient ID/Chief Complaint:  The patient is a 85 year old female, , .  The patient self-referred and voluntarily presented for an individual intake to address concerns related to increasing anxiety and decreased ability to go out in public.  Reviewed limits of confidentiality and [facility] policies; patient expressed understanding and agreed to voluntarily proceed with evaluation and treatment.    Interval History:   Session Focus: The patient's estimated global assessment of functioning indicates a mild level of difficulty with some problems in relationships and activities of daily living. The patient is nonetheless functioning well.  The patient looked physically more confident she reports a decrease in anxiety and increase in exercise.  The patient also disclosed that she is starting to use CBD to assist with some anxiety symptoms.  Patient continues physical therapy and making good progress currently she is walking with a quad cane.  Patient was also able to recognize that getting better would not necessarily result in decrease involvement of her daughter but it potentially will change it to engaging in activities that are more reinforcing.    Therapeutic Intervention: Cognitive Behavioral Therapy      Planned Intervention: Continue to provide support and encouragement related to her ability to manage her anxiety and her vertigo symptoms.      Objective/Observations:    Patient did not present in acute distress. Patient was appropriately groomed. Patient was alert and oriented x4. Eye contact was appropriate. No abnormalities in attention or concentration were noted. No abnormalities of movement present;  psychomotor activity was normal. Speech was fluent and regular in rhythm, rate, volume, and tone. Thought processes linear, logical and goal directed. There was no evidence of thought disorder. No auditory or visual hallucinations. Long and short term memory appeared to be intact. Insight, judgment, and impulse control were deemed to be good.  Reported mood was “happy and sad.” Affect was full-ranging and appropriate to thought content and conversation.  Patient denied past and current suicidal and homicidal ideation in plan, intent, and preparatory behavior.      Diagnoses:   1. 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 Current mild episode of major depressive disorder without prior episode (HCC)         The patient denied any suicide-related ideation and/or behaviors and intent/plan, denied thoughts about death and dying both in session and during the period since last appointment, or past 2 weeks.    Risk Level: Not Currently at Clinically Significant Risk  Hospitalization is not deemed necessary at this time as the patient does not present a clear or imminent danger to self or others. No indication for pursuing higher level of care. Outpatient management is currently most appropriate and least restrictive level of care.    Current risk:   SUICIDE: Low   Homicide: Not applicable   Self-harm: Not applicable   Relapse: Not applicable   Other:    Safety Plan reviewed? No   If evidence of imminent risk is present, intervention/plan:         Treatment Goal(s)/Objective(s) addressed:   Anxiety   Goal: Develop strategies to reduce symptoms, or   Reduce anxiety and improve coping skills     Be free of panic episodes (100%)   Learn two new ways of coping with routine stressors   Report feeling more positive about self and abilities during therapy sessions       Progress toward Treatment Goals: Moderate improvement    Plan:      1) The patient will return to the clinic 4-6  weeks.  2) Crisis Response Plan:  Reviewed emergency resources with the patient and the patient expressed understanding including:  If feeling suicidal, patient will call or present to the [facility] during duty hours or present to closest ED ([facility] or [facility], call 911 or crisis hotline (9-439-506-QSSX) after duty hours.  3) Referrals/Consults:  N/A  4) Barriers to Learning:  No  5) Readiness to Learn:  Yes  6) Cultural Concerns:  No  7) Patient voiced understanding of, and agreement with, plan and goals as annotated above.  8) Declare these services are medically necessary and appropriate to the patient’s diagnosis and needs  9) The point of contact at the [facility] regarding this evaluation is Dr. Montes, Psychologist.    Pete Montes III, Ed.D.

## 2020-10-26 ENCOUNTER — PHYSICAL THERAPY (OUTPATIENT)
Dept: PHYSICAL THERAPY | Facility: REHABILITATION | Age: 85
End: 2020-10-26
Attending: FAMILY MEDICINE
Payer: MEDICARE

## 2020-10-26 DIAGNOSIS — R53.1 GENERALIZED WEAKNESS: ICD-10-CM

## 2020-10-26 DIAGNOSIS — H81.13 BENIGN PAROXYSMAL POSITIONAL VERTIGO DUE TO BILATERAL VESTIBULAR DISORDER: ICD-10-CM

## 2020-10-26 PROCEDURE — 97110 THERAPEUTIC EXERCISES: CPT

## 2020-10-26 PROCEDURE — 97112 NEUROMUSCULAR REEDUCATION: CPT

## 2020-10-26 NOTE — OP THERAPY DAILY TREATMENT
"  Outpatient Physical Therapy  DAILY TREATMENT     [facility] Physical [address].  [address]  [place] [address]  Phone:  [phone]  Fax:  [phone]    Date: 10/26/2020    Patient: Lucita Babcock  YOB: 1935  MRN: [id number]     Time Calculation    Start time: 0917  Stop time: 1003 Time Calculation (min): 46 minutes         Chief Complaint: Vertigo and Loss Of Balance    Visit #: 9    SUBJECTIVE:  Pt returns after a brief break due to scheduling. No changes since last visit.  States she is still riding her bike every day.  The knees are sore today.  Thinks it is the weather change.     OBJECTIVE:      Therapeutic Exercises (CPT 03047):     1. NuStep, L3 x 8 min    Therapeutic Treatments and Modalities:     1. Neuromuscular Re-education (CPT 30937)    Therapeutic Treatment and Modalities Summary:   - Static balance: airex with- EO HTs, EC static, EC horiz HTs, 360 deg turns  - Gait: with bilat dowels to assist trunk rotation.  Cuing for fwd gaze, increased step length.   - Obstacle course: cone kick overs, step up and over (2/4\" and airex), 1/2 and full roller step over, foam wedge up and over.      Time-based treatments/modalities:    Physical Therapy Timed Treatment Charges  Neuromusc re-ed, balance, coor, post minutes (CPT 81823): 36 minutes  Therapeutic exercise minutes (CPT 84048): 10 minutes    ASSESSMENT:   Response to treatment: Pt observed to have less reliance on SPC upon presentation to the clinic.  Naturally holding it off the ground at times.  Gait quality improved with cuing, pt stating \"oh, that feels so much better.\" Anxiety, however, was the primary limiter to participation in the obstacle course.  Req HHA to complete and stating multiple times \"I'm so dizzy, I can't do this.\"     PLAN/RECOMMENDATIONS:   Plan for treatment: therapy treatment to continue next visit.  Planned interventions for next visit: continue with current treatment.       "

## 2020-11-09 ENCOUNTER — APPOINTMENT (OUTPATIENT)
Dept: PHYSICAL THERAPY | Facility: REHABILITATION | Age: 85
End: 2020-11-09
Attending: FAMILY MEDICINE
Payer: MEDICARE

## 2020-11-10 ENCOUNTER — OFFICE VISIT (OUTPATIENT)
Dept: BEHAVIORAL HEALTH | Facility: CLINIC | Age: 85
End: 2020-11-10
Payer: MEDICARE

## 2020-11-10 DIAGNOSIS — F06.4 ANXIETY DISORDER DUE TO MEDICAL CONDITION: ICD-10-CM

## 2020-11-10 DIAGNOSIS — F32.0 CURRENT MILD EPISODE OF MAJOR DEPRESSIVE DISORDER WITHOUT PRIOR EPISODE (HCC): ICD-10-CM

## 2020-11-10 DIAGNOSIS — F41.1 GENERALIZED ANXIETY DISORDER: ICD-10-CM

## 2020-11-10 PROCEDURE — 90834 PSYTX W PT 45 MINUTES: CPT | Performed by: PSYCHOLOGIST

## 2020-11-11 NOTE — BH THERAPY
[facility] Progress Note    Patient Name: Lucita Babcock  Patient MRN: [id number]  Today's Date: 11/10/2020    Type of session:Individual psychotherapy  Length of session: 45 minutes  Persons in attendance:Patient and Children    Subjective/New Info: The patient ID/Chief Complaint:  The patient is a 85 year old female, , .  The patient self-referred and voluntarily presented for an individual intake to address concerns related to increasing anxiety and decreased ability to go out in public.  Reviewed limits of confidentiality and [facility] policies; patient expressed understanding and agreed to voluntarily proceed with evaluation and treatment.    Interval History:   Session Focus: The patient's estimated global assessment of functioning indicates a mild level of difficulty with some problems in relationships and activities of daily living. The patient is nonetheless functioning well.  The patient continues to be concerned about falling continue to actively challenge her thoughts about falls.  The patient was able to identify that some of her safety behavior is simply avoidance of the possibility of following.  The patient was able to ambulate from this provider's office to the front door with no difficulties the patient was encouraged to follow the directions that were provided to her by a physical therapist related to the use of assistive devices including a cane and a walker.    Therapeutic Intervention: Cognitive Behavioral Therapy      Planned Intervention: Continue to provide support and encouragement related to her ability to manage her anxiety and her vertigo symptoms.      Objective/Observations:    Patient did not present in acute distress. Patient was appropriately groomed. Patient was alert and oriented x4. Eye contact was appropriate. No abnormalities in attention or concentration were noted. No abnormalities of movement present; psychomotor  activity was normal. Speech was fluent and regular in rhythm, rate, volume, and tone. Thought processes linear, logical and goal directed. There was no evidence of thought disorder. No auditory or visual hallucinations. Long and short term memory appeared to be intact. Insight, judgment, and impulse control were deemed to be good.  Reported mood was “happy and sad.” Affect was full-ranging and appropriate to thought content and conversation.  Patient denied past and current suicidal and homicidal ideation in plan, intent, and preparatory behavior.      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 Generalized anxiety disorder    3. Current mild episode of major depressive disorder without prior episode (HCC)         The patient denied any suicide-related ideation and/or behaviors and intent/plan, denied thoughts about death and dying both in session and during the period since last appointment, or past 2 weeks.    Risk Level: Not Currently at Clinically Significant Risk  Hospitalization is not deemed necessary at this time as the patient does not present a clear or imminent danger to self or others. No indication for pursuing higher level of care. Outpatient management is currently most appropriate and least restrictive level of care.    Current risk:   SUICIDE: Low   Homicide: Not applicable   Self-harm: Not applicable   Relapse: Not applicable   Other:    Safety Plan reviewed? No   If evidence of imminent risk is present, intervention/plan:         Treatment Goal(s)/Objective(s) addressed:   Anxiety   Goal: Develop strategies to reduce symptoms, or   Reduce anxiety and improve coping skills     Be free of panic episodes (100%)   Learn two new ways of coping with routine stressors   Report feeling more positive about self and abilities during therapy sessions       Progress toward Treatment Goals: Moderate improvement    Plan:      1) The patient will return to the clinic 4-6 weeks.  2) Crisis  Response Plan:  Reviewed emergency resources with the patient and the patient expressed understanding including:  If feeling suicidal, patient will call or present to the [facility] during duty hours or present to closest ED ([facility] or [facility], call 911 or crisis hotline ([phone]) after duty hours.  3) Referrals/Consults:  N/A  4) Barriers to Learning:  No  5) Readiness to Learn:  Yes  6) Cultural Concerns:  No  7) Patient voiced understanding of, and agreement with, plan and goals as annotated above.  8) Declare these services are medically necessary and appropriate to the patient’s diagnosis and needs  9) The point of contact at the [facility] regarding this evaluation is Dr. Montes, Psychologist.    Pete Montes III, Ed.D.

## 2020-12-19 DIAGNOSIS — I10 ESSENTIAL HYPERTENSION: ICD-10-CM

## 2020-12-21 ENCOUNTER — HOSPITAL ENCOUNTER (OUTPATIENT)
Facility: MEDICAL CENTER | Age: 85
End: 2020-12-21
Attending: PHYSICIAN ASSISTANT
Payer: MEDICARE

## 2020-12-21 PROCEDURE — 87186 SC STD MICRODIL/AGAR DIL: CPT

## 2020-12-21 PROCEDURE — 87077 CULTURE AEROBIC IDENTIFY: CPT

## 2020-12-21 PROCEDURE — 87086 URINE CULTURE/COLONY COUNT: CPT

## 2020-12-22 RX ORDER — SERTRALINE HYDROCHLORIDE 100 MG/1
TABLET, FILM COATED ORAL
Qty: 90 TAB | Refills: 0 | Status: ON HOLD | OUTPATIENT
Start: 2020-12-22 | End: 2021-02-22

## 2020-12-26 ENCOUNTER — OUTPATIENT INFUSION SERVICES (OUTPATIENT)
Dept: ONCOLOGY | Facility: MEDICAL CENTER | Age: 85
DRG: 511 | End: 2020-12-26
Attending: PHYSICIAN ASSISTANT
Payer: MEDICARE

## 2020-12-26 VITALS
SYSTOLIC BLOOD PRESSURE: 149 MMHG | TEMPERATURE: 97.4 F | WEIGHT: 156.53 LBS | RESPIRATION RATE: 19 BRPM | HEIGHT: 67 IN | OXYGEN SATURATION: 99 % | DIASTOLIC BLOOD PRESSURE: 64 MMHG | HEART RATE: 72 BPM | BODY MASS INDEX: 24.57 KG/M2

## 2020-12-26 DIAGNOSIS — N39.0 RECURRENT UTI: ICD-10-CM

## 2020-12-26 DIAGNOSIS — N39.0 RECURRENT URINARY TRACT INFECTION: ICD-10-CM

## 2020-12-26 LAB
ALBUMIN SERPL BCP-MCNC: 4.3 G/DL (ref 3.2–4.9)
ALBUMIN/GLOB SERPL: 1.5 G/DL
ALP SERPL-CCNC: 87 U/L (ref 30–99)
ALT SERPL-CCNC: 30 U/L (ref 2–50)
ANION GAP SERPL CALC-SCNC: 11 MMOL/L (ref 7–16)
AST SERPL-CCNC: 26 U/L (ref 12–45)
BILIRUB SERPL-MCNC: 0.3 MG/DL (ref 0.1–1.5)
BUN SERPL-MCNC: 13 MG/DL (ref 8–22)
CALCIUM SERPL-MCNC: 10.6 MG/DL (ref 8.5–10.5)
CHLORIDE SERPL-SCNC: 101 MMOL/L (ref 96–112)
CO2 SERPL-SCNC: 26 MMOL/L (ref 20–33)
CREAT SERPL-MCNC: 0.52 MG/DL (ref 0.5–1.4)
GLOBULIN SER CALC-MCNC: 2.8 G/DL (ref 1.9–3.5)
GLUCOSE SERPL-MCNC: 89 MG/DL (ref 65–99)
POTASSIUM SERPL-SCNC: 4.1 MMOL/L (ref 3.6–5.5)
PROT SERPL-MCNC: 7.1 G/DL (ref 6–8.2)
SODIUM SERPL-SCNC: 138 MMOL/L (ref 135–145)

## 2020-12-26 PROCEDURE — 96365 THER/PROPH/DIAG IV INF INIT: CPT

## 2020-12-26 PROCEDURE — 36415 COLL VENOUS BLD VENIPUNCTURE: CPT

## 2020-12-26 PROCEDURE — 700111 HCHG RX REV CODE 636 W/ 250 OVERRIDE (IP): Performed by: PHYSICIAN ASSISTANT

## 2020-12-26 PROCEDURE — 700105 HCHG RX REV CODE 258: Performed by: PHYSICIAN ASSISTANT

## 2020-12-26 PROCEDURE — 80053 COMPREHEN METABOLIC PANEL: CPT

## 2020-12-26 RX ORDER — 0.9 % SODIUM CHLORIDE 0.9 %
5 VIAL (ML) INJECTION PRN
Status: CANCELLED | OUTPATIENT
Start: 2020-12-27

## 2020-12-26 RX ORDER — 0.9 % SODIUM CHLORIDE 0.9 %
5 VIAL (ML) INJECTION PRN
Status: CANCELLED | OUTPATIENT
Start: 2020-12-26

## 2020-12-26 RX ADMIN — GENTAMICIN SULFATE 140 MG: 40 INJECTION, SOLUTION INTRAMUSCULAR; INTRAVENOUS at 10:45

## 2020-12-26 ASSESSMENT — FIBROSIS 4 INDEX: FIB4 SCORE: 2.43

## 2020-12-26 NOTE — PROGRESS NOTES
"Pharmacy Kinetics 85 y.o. female on gentamicin day # 1 of 7 12/26/2020    Dosing Weight: 71 kg  Currently on Gentamicin 140 mg (2 mg/kg) iv q24hr     Indication for treatment: UTI    Pertinent history per medical record: Per [facility] [place] consult, patient has had recurrent UTIs with CNS symptoms reported by daughter.     Other antibiotics: nitrofurantoin prophylaxis     Allergies: Ace inhibitors, Augmentin, Erythromycin, Lisinopril, Penicillins, Epinephrine, Percocet  [apap-fd&c red #40 [place]-oxycodone], Percocet [oxycodone-acetaminophen], and Sulfa drugs     List concerns for renal function: Advanced age    Pertinent cultures to date:   12/21/20: Urine: E coli > 100,000 cfu/ml  ESBL+   ROBERTO     Ampicillin >16 mcg/mL Resistant     Ampicillin/sulbactam >16/8 mcg/mL Resistant     Cefazolin >16 mcg/mL Resistant     Cefepime >16 mcg/mL Resistant     Cefotaxime >32 mcg/mL Extended Spectrum Beta Lactamase     Cefotetan <=16 mcg/mL Sensitive     Ceftazidime 4 mcg/mL Extended Spectrum Beta Lactamase     Ceftriaxone >32 mcg/mL Extended Spectrum Beta Lactamase     Ciprofloxacin >2 mcg/mL Resistant     Ertapenem <=0.5 mcg/mL Sensitive     Gentamicin <=4 mcg/mL Sensitive     Levofloxacin >4 mcg/mL Resistant     Nitrofurantoin >64 mcg/mL Resistant     Pip/Tazobactam <=16 mcg/mL Sensitive     Tobramycin <=4 mcg/mL Sensitive     Trimeth/Sulfa >2/38 mcg/mL Resistant        No results for input(s): WBC, NEUTSPOLYS, BANDSSTABS in the last 72 hours.    12/26/20: BUN = 13   Cr = 0.52   TBili = 0.3    No results for input(s): GENTTROUGH, GENTPEAK, GENTRANDOM in the last 72 hours.[unfilled]   /64   Pulse 72   Temp 36.3 °C (97.4 °F) (Temporal)   Resp 19   Ht 1.702 m (5' 7\")   Wt 71 kg (156 lb 8.4 oz)   SpO2 99%  @TMAX(24)@    A/P   1. Gentamicin dose change: started today per protocol  2. Next gentamicin level: Monday 12/28 before daily dose  3. Goal trough: undetectable  4. Comments: Asked to give gentamicin in Infusion " Center for 7 days for recurrent UTI due to ESBL+ E coli; details above. She had similar microbiology results in Feb 2020 and Aug 2020.   5. Plan to check chemistry today and verify undetectable gent trough on Monday.     Misti WongD

## 2020-12-26 NOTE — PROGRESS NOTES
Pt arrived to IS, ambulatory, for first dose Gentamycin for UTI. Pt unsure of current medications, encouraged to bring a list with her tomorrow as there are two additional antibiotics listed, pt believes she is not on any oral antibiotics at this time. Pt educated about Gentamycin infusion and potential side effects. 22g PIV established in the L-FA, positive blood return noted. Labs drawn per orders. Gentamycin infused with no s/sx of adverse reaction. PIV flushed and saline locked, wrapped and left in place for tomorrow. Pt left IS with no s/sx of distress. Follow up appointment confirmed.

## 2020-12-27 ENCOUNTER — HOSPITAL ENCOUNTER (INPATIENT)
Facility: MEDICAL CENTER | Age: 85
LOS: 3 days | DRG: 511 | End: 2020-12-30
Attending: EMERGENCY MEDICINE | Admitting: INTERNAL MEDICINE
Payer: MEDICARE

## 2020-12-27 ENCOUNTER — APPOINTMENT (OUTPATIENT)
Dept: RADIOLOGY | Facility: MEDICAL CENTER | Age: 85
DRG: 511 | End: 2020-12-27
Attending: EMERGENCY MEDICINE
Payer: MEDICARE

## 2020-12-27 ENCOUNTER — APPOINTMENT (OUTPATIENT)
Dept: RADIOLOGY | Facility: MEDICAL CENTER | Age: 85
DRG: 511 | End: 2020-12-27
Attending: ORTHOPAEDIC SURGERY
Payer: MEDICARE

## 2020-12-27 ENCOUNTER — ANESTHESIA (OUTPATIENT)
Dept: SURGERY | Facility: MEDICAL CENTER | Age: 85
DRG: 511 | End: 2020-12-27
Payer: MEDICARE

## 2020-12-27 ENCOUNTER — OUTPATIENT INFUSION SERVICES (OUTPATIENT)
Dept: ONCOLOGY | Facility: MEDICAL CENTER | Age: 85
DRG: 511 | End: 2020-12-27
Attending: PHYSICIAN ASSISTANT
Payer: MEDICARE

## 2020-12-27 ENCOUNTER — ANESTHESIA EVENT (OUTPATIENT)
Dept: SURGERY | Facility: MEDICAL CENTER | Age: 85
DRG: 511 | End: 2020-12-27
Payer: MEDICARE

## 2020-12-27 VITALS
RESPIRATION RATE: 18 BRPM | OXYGEN SATURATION: 93 % | TEMPERATURE: 97.3 F | DIASTOLIC BLOOD PRESSURE: 60 MMHG | HEART RATE: 73 BPM | SYSTOLIC BLOOD PRESSURE: 158 MMHG

## 2020-12-27 DIAGNOSIS — S32.010A COMPRESSION FRACTURE OF L1 VERTEBRA, INITIAL ENCOUNTER (HCC): ICD-10-CM

## 2020-12-27 DIAGNOSIS — N39.0 RECURRENT UTI: ICD-10-CM

## 2020-12-27 DIAGNOSIS — S52.531B TYPE I OR II OPEN COLLES' FRACTURE OF RIGHT RADIUS, INITIAL ENCOUNTER: ICD-10-CM

## 2020-12-27 DIAGNOSIS — S62.101S WRIST FRACTURE, CLOSED, RIGHT, SEQUELA: ICD-10-CM

## 2020-12-27 PROBLEM — S32.019A L1 VERTEBRAL FRACTURE (HCC): Status: ACTIVE | Noted: 2020-12-27

## 2020-12-27 PROBLEM — S62.102B: Status: ACTIVE | Noted: 2020-12-27

## 2020-12-27 PROBLEM — D72.829 LEUKOCYTOSIS: Status: ACTIVE | Noted: 2020-12-27

## 2020-12-27 PROBLEM — S62.101B: Status: ACTIVE | Noted: 2020-12-27

## 2020-12-27 PROBLEM — F03.90 DEMENTIA (HCC): Status: ACTIVE | Noted: 2020-12-27

## 2020-12-27 LAB
ALBUMIN SERPL BCP-MCNC: 4.5 G/DL (ref 3.2–4.9)
ALBUMIN/GLOB SERPL: 1.6 G/DL
ALP SERPL-CCNC: 91 U/L (ref 30–99)
ALT SERPL-CCNC: 36 U/L (ref 2–50)
ANION GAP SERPL CALC-SCNC: 15 MMOL/L (ref 7–16)
AST SERPL-CCNC: 27 U/L (ref 12–45)
BASOPHILS # BLD AUTO: 0.4 % (ref 0–1.8)
BASOPHILS # BLD: 0.08 K/UL (ref 0–0.12)
BILIRUB SERPL-MCNC: 0.4 MG/DL (ref 0.1–1.5)
BUN SERPL-MCNC: 15 MG/DL (ref 8–22)
CALCIUM SERPL-MCNC: 11.1 MG/DL (ref 8.5–10.5)
CHLORIDE SERPL-SCNC: 99 MMOL/L (ref 96–112)
CO2 SERPL-SCNC: 24 MMOL/L (ref 20–33)
COVID ORDER STATUS COVID19: NORMAL
CREAT SERPL-MCNC: 0.48 MG/DL (ref 0.5–1.4)
EKG IMPRESSION: NORMAL
EOSINOPHIL # BLD AUTO: 0.02 K/UL (ref 0–0.51)
EOSINOPHIL NFR BLD: 0.1 % (ref 0–6.9)
ERYTHROCYTE [DISTWIDTH] IN BLOOD BY AUTOMATED COUNT: 44.1 FL (ref 35.9–50)
GLOBULIN SER CALC-MCNC: 2.9 G/DL (ref 1.9–3.5)
GLUCOSE SERPL-MCNC: 92 MG/DL (ref 65–99)
HCT VFR BLD AUTO: 46.4 % (ref 37–47)
HGB BLD-MCNC: 15.6 G/DL (ref 12–16)
IMM GRANULOCYTES # BLD AUTO: 0.21 K/UL (ref 0–0.11)
IMM GRANULOCYTES NFR BLD AUTO: 1 % (ref 0–0.9)
LYMPHOCYTES # BLD AUTO: 1.73 K/UL (ref 1–4.8)
LYMPHOCYTES NFR BLD: 7.9 % (ref 22–41)
MCH RBC QN AUTO: 31.3 PG (ref 27–33)
MCHC RBC AUTO-ENTMCNC: 33.6 G/DL (ref 33.6–35)
MCV RBC AUTO: 93.2 FL (ref 81.4–97.8)
MONOCYTES # BLD AUTO: 1.04 K/UL (ref 0–0.85)
MONOCYTES NFR BLD AUTO: 4.7 % (ref 0–13.4)
NEUTROPHILS # BLD AUTO: 18.84 K/UL (ref 2–7.15)
NEUTROPHILS NFR BLD: 85.9 % (ref 44–72)
NRBC # BLD AUTO: 0 K/UL
NRBC BLD-RTO: 0 /100 WBC
PLATELET # BLD AUTO: 251 K/UL (ref 164–446)
PMV BLD AUTO: 9.2 FL (ref 9–12.9)
POTASSIUM SERPL-SCNC: 3.5 MMOL/L (ref 3.6–5.5)
PROT SERPL-MCNC: 7.4 G/DL (ref 6–8.2)
RBC # BLD AUTO: 4.98 M/UL (ref 4.2–5.4)
SARS-COV+SARS-COV-2 AG RESP QL IA.RAPID: NOTDETECTED
SODIUM SERPL-SCNC: 138 MMOL/L (ref 135–145)
SPECIMEN SOURCE: NORMAL
WBC # BLD AUTO: 21.9 K/UL (ref 4.8–10.8)

## 2020-12-27 PROCEDURE — 700105 HCHG RX REV CODE 258: Performed by: ANESTHESIOLOGY

## 2020-12-27 PROCEDURE — 160028 HCHG SURGERY MINUTES - 1ST 30 MINS LEVEL 3: Performed by: ORTHOPAEDIC SURGERY

## 2020-12-27 PROCEDURE — 99223 1ST HOSP IP/OBS HIGH 75: CPT | Mod: AI | Performed by: INTERNAL MEDICINE

## 2020-12-27 PROCEDURE — 700111 HCHG RX REV CODE 636 W/ 250 OVERRIDE (IP): Performed by: ANESTHESIOLOGY

## 2020-12-27 PROCEDURE — 160048 HCHG OR STATISTICAL LEVEL 1-5: Performed by: ORTHOPAEDIC SURGERY

## 2020-12-27 PROCEDURE — 70450 CT HEAD/BRAIN W/O DYE: CPT

## 2020-12-27 PROCEDURE — 96365 THER/PROPH/DIAG IV INF INIT: CPT

## 2020-12-27 PROCEDURE — 87426 SARSCOV CORONAVIRUS AG IA: CPT

## 2020-12-27 PROCEDURE — 72125 CT NECK SPINE W/O DYE: CPT

## 2020-12-27 PROCEDURE — 80053 COMPREHEN METABOLIC PANEL: CPT

## 2020-12-27 PROCEDURE — 96375 TX/PRO/DX INJ NEW DRUG ADDON: CPT

## 2020-12-27 PROCEDURE — 73100 X-RAY EXAM OF WRIST: CPT | Mod: RT

## 2020-12-27 PROCEDURE — 700105 HCHG RX REV CODE 258: Performed by: INTERNAL MEDICINE

## 2020-12-27 PROCEDURE — 0PSH04Z REPOSITION RIGHT RADIUS WITH INTERNAL FIXATION DEVICE, OPEN APPROACH: ICD-10-PCS | Performed by: ORTHOPAEDIC SURGERY

## 2020-12-27 PROCEDURE — 700111 HCHG RX REV CODE 636 W/ 250 OVERRIDE (IP)

## 2020-12-27 PROCEDURE — 160039 HCHG SURGERY MINUTES - EA ADDL 1 MIN LEVEL 3: Performed by: ORTHOPAEDIC SURGERY

## 2020-12-27 PROCEDURE — 72128 CT CHEST SPINE W/O DYE: CPT

## 2020-12-27 PROCEDURE — 93005 ELECTROCARDIOGRAM TRACING: CPT | Performed by: EMERGENCY MEDICINE

## 2020-12-27 PROCEDURE — 72131 CT LUMBAR SPINE W/O DYE: CPT

## 2020-12-27 PROCEDURE — L0458 TLSO 2MOD SYMPHIS-XIPHO PRE: HCPCS

## 2020-12-27 PROCEDURE — 99291 CRITICAL CARE FIRST HOUR: CPT

## 2020-12-27 PROCEDURE — C1713 ANCHOR/SCREW BN/BN,TIS/BN: HCPCS | Performed by: ORTHOPAEDIC SURGERY

## 2020-12-27 PROCEDURE — 700102 HCHG RX REV CODE 250 W/ 637 OVERRIDE(OP): Performed by: INTERNAL MEDICINE

## 2020-12-27 PROCEDURE — 700105 HCHG RX REV CODE 258: Performed by: PHYSICIAN ASSISTANT

## 2020-12-27 PROCEDURE — 160002 HCHG RECOVERY MINUTES (STAT): Performed by: ORTHOPAEDIC SURGERY

## 2020-12-27 PROCEDURE — 96376 TX/PRO/DX INJ SAME DRUG ADON: CPT

## 2020-12-27 PROCEDURE — 71045 X-RAY EXAM CHEST 1 VIEW: CPT

## 2020-12-27 PROCEDURE — 160009 HCHG ANES TIME/MIN: Performed by: ORTHOPAEDIC SURGERY

## 2020-12-27 PROCEDURE — 160035 HCHG PACU - 1ST 60 MINS PHASE I: Performed by: ORTHOPAEDIC SURGERY

## 2020-12-27 PROCEDURE — 29125 APPL SHORT ARM SPLINT STATIC: CPT

## 2020-12-27 PROCEDURE — 85025 COMPLETE CBC W/AUTO DIFF WBC: CPT

## 2020-12-27 PROCEDURE — 64415 NJX AA&/STRD BRCH PLXS IMG: CPT | Performed by: ORTHOPAEDIC SURGERY

## 2020-12-27 PROCEDURE — 700111 HCHG RX REV CODE 636 W/ 250 OVERRIDE (IP): Performed by: STUDENT IN AN ORGANIZED HEALTH CARE EDUCATION/TRAINING PROGRAM

## 2020-12-27 PROCEDURE — 700111 HCHG RX REV CODE 636 W/ 250 OVERRIDE (IP): Performed by: PHYSICIAN ASSISTANT

## 2020-12-27 PROCEDURE — 700111 HCHG RX REV CODE 636 W/ 250 OVERRIDE (IP): Performed by: EMERGENCY MEDICINE

## 2020-12-27 PROCEDURE — A9270 NON-COVERED ITEM OR SERVICE: HCPCS | Performed by: INTERNAL MEDICINE

## 2020-12-27 PROCEDURE — L0464 TLSO 4MOD SACRO-SCAP PRE: HCPCS

## 2020-12-27 PROCEDURE — C9803 HOPD COVID-19 SPEC COLLECT: HCPCS | Performed by: ORTHOPAEDIC SURGERY

## 2020-12-27 PROCEDURE — 501838 HCHG SUTURE GENERAL: Performed by: ORTHOPAEDIC SURGERY

## 2020-12-27 PROCEDURE — 770006 HCHG ROOM/CARE - MED/SURG/GYN SEMI*

## 2020-12-27 DEVICE — SCREW 2.5 MM LOCKING TI X 18MM LONG (6TX8=48): Type: IMPLANTABLE DEVICE | Site: WRIST | Status: FUNCTIONAL

## 2020-12-27 DEVICE — SCREW 3.5 MM LOCKING TI X 14MM LONG (6TX8+2TX5=58): Type: IMPLANTABLE DEVICE | Site: WRIST | Status: FUNCTIONAL

## 2020-12-27 DEVICE — SCREW 3.5 MM NON-LOCKING TI X 14MM LONG (6TX8+2TX5=58): Type: IMPLANTABLE DEVICE | Site: WRIST | Status: FUNCTIONAL

## 2020-12-27 DEVICE — SCREW 2.5 MM LOCKING TI X 16MM LONG (6TX8=48): Type: IMPLANTABLE DEVICE | Site: WRIST | Status: FUNCTIONAL

## 2020-12-27 DEVICE — SCREW 2.5 MM LOCKING TI X 20MM LONG (6TX8=48): Type: IMPLANTABLE DEVICE | Site: WRIST | Status: FUNCTIONAL

## 2020-12-27 DEVICE — PLATE VOLAR NARROW RIGHT 3HX20X44MM (2TX2=4): Type: IMPLANTABLE DEVICE | Site: WRIST | Status: FUNCTIONAL

## 2020-12-27 DEVICE — SCREW 3.5 MM LOCKING TI X 12MM LONG (6TX8+2TX5=58): Type: IMPLANTABLE DEVICE | Site: WRIST | Status: FUNCTIONAL

## 2020-12-27 RX ORDER — AMOXICILLIN 250 MG
2 CAPSULE ORAL 2 TIMES DAILY
Status: DISCONTINUED | OUTPATIENT
Start: 2020-12-27 | End: 2020-12-27

## 2020-12-27 RX ORDER — HALOPERIDOL 5 MG/ML
1 INJECTION INTRAMUSCULAR
Status: DISCONTINUED | OUTPATIENT
Start: 2020-12-27 | End: 2020-12-27 | Stop reason: HOSPADM

## 2020-12-27 RX ORDER — CYCLOSPORINE 0.5 MG/ML
1 EMULSION OPHTHALMIC 2 TIMES DAILY
Status: DISCONTINUED | OUTPATIENT
Start: 2020-12-27 | End: 2020-12-27

## 2020-12-27 RX ORDER — CEFAZOLIN SODIUM 2 G/100ML
2 INJECTION, SOLUTION INTRAVENOUS EVERY 8 HOURS
Status: COMPLETED | OUTPATIENT
Start: 2020-12-28 | End: 2020-12-28

## 2020-12-27 RX ORDER — SODIUM CHLORIDE, SODIUM LACTATE, POTASSIUM CHLORIDE, CALCIUM CHLORIDE 600; 310; 30; 20 MG/100ML; MG/100ML; MG/100ML; MG/100ML
INJECTION, SOLUTION INTRAVENOUS
Status: DISCONTINUED | OUTPATIENT
Start: 2020-12-27 | End: 2020-12-27 | Stop reason: SURG

## 2020-12-27 RX ORDER — 0.9 % SODIUM CHLORIDE 0.9 %
5 VIAL (ML) INJECTION PRN
Status: CANCELLED | OUTPATIENT
Start: 2020-12-28

## 2020-12-27 RX ORDER — MORPHINE SULFATE 4 MG/ML
1 INJECTION, SOLUTION INTRAMUSCULAR; INTRAVENOUS EVERY 4 HOURS PRN
Status: DISCONTINUED | OUTPATIENT
Start: 2020-12-27 | End: 2020-12-30 | Stop reason: HOSPADM

## 2020-12-27 RX ORDER — ONDANSETRON 2 MG/ML
4 INJECTION INTRAMUSCULAR; INTRAVENOUS ONCE
Status: COMPLETED | OUTPATIENT
Start: 2020-12-27 | End: 2020-12-27

## 2020-12-27 RX ORDER — CEFAZOLIN SODIUM 1 G/3ML
INJECTION, POWDER, FOR SOLUTION INTRAMUSCULAR; INTRAVENOUS PRN
Status: DISCONTINUED | OUTPATIENT
Start: 2020-12-27 | End: 2020-12-27 | Stop reason: SURG

## 2020-12-27 RX ORDER — SODIUM CHLORIDE, SODIUM LACTATE, POTASSIUM CHLORIDE, CALCIUM CHLORIDE 600; 310; 30; 20 MG/100ML; MG/100ML; MG/100ML; MG/100ML
INJECTION, SOLUTION INTRAVENOUS CONTINUOUS
Status: DISCONTINUED | OUTPATIENT
Start: 2020-12-27 | End: 2020-12-27 | Stop reason: HOSPADM

## 2020-12-27 RX ORDER — POLYETHYLENE GLYCOL 3350 17 G/17G
1 POWDER, FOR SOLUTION ORAL
Status: DISCONTINUED | OUTPATIENT
Start: 2020-12-27 | End: 2020-12-30 | Stop reason: HOSPADM

## 2020-12-27 RX ORDER — VITAMIN B COMPLEX
4000 TABLET ORAL DAILY
Status: DISCONTINUED | OUTPATIENT
Start: 2020-12-27 | End: 2020-12-30 | Stop reason: HOSPADM

## 2020-12-27 RX ORDER — SERTRALINE HYDROCHLORIDE 100 MG/1
100 TABLET, FILM COATED ORAL DAILY
Status: DISCONTINUED | OUTPATIENT
Start: 2020-12-28 | End: 2020-12-30 | Stop reason: HOSPADM

## 2020-12-27 RX ORDER — ONDANSETRON 2 MG/ML
INJECTION INTRAMUSCULAR; INTRAVENOUS PRN
Status: DISCONTINUED | OUTPATIENT
Start: 2020-12-27 | End: 2020-12-27 | Stop reason: SURG

## 2020-12-27 RX ORDER — ONDANSETRON 2 MG/ML
4 INJECTION INTRAMUSCULAR; INTRAVENOUS
Status: DISCONTINUED | OUTPATIENT
Start: 2020-12-27 | End: 2020-12-27 | Stop reason: HOSPADM

## 2020-12-27 RX ORDER — ONDANSETRON 2 MG/ML
4 INJECTION INTRAMUSCULAR; INTRAVENOUS EVERY 4 HOURS PRN
Status: DISCONTINUED | OUTPATIENT
Start: 2020-12-27 | End: 2020-12-30 | Stop reason: HOSPADM

## 2020-12-27 RX ORDER — CARBOXYMETHYLCELLULOSE SODIUM 5 MG/ML
1 SOLUTION/ DROPS OPHTHALMIC PRN
Status: DISCONTINUED | OUTPATIENT
Start: 2020-12-27 | End: 2020-12-30 | Stop reason: HOSPADM

## 2020-12-27 RX ORDER — ROPIVACAINE HYDROCHLORIDE 5 MG/ML
INJECTION, SOLUTION EPIDURAL; INFILTRATION; PERINEURAL
Status: COMPLETED | OUTPATIENT
Start: 2020-12-27 | End: 2020-12-27

## 2020-12-27 RX ORDER — MORPHINE SULFATE 4 MG/ML
INJECTION, SOLUTION INTRAMUSCULAR; INTRAVENOUS
Status: COMPLETED
Start: 2020-12-27 | End: 2020-12-27

## 2020-12-27 RX ORDER — AMOXICILLIN 250 MG
2 CAPSULE ORAL 2 TIMES DAILY
Status: DISCONTINUED | OUTPATIENT
Start: 2020-12-28 | End: 2020-12-30 | Stop reason: HOSPADM

## 2020-12-27 RX ORDER — SERTRALINE HYDROCHLORIDE 100 MG/1
100 TABLET, FILM COATED ORAL
Status: DISCONTINUED | OUTPATIENT
Start: 2020-12-27 | End: 2020-12-27

## 2020-12-27 RX ORDER — ONDANSETRON 4 MG/1
4 TABLET, ORALLY DISINTEGRATING ORAL EVERY 4 HOURS PRN
Status: DISCONTINUED | OUTPATIENT
Start: 2020-12-27 | End: 2020-12-30 | Stop reason: HOSPADM

## 2020-12-27 RX ORDER — LORAZEPAM 2 MG/ML
0.5 INJECTION INTRAMUSCULAR ONCE
Status: COMPLETED | OUTPATIENT
Start: 2020-12-27 | End: 2020-12-27

## 2020-12-27 RX ORDER — MULTIVIT-MIN/IRON/FOLIC ACID/K 18-600-40
4000 CAPSULE ORAL DAILY
COMMUNITY
End: 2021-01-26

## 2020-12-27 RX ORDER — DEXAMETHASONE SODIUM PHOSPHATE 4 MG/ML
INJECTION, SOLUTION INTRA-ARTICULAR; INTRALESIONAL; INTRAMUSCULAR; INTRAVENOUS; SOFT TISSUE PRN
Status: DISCONTINUED | OUTPATIENT
Start: 2020-12-27 | End: 2020-12-27 | Stop reason: SURG

## 2020-12-27 RX ORDER — OXYCODONE HYDROCHLORIDE 5 MG/1
5 TABLET ORAL EVERY 4 HOURS PRN
Status: DISCONTINUED | OUTPATIENT
Start: 2020-12-27 | End: 2020-12-30 | Stop reason: HOSPADM

## 2020-12-27 RX ORDER — BETHANECHOL CHLORIDE 25 MG/1
25 TABLET ORAL 2 TIMES DAILY
Status: DISCONTINUED | OUTPATIENT
Start: 2020-12-27 | End: 2020-12-30 | Stop reason: HOSPADM

## 2020-12-27 RX ORDER — BISACODYL 10 MG
10 SUPPOSITORY, RECTAL RECTAL
Status: DISCONTINUED | OUTPATIENT
Start: 2020-12-27 | End: 2020-12-30 | Stop reason: HOSPADM

## 2020-12-27 RX ORDER — SODIUM CHLORIDE 9 MG/ML
INJECTION, SOLUTION INTRAVENOUS CONTINUOUS
Status: DISCONTINUED | OUTPATIENT
Start: 2020-12-27 | End: 2020-12-28

## 2020-12-27 RX ORDER — CEFAZOLIN SODIUM 1 G/50ML
1 INJECTION, SOLUTION INTRAVENOUS ONCE
Status: COMPLETED | OUTPATIENT
Start: 2020-12-27 | End: 2020-12-27

## 2020-12-27 RX ORDER — MORPHINE SULFATE 4 MG/ML
4 INJECTION, SOLUTION INTRAMUSCULAR; INTRAVENOUS ONCE
Status: COMPLETED | OUTPATIENT
Start: 2020-12-27 | End: 2020-12-27

## 2020-12-27 RX ORDER — BISACODYL 10 MG
10 SUPPOSITORY, RECTAL RECTAL
Status: DISCONTINUED | OUTPATIENT
Start: 2020-12-27 | End: 2020-12-27

## 2020-12-27 RX ORDER — DIPHENHYDRAMINE HYDROCHLORIDE 50 MG/ML
12.5 INJECTION INTRAMUSCULAR; INTRAVENOUS
Status: DISCONTINUED | OUTPATIENT
Start: 2020-12-27 | End: 2020-12-27 | Stop reason: HOSPADM

## 2020-12-27 RX ORDER — POLYETHYLENE GLYCOL 3350 17 G/17G
1 POWDER, FOR SOLUTION ORAL
Status: DISCONTINUED | OUTPATIENT
Start: 2020-12-27 | End: 2020-12-27

## 2020-12-27 RX ADMIN — SODIUM CHLORIDE, POTASSIUM CHLORIDE, SODIUM LACTATE AND CALCIUM CHLORIDE: 600; 310; 30; 20 INJECTION, SOLUTION INTRAVENOUS at 19:33

## 2020-12-27 RX ADMIN — SODIUM CHLORIDE: 9 INJECTION, SOLUTION INTRAVENOUS at 22:28

## 2020-12-27 RX ADMIN — DEXAMETHASONE SODIUM PHOSPHATE 8 MG: 4 INJECTION, SOLUTION INTRA-ARTICULAR; INTRALESIONAL; INTRAMUSCULAR; INTRAVENOUS; SOFT TISSUE at 19:33

## 2020-12-27 RX ADMIN — MORPHINE SULFATE 1 MG: 4 INJECTION INTRAVENOUS at 18:29

## 2020-12-27 RX ADMIN — MORPHINE SULFATE 4 MG: 4 INJECTION INTRAVENOUS at 15:17

## 2020-12-27 RX ADMIN — CEFAZOLIN SODIUM 1 G: 1 INJECTION, SOLUTION INTRAVENOUS at 16:17

## 2020-12-27 RX ADMIN — GENTAMICIN SULFATE 140 MG: 40 INJECTION, SOLUTION INTRAMUSCULAR; INTRAVENOUS at 12:04

## 2020-12-27 RX ADMIN — MORPHINE SULFATE 4 MG: 4 INJECTION INTRAVENOUS at 16:14

## 2020-12-27 RX ADMIN — CEFAZOLIN 2 G: 330 INJECTION, POWDER, FOR SOLUTION INTRAMUSCULAR; INTRAVENOUS at 19:33

## 2020-12-27 RX ADMIN — ONDANSETRON 8 MG: 2 INJECTION INTRAMUSCULAR; INTRAVENOUS at 19:33

## 2020-12-27 RX ADMIN — BETHANECHOL CHLORIDE 25 MG: 25 TABLET ORAL at 22:06

## 2020-12-27 RX ADMIN — LORAZEPAM 0.5 MG: 2 INJECTION INTRAMUSCULAR; INTRAVENOUS at 23:14

## 2020-12-27 RX ADMIN — PROPOFOL 150 MG: 10 INJECTION, EMULSION INTRAVENOUS at 19:47

## 2020-12-27 RX ADMIN — ONDANSETRON 4 MG: 2 INJECTION INTRAMUSCULAR; INTRAVENOUS at 15:17

## 2020-12-27 RX ADMIN — ROPIVACAINE HYDROCHLORIDE 30 ML: 5 INJECTION, SOLUTION EPIDURAL; INFILTRATION; PERINEURAL at 19:30

## 2020-12-27 ASSESSMENT — ENCOUNTER SYMPTOMS
EYES NEGATIVE: 1
NEUROLOGICAL NEGATIVE: 1
GASTROINTESTINAL NEGATIVE: 1
CHILLS: 0
RESPIRATORY NEGATIVE: 1
WEIGHT LOSS: 0
FALLS: 1
BACK PAIN: 1
PSYCHIATRIC NEGATIVE: 1
NECK PAIN: 0
FEVER: 0
CARDIOVASCULAR NEGATIVE: 1
CONSTITUTIONAL NEGATIVE: 1

## 2020-12-27 ASSESSMENT — FIBROSIS 4 INDEX: FIB4 SCORE: 1.52

## 2020-12-27 ASSESSMENT — PAIN SCALES - GENERAL: PAIN_LEVEL: 0

## 2020-12-27 NOTE — ED PROVIDER NOTES
ED Provider Note    Scribed for Truman Tolentino M.D. by Madonna Trent. 12/27/2020, 2:39 PM.    Primary care provider: Robbie Sloan M.D.  Means of arrival: EMS  History obtained from: Patient   History limited by: None    CHIEF COMPLAINT  Chief Complaint   Patient presents with   • GLF     Per patient she was stepping over a step in the garage and fell backwards. Per EMS, patient did not hit head or have a LOC. Patient denies blood thinners. Patient intermitently confused and not oriented to year.    • Arm Injury     Patient fell onto wrist. Patient presents with swelling and bruising to top of right hand and wrist.      PPE Note: I personally donned full PPE for all patient encounters during this visit, including wearing an N95 respirator mask and gloves. Scribe remained outside the patient's room and did not have any contact with the patient for the duration of patient encounter.      HPI  Lucita Babcock is a 85 y.o. female who presents to the Emergency Department for right wrist pain after sustaining a fall in her garage onset prior to arrival. She states she was walking up the stairs when she slipped and fell from the second step onto her wrist and back. She says she also hit her head with no loss of consciousness. She is also experiencing lower back pain. She denies neck or abdominal pain. She recently broke her neck and had surgery with Dr. Khan. She says she is allergic to penicillin. Her tetanus is up to date.     REVIEW OF SYSTEMS  Review of Systems   Gastrointestinal: Negative for abdominal pain.   Musculoskeletal: Positive for back pain and falls. Negative for neck pain.        Wrist pain   Neurological: Negative for loss of consciousness.   All other systems reviewed and are negative.      PAST MEDICAL HISTORY   has a past medical history of Anesthesia, Aneurysm (HCC), Anxiety, Arthritis, Breast cancer (HCC) (2008), Cancer (HCC) (2008), CATARACT, Dental disorder, Depression, Heart burn,  Hypertension, Indigestion, Injury of cervical spine (HCC) (2016), Osteopenia, 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HCC), Sarcoid, Urinary bladder disorder, and UTI (urinary tract infection).    SURGICAL HISTORY   has a past surgical history that includes vein stripping; mass excision general (); breast biopsy (08); abdominal hysterectomy total (); cataract phaco with iol (2010); colonoscopy (); ercp in or (2012); samuel by laparoscopy (2013); lumpectomy (); radiation therapy plan simple (); cataract phaco with iol (2014); cervical disk and fusion anterior (2016); and parathyroid exploration (2018).    SOCIAL HISTORY  Social History     Tobacco Use   • Smoking status: Former Smoker     Packs/day: 0.50     Years: 45.00     Pack years: 22.50     Quit date: 10/9/1969     Years since quittin.2   • Smokeless tobacco: Never Used   Substance Use Topics   • Alcohol use: No   • Drug use: No      Social History     Substance and Sexual Activity   Drug Use No       FAMILY HISTORY  Family History   Problem Relation Age of Onset   • Hypertension Mother    • Stroke Mother    • Suicide Attempts Son    • Bipolar disorder Son    • Other Father         Dementia   • Other Sister         BIpolar   • Bipolar disorder Sister    • Other Sister         Bipolar   • Other Sister         THyroid disease   • Bipolar disorder Sister    • Cancer Neg Hx    • Diabetes Neg Hx    • Heart Disease Neg Hx        CURRENT MEDICATIONS  Home Medications     Reviewed by Digna Pinto R.N. (Registered Nurse) on 20 at 1424  Med List Status: Unable to Obtain   Medication Last Dose Status   acetaminophen (TYLENOL) 160 MG/5ML Suspension  Active   ascorbic acid (ASCORBIC ACID) 500 MG Tab  Active   bethanechol (URECHOLINE) 25 MG Tab  Active   Cholecalciferol (VITAMIN D PO)  Active   cyclosporin (RESTASIS) 0.05 % ophthalmic emulsion  Active   Diclofenac Sodium 1 % Gel  Active   DILTIAZem (CARDIZEM) 30 MG Tab   "Active   DILTIAZem (CARDIZEM) 30 MG Tab  Active   nitrofurantoin (MACROBID) 100 MG Cap  Active   sertraline (ZOLOFT) 100 MG Tab  Active                ALLERGIES  Allergies   Allergen Reactions   • Ace Inhibitors Anaphylaxis   • Augmentin      Flu like sx   • Erythromycin Rash   • Lisinopril Anaphylaxis   • Penicillins Hives     Tolerates cephalosporins   • Epinephrine Unspecified     shaking   • Percocet  [Apap-Fd&C Red #40 [place]-Oxycodone] Vomiting   • Percocet [Oxycodone-Acetaminophen] Vomiting   • Sulfa Drugs Hives       PHYSICAL EXAM  VITAL SIGNS: BP (!) 179/80   Pulse 73   Temp 36.2 °C (97.1 °F) (Temporal)   Resp 16   Ht 1.702 m (5' 7\")   Wt 70.8 kg (156 lb)   SpO2 98%   Breastfeeding No   BMI 24.43 kg/m²     Constitutional:  mild distress  HENT:  Moist mucous membranes  Eyes:  No conjunctivitis or icterus  Neck: Non-tender, trachea is midline, no palpable thyroid  Lymphatic:  No cervical lymphadenopathy  Cardiovascular:  Regular rate and rhythm, no murmurs  Thorax & Lungs: Chest wall non-tender, normal breath sounds, no rhonchi  Abdomen:  Soft, Non-tender  Skin:. no rash  Back: Non-tender, no CVA tenderness  Extremities: Dorsal forearm deformity with echymossis and swelling circumferentially. Less than 0.5 cm laceration to the ulnar flexor surface consistent of open fracture   no edema  Vascular:  symmetric radial pulse  Neurologic:  Normal gross motor    LABS  Labs Reviewed   CBC WITH DIFFERENTIAL - Abnormal; Notable for the following components:       Result Value    WBC 21.9 (*)     Neutrophils-Polys 85.90 (*)     Lymphocytes 7.90 (*)     Immature Granulocytes 1.00 (*)     Neutrophils (Absolute) 18.84 (*)     Monos (Absolute) 1.04 (*)     Immature Granulocytes (abs) 0.21 (*)     All other components within normal limits   COMP METABOLIC PANEL - Abnormal; Notable for the following components:    Potassium 3.5 (*)     Creatinine 0.48 (*)     Calcium 11.1 (*)     All other components within normal " limits   CBC WITH DIFFERENTIAL - Abnormal; Notable for the following components:    WBC 14.8 (*)     MCHC 32.4 (*)     Neutrophils-Polys 93.50 (*)     Lymphocytes 4.60 (*)     Neutrophils (Absolute) 13.86 (*)     Lymphs (Absolute) 0.69 (*)     All other components within normal limits   BASIC METABOLIC PANEL - Abnormal; Notable for the following components:    Sodium 134 (*)     Glucose 172 (*)     All other components within normal limits   CRP QUANTITIVE (NON-CARDIAC) - Abnormal; Notable for the following components:    Stat C-Reactive Protein 1.28 (*)     All other components within normal limits   COVID/SARS COV-2   COVID/SARS COV-2    Narrative:     Is patient being admitted?->Yes  Does this patient meet criteria for Rush/Cepheid per [facility]  Inpatient Workflow? (See workflow link below)->Yes  Expected turn around time?->(Rach/Abbott) Antigen dry swab  test asymptomatic surgical patient  Have you been in close contact with a person who is suspected  or known to be positive for COVID-19 within the last 30 days  (e.g. last seen that person < 30 days ago)->No   ESTIMATED GFR   SARS-COV ANTIGEN MAGALIS    Narrative:     Is patient being admitted?->Yes  Does this patient meet criteria for Rush/Cepheid per [facility]  Inpatient Workflow? (See workflow link below)->Yes  Expected turn around time?->(Rach/Abbott) Antigen dry swab  test asymptomatic surgical patient  Have you been in close contact with a person who is suspected  or known to be positive for COVID-19 within the last 30 days  (e.g. last seen that person < 30 days ago)->No   MAGNESIUM   PROCALCITONIN   TSH WITH REFLEX TO FT4   VITAMIN D,25 HYDROXY   VITAMIN B12   FOLATE   ESTIMATED GFR   URINALYSIS     All labs reviewed by me.    EKG Interpretation  normal sinus rhythm rate 75 with first-degree AV block normal corrected QT interval. Normal QRS she has non-diagnostic ST depressions in the left anterior lateral leads. Somewhat different than  previous    RADIOLOGY  CT-LSPINE W/O PLUS RECONS   Final Result      1.  L1 superior endplate compression fracture resulting in 30% height loss and mild posterior bony retropulsion. This narrows the spinal canal by less than 20%.      2.  Multilevel degenerative disc disease and facet degeneration.      CT-TSPINE W/O PLUS RECONS   Final Result         No acute fracture or subluxation of the thoracic spine.      L1 fracture.      CT-HEAD W/O   Final Result      1.  Cerebral atrophy.      2.  White matter lucencies most consistent with small vessel ischemic change versus demyelination or gliosis.      3.  Otherwise, Head CT without contrast with no acute findings. No evidence of acute cerebral infarction, hemorrhage or mass lesion.      CT-CSPINE WITHOUT PLUS RECONS   Final Result      1.  No evidence of cervical spine fracture.      2.  Surgical change extending from C4 through C7.      3.  Multilevel degenerative disc disease and facet degeneration.      4.  Kyphotic deformity.      DX-WRIST-LIMITED 2- RIGHT   Final Result      Markedly displaced, impacted and dorsally angulated fractures of the distal radius and ulna.         DX-CHEST-PORTABLE (1 VIEW)   Final Result      Cardiomegaly.        The radiologist's interpretation of all radiological studies have been reviewed by me.    COURSE & MEDICAL DECISION MAKING  Pertinent Labs & Imaging studies reviewed. (See chart for details)    3:05 PM - Patient seen and examined at bedside. Patient will be treated with morphine 4 mg, Zofran 4 mg, and Ancef 1 g. Ordered DX-Wrist, CT-CSpine without contrast, CT-LSpine without contrast, CT-TSpine without contrast, DX-Chest, CT-Head, CBC with diff, CMP, Urinalysis, and EKG  to evaluate her symptoms. The differential diagnoses include but are not limited to: spine fracture distal radius fracture      Medical Decision Making:   patient appears to have open fracture distal radius x-ray confirms a distal radius ulnar fracture with  significant displacement. Does good distal neurovascular exam on that extremity. Wound is been bandaged patient is tetanus is updated prior to this. She has been given Ancef as well as being placed in a splint for the position of comfort.    Patient is being kept NPO Dr. Martinez will be taking the patient to surgery this evening.    Patient also has back pain CT does confirm a small compression fracture with minimal posterior displacement.    I did talked with neurosurgery Dr. Koroma.. He skin consult on the patient did get a T SLO brace. I'm contacting medicine for admit. Patient is being admitted in guarded condition        FINAL IMPRESSION  1. Type I or II open Colles' fracture of right radius, initial encounter    2. Compression fracture of L1 vertebra, initial encounter ([facility])          IMadonna (Scribe), am scribing for, and in the presence of, Truman Tolentino M.D..    Electronically signed by: Madonna Trent (Scribe), 12/27/2020    ITruman M.D. personally performed the services described in this documentation, as scribed by Madonna Trent in my presence, and it is both accurate and complete.    The note accurately reflects work and decisions made by me.  Truman Tolentino M.D.  12/27/2020  4:42 PM

## 2020-12-27 NOTE — PROGRESS NOTES
Pharmacy Kinetics 85 y.o. female on gentamicin day # 2 of 7 12/27/2020    Dosing Weight: 71 kg  Currently on Gentamicin 140 mg (2 mg/kg) iv q24hr     Indication for treatment: UTI    Pertinent history per medical record: Per [facility] [place] consult, patient has had recurrent UTIs with CNS symptoms reported by daughter. UA cultures from 12/21/20 grew E.coli ESBL. Gentamicin per pharmacy initiated x 7 days.    Other antibiotics: nitrofurantoin prophylaxis     Allergies: Ace inhibitors, Augmentin, Erythromycin, Lisinopril, Penicillins, Epinephrine, Percocet  [apap-fd&c red #40 [place]-oxycodone], Percocet [oxycodone-acetaminophen], and Sulfa drugs     List concerns for renal function: Advanced age    Pertinent cultures to date:   12/21/20: Urine: E coli > 100,000 cfu/ml  ESBL+   ROBERTO     Ampicillin >16 mcg/mL Resistant     Ampicillin/sulbactam >16/8 mcg/mL Resistant     Cefazolin >16 mcg/mL Resistant     Cefepime >16 mcg/mL Resistant     Cefotaxime >32 mcg/mL Extended Spectrum Beta Lactamase     Cefotetan <=16 mcg/mL Sensitive     Ceftazidime 4 mcg/mL Extended Spectrum Beta Lactamase     Ceftriaxone >32 mcg/mL Extended Spectrum Beta Lactamase     Ciprofloxacin >2 mcg/mL Resistant     Ertapenem <=0.5 mcg/mL Sensitive     Gentamicin <=4 mcg/mL Sensitive     Levofloxacin >4 mcg/mL Resistant     Nitrofurantoin >64 mcg/mL Resistant     Pip/Tazobactam <=16 mcg/mL Sensitive     Tobramycin <=4 mcg/mL Sensitive     Trimeth/Sulfa >2/38 mcg/mL Resistant        No results for input(s): WBC, NEUTSPOLYS, BANDSSTABS in the last 72 hours.    Labs 12/26/20   BUN = 13 mg/dL  Cr = 0.52 mg/dL  TBili = 0.3 mg/dL    /60   Pulse 73   Temp 36.3 °C (97.3 °F) (Temporal)   Resp 18   SpO2 93%     A/P   1. Gentamicin dose change: No, continue 2 mg/kg (140 mg) IV daily  2. Next gentamicin level: Monday 12/28 before daily dose  3. Goal trough: undetectable  4. Comments: Continue current dosing regimen and check gentamicin trough on Monday  12/28/20.     Amilcar Salmeron, PharmD

## 2020-12-27 NOTE — PROGRESS NOTES
Pt presented to infusion for D2 of 7 of Gentamycin. She had PIV in place from yesterday. Flushed easily with no swelling or pain. Gentamycin infused without issue. PIV flushed and saline locked, left for treatment tomorrow. Escorted to lobby to ride from daughter.

## 2020-12-27 NOTE — ED TRIAGE NOTES
"Chief Complaint   Patient presents with   • GLF     Per patient she was stepping over a step in the garage and fell backwards. Per EMS, patient did not hit head or have a LOC. Patient denies blood thinners. Patient intermitently confused and not oriented to year.    • Arm Injury     Patient fell onto wrist. Patient presents with swelling and bruising to top of right hand and wrist.      Patient states \"I did hit my head\" but then states \"well no I didn't.\"     Charge RN called. Not to be a TBI due to no visible head trauma.     Patient hooked up to monitors. VSS. Denies SOB and/or Chest pain. States she was receiving IV ABX today for a UTI with outpatient services.       "

## 2020-12-27 NOTE — ED NOTES
Patient arrived with PIV in place. Per patient, she has this IV for IV ABX with outpatient services. PIV to left forearm.

## 2020-12-28 LAB
25(OH)D3 SERPL-MCNC: 53 NG/ML (ref 30–100)
ANION GAP SERPL CALC-SCNC: 11 MMOL/L (ref 7–16)
BASOPHILS # BLD AUTO: 0.2 % (ref 0–1.8)
BASOPHILS # BLD: 0.03 K/UL (ref 0–0.12)
BUN SERPL-MCNC: 15 MG/DL (ref 8–22)
CALCIUM SERPL-MCNC: 9.8 MG/DL (ref 8.5–10.5)
CHLORIDE SERPL-SCNC: 100 MMOL/L (ref 96–112)
CO2 SERPL-SCNC: 23 MMOL/L (ref 20–33)
CREAT SERPL-MCNC: 0.61 MG/DL (ref 0.5–1.4)
CRP SERPL HS-MCNC: 1.28 MG/DL (ref 0–0.75)
EOSINOPHIL # BLD AUTO: 0 K/UL (ref 0–0.51)
EOSINOPHIL NFR BLD: 0 % (ref 0–6.9)
ERYTHROCYTE [DISTWIDTH] IN BLOOD BY AUTOMATED COUNT: 44.5 FL (ref 35.9–50)
FOLATE SERPL-MCNC: >40 NG/ML
GLUCOSE SERPL-MCNC: 172 MG/DL (ref 65–99)
HCT VFR BLD AUTO: 43.2 % (ref 37–47)
HGB BLD-MCNC: 14 G/DL (ref 12–16)
IMM GRANULOCYTES # BLD AUTO: 0.11 K/UL (ref 0–0.11)
IMM GRANULOCYTES NFR BLD AUTO: 0.7 % (ref 0–0.9)
LYMPHOCYTES # BLD AUTO: 0.69 K/UL (ref 1–4.8)
LYMPHOCYTES NFR BLD: 4.6 % (ref 22–41)
MAGNESIUM SERPL-MCNC: 1.9 MG/DL (ref 1.5–2.5)
MCH RBC QN AUTO: 30.4 PG (ref 27–33)
MCHC RBC AUTO-ENTMCNC: 32.4 G/DL (ref 33.6–35)
MCV RBC AUTO: 93.7 FL (ref 81.4–97.8)
MONOCYTES # BLD AUTO: 0.15 K/UL (ref 0–0.85)
MONOCYTES NFR BLD AUTO: 1 % (ref 0–13.4)
NEUTROPHILS # BLD AUTO: 13.86 K/UL (ref 2–7.15)
NEUTROPHILS NFR BLD: 93.5 % (ref 44–72)
NRBC # BLD AUTO: 0 K/UL
NRBC BLD-RTO: 0 /100 WBC
PLATELET # BLD AUTO: 230 K/UL (ref 164–446)
PMV BLD AUTO: 9.7 FL (ref 9–12.9)
POTASSIUM SERPL-SCNC: 4.2 MMOL/L (ref 3.6–5.5)
PROCALCITONIN SERPL-MCNC: <0.05 NG/ML
RBC # BLD AUTO: 4.61 M/UL (ref 4.2–5.4)
SODIUM SERPL-SCNC: 134 MMOL/L (ref 135–145)
TSH SERPL DL<=0.005 MIU/L-ACNC: 0.53 UIU/ML (ref 0.38–5.33)
VIT B12 SERPL-MCNC: 905 PG/ML (ref 211–911)
WBC # BLD AUTO: 14.8 K/UL (ref 4.8–10.8)

## 2020-12-28 PROCEDURE — 82607 VITAMIN B-12: CPT

## 2020-12-28 PROCEDURE — 700105 HCHG RX REV CODE 258: Performed by: INTERNAL MEDICINE

## 2020-12-28 PROCEDURE — 85025 COMPLETE CBC W/AUTO DIFF WBC: CPT

## 2020-12-28 PROCEDURE — 83735 ASSAY OF MAGNESIUM: CPT

## 2020-12-28 PROCEDURE — 84443 ASSAY THYROID STIM HORMONE: CPT

## 2020-12-28 PROCEDURE — 700111 HCHG RX REV CODE 636 W/ 250 OVERRIDE (IP): Performed by: HOSPITALIST

## 2020-12-28 PROCEDURE — 770006 HCHG ROOM/CARE - MED/SURG/GYN SEMI*

## 2020-12-28 PROCEDURE — 86140 C-REACTIVE PROTEIN: CPT

## 2020-12-28 PROCEDURE — 36415 COLL VENOUS BLD VENIPUNCTURE: CPT

## 2020-12-28 PROCEDURE — 82306 VITAMIN D 25 HYDROXY: CPT

## 2020-12-28 PROCEDURE — 82746 ASSAY OF FOLIC ACID SERUM: CPT

## 2020-12-28 PROCEDURE — 700101 HCHG RX REV CODE 250: Performed by: INTERNAL MEDICINE

## 2020-12-28 PROCEDURE — 700102 HCHG RX REV CODE 250 W/ 637 OVERRIDE(OP): Performed by: INTERNAL MEDICINE

## 2020-12-28 PROCEDURE — 700111 HCHG RX REV CODE 636 W/ 250 OVERRIDE (IP): Performed by: ORTHOPAEDIC SURGERY

## 2020-12-28 PROCEDURE — 99232 SBSQ HOSP IP/OBS MODERATE 35: CPT | Performed by: HOSPITALIST

## 2020-12-28 PROCEDURE — 700111 HCHG RX REV CODE 636 W/ 250 OVERRIDE (IP): Performed by: INTERNAL MEDICINE

## 2020-12-28 PROCEDURE — A9270 NON-COVERED ITEM OR SERVICE: HCPCS | Performed by: INTERNAL MEDICINE

## 2020-12-28 PROCEDURE — 80048 BASIC METABOLIC PNL TOTAL CA: CPT

## 2020-12-28 PROCEDURE — 84145 PROCALCITONIN (PCT): CPT

## 2020-12-28 PROCEDURE — 97161 PT EVAL LOW COMPLEX 20 MIN: CPT

## 2020-12-28 PROCEDURE — 97166 OT EVAL MOD COMPLEX 45 MIN: CPT

## 2020-12-28 RX ORDER — ALPRAZOLAM 0.25 MG/1
0.25 TABLET ORAL
Status: DISCONTINUED | OUTPATIENT
Start: 2020-12-28 | End: 2020-12-28

## 2020-12-28 RX ORDER — MAGNESIUM SULFATE HEPTAHYDRATE 40 MG/ML
2 INJECTION, SOLUTION INTRAVENOUS ONCE
Status: COMPLETED | OUTPATIENT
Start: 2020-12-28 | End: 2020-12-28

## 2020-12-28 RX ADMIN — BETHANECHOL CHLORIDE 25 MG: 25 TABLET ORAL at 17:22

## 2020-12-28 RX ADMIN — CEFAZOLIN SODIUM 2 G: 2 INJECTION, SOLUTION INTRAVENOUS at 04:25

## 2020-12-28 RX ADMIN — OXYCODONE 5 MG: 5 TABLET ORAL at 18:27

## 2020-12-28 RX ADMIN — DOCUSATE SODIUM 50 MG AND SENNOSIDES 8.6 MG 2 TABLET: 8.6; 5 TABLET, FILM COATED ORAL at 17:22

## 2020-12-28 RX ADMIN — Medication 4000 UNITS: at 06:32

## 2020-12-28 RX ADMIN — CEFAZOLIN SODIUM 2 G: 2 INJECTION, SOLUTION INTRAVENOUS at 11:17

## 2020-12-28 RX ADMIN — MAGNESIUM SULFATE 2 G: 2 INJECTION INTRAVENOUS at 08:51

## 2020-12-28 RX ADMIN — DOCUSATE SODIUM 50 MG AND SENNOSIDES 8.6 MG 2 TABLET: 8.6; 5 TABLET, FILM COATED ORAL at 06:32

## 2020-12-28 RX ADMIN — POLYETHYLENE GLYCOL 3350 1 PACKET: 17 POWDER, FOR SOLUTION ORAL at 09:27

## 2020-12-28 RX ADMIN — BETHANECHOL CHLORIDE 25 MG: 25 TABLET ORAL at 06:32

## 2020-12-28 RX ADMIN — GENTAMICIN SULFATE 140 MG: 40 INJECTION, SOLUTION INTRAMUSCULAR; INTRAVENOUS at 06:32

## 2020-12-28 RX ADMIN — POLYETHYLENE GLYCOL 3350 1 PACKET: 17 POWDER, FOR SOLUTION ORAL at 20:44

## 2020-12-28 RX ADMIN — SERTRALINE HYDROCHLORIDE 100 MG: 100 TABLET ORAL at 06:32

## 2020-12-28 RX ADMIN — ENOXAPARIN SODIUM 40 MG: 40 INJECTION SUBCUTANEOUS at 06:32

## 2020-12-28 RX ADMIN — DILTIAZEM HYDROCHLORIDE 30 MG: 30 TABLET, FILM COATED ORAL at 07:05

## 2020-12-28 RX ADMIN — DILTIAZEM HYDROCHLORIDE 30 MG: 30 TABLET, FILM COATED ORAL at 18:35

## 2020-12-28 RX ADMIN — OXYCODONE 5 MG: 5 TABLET ORAL at 15:07

## 2020-12-28 RX ADMIN — OXYCODONE 5 MG: 5 TABLET ORAL at 22:35

## 2020-12-28 ASSESSMENT — ENCOUNTER SYMPTOMS
MYALGIAS: 0
SORE THROAT: 0
EYES NEGATIVE: 1
FEVER: 0
COUGH: 0
NECK PAIN: 0
ABDOMINAL PAIN: 0
MEMORY LOSS: 1
HEADACHES: 0
CONSTITUTIONAL NEGATIVE: 1
NAUSEA: 0
RESPIRATORY NEGATIVE: 1
DIZZINESS: 0
PALPITATIONS: 0
WEIGHT LOSS: 0
FOCAL WEAKNESS: 0
DEPRESSION: 0
BACK PAIN: 1
BLURRED VISION: 0
GASTROINTESTINAL NEGATIVE: 1
BRUISES/BLEEDS EASILY: 0
VOMITING: 0
FALLS: 1
CHILLS: 0
SHORTNESS OF BREATH: 0
CARDIOVASCULAR NEGATIVE: 1
WEAKNESS: 0
DIAPHORESIS: 0
NEUROLOGICAL NEGATIVE: 1
LOSS OF CONSCIOUSNESS: 0

## 2020-12-28 ASSESSMENT — COGNITIVE AND FUNCTIONAL STATUS - GENERAL
WALKING IN HOSPITAL ROOM: A LOT
MOVING TO AND FROM BED TO CHAIR: A LITTLE
TURNING FROM BACK TO SIDE WHILE IN FLAT BAD: A LOT
HELP NEEDED FOR BATHING: A LOT
TOILETING: A LOT
SUGGESTED CMS G CODE MODIFIER DAILY ACTIVITY: CK
MOVING FROM LYING ON BACK TO SITTING ON SIDE OF FLAT BED: A LITTLE
CLIMB 3 TO 5 STEPS WITH RAILING: A LOT
PERSONAL GROOMING: A LITTLE
DRESSING REGULAR LOWER BODY CLOTHING: A LOT
MOBILITY SCORE: 13
PERSONAL GROOMING: A LITTLE
WALKING IN HOSPITAL ROOM: A LOT
STANDING UP FROM CHAIR USING ARMS: A LOT
DAILY ACTIVITIY SCORE: 14
TOILETING: A LOT
DRESSING REGULAR UPPER BODY CLOTHING: A LOT
STANDING UP FROM CHAIR USING ARMS: A LITTLE
EATING MEALS: A LITTLE
DRESSING REGULAR LOWER BODY CLOTHING: A LOT
DAILY ACTIVITIY SCORE: 14
CLIMB 3 TO 5 STEPS WITH RAILING: A LOT
MOBILITY SCORE: 15
SUGGESTED CMS G CODE MODIFIER MOBILITY: CL
TURNING FROM BACK TO SIDE WHILE IN FLAT BAD: A LITTLE
SUGGESTED CMS G CODE MODIFIER DAILY ACTIVITY: CK
MOVING FROM LYING ON BACK TO SITTING ON SIDE OF FLAT BED: A LOT
EATING MEALS: A LITTLE
DRESSING REGULAR UPPER BODY CLOTHING: A LOT
HELP NEEDED FOR BATHING: A LOT
SUGGESTED CMS G CODE MODIFIER MOBILITY: CK
MOVING TO AND FROM BED TO CHAIR: A LOT

## 2020-12-28 ASSESSMENT — ACTIVITIES OF DAILY LIVING (ADL): TOILETING: INDEPENDENT

## 2020-12-28 ASSESSMENT — PAIN DESCRIPTION - PAIN TYPE
TYPE: ACUTE PAIN

## 2020-12-28 ASSESSMENT — FIBROSIS 4 INDEX: FIB4 SCORE: 1.64

## 2020-12-28 ASSESSMENT — LIFESTYLE VARIABLES: SUBSTANCE_ABUSE: 0

## 2020-12-28 ASSESSMENT — GAIT ASSESSMENTS: GAIT LEVEL OF ASSIST: UNABLE TO PARTICIPATE

## 2020-12-28 NOTE — DISCHARGE PLANNING
Received Choice form at [phone]  Agency/Facility Name: [facility]  Referral sent per Choice form @ [phone]

## 2020-12-28 NOTE — CONSULTS
"12/27/2020    Time Called: 16:27  Time Arrived: 16:47    Lucita Babcock is a 85 y.o. female who presents after a fall with a right wrist fracture and is here for management. 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   • Cancer (HCC) 2008    breast   • CATARACT     bilat IOL   • Dental disorder     upper partial   • Depression    • Heart burn    • Hypertension    • Indigestion    • Injury of cervical spine (HCC) July 2016    C-spine decompression/laminectomy   • Osteopenia    • 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HCC)    • Sarcoid    • Urinary bladder disorder     frequency    • UTI (urinary tract infection)     recurrent       Past Surgical History:   Procedure Laterality Date   • PARATHYROID EXPLORATION  9/7/2018    Procedure: PARATHYROID EXPLORATION- MINIMALLY INVASIVE,   NIMS RECURRENT LARYNGEAL NERVE MONITORING RIGHT;  Surgeon: Devora Gonsalez M.D.;  Location: SURGERY SAME DAY [facility];  Service: General   • CERVICAL DISK AND FUSION ANTERIOR  7/27/2016    Procedure: CERVICAL DISK AND FUSION ANTERIOR C4-7;  Surgeon: Niles Khan M.D.;  Location: SURGERY [facility];  Service:    • CATARACT PHACO WITH IOL  8/27/2014    Performed by Avani Allen M.D. at SURGERY SAME DAY [facility]   • AMBROCIO BY LAPAROSCOPY  1/8/2013    Performed by Kenrick Howell M.D. at SURGERY [facility]   • ERCP IN OR  12/28/2012    Performed by Jay Dubois M.D. at SURGERY [facility]   • CATARACT PHACO WITH IOL  8/11/2010    Performed by AVANI ALLEN at SURGERY SAME DAY [facility]   • MASS EXCISION GENERAL  7/08    chest mass biopsy sarcoid   • BREAST BIOPSY  5/27/08    Performed by KENRICK HOWELL at SURGERY SAME DAY [facility]   • LUMPECTOMY  2008    right   • PB RADIATION THERAPY PLAN " SIMPLE  2008    right   • COLONOSCOPY  2007    2 polyps   • ABDOMINAL HYSTERECTOMY TOTAL  1997   • VEIN STRIPPING      R leg       Medications  No current facility-administered medications on file prior to encounter.      Current Outpatient Medications on File Prior to Encounter   Medication Sig Dispense Refill   • Cholecalciferol (VITAMIN D) 50 MCG (2000 UT) Cap Take 4,000 Units by mouth every day.     • GENTAMICIN IN SALINE IV Infuse 140 mg into a venous catheter every morning. 7 day course     • sertraline (ZOLOFT) 100 MG Tab TAKE 1 TABLET BY MOUTH EVERY DAY 90 Tab 0   • Diclofenac Sodium 1 % Gel APPLY TO AFFECTED AREA TWICE A DAY AS NEEDED (Patient not taking: Reported on 12/27/2020) 100 g 0   • ascorbic acid (ASCORBIC ACID) 500 MG Tab TAKE 1 TABLET BY MOUTH THREE TIMES A  Tab 2   • DILTIAZem (CARDIZEM) 30 MG Tab TAKE 2 TABLETS BY MOUTH EVERY DAY (Patient taking differently: Take 30 mg by mouth every evening. TAKE 2 TABLETS BY MOUTH EVERY DAY) 180 Tab 2   • nitrofurantoin (MACROBID) 100 MG Cap Take one tab after coitus x1 for prophylaxis (Patient taking differently: Take 100 mg by mouth every day. Prophylaxis) 5 Cap 2   • cyclosporin (RESTASIS) 0.05 % ophthalmic emulsion Place 1 Drop in both eyes 2 times a day.     • DILTIAZem (CARDIZEM) 30 MG Tab Take 1 Tab by mouth every day. (Patient not taking: Reported on 12/27/2020) 90 Tab 0   • bethanechol (URECHOLINE) 25 MG Tab Take 1 Tab by mouth every 24 hours as needed. (Patient taking differently: Take 25 mg by mouth 2 (two) times a day.) 90 Tab 0   • acetaminophen (TYLENOL) 160 MG/5ML Suspension Take 15 mg/kg by mouth every 6 hours as needed.         Allergies  Ace inhibitors, Augmentin, Erythromycin, Lisinopril, Penicillins, Epinephrine, Percocet  [apap-fd&c red #40 [place]-oxycodone], Percocet [oxycodone-acetaminophen], and Sulfa drugs    ROS  Right wrist pain and deformity. All other systems were reviewed and found to be negative    Family History    Problem Relation Age of Onset   • Hypertension Mother    • Stroke Mother    • Suicide Attempts Son    • Bipolar disorder Son    • Other Father         Dementia   • Other Sister         BIpolar   • Bipolar disorder Sister    • Other Sister         Bipolar   • Other Sister         THyroid disease   • Bipolar disorder Sister    • Cancer Neg Hx    • Diabetes Neg Hx    • Heart Disease Neg Hx        Social History     Socioeconomic History   • Marital status:      Spouse name: Not on file   • Number of children: Not on file   • Years of education: Not on file   • Highest education level: Not on file   Occupational History   • Not on file   Social Needs   • Financial resource strain: Not on file   • Food insecurity     Worry: Not on file     Inability: Not on file   • Transportation needs     Medical: Yes     Non-medical: No   Tobacco Use   • Smoking status: Former Smoker     Packs/day: 0.50     Years: 45.00     Pack years: 22.50     Quit date: 10/9/1969     Years since quittin.2   • Smokeless tobacco: Never Used   Substance and Sexual Activity   • Alcohol use: No   • Drug use: No   • Sexual activity: Not Currently     Partners: Male     Comment:     Lifestyle   • Physical activity     Days per week: Not on file     Minutes per session: Not on file   • Stress: Not on file   Relationships   • Social connections     Talks on phone: Not on file     Gets together: Not on file     Attends Sabianism service: Not on file     Active member of club or organization: Not on file     Attends meetings of clubs or organizations: Not on file     Relationship status: Not on file   • Intimate partner violence     Fear of current or ex partner: Not on file     Emotionally abused: Not on file     Physically abused: Not on file     Forced sexual activity: Not on file   Other Topics Concern   • Not on file   Social History Narrative   • Not on file       Physical Exam  Vitals  /67   Pulse 73   Temp 36.2 °C (97.1 °F)  "(Temporal)   Resp 18   Ht 1.702 m (5' 7\")   Wt 70.8 kg (156 lb)   SpO2 99%   General: Well Developed, Well Nourished, no acute distress  HEENT: Normocephalic, atraumatic  Eyes: Anicteric, PERRLA, EOMI  Neck: Supple, nontender, no masses  Lungs: CTA, no wheezes or crackles  Heart: RRR, no murmurs, rubs or gallops  Abdomen: Soft, NT, ND  Pelvis: Stable to AP and Lateral Compression  Skin: Intact, no open wounds  Extremities: Right wrist pain and deformity, 1 cm open wound  Neuro: NVI  Vascular: 2+Rad/Uln, Capillary refill <2 seconds    Radiographs:  CT-LSPINE W/O PLUS RECONS   Final Result      1.  L1 superior endplate compression fracture resulting in 30% height loss and mild posterior bony retropulsion. This narrows the spinal canal by less than 20%.      2.  Multilevel degenerative disc disease and facet degeneration.      CT-TSPINE W/O PLUS RECONS   Final Result         No acute fracture or subluxation of the thoracic spine.      L1 fracture.      CT-HEAD W/O   Final Result      1.  Cerebral atrophy.      2.  White matter lucencies most consistent with small vessel ischemic change versus demyelination or gliosis.      3.  Otherwise, Head CT without contrast with no acute findings. No evidence of acute cerebral infarction, hemorrhage or mass lesion.      CT-CSPINE WITHOUT PLUS RECONS   Final Result      1.  No evidence of cervical spine fracture.      2.  Surgical change extending from C4 through C7.      3.  Multilevel degenerative disc disease and facet degeneration.      4.  Kyphotic deformity.      DX-WRIST-LIMITED 2- RIGHT   Final Result      Markedly displaced, impacted and dorsally angulated fractures of the distal radius and ulna.         DX-CHEST-PORTABLE (1 VIEW)   Final Result      Cardiomegaly.          Laboratory Values  Recent Labs     12/27/20  1652   WBC 21.9*   RBC 4.98   HEMOGLOBIN 15.6   HEMATOCRIT 46.4   MCV 93.2   MCH 31.3   MCHC 33.6   RDW 44.1   PLATELETCT 251   MPV 9.2     Recent Labs    "  12/26/20  1035 12/27/20  1652   SODIUM 138 138   POTASSIUM 4.1 3.5*   CHLORIDE 101 99   CO2 26 24   GLUCOSE 89 92   BUN 13 15             Impression: Grade 1 Open right distal radius fracture    Plan:Operative intervention. Risks and benefits of surgery were discussed which include but are not limited to bleeding, infection, neurovascular damage, malunion, nonunion, instability, limb length discrepancy, DVT, PE, MI, Stroke and death. They understand these risks and wish to proceed.

## 2020-12-28 NOTE — ED NOTES
1700  Pt had splint in rue placed by jhon webb.   1845  Per jhon webb pt unable to tolerate splint, so splint was removed.

## 2020-12-28 NOTE — H&P
Hospital Medicine History & Physical Note    Date of Service  12/27/2020    Primary Care Physician  Robbie Sloan M.D.    Consultants  Orthopedic  Neurosurgeon    Code Status  Full Code    Chief Complaint  Chief Complaint   Patient presents with   • GLF     Per patient she was stepping over a step in the garage and fell backwards. Per EMS, patient did not hit head or have a LOC. Patient denies blood thinners. Patient intermitently confused and not oriented to year.    • Arm Injury     Patient fell onto wrist. Patient presents with swelling and bruising to top of right hand and wrist.        History of Presenting Illness    85-year-old female with history of hypertension hyperparathyroidism and memory loss/dementia presented 12/27 with fall, the patient was walking up the stairs when she slipped and fell backward, denied significant loss of consciousness, denied chest pain or palpitation, the patient has been having some dizziness and deconditioning, she was seen by PT and OT as outpatient however on admission x-ray showed displaced fracture of the distal radius and ulna, and L1 compression fracture more than 30%, patient was evaluated by orthopedic surgeon for surgery today, also evaluated by neurosurgeon.     The patient is alert and oriented to herself and place on admission, according to her family and daughter the patient has had memory issues for more than last year and they blamed recurrent UTI for her memory issues.    The patient has had recurrent UTI, recently evaluated by urology  Consult and started her on gentamicin after urine culture showed E. coli, according to the chart the symptoms was declining in her memory, gentamicin was started on 12/25 in the infusion center    Review of Systems  Review of Systems   Constitutional: Positive for malaise/fatigue. Negative for chills, fever and weight loss.   HENT: Negative.    Respiratory: Negative.    Cardiovascular: Negative.    Gastrointestinal: Negative.     Genitourinary: Negative.    Musculoskeletal: Positive for back pain, falls and joint pain. Negative for neck pain.   Skin: Negative.    Neurological: Negative.    Psychiatric/Behavioral: Negative.        Past Medical History   has a past medical history of Anesthesia, Aneurysm ([facility]), Anxiety, Arthritis, Breast cancer ([facility]) (2008), Cancer ([facility]) (2008), CATARACT, Dental disorder, Depression, Heart burn, Hypertension, Indigestion, Injury of cervical spine ([facility]) (July 2016), Osteopenia, 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[facility]), Sarcoid, Urinary bladder disorder, and UTI (urinary tract infection).    Surgical History   has a past surgical history that includes vein stripping; mass excision general (7/08); breast biopsy (5/27/08); abdominal hysterectomy total (1997); cataract phaco with iol (8/11/2010); colonoscopy (2007); ercp in or (12/28/2012); samuel by laparoscopy (1/8/2013); lumpectomy (2008); pr radiation therapy plan simple (2008); cataract phaco with iol (8/27/2014); cervical disk and fusion anterior (7/27/2016); and parathyroid exploration (9/7/2018).     Family History  family history includes Bipolar disorder in her sister, sister, and son; Hypertension in her mother; Other in her father, sister, sister, and sister; Stroke in her mother; Suicide Attempts in her son.     Social History   reports that she quit smoking about 51 years ago. She has a 22.50 pack-year smoking history. She has never used smokeless tobacco. She reports that she does not drink alcohol or use drugs.    Allergies  Allergies   Allergen Reactions   • Ace Inhibitors Anaphylaxis   • Augmentin      Flu like sx   • Erythromycin Rash   • Lisinopril Anaphylaxis   • Penicillins Hives     Tolerates cephalosporins   • Epinephrine Unspecified     shaking   • Percocet  [Apap-Fd&C Red #40 [place]-Oxycodone] Vomiting   • Percocet [Oxycodone-Acetaminophen] Vomiting   • Sulfa Drugs Hives       Medications  Prior to Admission Medications   Prescriptions Last Dose  Informant Patient Reported? Taking?   Cholecalciferol (VITAMIN D PO)   Yes No   Sig: Take  by mouth.   DILTIAZem (CARDIZEM) 30 MG Tab   No No   Sig: Take 1 Tab by mouth every day.   Patient not taking: Reported on 10/2/2019   DILTIAZem (CARDIZEM) 30 MG Tab   No No   Sig: TAKE 2 TABLETS BY MOUTH EVERY DAY   Diclofenac Sodium 1 % Gel   No No   Sig: APPLY TO AFFECTED AREA TWICE A DAY AS NEEDED   acetaminophen (TYLENOL) 160 MG/5ML Suspension  Family Member Yes No   Sig: Take 15 mg/kg by mouth every 6 hours as needed.   ascorbic acid (ASCORBIC ACID) 500 MG Tab   No No   Sig: TAKE 1 TABLET BY MOUTH THREE TIMES A DAY   bethanechol (URECHOLINE) 25 MG Tab  Family Member No No   Sig: Take 1 Tab by mouth every 24 hours as needed.   cyclosporin (RESTASIS) 0.05 % ophthalmic emulsion   Yes No   Sig: Place 1 Drop in both eyes 2 times a day.   nitrofurantoin (MACROBID) 100 MG Cap   No No   Sig: Take one tab after coitus x1 for prophylaxis   sertraline (ZOLOFT) 100 MG Tab   No No   Sig: TAKE 1 TABLET BY MOUTH EVERY DAY      Facility-Administered Medications: None       Physical Exam  Temp:  [36.2 °C (97.1 °F)] 36.2 °C (97.1 °F)  Pulse:  [73-75] 73  Resp:  [16-20] 18  BP: (156-179)/(67-80) 156/67  SpO2:  [89 %-99 %] 99 %    Physical Exam  Constitutional:       Appearance: She is not ill-appearing.   HENT:      Head: Normocephalic and atraumatic.   Eyes:      General: No scleral icterus.  Cardiovascular:      Rate and Rhythm: Normal rate.      Heart sounds: No murmur.   Pulmonary:      Effort: Pulmonary effort is normal. No respiratory distress.      Breath sounds: No wheezing.   Abdominal:      General: There is no distension.   Musculoskeletal:         General: Tenderness, deformity and signs of injury present. No swelling.      Right lower leg: No edema.      Left lower leg: No edema.   Skin:     Coloration: Skin is not jaundiced or pale.      Findings: Bruising present. No erythema or lesion.   Neurological:      General: No focal  deficit present.      Mental Status: She is alert. Mental status is at baseline. She is disoriented.      Cranial Nerves: No cranial nerve deficit.      Sensory: No sensory deficit.      Motor: No weakness.         Laboratory:  Recent Labs     12/27/20  1652   WBC 21.9*   RBC 4.98   HEMOGLOBIN 15.6   HEMATOCRIT 46.4   MCV 93.2   MCH 31.3   MCHC 33.6   RDW 44.1   PLATELETCT 251   MPV 9.2     Recent Labs     12/26/20  1035 12/27/20  1652   SODIUM 138 138   POTASSIUM 4.1 3.5*   CHLORIDE 101 99   CO2 26 24   GLUCOSE 89 92   BUN 13 15   CREATININE 0.52 0.48*   CALCIUM 10.6* 11.1*     Recent Labs     12/26/20  1035 12/27/20  1652   ALTSGPT 30 36   ASTSGOT 26 27   ALKPHOSPHAT 87 91   TBILIRUBIN 0.3 0.4   GLUCOSE 89 92         No results for input(s): NTPROBNP in the last 72 hours.      No results for input(s): TROPONINT in the last 72 hours.    Imaging:  CT-LSPINE W/O PLUS RECONS   Final Result      1.  L1 superior endplate compression fracture resulting in 30% height loss and mild posterior bony retropulsion. This narrows the spinal canal by less than 20%.      2.  Multilevel degenerative disc disease and facet degeneration.      CT-TSPINE W/O PLUS RECONS   Final Result         No acute fracture or subluxation of the thoracic spine.      L1 fracture.      CT-HEAD W/O   Final Result      1.  Cerebral atrophy.      2.  White matter lucencies most consistent with small vessel ischemic change versus demyelination or gliosis.      3.  Otherwise, Head CT without contrast with no acute findings. No evidence of acute cerebral infarction, hemorrhage or mass lesion.      CT-CSPINE WITHOUT PLUS RECONS   Final Result      1.  No evidence of cervical spine fracture.      2.  Surgical change extending from C4 through C7.      3.  Multilevel degenerative disc disease and facet degeneration.      4.  Kyphotic deformity.      DX-WRIST-LIMITED 2- RIGHT   Final Result      Markedly displaced, impacted and dorsally angulated fractures of the  distal radius and ulna.         DX-CHEST-PORTABLE (1 VIEW)   Final Result      Cardiomegaly.            Assessment/Plan:  I anticipate this patient will require at least two midnights for appropriate medical management, necessitating inpatient admission.    * Wrist fracture, closed, right, sequela  Assessment & Plan  After mechanical fall  Orthopedic on board for surgery today  Pain management and schedule stool softener to prevent constipation  Vitamin D supplementation and PT OT    Dementia ([facility])  Assessment & Plan  The patient has declining on her memory for more than 1 year  On admission the patient is alert to herself and place only, she is forgetful  According to the family, that is her baseline,   The patient has been treated by antibiotics for many times due to UTI??    The patient is on high risk for delirium during this hospitalization  Pain control, stool softener scheduled, nonpharmacological treatment, moving early    L1 vertebral fracture ([facility])  Assessment & Plan  Compression fracture more than 30%  Neurosurgeon on board  No neuro deficit  The patient might need bisphosphonate as outpatient  Continue vitamin D and calcium supplementation    Fall- (present on admission)  Assessment & Plan  Recurrent falls with dizziness  No significant syncope, palpitation or chest pain  No signs of stroke  Multifactorial including, deconditioning and dementia  Continue vitamin D supplementation and check TSH  PT and OT evaluation after surgery  Most likely the patient will need 24/7 supervision due to significant dementia and generalized weakness with debility.     History of recurrent UTIs- (present on admission)  Assessment & Plan  The patient has been treated with antibiotics for recurrent UTI  According to the family the symptoms are declining her memory issues  Discussed the risk/benefit from gentamicin, the family insisted to continue gentamicin  Continue course of gentamicin for ESBL started on 12/25 for 7  days  Consider  infectious disease consult tomorrow    Leukocytosis  Assessment & Plan  Likely related to fracture and distress  However the patient is on gentamicin started 12/25 for UTI  Continue monitoring with labs daily    Hyperparathyroidism (HCC)- (present on admission)  Assessment & Plan  The patient has a chronic hyperkalemia with elevated PTH  Check vitamin D tomorrow  Consider bisphosphonates to be started as outpatient    Essential hypertension- (present on admission)  Assessment & Plan  The patient taking diltiazem 30 mg twice daily  Consider to switch to amlodipine since the patient has dizziness  Continue monitoring

## 2020-12-28 NOTE — DISCHARGE PLANNING
[facility]/Transitional Care Coordination  Current documentation reveals patient may have the potential for acute inpatient rehabilitation.  Please consider a PMR referral to assist with discharge planning.     Thank you.

## 2020-12-28 NOTE — ASSESSMENT & PLAN NOTE
The patient has a chronic hyperkalemia with elevated PTH   vitamin D 53  Consider bisphosphonates to be started as outpatient

## 2020-12-28 NOTE — ASSESSMENT & PLAN NOTE
The patient has been treated with antibiotics for recurrent UTI  According to the family the symptoms are declining her memory issues  Discussed the risk/benefit from gentamicin, the family insisted to continue gentamicin  Continue course of gentamicin for ESBL started on 12/25 for 7 days

## 2020-12-28 NOTE — PROGRESS NOTES
"Pharmacy Kinetics 85 y.o. female on gentamicin day # 3 2020    Dosing Weight: 70  Currently on Gentamicin 140 mg iv q24hr  Please refer to [facility] note from yesterdays visit regarding gentamicin dosing history & plan.  therapy planned to complete .    Indication for treatment: ESBL UTI    Pertinent history per medical record: Admitted on 2020 for GLF. On treatment per urology for ESBL UTI with gentamicin at infusion center.    Other antibiotics: none    Allergies: Ace inhibitors, Augmentin, Erythromycin, Lisinopril, Penicillins, Epinephrine, Percocet  [apap-fd&c red #40 [place]-oxycodone], Percocet [oxycodone-acetaminophen], and Sulfa drugs     List concerns for renal function: advanced age     Pertinent cultures to date:   20 ESBL UTI    Recent Labs     20  1652 20  0029   WBC 21.9* 14.8*   NEUTSPOLYS 85.90* 93.50*     Recent Labs     20  1035 20  1652 20  0029   BUN 13 15 15   CREATININE 0.52 0.48* 0.61   ALBUMIN 4.3 4.5  --      No results for input(s): GENTTROUGH, GENTPEAK, GENTRANDOM in the last 72 hours.    Intake/Output Summary (Last 24 hours) at 2020 1433  Last data filed at 2020 1300  Gross per 24 hour   Intake 1200 ml   Output 5 ml   Net 1195 ml      /59   Pulse 70   Temp 36.4 °C (97.6 °F) (Temporal)   Resp 18   Ht 1.702 m (5' 7\")   Wt 70 kg (154 lb 5.2 oz)   SpO2 96%  Temp (24hrs), Av.7 °C (98.1 °F), Min:36.3 °C (97.3 °F), Max:37.1 °C (98.8 °F)      A/P   1. Gentamicin dose change: n/a  2. Next gentamicin level: tomorrow @0600  3. Goal trough: undetectable  4. Comments: Level above to assess clearance at pt is elderly. Pharmacy will follow.    Dakota Winter, PharmD, BCPS, BCCP    "

## 2020-12-28 NOTE — PROGRESS NOTES
[facility] Medicine Daily Progress Note    Date of Service  12/28/2020    Chief Complaint  85 y.o. female admitted 12/27/2020 with arm pain following a fall    Hospital Course  Patient is a pleasant 85-year-old female with history of hypertension hyperparathyroidism and memory loss/dementia.  She had started treatment with IV gentamicin in the infusion center on 12/25 for treatment of a recurrent UTI that was being followed by urology.  She presented to the ER on 12/27 after a ground level fall.  She reported that she had been  walking up the stairs when she slipped and fell backward.  She denied LOC and denied any prodromal symptoms reporting that this was mechanical however she did admit to recent intermittent dizziness and deconditioning and has been using a walker at home.  In the ER an x-ray showed a displaced fracture of the distal radius and ulna, and L1 compression fracture more than 30%, patient was evaluated by orthopedic surgeon and underwent a right sided ORIF with Dr. Sheldon, she was also evaluated by neurosurgery and placed in a TLSO brace.     Interval Problem Update  She is sitting up in her chair, TLSO is in place, she denies back pain, and reports mild right wrist pain 2/10.  She reports chronic anxiety with intermittent panic attacks but denies any related symptoms at this time.  She is axox3, ROS otherwise negative, discussed with nursing and case mgt    Consultants/Specialty  Ortho  Neurosurgery    Code Status  Full Code    Disposition  Likely snf    Review of Systems  Review of Systems   Constitutional: Negative.  Negative for chills, diaphoresis, fever, malaise/fatigue and weight loss.   HENT: Negative.  Negative for sore throat.    Eyes: Negative.  Negative for blurred vision.   Respiratory: Negative.  Negative for cough and shortness of breath.    Cardiovascular: Negative.  Negative for chest pain, palpitations and leg swelling.   Gastrointestinal: Negative.  Negative for abdominal pain,  nausea and vomiting.   Genitourinary: Negative.  Negative for dysuria.   Musculoskeletal: Positive for joint pain. Negative for myalgias.   Skin: Negative.  Negative for itching and rash.   Neurological: Negative.  Negative for dizziness, focal weakness, weakness and headaches.   Endo/Heme/Allergies: Negative.  Does not bruise/bleed easily.   Psychiatric/Behavioral: Positive for memory loss. Negative for depression, substance abuse and suicidal ideas.   All other systems reviewed and are negative.       Physical Exam  Temp:  [36.2 °C (97.1 °F)-37.1 °C (98.8 °F)] 36.4 °C (97.6 °F)  Pulse:  [68-76] 70  Resp:  [15-25] 18  BP: (110-179)/(45-80) 110/59  SpO2:  [89 %-99 %] 96 %    Physical Exam  Vitals signs and nursing note reviewed. Exam conducted with a chaperone present.   Constitutional:       General: She is not in acute distress.     Appearance: Normal appearance. She is not diaphoretic.   HENT:      Head: Normocephalic.      Nose: Nose normal.      Mouth/Throat:      Mouth: Mucous membranes are moist.   Eyes:      Pupils: Pupils are equal, round, and reactive to light.   Cardiovascular:      Rate and Rhythm: Normal rate and regular rhythm.      Pulses: Normal pulses.      Heart sounds: Normal heart sounds.   Pulmonary:      Effort: Pulmonary effort is normal.      Breath sounds: Normal breath sounds.   Abdominal:      General: Abdomen is flat. Bowel sounds are normal.      Palpations: Abdomen is soft.   Musculoskeletal: Normal range of motion.         General: No swelling or deformity.      Comments: Right arm in sling, sensation and pulses intact   Skin:     General: Skin is warm and dry.      Capillary Refill: Capillary refill takes less than 2 seconds.      Findings: Bruising present.   Neurological:      General: No focal deficit present.      Mental Status: She is alert.      Cranial Nerves: No cranial nerve deficit.   Psychiatric:         Mood and Affect: Mood normal.         Behavior: Behavior normal.          Fluids    Intake/Output Summary (Last 24 hours) at 12/28/2020 1411  Last data filed at 12/28/2020 1300  Gross per 24 hour   Intake 1200 ml   Output 5 ml   Net 1195 ml       Laboratory  Recent Labs     12/27/20  1652 12/28/20  0029   WBC 21.9* 14.8*   RBC 4.98 4.61   HEMOGLOBIN 15.6 14.0   HEMATOCRIT 46.4 43.2   MCV 93.2 93.7   MCH 31.3 30.4   MCHC 33.6 32.4*   RDW 44.1 44.5   PLATELETCT 251 230   MPV 9.2 9.7     Recent Labs     12/26/20  1035 12/27/20  1652 12/28/20  0029   SODIUM 138 138 134*   POTASSIUM 4.1 3.5* 4.2   CHLORIDE 101 99 100   CO2 26 24 23   GLUCOSE 89 92 172*   BUN 13 15 15   CREATININE 0.52 0.48* 0.61   CALCIUM 10.6* 11.1* 9.8                   Imaging  DX-WRIST-LIMITED 2- RIGHT   Final Result      Intraoperative images intended for localization and not for diagnostic purposes demonstrate plate and screw fixation of the distal radius. See procedure report for details.      Fluoroscopy Time:  0.1  minutes.  2 fluoroscopic images were obtained.      DX-PORTABLE FLUORO > 1 HOUR   Final Result      Intraoperative images intended for localization and not for diagnostic purposes demonstrate plate and screw fixation of the distal radius. See procedure report for details.      Fluoroscopy Time:  0.1  minutes.  2 fluoroscopic images were obtained.      CT-LSPINE W/O PLUS RECONS   Final Result      1.  L1 superior endplate compression fracture resulting in 30% height loss and mild posterior bony retropulsion. This narrows the spinal canal by less than 20%.      2.  Multilevel degenerative disc disease and facet degeneration.      CT-TSPINE W/O PLUS RECONS   Final Result         No acute fracture or subluxation of the thoracic spine.      L1 fracture.      CT-HEAD W/O   Final Result      1.  Cerebral atrophy.      2.  White matter lucencies most consistent with small vessel ischemic change versus demyelination or gliosis.      3.  Otherwise, Head CT without contrast with no acute findings. No evidence  of acute cerebral infarction, hemorrhage or mass lesion.      CT-CSPINE WITHOUT PLUS RECONS   Final Result      1.  No evidence of cervical spine fracture.      2.  Surgical change extending from C4 through C7.      3.  Multilevel degenerative disc disease and facet degeneration.      4.  Kyphotic deformity.      DX-WRIST-LIMITED 2- RIGHT   Final Result      Markedly displaced, impacted and dorsally angulated fractures of the distal radius and ulna.         DX-CHEST-PORTABLE (1 VIEW)   Final Result      Cardiomegaly.           Assessment/Plan  * Wrist fracture, closed, right, sequela  Assessment & Plan  After mechanical fall  Orthopedic on board for surgery today  Pain management and schedule stool softener to prevent constipation  Vitamin D supplementation and PT OT    Dementia (HCC)  Assessment & Plan  Reportedly at her baseline  Try to avoid psychoactive and sedating medications    L1 vertebral fracture ([facility])  Assessment & Plan  Compression fracture more than 30%  Neurosurgery following, continue TLSO  No associated pain  The patient might need bisphosphonate as outpatient  Continue vitamin D and calcium supplementation    Fall- (present on admission)  Assessment & Plan  Recurrent falls with dizziness  No significant syncope, palpitation or chest pain  Following bp  Multifactorial including, deconditioning and dementia  Continue vitamin D supplementation and check TSH  Continue PT/ OT    History of recurrent UTIs- (present on admission)  Assessment & Plan  The patient has been treated with antibiotics for recurrent UTI  According to the family the symptoms are declining her memory issues  Discussed the risk/benefit from gentamicin, the family insisted to continue gentamicin  Continue course of gentamicin for ESBL started on 12/25 for 7 days      Leukocytosis  Assessment & Plan  Likely related to fracture and distress  Improving  On gentamicin started 12/25 for UTI  Continue monitoring with labs  daily    Hyperparathyroidism (HCC)- (present on admission)  Assessment & Plan  The patient has a chronic hyperkalemia with elevated PTH   vitamin D 53  Consider bisphosphonates to be started as outpatient    Essential hypertension- (present on admission)  Assessment & Plan  following       VTE prophylaxis:  lovenox

## 2020-12-28 NOTE — THERAPY
Occupational Therapy   Initial Evaluation     Patient Name: Lucita Babcock  Age:  85 y.o., Sex:  female  Medical Record #: [id number]  Today's Date: 12/28/2020     Precautions  Precautions: Fall Risk, Weight Bearing As Tolerated Right Upper Extremity, TLSO (Thoracolumbosacral orthosis)(R UE sling PRN, TLSO on when OOB x3-6 weeks)    Assessment  Patient is 85 y.o. female with a diagnosis of GLF, L1 compression fracture, R wrist fracture, s/p ORIF.  Additional factors influencing patient status / progress: PCA assist at home, involved family.      Plan    Recommend Occupational Therapy 3 times per week until therapy goals are met for the following treatments:  Adaptive Equipment, Self Care/Activities of Daily Living, Therapeutic Activities and Therapeutic Exercises.    [facility] Equipment Recommendations: (P) Unable to determine at this time  Discharge Recommendations: (P) Recommend post-acute placement for additional occupational therapy services prior to discharge home     Subjective    Pleasant, anxious, cooperative and motivated throughout     Objective       12/28/20 0905   Total Time Spent   Total Time Spent (Mins) 40   Charge Group   OT Evaluation OT Evaluation Mod   Initial Contact Note    Initial Contact Note Order Received and Verified, Occupational Therapy Evaluation in Progress with Full Report to Follow.   Prior Living Situation   Prior Services Home With Outpatient Therapy;Intermittent Physical Support for ADL Per Service   Housing / Facility 2 Story House  (does not go upstairs)   Steps Into Home 2  (room for FWW on each step, not consecutive steps)   Steps In Home 16   Bathroom Set up Walk In Shower;Bathtub / Shower Combination;Grab Bars   Equipment Owned Front-Wheel Walker;Single Point Cane   Lives with - Patient's Self Care Capacity Alone and Able to Care For Self  (with PCA assist 4 days a week for 3 hour periods)   Comments family aleksander like a [facility] at [facility], prefer [facility]   Prior Level of ADL  Function   Self Feeding Independent   Grooming / Hygiene Independent   Bathing Requires Assist  (PCA assist)   Dressing Independent   Toileting Independent   Prior Level of IADL Function   Medication Management Requires Assist   Laundry Requires Assist   Kitchen Mobility Independent   Finances Requires Assist   Home Management Requires Assist   Shopping Requires Assist   Prior Level Of Mobility Independent With Device in Home   Driving / Transportation Relatives / Others Provide Transportation   History of Falls   History of Falls Yes   Date of Last Fall   (reason for admit)   Precautions   Precautions Fall Risk;TLSO (Thoracolumbosacral orthosis);Sling Right Upper Extremity   Vitals   O2 (LPM) 3   O2 Delivery Device Nasal Cannula   Pain 0 - 10 Group   Therapist Pain Assessment Post Activity Pain Same as Prior to Activity;Nurse Notified;1   Cognition    Level of Consciousness Alert   Comments easily distractible, reports poor memory, frequently anxious. responds well to relaxation/ breathing exercsies   Passive ROM Upper Body   Passive ROM Upper Body X   Comments left UE WFL, right UE limited at wrist/ hand due to soft cast   Active ROM Upper Body   Active ROM Upper Body  X   Dominant Hand Left   Comments left UE WFL, right UE minimal movement detected   Strength Upper Body   Upper Body Strength  X   Comments left UE WFL, right UE minimal   Sensation Upper Body   Upper Extremity Sensation  X   Comments left UE WFL, reports right UE impaired   Upper Body Muscle Tone   Upper Body Muscle Tone  WDL   Coordination Upper Body   Coordination X   Comments impaired throughout right UE   Balance Assessment   Sitting Balance (Static) Fair   Sitting Balance (Dynamic) Fair   Standing Balance (Static) Fair   Standing Balance (Dynamic) Fair -   Weight Shift Sitting Poor   Weight Shift Standing Poor   Bed Mobility    Supine to Sit Minimal Assist   Sit to Supine   (left seated in bedside chair)   Scooting Minimal Assist   ADL  Assessment   Eating Supervision  (assist to open containers)   Grooming Supervision;Seated   Bathing   (NT)   Upper Body Dressing Moderate Assist  (max assist TLSO)   Lower Body Dressing Maximal Assist   Toileting Moderate Assist   How much help from another person does the patient currently need...   Putting on and taking off regular lower body clothing? 2   Bathing (including washing, rinsing, and drying)? 2   Toileting, which includes using a toilet, bedpan, or urinal? 2   Putting on and taking off regular upper body clothing? 2   Taking care of personal grooming such as brushing teeth? 3   Eating meals? 3   6 Clicks Daily Activity Score 14   Functional Mobility   Sit to Stand Minimal Assist   Bed, Chair, Wheelchair Transfer Minimal Assist   Toilet Transfers Minimal Assist   Transfer Method Stand Step   Visual Perception   Visual Perception  WDL   Patient / Family Goals   Patient / Family Goal #1 feel better   Short Term Goals   Short Term Goal # 1 SBA toileting tasks   Short Term Goal # 2 SBA necessary functional transfers   Short Term Goal # 3 set up to sera/ doff TLSO   Short Term Goal # 4 I return demo of UE ROM HEP   Education Group   Education Provided Back Safety   Role of Occupational Therapist Patient Response Patient;Acceptance;Explanation;Demonstration;Reinforcement Needed   Back Safety Patient Response Patient;Acceptance;Explanation;Demonstration;Reinforcement Needed   Problem List   Problem List Decreased Active Daily Living Skills;Decreased Upper Extremity Strength Right;Decreased Upper Extremity AROM Right;Decreased Functional Mobility;Decreased Activity Tolerance;Limited Knowledge of Post Op Precautions   Anticipated Discharge Equipment and Recommendations   DC Equipment Recommendations Unable to determine at this time   Discharge Recommendations Recommend post-acute placement for additional occupational therapy services prior to discharge home   Interdisciplinary Plan of Care Collaboration   IDT  Collaboration with  Nursing;Physical Therapist   Patient Position at End of Therapy Seated;Chair Alarm On;Call Light within Reach;Tray Table within Reach;Phone within Reach   Collaboration Comments report given   Session Information   Date / Session Number  12/28,1 ( 1/3, 1/3)   Priority 2

## 2020-12-28 NOTE — ANESTHESIA TIME REPORT
Anesthesia Start and Stop Event Times     Date Time Event    12/27/2020 1933 Anesthesia Start     1943 Ready for Procedure     2035 Anesthesia Stop        Responsible Staff  12/27/20    Name Role Begin End    Moisés Daugherty M.D. Anesth 1933 2035        Preop Diagnosis (Free Text):  Pre-op Diagnosis     RIGHT OPEN WRIST FRACTURE        Preop Diagnosis (Codes):    Post op Diagnosis  Open wrist fracture      Premium Reason  E. Weekend    Comments: block, emergency

## 2020-12-28 NOTE — ANESTHESIA PREPROCEDURE EVALUATION
Relevant Problems   PULMONARY   (+) History of recurrent UTIs      NEURO   (+) H/O dizziness   (+) HX: breast cancer   (+) History of recurrent UTIs   (+) Tension type headache      CARDIAC   (+) Chronic venous insufficiency   (+) Essential hypertension      GI   (+) Gastroesophageal reflux disease without esophagitis       Physical Exam    Airway   Mallampati: II  TM distance: >3 FB  Neck ROM: full       Cardiovascular - normal exam  Rhythm: regular  Rate: normal  (-) murmur     Dental - normal exam           Pulmonary - normal exam  Breath sounds clear to auscultation     Abdominal    Neurological - normal exam                 Anesthesia Plan    ASA 2- EMERGENT   ASA physical status emergent criteria: displaced fracture with possible neurovascular compromise    Plan - general and peripheral nerve block     Peripheral nerve block will be post-op pain control  Airway plan will be LMA        Induction: intravenous    Postoperative Plan: Postoperative administration of opioids is intended.    Pertinent diagnostic labs and testing reviewed    Informed Consent:    Anesthetic plan and risks discussed with patient.    Use of blood products discussed with: patient whom consented to blood products.

## 2020-12-28 NOTE — PROGRESS NOTES
The pt arrived to the unit with transport from PACU. The pt was moved to her bed, assessed, and educated on her room, her plan of care and how to use call light. Questions and concerns were answered.     2 RN skin check complete with Jose STRAUSS .   Devices in place none.  Skin assessed under devices n/a.  Confirmed pressure ulcers found on nonr.  New potential pressure ulcers noted on nonr. Wound consult placed n/a.  The following interventions in place: the pt I able to reposition herself in bed and has no sensory deficits besides her arm with the current nerve block.

## 2020-12-28 NOTE — OR NURSING
Report given to Padmini STRAUSS via SBAR no questions at this time.   traction called to verify order for sling and pt transitioning to room T303-2.  Pt has purewic in place at this time.

## 2020-12-28 NOTE — THERAPY
"Physical Therapy   Initial Evaluation     Patient Name: Lucita Babcock  Age:  85 y.o., Sex:  female  Medical Record #: [id number]  Today's Date: 12/28/2020     Precautions: Fall Risk, Weight Bearing As Tolerated Right Upper Extremity, TLSO (Thoracolumbosacral orthosis)(R UE sling PRN, TLSO on when OOB x3-6 weeks)    Assessment  Patient is 85 y.o. female presented 12/27/20 after GLF at home. Pt slipped and fell backward resulting in displaced fracture of the distal radius and ulna, and L1 compression fracture (non-op management with TLSO x 3-6 weeks) .  Pt is s/p R ORIF distal radius,POD#1. Additional factors influencing patient status / progress: pain, decreased activity tolerance, impaired bed mobility,transfers,gait and balance requiring assist for all mobility currently. Pt has good family support and family realizes that pt will need higher level of care at discharge. PT will follow during acute stay.      Plan    Recommend Physical Therapy 3 times per week until therapy goals are met for the following treatments:  Bed Mobility, Equipment, Gait Training, Neuro Re-Education / Balance, Therapeutic Activities and Therapeutic Exercises    DC Equipment Recommendations: Unable to determine at this time  Discharge Recommendations: Recommend post-acute placement for additional physical therapy services prior to discharge home       Subjective    Pt agrees to PT. \"I'm always anxious.\"     Objective       12/28/20 0911   Prior Living Situation   Prior Services Home With Outpatient Therapy;Intermittent Physical Support for ADL Per Service;Other (Comments)  (hired CG 4 days/week x 3 hrs/day)   Housing / Facility 2 Story House   Steps Into Home 2   Steps In Home 16   Equipment Owned Front-Wheel Walker;Single Point Cane   Lives with - Patient's Self Care Capacity Alone and Able to Care For Self   Comments Family would like higher level of care at [facility]   Prior Level of Functional Mobility   Bed Mobility Independent   Transfer " Status Independent   Ambulation Independent   Distance Ambulation (Feet) 200   Assistive Devices Used Single Point Cane   Stairs Independent   History of Falls   History of Falls Yes   Date of Last Fall 12/27/20   Cognition    Cognition / Consciousness X   Level of Consciousness Alert   Attention Impaired   Comments Pt cooperative but anxious t/o tx, cues to redirect to maintain task.   Passive ROM Lower Body   Passive ROM Lower Body WDL   Active ROM Lower Body    Active ROM Lower Body  WDL   Strength Lower Body   Lower Body Strength  X   Gross Strength Generalized Weakness, Equal Bilaterally   Sensation Lower Body   Lower Extremity Sensation   WDL   Lower Body Muscle Tone   Lower Body Muscle Tone  WDL   Coordination Lower Body    Coordination Lower Body  WDL   Balance Assessment   Sitting Balance (Static) Fair   Sitting Balance (Dynamic) Fair -   Standing Balance (Static) Fair   Standing Balance (Dynamic) Fair -   Weight Shift Sitting Poor   Weight Shift Standing Poor   Comments with L HHA at bedside   Gait Analysis   Gait Level Of Assist Unable to Participate   Bed Mobility    Supine to Sit Minimal Assist   Scooting Minimal Assist   Comments HOB elevated, using rails   Functional Mobility   Sit to Stand Minimal Assist   Bed, Chair, Wheelchair Transfer Minimal Assist   Transfer Method Stand Step   Mobility supine>EOB>stand step to chair   How much help from another person does the patient currently need...   6 clicks Mobility Score 15   Activity Tolerance   Sitting Edge of Bed 10   Standing 2   Patient / Family Goals    Patient / Family Goal #1 Get better   Short Term Goals    Short Term Goal # 1 Pt will be SPV for all transfers with LRAD in 6 txs to improve functional mobility.   Short Term Goal # 2 Pt will be SPV for gait >150' with LRAD in 6 txs to improve functional mobility.   Education Group   Role of Physical Therapist Patient Response Patient;Acceptance;Explanation;Action Demonstration;Verbal Demonstration    Brace Wear & Care Patient Response Patient;Acceptance;Explanation;Demonstration;Verbal Demonstration;Reinforcement Needed   Additional Comments Pt Asha to sera WALKER at EOB   Problem List    Problems Pain;Impaired Bed Mobility;Impaired Transfers;Impaired Ambulation;Functional Strength Deficit;Impaired Balance;Decreased Activity Tolerance;Limited Knowledge of Post-Op Precautions   Anticipated Discharge Equipment and Recommendations   DC Equipment Recommendations Unable to determine at this time   Discharge Recommendations Recommend post-acute placement for additional physical therapy services prior to discharge home

## 2020-12-28 NOTE — ANESTHESIA PROCEDURE NOTES
Airway    Date/Time: 12/27/2020 7:47 PM  Performed by: Moisés Daugherty M.D.  Authorized by: Moisés Daugherty M.D.     Location:  OR  Urgency:  Elective  Indications for Airway Management:  Anesthesia      Spontaneous Ventilation: absent    Sedation Level:  Deep  Preoxygenated: Yes    Final Airway Type:  Supraglottic airway  Final Supraglottic Airway:  Standard LMA    SGA Size:  4  Number of Attempts at Approach:  1

## 2020-12-28 NOTE — DISCHARGE PLANNING
"PC to daughter Elda  is the \"go to person\" for her mom. Pt lives alone in a 2 level home but pt lives on the first floor of her home. Pt has caregivers that come in 3 hrs a day four times a week from [facility]. Elda said it has come to a point where pt will need to go to an assistive living facility and the family has been looking for one that she can move to but before that Elda would like pt to go to an SNF for STR. Elda would like unit CM to discuss the choice with her for STR after PT/OT evaluation.    Pt was also recently diagnosed with UTI and since she has been having frequent UTIs then the pt was prescribed daily IV abx infusion at the [facility] x7 days. Pt has a PICC line.     Care Transition Team Assessment    Information Source  Orientation : Disoriented to Time  Information Given By: Other (Comments)(daughter Elda)  Informant's Name: Elda (daughter)    Readmission Evaluation  Is this a readmission?: No    Elopement Risk  Legal Hold: No  Ambulatory or Self Mobile in Wheelchair: Yes    Interdisciplinary Discharge Planning  Does Admitting Nurse Feel This Could be a Complex Discharge?: No  Primary Care Physician: Dr. Sloan  Lives with - Patient's Self Care Capacity: Alone and Able to Care For Self  Support Systems: Family Member(s)  Housing / Facility: 2 Story Apartment / Condo(first floor bedroom)  Do You Take your Prescribed Medications Regularly: Yes  Able to Return to Previous ADL's: Future Time w/Therapy  Mobility Issues: Yes  Prior Services: Home-Independent(IV infusion at [facility]  x7 days)  Patient Prefers to be Discharged to:: tbd  Assistance Needed: Yes  Durable Medical Equipment: Walker(cane) usually walks with walker    Discharge Preparedness  What is your plan after discharge?: Skilled nursing facility  What are your discharge supports?: Child  Prior Functional Level: Ambulatory, Needs Assist with Activities of Daily Living, Needs Assist with " Medication Management, Uses Cane, Uses Walker  Difficulity with ADLs: Bathing, Brushing teeth, Dressing, Toileting, Walking  Difficulity with IADLs: Cooking, Driving, Laundry, Shopping    Functional Assesment  Prior Functional Level: Ambulatory, Needs Assist with Activities of Daily Living, Needs Assist with Medication Management, Uses Cane, Uses Walker    Finances  Financial Barriers to Discharge: No  Prescription Coverage: Yes    Vision / Hearing Impairment  Vision Impairment : Yes  Right Eye Vision: Wears Glasses  Left Eye Vision: Wears Glasses  Hearing Impairment : No    Domestic Abuse  Have you ever been the victim of abuse or violence?: No    Psychological Assessment  History of Substance Abuse: None    Discharge Risks or Barriers  Discharge risks or barriers?: Post-acute placement / services, Other (comment)    Anticipated Discharge Information  Discharge Disposition: Still a Patient (30)

## 2020-12-28 NOTE — ASSESSMENT & PLAN NOTE
Compression fracture more than 30%  Neurosurgery following, continue TLSO  No associated pain  The patient might need bisphosphonate as outpatient  Continue vitamin D and calcium supplementation

## 2020-12-28 NOTE — OR NURSING
Spoke with family member Sarah to let them know that she will be going to her room within the hour.  Per family member pt has questionable onset early dementia, stated that pt will ask same questions several time and not remember the answers.  Pt is alert but confused in PACU, follows commands, pt in NAD at this time.

## 2020-12-28 NOTE — PROGRESS NOTES
"Patient seen and examined  Complains of minimal wrist pain  Very glad to be left handed    /64   Pulse 70   Temp 36.3 °C (97.3 °F) (Temporal)   Resp 15   Ht 1.702 m (5' 7\")   Wt 70.8 kg (156 lb)   SpO2 96%     Recent Labs     12/27/20  1652 12/28/20  0029   WBC 21.9* 14.8*   RBC 4.98 4.61   HEMOGLOBIN 15.6 14.0   HEMATOCRIT 46.4 43.2   MCV 93.2 93.7   MCH 31.3 30.4   MCHC 33.6 32.4*   RDW 44.1 44.5   PLATELETCT 251 230   MPV 9.2 9.7       No acute distress  Dressing clean dry and intact  Neurovascularly intact    POD#1  S/P ORIF open distal radius fracture    Plan:  DVT Prophylaxis- TEDS/SCDs  Weight Bearing Status- 1 pound RUE  PT/OT  Antibiotics: 24 hrs post op  Case Coordination          "

## 2020-12-28 NOTE — PROGRESS NOTES
Pharmacy Kinetics 85 y.o. female on gentamicin day # 2 of 7    12/27/2020    Dosing Weight: 70 kg  Currently on Gentamicin 140 mg iv q24hr    Please refer to [facility] note from today's visit regarding gentamicin dosing history & plan. Next dose due 12/28 and therapy planned to complete 1/1.    Pharmacy will continue to follow.     Denita Canales, PharmD, BCCCP

## 2020-12-28 NOTE — CONSULTS
Surgery Neurosurgery Consultation    Date of Service  12/27/2020    Referring Physician  Jayna Torres M.D.    Consulting Physician  Scottie Keane M.D.    Reason for Consultation  Mild L1 compression fracture    History of Presenting Illness  85 y.o. female who presented 12/27/2020 with ground-level fall in her garage, L1 compression fracture and a right wrist fracture.  She does endorse mild to moderate back pain.  She denies neurologic symptoms.  She ambulates with a walker at home.  Her daughter states that she does not ambulate frequently or often.    Review of Systems  Review of Systems   Constitutional: Negative.    HENT: Negative.    Eyes: Negative.    Respiratory: Negative.    Cardiovascular: Negative.    Gastrointestinal: Negative.    Genitourinary: Negative.    Musculoskeletal: Positive for back pain, falls and joint pain.   Skin: Negative.    Neurological: Negative.    Endo/Heme/Allergies: Negative.    Psychiatric/Behavioral: Negative.    All other systems reviewed and are negative.      Past Medical History   has a past medical history of Anesthesia, Aneurysm ([facility]), Anxiety, Arthritis, Breast cancer ([facility]) (2008), Cancer ([facility]) (2008), CATARACT, Dental disorder, Depression, Heart burn, Hypertension, Indigestion, Injury of cervical spine ([facility]) (July 2016), Osteopenia, 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[facility]), Sarcoid, Urinary bladder disorder, and UTI (urinary tract infection).    Surgical History   has a past surgical history that includes vein stripping; mass excision general (7/08); breast biopsy (5/27/08); abdominal hysterectomy total (1997); cataract phaco with iol (8/11/2010); colonoscopy (2007); ercp in or (12/28/2012); samuel by laparoscopy (1/8/2013); lumpectomy (2008); pr radiation therapy plan simple (2008); cataract phaco with iol (8/27/2014); cervical disk and fusion anterior (7/27/2016); and parathyroid exploration (9/7/2018).    Family History  family history includes Bipolar disorder in her sister,  sister, and son; Hypertension in her mother; Other in her father, sister, sister, and sister; Stroke in her mother; Suicide Attempts in her son.    Social History   reports that she quit smoking about 51 years ago. She has a 22.50 pack-year smoking history. She has never used smokeless tobacco. She reports that she does not drink alcohol or use drugs.    Medications  Prior to Admission Medications   Prescriptions Last Dose Informant Patient Reported? Taking?   Cholecalciferol (VITAMIN D) 50 MCG (2000 UT) Cap 12/26/2020 at 1200 Family Member Yes Yes   Sig: Take 4,000 Units by mouth every day.   DILTIAZem (CARDIZEM) 30 MG Tab Not Taking at Unknown time Family Member No No   Sig: Take 1 Tab by mouth every day.   Patient not taking: Reported on 12/27/2020   DILTIAZem (CARDIZEM) 30 MG Tab 12/26/2020 at PM Family Member No No   Sig: TAKE 2 TABLETS BY MOUTH EVERY DAY   Patient taking differently: Take 30 mg by mouth every evening. TAKE 2 TABLETS BY MOUTH EVERY DAY   Diclofenac Sodium 1 % Gel Not Taking at Unknown time Family Member No No   Sig: APPLY TO AFFECTED AREA TWICE A DAY AS NEEDED   Patient not taking: Reported on 12/27/2020   GENTAMICIN IN SALINE IV 12/27/2020 at AM Family Member Yes Yes   Sig: Infuse 140 mg into a venous catheter every morning. 7 day course   acetaminophen (TYLENOL) 160 MG/5ML Suspension 12/27/2020 at AM Family Member Yes No   Sig: Take 15 mg/kg by mouth every 6 hours as needed.   ascorbic acid (ASCORBIC ACID) 500 MG Tab 12/26/2020 at 1200 Family Member No No   Sig: TAKE 1 TABLET BY MOUTH THREE TIMES A DAY   bethanechol (URECHOLINE) 25 MG Tab 12/27/2020 at AM Family Member No No   Sig: Take 1 Tab by mouth every 24 hours as needed.   Patient taking differently: Take 25 mg by mouth 2 (two) times a day.   cyclosporin (RESTASIS) 0.05 % ophthalmic emulsion 12/27/2020 at AM Family Member Yes No   Sig: Place 1 Drop in both eyes 2 times a day.   nitrofurantoin (MACROBID) 100 MG Cap >2 days at unknown  Family Member No No   Sig: Take one tab after coitus x1 for prophylaxis   Patient taking differently: Take 100 mg by mouth every day. Prophylaxis   sertraline (ZOLOFT) 100 MG Tab 12/27/2020 at AM Family Member No No   Sig: TAKE 1 TABLET BY MOUTH EVERY DAY      Facility-Administered Medications: None       Allergies  Allergies   Allergen Reactions   • Ace Inhibitors Anaphylaxis   • Augmentin      Flu like sx   • Erythromycin Rash   • Lisinopril Anaphylaxis   • Penicillins Hives     Tolerates cephalosporins   • Epinephrine Unspecified     shaking   • Percocet  [Apap-Fd&C Red #40 [place]-Oxycodone] Vomiting   • Percocet [Oxycodone-Acetaminophen] Vomiting   • Sulfa Drugs Hives       Physical Exam  Temp:  [36.2 °C (97.1 °F)] 36.2 °C (97.1 °F)  Pulse:  [73-75] 73  Resp:  [16-20] 18  BP: (156-179)/(67-80) 156/67  SpO2:  [89 %-99 %] 99 %    Physical Exam  Vitals signs and nursing note reviewed.   Constitutional:       Appearance: Normal appearance. She is normal weight.   HENT:      Head: Normocephalic.      Comments: Laceration to the posterior scalp     Right Ear: Ear canal and external ear normal.      Left Ear: Ear canal and external ear normal.      Nose: Nose normal.      Mouth/Throat:      Mouth: Mucous membranes are moist.      Pharynx: Oropharynx is clear.   Eyes:      Extraocular Movements: Extraocular movements intact.      Conjunctiva/sclera: Conjunctivae normal.      Pupils: Pupils are equal, round, and reactive to light.   Neck:      Musculoskeletal: Normal range of motion and neck supple.   Cardiovascular:      Rate and Rhythm: Normal rate and regular rhythm.      Pulses: Normal pulses.      Heart sounds: Normal heart sounds.   Pulmonary:      Effort: Pulmonary effort is normal.      Breath sounds: Normal breath sounds.   Abdominal:      General: Abdomen is flat. Bowel sounds are normal.      Palpations: Abdomen is soft.   Musculoskeletal:      Comments: Right wrist is splinted  Tenderness to the upper lumbar  spine to palpation   Skin:     General: Skin is warm and dry.      Capillary Refill: Capillary refill takes less than 2 seconds.   Neurological:      General: No focal deficit present.      Mental Status: She is alert and oriented to person, place, and time. Mental status is at baseline.   Psychiatric:         Mood and Affect: Mood normal.         Behavior: Behavior normal.         Thought Content: Thought content normal.         Judgment: Judgment normal.         Fluids      Laboratory  Recent Labs     12/27/20  1652   WBC 21.9*   RBC 4.98   HEMOGLOBIN 15.6   HEMATOCRIT 46.4   MCV 93.2   MCH 31.3   MCHC 33.6   RDW 44.1   PLATELETCT 251   MPV 9.2     Recent Labs     12/26/20  1035 12/27/20  1652   SODIUM 138 138   POTASSIUM 4.1 3.5*   CHLORIDE 101 99   CO2 26 24   GLUCOSE 89 92   BUN 13 15   CREATININE 0.52 0.48*   CALCIUM 10.6* 11.1*                     Imaging  CT-LSPINE W/O PLUS RECONS   Final Result      1.  L1 superior endplate compression fracture resulting in 30% height loss and mild posterior bony retropulsion. This narrows the spinal canal by less than 20%.      2.  Multilevel degenerative disc disease and facet degeneration.      CT-TSPINE W/O PLUS RECONS   Final Result         No acute fracture or subluxation of the thoracic spine.      L1 fracture.      CT-HEAD W/O   Final Result      1.  Cerebral atrophy.      2.  White matter lucencies most consistent with small vessel ischemic change versus demyelination or gliosis.      3.  Otherwise, Head CT without contrast with no acute findings. No evidence of acute cerebral infarction, hemorrhage or mass lesion.      CT-CSPINE WITHOUT PLUS RECONS   Final Result      1.  No evidence of cervical spine fracture.      2.  Surgical change extending from C4 through C7.      3.  Multilevel degenerative disc disease and facet degeneration.      4.  Kyphotic deformity.      DX-WRIST-LIMITED 2- RIGHT   Final Result      Markedly displaced, impacted and dorsally angulated  fractures of the distal radius and ulna.         DX-CHEST-PORTABLE (1 VIEW)   Final Result      Cardiomegaly.          Assessment/Plan    Mild L1 compression fracture  TLSO when out of bed and ambulatory for a total of 3-6 weeks.  I have ordered her a TLSO  No spinal precautions necessary  I discussed with patient and daughter that back pain should improve in 2 weeks and resolve in about 6 weeks.  If she is still having pain in 6 weeks [facility]Lateral lumbar x[facility]

## 2020-12-28 NOTE — ASSESSMENT & PLAN NOTE
After mechanical fall  Orthopedic on board for surgery today  Pain management and schedule stool softener to prevent constipation  Vitamin D supplementation and PT OT

## 2020-12-28 NOTE — ASSESSMENT & PLAN NOTE
Recurrent falls with dizziness  No significant syncope, palpitation or chest pain  Following bp  Multifactorial including, deconditioning and dementia  Continue vitamin D supplementation   Continue PT/ OT

## 2020-12-28 NOTE — DISCHARGE PLANNING
Anticipated Discharge Disposition: SNF    Action: Spoke to pts daughter, Elda, by phone. Elda gave verbal choice for SNF. SNF choice faxed to [facility].    Barriers to Discharge: SNF acceptance    Plan: f/u with medical team, pts daughter EldaJOSE MANUEL

## 2020-12-28 NOTE — ED NOTES
Med rec complete per Pt and Pt's daughter at bedside   Pt's daughter verified medications and last dose taken   Pt takes NITROFURANTOIN for prophylaxis use.  Pt was started on IV GENTAMICIN 140mg 12/26/2020   Last dose taken 12/27/2020 AM   Allergies reviewed per Pt's daughter     Home Pharmacy [facility] @[phone]

## 2020-12-28 NOTE — PROGRESS NOTES
"   Orthopaedic Progress Note    Interval changes:  Patient doing well post op  Cleared for DC by ortho pending medicine clearance    ROS - Patient denies any new issues.  Pain well controlled.    /59   Pulse 70   Temp 36.4 °C (97.6 °F) (Temporal)   Resp 18   Ht 1.702 m (5' 7\")   Wt 70 kg (154 lb 5.2 oz)   SpO2 96%       Patient seen and examined  No acute distress  Breathing non labored  RRR  RUE in short arm splint, DNVI, moves all fingers, cap refill <2 sec.     Recent Labs     12/27/20  1652 12/28/20  0029   WBC 21.9* 14.8*   RBC 4.98 4.61   HEMOGLOBIN 15.6 14.0   HEMATOCRIT 46.4 43.2   MCV 93.2 93.7   MCH 31.3 30.4   MCHC 33.6 32.4*   RDW 44.1 44.5   PLATELETCT 251 230   MPV 9.2 9.7       Active Hospital Problems    Diagnosis   • Wrist fracture, closed, right, sequela [S62.101S]     Priority: High   • L1 vertebral fracture (HCC) [S32.019A]     Priority: High   • Dementia (HCC) [F03.90]     Priority: High   • Fall [W19.XXXA]     Priority: High   • History of recurrent UTIs [Z87.440]     Priority: High     Discussed details to prevent UTI. Counseling for postcoital voiding, Genital hygiene, daily probiotics, oral hydrations, cranberry.      • Leukocytosis [D72.829]     Priority: Medium   • Hyperparathyroidism (HCC) [E21.3]     Priority: Medium     Follow with endocrinologist, Dr Andres regularly.      • Essential hypertension [I10]     Priority: Medium     Patient could not tolerate HCTZ due to side effects of hypercalcemia. She also has history of angioedema with ACEIs and she reported that she was told not to take antihypertensive medications ending with \" ol \" or \" il \". She has chronic venous insufficiency and history of varicose vein removed on both LE. She has leg swelling from taking amlodipine.  Adequate BP control.  Dr. Nick Dixon, Cardiology (sign off 7/15)           Assessment/Plan:  Cleared for DC by ortho pending medicine clearance  POD#1 S/P:  1.  Irrigation and debridement of open " fracture.  2.  Open reduction internal fixation of right comminuted distal radius fracture  Wt bearing status - NWB RUE  Wound care/Drains - splint left in place  Future Procedures - none planned   Lovenox: Start 12/28, Duration-until ambulatory > 150'  Sutures/Staples out- 10-14 days post operatively  PT/OT-initiated  Antibiotics: gentamicin 140mg IV QD  DVT Prophylaxis- TEDS/SCDs/Foot pumps  Lopez-none  Case Coordination for Discharge Planning - Disposition home

## 2020-12-28 NOTE — OP REPORT
DATE OF SERVICE:  12/27/2020     PREOPERATIVE DIAGNOSIS:  Grade 1 open right distal radius and ulnar fractures.     POSTOPERATIVE DIAGNOSIS:  Grade 1 open right distal radius and ulnar   fractures.     PROCEDURES:  1.  Irrigation and debridement of open fracture.  2.  Open reduction internal fixation of right comminuted distal radius   fracture.     SURGEON:  Ramirez Martinez MD     ASSISTANT:  None.     ESTIMATED BLOOD LOSS:  None.     INDICATIONS:  This is an 85-year-old black female status post fall who   sustained an open distal radius and ulna fracture.  Risks and benefits of   operative fixation were discussed, which include, but not limited to bleeding,   infection, neurovascular damage, pain, stiffness, malunion, nonunion, DVT,   PE, MI, stroke, and death.  They understand all these risks and wished to   proceed.     DESCRIPTION OF PROCEDURE:  The patient was sedated with LMA anesthesia and   administered proper antibiotics.  Right upper extremity was prepped and draped   in the usual sterile fashion.  The open wound was extended and the fracture   site was then debrided, skin, subcutaneous tissue, underlying muscle, and bone   in an excisional fashion with a knife and rongeur, irrigated with copious   amounts of normal saline solution.  A standard FCR approach to the distal   radius was then performed with care taken to avoid all neurovascular   structures and the fracture was reduced to anatomic position and held with a   K-wire followed by an [facility] distal radius volar locking plate with a combination   of locking and nonlocking fixation.  All screws were checked, appropriate   length and joint reduction was found to be anatomic.  Wounds were irrigated,   closed in layers.  Sterile dressing was applied.  Volar splint was applied.    The patient tolerated the procedure well.     POSTOPERATIVE PLAN:  The patient will be admitted under the medicine service   for perioperative antibiotics and pain  control.        ______________________________  CAIT URBANO MD PLA/YOAV/TERESA    DD:  12/27/2020 20:30  DT:  12/27/2020 20:57    Job#:  [id number]

## 2020-12-28 NOTE — PROGRESS NOTES
The pt is having increased anxiety due to her inability to feel or move her arm per pt. The pt is A&O x 3 with some confusion in regards to situation which is also causing her anxiety ans well. This RN paged the on-call hospitalist Dr. Vázquez and updated him on the situation to which he ordered a one time dose of Lorazepam 0.5 mg IV to help with her anxiety.

## 2020-12-28 NOTE — ASSESSMENT & PLAN NOTE
On gentamicin started 12/25 for UTI  WBC rising, temp of 100.6 noted today  Her UTI symptoms improved per patient  No resp symptoms. CXR neg for infection and COVID was neg  Procalcitonin was low  Trend CBC. Check blood cultures

## 2020-12-28 NOTE — ANESTHESIA POSTPROCEDURE EVALUATION
Patient: Lucita Babcock    Procedure Summary     Date: 12/27/20 Room / Location: [place] [address] / SURGERY [facility]    Anesthesia Start: 1933 Anesthesia Stop: 2035    Procedure: ORIF, FOREARM (Right Wrist) Diagnosis: (RIGHT OPEN WRIST FRACTURE)    Surgeons: Ramirez Martinez M.D. Responsible Provider: Moisés Daugherty M.D.    Anesthesia Type: general, peripheral nerve block ASA Status: 2 - Emergent          Final Anesthesia Type: general, peripheral nerve block  Last vitals  BP   Blood Pressure : 124/76    Temp   37 °C (98.6 °F)    Pulse   Pulse: 73   Resp   20    SpO2   96 %      Anesthesia Post Evaluation    Patient location during evaluation: PACU  Patient participation: complete - patient participated  Level of consciousness: awake and alert  Pain score: 0    Airway patency: patent  Anesthetic complications: no  Cardiovascular status: hemodynamically stable  Respiratory status: acceptable  Hydration status: euvolemic    PONV: none           Nurse Pain Score: 0 (NPRS)

## 2020-12-28 NOTE — ANESTHESIA PROCEDURE NOTES
Peripheral Block    Date/Time: 12/27/2020 7:30 PM  Performed by: Moisés Daugherty M.D.  Authorized by: Moisés Daugherty M.D.     Start Time:  12/27/2020 7:30 PM  Reason for Block: at surgeon's request and post-op pain management ONLY    patient identified, IV checked, site marked, risks and benefits discussed, surgical consent, monitors and equipment checked, pre-op evaluation and timeout performed    Patient Position:  Supine  Prep: ChloraPrep    Monitoring:  Heart rate, continuous pulse ox and cardiac monitor  Block Region:  Upper Extremity  Upper Extremity - Block Type:  BRACHIAL PLEXUS block, Supraclavicular approach    Laterality:  Right  Procedures: ultrasound guided  Image captured, interpreted and electronically stored.  Local Infiltration:  Lidocaine  Strength:  1 %  Dose:  3 ml  Block Type:  Single-shot  Needle Length:  100mm  Needle Gauge:  21 G  Needle Localization:  Ultrasound guidance  Injection Assessment:  Negative aspiration for heme, no paresthesia on injection, incremental injection and local visualized surrounding nerve on ultrasound

## 2020-12-29 ENCOUNTER — APPOINTMENT (OUTPATIENT)
Dept: ONCOLOGY | Facility: MEDICAL CENTER | Age: 85
DRG: 511 | End: 2020-12-29
Attending: PHYSICIAN ASSISTANT
Payer: MEDICARE

## 2020-12-29 LAB
ANION GAP SERPL CALC-SCNC: 9 MMOL/L (ref 7–16)
BASOPHILS # BLD AUTO: 0.1 % (ref 0–1.8)
BASOPHILS # BLD: 0.02 K/UL (ref 0–0.12)
BUN SERPL-MCNC: 9 MG/DL (ref 8–22)
CALCIUM SERPL-MCNC: 9.5 MG/DL (ref 8.5–10.5)
CHLORIDE SERPL-SCNC: 104 MMOL/L (ref 96–112)
CO2 SERPL-SCNC: 27 MMOL/L (ref 20–33)
COVID ORDER STATUS COVID19: NORMAL
CREAT SERPL-MCNC: 0.44 MG/DL (ref 0.5–1.4)
EOSINOPHIL # BLD AUTO: 0.04 K/UL (ref 0–0.51)
EOSINOPHIL NFR BLD: 0.2 % (ref 0–6.9)
ERYTHROCYTE [DISTWIDTH] IN BLOOD BY AUTOMATED COUNT: 45.4 FL (ref 35.9–50)
GENTAMICIN SERPL-MCNC: <0.3 UG/ML (ref 1–2)
GLUCOSE SERPL-MCNC: 91 MG/DL (ref 65–99)
HCT VFR BLD AUTO: 41.6 % (ref 37–47)
HGB BLD-MCNC: 13.3 G/DL (ref 12–16)
IMM GRANULOCYTES # BLD AUTO: 0.14 K/UL (ref 0–0.11)
IMM GRANULOCYTES NFR BLD AUTO: 0.9 % (ref 0–0.9)
LYMPHOCYTES # BLD AUTO: 1.08 K/UL (ref 1–4.8)
LYMPHOCYTES NFR BLD: 6.6 % (ref 22–41)
MAGNESIUM SERPL-MCNC: 1.9 MG/DL (ref 1.5–2.5)
MCH RBC QN AUTO: 30.2 PG (ref 27–33)
MCHC RBC AUTO-ENTMCNC: 32 G/DL (ref 33.6–35)
MCV RBC AUTO: 94.5 FL (ref 81.4–97.8)
MONOCYTES # BLD AUTO: 1.04 K/UL (ref 0–0.85)
MONOCYTES NFR BLD AUTO: 6.4 % (ref 0–13.4)
NEUTROPHILS # BLD AUTO: 14.04 K/UL (ref 2–7.15)
NEUTROPHILS NFR BLD: 85.8 % (ref 44–72)
NRBC # BLD AUTO: 0 K/UL
NRBC BLD-RTO: 0 /100 WBC
PLATELET # BLD AUTO: 210 K/UL (ref 164–446)
PMV BLD AUTO: 9.5 FL (ref 9–12.9)
POTASSIUM SERPL-SCNC: 3.6 MMOL/L (ref 3.6–5.5)
RBC # BLD AUTO: 4.4 M/UL (ref 4.2–5.4)
SODIUM SERPL-SCNC: 140 MMOL/L (ref 135–145)
WBC # BLD AUTO: 16.4 K/UL (ref 4.8–10.8)

## 2020-12-29 PROCEDURE — A9270 NON-COVERED ITEM OR SERVICE: HCPCS | Performed by: INTERNAL MEDICINE

## 2020-12-29 PROCEDURE — 700102 HCHG RX REV CODE 250 W/ 637 OVERRIDE(OP): Performed by: INTERNAL MEDICINE

## 2020-12-29 PROCEDURE — C9803 HOPD COVID-19 SPEC COLLECT: HCPCS | Performed by: STUDENT IN AN ORGANIZED HEALTH CARE EDUCATION/TRAINING PROGRAM

## 2020-12-29 PROCEDURE — 87040 BLOOD CULTURE FOR BACTERIA: CPT

## 2020-12-29 PROCEDURE — 36415 COLL VENOUS BLD VENIPUNCTURE: CPT

## 2020-12-29 PROCEDURE — U0003 INFECTIOUS AGENT DETECTION BY NUCLEIC ACID (DNA OR RNA); SEVERE ACUTE RESPIRATORY SYNDROME CORONAVIRUS 2 (SARS-COV-2) (CORONAVIRUS DISEASE [COVID-19]), AMPLIFIED PROBE TECHNIQUE, MAKING USE OF HIGH THROUGHPUT TECHNOLOGIES AS DESCRIBED BY CMS-2020-01-R: HCPCS

## 2020-12-29 PROCEDURE — 85025 COMPLETE CBC W/AUTO DIFF WBC: CPT

## 2020-12-29 PROCEDURE — 80170 ASSAY OF GENTAMICIN: CPT

## 2020-12-29 PROCEDURE — 700111 HCHG RX REV CODE 636 W/ 250 OVERRIDE (IP): Performed by: INTERNAL MEDICINE

## 2020-12-29 PROCEDURE — 83735 ASSAY OF MAGNESIUM: CPT

## 2020-12-29 PROCEDURE — 700101 HCHG RX REV CODE 250: Performed by: INTERNAL MEDICINE

## 2020-12-29 PROCEDURE — 80048 BASIC METABOLIC PNL TOTAL CA: CPT

## 2020-12-29 PROCEDURE — 99232 SBSQ HOSP IP/OBS MODERATE 35: CPT | Performed by: INTERNAL MEDICINE

## 2020-12-29 PROCEDURE — 700105 HCHG RX REV CODE 258: Performed by: INTERNAL MEDICINE

## 2020-12-29 PROCEDURE — 770006 HCHG ROOM/CARE - MED/SURG/GYN SEMI*

## 2020-12-29 RX ORDER — CYCLOBENZAPRINE HCL 10 MG
5 TABLET ORAL 3 TIMES DAILY PRN
Status: DISCONTINUED | OUTPATIENT
Start: 2020-12-29 | End: 2020-12-30 | Stop reason: HOSPADM

## 2020-12-29 RX ADMIN — BETHANECHOL CHLORIDE 25 MG: 25 TABLET ORAL at 17:06

## 2020-12-29 RX ADMIN — DOCUSATE SODIUM 50 MG AND SENNOSIDES 8.6 MG 2 TABLET: 8.6; 5 TABLET, FILM COATED ORAL at 05:03

## 2020-12-29 RX ADMIN — SERTRALINE HYDROCHLORIDE 100 MG: 100 TABLET ORAL at 05:04

## 2020-12-29 RX ADMIN — DOCUSATE SODIUM 50 MG AND SENNOSIDES 8.6 MG 2 TABLET: 8.6; 5 TABLET, FILM COATED ORAL at 17:06

## 2020-12-29 RX ADMIN — CYCLOBENZAPRINE 5 MG: 10 TABLET, FILM COATED ORAL at 09:59

## 2020-12-29 RX ADMIN — POLYETHYLENE GLYCOL 3350 1 PACKET: 17 POWDER, FOR SOLUTION ORAL at 09:59

## 2020-12-29 RX ADMIN — MORPHINE SULFATE 1 MG: 4 INJECTION INTRAVENOUS at 00:43

## 2020-12-29 RX ADMIN — POLYETHYLENE GLYCOL 3350 1 PACKET: 17 POWDER, FOR SOLUTION ORAL at 21:39

## 2020-12-29 RX ADMIN — GENTAMICIN SULFATE 140 MG: 40 INJECTION, SOLUTION INTRAMUSCULAR; INTRAVENOUS at 05:23

## 2020-12-29 RX ADMIN — BETHANECHOL CHLORIDE 25 MG: 25 TABLET ORAL at 05:09

## 2020-12-29 RX ADMIN — CYCLOBENZAPRINE 5 MG: 10 TABLET, FILM COATED ORAL at 17:06

## 2020-12-29 RX ADMIN — DILTIAZEM HYDROCHLORIDE 30 MG: 30 TABLET, FILM COATED ORAL at 17:07

## 2020-12-29 RX ADMIN — OXYCODONE 5 MG: 5 TABLET ORAL at 17:06

## 2020-12-29 RX ADMIN — Medication 4000 UNITS: at 05:03

## 2020-12-29 RX ADMIN — MORPHINE SULFATE 1 MG: 4 INJECTION INTRAVENOUS at 05:02

## 2020-12-29 RX ADMIN — OXYCODONE 5 MG: 5 TABLET ORAL at 13:19

## 2020-12-29 RX ADMIN — ENOXAPARIN SODIUM 40 MG: 40 INJECTION SUBCUTANEOUS at 05:02

## 2020-12-29 RX ADMIN — OXYCODONE 5 MG: 5 TABLET ORAL at 02:56

## 2020-12-29 RX ADMIN — DILTIAZEM HYDROCHLORIDE 30 MG: 30 TABLET, FILM COATED ORAL at 05:03

## 2020-12-29 ASSESSMENT — PAIN DESCRIPTION - PAIN TYPE
TYPE: ACUTE PAIN;SURGICAL PAIN
TYPE: ACUTE PAIN
TYPE: ACUTE PAIN;SURGICAL PAIN

## 2020-12-29 ASSESSMENT — ENCOUNTER SYMPTOMS
CONSTIPATION: 0
SHORTNESS OF BREATH: 0
NERVOUS/ANXIOUS: 0
DIZZINESS: 0
VOMITING: 0
NECK PAIN: 1
ABDOMINAL PAIN: 0
FEVER: 0

## 2020-12-29 NOTE — CARE PLAN
Problem: Safety  Goal: Will remain free from injury  Outcome: PROGRESSING AS EXPECTED   Bed locked in lowest position. Call light and personal items within reach. Pt educated to call for assitance  Problem: Venous Thromboembolism (VTW)/Deep Vein Thrombosis (DVT) Prevention:  Goal: Patient will participate in Venous Thrombosis (VTE)/Deep Vein Thrombosis (DVT)Prevention Measures  Outcome: PROGRESSING AS EXPECTED   SCD's on.  Problem: Pain Management  Goal: Pain level will decrease to patient's comfort goal  Outcome: PROGRESSING AS EXPECTED   Pt verbalizes pain relieve with current pain medication plan.

## 2020-12-29 NOTE — PROGRESS NOTES
"   Orthopaedic Progress Note    Interval changes:  Patient doing well    Cleared for DC by ortho pending medicine clearance    ROS - Patient denies any new issues.  Pain well controlled.    /64   Pulse 92   Temp (!) 38.1 °C (100.6 °F) (Temporal)   Resp 17   Ht 1.702 m (5' 7\")   Wt 70 kg (154 lb 5.2 oz)   SpO2 90%       Patient seen and examined  No acute distress  Breathing non labored  RRR  RUE in short arm splint, DNVI, moves all fingers, cap refill <2 sec.     Recent Labs     12/27/20  1652 12/28/20  0029 12/29/20  0626   WBC 21.9* 14.8* 16.4*   RBC 4.98 4.61 4.40   HEMOGLOBIN 15.6 14.0 13.3   HEMATOCRIT 46.4 43.2 41.6   MCV 93.2 93.7 94.5   MCH 31.3 30.4 30.2   MCHC 33.6 32.4* 32.0*   RDW 44.1 44.5 45.4   PLATELETCT 251 230 210   MPV 9.2 9.7 9.5       Active Hospital Problems    Diagnosis   • Wrist fracture, closed, right, sequela [S62.101S]     Priority: High   • L1 vertebral fracture (HCC) [S32.019A]     Priority: High   • Dementia (HCC) [F03.90]     Priority: High   • Fall [W19.XXXA]     Priority: High   • History of recurrent UTIs [Z87.440]     Priority: High     Discussed details to prevent UTI. Counseling for postcoital voiding, Genital hygiene, daily probiotics, oral hydrations, cranberry.      • Leukocytosis [D72.829]     Priority: Medium   • Hyperparathyroidism (HCC) [E21.3]     Priority: Medium     Follow with endocrinologist, Dr Andres regularly.      • Essential hypertension [I10]     Priority: Medium     Patient could not tolerate HCTZ due to side effects of hypercalcemia. She also has history of angioedema with ACEIs and she reported that she was told not to take antihypertensive medications ending with \" ol \" or \" il \". She has chronic venous insufficiency and history of varicose vein removed on both LE. She has leg swelling from taking amlodipine.  Adequate BP control.  Dr. Nick Dixon, Cardiology (sign off 7/15)           Assessment/Plan:  Cleared for DC by ortho pending medicine " clearance  POD#2 S/P:  1.  Irrigation and debridement of open fracture.  2.  Open reduction internal fixation of right comminuted distal radius fracture  Wt bearing status - NWB RUE  Wound care/Drains - splint left in place  Future Procedures - none planned   Lovenox: Start 12/28, Duration-until ambulatory > 150'  Sutures/Staples out- 10-14 days post operatively  PT/OT-initiated  Antibiotics: gentamicin 140mg IV QD  DVT Prophylaxis- TEDS/SCDs/Foot pumps  Lopez-none  Case Coordination for Discharge Planning - Disposition home

## 2020-12-29 NOTE — PROGRESS NOTES
Hospital Medicine Daily Progress Note    Date of Service  12/29/2020    Chief Complaint  85 y.o. female admitted 12/27/2020 with fall    Hospital Course  86 yo woman with dementia, HTN who fell and had right wrist fracture. She had I&D of open fracture with ORIF with Dr. Martinez 12/27/20. She also had L1 compression fracture, Dr. Keane recommended TLSO for 3-6 weeks. She had ESBL E coli UTI as outpatient and continued on gentamicin until 1/1/21.     Interval Problem Update  She complains of neck pain, has neck surgeries before. Wrist pain is fairly controlled    Consultants/Specialty  Ortho    Code Status  Full Code    Disposition  SNF tomorrow    Review of Systems  Review of Systems   Constitutional: Negative for fever.   Respiratory: Negative for shortness of breath.    Cardiovascular: Negative for chest pain.   Gastrointestinal: Negative for abdominal pain, constipation and vomiting.   Genitourinary: Negative for dysuria, frequency and urgency.   Musculoskeletal: Positive for joint pain and neck pain.   Neurological: Negative for dizziness.   Psychiatric/Behavioral: The patient is not nervous/anxious.         Physical Exam  Temp:  [36.7 °C (98 °F)-38.1 °C (100.6 °F)] 37.3 °C (99.2 °F)  Pulse:  [72-99] 99  Resp:  [16-18] 17  BP: (108-140)/(57-71) 132/70  SpO2:  [90 %-99 %] 90 %    Physical Exam  Vitals signs and nursing note reviewed.   Constitutional:       General: She is not in acute distress.     Appearance: She is not toxic-appearing.   HENT:      Head: Normocephalic.      Mouth/Throat:      Mouth: Mucous membranes are moist.   Eyes:      General:         Right eye: No discharge.         Left eye: No discharge.   Neck:      Musculoskeletal: Neck supple. No neck rigidity.   Cardiovascular:      Rate and Rhythm: Normal rate and regular rhythm.      Heart sounds: Murmur present.   Pulmonary:      Effort: Pulmonary effort is normal. No respiratory distress.      Breath sounds: No wheezing or rales.    Abdominal:      Palpations: Abdomen is soft.      Tenderness: There is no abdominal tenderness.   Musculoskeletal:      Comments: Right arm in splint   Skin:     General: Skin is warm and dry.   Neurological:      Mental Status: She is alert and oriented to person, place, and time.   Psychiatric:         Mood and Affect: Mood normal.         Behavior: Behavior normal.         Fluids    Intake/Output Summary (Last 24 hours) at 12/29/2020 1502  Last data filed at 12/28/2020 2300  Gross per 24 hour   Intake 480 ml   Output 900 ml   Net -420 ml       Laboratory  Recent Labs     12/27/20  1652 12/28/20  0029 12/29/20  0626   WBC 21.9* 14.8* 16.4*   RBC 4.98 4.61 4.40   HEMOGLOBIN 15.6 14.0 13.3   HEMATOCRIT 46.4 43.2 41.6   MCV 93.2 93.7 94.5   MCH 31.3 30.4 30.2   MCHC 33.6 32.4* 32.0*   RDW 44.1 44.5 45.4   PLATELETCT 251 230 210   MPV 9.2 9.7 9.5     Recent Labs     12/27/20  1652 12/28/20  0029 12/29/20  0626   SODIUM 138 134* 140   POTASSIUM 3.5* 4.2 3.6   CHLORIDE 99 100 104   CO2 24 23 27   GLUCOSE 92 172* 91   BUN 15 15 9   CREATININE 0.48* 0.61 0.44*   CALCIUM 11.1* 9.8 9.5                   Imaging  DX-WRIST-LIMITED 2- RIGHT   Final Result      Intraoperative images intended for localization and not for diagnostic purposes demonstrate plate and screw fixation of the distal radius. See procedure report for details.      Fluoroscopy Time:  0.1  minutes.  2 fluoroscopic images were obtained.      DX-PORTABLE FLUORO > 1 HOUR   Final Result      Intraoperative images intended for localization and not for diagnostic purposes demonstrate plate and screw fixation of the distal radius. See procedure report for details.      Fluoroscopy Time:  0.1  minutes.  2 fluoroscopic images were obtained.      CT-LSPINE W/O PLUS RECONS   Final Result      1.  L1 superior endplate compression fracture resulting in 30% height loss and mild posterior bony retropulsion. This narrows the spinal canal by less than 20%.      2.   Multilevel degenerative disc disease and facet degeneration.      CT-TSPINE W/O PLUS RECONS   Final Result         No acute fracture or subluxation of the thoracic spine.      L1 fracture.      CT-HEAD W/O   Final Result      1.  Cerebral atrophy.      2.  White matter lucencies most consistent with small vessel ischemic change versus demyelination or gliosis.      3.  Otherwise, Head CT without contrast with no acute findings. No evidence of acute cerebral infarction, hemorrhage or mass lesion.      CT-CSPINE WITHOUT PLUS RECONS   Final Result      1.  No evidence of cervical spine fracture.      2.  Surgical change extending from C4 through C7.      3.  Multilevel degenerative disc disease and facet degeneration.      4.  Kyphotic deformity.      DX-WRIST-LIMITED 2- RIGHT   Final Result      Markedly displaced, impacted and dorsally angulated fractures of the distal radius and ulna.         DX-CHEST-PORTABLE (1 VIEW)   Final Result      Cardiomegaly.           Assessment/Plan  * Wrist fracture, closed, right, sequela- (present on admission)  Assessment & Plan  After mechanical fall  Orthopedic on board for surgery today  Pain management and schedule stool softener to prevent constipation  Vitamin D supplementation and PT OT    Dementia (HCC)- (present on admission)  Assessment & Plan  At her baseline  Try to avoid psychoactive and sedating medications    L1 vertebral fracture (HCC)- (present on admission)  Assessment & Plan  Compression fracture more than 30%  Neurosurgery following, continue TLSO  No associated pain  The patient might need bisphosphonate as outpatient  Continue vitamin D and calcium supplementation    Fall- (present on admission)  Assessment & Plan  Recurrent falls with dizziness  No significant syncope, palpitation or chest pain  Following bp  Multifactorial including, deconditioning and dementia  Continue vitamin D supplementation   Continue PT/ OT    History of recurrent UTIs- (present on  admission)  Assessment & Plan  The patient has been treated with antibiotics for recurrent UTI  According to the family the symptoms are declining her memory issues  Discussed the risk/benefit from gentamicin, the family insisted to continue gentamicin  Continue course of gentamicin for ESBL started on 12/25 for 7 days      Leukocytosis- (present on admission)  Assessment & Plan  On gentamicin started 12/25 for UTI  WBC rising, temp of 100.6 noted today  Her UTI symptoms improved per patient  No resp symptoms. CXR neg for infection and COVID was neg  Procalcitonin was low  Trend CBC. Check blood cultures    Hyperparathyroidism (HCC)- (present on admission)  Assessment & Plan  The patient has a chronic hyperkalemia with elevated PTH   vitamin D 53  Consider bisphosphonates to be started as outpatient    Essential hypertension- (present on admission)  Assessment & Plan  Controlled  Continue diltiazem       VTE prophylaxis: lovenox

## 2020-12-29 NOTE — PROGRESS NOTES
"P atient seen and examined  Pain tolerable, as expected    /64   Pulse 92   Temp (!) 38.1 °C (100.6 °F) (Temporal)   Resp 17   Ht 1.702 m (5' 7\")   Wt 70 kg (154 lb 5.2 oz)   SpO2 90%     Recent Labs     12/27/20  1652 12/28/20  0029 12/29/20  0626   WBC 21.9* 14.8* 16.4*   RBC 4.98 4.61 4.40   HEMOGLOBIN 15.6 14.0 13.3   HEMATOCRIT 46.4 43.2 41.6   MCV 93.2 93.7 94.5   MCH 31.3 30.4 30.2   MCHC 33.6 32.4* 32.0*   RDW 44.1 44.5 45.4   PLATELETCT 251 230 210   MPV 9.2 9.7 9.5       No acute distress  Dressing clean dry and intact  Neurovascularly intact    POD#  2 ORIF open distal radius    Plan:  DVT Prophylaxis- TEDS/SCDs  Weight Bearing Status- 1# operative hand  PT/OT  Antibiotics: complete  Case Coordination          "

## 2020-12-29 NOTE — DISCHARGE PLANNING
Spoke to Heather @ [facility]    Transport is scheduled for 12/29/20 @6678 going to [facility].    Notified Bedside CARLOS A Dolan

## 2020-12-29 NOTE — PROGRESS NOTES
"Pharmacy Kinetics 85 y.o. female on gentamicin day # 4 2020    Dosing Weight: 70  Currently on Gentamicin 140 mg iv q24hr  Please refer to [facility] notes regarding gentamicin dosing history & plan.  therapy planned to complete .    Indication for treatment: ESBL UTI    Pertinent history per medical record: Admitted on 2020 for GLF. On treatment per urology for ESBL UTI with gentamicin at infusion center.   .    Other antibiotics: none    Allergies: Ace inhibitors, Augmentin, Erythromycin, Lisinopril, Penicillins, Epinephrine, Percocet  [apap-fd&c red #40 [place]-oxycodone], Percocet [oxycodone-acetaminophen], and Sulfa drugs     List concerns for renal function: advanced age    Pertinent cultures to date:   20 ESBL UTI    Recent Labs     20  1652 20  0029 20  0626   WBC 21.9* 14.8* 16.4*   NEUTSPOLYS 85.90* 93.50* 85.80*     Recent Labs     20  1652 20  0029 20  0626   BUN 15 15 9   CREATININE 0.48* 0.61 0.44*   ALBUMIN 4.5  --   --      Recent Labs     20  0626   GENTTROUGH <0.30*       Intake/Output Summary (Last 24 hours) at 2020 1456  Last data filed at 2020 2300  Gross per 24 hour   Intake 480 ml   Output 900 ml   Net -420 ml      /64   Pulse 92   Temp (!) 38.1 °C (100.6 °F) (Temporal)   Resp 17   Ht 1.702 m (5' 7\")   Wt 70 kg (154 lb 5.2 oz)   SpO2 90%  Temp (24hrs), Av.2 °C (99 °F), Min:36.7 °C (98 °F), Max:38.1 °C (100.6 °F)      A/P   1. Gentamicin dose change: n/a  2. Next gentamicin level: none   3. Goal trough: undetectable  4. Comments: level today within goal and renal is stable. No more levels unless patient clinical status changes. Pharmacy will follow. Therapy to end .    Misti ChandlerD    "

## 2020-12-29 NOTE — PROGRESS NOTES
"0730 Received report from [facility]. Pt A&Ox3. Pt very confused to place. Pt stated \"I want to go home\" \"Where are my shoes, where are my pants\".Pt reoriented to place and time. Pt verbalizes feeling better \"Now I know I am safe\". Bed locked in lowest position. Call light and personal items within reach. Bed alarm on. 3 bed rails up.  "

## 2020-12-29 NOTE — DISCHARGE PLANNING
Spoke to Heather @ [facility]    Transport is scheduled for 12/30/20@1300 going to [facility].    Notified Bedside CARLOS A Nicole

## 2020-12-29 NOTE — DISCHARGE PLANNING
Anticipated Discharge Disposition: [facility]    Action: Spoke to Heather with [facility] to notify her that pt needs 3 more days of gentamycin iv and that pt only has a peripheral iv. Heather states she would talk to the DON and call this RNCM back whether or not pt nees a PICC.    Barriers to Discharge: Bed availability at [facility]    Plan: f/u with medical team, pt, Heather, CCA

## 2020-12-30 ENCOUNTER — APPOINTMENT (OUTPATIENT)
Dept: ONCOLOGY | Facility: MEDICAL CENTER | Age: 85
DRG: 511 | End: 2020-12-30
Attending: PHYSICIAN ASSISTANT
Payer: MEDICARE

## 2020-12-30 VITALS
HEIGHT: 67 IN | SYSTOLIC BLOOD PRESSURE: 110 MMHG | BODY MASS INDEX: 24.22 KG/M2 | DIASTOLIC BLOOD PRESSURE: 57 MMHG | TEMPERATURE: 97.3 F | RESPIRATION RATE: 16 BRPM | HEART RATE: 81 BPM | OXYGEN SATURATION: 96 % | WEIGHT: 154.32 LBS

## 2020-12-30 PROBLEM — D72.829 LEUKOCYTOSIS: Status: RESOLVED | Noted: 2020-12-27 | Resolved: 2020-12-30

## 2020-12-30 LAB
ANION GAP SERPL CALC-SCNC: 8 MMOL/L (ref 7–16)
BASOPHILS # BLD AUTO: 0.3 % (ref 0–1.8)
BASOPHILS # BLD: 0.03 K/UL (ref 0–0.12)
BUN SERPL-MCNC: 10 MG/DL (ref 8–22)
CALCIUM SERPL-MCNC: 9.6 MG/DL (ref 8.5–10.5)
CHLORIDE SERPL-SCNC: 100 MMOL/L (ref 96–112)
CO2 SERPL-SCNC: 28 MMOL/L (ref 20–33)
CREAT SERPL-MCNC: 0.43 MG/DL (ref 0.5–1.4)
EOSINOPHIL # BLD AUTO: 0.08 K/UL (ref 0–0.51)
EOSINOPHIL NFR BLD: 0.7 % (ref 0–6.9)
ERYTHROCYTE [DISTWIDTH] IN BLOOD BY AUTOMATED COUNT: 46.6 FL (ref 35.9–50)
GLUCOSE SERPL-MCNC: 107 MG/DL (ref 65–99)
HCT VFR BLD AUTO: 39.9 % (ref 37–47)
HGB BLD-MCNC: 12.9 G/DL (ref 12–16)
IMM GRANULOCYTES # BLD AUTO: 0.09 K/UL (ref 0–0.11)
IMM GRANULOCYTES NFR BLD AUTO: 0.8 % (ref 0–0.9)
LYMPHOCYTES # BLD AUTO: 1.39 K/UL (ref 1–4.8)
LYMPHOCYTES NFR BLD: 12 % (ref 22–41)
MCH RBC QN AUTO: 30.6 PG (ref 27–33)
MCHC RBC AUTO-ENTMCNC: 32.3 G/DL (ref 33.6–35)
MCV RBC AUTO: 94.8 FL (ref 81.4–97.8)
MONOCYTES # BLD AUTO: 0.78 K/UL (ref 0–0.85)
MONOCYTES NFR BLD AUTO: 6.7 % (ref 0–13.4)
NEUTROPHILS # BLD AUTO: 9.26 K/UL (ref 2–7.15)
NEUTROPHILS NFR BLD: 79.5 % (ref 44–72)
NRBC # BLD AUTO: 0 K/UL
NRBC BLD-RTO: 0 /100 WBC
PLATELET # BLD AUTO: 194 K/UL (ref 164–446)
PMV BLD AUTO: 9.5 FL (ref 9–12.9)
POTASSIUM SERPL-SCNC: 4 MMOL/L (ref 3.6–5.5)
RBC # BLD AUTO: 4.21 M/UL (ref 4.2–5.4)
SARS-COV-2 RNA RESP QL NAA+PROBE: NOTDETECTED
SODIUM SERPL-SCNC: 136 MMOL/L (ref 135–145)
SPECIMEN SOURCE: NORMAL
WBC # BLD AUTO: 11.6 K/UL (ref 4.8–10.8)

## 2020-12-30 PROCEDURE — 80048 BASIC METABOLIC PNL TOTAL CA: CPT

## 2020-12-30 PROCEDURE — A9270 NON-COVERED ITEM OR SERVICE: HCPCS | Performed by: INTERNAL MEDICINE

## 2020-12-30 PROCEDURE — 36415 COLL VENOUS BLD VENIPUNCTURE: CPT

## 2020-12-30 PROCEDURE — 700105 HCHG RX REV CODE 258: Performed by: INTERNAL MEDICINE

## 2020-12-30 PROCEDURE — 700102 HCHG RX REV CODE 250 W/ 637 OVERRIDE(OP): Performed by: INTERNAL MEDICINE

## 2020-12-30 PROCEDURE — 85025 COMPLETE CBC W/AUTO DIFF WBC: CPT

## 2020-12-30 PROCEDURE — 99239 HOSP IP/OBS DSCHRG MGMT >30: CPT | Performed by: INTERNAL MEDICINE

## 2020-12-30 PROCEDURE — 700111 HCHG RX REV CODE 636 W/ 250 OVERRIDE (IP): Performed by: INTERNAL MEDICINE

## 2020-12-30 PROCEDURE — 700101 HCHG RX REV CODE 250: Performed by: INTERNAL MEDICINE

## 2020-12-30 RX ORDER — AMOXICILLIN 250 MG
2 CAPSULE ORAL 2 TIMES DAILY
Qty: 30 TAB | Refills: 0 | Status: ON HOLD
Start: 2020-12-30 | End: 2021-02-22

## 2020-12-30 RX ORDER — OXYCODONE HYDROCHLORIDE 5 MG/1
5 TABLET ORAL EVERY 6 HOURS PRN
Qty: 5 TAB | Refills: 0 | Status: SHIPPED | OUTPATIENT
Start: 2020-12-30 | End: 2021-01-02

## 2020-12-30 RX ORDER — CYCLOBENZAPRINE HCL 5 MG
5 TABLET ORAL 3 TIMES DAILY PRN
Qty: 30 TAB | Refills: 0 | Status: SHIPPED
Start: 2020-12-30 | End: 2021-01-26

## 2020-12-30 RX ORDER — OXYCODONE HYDROCHLORIDE 5 MG/1
5 TABLET ORAL EVERY 6 HOURS PRN
Qty: 5 TAB | Refills: 0 | Status: SHIPPED | OUTPATIENT
Start: 2020-12-30 | End: 2020-12-30

## 2020-12-30 RX ADMIN — OXYCODONE 5 MG: 5 TABLET ORAL at 08:48

## 2020-12-30 RX ADMIN — DOCUSATE SODIUM 50 MG AND SENNOSIDES 8.6 MG 2 TABLET: 8.6; 5 TABLET, FILM COATED ORAL at 05:36

## 2020-12-30 RX ADMIN — BETHANECHOL CHLORIDE 25 MG: 25 TABLET ORAL at 05:36

## 2020-12-30 RX ADMIN — DILTIAZEM HYDROCHLORIDE 30 MG: 30 TABLET, FILM COATED ORAL at 05:35

## 2020-12-30 RX ADMIN — CYCLOBENZAPRINE 5 MG: 10 TABLET, FILM COATED ORAL at 11:53

## 2020-12-30 RX ADMIN — CYCLOBENZAPRINE 5 MG: 10 TABLET, FILM COATED ORAL at 06:47

## 2020-12-30 RX ADMIN — OXYCODONE 5 MG: 5 TABLET ORAL at 03:42

## 2020-12-30 RX ADMIN — OXYCODONE 5 MG: 5 TABLET ORAL at 13:15

## 2020-12-30 RX ADMIN — ENOXAPARIN SODIUM 40 MG: 40 INJECTION SUBCUTANEOUS at 05:38

## 2020-12-30 RX ADMIN — Medication 4000 UNITS: at 05:36

## 2020-12-30 RX ADMIN — SERTRALINE HYDROCHLORIDE 100 MG: 100 TABLET ORAL at 05:36

## 2020-12-30 RX ADMIN — POLYETHYLENE GLYCOL 3350 1 PACKET: 17 POWDER, FOR SOLUTION ORAL at 08:24

## 2020-12-30 RX ADMIN — GENTAMICIN SULFATE 140 MG: 40 INJECTION, SOLUTION INTRAMUSCULAR; INTRAVENOUS at 05:35

## 2020-12-30 ASSESSMENT — PAIN DESCRIPTION - PAIN TYPE
TYPE: ACUTE PAIN
TYPE: ACUTE PAIN;SURGICAL PAIN
TYPE: ACUTE PAIN
TYPE: ACUTE PAIN;SURGICAL PAIN

## 2020-12-30 NOTE — CARE PLAN
Problem: Safety  Goal: Will remain free from falls  Outcome: PROGRESSING AS EXPECTED   Fall precautions in place. Patient oriented to self. Verified bed alarm settings and ensured bed is in lowest position with bed brakes applied. Non-skid footwear in use. Call light and personal belongings in place. Hourly rounding.     Problem: Psychosocial Needs:  Goal: Level of anxiety will decrease  Outcome: PROGRESSING AS EXPECTED   Encouraged patient to voice feelings and concerns, provided therapeutic communication. Re-oriented patient to place, time and event.

## 2020-12-30 NOTE — PROGRESS NOTES
Patient had a fever of 100.6 at 1137 on 12/29. Patient has been afebrile so far this shift. Received order for repeat Covid swab by Dr. Valle. Swab completed and sent to laboratory.

## 2020-12-30 NOTE — PROGRESS NOTES
1355 Patient transferred to [facility]. Report given to CARLOS A Portillo. All personal belongings taken.   Pt's daughter, Sarah, notified about transfer.

## 2020-12-30 NOTE — CARE PLAN
Problem: Communication  Goal: The ability to communicate needs accurately and effectively will improve  Outcome: PROGRESSING AS EXPECTED   Pt uses call light appropriate. Pt is able to verbalize needs appropriately.  Problem: Safety  Goal: Will remain free from injury  Outcome: PROGRESSING AS EXPECTED   Bed locked and in lowest position. Call light and personal belongings within reach. Hourly rounding in place.  Problem: Venous Thromboembolism (VTW)/Deep Vein Thrombosis (DVT) Prevention:  Goal: Patient will participate in Venous Thrombosis (VTE)/Deep Vein Thrombosis (DVT)Prevention Measures  Outcome: PROGRESSING AS EXPECTED   SCDs on.

## 2020-12-30 NOTE — DISCHARGE PLANNING
Anticipated Discharge Disposition: [facility]    Action: pt medically cleared for transfer to [facility], transport set for 1300. Transfer packet complete, placed in chart, CARLOS A Marie notified.    Barriers to Discharge: none    Plan: Transfer to [facility] at 1300

## 2020-12-30 NOTE — DISCHARGE SUMMARY
"Discharge Summary    CHIEF COMPLAINT ON ADMISSION  Chief Complaint   Patient presents with   • GLF     Per patient she was stepping over a step in the garage and fell backwards. Per EMS, patient did not hit head or have a LOC. Patient denies blood thinners. Patient intermitently confused and not oriented to year.    • Arm Injury     Patient fell onto wrist. Patient presents with swelling and bruising to top of right hand and wrist.        Reason for Admission  ems     CODE STATUS  Full Code    HPI & HOSPITAL COURSE  84 yo woman with dementia, HTN who fell and had right wrist fracture. She had I&D of open fracture with ORIF with Dr. Martinez 12/27/20. Ortho recommends weight limit of 1 pound for her right hand, until follow up with them. She also had L1 compression fracture, Dr. Keane recommended TLSO for 3-6 weeks. She had ESBL E coli UTI as outpatient and continued on gentamicin started by her urologist until 1/1/21.     Therefore, she is discharged in good and stable condition to skilled nursing facility.    The patient met 2-midnight criteria for an inpatient stay at the time of discharge.      FOLLOW UP ITEMS POST DISCHARGE  F/u with Dr. Martinez  F/u with Dr. Keane if back pain not improved  F/u with urologist regarding chronic Macrobid prescription    DISCHARGE DIAGNOSES  Principal Problem:    Wrist fracture, closed, right, sequela POA: Yes  Active Problems:    History of recurrent UTIs POA: Yes      Overview: Discussed details to prevent UTI. Counseling for postcoital voiding,       Genital hygiene, daily probiotics, oral hydrations, cranberry.     Fall POA: Yes    L1 vertebral fracture (HCC) POA: Yes    Dementia (HCC) POA: Yes    Essential hypertension POA: Yes      Overview: Patient could not tolerate HCTZ due to side effects of hypercalcemia. She       also has history of angioedema with ACEIs and she reported that she was       told not to take antihypertensive medications ending with \" ol \" or \" " "il       \". She has chronic venous insufficiency and history of varicose vein       removed on both LE. She has leg swelling from taking amlodipine.      Adequate BP control.      Dr. Nick Dixon, Cardiology (sign off 7/15)          Hyperparathyroidism (HCC) POA: Yes      Overview: Follow with endocrinologist, Dr Andres regularly.     Recurrent UTI POA: Yes  Resolved Problems:    Leukocytosis POA: Yes      FOLLOW UP  Future Appointments   Date Time Provider Department Center   1/18/2021  3:30 PM Pete Montes III, Ed.D. [facility] [address] [id number] [phone]          MEDICATIONS ON DISCHARGE     Medication List      START taking these medications      Instructions   cyclobenzaprine 5 mg tablet  Commonly known as: Flexeril   Take 1 Tab by mouth 3 times a day as needed for Muscle Spasms.  Dose: 5 mg     oxyCODONE immediate-release 5 MG Tabs  Commonly known as: ROXICODONE   Take 1 Tab by mouth every 6 hours as needed for Severe Pain for up to 3 days.  Dose: 5 mg     senna-docusate 8.6-50 MG Tabs  Commonly known as: PERICOLACE or SENOKOT S   Take 2 Tabs by mouth 2 Times a Day.  Dose: 2 Tab        CHANGE how you take these medications      Instructions   bethanechol 25 MG Tabs  What changed: when to take this  Commonly known as: URECHOLINE   Take 1 Tab by mouth every 24 hours as needed.  Dose: 25 mg     DILTIAZem 30 MG Tabs  What changed: Another medication with the same name was removed. Continue taking this medication, and follow the directions you see here.  Commonly known as: CARDIZEM   Take 1 Tab by mouth every day.  Dose: 30 mg        CONTINUE taking these medications      Instructions   acetaminophen 160 MG/5ML Susp  Commonly known as: TYLENOL   Take 15 mg/kg by mouth every 6 hours as needed.  Dose: 15 mg/kg     ascorbic acid 500 MG Tabs  Commonly known as: ascorbic acid   TAKE 1 TABLET BY MOUTH THREE TIMES A DAY     Diclofenac Sodium 1 % " Gel   APPLY TO AFFECTED AREA TWICE A DAY AS NEEDED     GENTAMICIN IN SALINE IV   Infuse 140 mg into a venous catheter every morning. 7 day course  Dose: 140 mg     Restasis 0.05 % ophthalmic emulsion  Generic drug: cyclosporin   Place 1 Drop in both eyes 2 times a day.  Dose: 1 Drop     sertraline 100 MG Tabs  Commonly known as: Zoloft   TAKE 1 TABLET BY MOUTH EVERY DAY     Vitamin D 50 MCG (2000 UT) Caps   Take 4,000 Units by mouth every day.  Dose: 4,000 Units        STOP taking these medications    nitrofurantoin 100 MG Caps  Commonly known as: MACROBID            Allergies  Allergies   Allergen Reactions   • Ace Inhibitors Anaphylaxis   • Augmentin      Flu like sx   • Erythromycin Rash   • Lisinopril Anaphylaxis   • Penicillins Hives     Tolerates cephalosporins   • Epinephrine Unspecified     shaking   • Percocet  [Apap-Fd&C Red #[place]-Oxycodone] Vomiting   • Percocet [Oxycodone-Acetaminophen] Vomiting   • Sulfa Drugs Hives       DIET  Orders Placed This Encounter   Procedures   • Diet Order Diet: Regular     Standing Status:   Standing     Number of Occurrences:   1     Order Specific Question:   Diet:     Answer:   Regular [1]       ACTIVITY  weight limit of 1 pound for her right hand  TLSO when out of bed and ambulating    LINES, DRAINS, AND WOUNDS  This is an automated list. Peripheral IVs will be removed prior to discharge.  Peripheral IV 12/26/20 22 G Left Forearm (Active)   Site Assessment Clean;Dry;Intact 12/29/20 2138   Dressing Type Transparent Film 12/29/20 2138   Line Status Flushed;Saline locked;Scrubbed the hub prior to access 12/29/20 2138   Dressing Status Clean;Dry;Intact 12/29/20 2138   Dressing Intervention N/A 12/29/20 2138   Date Primary Tubing Changed 12/27/20 12/27/20 2130   Date Secondary Tubing Changed 12/28/20 12/27/20 2130   NEXT Primary Tubing Change  12/29/20 12/27/20 2130   NEXT Secondary Tubing Change  12/29/20 12/27/20 2130   Infiltration Grading ([facility]) 0 12/29/20  2138   Phlebitis Scale ([facility] Only) 0 12/29/20 2138       Wound 12/27/20 Incision Arm Right (Active)   Site Assessment ISABELLA 12/29/20 2138   Periwound Assessment ISABELLA 12/29/20 2138   Margins ISABELLA 12/29/20 2138   Closure ISABELLA 12/29/20 2138   Drainage Amount ISABELLA 12/29/20 2138   Drainage Description ISABELLA 12/29/20 2138   Treatments Site care;Offloading 12/27/20 2120   Wound Cleansing Not Applicable 12/27/20 2120   Periwound Protectant Not Applicable 12/27/20 2120   Dressing Cleansing/Solutions Not Applicable 12/27/20 2120   Dressing Options Ace Wrap;Splint 12/29/20 2138   Dressing Changed Observed 12/29/20 2138   Dressing Status Clean;Dry;Intact 12/29/20 2138   Dressing Change/Treatment Frequency As Needed 12/28/20 2100       Peripheral IV 12/26/20 22 G Left Forearm (Active)   Site Assessment Clean;Dry;Intact 12/29/20 2138   Dressing Type Transparent Film 12/29/20 2138   Line Status Flushed;Saline locked;Scrubbed the hub prior to access 12/29/20 2138   Dressing Status Clean;Dry;Intact 12/29/20 2138   Dressing Intervention N/A 12/29/20 2138   Date Primary Tubing Changed 12/27/20 12/27/20 2130   Date Secondary Tubing Changed 12/28/20 12/27/20 2130   NEXT Primary Tubing Change  12/29/20 12/27/20 2130   NEXT Secondary Tubing Change  12/29/20 12/27/20 2130   Infiltration Grading ([facility], [facility]) 0 12/29/20 2138   Phlebitis Scale ([facility] Only) 0 12/29/20 2138               MENTAL STATUS ON TRANSFER  Level of Consciousness: Alert  Orientation : Disoriented to Time  Speech: Speech Clear    CONSULTATIONS  Ortho  Spine surgery    PROCEDURES  DATE OF SERVICE:  12/27/2020      PREOPERATIVE DIAGNOSIS:  Grade 1 open right distal radius and ulnar fractures.     POSTOPERATIVE DIAGNOSIS:  Grade 1 open right distal radius and ulnar   fractures.     PROCEDURES:  1.  Irrigation and debridement of open fracture.  2.  Open reduction internal fixation of right comminuted distal radius   Fracture.      DX-WRIST-LIMITED 2- RIGHT   Final Result       Intraoperative images intended for localization and not for diagnostic purposes demonstrate plate and screw fixation of the distal radius. See procedure report for details.      Fluoroscopy Time:  0.1  minutes.  2 fluoroscopic images were obtained.      DX-PORTABLE FLUORO > 1 HOUR   Final Result      Intraoperative images intended for localization and not for diagnostic purposes demonstrate plate and screw fixation of the distal radius. See procedure report for details.      Fluoroscopy Time:  0.1  minutes.  2 fluoroscopic images were obtained.      CT-LSPINE W/O PLUS RECONS   Final Result      1.  L1 superior endplate compression fracture resulting in 30% height loss and mild posterior bony retropulsion. This narrows the spinal canal by less than 20%.      2.  Multilevel degenerative disc disease and facet degeneration.      CT-TSPINE W/O PLUS RECONS   Final Result         No acute fracture or subluxation of the thoracic spine.      L1 fracture.      CT-HEAD W/O   Final Result      1.  Cerebral atrophy.      2.  White matter lucencies most consistent with small vessel ischemic change versus demyelination or gliosis.      3.  Otherwise, Head CT without contrast with no acute findings. No evidence of acute cerebral infarction, hemorrhage or mass lesion.      CT-CSPINE WITHOUT PLUS RECONS   Final Result      1.  No evidence of cervical spine fracture.      2.  Surgical change extending from C4 through C7.      3.  Multilevel degenerative disc disease and facet degeneration.      4.  Kyphotic deformity.      DX-WRIST-LIMITED 2- RIGHT   Final Result      Markedly displaced, impacted and dorsally angulated fractures of the distal radius and ulna.         DX-CHEST-PORTABLE (1 VIEW)   Final Result      Cardiomegaly.            LABORATORY  Lab Results   Component Value Date    SODIUM 136 12/30/2020    POTASSIUM 4.0 12/30/2020    CHLORIDE 100 12/30/2020    CO2 28 12/30/2020    GLUCOSE 107 (H) 12/30/2020    BUN 10  12/30/2020    CREATININE 0.43 (L) 12/30/2020    CREATININE 0.7 04/04/2009        Lab Results   Component Value Date    WBC 11.6 (H) 12/30/2020    HEMOGLOBIN 12.9 12/30/2020    HEMATOCRIT 39.9 12/30/2020    PLATELETCT 194 12/30/2020        Total time of the discharge process exceeds 32 minutes.

## 2020-12-31 ENCOUNTER — APPOINTMENT (OUTPATIENT)
Dept: ONCOLOGY | Facility: MEDICAL CENTER | Age: 85
DRG: 511 | End: 2020-12-31
Attending: PHYSICIAN ASSISTANT
Payer: MEDICARE

## 2021-01-01 ENCOUNTER — HOME CARE VISIT (OUTPATIENT)
Dept: HOSPICE | Facility: HOSPICE | Age: 86
End: 2021-01-01
Payer: MEDICARE

## 2021-01-01 ENCOUNTER — APPOINTMENT (OUTPATIENT)
Dept: ONCOLOGY | Facility: MEDICAL CENTER | Age: 86
End: 2021-01-01
Attending: FAMILY MEDICINE
Payer: MEDICARE

## 2021-01-01 VITALS
SYSTOLIC BLOOD PRESSURE: 120 MMHG | DIASTOLIC BLOOD PRESSURE: 64 MMHG | HEART RATE: 72 BPM | SYSTOLIC BLOOD PRESSURE: 120 MMHG | RESPIRATION RATE: 12 BRPM | RESPIRATION RATE: 16 BRPM | DIASTOLIC BLOOD PRESSURE: 60 MMHG | HEART RATE: 64 BPM

## 2021-01-01 VITALS — HEART RATE: 72 BPM | SYSTOLIC BLOOD PRESSURE: 130 MMHG | RESPIRATION RATE: 20 BRPM | DIASTOLIC BLOOD PRESSURE: 68 MMHG

## 2021-01-01 VITALS — RESPIRATION RATE: 16 BRPM | SYSTOLIC BLOOD PRESSURE: 120 MMHG | HEART RATE: 72 BPM | DIASTOLIC BLOOD PRESSURE: 68 MMHG

## 2021-01-01 VITALS — DIASTOLIC BLOOD PRESSURE: 60 MMHG | RESPIRATION RATE: 20 BRPM | HEART RATE: 80 BPM | SYSTOLIC BLOOD PRESSURE: 132 MMHG

## 2021-01-01 VITALS
RESPIRATION RATE: 20 BRPM | TEMPERATURE: 99.3 F | SYSTOLIC BLOOD PRESSURE: 122 MMHG | HEART RATE: 92 BPM | DIASTOLIC BLOOD PRESSURE: 60 MMHG

## 2021-01-01 VITALS — RESPIRATION RATE: 16 BRPM | HEART RATE: 80 BPM | DIASTOLIC BLOOD PRESSURE: 50 MMHG | SYSTOLIC BLOOD PRESSURE: 134 MMHG

## 2021-01-01 VITALS — RESPIRATION RATE: 16 BRPM | HEART RATE: 60 BPM | DIASTOLIC BLOOD PRESSURE: 70 MMHG | SYSTOLIC BLOOD PRESSURE: 142 MMHG

## 2021-01-01 VITALS — SYSTOLIC BLOOD PRESSURE: 142 MMHG | RESPIRATION RATE: 12 BRPM | DIASTOLIC BLOOD PRESSURE: 70 MMHG | HEART RATE: 68 BPM

## 2021-01-01 VITALS — RESPIRATION RATE: 16 BRPM | DIASTOLIC BLOOD PRESSURE: 80 MMHG | HEART RATE: 78 BPM | SYSTOLIC BLOOD PRESSURE: 136 MMHG

## 2021-01-01 VITALS — SYSTOLIC BLOOD PRESSURE: 120 MMHG | DIASTOLIC BLOOD PRESSURE: 60 MMHG | RESPIRATION RATE: 18 BRPM | HEART RATE: 78 BPM

## 2021-01-01 VITALS — RESPIRATION RATE: 20 BRPM | SYSTOLIC BLOOD PRESSURE: 140 MMHG | HEART RATE: 64 BPM | DIASTOLIC BLOOD PRESSURE: 70 MMHG

## 2021-01-01 VITALS — DIASTOLIC BLOOD PRESSURE: 62 MMHG | RESPIRATION RATE: 12 BRPM | HEART RATE: 72 BPM | SYSTOLIC BLOOD PRESSURE: 140 MMHG

## 2021-01-01 VITALS — HEART RATE: 94 BPM | DIASTOLIC BLOOD PRESSURE: 78 MMHG | SYSTOLIC BLOOD PRESSURE: 140 MMHG | RESPIRATION RATE: 12 BRPM

## 2021-01-01 VITALS — HEART RATE: 76 BPM | RESPIRATION RATE: 16 BRPM | SYSTOLIC BLOOD PRESSURE: 144 MMHG | DIASTOLIC BLOOD PRESSURE: 80 MMHG

## 2021-01-01 VITALS — HEART RATE: 72 BPM | DIASTOLIC BLOOD PRESSURE: 66 MMHG | RESPIRATION RATE: 20 BRPM | SYSTOLIC BLOOD PRESSURE: 118 MMHG

## 2021-01-01 VITALS — SYSTOLIC BLOOD PRESSURE: 150 MMHG | HEART RATE: 64 BPM | DIASTOLIC BLOOD PRESSURE: 70 MMHG | RESPIRATION RATE: 24 BRPM

## 2021-01-01 VITALS — DIASTOLIC BLOOD PRESSURE: 70 MMHG | HEART RATE: 68 BPM | RESPIRATION RATE: 16 BRPM | SYSTOLIC BLOOD PRESSURE: 130 MMHG

## 2021-01-01 VITALS — SYSTOLIC BLOOD PRESSURE: 138 MMHG | HEART RATE: 68 BPM | DIASTOLIC BLOOD PRESSURE: 66 MMHG | RESPIRATION RATE: 20 BRPM

## 2021-01-01 VITALS — SYSTOLIC BLOOD PRESSURE: 130 MMHG | RESPIRATION RATE: 20 BRPM | DIASTOLIC BLOOD PRESSURE: 70 MMHG | HEART RATE: 68 BPM

## 2021-01-01 VITALS — HEART RATE: 80 BPM | RESPIRATION RATE: 12 BRPM | DIASTOLIC BLOOD PRESSURE: 68 MMHG | SYSTOLIC BLOOD PRESSURE: 118 MMHG

## 2021-01-01 DIAGNOSIS — I67.2 CEREBRAL ATHEROSCLEROSIS: ICD-10-CM

## 2021-01-01 DIAGNOSIS — Z51.5 HOSPICE CARE PATIENT: ICD-10-CM

## 2021-01-01 PROCEDURE — S9126 HOSPICE CARE, IN THE HOME, P: HCPCS

## 2021-01-01 PROCEDURE — G0299 HHS/HOSPICE OF RN EA 15 MIN: HCPCS

## 2021-01-01 PROCEDURE — G0156 HHCP-SVS OF AIDE,EA 15 MIN: HCPCS

## 2021-01-01 RX ORDER — MORPHINE SULFATE 15 MG/1
15 TABLET, FILM COATED, EXTENDED RELEASE ORAL EVERY 8 HOURS
Qty: 42 TABLET | Refills: 0 | Status: SHIPPED | OUTPATIENT
Start: 2021-01-01 | End: 2022-01-01 | Stop reason: SDUPTHER

## 2021-01-01 RX ORDER — HALOPERIDOL 2 MG/ML
0.5 SOLUTION ORAL EVERY 8 HOURS PRN
Qty: 15 ML | Refills: 3 | Status: SHIPPED | OUTPATIENT
Start: 2021-01-01 | End: 2022-01-01

## 2021-01-01 RX ORDER — MORPHINE SULFATE 15 MG/1
15 TABLET, FILM COATED, EXTENDED RELEASE ORAL EVERY 8 HOURS
Qty: 30 TABLET | Refills: 0 | Status: SHIPPED | OUTPATIENT
Start: 2021-01-01 | End: 2021-01-01 | Stop reason: SDUPTHER

## 2021-01-01 RX ORDER — ACETAMINOPHEN 500 MG
TABLET ORAL
Qty: 30 TABLET | Refills: 5 | Status: CANCELLED | OUTPATIENT
Start: 2021-01-01

## 2021-01-01 RX ORDER — ACETAMINOPHEN 500 MG
TABLET ORAL
Qty: 30 TABLET | Refills: 4 | Status: SHIPPED | OUTPATIENT
Start: 2021-01-01 | End: 2022-01-01

## 2021-01-01 SDOH — ECONOMIC STABILITY: GENERAL

## 2021-01-01 ASSESSMENT — ENCOUNTER SYMPTOMS
DRY SKIN: 1
LAST BOWEL MOVEMENT: 66058
SOMNOLENCE: 1
PAIN LOCATION: RIGHT LEG
STOOL FREQUENCY: LESS THAN DAILY
SLEEP QUALITY: ADEQUATE
SOMNOLENCE: 1
LAST BOWEL MOVEMENT: 66058
LAST BOWEL MOVEMENT: 66070
FATIGUES EASILY: 1
STOOL FREQUENCY: LESS THAN DAILY
SOMNOLENCE: 1
DRY SKIN: 1
DESCRIPTION OF MEMORY LOSS: LONG TERM
SUBJECTIVE PAIN PROGRESSION: UNCHANGED
HYPOTENSION: 1
FORGETFULNESS: 1
FATIGUES EASILY: 1
CONSTIPATION: 1
FATIGUES EASILY: 1
FORGETFULNESS: 1
FORGETFULNESS: 1
DENIES PAIN: 1
FATIGUE: 1
DESCRIPTION OF MEMORY LOSS: LONG TERM
PERSON REPORTING PAIN: PATIENT
DRY SKIN: 1
DRY SKIN: 1
FORGETFULNESS: 1
PERSON REPORTING PAIN: PATIENT
PERSON REPORTING PAIN: PATIENT
SLEEP QUALITY: ADEQUATE
DESCRIPTION OF MEMORY LOSS: SHORT TERM
LAST BOWEL MOVEMENT: 66052
DESCRIPTION OF MEMORY LOSS: SHORT TERM
FATIGUE: 1
FORGETFULNESS: 1
FATIGUES EASILY: 1
PAIN LOCATION - PAIN SEVERITY: 7/10
SLEEP QUALITY: ADEQUATE
DENIES PAIN: 1
DENIES PAIN: 1
DESCRIPTION OF MEMORY LOSS: LONG TERM
SLEEP QUALITY: ADEQUATE
STOOL FREQUENCY: LESS THAN DAILY
FORGETFULNESS: 1
CONSTIPATION: 1
FATIGUES EASILY: 1
PAIN LOCATION: RIGHT KNEE
DRY SKIN: 1
DENIES PAIN: 1
SUBJECTIVE PAIN PROGRESSION: WAXING AND WANING
DESCRIPTION OF MEMORY LOSS: SHORT TERM
STOOL FREQUENCY: LESS THAN DAILY
STOOL FREQUENCY: LESS THAN DAILY
FORGETFULNESS: 1
PERSON REPORTING PAIN: PATIENT
LAST BOWEL MOVEMENT: 66053
SLEEP QUALITY: ADEQUATE
SUBJECTIVE PAIN PROGRESSION: WAXING AND WANING
FATIGUES EASILY: 1
DENIES PAIN: 1
FATIGUES EASILY: 1
FATIGUE: 1
PERSON REPORTING PAIN: PATIENT
DENIES PAIN: 1
DENIES PAIN: 1
FATIGUE: 1
SLEEP QUALITY: ADEQUATE
SLEEP QUALITY: ADEQUATE
DESCRIPTION OF MEMORY LOSS: SHORT TERM
DRY SKIN: 1
PAIN: 1
FATIGUE: 1
SLEEP QUALITY: ADEQUATE
PERSON REPORTING PAIN: DIRECT OBSERVATION
DESCRIPTION OF MEMORY LOSS: SHORT TERM
SOMNOLENCE: 1
FATIGUE: 1
SLEEP QUALITY: ADEQUATE
FATIGUES EASILY: 1
PERSON REPORTING PAIN: DIRECT OBSERVATION
DESCRIPTION OF MEMORY LOSS: LONG TERM
FATIGUES EASILY: 1
LAST BOWEL MOVEMENT: 66079
DESCRIPTION OF MEMORY LOSS: SHORT TERM
CONSTIPATION: 1
DENIES PAIN: 1
STOOL FREQUENCY: LESS THAN DAILY
DESCRIPTION OF MEMORY LOSS: LONG TERM
FATIGUE: 1
SOMNOLENCE: 1
LAST BOWEL MOVEMENT: 66102
PAIN LOCATION - EXACERBATING FACTORS: MOVEMENT
DRY SKIN: 1
DRY SKIN: 1
DESCRIPTION OF MEMORY LOSS: SHORT TERM
FATIGUE: 1
FATIGUE: 1
CONSTIPATION: 1
FATIGUES EASILY: 1
FATIGUE: 1
FORGETFULNESS: 1
CONSTIPATION: 1
SLEEP QUALITY: ADEQUATE
DESCRIPTION OF MEMORY LOSS: LONG TERM
STOOL FREQUENCY: LESS THAN DAILY
FATIGUES EASILY: 1
DESCRIPTION OF MEMORY LOSS: LONG TERM
FATIGUE: 1
PAIN LOCATION - RELIEVING FACTORS: MEDS
FATIGUES EASILY: 1
DRY SKIN: 1
SLEEP QUALITY: ADEQUATE
FATIGUES EASILY: 1
DENIES PAIN: 1
SOMNOLENCE: 1
PERSON REPORTING PAIN: DIRECT OBSERVATION
PAIN: 1
CONSTIPATION: 1
DESCRIPTION OF MEMORY LOSS: SHORT TERM
SLEEP QUALITY: ADEQUATE
DRY SKIN: 1
PERSON REPORTING PAIN: DIRECT OBSERVATION
FATIGUES EASILY: 1
DRY SKIN: 1
DESCRIPTION OF MEMORY LOSS: LONG TERM
DESCRIPTION OF MEMORY LOSS: LONG TERM
STOOL FREQUENCY: LESS THAN DAILY
DENIES PAIN: 1
LAST BOWEL MOVEMENT: 66092
FORGETFULNESS: 1
DRY SKIN: 1
DESCRIPTION OF MEMORY LOSS: SHORT TERM
DENIES PAIN: 1
FORGETFULNESS: 1
DESCRIPTION OF MEMORY LOSS: LONG TERM
DESCRIPTION OF MEMORY LOSS: LONG TERM
HIGHEST PAIN SEVERITY IN PAST 24 HOURS: 4/10
DRY SKIN: 1
SLEEP QUALITY: ADEQUATE
FATIGUES EASILY: 1
LAST BOWEL MOVEMENT: 66058
SLEEP QUALITY: ADEQUATE
SLEEP QUALITY: ADEQUATE
PERSON REPORTING PAIN: DIRECT OBSERVATION
DESCRIPTION OF MEMORY LOSS: LONG TERM
DESCRIPTION OF MEMORY LOSS: LONG TERM
CONSTIPATION: 1
DENIES PAIN: 1
DENIES PAIN: 1
FORGETFULNESS: 1
DESCRIPTION OF MEMORY LOSS: LONG TERM
STOOL FREQUENCY: LESS THAN DAILY
FATIGUE: 1
FATIGUES EASILY: 1
HYPOTENSION: 1
DESCRIPTION OF MEMORY LOSS: LONG TERM
DENIES PAIN: 1
DESCRIPTION OF MEMORY LOSS: LONG TERM
LAST BOWEL MOVEMENT: 66068
STOOL FREQUENCY: LESS THAN DAILY
DENIES PAIN: 1
STOOL FREQUENCY: LESS THAN DAILY
SLEEP QUALITY: ADEQUATE
FATIGUES EASILY: 1
DRY SKIN: 1
FATIGUE: 1
CONSTIPATION: 1
DRY SKIN: 1
CONSTIPATION: 1
PERSON REPORTING PAIN: DIRECT OBSERVATION
STOOL FREQUENCY: LESS THAN DAILY
SLEEP QUALITY: ADEQUATE
FATIGUES EASILY: 1
CONSTIPATION: 1
HYPOTENSION: 1
LAST BOWEL MOVEMENT: 66074
PERSON REPORTING PAIN: PATIENT
STOOL FREQUENCY: LESS THAN DAILY
FORGETFULNESS: 1
STOOL FREQUENCY: LESS THAN DAILY
STOOL FREQUENCY: LESS THAN DAILY
DRY SKIN: 1
FATIGUE: 1
STOOL FREQUENCY: LESS THAN DAILY
SLEEP QUALITY: ADEQUATE
DRY SKIN: 1
FATIGUE: 1
DESCRIPTION OF MEMORY LOSS: LONG TERM
DESCRIPTION OF MEMORY LOSS: LONG TERM
STOOL FREQUENCY: LESS THAN DAILY
FATIGUE: 1
SLEEP QUALITY: ADEQUATE
DESCRIPTION OF MEMORY LOSS: LONG TERM
DESCRIPTION OF MEMORY LOSS: SHORT TERM
DESCRIPTION OF MEMORY LOSS: SHORT TERM
PERSON REPORTING PAIN: PATIENT
HYPOTENSION: 1
PAIN: 1
SLEEP QUALITY: ADEQUATE
DRY SKIN: 1
HIGHEST PAIN SEVERITY IN PAST 24 HOURS: 5/10
PAIN LOCATION - PAIN DURATION: LESS THAN A MINUTE
DESCRIPTION OF MEMORY LOSS: SHORT TERM
DENIES PAIN: 1
STOOL FREQUENCY: LESS THAN DAILY
LAST BOWEL MOVEMENT: 66084
FATIGUES EASILY: 1
DRY SKIN: 1
LAST BOWEL MOVEMENT: 66074
LAST BOWEL MOVEMENT: 66070
FORGETFULNESS: 1
FATIGUE: 1
LAST BOWEL MOVEMENT: 66084
DESCRIPTION OF MEMORY LOSS: SHORT TERM
PERSON REPORTING PAIN: PATIENT
PERSON REPORTING PAIN: DIRECT OBSERVATION
DESCRIPTION OF MEMORY LOSS: LONG TERM
PERSON REPORTING PAIN: PATIENT
PAIN LOCATION - PAIN SEVERITY: 5/10
FATIGUE: 1
FATIGUE: 1
PERSON REPORTING PAIN: PATIENT
LAST BOWEL MOVEMENT: 66084
CONSTIPATION: 1
DESCRIPTION OF MEMORY LOSS: SHORT TERM
CONSTIPATION: 1
FORGETFULNESS: 1
FORGETFULNESS: 1
DESCRIPTION OF MEMORY LOSS: LONG TERM
CONSTIPATION: 1
FORGETFULNESS: 1
PAIN LOCATION - PAIN FREQUENCY: INTERMITTENT
DRY SKIN: 1
DENIES PAIN: 1
DESCRIPTION OF MEMORY LOSS: LONG TERM
SLEEP QUALITY: ADEQUATE
FORGETFULNESS: 1
DENIES PAIN: 1
LAST BOWEL MOVEMENT: 66097
FORGETFULNESS: 1
DESCRIPTION OF MEMORY LOSS: SHORT TERM
CONSTIPATION: 1
LAST BOWEL MOVEMENT: 66106
CONSTIPATION: 1
DRY SKIN: 1
FATIGUES EASILY: 1
SUBJECTIVE PAIN PROGRESSION: WAXING AND WANING
LOWER EXTREMITY EDEMA: 1
FATIGUES EASILY: 1
PERSON REPORTING PAIN: PATIENT
FORGETFULNESS: 1
LAST BOWEL MOVEMENT: 66084
FATIGUES EASILY: 1
SLEEP QUALITY: ADEQUATE
PAIN LOCATION - PAIN QUALITY: SHARP
DESCRIPTION OF MEMORY LOSS: SHORT TERM
DENIES PAIN: 1
DESCRIPTION OF MEMORY LOSS: SHORT TERM
STOOL FREQUENCY: LESS THAN DAILY
PERSON REPORTING PAIN: DIRECT OBSERVATION
DRY SKIN: 1
HYPOTENSION: 1
DRY SKIN: 1
STOOL FREQUENCY: LESS THAN DAILY
CONSTIPATION: 1
CONSTIPATION: 1
FATIGUE: 1
DESCRIPTION OF MEMORY LOSS: SHORT TERM
FATIGUE: 1
LAST BOWEL MOVEMENT: 66065
FORGETFULNESS: 1
PERSON REPORTING PAIN: PATIENT
DESCRIPTION OF MEMORY LOSS: SHORT TERM
CONSTIPATION: 1
FORGETFULNESS: 1
DESCRIPTION OF MEMORY LOSS: SHORT TERM
FATIGUES EASILY: 1
STOOL FREQUENCY: LESS THAN DAILY
SOMNOLENCE: 1
LAST BOWEL MOVEMENT: 66100
SLEEP QUALITY: ADEQUATE
DENIES PAIN: 1
DESCRIPTION OF MEMORY LOSS: SHORT TERM
FORGETFULNESS: 1
SOMNOLENCE: 1
LAST BOWEL MOVEMENT: 66067
DRY SKIN: 1
DESCRIPTION OF MEMORY LOSS: SHORT TERM
DENIES PAIN: 1
CONSTIPATION: 1
FATIGUE: 1

## 2021-01-01 ASSESSMENT — PAIN SCALES - PAIN ASSESSMENT IN ADVANCED DEMENTIA (PAINAD)
CONSOLABILITY: 1 - DISTRACTED OR REASSURED BY VOICE OR TOUCH.
FACIALEXPRESSION: 2 - FACIAL GRIMACING.
BODYLANGUAGE: 0 - RELAXED.
CONSOLABILITY: 1 - DISTRACTED OR REASSURED BY VOICE OR TOUCH.
NEGVOCALIZATION: 0 - NONE.
TOTALSCORE: 2
NEGVOCALIZATION: 1 - OCCASIONAL MOAN OR GROAN. LOW-LEVEL SPEECH WITH A NEGATIVE OR DISAPPROVING QUALITY.
BODYLANGUAGE: 1 - TENSE. DISTRESSED PACING. FIDGETING.
BODYLANGUAGE: 0 - RELAXED.
TOTALSCORE: 0
FACIALEXPRESSION: 0 - SMILING OR INEXPRESSIVE.
TOTALSCORE: 6
BODYLANGUAGE: 0 - RELAXED.
NEGVOCALIZATION: 1 - OCCASIONAL MOAN OR GROAN. LOW-LEVEL SPEECH WITH A NEGATIVE OR DISAPPROVING QUALITY.
NEGVOCALIZATION: 1 - OCCASIONAL MOAN OR GROAN. LOW-LEVEL SPEECH WITH A NEGATIVE OR DISAPPROVING QUALITY.
FACIALEXPRESSION: 0 - SMILING OR INEXPRESSIVE.
FACIALEXPRESSION: 0 - SMILING OR INEXPRESSIVE.
CONSOLABILITY: 0 - NO NEED TO CONSOLE.
TOTALSCORE: 2
CONSOLABILITY: 1 - DISTRACTED OR REASSURED BY VOICE OR TOUCH.

## 2021-01-01 ASSESSMENT — ACTIVITIES OF DAILY LIVING (ADL)
MONEY MANAGEMENT (EXPENSES/BILLS): TOTALLY DEPENDENT

## 2021-01-01 ASSESSMENT — SOCIAL DETERMINANTS OF HEALTH (SDOH)
ACTIVE STRESSOR - HEALTH CHANGES: 1

## 2021-01-03 LAB
BACTERIA BLD CULT: NORMAL
SIGNIFICANT IND 70042: NORMAL
SITE SITE: NORMAL
SOURCE SOURCE: NORMAL

## 2021-01-04 LAB
BACTERIA BLD CULT: NORMAL
SIGNIFICANT IND 70042: NORMAL
SITE SITE: NORMAL
SOURCE SOURCE: NORMAL

## 2021-01-11 DIAGNOSIS — Z23 NEED FOR VACCINATION: ICD-10-CM

## 2021-01-22 ENCOUNTER — HOME HEALTH ADMISSION (OUTPATIENT)
Dept: HOME HEALTH SERVICES | Facility: HOME HEALTHCARE | Age: 86
End: 2021-01-22
Payer: MEDICARE

## 2021-01-25 ENCOUNTER — TELEPHONE (OUTPATIENT)
Dept: HEALTH INFORMATION MANAGEMENT | Facility: OTHER | Age: 86
End: 2021-01-25

## 2021-01-25 NOTE — TELEPHONE ENCOUNTER
TCN spoke with pt daughter Elda regard pt DC from [facility] today. Daughter is agreeable to [facility] and donald olivares liek them to also be pt new PCP. TC sent referral to [facility] requesting transition visit and follow up for PCP service. Daughter requested TCN send contact information to her via e-mail. Email sent. TC for contact daughter on 1/28 for follow up.

## 2021-01-26 ENCOUNTER — HOME CARE VISIT (OUTPATIENT)
Dept: HOME HEALTH SERVICES | Facility: HOME HEALTHCARE | Age: 86
End: 2021-01-26

## 2021-01-26 ENCOUNTER — ANTICOAGULATION MONITORING (OUTPATIENT)
Dept: VASCULAR LAB | Facility: MEDICAL CENTER | Age: 86
End: 2021-01-26

## 2021-01-26 NOTE — PROGRESS NOTES
Medication chart review for [facility] services    PCP:  Robbie Sloan M.D.  [address] [place] [address] Dr Charlie KEARNS [phone]  Fax: [phone]    Current medication list     Current Outpatient Medications:   •  lidocaine, 1 Patch, Transdermal, Q24HRS  •  melatonin, 9 mg, Oral, QHS PRN  •  nitrofurantoin macro crystals, 100 mg, Oral, BID  •  Probiotic Daily, 1 Cap, Oral, TID  •  acetaminophen, 650 mg, Oral, Q6HRS PRN  •  senna-docusate, 2 Tab, Oral, BID  •  sertraline, TAKE 1 TABLET BY MOUTH EVERY DAY  •  ascorbic acid, TAKE 1 TABLET BY MOUTH THREE TIMES A DAY  •  DILTIAZem, 30 mg, Oral, QDAY  •  bethanechol, 25 mg, Oral, Q24HRS PRN (Patient taking differently: 25 mg, Oral, 2 times daily)    Allergies   Allergen Reactions   • Ace Inhibitors Anaphylaxis   • Augmentin      Flu like sx   • Erythromycin Rash   • Lisinopril Anaphylaxis   • Penicillins Hives     Tolerates cephalosporins   • Cephalosporins    • Epinephrine Unspecified     shaking   • Percocet  [Apap-Fd&C Red #40 [place]-Oxycodone] Vomiting   • Percocet [Oxycodone-Acetaminophen] Vomiting   • Sulfa Drugs Hives       Medications on discharge summary that are not on their home medication list (or the dosing interval differs from the discharge summary)       START taking these medications      Instructions   cyclobenzaprine 5 mg tablet  Commonly known as: Flexeril    Take 1 Tab by mouth 3 times a day as needed for Muscle Spasms.  Dose: 5 mg      oxyCODONE immediate-release 5 MG Tabs  Commonly known as: ROXICODONE    Take 1 Tab by mouth every 6 hours as needed for Severe Pain for up to 3 days.  Dose: 5 mg                                                              Diclofenac Sodium 1 % Gel    APPLY TO AFFECTED AREA TWICE A DAY AS NEEDED      GENTAMICIN IN SALINE IV    Infuse 140 mg into a venous catheter every morning. 7 day course  Dose: 140 mg      Restasis 0.05 % ophthalmic emulsion  Generic drug: cyclosporin    Place 1 Drop in both eyes 2 times a day.  Dose: 1  Drop          Vitamin D 50 MCG (2000 UT) Caps    Take 4,000 Units by mouth every day.  Dose: 4,000 Units          Medications on home medication list not listed on their discharge summary          Labs / Images Reviewed:     Lab Results   Component Value Date/Time    SODIUM 136 12/30/2020 12:50 AM    POTASSIUM 4.0 12/30/2020 12:50 AM    CHLORIDE 100 12/30/2020 12:50 AM    CO2 28 12/30/2020 12:50 AM    GLUCOSE 107 (H) 12/30/2020 12:50 AM    BUN 10 12/30/2020 12:50 AM    CREATININE 0.43 (L) 12/30/2020 12:50 AM    CREATININE 0.7 04/04/2009 12:35 AM      Lab Results   Component Value Date/Time    ALKPHOSPHAT 91 12/27/2020 04:52 PM    ASTSGOT 27 12/27/2020 04:52 PM    ALTSGPT 36 12/27/2020 04:52 PM    TBILIRUBIN 0.4 12/27/2020 04:52 PM    ALBUMIN 4.5 12/27/2020 04:52 PM    ALBUMIN 4.78 11/03/2015 11:07 AM    INR 1.02 07/27/2016 12:07 PM          Potential drug interactions:         Assessment and Plan:   • Received referral from [facility]. Medications reviewed. No clinically significant interactions noted.             Nigel Jackson, PharmD, MS, BCACP, LCC  [facility]  Phone [phone] fax [phone]    This note was created using voice recognition software (Dragon). The accuracy of the dictation is limited by the abilities of the software. I have reviewed the note prior to signing, however some errors in grammar and context are still possible. If you have any questions related to this note please do not hesitate to contact our office.

## 2021-02-01 ENCOUNTER — TELEPHONE (OUTPATIENT)
Dept: HEALTH INFORMATION MANAGEMENT | Facility: OTHER | Age: 86
End: 2021-02-01

## 2021-02-02 NOTE — TELEPHONE ENCOUNTER
PC to pt daughter regarding follow up from SNF. Daughter reports pt is very confused and depressed. Pt will go to urologist on 2/2/21. Pt had UA last week which was negative. Pt has been seen by [facility]. [facility] did not  pt. Daughter and TCN discussed the possibility of a new referral once pt settles in and her cognition and pain improve. Daughter has TCN contact information and will call TCN with future concerns and needs.

## 2021-02-12 ENCOUNTER — HOSPITAL ENCOUNTER (INPATIENT)
Facility: MEDICAL CENTER | Age: 86
LOS: 6 days | DRG: 543 | End: 2021-02-22
Attending: EMERGENCY MEDICINE | Admitting: STUDENT IN AN ORGANIZED HEALTH CARE EDUCATION/TRAINING PROGRAM
Payer: MEDICARE

## 2021-02-12 ENCOUNTER — APPOINTMENT (OUTPATIENT)
Dept: RADIOLOGY | Facility: MEDICAL CENTER | Age: 86
DRG: 543 | End: 2021-02-12
Attending: EMERGENCY MEDICINE
Payer: MEDICARE

## 2021-02-12 DIAGNOSIS — S32.000A COMPRESSION FRACTURE OF LUMBAR VERTEBRA, INITIAL ENCOUNTER, UNSPECIFIED LUMBAR VERTEBRAL LEVEL: ICD-10-CM

## 2021-02-12 DIAGNOSIS — R62.7 FAILURE TO THRIVE IN ADULT: ICD-10-CM

## 2021-02-12 DIAGNOSIS — N39.0 ACUTE UTI: ICD-10-CM

## 2021-02-12 DIAGNOSIS — M54.50 ACUTE LOW BACK PAIN, UNSPECIFIED BACK PAIN LATERALITY, UNSPECIFIED WHETHER SCIATICA PRESENT: ICD-10-CM

## 2021-02-12 DIAGNOSIS — M25.551 RIGHT HIP PAIN: ICD-10-CM

## 2021-02-12 DIAGNOSIS — F03.90 DEMENTIA WITHOUT BEHAVIORAL DISTURBANCE, UNSPECIFIED DEMENTIA TYPE: ICD-10-CM

## 2021-02-12 DIAGNOSIS — J06.9 VIRAL URI: ICD-10-CM

## 2021-02-12 LAB
ALBUMIN SERPL BCP-MCNC: 3.7 G/DL (ref 3.2–4.9)
ALBUMIN/GLOB SERPL: 1.2 G/DL
ALP SERPL-CCNC: 121 U/L (ref 30–99)
ALT SERPL-CCNC: 26 U/L (ref 2–50)
ANION GAP SERPL CALC-SCNC: 11 MMOL/L (ref 7–16)
APPEARANCE UR: ABNORMAL
AST SERPL-CCNC: 41 U/L (ref 12–45)
BACTERIA #/AREA URNS HPF: ABNORMAL /HPF
BASOPHILS # BLD AUTO: 0.3 % (ref 0–1.8)
BASOPHILS # BLD: 0.04 K/UL (ref 0–0.12)
BILIRUB SERPL-MCNC: 0.5 MG/DL (ref 0.1–1.5)
BILIRUB UR QL STRIP.AUTO: NEGATIVE
BUN SERPL-MCNC: 15 MG/DL (ref 8–22)
CALCIUM SERPL-MCNC: 10 MG/DL (ref 8.5–10.5)
CAOX CRY #/AREA URNS HPF: ABNORMAL /HPF
CHLORIDE SERPL-SCNC: 102 MMOL/L (ref 96–112)
CO2 SERPL-SCNC: 23 MMOL/L (ref 20–33)
COLOR UR: YELLOW
CREAT SERPL-MCNC: 0.36 MG/DL (ref 0.5–1.4)
EKG IMPRESSION: NORMAL
EOSINOPHIL # BLD AUTO: 0.02 K/UL (ref 0–0.51)
EOSINOPHIL NFR BLD: 0.2 % (ref 0–6.9)
EPI CELLS #/AREA URNS HPF: ABNORMAL /HPF
ERYTHROCYTE [DISTWIDTH] IN BLOOD BY AUTOMATED COUNT: 47.6 FL (ref 35.9–50)
GLOBULIN SER CALC-MCNC: 3 G/DL (ref 1.9–3.5)
GLUCOSE SERPL-MCNC: 101 MG/DL (ref 65–99)
GLUCOSE UR STRIP.AUTO-MCNC: NEGATIVE MG/DL
HCT VFR BLD AUTO: 44.8 % (ref 37–47)
HGB BLD-MCNC: 14.7 G/DL (ref 12–16)
IMM GRANULOCYTES # BLD AUTO: 0.07 K/UL (ref 0–0.11)
IMM GRANULOCYTES NFR BLD AUTO: 0.6 % (ref 0–0.9)
KETONES UR STRIP.AUTO-MCNC: NEGATIVE MG/DL
LACTATE BLD-SCNC: 1.6 MMOL/L (ref 0.5–2)
LEUKOCYTE ESTERASE UR QL STRIP.AUTO: ABNORMAL
LIPASE SERPL-CCNC: 21 U/L (ref 11–82)
LYMPHOCYTES # BLD AUTO: 1.09 K/UL (ref 1–4.8)
LYMPHOCYTES NFR BLD: 9.3 % (ref 22–41)
MCH RBC QN AUTO: 30.4 PG (ref 27–33)
MCHC RBC AUTO-ENTMCNC: 32.8 G/DL (ref 33.6–35)
MCV RBC AUTO: 92.8 FL (ref 81.4–97.8)
MICRO URNS: ABNORMAL
MONOCYTES # BLD AUTO: 0.68 K/UL (ref 0–0.85)
MONOCYTES NFR BLD AUTO: 5.8 % (ref 0–13.4)
NEUTROPHILS # BLD AUTO: 9.83 K/UL (ref 2–7.15)
NEUTROPHILS NFR BLD: 83.8 % (ref 44–72)
NITRITE UR QL STRIP.AUTO: POSITIVE
NRBC # BLD AUTO: 0 K/UL
NRBC BLD-RTO: 0 /100 WBC
PH UR STRIP.AUTO: 5.5 [PH] (ref 5–8)
PLATELET # BLD AUTO: 327 K/UL (ref 164–446)
PMV BLD AUTO: 9 FL (ref 9–12.9)
POTASSIUM SERPL-SCNC: 4.1 MMOL/L (ref 3.6–5.5)
PROT SERPL-MCNC: 6.7 G/DL (ref 6–8.2)
PROT UR QL STRIP: 30 MG/DL
RBC # BLD AUTO: 4.83 M/UL (ref 4.2–5.4)
RBC # URNS HPF: ABNORMAL /HPF
RBC UR QL AUTO: ABNORMAL
SARS-COV-2 RNA RESP QL NAA+PROBE: NOTDETECTED
SODIUM SERPL-SCNC: 136 MMOL/L (ref 135–145)
SP GR UR STRIP.AUTO: 1.02
SPECIMEN SOURCE: NORMAL
UROBILINOGEN UR STRIP.AUTO-MCNC: 0.2 MG/DL
WBC # BLD AUTO: 11.7 K/UL (ref 4.8–10.8)
WBC #/AREA URNS HPF: ABNORMAL /HPF

## 2021-02-12 PROCEDURE — 73502 X-RAY EXAM HIP UNI 2-3 VIEWS: CPT | Mod: LT

## 2021-02-12 PROCEDURE — 700102 HCHG RX REV CODE 250 W/ 637 OVERRIDE(OP): Performed by: STUDENT IN AN ORGANIZED HEALTH CARE EDUCATION/TRAINING PROGRAM

## 2021-02-12 PROCEDURE — 700105 HCHG RX REV CODE 258: Performed by: EMERGENCY MEDICINE

## 2021-02-12 PROCEDURE — 96365 THER/PROPH/DIAG IV INF INIT: CPT

## 2021-02-12 PROCEDURE — 83605 ASSAY OF LACTIC ACID: CPT

## 2021-02-12 PROCEDURE — 99220 PR INITIAL OBSERVATION CARE,LEVL III: CPT | Performed by: STUDENT IN AN ORGANIZED HEALTH CARE EDUCATION/TRAINING PROGRAM

## 2021-02-12 PROCEDURE — G0378 HOSPITAL OBSERVATION PER HR: HCPCS

## 2021-02-12 PROCEDURE — U0003 INFECTIOUS AGENT DETECTION BY NUCLEIC ACID (DNA OR RNA); SEVERE ACUTE RESPIRATORY SYNDROME CORONAVIRUS 2 (SARS-COV-2) (CORONAVIRUS DISEASE [COVID-19]), AMPLIFIED PROBE TECHNIQUE, MAKING USE OF HIGH THROUGHPUT TECHNOLOGIES AS DESCRIBED BY CMS-2020-01-R: HCPCS

## 2021-02-12 PROCEDURE — 72192 CT PELVIS W/O DYE: CPT

## 2021-02-12 PROCEDURE — 80053 COMPREHEN METABOLIC PANEL: CPT

## 2021-02-12 PROCEDURE — 87077 CULTURE AEROBIC IDENTIFY: CPT

## 2021-02-12 PROCEDURE — 85025 COMPLETE CBC W/AUTO DIFF WBC: CPT

## 2021-02-12 PROCEDURE — 99285 EMERGENCY DEPT VISIT HI MDM: CPT

## 2021-02-12 PROCEDURE — 81001 URINALYSIS AUTO W/SCOPE: CPT

## 2021-02-12 PROCEDURE — A9270 NON-COVERED ITEM OR SERVICE: HCPCS | Performed by: EMERGENCY MEDICINE

## 2021-02-12 PROCEDURE — 87186 SC STD MICRODIL/AGAR DIL: CPT

## 2021-02-12 PROCEDURE — 700111 HCHG RX REV CODE 636 W/ 250 OVERRIDE (IP): Performed by: EMERGENCY MEDICINE

## 2021-02-12 PROCEDURE — 83690 ASSAY OF LIPASE: CPT

## 2021-02-12 PROCEDURE — 700105 HCHG RX REV CODE 258: Performed by: STUDENT IN AN ORGANIZED HEALTH CARE EDUCATION/TRAINING PROGRAM

## 2021-02-12 PROCEDURE — U0005 INFEC AGEN DETEC AMPLI PROBE: HCPCS

## 2021-02-12 PROCEDURE — A9270 NON-COVERED ITEM OR SERVICE: HCPCS | Performed by: STUDENT IN AN ORGANIZED HEALTH CARE EDUCATION/TRAINING PROGRAM

## 2021-02-12 PROCEDURE — 87040 BLOOD CULTURE FOR BACTERIA: CPT | Mod: 91

## 2021-02-12 PROCEDURE — 700102 HCHG RX REV CODE 250 W/ 637 OVERRIDE(OP): Performed by: EMERGENCY MEDICINE

## 2021-02-12 PROCEDURE — 700111 HCHG RX REV CODE 636 W/ 250 OVERRIDE (IP): Performed by: STUDENT IN AN ORGANIZED HEALTH CARE EDUCATION/TRAINING PROGRAM

## 2021-02-12 PROCEDURE — 93005 ELECTROCARDIOGRAM TRACING: CPT | Performed by: EMERGENCY MEDICINE

## 2021-02-12 PROCEDURE — 87086 URINE CULTURE/COLONY COUNT: CPT

## 2021-02-12 RX ORDER — OXYCODONE HYDROCHLORIDE 5 MG/1
5 TABLET ORAL
Status: DISCONTINUED | OUTPATIENT
Start: 2021-02-12 | End: 2021-02-22 | Stop reason: HOSPADM

## 2021-02-12 RX ORDER — CEPHALEXIN 250 MG/1
250 CAPSULE ORAL EVERY EVENING
Status: ON HOLD | COMMUNITY
End: 2021-02-22

## 2021-02-12 RX ORDER — GUAIFENESIN/DEXTROMETHORPHAN 100-10MG/5
10 SYRUP ORAL EVERY 6 HOURS PRN
Status: DISCONTINUED | OUTPATIENT
Start: 2021-02-12 | End: 2021-02-22 | Stop reason: HOSPADM

## 2021-02-12 RX ORDER — AMOXICILLIN 250 MG
2 CAPSULE ORAL 2 TIMES DAILY
Status: DISCONTINUED | OUTPATIENT
Start: 2021-02-12 | End: 2021-02-22 | Stop reason: HOSPADM

## 2021-02-12 RX ORDER — ACETAMINOPHEN 325 MG/1
650 TABLET ORAL EVERY 6 HOURS PRN
Status: DISCONTINUED | OUTPATIENT
Start: 2021-02-12 | End: 2021-02-22 | Stop reason: HOSPADM

## 2021-02-12 RX ORDER — SODIUM CHLORIDE, SODIUM LACTATE, POTASSIUM CHLORIDE, CALCIUM CHLORIDE 600; 310; 30; 20 MG/100ML; MG/100ML; MG/100ML; MG/100ML
INJECTION, SOLUTION INTRAVENOUS
Status: DISCONTINUED | OUTPATIENT
Start: 2021-02-12 | End: 2021-02-14

## 2021-02-12 RX ORDER — BISACODYL 10 MG
10 SUPPOSITORY, RECTAL RECTAL
Status: DISCONTINUED | OUTPATIENT
Start: 2021-02-12 | End: 2021-02-22 | Stop reason: HOSPADM

## 2021-02-12 RX ORDER — HYDROCODONE BITARTRATE AND ACETAMINOPHEN 5; 325 MG/1; MG/1
1 TABLET ORAL ONCE
Status: COMPLETED | OUTPATIENT
Start: 2021-02-12 | End: 2021-02-12

## 2021-02-12 RX ORDER — CYCLOSPORINE 0.5 MG/ML
1 EMULSION OPHTHALMIC 2 TIMES DAILY
Status: ON HOLD | COMMUNITY
End: 2021-02-22

## 2021-02-12 RX ORDER — VIT A/VIT C/VIT E/ZINC/COPPER 4296-226
1 CAPSULE ORAL
Status: ON HOLD | COMMUNITY
End: 2021-02-13

## 2021-02-12 RX ORDER — CHOLECALCIFEROL (VITAMIN D3) 125 MCG
2000 CAPSULE ORAL EVERY MORNING
Status: ON HOLD | COMMUNITY
End: 2021-02-22 | Stop reason: SDUPTHER

## 2021-02-12 RX ORDER — POLYETHYLENE GLYCOL 3350 17 G/17G
1 POWDER, FOR SOLUTION ORAL
Status: DISCONTINUED | OUTPATIENT
Start: 2021-02-12 | End: 2021-02-22 | Stop reason: HOSPADM

## 2021-02-12 RX ORDER — TRAMADOL HYDROCHLORIDE 50 MG/1
25 TABLET ORAL
Status: ON HOLD | COMMUNITY
Start: 2021-02-11 | End: 2021-02-22

## 2021-02-12 RX ORDER — NITROFURANTOIN 25; 75 MG/1; MG/1
CAPSULE ORAL
COMMUNITY
Start: 2021-01-25 | End: 2021-02-12

## 2021-02-12 RX ORDER — LABETALOL HYDROCHLORIDE 5 MG/ML
10 INJECTION, SOLUTION INTRAVENOUS EVERY 4 HOURS PRN
Status: DISCONTINUED | OUTPATIENT
Start: 2021-02-12 | End: 2021-02-22 | Stop reason: HOSPADM

## 2021-02-12 RX ORDER — POLYETHYLENE GLYCOL 3350 17 G/17G
17 POWDER, FOR SOLUTION ORAL
Status: ON HOLD | COMMUNITY
End: 2021-02-22

## 2021-02-12 RX ORDER — HYDROMORPHONE HYDROCHLORIDE 1 MG/ML
0.25 INJECTION, SOLUTION INTRAMUSCULAR; INTRAVENOUS; SUBCUTANEOUS
Status: DISCONTINUED | OUTPATIENT
Start: 2021-02-12 | End: 2021-02-22 | Stop reason: HOSPADM

## 2021-02-12 RX ORDER — BETHANECHOL CHLORIDE 50 MG/1
25 TABLET ORAL
Status: ON HOLD | COMMUNITY
Start: 2021-01-25 | End: 2021-02-22

## 2021-02-12 RX ORDER — MELOXICAM 7.5 MG/1
7.5 TABLET ORAL
Status: ON HOLD | COMMUNITY
End: 2021-02-13

## 2021-02-12 RX ORDER — LORAZEPAM 0.5 MG/1
0.25 TABLET ORAL EVERY 4 HOURS PRN
Status: ON HOLD | COMMUNITY
End: 2021-02-13

## 2021-02-12 RX ORDER — ONDANSETRON 2 MG/ML
4 INJECTION INTRAMUSCULAR; INTRAVENOUS EVERY 4 HOURS PRN
Status: DISCONTINUED | OUTPATIENT
Start: 2021-02-12 | End: 2021-02-22 | Stop reason: HOSPADM

## 2021-02-12 RX ORDER — OXYCODONE HYDROCHLORIDE 5 MG/1
2.5 TABLET ORAL
Status: DISCONTINUED | OUTPATIENT
Start: 2021-02-12 | End: 2021-02-22 | Stop reason: HOSPADM

## 2021-02-12 RX ORDER — CLONIDINE HYDROCHLORIDE 0.1 MG/1
TABLET ORAL
COMMUNITY
Start: 2021-01-25 | End: 2021-02-12

## 2021-02-12 RX ORDER — ONDANSETRON 4 MG/1
4 TABLET, ORALLY DISINTEGRATING ORAL EVERY 4 HOURS PRN
Status: DISCONTINUED | OUTPATIENT
Start: 2021-02-12 | End: 2021-02-13

## 2021-02-12 RX ADMIN — OXYCODONE 5 MG: 5 TABLET ORAL at 21:40

## 2021-02-12 RX ADMIN — SODIUM CHLORIDE, POTASSIUM CHLORIDE, SODIUM LACTATE AND CALCIUM CHLORIDE: 600; 310; 30; 20 INJECTION, SOLUTION INTRAVENOUS at 21:42

## 2021-02-12 RX ADMIN — DOCUSATE SODIUM 50 MG AND SENNOSIDES 8.6 MG 2 TABLET: 8.6; 5 TABLET, FILM COATED ORAL at 21:40

## 2021-02-12 RX ADMIN — HYDROCODONE BITARTRATE AND ACETAMINOPHEN 1 TABLET: 5; 325 TABLET ORAL at 17:52

## 2021-02-12 RX ADMIN — MEROPENEM 500 MG: 500 INJECTION, POWDER, FOR SOLUTION INTRAVENOUS at 23:06

## 2021-02-12 RX ADMIN — MEROPENEM 500 MG: 500 INJECTION, POWDER, FOR SOLUTION INTRAVENOUS at 18:25

## 2021-02-12 ASSESSMENT — LIFESTYLE VARIABLES
HAVE PEOPLE ANNOYED YOU BY CRITICIZING YOUR DRINKING: NO
EVER FELT BAD OR GUILTY ABOUT YOUR DRINKING: NO
CONSUMPTION TOTAL: NEGATIVE
ON A TYPICAL DAY WHEN YOU DRINK ALCOHOL HOW MANY DRINKS DO YOU HAVE: 0
HAVE YOU EVER FELT YOU SHOULD CUT DOWN ON YOUR DRINKING: NO
ALCOHOL_USE: NO
AVERAGE NUMBER OF DAYS PER WEEK YOU HAVE A DRINK CONTAINING ALCOHOL: 0
HOW MANY TIMES IN THE PAST YEAR HAVE YOU HAD 5 OR MORE DRINKS IN A DAY: 0
DO YOU DRINK ALCOHOL: NO
TOTAL SCORE: 0
DOES PATIENT WANT TO STOP DRINKING: NO
TOTAL SCORE: 0
EVER HAD A DRINK FIRST THING IN THE MORNING TO STEADY YOUR NERVES TO GET RID OF A HANGOVER: NO
TOTAL SCORE: 0

## 2021-02-12 ASSESSMENT — COGNITIVE AND FUNCTIONAL STATUS - GENERAL
PERSONAL GROOMING: A LITTLE
CLIMB 3 TO 5 STEPS WITH RAILING: A LOT
MOBILITY SCORE: 15
EATING MEALS: A LITTLE
MOVING FROM LYING ON BACK TO SITTING ON SIDE OF FLAT BED: A LITTLE
DAILY ACTIVITIY SCORE: 14
SUGGESTED CMS G CODE MODIFIER MOBILITY: CK
MOVING TO AND FROM BED TO CHAIR: A LITTLE
STANDING UP FROM CHAIR USING ARMS: A LITTLE
TOILETING: A LOT
SUGGESTED CMS G CODE MODIFIER DAILY ACTIVITY: CK
WALKING IN HOSPITAL ROOM: A LOT
DRESSING REGULAR UPPER BODY CLOTHING: A LOT
HELP NEEDED FOR BATHING: A LOT
TURNING FROM BACK TO SIDE WHILE IN FLAT BAD: A LOT
DRESSING REGULAR LOWER BODY CLOTHING: A LOT

## 2021-02-12 ASSESSMENT — PAIN DESCRIPTION - PAIN TYPE
TYPE: ACUTE PAIN
TYPE: ACUTE PAIN

## 2021-02-12 ASSESSMENT — PATIENT HEALTH QUESTIONNAIRE - PHQ9
1. LITTLE INTEREST OR PLEASURE IN DOING THINGS: NOT AT ALL
SUM OF ALL RESPONSES TO PHQ9 QUESTIONS 1 AND 2: 0
2. FEELING DOWN, DEPRESSED, IRRITABLE, OR HOPELESS: NOT AT ALL

## 2021-02-12 ASSESSMENT — FIBROSIS 4 INDEX: FIB4 SCORE: 1.95

## 2021-02-12 NOTE — ED TRIAGE NOTES
"Chief Complaint   Patient presents with   • Hip Pain     BIB EMS to [facility], pt on monitor and in gown. Per EMS report pt is from memory care facility, saw PCP today who didn't give her anything for her pain, EMS was then called from memory facility. Pt is AOx1 at baseline, reports 8/10 aching/sore L hip pain, facility reported no witnessed falls. GCS 14.    Medications given en route: 100 mcg fentanyl    /94   Pulse 92   Temp 36.7 °C (98.1 °F) (Temporal)   Resp 15   Ht 1.676 m (5' 6\")   Wt 70 kg (154 lb 5.2 oz)   SpO2 95%   BMI 24.91 kg/m²   "

## 2021-02-12 NOTE — ED PROVIDER NOTES
"ED Provider Note    CHIEF COMPLAINT  Chief Complaint   Patient presents with   • Hip Pain       HPI  Lucita Babcock is a 85 y.o. female who presents to the emergency department for possible hip pain.  The patient has a history of dementia.  She is unable to provide any real history.  Per report she been having some hip pain.  She went to the doctor's office they were unable to evaluate her.  She was sent back to her memory care unit and it was sent here for further work-up and treatment.    The patient denies any falls or injury however she does not know where she is or what is going on.  She has no recall of any events.  No history is obtainable because of her dementia.  Unclear at this time if she is at baseline.              REVIEW OF SYSTEMS  See HPI for further details.  Unable to assess secondary to dementia.    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   • Cancer (HCC) 2008    breast   • CATARACT     bilat IOL   • Dental disorder     upper partial   • Depression    • Heart burn    • Hypertension    • Indigestion    • Injury of cervical spine (HCC) July 2016    C-spine decompression/laminectomy   • Osteopenia    • 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HCC)    • Sarcoid    • Urinary bladder disorder     frequency    • UTI (urinary tract infection)     recurrent       FAMILY HISTORY  Family History   Problem Relation Age of Onset   • Hypertension Mother    • Stroke Mother    • Suicide Attempts Son    • Bipolar disorder Son    • Other Father         Dementia   • Other Sister         BIpolar   • Bipolar disorder Sister    • Other Sister         Bipolar   • Other Sister         THyroid disease   • Bipolar disorder Sister    • Cancer Neg Hx    • Diabetes Neg Hx    • Heart Disease Neg Hx        SOCIAL HISTORY  Social History     Socioeconomic History   • " Marital status:      Spouse name: Not on file   • Number of children: Not on file   • Years of education: Not on file   • Highest education level: Not on file   Occupational History   • Not on file   Tobacco Use   • Smoking status: Former Smoker     Packs/day: 0.50     Years: 45.00     Pack years: 22.50     Quit date: 10/9/1969     Years since quittin.3   • Smokeless tobacco: Never Used   Substance and Sexual Activity   • Alcohol use: No   • Drug use: No   • Sexual activity: Not Currently     Partners: Male     Comment:     Other Topics Concern   • Not on file   Social History Narrative   • Not on file     Social Determinants of Health     Financial Resource Strain:    • Difficulty of Paying Living Expenses:    Food Insecurity:    • Worried About Running Out of Food in the Last Year:    • Ran Out of Food in the Last Year:    Transportation Needs:    • Lack of Transportation (Medical):    • Lack of Transportation (Non-Medical):    Physical Activity:    • Days of Exercise per Week:    • Minutes of Exercise per Session:    Stress:    • Feeling of Stress :    Social Connections:    • Frequency of Communication with Friends and Family:    • Frequency of Social Gatherings with Friends and Family:    • Attends [employer] Services:    • Active Member of Clubs or Organizations:    • Attends Club or Organization Meetings:    • Marital Status:    Intimate Partner Violence:    • Fear of Current or Ex-Partner:    • Emotionally Abused:    • Physically Abused:    • Sexually Abused:        SURGICAL HISTORY  Past Surgical History:   Procedure Laterality Date   • FOREARM ORIF Right 2020    Procedure: ORIF, FOREARM;  Surgeon: Ramirez Martinez M.D.;  Location: SURGERY [facility];  Service: Orthopedics   • PARATHYROID EXPLORATION  2018    Procedure: PARATHYROID EXPLORATION- MINIMALLY INVASIVE,   NIMS RECURRENT LARYNGEAL NERVE MONITORING RIGHT;  Surgeon: Devora Gonsalez M.D.;  Location: SURGERY SAME DAY  [facility];  Service: General   • CERVICAL DISK AND FUSION ANTERIOR  7/27/2016    Procedure: CERVICAL DISK AND FUSION ANTERIOR C4-7;  Surgeon: Niles Khan M.D.;  Location: SURGERY [facility];  Service:    • CATARACT PHACO WITH IOL  8/27/2014    Performed by Avani Allen M.D. at SURGERY SAME DAY [facility]   • AMBROCIO BY LAPAROSCOPY  1/8/2013    Performed by Kenrick Howell M.D. at SURGERY [facility]   • ERCP IN OR  12/28/2012    Performed by Jay Dubois M.D. at SURGERY [facility]   • CATARACT PHACO WITH IOL  8/11/2010    Performed by AVANI ALLEN at SURGERY SAME DAY [facility]   • MASS EXCISION GENERAL  7/08    chest mass biopsy sarcoid   • BREAST BIOPSY  5/27/08    Performed by KENRICK HOWELL at SURGERY SAME DAY [facility]   • LUMPECTOMY  2008    right   • PB RADIATION THERAPY PLAN SIMPLE  2008    right   • COLONOSCOPY  2007    2 polyps   • ABDOMINAL HYSTERECTOMY TOTAL  1997   • VEIN STRIPPING      R leg       CURRENT MEDICATIONS  Home Medications     Reviewed by Nola Randhawa (Pharmacy Tech) on 02/12/21 at 1746  Med List Status: Not Addressed   Medication Last Dose Status   acetaminophen (TYLENOL) 325 MG Tab  Active   ascorbic acid (ASCORBIC ACID) 500 MG Tab  Active   bethanechol (URECHOLINE) 25 MG Tab  Active   DILTIAZem (CARDIZEM) 30 MG Tab  Active   lidocaine (LIDODERM) 5 % Patch  Active   melatonin 3 MG Tab  Active   nitrofurantoin macro crystals (MACRODANTIN) 100 MG Cap  Active   Probiotic Product (PROBIOTIC DAILY) Cap  Active   senna-docusate (PERICOLACE OR SENOKOT S) 8.6-50 MG Tab  Active   sertraline (ZOLOFT) 100 MG Tab  Active                ALLERGIES  Allergies   Allergen Reactions   • Ace Inhibitors Anaphylaxis   • Augmentin      Flu like sx   • Erythromycin Rash   • Lisinopril Anaphylaxis   • Penicillins Hives     Tolerates cephalosporins   • Cephalosporins    • Epinephrine Unspecified     shaking   • Percocet  [Apap-Fd&C Red #40 [place]-Oxycodone] Vomiting   •  "Percocet [Oxycodone-Acetaminophen] Vomiting   • Sulfa Drugs Hives       PHYSICAL EXAM  VITAL SIGNS: /94   Pulse 92   Temp 36.7 °C (98.1 °F) (Temporal)   Resp 15   Ht 1.676 m (5' 6\")   Wt 70 kg (154 lb 5.2 oz)   SpO2 95%   BMI 24.91 kg/m²      Constitutional: Awake, alert, chronically ill-appearing no acute cardiopulmonary distress  HENT: Normocephalic, Atraumatic, Bilateral external ears normal  Eyes: PERRL, EOMI, Conjunctiva normal, No discharge.   Neck: Normal range of motion,  Cardiovascular: Normal heart rate, Normal rhythm, No murmurs, No rubs, No gallops.   Thorax & Lungs: Normal breath sounds, No respiratory distress, No wheezing  Abdomen: Bowel sounds normal, Soft, No tenderness  Skin: Warm, Dry, No erythema, No rash. .   Musculoskeletal: Good range of motion in all major joints.  She does she notes some pain with movement of the right hip.  No obvious deformity.  Good pulses and perfusion.  Neurologic: Alert, No focal deficits noted.   Psychiatric: Unable to assess      Results for orders placed or performed during the hospital encounter of 02/12/21   CBC WITH DIFFERENTIAL   Result Value Ref Range    WBC 11.7 (H) 4.8 - 10.8 K/uL    RBC 4.83 4.20 - 5.40 M/uL    Hemoglobin 14.7 12.0 - 16.0 g/dL    Hematocrit 44.8 37.0 - 47.0 %    MCV 92.8 81.4 - 97.8 fL    MCH 30.4 27.0 - 33.0 pg    MCHC 32.8 (L) 33.6 - 35.0 g/dL    RDW 47.6 35.9 - 50.0 fL    Platelet Count 327 164 - 446 K/uL    MPV 9.0 9.0 - 12.9 fL    Neutrophils-Polys 83.80 (H) 44.00 - 72.00 %    Lymphocytes 9.30 (L) 22.00 - 41.00 %    Monocytes 5.80 0.00 - 13.40 %    Eosinophils 0.20 0.00 - 6.90 %    Basophils 0.30 0.00 - 1.80 %    Immature Granulocytes 0.60 0.00 - 0.90 %    Nucleated RBC 0.00 /100 WBC    Neutrophils (Absolute) 9.83 (H) 2.00 - 7.15 K/uL    Lymphs (Absolute) 1.09 1.00 - 4.80 K/uL    Monos (Absolute) 0.68 0.00 - 0.85 K/uL    Eos (Absolute) 0.02 0.00 - 0.51 K/uL    Baso (Absolute) 0.04 0.00 - 0.12 K/uL    Immature Granulocytes " (abs) 0.07 0.00 - 0.11 K/uL    NRBC (Absolute) 0.00 K/uL   COMP METABOLIC PANEL   Result Value Ref Range    Sodium 136 135 - 145 mmol/L    Potassium 4.1 3.6 - 5.5 mmol/L    Chloride 102 96 - 112 mmol/L    Co2 23 20 - 33 mmol/L    Anion Gap 11.0 7.0 - 16.0    Glucose 101 (H) 65 - 99 mg/dL    Bun 15 8 - 22 mg/dL    Creatinine 0.36 (L) 0.50 - 1.40 mg/dL    Calcium 10.0 8.5 - 10.5 mg/dL    AST(SGOT) 41 12 - 45 U/L    ALT(SGPT) 26 2 - 50 U/L    Alkaline Phosphatase 121 (H) 30 - 99 U/L    Total Bilirubin 0.5 0.1 - 1.5 mg/dL    Albumin 3.7 3.2 - 4.9 g/dL    Total Protein 6.7 6.0 - 8.2 g/dL    Globulin 3.0 1.9 - 3.5 g/dL    A-G Ratio 1.2 g/dL   LIPASE   Result Value Ref Range    Lipase 21 11 - 82 U/L   LACTIC ACID   Result Value Ref Range    Lactic Acid 1.6 0.5 - 2.0 mmol/L   URINALYSIS CULTURE, IF INDICATED    Specimen: Urine   Result Value Ref Range    Color Yellow     Character Hazy (A)     Specific Gravity 1.025 <1.035    Ph 5.5 5.0 - 8.0    Glucose Negative Negative mg/dL    Ketones Negative Negative mg/dL    Protein 30 (A) Negative mg/dL    Bilirubin Negative Negative    Urobilinogen, Urine 0.2 Negative    Nitrite Positive (A) Negative    Leukocyte Esterase Moderate (A) Negative    Occult Blood Small (A) Negative    Micro Urine Req Microscopic    ESTIMATED GFR   Result Value Ref Range    GFR If African American >60 >60 mL/min/1.73 m 2    GFR If Non African American >60 >60 mL/min/1.73 m 2   URINE MICROSCOPIC (W/UA)   Result Value Ref Range    WBC Packed (A) /hpf    RBC 5-10 (A) /hpf    Bacteria Many (A) None /hpf    Epithelial Cells Moderate (A) /hpf    Ca Oxalate Crystal Few /hpf   URINE CULTURE(NEW)    Specimen: Urine   Result Value Ref Range    Significant Indicator NEG     Source UR     Site -     Culture Result -    EKG (NOW)   Result Value Ref Range    Report       [facility] Emergency Dept.    Test Date:  2021-02-12  Pt Name:    KEANU TOBIN                   Department: ER  MRN:         [id number]                      Room:        18  Gender:     Female                       Technician: [id number]  :        [id number]                   Requested By:AYESHA KIRKLAND  Order #:    [id number]                    Reading MD: AYESHA KIRKLAND. BARTOLO    Measurements  Intervals                                Axis  Rate:       90                           P:          70  TN:         187                          QRS:        45  QRSD:       73                           T:          56  QT:         356  QTc:        436    Interpretive Statements  Sinus rhythm  Atrial premature complex  Probable left atrial enlargement  Compared to ECG 2020 16:12:06  Atrial premature complex(es) now present  First degree AV block no longer present  ST (T wave) deviation no longer present  Electronically Signed On 2021 17:43:09 PST by AYESHA KIRKLAND. AMD       *Note: Due to a large number of results and/or encounters for the requested time period, some results have not been displayed. A complete set of results can be found in Results Review.        RADIOLOGY/PROCEDURES  CT-PELVIS W/O   Final Result      1.  No acute fracture or dislocation. Cortical irregularity in the left suprapubic ramus noted on the recent radiograph is related to a nutrient vessel.   2.  Ill-defined sclerosis in the sacrum, related to chronic sacral insufficiency fracture.   3.  Colonic diverticulosis.      DX-HIP-COMPLETE - UNILATERAL 2+ LEFT   Final Result      1.  Subtle cortical irregularity along the superior aspect of the left superior pubic ramus may represent a subacute to chronic nondisplaced fracture.   2.  No proximal femoral fracture.            COURSE & MEDICAL DECISION MAKING  Pertinent Labs & Imaging studies reviewed. (See chart for details)    The patient presents to emergency room for pain.  Unable to get much history from her other than she is having discomfort.  The patient is unable to state why she is here or what is been going on.   Denies any falls however her history is completely unreliable.      The patient is worked up with an x-ray and some labs.  X-ray shows a questionable pelvic fracture.  Radiologist recommends a CT or further imaging.  CT is negative.    Because the patient's altered mental status of broad differential diagnosis was considered including but noted to acute UTI, dehydration, electrolyte abnormality, sepsis, and COVID-19.    The patient's chart is reviewed and she has a history of frequent UTIs and ESBL urinary tract infections.    The patient does not appear septic.  She is started on antibiotics per the pharmacy.  She will be hospitalized for further work-up and treatment.  I suspect her UTI is causing some of her mental status changes.      I was subsequently made aware that the daughter called to let us know that the patient has been having some back pain related to compression fracture that she sustained in December and is where her pain is.  CTs were reviewed and she did have an image in late December.    The patient be hospitalized for further work-up and treatment.  Discussed case with hospitalist and care transfer at that time.          FINAL IMPRESSION  1. Right hip pain     2. Acute UTI     3. Compression fracture of lumbar vertebra, initial encounter, unspecified lumbar vertebral level (HCC)     4. Dementia without behavioral disturbance, unspecified dementia type (HCC)         3.         Electronically signed by: Gigi Camacho M.D., 2/12/2021 3:51 PM

## 2021-02-13 PROBLEM — R29.6 FREQUENT FALLS: Status: ACTIVE | Noted: 2021-02-13

## 2021-02-13 PROBLEM — G93.40 ACUTE ENCEPHALOPATHY: Status: ACTIVE | Noted: 2021-02-13

## 2021-02-13 PROBLEM — R62.7 FAILURE TO THRIVE IN ADULT: Status: ACTIVE | Noted: 2021-02-13

## 2021-02-13 PROBLEM — M25.559 HIP PAIN: Status: ACTIVE | Noted: 2021-02-13

## 2021-02-13 PROBLEM — M54.50 ACUTE LOW BACK PAIN: Status: ACTIVE | Noted: 2021-02-13

## 2021-02-13 LAB
ANION GAP SERPL CALC-SCNC: 11 MMOL/L (ref 7–16)
BASOPHILS # BLD AUTO: 0.5 % (ref 0–1.8)
BASOPHILS # BLD: 0.04 K/UL (ref 0–0.12)
BUN SERPL-MCNC: 15 MG/DL (ref 8–22)
CALCIUM SERPL-MCNC: 10.1 MG/DL (ref 8.5–10.5)
CHLORIDE SERPL-SCNC: 100 MMOL/L (ref 96–112)
CO2 SERPL-SCNC: 25 MMOL/L (ref 20–33)
CREAT SERPL-MCNC: 0.43 MG/DL (ref 0.5–1.4)
EOSINOPHIL # BLD AUTO: 0.07 K/UL (ref 0–0.51)
EOSINOPHIL NFR BLD: 0.8 % (ref 0–6.9)
ERYTHROCYTE [DISTWIDTH] IN BLOOD BY AUTOMATED COUNT: 48.5 FL (ref 35.9–50)
GLUCOSE SERPL-MCNC: 87 MG/DL (ref 65–99)
HCT VFR BLD AUTO: 42.9 % (ref 37–47)
HGB BLD-MCNC: 13.9 G/DL (ref 12–16)
IMM GRANULOCYTES # BLD AUTO: 0.06 K/UL (ref 0–0.11)
IMM GRANULOCYTES NFR BLD AUTO: 0.7 % (ref 0–0.9)
LYMPHOCYTES # BLD AUTO: 1.67 K/UL (ref 1–4.8)
LYMPHOCYTES NFR BLD: 19.2 % (ref 22–41)
MAGNESIUM SERPL-MCNC: 2.1 MG/DL (ref 1.5–2.5)
MCH RBC QN AUTO: 30.2 PG (ref 27–33)
MCHC RBC AUTO-ENTMCNC: 32.4 G/DL (ref 33.6–35)
MCV RBC AUTO: 93.3 FL (ref 81.4–97.8)
MONOCYTES # BLD AUTO: 0.7 K/UL (ref 0–0.85)
MONOCYTES NFR BLD AUTO: 8 % (ref 0–13.4)
NEUTROPHILS # BLD AUTO: 6.18 K/UL (ref 2–7.15)
NEUTROPHILS NFR BLD: 70.8 % (ref 44–72)
NRBC # BLD AUTO: 0 K/UL
NRBC BLD-RTO: 0 /100 WBC
PLATELET # BLD AUTO: 319 K/UL (ref 164–446)
PMV BLD AUTO: 9.8 FL (ref 9–12.9)
POTASSIUM SERPL-SCNC: 4.1 MMOL/L (ref 3.6–5.5)
RBC # BLD AUTO: 4.6 M/UL (ref 4.2–5.4)
SODIUM SERPL-SCNC: 136 MMOL/L (ref 135–145)
WBC # BLD AUTO: 8.7 K/UL (ref 4.8–10.8)

## 2021-02-13 PROCEDURE — 700101 HCHG RX REV CODE 250: Performed by: STUDENT IN AN ORGANIZED HEALTH CARE EDUCATION/TRAINING PROGRAM

## 2021-02-13 PROCEDURE — 96375 TX/PRO/DX INJ NEW DRUG ADDON: CPT

## 2021-02-13 PROCEDURE — 51798 US URINE CAPACITY MEASURE: CPT

## 2021-02-13 PROCEDURE — 99225 PR SUBSEQUENT OBSERVATION CARE,LEVEL II: CPT | Performed by: STUDENT IN AN ORGANIZED HEALTH CARE EDUCATION/TRAINING PROGRAM

## 2021-02-13 PROCEDURE — 700102 HCHG RX REV CODE 250 W/ 637 OVERRIDE(OP): Performed by: STUDENT IN AN ORGANIZED HEALTH CARE EDUCATION/TRAINING PROGRAM

## 2021-02-13 PROCEDURE — 80048 BASIC METABOLIC PNL TOTAL CA: CPT

## 2021-02-13 PROCEDURE — G0378 HOSPITAL OBSERVATION PER HR: HCPCS

## 2021-02-13 PROCEDURE — 36415 COLL VENOUS BLD VENIPUNCTURE: CPT

## 2021-02-13 PROCEDURE — 96372 THER/PROPH/DIAG INJ SC/IM: CPT

## 2021-02-13 PROCEDURE — 700105 HCHG RX REV CODE 258: Performed by: STUDENT IN AN ORGANIZED HEALTH CARE EDUCATION/TRAINING PROGRAM

## 2021-02-13 PROCEDURE — A9270 NON-COVERED ITEM OR SERVICE: HCPCS | Performed by: STUDENT IN AN ORGANIZED HEALTH CARE EDUCATION/TRAINING PROGRAM

## 2021-02-13 PROCEDURE — 700111 HCHG RX REV CODE 636 W/ 250 OVERRIDE (IP): Performed by: STUDENT IN AN ORGANIZED HEALTH CARE EDUCATION/TRAINING PROGRAM

## 2021-02-13 PROCEDURE — 83735 ASSAY OF MAGNESIUM: CPT

## 2021-02-13 PROCEDURE — 96366 THER/PROPH/DIAG IV INF ADDON: CPT

## 2021-02-13 PROCEDURE — 85025 COMPLETE CBC W/AUTO DIFF WBC: CPT

## 2021-02-13 PROCEDURE — 96367 TX/PROPH/DG ADDL SEQ IV INF: CPT

## 2021-02-13 RX ORDER — BETHANECHOL CHLORIDE 25 MG/1
25 TABLET ORAL
Status: DISCONTINUED | OUTPATIENT
Start: 2021-02-13 | End: 2021-02-22 | Stop reason: HOSPADM

## 2021-02-13 RX ORDER — VITAMIN B COMPLEX
2000 TABLET ORAL EVERY MORNING
Status: DISCONTINUED | OUTPATIENT
Start: 2021-02-13 | End: 2021-02-22 | Stop reason: HOSPADM

## 2021-02-13 RX ORDER — ASCORBIC ACID 500 MG
500 TABLET ORAL DAILY
Status: DISCONTINUED | OUTPATIENT
Start: 2021-02-13 | End: 2021-02-22 | Stop reason: HOSPADM

## 2021-02-13 RX ORDER — ONDANSETRON 4 MG/1
4 TABLET, ORALLY DISINTEGRATING ORAL EVERY 4 HOURS PRN
Status: DISCONTINUED | OUTPATIENT
Start: 2021-02-13 | End: 2021-02-22 | Stop reason: HOSPADM

## 2021-02-13 RX ORDER — LORAZEPAM 0.5 MG/1
0.5 TABLET ORAL EVERY 8 HOURS PRN
Status: DISCONTINUED | OUTPATIENT
Start: 2021-02-13 | End: 2021-02-22 | Stop reason: HOSPADM

## 2021-02-13 RX ORDER — SERTRALINE HYDROCHLORIDE 100 MG/1
100 TABLET, FILM COATED ORAL EVERY MORNING
Status: DISCONTINUED | OUTPATIENT
Start: 2021-02-13 | End: 2021-02-22 | Stop reason: HOSPADM

## 2021-02-13 RX ORDER — HYDRALAZINE HYDROCHLORIDE 50 MG/1
50 TABLET, FILM COATED ORAL EVERY 8 HOURS PRN
Status: DISCONTINUED | OUTPATIENT
Start: 2021-02-13 | End: 2021-02-22 | Stop reason: HOSPADM

## 2021-02-13 RX ORDER — CARBOXYMETHYLCELLULOSE SODIUM 5 MG/ML
2 SOLUTION/ DROPS OPHTHALMIC EVERY 4 HOURS PRN
Status: DISCONTINUED | OUTPATIENT
Start: 2021-02-13 | End: 2021-02-22 | Stop reason: HOSPADM

## 2021-02-13 RX ADMIN — SODIUM CHLORIDE, POTASSIUM CHLORIDE, SODIUM LACTATE AND CALCIUM CHLORIDE: 600; 310; 30; 20 INJECTION, SOLUTION INTRAVENOUS at 15:14

## 2021-02-13 RX ADMIN — LABETALOL HYDROCHLORIDE 10 MG: 5 INJECTION, SOLUTION INTRAVENOUS at 07:30

## 2021-02-13 RX ADMIN — MEROPENEM 500 MG: 500 INJECTION, POWDER, FOR SOLUTION INTRAVENOUS at 18:25

## 2021-02-13 RX ADMIN — BETHANECHOL CHLORIDE 25 MG: 25 TABLET ORAL at 02:09

## 2021-02-13 RX ADMIN — Medication 2000 UNITS: at 14:19

## 2021-02-13 RX ADMIN — OXYCODONE 5 MG: 5 TABLET ORAL at 11:30

## 2021-02-13 RX ADMIN — DILTIAZEM HYDROCHLORIDE 30 MG: 30 TABLET, FILM COATED ORAL at 19:05

## 2021-02-13 RX ADMIN — ENOXAPARIN SODIUM 40 MG: 40 INJECTION SUBCUTANEOUS at 05:09

## 2021-02-13 RX ADMIN — OXYCODONE 5 MG: 5 TABLET ORAL at 07:31

## 2021-02-13 RX ADMIN — MEROPENEM 500 MG: 500 INJECTION, POWDER, FOR SOLUTION INTRAVENOUS at 05:09

## 2021-02-13 RX ADMIN — DOCUSATE SODIUM 50 MG AND SENNOSIDES 8.6 MG 2 TABLET: 8.6; 5 TABLET, FILM COATED ORAL at 18:25

## 2021-02-13 RX ADMIN — HYDRALAZINE HYDROCHLORIDE 50 MG: 50 TABLET, FILM COATED ORAL at 18:25

## 2021-02-13 RX ADMIN — OXYCODONE 5 MG: 5 TABLET ORAL at 02:09

## 2021-02-13 RX ADMIN — THIAMINE HYDROCHLORIDE 500 MG: 100 INJECTION, SOLUTION INTRAMUSCULAR; INTRAVENOUS at 15:14

## 2021-02-13 RX ADMIN — BETHANECHOL CHLORIDE 25 MG: 25 TABLET ORAL at 22:58

## 2021-02-13 RX ADMIN — SERTRALINE HYDROCHLORIDE 100 MG: 100 TABLET, FILM COATED ORAL at 05:09

## 2021-02-13 RX ADMIN — OXYCODONE HYDROCHLORIDE AND ACETAMINOPHEN 500 MG: 500 TABLET ORAL at 14:19

## 2021-02-13 RX ADMIN — BETHANECHOL CHLORIDE 25 MG: 25 TABLET ORAL at 09:23

## 2021-02-13 RX ADMIN — ACETAMINOPHEN 650 MG: 325 TABLET ORAL at 18:25

## 2021-02-13 RX ADMIN — DOCUSATE SODIUM 50 MG AND SENNOSIDES 8.6 MG 2 TABLET: 8.6; 5 TABLET, FILM COATED ORAL at 05:09

## 2021-02-13 RX ADMIN — LORAZEPAM 0.5 MG: 0.5 TABLET ORAL at 14:19

## 2021-02-13 RX ADMIN — MEROPENEM 500 MG: 500 INJECTION, POWDER, FOR SOLUTION INTRAVENOUS at 11:30

## 2021-02-13 ASSESSMENT — PAIN DESCRIPTION - PAIN TYPE
TYPE: ACUTE PAIN

## 2021-02-13 ASSESSMENT — ENCOUNTER SYMPTOMS
PALPITATIONS: 0
SHORTNESS OF BREATH: 0
COUGH: 0
BACK PAIN: 1
NECK PAIN: 1
FALLS: 1

## 2021-02-13 NOTE — ASSESSMENT & PLAN NOTE
L1 compression fracture noted by CT 12/2020 - neurosurgery consulted at the time - TLSO brace but no intervention planned  Saw outpt neurosurgery 2/12 - who sent patient to hospital for MRI workup  MRI lumbar: severe L1 compression fracture compared to prior 12/2020, mild L5 compression fracture, DJD of T11  PT/OT  Pain control  Neurosurgery consulted, medical management recommended  Continue TLSO brace

## 2021-02-13 NOTE — H&P
[facility] Medicine History & Physical Note    Date of Service  2/13/2021    Primary Care Physician  Robbie Sloan M.D.    Consultants      Code Status  Full Code    Chief Complaint  Chief Complaint   Patient presents with   • Hip Pain       History of Presenting Illness  85 y.o. female who presented 2/12/2021 with hip pain.    Patient is a 85-year-old pleasantly demented  female has complaints of right hip pain.  Patient is a very poor historian because of patient's dementia.  Patient comes from a assisted living facility.  Patient does not recall any recent traumatic falls does not recall any falls whatsoever patient does not recall how she got this hip pain patient is unclear as to the nature of her hip pain.  Again she is a poor historian.  Majority of information obtained via chart review should also be noted that patient was evaluated emergency room    Patient was evaluated emergency room and found to have a completely normal CT scan of the abdomen and pelvis furthermore urinalysis revealed she does have a UA consistent with cystitis it should be noted the patient has extensive history ESBL.  Further interview with patient was made impossible because of patient dementia.    Review of Systems  Review of Systems   Unable to perform ROS: Dementia       Past Medical History   has a past medical history of Anesthesia, Aneurysm ([facility]), Anxiety, Arthritis, Breast cancer ([facility]) (2008), Cancer (HCC) (2008), CATARACT, Dental disorder, Depression, Heart burn, Hypertension, Indigestion, Injury of cervical spine ([facility]) (July 2016), Osteopenia, 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[facility]), Sarcoid, Urinary bladder disorder, and UTI (urinary tract infection).    Surgical History   has a past surgical history that includes vein stripping; mass excision general (7/08); breast biopsy (5/27/08); abdominal hysterectomy total (1997); cataract phaco with iol (8/11/2010); colonoscopy (2007); ercp in or (12/28/2012); samuel by laparoscopy  (1/8/2013); lumpectomy (2008); pr radiation therapy plan simple (2008); cataract phaco with iol (8/27/2014); cervical disk and fusion anterior (7/27/2016); parathyroid exploration (9/7/2018); and forearm orif (Right, 12/27/2020).     Family History  family history includes Bipolar disorder in her sister, sister, and son; Hypertension in her mother; Other in her father, sister, sister, and sister; Stroke in her mother; Suicide Attempts in her son.     Social History   reports that she quit smoking about 51 years ago. She has a 22.50 pack-year smoking history. She has never used smokeless tobacco. She reports that she does not drink alcohol and does not use drugs.    Allergies  Allergies   Allergen Reactions   • Ace Inhibitors Anaphylaxis   • Augmentin Unspecified     Flu like symptoms   • Lisinopril Anaphylaxis   • Oxycodone Vomiting   • Penicillins Hives     Tolerates cephalosporins   • Sulfa Drugs Hives   • Cephalosporins    • Epinephrine Unspecified     shaking   • Erythromycin Rash             Medications  Prior to Admission Medications   Prescriptions Last Dose Informant Patient Reported? Taking?   Cholecalciferol (VITAMIN D3) 50 MCG (2000 UT) Tab 2/12/2021 at 0700 MAR from Other Facility Yes Yes   Sig: Take 2,000 Units by mouth every morning.   D-Mannose 500 MG Cap 2/12/2021 at 0800 MAR from Other Facility Yes Yes   Sig: Take 500 mg by mouth 2 (two) times a day.   DILTIAZem (CARDIZEM) 30 MG Tab 2/11/2021 at 1900 MAR from Other Facility No No   Sig: Take 1 Tab by mouth every day.   Patient taking differently: Take 30 mg by mouth every evening.   LORazepam (ATIVAN) 0.5 MG Tab 2/8/2021 at 1733 MAR from Other Facility Yes Yes   Sig: Take 0.25 mg by mouth every four hours as needed for Anxiety. 1/2 tablet = 0.25 mg.   Multiple Vitamins-Minerals (PRESERVISION AREDS) Cap 2/12/2021 at 0800 MAR from Other Facility Yes Yes   Sig: Take 1 capsule by mouth 2 (two) times a day.   Probiotic Product (PROBIOTIC DAILY) Cap  2/11/2021 at 1200 MAR from Other Facility Yes No   Sig: Take 1 capsule by mouth every day.   RESTASIS 0.05 % ophthalmic emulsion 2/12/2021 at 0700 MAR from Other Facility Yes Yes   Sig: Administer 1 Drop into both eyes 2 times a day.   acetaminophen (TYLENOL) 325 MG Tab 2/12/2021 at 0700 MAR from Other Facility Yes No   Sig: Take 650 mg by mouth see administration instructions. 2 tablets = 650 mg. Given 3 times per day at 0700, 1200, and 1700, AND every 4 hours AS NEEDED for pain. Not to exceed 3 gm in 24 hours.   ascorbic acid (ASCORBIC ACID) 500 MG Tab 2/12/2021 at 0700 MAR from Other Facility No No   Sig: TAKE 1 TABLET BY MOUTH THREE TIMES A DAY   Patient taking differently: Take 500 mg by mouth 3 times a day.   bethanechol (URECHOLINE) 25 MG Tab NOT TAKING at NOT TAKING MAR from Other Facility No No   Sig: Take 1 Tab by mouth every 24 hours as needed.   Patient not taking: Reported on 2/12/2021   bethanechol (URECHOLINE) 50 MG tablet 2/12/2021 at 0800 MAR from Other Facility Yes No   Sig: Take 25 mg by mouth 2 (two) times a day. 1/2 tablet = 25 mg.   cephALEXin (KEFLEX) 250 MG Cap 2/11/2021 at 1800 MAR from Other Facility Yes Yes   Sig: Take 250 mg by mouth every evening. Started 2/5/2021; duration of course not specified.   Indications: UTI   lidocaine (LIDODERM) 5 % Patch 2/12/2021 at 0700 ON MAR from Other Facility Yes No   Sig: Place 1 Patch on the skin every day. APPLY at 0700; REMOVE at 1900.   meloxicam (MOBIC) 7.5 MG Tab 2/9/2021 at 1800 MAR from Other Facility Yes Yes   Sig: Take 7.5 mg by mouth every evening as needed for Mild Pain. DISCONTINUED on 2/10/2021.   multivitamin (THERAGRAN) Tab 2/12/2021 at 0800 MAR from Other Facility Yes Yes   Sig: Take 1 tablet by mouth every morning.   polyethylene glycol 3350 (MIRALAX) 17 GM/SCOOP Powder 2/12/2021 at 0800 MAR from Other Facility Yes Yes   Sig: Take 17 g by mouth every Monday, Wednesday, and Friday. Hold for loose stool.   senna-docusate (PERICOLACE OR  SENOKOT S) 8.6-50 MG Tab 2/8/2021 at 1447 MAR from Other Facility No No   Sig: Take 2 Tabs by mouth 2 Times a Day.   Patient taking differently: Take 1 tablet by mouth 2 times a day as needed for Constipation.   sertraline (ZOLOFT) 100 MG Tab 2/12/2021 at 0700 MAR from Other Facility No No   Sig: TAKE 1 TABLET BY MOUTH EVERY DAY   Patient taking differently: Take 100 mg by mouth every morning.   traMADol (ULTRAM) 50 MG Tab 2/12/2021 at 0800 MAR from Other Facility Yes Yes   Sig: Take 25 mg by mouth 2 (two) times a day. 1/2 tablet = 25 mg. Started 2/11/2021.   Indications: Pain      Facility-Administered Medications: None       Physical Exam  Temp:  [36.3 °C (97.4 °F)-36.7 °C (98.1 °F)] 36.3 °C (97.4 °F)  Pulse:  [86-92] 89  Resp:  [15-16] 16  BP: (134-163)/(72-94) 144/72  SpO2:  [90 %-95 %] 90 %    Physical Exam  Vitals reviewed.   Constitutional:       General: She is not in acute distress.     Appearance: She is normal weight. She is not ill-appearing.   HENT:      Head: Normocephalic and atraumatic.      Right Ear: External ear normal.      Left Ear: External ear normal.      Nose: No congestion or rhinorrhea.   Eyes:      General:         Right eye: No discharge.         Left eye: No discharge.      Extraocular Movements: Extraocular movements intact.      Conjunctiva/sclera: Conjunctivae normal.      Pupils: Pupils are equal, round, and reactive to light.   Cardiovascular:      Rate and Rhythm: Normal rate and regular rhythm.   Pulmonary:      Effort: Pulmonary effort is normal. No respiratory distress.      Breath sounds: No stridor.   Abdominal:      General: Abdomen is flat. There is no distension.      Palpations: There is no mass.   Musculoskeletal:         General: Tenderness present.      Cervical back: Normal range of motion. No rigidity or tenderness.      Comments: Right hip   Skin:     General: Skin is warm.      Coloration: Skin is not jaundiced or pale.   Neurological:      Mental Status: She is  disoriented.   Psychiatric:      Comments: Patient demented         Laboratory:  Recent Labs     02/12/21  1546   WBC 11.7*   RBC 4.83   HEMOGLOBIN 14.7   HEMATOCRIT 44.8   MCV 92.8   MCH 30.4   MCHC 32.8*   RDW 47.6   PLATELETCT 327   MPV 9.0     Recent Labs     02/12/21  1546   SODIUM 136   POTASSIUM 4.1   CHLORIDE 102   CO2 23   GLUCOSE 101*   BUN 15   CREATININE 0.36*   CALCIUM 10.0     Recent Labs     02/12/21  1546   ALTSGPT 26   ASTSGOT 41   ALKPHOSPHAT 121*   TBILIRUBIN 0.5   LIPASE 21   GLUCOSE 101*         No results for input(s): NTPROBNP in the last 72 hours.      No results for input(s): TROPONINT in the last 72 hours.    Imaging:  CT-PELVIS W/O   Final Result      1.  No acute fracture or dislocation. Cortical irregularity in the left suprapubic ramus noted on the recent radiograph is related to a nutrient vessel.   2.  Ill-defined sclerosis in the sacrum, related to chronic sacral insufficiency fracture.   3.  Colonic diverticulosis.      DX-HIP-COMPLETE - UNILATERAL 2+ LEFT   Final Result      1.  Subtle cortical irregularity along the superior aspect of the left superior pubic ramus may represent a subacute to chronic nondisplaced fracture.   2.  No proximal femoral fracture.            Assessment/Plan:  I anticipate this patient is appropriate for observation status at this time.    Dementia (HCC)- (present on admission)  Assessment & Plan  Patient assisted living facility patient has advanced dementia poor historian    History of recurrent UTIs- (present on admission)  Assessment & Plan  Has long history of ESBL recommend ID consultation patient empirically placed on meropenem at this point time patient has urinalysis consistent with cystitis.    Essential hypertension- (present on admission)  Assessment & Plan  Continue patient's home medications labetalol as needed as needed.    Hip pain  Assessment & Plan  Unclear etiology patient had negative CT scan recommend physical therapy symptom  management  Anticoagulation sequential compression devices, Lovenox 40 mg subcu daily.

## 2021-02-13 NOTE — ED NOTES
Med rec in process; contacted [facility] antwan Guerra () to request MAR; currently awaiting fax.

## 2021-02-13 NOTE — ASSESSMENT & PLAN NOTE
Has long history of ESBL recommend ID consultation patient empirically placed on meropenem at this point time patient has urinalysis consistent with cystitis.

## 2021-02-13 NOTE — ASSESSMENT & PLAN NOTE
Adequately treated  UA pyuria  UCx 2/12: ESBL E.coli  Abx: s/p fosphomycin 3g x1 2/14 and Meropenem (2/12-2/14)    ID consulted - adequately treated  Resolved

## 2021-02-13 NOTE — ASSESSMENT & PLAN NOTE
?radiculopathy  No hip fracture seen by Xray  L1 fracture by CT 12/2020 and severe burst fracture seen again 2/15/21

## 2021-02-13 NOTE — ASSESSMENT & PLAN NOTE
IMPROVED  Toxic/metabolic etiology, back pain exacerbating  Does have underlying dementia  ESBL E.coli UTI -- adequately treated with abx per ID  HIV/syphilis -- negative  Negative acute CT head 12/27/2020  S/p trial of IV thiamine  Borderline low B12/folate -- on replacement therapy  Minimize sedatives

## 2021-02-13 NOTE — CARE PLAN
Problem: Venous Thromboembolism (VTW)/Deep Vein Thrombosis (DVT) Prevention:  Goal: Patient will participate in Venous Thrombosis (VTE)/Deep Vein Thrombosis (DVT)Prevention Measures  Outcome: PROGRESSING AS EXPECTED  Flowsheets (Taken 2/13/2021 0800)  Risk Assessment Score: 2 (Pended)  VTE RISK: Moderate (Pended)  Mechanical Prophylaxis: SCDs, Sequential Compression Device  SCDs, Sequential Compression Device: On  Pharmacologic Prophylaxis Used: LMWH: Enoxaparin(Lovenox) (Pended)  Note: The patient has SCD's on and running and Lovenox is in use. No findings of VTE upon assessment.      Problem: Skin Integrity  Goal: Risk for impaired skin integrity will decrease  Outcome: PROGRESSING AS EXPECTED  Note: The patient turns herself appropriately in bed and voices any positioning discomfort to staff.

## 2021-02-13 NOTE — CARE PLAN
Problem: Communication  Goal: The ability to communicate needs accurately and effectively will improve  2/12/2021 2231 by Bonnie Perez R.N.  Outcome: PROGRESSING AS EXPECTED  Note: Patient able to communicate needs. All questions answered at this time.   2/12/2021 2230 by Bonnie Perez R.N.  Outcome: PROGRESSING AS EXPECTED  Note: Patient able to communicate needs. All questions answered at this time.      Problem: Safety  Goal: Will remain free from injury  2/12/2021 2231 by Bonnie Perez R.N.  Outcome: PROGRESSING AS EXPECTED  Note: Non-slip socks are on, bed is locked and in lowest position, personal belongings are within reach, room is free of clutter and spills, call light is in place. Patient educated on how to use the call light and how to call for assistance. Patient verbalized understanding.    2/12/2021 2230 by Bonnie Perez R.N.  Outcome: PROGRESSING AS EXPECTED  Note: Non-slip socks are on, bed is locked and in lowest position, personal belongings are within reach, room is free of clutter and spills, call light is in place. Patient educated on how to use the call light and how to call for assistance. Patient verbalized understanding.    Goal: Will remain free from falls  Outcome: PROGRESSING AS EXPECTED  Note: Standard precautions in place. Cleansed hands before and after patient care to prevent the spread of germs.       Problem: Infection  Goal: Will remain free from infection  Outcome: PROGRESSING AS EXPECTED  Note: Standard precautions in place. Cleansed hands before and after patient care to prevent the spread of germs.

## 2021-02-13 NOTE — ED NOTES
Pt's daughter Elda updated on POC per pt request, per family pt has been having difficulty with her L1 pain after a fall in December and her neurologist is requesting a MRI, ERP notified.

## 2021-02-13 NOTE — PROGRESS NOTES
2 RN Skin Check    2 RN skin check completed with Kadie STRAUSS.   Devices in place: SCDs and Nasal Cannula.  Skin assessed under devices: yes.  Confirmed pressure ulcers found on: n/a.  New potential pressure ulcers noted on n/a. Wound consult placed N/A.  The following interventions in place Pillows and Waffle bed overlay.  Skin: no skin issues noted.

## 2021-02-13 NOTE — PROGRESS NOTES
Hospital Medicine Daily Progress Note    Date of Service  2/13/2021    Chief Complaint  85 y.o. female admitted 2/12/2021 with AMS, failure to thrive, back pain    Hospital Course  85 y.o. demented female from [facility] who presented 2/12/2021 with AMS, failure to thrive, back pain. History obtained from daughter present at bedside as pt is poor historian. Per daughter, she has had worsening back pain, recent L1 fracture noted in 12/2020. They went to see neurosurgery Friday, 2/12 - MD at [facility] sent her to ER for MRI and further workup. She was found to have pyuria, due to prior ESBL - has been started on Meropenem by admitting provider.    Interval Problem Update  2/13: continue Meropenem, ID consulted for assistance with abx. Notable L1 compression fracture, 20% spinal stenosis. As patient is referred from outpt neurosurgery, MRI lumbar ordered.    Consultants/Specialty  ID    Code Status  Full Code    Disposition  TBD    Review of Systems  Review of Systems   Constitutional: Positive for malaise/fatigue.   Respiratory: Negative for cough and shortness of breath.    Cardiovascular: Negative for chest pain and palpitations.   Musculoskeletal: Positive for back pain, falls, joint pain and neck pain.   All other systems reviewed and are negative.       Physical Exam  Temp:  [36.3 °C (97.4 °F)-37.4 °C (99.3 °F)] 37.4 °C (99.3 °F)  Pulse:  [86-94] 94  Resp:  [15-16] 16  BP: (134-188)/(72-94) 160/79  SpO2:  [90 %-95 %] 90 %    Physical Exam  Vitals and nursing note reviewed.   Constitutional:       Appearance: Normal appearance.   HENT:      Head: Normocephalic and atraumatic.      Nose: Nose normal.      Mouth/Throat:      Mouth: Mucous membranes are dry.      Pharynx: Oropharynx is clear.   Eyes:      General: No scleral icterus.     Extraocular Movements: Extraocular movements intact.   Cardiovascular:      Rate and Rhythm: Normal rate and regular rhythm.      Pulses: Normal pulses.   Pulmonary:      Effort: No  respiratory distress.      Breath sounds: No wheezing.   Abdominal:      Palpations: Abdomen is soft.      Tenderness: There is no abdominal tenderness. There is no guarding or rebound.   Musculoskeletal:         General: Signs of injury present.      Cervical back: Neck supple. No tenderness.      Comments: Mildly tender in lumbar region  Trace LE edema   Skin:     General: Skin is warm and dry.      Capillary Refill: Capillary refill takes 2 to 3 seconds.   Neurological:      Mental Status: She is alert.      Comments: AAO to self and place  Follows some command  Minimally verbal   Psychiatric:      Comments: Appears anxious         Fluids  No intake or output data in the 24 hours ending 02/13/21 1316    Laboratory  Recent Labs     02/12/21  1546 02/13/21  0125   WBC 11.7* 8.7   RBC 4.83 4.60   HEMOGLOBIN 14.7 13.9   HEMATOCRIT 44.8 42.9   MCV 92.8 93.3   MCH 30.4 30.2   MCHC 32.8* 32.4*   RDW 47.6 48.5   PLATELETCT 327 319   MPV 9.0 9.8     Recent Labs     02/12/21  1546 02/13/21  0125   SODIUM 136 136   POTASSIUM 4.1 4.1   CHLORIDE 102 100   CO2 23 25   GLUCOSE 101* 87   BUN 15 15   CREATININE 0.36* 0.43*   CALCIUM 10.0 10.1                   Imaging  CT-PELVIS W/O   Final Result      1.  No acute fracture or dislocation. Cortical irregularity in the left suprapubic ramus noted on the recent radiograph is related to a nutrient vessel.   2.  Ill-defined sclerosis in the sacrum, related to chronic sacral insufficiency fracture.   3.  Colonic diverticulosis.      DX-HIP-COMPLETE - UNILATERAL 2+ LEFT   Final Result      1.  Subtle cortical irregularity along the superior aspect of the left superior pubic ramus may represent a subacute to chronic nondisplaced fracture.   2.  No proximal femoral fracture.      MR-LUMBAR SPINE-W/O    (Results Pending)        Assessment/Plan  * Acute encephalopathy  Assessment & Plan  Toxic/metabolic etiology, back pain exacerbating  Does have underlying dementia  UA pyuria - on  abx  F/u B12/folate/HIV/syphilis  Negative acute CT head 12/27/2020  Trial of IV thiamine  Minimize sedatives    Acute low back pain  Assessment & Plan  L1 compression fracture noted by CT 12/2020 - neurosurgery consulted at the time - TLSO brace but no intervention planned  Saw outpt neurosurgery 2/12 - who sent patient to hospital for MRI workup  MRI lumbar ordered - consult neurosurgery when result available  PT/OT  Pain control    Dementia (HCC)- (present on admission)  Assessment & Plan  Patient assisted living facility patient has advanced dementia poor historian    Acute cystitis without hematuria  Assessment & Plan  Acute encephalopathy  UA pyuria  H/o ESBL UTI 8/2020    Abx: Meropenem (2/12-now)  F/u UCx, BCx  ID consulted    Essential hypertension- (present on admission)  Assessment & Plan  Cardiazem  PRN labetalol    Failure to thrive in adult  Assessment & Plan  Palliative care consulted for advance care planning    Frequent falls  Assessment & Plan  Fall precautions  PT/OT    Hip pain  Assessment & Plan  ?radiculopathy  No hip fracture seen by Xray  L1 fracture by CT 12/2020  F/u MRI lumbar    Physical deconditioning- (present on admission)  Assessment & Plan  PT/OT  Frequent fall - recent wrist fracture and L1 compression fracture    Urinary incontinence- (present on admission)  Assessment & Plan  Bethanechol  PRN bladder scan and straight cath    Vitamin D deficiency- (present on admission)  Assessment & Plan  supplement       VTE prophylaxis: Lovenox

## 2021-02-13 NOTE — ED NOTES
Med rec completed per MAR faxed from [facility].  Allergies reviewed per MAR.  Pt is currently on cephalexin 250 mg one capsule per day started 2/5/2021 for a UTI; duration of course not specified.

## 2021-02-13 NOTE — ASSESSMENT & PLAN NOTE
Frequent hospitalization for recurrent UTI, back pain  Discussed with daughters (including med MORIN) on 2/14 - DNR confirmed, pursue MRI lumbar, not ready to discuss end of life goal of care at this time    Palliative care consulted for advance care planning, appreciate assistance.

## 2021-02-14 ENCOUNTER — APPOINTMENT (OUTPATIENT)
Dept: RADIOLOGY | Facility: MEDICAL CENTER | Age: 86
DRG: 543 | End: 2021-02-14
Attending: STUDENT IN AN ORGANIZED HEALTH CARE EDUCATION/TRAINING PROGRAM
Payer: MEDICARE

## 2021-02-14 LAB
ALBUMIN SERPL BCP-MCNC: 3.1 G/DL (ref 3.2–4.9)
BASOPHILS # BLD AUTO: 0.4 % (ref 0–1.8)
BASOPHILS # BLD: 0.03 K/UL (ref 0–0.12)
BUN SERPL-MCNC: 12 MG/DL (ref 8–22)
CALCIUM SERPL-MCNC: 9.3 MG/DL (ref 8.5–10.5)
CHLORIDE SERPL-SCNC: 100 MMOL/L (ref 96–112)
CO2 SERPL-SCNC: 24 MMOL/L (ref 20–33)
CREAT SERPL-MCNC: 0.26 MG/DL (ref 0.5–1.4)
CRP SERPL HS-MCNC: 7.78 MG/DL (ref 0–0.75)
EOSINOPHIL # BLD AUTO: 0.09 K/UL (ref 0–0.51)
EOSINOPHIL NFR BLD: 1.1 % (ref 0–6.9)
ERYTHROCYTE [DISTWIDTH] IN BLOOD BY AUTOMATED COUNT: 47.6 FL (ref 35.9–50)
ERYTHROCYTE [SEDIMENTATION RATE] IN BLOOD BY WESTERGREN METHOD: 24 MM/HOUR (ref 0–30)
FOLATE SERPL-MCNC: 18.1 NG/ML
GLUCOSE SERPL-MCNC: 91 MG/DL (ref 65–99)
HCT VFR BLD AUTO: 41 % (ref 37–47)
HGB BLD-MCNC: 13.1 G/DL (ref 12–16)
HIV 1+2 AB+HIV1 P24 AG SERPL QL IA: NORMAL
IMM GRANULOCYTES # BLD AUTO: 0.06 K/UL (ref 0–0.11)
IMM GRANULOCYTES NFR BLD AUTO: 0.7 % (ref 0–0.9)
LDH SERPL L TO P-CCNC: 207 U/L (ref 107–266)
LYMPHOCYTES # BLD AUTO: 1.03 K/UL (ref 1–4.8)
LYMPHOCYTES NFR BLD: 12.7 % (ref 22–41)
MAGNESIUM SERPL-MCNC: 2 MG/DL (ref 1.5–2.5)
MCH RBC QN AUTO: 29.9 PG (ref 27–33)
MCHC RBC AUTO-ENTMCNC: 32 G/DL (ref 33.6–35)
MCV RBC AUTO: 93.6 FL (ref 81.4–97.8)
MONOCYTES # BLD AUTO: 0.49 K/UL (ref 0–0.85)
MONOCYTES NFR BLD AUTO: 6.1 % (ref 0–13.4)
NEUTROPHILS # BLD AUTO: 6.38 K/UL (ref 2–7.15)
NEUTROPHILS NFR BLD: 79 % (ref 44–72)
NRBC # BLD AUTO: 0 K/UL
NRBC BLD-RTO: 0 /100 WBC
PHOSPHATE SERPL-MCNC: 2.3 MG/DL (ref 2.5–4.5)
PLATELET # BLD AUTO: 285 K/UL (ref 164–446)
PMV BLD AUTO: 9.5 FL (ref 9–12.9)
POTASSIUM SERPL-SCNC: 3.5 MMOL/L (ref 3.6–5.5)
RBC # BLD AUTO: 4.38 M/UL (ref 4.2–5.4)
SODIUM SERPL-SCNC: 133 MMOL/L (ref 135–145)
TREPONEMA PALLIDUM IGG+IGM AB [PRESENCE] IN SERUM OR PLASMA BY IMMUNOASSAY: NORMAL
VIT B12 SERPL-MCNC: 653 PG/ML (ref 211–911)
WBC # BLD AUTO: 8.1 K/UL (ref 4.8–10.8)

## 2021-02-14 PROCEDURE — 700105 HCHG RX REV CODE 258: Performed by: STUDENT IN AN ORGANIZED HEALTH CARE EDUCATION/TRAINING PROGRAM

## 2021-02-14 PROCEDURE — 86780 TREPONEMA PALLIDUM: CPT

## 2021-02-14 PROCEDURE — 83615 LACTATE (LD) (LDH) ENZYME: CPT

## 2021-02-14 PROCEDURE — 700102 HCHG RX REV CODE 250 W/ 637 OVERRIDE(OP): Performed by: STUDENT IN AN ORGANIZED HEALTH CARE EDUCATION/TRAINING PROGRAM

## 2021-02-14 PROCEDURE — A9270 NON-COVERED ITEM OR SERVICE: HCPCS | Performed by: STUDENT IN AN ORGANIZED HEALTH CARE EDUCATION/TRAINING PROGRAM

## 2021-02-14 PROCEDURE — 700111 HCHG RX REV CODE 636 W/ 250 OVERRIDE (IP): Performed by: STUDENT IN AN ORGANIZED HEALTH CARE EDUCATION/TRAINING PROGRAM

## 2021-02-14 PROCEDURE — 99225 PR SUBSEQUENT OBSERVATION CARE,LEVEL II: CPT | Performed by: STUDENT IN AN ORGANIZED HEALTH CARE EDUCATION/TRAINING PROGRAM

## 2021-02-14 PROCEDURE — 96372 THER/PROPH/DIAG INJ SC/IM: CPT

## 2021-02-14 PROCEDURE — 83735 ASSAY OF MAGNESIUM: CPT

## 2021-02-14 PROCEDURE — 700101 HCHG RX REV CODE 250: Performed by: INTERNAL MEDICINE

## 2021-02-14 PROCEDURE — 36415 COLL VENOUS BLD VENIPUNCTURE: CPT

## 2021-02-14 PROCEDURE — 85025 COMPLETE CBC W/AUTO DIFF WBC: CPT

## 2021-02-14 PROCEDURE — 82746 ASSAY OF FOLIC ACID SERUM: CPT

## 2021-02-14 PROCEDURE — 86140 C-REACTIVE PROTEIN: CPT

## 2021-02-14 PROCEDURE — G0378 HOSPITAL OBSERVATION PER HR: HCPCS

## 2021-02-14 PROCEDURE — 80069 RENAL FUNCTION PANEL: CPT

## 2021-02-14 PROCEDURE — 82607 VITAMIN B-12: CPT

## 2021-02-14 PROCEDURE — 96375 TX/PRO/DX INJ NEW DRUG ADDON: CPT

## 2021-02-14 PROCEDURE — 72148 MRI LUMBAR SPINE W/O DYE: CPT | Mod: ME

## 2021-02-14 PROCEDURE — 85652 RBC SED RATE AUTOMATED: CPT

## 2021-02-14 PROCEDURE — 87389 HIV-1 AG W/HIV-1&-2 AB AG IA: CPT

## 2021-02-14 PROCEDURE — 96366 THER/PROPH/DIAG IV INF ADDON: CPT

## 2021-02-14 RX ORDER — GRANULES FOR ORAL 3 G/1
3 POWDER ORAL ONCE
Status: COMPLETED | OUTPATIENT
Start: 2021-02-14 | End: 2021-02-14

## 2021-02-14 RX ORDER — POTASSIUM CHLORIDE 20 MEQ/1
60 TABLET, EXTENDED RELEASE ORAL ONCE
Status: COMPLETED | OUTPATIENT
Start: 2021-02-14 | End: 2021-02-14

## 2021-02-14 RX ORDER — LORAZEPAM 2 MG/ML
0.25 INJECTION INTRAMUSCULAR
Status: COMPLETED | OUTPATIENT
Start: 2021-02-14 | End: 2021-02-14

## 2021-02-14 RX ORDER — MORPHINE SULFATE 4 MG/ML
2 INJECTION, SOLUTION INTRAMUSCULAR; INTRAVENOUS
Status: COMPLETED | OUTPATIENT
Start: 2021-02-14 | End: 2021-02-14

## 2021-02-14 RX ORDER — FOLIC ACID 1 MG/1
1 TABLET ORAL DAILY
Status: DISCONTINUED | OUTPATIENT
Start: 2021-02-14 | End: 2021-02-22 | Stop reason: HOSPADM

## 2021-02-14 RX ORDER — CHOLECALCIFEROL (VITAMIN D3) 125 MCG
1000 CAPSULE ORAL DAILY
Status: DISCONTINUED | OUTPATIENT
Start: 2021-02-14 | End: 2021-02-22 | Stop reason: HOSPADM

## 2021-02-14 RX ADMIN — FOLIC ACID 1 MG: 1 TABLET ORAL at 09:26

## 2021-02-14 RX ADMIN — MEROPENEM 500 MG: 500 INJECTION, POWDER, FOR SOLUTION INTRAVENOUS at 06:39

## 2021-02-14 RX ADMIN — SERTRALINE HYDROCHLORIDE 100 MG: 100 TABLET, FILM COATED ORAL at 06:40

## 2021-02-14 RX ADMIN — MORPHINE SULFATE 2 MG: 4 INJECTION INTRAVENOUS at 16:39

## 2021-02-14 RX ADMIN — Medication 2000 UNITS: at 06:40

## 2021-02-14 RX ADMIN — THIAMINE HYDROCHLORIDE 500 MG: 100 INJECTION, SOLUTION INTRAMUSCULAR; INTRAVENOUS at 09:27

## 2021-02-14 RX ADMIN — SODIUM CHLORIDE, POTASSIUM CHLORIDE, SODIUM LACTATE AND CALCIUM CHLORIDE: 600; 310; 30; 20 INJECTION, SOLUTION INTRAVENOUS at 01:04

## 2021-02-14 RX ADMIN — BETHANECHOL CHLORIDE 25 MG: 25 TABLET ORAL at 21:50

## 2021-02-14 RX ADMIN — DILTIAZEM HYDROCHLORIDE 30 MG: 30 TABLET, FILM COATED ORAL at 17:39

## 2021-02-14 RX ADMIN — ENOXAPARIN SODIUM 40 MG: 40 INJECTION SUBCUTANEOUS at 06:40

## 2021-02-14 RX ADMIN — POTASSIUM CHLORIDE 60 MEQ: 1500 TABLET, EXTENDED RELEASE ORAL at 09:26

## 2021-02-14 RX ADMIN — LORAZEPAM 0.26 MG: 2 INJECTION INTRAMUSCULAR; INTRAVENOUS at 16:26

## 2021-02-14 RX ADMIN — GRANULES FOR ORAL SOLUTION 3 G: 3 POWDER ORAL at 10:40

## 2021-02-14 RX ADMIN — BETHANECHOL CHLORIDE 25 MG: 25 TABLET ORAL at 09:26

## 2021-02-14 RX ADMIN — OXYCODONE 5 MG: 5 TABLET ORAL at 10:40

## 2021-02-14 RX ADMIN — OXYCODONE 5 MG: 5 TABLET ORAL at 17:39

## 2021-02-14 RX ADMIN — MEROPENEM 500 MG: 500 INJECTION, POWDER, FOR SOLUTION INTRAVENOUS at 00:12

## 2021-02-14 RX ADMIN — DOCUSATE SODIUM 50 MG AND SENNOSIDES 8.6 MG 2 TABLET: 8.6; 5 TABLET, FILM COATED ORAL at 06:40

## 2021-02-14 RX ADMIN — CYANOCOBALAMIN TAB 500 MCG 1000 MCG: 500 TAB at 09:26

## 2021-02-14 RX ADMIN — OXYCODONE HYDROCHLORIDE AND ACETAMINOPHEN 500 MG: 500 TABLET ORAL at 06:40

## 2021-02-14 ASSESSMENT — PAIN SCALES - PAIN ASSESSMENT IN ADVANCED DEMENTIA (PAINAD)
FACIALEXPRESSION: FACIAL GRIMACING
NEGVOCALIZATION: OCCASIONAL MOAN/GROAN, LOW SPEECH, NEGATIVE/DISAPPROVING QUALITY
FACIALEXPRESSION: FACIAL GRIMACING
BODYLANGUAGE: TENSE, DISTRESSED PACING, FIDGETING
BODYLANGUAGE: RELAXED
TOTALSCORE: 6
BODYLANGUAGE: TENSE, DISTRESSED PACING, FIDGETING
CONSOLABILITY: DISTRACTED OR REASSURED BY VOICE/TOUCH
BREATHING: OCCASIONAL LABORED BREATHING, SHORT PERIOD OF HYPERVENTILATION
BODYLANGUAGE: TENSE, DISTRESSED PACING, FIDGETING
TOTALSCORE: 5
BREATHING: NORMAL
CONSOLABILITY: UNABLE TO CONSOLE, DISTRACT OR REASSURE
FACIALEXPRESSION: SMILING OR INEXPRESSIVE
BREATHING: OCCASIONAL LABORED BREATHING, SHORT PERIOD OF HYPERVENTILATION
BREATHING: NORMAL
FACIALEXPRESSION: SAD, FRIGHTENED, FROWN
TOTALSCORE: 0
NEGVOCALIZATION: OCCASIONAL MOAN/GROAN, LOW SPEECH, NEGATIVE/DISAPPROVING QUALITY
CONSOLABILITY: NO NEED TO CONSOLE
TOTALSCORE: 6
NEGVOCALIZATION: OCCASIONAL MOAN/GROAN, LOW SPEECH, NEGATIVE/DISAPPROVING QUALITY
CONSOLABILITY: DISTRACTED OR REASSURED BY VOICE/TOUCH

## 2021-02-14 ASSESSMENT — ENCOUNTER SYMPTOMS
COUGH: 0
SHORTNESS OF BREATH: 0
FALLS: 1
NECK PAIN: 1
BACK PAIN: 1
PALPITATIONS: 0

## 2021-02-14 NOTE — PROGRESS NOTES
Hospital Medicine Daily Progress Note    Date of Service  2/14/2021    Chief Complaint  85 y.o. female admitted 2/12/2021 with AMS, failure to thrive, back pain    Hospital Course  85 y.o. demented female from [facility] who presented 2/12/2021 with AMS, failure to thrive, back pain. History obtained from daughter present at bedside as pt is poor historian. Per daughter, she has had worsening back pain, recent L1 fracture noted in 12/2020. They went to see neurosurgery Friday, 2/12 - MD at [facility] sent her to ER for MRI and further workup. She was found to have pyuria, due to prior ESBL - has been started on Meropenem by admitting provider.    Interval Problem Update  2/13: continue Meropenem, ID consulted for assistance with abx. Notable L1 compression fracture, 20% spinal stenosis. As patient is referred from outpt neurosurgery, MRI lumbar ordered.    2/14: MRI not completed yet. Ordered as needed morphine / Ativan for MRI. Patient was agitated overnight, unpleasant to nursing staff. Had long discussion with daughters (Sarah - present at bedside, Elda MORIN via phone) for goal of care - DNR status confirmed, pursue with MRI for now, did not want to discuss further end of life care at this time.    Consultants/Specialty  ID  Palliative care    Code Status  Full Code    Disposition  TBD    Review of Systems  Review of Systems   Constitutional: Positive for malaise/fatigue.   Respiratory: Negative for cough and shortness of breath.    Cardiovascular: Negative for chest pain and palpitations.   Musculoskeletal: Positive for back pain, falls, joint pain and neck pain.   All other systems reviewed and are negative.       Physical Exam  Temp:  [36.6 °C (97.8 °F)-37.9 °C (100.3 °F)] 36.7 °C (98.1 °F)  Pulse:  [] 76  Resp:  [16-20] 17  BP: (129-183)/(66-92) 141/79  SpO2:  [92 %-100 %] 92 %    Physical Exam  Vitals and nursing note reviewed.   Constitutional:       Appearance: Normal appearance.   HENT:      Head:  Normocephalic and atraumatic.      Nose: Nose normal.      Mouth/Throat:      Mouth: Mucous membranes are dry.      Pharynx: Oropharynx is clear.   Eyes:      General: No scleral icterus.     Extraocular Movements: Extraocular movements intact.   Cardiovascular:      Rate and Rhythm: Normal rate and regular rhythm.      Pulses: Normal pulses.   Pulmonary:      Effort: No respiratory distress.      Breath sounds: No wheezing.   Abdominal:      Palpations: Abdomen is soft.      Tenderness: There is no abdominal tenderness. There is no guarding or rebound.   Musculoskeletal:         General: Signs of injury present.      Cervical back: Neck supple. No tenderness.      Comments: Mildly tender in lumbar region  Trace LE edema   Skin:     General: Skin is warm and dry.      Capillary Refill: Capillary refill takes 2 to 3 seconds.   Neurological:      Mental Status: She is alert.      Comments: AAO to self and place  Follows some command  Minimally verbal   Psychiatric:      Comments: Appears anxious         Fluids    Intake/Output Summary (Last 24 hours) at 2/14/2021 1223  Last data filed at 2/14/2021 1000  Gross per 24 hour   Intake 300 ml   Output 500 ml   Net -200 ml       Laboratory  Recent Labs     02/12/21  1546 02/13/21  0125 02/14/21  0626   WBC 11.7* 8.7 8.1   RBC 4.83 4.60 4.38   HEMOGLOBIN 14.7 13.9 13.1   HEMATOCRIT 44.8 42.9 41.0   MCV 92.8 93.3 93.6   MCH 30.4 30.2 29.9   MCHC 32.8* 32.4* 32.0*   RDW 47.6 48.5 47.6   PLATELETCT 327 319 285   MPV 9.0 9.8 9.5     Recent Labs     02/12/21  1546 02/13/21  0125 02/14/21  0626   SODIUM 136 136 133*   POTASSIUM 4.1 4.1 3.5*   CHLORIDE 102 100 100   CO2 23 25 24   GLUCOSE 101* 87 91   BUN 15 15 12   CREATININE 0.36* 0.43* 0.26*   CALCIUM 10.0 10.1 9.3                   Imaging  CT-PELVIS W/O   Final Result      1.  No acute fracture or dislocation. Cortical irregularity in the left suprapubic ramus noted on the recent radiograph is related to a nutrient vessel.   2.   Ill-defined sclerosis in the sacrum, related to chronic sacral insufficiency fracture.   3.  Colonic diverticulosis.      DX-HIP-COMPLETE - UNILATERAL 2+ LEFT   Final Result      1.  Subtle cortical irregularity along the superior aspect of the left superior pubic ramus may represent a subacute to chronic nondisplaced fracture.   2.  No proximal femoral fracture.      MR-LUMBAR SPINE-W/O    (Results Pending)        Assessment/Plan  * Acute encephalopathy  Assessment & Plan  Toxic/metabolic etiology, back pain exacerbating  Does have underlying dementia  UA pyuria - on abx  F/u B12/folate/HIV/syphilis  Negative acute CT head 12/27/2020  Trial of IV thiamine  Minimize sedatives    Acute low back pain  Assessment & Plan  L1 compression fracture noted by CT 12/2020 - neurosurgery consulted at the time - TLSO brace but no intervention planned  Saw outpt neurosurgery 2/12 - who sent patient to hospital for MRI workup  MRI lumbar ordered - consult neurosurgery when result available  PT/OT  Pain control    Unclear may or my not benefit from neurosurgical intervention or Kyphoplasty/vertebroplasty - await MRI    Dementia (HCC)- (present on admission)  Assessment & Plan  Patient assisted living facility patient has advanced dementia poor historian    Acute cystitis without hematuria  Assessment & Plan  Adequately treated  UA pyuria  H/o ESBL prior admission    Abx: s/p fosphomycin 3g x1 2/14 and Meropenem (2/12-2/14)  F/u UCx, BCx  ID consulted - adequately treated    Essential hypertension- (present on admission)  Assessment & Plan  Cardiazem  PRN labetalol    Failure to thrive in adult  Assessment & Plan  Palliative care consulted for advance care planning    Frequent falls  Assessment & Plan  Fall precautions  PT/OT    Hip pain  Assessment & Plan  ?radiculopathy  No hip fracture seen by Xray  L1 fracture by CT 12/2020  F/u MRI lumbar    Physical deconditioning- (present on admission)  Assessment & Plan  PT/OT  Frequent  fall - recent wrist fracture and L1 compression fracture    Urinary incontinence- (present on admission)  Assessment & Plan  Bethanechol  PRN bladder scan and straight cath    Vitamin D deficiency- (present on admission)  Assessment & Plan  supplement       VTE prophylaxis: Lovenox

## 2021-02-14 NOTE — CARE PLAN
Problem: Safety  Goal: Will remain free from falls  Outcome: PROGRESSING AS EXPECTED     Problem: Infection  Goal: Will remain free from infection  Outcome: PROGRESSING AS EXPECTED     Problem: Venous Thromboembolism (VTW)/Deep Vein Thrombosis (DVT) Prevention:  Goal: Patient will participate in Venous Thrombosis (VTE)/Deep Vein Thrombosis (DVT)Prevention Measures  Outcome: PROGRESSING AS EXPECTED     Problem: Psychosocial Needs:  Goal: Level of anxiety will decrease  Outcome: PROGRESSING SLOWER THAN EXPECTED

## 2021-02-14 NOTE — CARE PLAN
Problem: Safety  Goal: Will remain free from injury  Outcome: PROGRESSING AS EXPECTED  Bed alarm in use. Pt educated on use of call light and to call for assistance.      Problem: Communication  Goal: The ability to communicate needs accurately and effectively will improve  Outcome: PROGRESSING AS EXPECTED   Pt reoriented as needed.

## 2021-02-15 ENCOUNTER — APPOINTMENT (OUTPATIENT)
Dept: RADIOLOGY | Facility: MEDICAL CENTER | Age: 86
DRG: 543 | End: 2021-02-15
Attending: NEUROLOGICAL SURGERY
Payer: MEDICARE

## 2021-02-15 PROCEDURE — 96372 THER/PROPH/DIAG INJ SC/IM: CPT

## 2021-02-15 PROCEDURE — 700111 HCHG RX REV CODE 636 W/ 250 OVERRIDE (IP): Performed by: STUDENT IN AN ORGANIZED HEALTH CARE EDUCATION/TRAINING PROGRAM

## 2021-02-15 PROCEDURE — 72131 CT LUMBAR SPINE W/O DYE: CPT | Mod: MG

## 2021-02-15 PROCEDURE — 700102 HCHG RX REV CODE 250 W/ 637 OVERRIDE(OP): Performed by: STUDENT IN AN ORGANIZED HEALTH CARE EDUCATION/TRAINING PROGRAM

## 2021-02-15 PROCEDURE — 72128 CT CHEST SPINE W/O DYE: CPT | Mod: MG

## 2021-02-15 PROCEDURE — A9270 NON-COVERED ITEM OR SERVICE: HCPCS | Performed by: STUDENT IN AN ORGANIZED HEALTH CARE EDUCATION/TRAINING PROGRAM

## 2021-02-15 PROCEDURE — G0378 HOSPITAL OBSERVATION PER HR: HCPCS

## 2021-02-15 PROCEDURE — 92610 EVALUATE SWALLOWING FUNCTION: CPT

## 2021-02-15 PROCEDURE — 700105 HCHG RX REV CODE 258: Performed by: STUDENT IN AN ORGANIZED HEALTH CARE EDUCATION/TRAINING PROGRAM

## 2021-02-15 PROCEDURE — 99225 PR SUBSEQUENT OBSERVATION CARE,LEVEL II: CPT | Performed by: STUDENT IN AN ORGANIZED HEALTH CARE EDUCATION/TRAINING PROGRAM

## 2021-02-15 RX ADMIN — BETHANECHOL CHLORIDE 25 MG: 25 TABLET ORAL at 20:26

## 2021-02-15 RX ADMIN — ENOXAPARIN SODIUM 40 MG: 40 INJECTION SUBCUTANEOUS at 05:48

## 2021-02-15 RX ADMIN — CYANOCOBALAMIN TAB 500 MCG 1000 MCG: 500 TAB at 05:48

## 2021-02-15 RX ADMIN — DIBASIC SODIUM PHOSPHATE, MONOBASIC POTASSIUM PHOSPHATE AND MONOBASIC SODIUM PHOSPHATE 500 MG: 852; 155; 130 TABLET ORAL at 17:26

## 2021-02-15 RX ADMIN — DILTIAZEM HYDROCHLORIDE 30 MG: 30 TABLET, FILM COATED ORAL at 17:24

## 2021-02-15 RX ADMIN — OXYCODONE 5 MG: 5 TABLET ORAL at 17:25

## 2021-02-15 RX ADMIN — OXYCODONE HYDROCHLORIDE AND ACETAMINOPHEN 500 MG: 500 TABLET ORAL at 05:48

## 2021-02-15 RX ADMIN — THIAMINE HYDROCHLORIDE 500 MG: 100 INJECTION, SOLUTION INTRAMUSCULAR; INTRAVENOUS at 10:39

## 2021-02-15 RX ADMIN — BETHANECHOL CHLORIDE 25 MG: 25 TABLET ORAL at 08:54

## 2021-02-15 RX ADMIN — Medication 2000 UNITS: at 05:48

## 2021-02-15 RX ADMIN — SERTRALINE HYDROCHLORIDE 100 MG: 100 TABLET, FILM COATED ORAL at 05:48

## 2021-02-15 RX ADMIN — FOLIC ACID 1 MG: 1 TABLET ORAL at 05:48

## 2021-02-15 RX ADMIN — OXYCODONE 5 MG: 5 TABLET ORAL at 08:54

## 2021-02-15 RX ADMIN — DIBASIC SODIUM PHOSPHATE, MONOBASIC POTASSIUM PHOSPHATE AND MONOBASIC SODIUM PHOSPHATE 500 MG: 852; 155; 130 TABLET ORAL at 12:22

## 2021-02-15 RX ADMIN — DOCUSATE SODIUM 50 MG AND SENNOSIDES 8.6 MG 2 TABLET: 8.6; 5 TABLET, FILM COATED ORAL at 05:48

## 2021-02-15 RX ADMIN — OXYCODONE 5 MG: 5 TABLET ORAL at 20:26

## 2021-02-15 ASSESSMENT — PAIN DESCRIPTION - PAIN TYPE
TYPE: CHRONIC PAIN;ACUTE PAIN
TYPE: ACUTE PAIN
TYPE: ACUTE PAIN
TYPE: CHRONIC PAIN;ACUTE PAIN

## 2021-02-15 ASSESSMENT — ENCOUNTER SYMPTOMS
BACK PAIN: 1
COUGH: 0
NECK PAIN: 1
FALLS: 1
PALPITATIONS: 0
SHORTNESS OF BREATH: 0

## 2021-02-15 ASSESSMENT — FIBROSIS 4 INDEX: FIB4 SCORE: 2.37

## 2021-02-15 NOTE — THERAPY
PT consult received. Pt pending Neurosurgical POC. Will initiate PT evaluation as appropriate once POC is determined    Viridiana Davidson, PT, DPT Voalte: [id number]

## 2021-02-15 NOTE — CARE PLAN
Problem: Nutritional:  Goal: Achieve adequate nutritional intake  Description: Patient will consume >/= 50% of meals/supplements  Outcome: NOT MET   See RD note. RD following.

## 2021-02-15 NOTE — PROGRESS NOTES
Hospital Medicine Daily Progress Note    Date of Service  2/15/2021    Chief Complaint  85 y.o. female admitted 2/12/2021 with AMS, failure to thrive, back pain    Hospital Course  85 y.o. demented female from [facility] who presented 2/12/2021 with AMS, failure to thrive, back pain. History obtained from daughter present at bedside as pt is poor historian. Per daughter, she has had worsening back pain, recent L1 fracture noted in 12/2020. They went to see neurosurgery Friday, 2/12 - MD at [facility] sent her to ER for MRI and further workup. She was found to have pyuria, due to prior ESBL - has been started on Meropenem by admitting provider.    Interval Problem Update  2/13: continue Meropenem, ID consulted for assistance with abx. Notable L1 compression fracture, 20% spinal stenosis. As patient is referred from outpt neurosurgery, MRI lumbar ordered.    2/14: MRI not completed yet. Ordered as needed morphine / Ativan for MRI. Patient was agitated overnight, unpleasant to nursing staff. Had long discussion with daughters (Sarah - present at bedside, Elda MORIN via phone) for goal of care - DNR status confirmed, pursue with MRI for now, did not want to discuss further end of life care at this time.    2/15: MRI lumbar notable known L1 compression fracture but also new L5 compression fracture and severe T11 compression. ?vertebroplasty/kyphoplasty. Consulted neurosurgery - ordered CT thoracic/lumbar. Await formal recommendations. Eventual d/c to SNF    Consultants/Specialty  ID  Palliative care    Code Status  Full Code    Disposition  Await neurosurgery recommendations  Eventual SNF  Explore PMR - referral sent    Review of Systems  Review of Systems   Constitutional: Positive for malaise/fatigue.   Respiratory: Negative for cough and shortness of breath.    Cardiovascular: Negative for chest pain and palpitations.   Musculoskeletal: Positive for back pain, falls, joint pain and neck pain.   All other systems  reviewed and are negative.       Physical Exam  Temp:  [36.1 °C (97 °F)-37.3 °C (99.1 °F)] 37.3 °C (99.1 °F)  Pulse:  [72-89] 89  Resp:  [16-17] 16  BP: (124-165)/(71-82) 146/80  SpO2:  [95 %-98 %] 95 %    Physical Exam  Vitals and nursing note reviewed.   Constitutional:       Appearance: Normal appearance.   HENT:      Head: Normocephalic and atraumatic.      Nose: Nose normal.      Mouth/Throat:      Mouth: Mucous membranes are dry.      Pharynx: Oropharynx is clear.   Eyes:      General: No scleral icterus.     Extraocular Movements: Extraocular movements intact.   Cardiovascular:      Rate and Rhythm: Normal rate and regular rhythm.      Pulses: Normal pulses.   Pulmonary:      Effort: No respiratory distress.      Breath sounds: No wheezing.   Abdominal:      Palpations: Abdomen is soft.      Tenderness: There is no abdominal tenderness. There is no guarding or rebound.   Musculoskeletal:         General: Signs of injury present.      Cervical back: Neck supple. No tenderness.      Comments: Mildly tender in lumbar region  Trace LE edema   Skin:     General: Skin is warm and dry.      Capillary Refill: Capillary refill takes 2 to 3 seconds.   Neurological:      Mental Status: She is alert.      Comments: AAO to self and place  Follows some command  Minimally verbal   Psychiatric:      Comments: Appears anxious         Fluids    Intake/Output Summary (Last 24 hours) at 2/15/2021 1939  Last data filed at 2/14/2021 2331  Gross per 24 hour   Intake --   Output 650 ml   Net -650 ml       Laboratory  Recent Labs     02/13/21  0125 02/14/21  0626   WBC 8.7 8.1   RBC 4.60 4.38   HEMOGLOBIN 13.9 13.1   HEMATOCRIT 42.9 41.0   MCV 93.3 93.6   MCH 30.2 29.9   MCHC 32.4* 32.0*   RDW 48.5 47.6   PLATELETCT 319 285   MPV 9.8 9.5     Recent Labs     02/13/21  0125 02/14/21  0626   SODIUM 136 133*   POTASSIUM 4.1 3.5*   CHLORIDE 100 100   CO2 25 24   GLUCOSE 87 91   BUN 15 12   CREATININE 0.43* 0.26*   CALCIUM 10.1 9.3                    Imaging  CT-LSPINE W/O PLUS RECONS   Final Result      1.  There is a severe burst fracture at the L1 level, significantly progressed since the prior CT and similar to the recent MRI from yesterday.   2.  There is slight concavity of the L5 superior endplate is noted as discussed on recent MRI.   3.  Multilevel degenerative disc disease and arthropathy is more fully discussed on the MRI.      CT-TSPINE W/O PLUS RECONS   Final Result         1.  Minimal interval loss of height of the central portion of the T11 vertebral body currently measuring 12.8 mm compared with 17.3 mm on 12/27/2020.      2.  No bony spinal canal compromise.      3.  No other thoracic vertebral body fracture. No posterior element fracture.      4.  No significant degenerative change.         MR-LUMBAR SPINE-W/O   Final Result      1.  T11 mild-moderate recent or subacute insufficiency compression fracture which was not present on CT studies from 12/27/2020. This appears amenable to vertebral augmentation.   2.  L1 marked compression fracture with substantial progression since CT studies from 12/27/2020. There is posterior cortical buckling/retropulsion. No associated epidural hematoma. This lesion appears amenable to vertebral augmentation.   3.  L5 mild superior endplate compression fracture with only slight superior endplate concavity. Bandlike marrow edema signal indicative of recent or subacute fracture. This fracture was not present on CT studies from 12/27/2020.      CT-PELVIS W/O   Final Result      1.  No acute fracture or dislocation. Cortical irregularity in the left suprapubic ramus noted on the recent radiograph is related to a nutrient vessel.   2.  Ill-defined sclerosis in the sacrum, related to chronic sacral insufficiency fracture.   3.  Colonic diverticulosis.      DX-HIP-COMPLETE - UNILATERAL 2+ LEFT   Final Result      1.  Subtle cortical irregularity along the superior aspect of the left superior pubic ramus may  represent a subacute to chronic nondisplaced fracture.   2.  No proximal femoral fracture.           Assessment/Plan  * Acute low back pain  Assessment & Plan  L1 compression fracture noted by CT 12/2020 - neurosurgery consulted at the time - TLSO brace but no intervention planned  Saw outpt neurosurgery 2/12 - who sent patient to hospital for MRI workup  MRI lumbar: severe L1 compression fracture compared to prior 12/2020, mild L5 compression fracture, DJD of T11    PT/OT  Suspect eventual SNF d/c  Pain control    Neurosurgery consulted - await recommendations    Acute encephalopathy  Assessment & Plan  IMPROVED  Toxic/metabolic etiology, back pain exacerbating  Does have underlying dementia  ESBL E.coli UTI -- adequately treated with abx per ID  HIV/syphilis -- negative  Negative acute CT head 12/27/2020  S/p trial of IV thiamine  Borderline low B12/folate -- on replacement therapy  Minimize sedatives    Dementia (HCC)- (present on admission)  Assessment & Plan  Patient assisted living facility patient has advanced dementia poor historian    Acute cystitis without hematuria  Assessment & Plan  Adequately treated  UA pyuria  UCx 2/12: ESBL E.coli  Abx: s/p fosphomycin 3g x1 2/14 and Meropenem (2/12-2/14)    ID consulted - adequately treated    Essential hypertension- (present on admission)  Assessment & Plan  Cardiazem  PRN labetalol    Failure to thrive in adult  Assessment & Plan  Frequent hospitalization for recurrent UTI, back pain  Discussed with daughters (including [facility]) on 2/14 - DNR confirmed, pursue MRI lumbar, not ready to discuss end of life goal of care at this time    Palliative care consulted for advance care planning    Frequent falls  Assessment & Plan  Fall precautions  PT/OT    Hip pain  Assessment & Plan  ?radiculopathy  No hip fracture seen by Xray  L1 fracture by CT 12/2020 and severe burst fracture seen again 2/15/21      Physical deconditioning- (present on admission)  Assessment &  Plan  PT/OT  Frequent falls, osteopenia  Recent compression lumbar spine fracture, wrist fracture    Urinary incontinence- (present on admission)  Assessment & Plan  Bethanechol  PRN bladder scan and straight cath    Vitamin D deficiency- (present on admission)  Assessment & Plan  supplement    Osteopenia- (present on admission)  Assessment & Plan  Supportive care  Fall precautions       VTE prophylaxis: Lovenox

## 2021-02-15 NOTE — DIETARY
"Nutrition services: Day 3 of admit.  Lucita Babcock is a 85 y.o. female with admitting DX of acute encephalopathy.  Consult received for failure to thrive in adult.    Spoke with patient and daughter at bedside. Upon visual inspection, no fat or muscle wasting were appreciated. Daughter states pt was eating normally at home, typically drinks Ensure with each meal at her assisted living facility.     Daughter states pt was having some difficulty chewing/swallowing her meal on Saturday and was switched to a liquidized diet. Daughter feeding pt breakfast, states pt needs assistance with feeding. Reports pt's intake seems improved this morning, was previously only eating ~25% of trays since admit. Will add Boost supplements TID with meals per RD poor PO protocol to mimic pt's home regimen and optimize PO intake.     Assessment:  Height: 167.6 cm (5' 6\")  Weight: 65.1 kg (143 lb 8.3 oz) via bed scale 2/15.   Body mass index is 23.18 kg/m², BMI classification: normal  Diet/Intake: Liquidized, Thin Liquids diet. PO <25% meals documented.     Evaluation:   1. PMH: dementia, HTN, osteopenia  2. Weight history:  · 70 kg via bed scale 12/28/20   3. MAR: ascorbic acid, cyanocobalamin, folvite, thiamine, cholecalciferol  4. Labs: Na 133 (L), K 3.5 (L), Creat 0.25 (L), Phos 2.3 (L)  5. Generalized BLE edema noted.   6. Per neurosurgery pt with L1 fracture, T11>L5 vertebral body fractures, CT ordered to check for morphology.    Malnutrition Risk: Patient does not meet criteria at this time.     Recommendations/Plan:  1. Replete K/Phos per MD.  2. Patient would benefit from 1:1 feeding assistance, SLP consult to determine appropriate diet texture - requested MD place consult.  3. Add TID Boost supplements per RD poor PO protocol.  4. Advance diet texture as safe per SLP.   5. Encourage intake of meals and supplements.  6. Document intake of all meals and supplements as % taken in ADL's to provide interdisciplinary communication " across all shifts.   7. Monitor weight.  8. Nutrition rep will continue to see patient for ongoing meal and snack preferences.     RD following.

## 2021-02-15 NOTE — CARE PLAN
Problem: Communication  Goal: The ability to communicate needs accurately and effectively will improve  Outcome: PROGRESSING AS EXPECTED     Problem: Safety  Goal: Will remain free from injury  Outcome: PROGRESSING AS EXPECTED     Problem: Pain Management  Goal: Pain level will decrease to patient's comfort goal  Outcome: PROGRESSING AS EXPECTED     Problem: Respiratory:  Goal: Respiratory status will improve  Outcome: PROGRESSING AS EXPECTED     Problem: Skin Integrity  Goal: Risk for impaired skin integrity will decrease  Outcome: PROGRESSING AS EXPECTED     Turning pt q2H to help prevent further skin breakdown. Weaned o2 from 3L to 2L NC. VSS.

## 2021-02-15 NOTE — PROGRESS NOTES
Patient seen as patient consult 6 weeks ago (GLF L1 compression fracture, prescribed TLSO), and in outpatient clinic recently by our office. In summary, this is an osteopenic (last DEXA 2018) patient with significantly progressive L1 fracture and new T11 > L5 vertebral body fractures. Ordered CT's to check for morphology, but it is unlikely L1 can be treated by anything but surgery (since cement retropulsion is much more likely now based on MRI), or just palliative bracing and activity limitation. T11 may be amenable to kyphoplasty pending CT's, but most importantly patient needs repeat DEXA scanning and aggressive medical treatment by her PMD's for her metabolic bone disease, or else she will become bed bound. Other diagnostic possibilities include metastatic bone diease (given Br cancer history) which may warrant contrasted MRI's, although non contrasted MRI does not indicate obvious lesions in other vertebral bodies.

## 2021-02-15 NOTE — THERAPY
Speech Language Pathology   Clinical Swallow Evaluation     Patient Name: Lucita Babcock  AGE:  85 y.o., SEX:  female  Medical Record #: [id number]  Today's Date: 2/15/2021     Precautions  Precautions: (P) Fall Risk, Swallow Precautions ( See Comments), Other (See Comments)  Comments: (P) Hx of dementia    Assessment    85-year-old female admitted from assisted living with complaints of right hip pain.  Pt is a poor historian 2/2 dementia, does not recall any recent traumatic falls and is unclear as to the nature of her hip pain.  She was found to have a normal CT scan of the abdomen and pelvis, and furthermore urinalysis revealed she has a UA consistent with cystitis.  Pt has an extensive history ESBL.    Pt was seen for a clinical swallow evaluation with daughter present at bedside.  Daughter reports the pt eats a regular diet at home, and drinks Boost at every meal.  Daughter also reports pt was having difficulty chewing food upon admit at [facility], and thus pt was put on a liquidized diet per MD.  Pt was AAO to self and hospital, with confusion noted in other spheres.  No gross deficits noted in oral exam, and laryngeal elevation palpated as complete with all textures tested.  Pt's voice was strong, however cough was minimally weak.  Given daughter's report, pt was given PO trials of applesauce (LQ3), puree, minced and moist solids, soft bite sized solids and thins.  Pt had no overt s/sx of aspiration with any texture tested, however mastication with soft solids was extremely prolonged, with oral holding noted.  This places the pt at risk for aspiration or penetration on oral residue.  Although pt had minimally prolonged mastication with minced and moist solids, no oral holding or oral residue was appreciated.  Pt had difficulty with self feeding and was easily distracted, requiring max cueing to take bites, though cooperative and pleasant. Recommend to upgrade diet to minced and moist with thin liquids, with 1:1  feeding.      Plan  Upgrade diet to minced and moist with thins, with 1:1 feeding assistance    Recommend Speech Therapy 3 times per week until therapy goals are met for the following treatments:  Dysphagia Training and Patient / Family / Caregiver Education.    Discharge Recommendations:  Recommend post-acute placement for additional speech therapy services prior to discharge home      Objective     02/15/21 1220   Prior Living Situation   Prior Services Home With Outpatient Therapy;Intermittent Physical Support for ADL Per Service  (Assisted Living since January)   Housing / Facility Assisted Living Residence  ([facility])   Lives with - Patient's Self Care Capacity Attendant / Paid Care Giver  ([facility])   Prior Level Of Function   Communication Impaired  (Hx of dementia)   Swallow Within Functional Limits  (per daughter pt eats a regular diet)   Dentition Intact   Hearing Within Functional Limits for Evaluation   Vision Wears Corrective Lenses   Patient's Primary Language English   Oral Motor Eval    Is Patient Able to Complete Oral Motor Eval Yes, Within Normal Limits   Laryngeal Function   Voice Quality Within Functional Limits   Volutional Cough Minimal   Excursion Upon Swallow Complete   Oral Food Presentation   Liquidised (3) Within Functional Limits   Pureed (4) Within Functional Limits   Minced & Moist (5) - (Dysphagia II) Within Functional Limits   Soft & Bite-Sized (6) - (Dysphagia III) Moderate   Self Feeding Needs Assistance   Tracheostomy   Tracheostomy  No   Dysphagia Strategies / Recommendations   Strategies / Interventions Recommended (Yes / No) Yes   Compensatory Strategies Strict 1:1 Feeding;Head of Bed 90 Degrees During Eating / Drinking;Single Sips / Bites   Diet / Liquid Recommendation Thin (0);Minced & Moist (5) - (Dysphagia II)   Medication Administration  Whole with Liquid Wash;Crush all Medications in Puree  (as tolerated)   Therapy Interventions Dysphagia Therapy By Speech  Language Pathologist   Dysphagia Rating   Nutritional Liquid Intake Rating Scale Non thickened beverages   Nutritional Food Intake Rating Scale Total oral diet with multiple consistencies but requiring special preparation or compensations

## 2021-02-15 NOTE — THERAPY
02/15/21 0813   Interdisciplinary Plan of Care Collaboration   Collaboration Comments OT referral received. MRI shows new compression fxs. Pt is pending NSG consult. Will hold OT eval until definitive POC in place.

## 2021-02-16 LAB
ALBUMIN SERPL BCP-MCNC: 3.2 G/DL (ref 3.2–4.9)
BASOPHILS # BLD AUTO: 0.5 % (ref 0–1.8)
BASOPHILS # BLD: 0.04 K/UL (ref 0–0.12)
BUN SERPL-MCNC: 12 MG/DL (ref 8–22)
CALCIUM SERPL-MCNC: 9.9 MG/DL (ref 8.5–10.5)
CHLORIDE SERPL-SCNC: 97 MMOL/L (ref 96–112)
CO2 SERPL-SCNC: 27 MMOL/L (ref 20–33)
CREAT SERPL-MCNC: 0.24 MG/DL (ref 0.5–1.4)
EOSINOPHIL # BLD AUTO: 0.13 K/UL (ref 0–0.51)
EOSINOPHIL NFR BLD: 1.6 % (ref 0–6.9)
ERYTHROCYTE [DISTWIDTH] IN BLOOD BY AUTOMATED COUNT: 47.2 FL (ref 35.9–50)
GLUCOSE SERPL-MCNC: 98 MG/DL (ref 65–99)
HCT VFR BLD AUTO: 40.2 % (ref 37–47)
HGB BLD-MCNC: 13 G/DL (ref 12–16)
IMM GRANULOCYTES # BLD AUTO: 0.03 K/UL (ref 0–0.11)
IMM GRANULOCYTES NFR BLD AUTO: 0.4 % (ref 0–0.9)
LYMPHOCYTES # BLD AUTO: 1.26 K/UL (ref 1–4.8)
LYMPHOCYTES NFR BLD: 15.1 % (ref 22–41)
MAGNESIUM SERPL-MCNC: 1.8 MG/DL (ref 1.5–2.5)
MCH RBC QN AUTO: 30.7 PG (ref 27–33)
MCHC RBC AUTO-ENTMCNC: 32.3 G/DL (ref 33.6–35)
MCV RBC AUTO: 94.8 FL (ref 81.4–97.8)
MONOCYTES # BLD AUTO: 0.51 K/UL (ref 0–0.85)
MONOCYTES NFR BLD AUTO: 6.1 % (ref 0–13.4)
NEUTROPHILS # BLD AUTO: 6.35 K/UL (ref 2–7.15)
NEUTROPHILS NFR BLD: 76.3 % (ref 44–72)
NRBC # BLD AUTO: 0 K/UL
NRBC BLD-RTO: 0 /100 WBC
PHOSPHATE SERPL-MCNC: 2.5 MG/DL (ref 2.5–4.5)
PLATELET # BLD AUTO: 291 K/UL (ref 164–446)
PMV BLD AUTO: 9.3 FL (ref 9–12.9)
POTASSIUM SERPL-SCNC: 3.3 MMOL/L (ref 3.6–5.5)
RBC # BLD AUTO: 4.24 M/UL (ref 4.2–5.4)
SODIUM SERPL-SCNC: 133 MMOL/L (ref 135–145)
WBC # BLD AUTO: 8.3 K/UL (ref 4.8–10.8)

## 2021-02-16 PROCEDURE — A9270 NON-COVERED ITEM OR SERVICE: HCPCS | Performed by: STUDENT IN AN ORGANIZED HEALTH CARE EDUCATION/TRAINING PROGRAM

## 2021-02-16 PROCEDURE — 700102 HCHG RX REV CODE 250 W/ 637 OVERRIDE(OP): Performed by: STUDENT IN AN ORGANIZED HEALTH CARE EDUCATION/TRAINING PROGRAM

## 2021-02-16 PROCEDURE — 770001 HCHG ROOM/CARE - MED/SURG/GYN PRIV*

## 2021-02-16 PROCEDURE — 80069 RENAL FUNCTION PANEL: CPT

## 2021-02-16 PROCEDURE — 700102 HCHG RX REV CODE 250 W/ 637 OVERRIDE(OP): Performed by: INTERNAL MEDICINE

## 2021-02-16 PROCEDURE — 92526 ORAL FUNCTION THERAPY: CPT

## 2021-02-16 PROCEDURE — 700111 HCHG RX REV CODE 636 W/ 250 OVERRIDE (IP): Performed by: STUDENT IN AN ORGANIZED HEALTH CARE EDUCATION/TRAINING PROGRAM

## 2021-02-16 PROCEDURE — 85025 COMPLETE CBC W/AUTO DIFF WBC: CPT

## 2021-02-16 PROCEDURE — 83735 ASSAY OF MAGNESIUM: CPT

## 2021-02-16 PROCEDURE — A9270 NON-COVERED ITEM OR SERVICE: HCPCS | Performed by: INTERNAL MEDICINE

## 2021-02-16 PROCEDURE — 36415 COLL VENOUS BLD VENIPUNCTURE: CPT

## 2021-02-16 PROCEDURE — 99232 SBSQ HOSP IP/OBS MODERATE 35: CPT | Performed by: INTERNAL MEDICINE

## 2021-02-16 PROCEDURE — 96372 THER/PROPH/DIAG INJ SC/IM: CPT

## 2021-02-16 RX ORDER — POTASSIUM CHLORIDE 20 MEQ/1
40 TABLET, EXTENDED RELEASE ORAL ONCE
Status: COMPLETED | OUTPATIENT
Start: 2021-02-16 | End: 2021-02-16

## 2021-02-16 RX ADMIN — OXYCODONE 5 MG: 5 TABLET ORAL at 08:21

## 2021-02-16 RX ADMIN — OXYCODONE HYDROCHLORIDE AND ACETAMINOPHEN 500 MG: 500 TABLET ORAL at 05:09

## 2021-02-16 RX ADMIN — DOCUSATE SODIUM 50 MG AND SENNOSIDES 8.6 MG 2 TABLET: 8.6; 5 TABLET, FILM COATED ORAL at 05:09

## 2021-02-16 RX ADMIN — DIBASIC SODIUM PHOSPHATE, MONOBASIC POTASSIUM PHOSPHATE AND MONOBASIC SODIUM PHOSPHATE 500 MG: 852; 155; 130 TABLET ORAL at 12:19

## 2021-02-16 RX ADMIN — DILTIAZEM HYDROCHLORIDE 30 MG: 30 TABLET, FILM COATED ORAL at 17:55

## 2021-02-16 RX ADMIN — BETHANECHOL CHLORIDE 25 MG: 25 TABLET ORAL at 08:21

## 2021-02-16 RX ADMIN — ACETAMINOPHEN 650 MG: 325 TABLET ORAL at 08:21

## 2021-02-16 RX ADMIN — SERTRALINE HYDROCHLORIDE 100 MG: 100 TABLET, FILM COATED ORAL at 05:09

## 2021-02-16 RX ADMIN — DIBASIC SODIUM PHOSPHATE, MONOBASIC POTASSIUM PHOSPHATE AND MONOBASIC SODIUM PHOSPHATE 500 MG: 852; 155; 130 TABLET ORAL at 05:09

## 2021-02-16 RX ADMIN — CYANOCOBALAMIN TAB 500 MCG 1000 MCG: 500 TAB at 05:09

## 2021-02-16 RX ADMIN — FOLIC ACID 1 MG: 1 TABLET ORAL at 05:09

## 2021-02-16 RX ADMIN — OXYCODONE 5 MG: 5 TABLET ORAL at 17:57

## 2021-02-16 RX ADMIN — ENOXAPARIN SODIUM 40 MG: 40 INJECTION SUBCUTANEOUS at 05:09

## 2021-02-16 RX ADMIN — OXYCODONE 5 MG: 5 TABLET ORAL at 05:09

## 2021-02-16 RX ADMIN — Medication 2000 UNITS: at 05:09

## 2021-02-16 RX ADMIN — ACETAMINOPHEN 650 MG: 325 TABLET ORAL at 17:57

## 2021-02-16 RX ADMIN — POTASSIUM CHLORIDE 40 MEQ: 1500 TABLET, EXTENDED RELEASE ORAL at 12:19

## 2021-02-16 RX ADMIN — DIBASIC SODIUM PHOSPHATE, MONOBASIC POTASSIUM PHOSPHATE AND MONOBASIC SODIUM PHOSPHATE 500 MG: 852; 155; 130 TABLET ORAL at 17:56

## 2021-02-16 RX ADMIN — DOCUSATE SODIUM 50 MG AND SENNOSIDES 8.6 MG 2 TABLET: 8.6; 5 TABLET, FILM COATED ORAL at 17:59

## 2021-02-16 ASSESSMENT — ENCOUNTER SYMPTOMS
COUGH: 0
FALLS: 1
BACK PAIN: 1
PALPITATIONS: 0
SHORTNESS OF BREATH: 0
NECK PAIN: 1

## 2021-02-16 ASSESSMENT — PAIN DESCRIPTION - PAIN TYPE
TYPE: ACUTE PAIN
TYPE: CHRONIC PAIN;ACUTE PAIN

## 2021-02-16 NOTE — CONSULTS
DATE OF SERVICE:  02/16/2021     INPATIENT CONSULTATION     CHIEF COMPLAINT:  Back pain, progressive fractures.     HISTORY OF PRESENT ILLNESS:  This is an 85-year-old woman who lives in an   assisted living facility, transferred a few days ago for failure to thrive.    She is somewhat of a difficult historian.  She is able to contribute to some   of the history, but she claims that she lives at home by herself and needs to   get back to [place].  She was seen by my colleague, Dr. Keane in late   12/2020 for an L1 compression fracture about 33% of the time, apparently   followed up within the past week or so at her outpatient facility with   increasing pain.  They were considering an MRI at that time reportedly.  The   patient was admitted.  An MRI was obtained, shows now an L1 burst fracture   with retropulsion and stenosis at the tip of the conus.  She denies weakness,   numbness in her legs.  No bladder or bowel difficulties.  She reports pain in   the midline.  She also has a new T12 and minimal L5 fractures as well.    Looking back, I see she has osteopenia treated only with vitamin D since 2018   DEXA scan.  This is likely what is going on.  I do not see her TLSO at her   bedside, but she reports wearing it previously.  She came in for chronic   cystitis and was put on meropenem.  She has a distant history of breast cancer   as well, but I do not think these look like pathologic fractures to me, but   it is a possible consideration as we do not have a contrasted MRI.     PAST MEDICAL HISTORY:  As mentioned in the HPI plus dementia.     SURGERIES:  She cannot fully contribute so gleaned from the chart, I find no   other obvious surgeries listed except for multiple urologic interventions.     FAMILY HISTORY:  There is no family at her bedside.     PHYSICAL EXAMINATION:   GENERAL:  She knows who she is.  She knows that she is in [place], but she gets   the year wrong.  NEUROLOGIC:  She has grossly full strength  in the iliopsoas, adductors,   abductors, quadriceps, DF, EHL and PF with intact sensation.  No pathologic   reflexes.  She has tenderness in the midline back.     ASSESSMENT AND PLAN:  The patient has a previous L1 compression fracture, now   has significantly progressed likely due to the osteopenia as well as a T11 and   L5 fracture.  She may be failing conservative management.  She has not had a   recent DEXA scan.  This is probably the most likely etiology is incompletely   treated osteopenia/osteoporosis.  A repeat DEXA scan of course will be   indicated, but decision needs to be made now about future treatments.  I do   not recommend a kyphoplasty for the L1 fracture.  This progression can really only be treated with surgery or   palliative bracing.  T11 could be considered a candidate for kyphoplasty due   to bone morphology.  I will have Dr. Keane weigh in as he has taken care   for this patient previously.     Thanks for allowing me to participate in her care.  Recommend that she do not   ambulate until that decision is made.        ______________________________  Reji Burdick III, MD    ECP/SUB/BENI    DD:  02/16/2021 06:36  DT:  02/16/2021 08:54    Job#:  [id number]

## 2021-02-16 NOTE — CARE PLAN
Problem: Safety  Goal: Will remain free from injury  Outcome: PROGRESSING AS EXPECTED     Problem: Pain Management  Goal: Pain level will decrease to patient's comfort goal  Outcome: PROGRESSING AS EXPECTED     Problem: Respiratory:  Goal: Respiratory status will improve  Outcome: PROGRESSING AS EXPECTED     Problem: Skin Integrity  Goal: Risk for impaired skin integrity will decrease  Outcome: PROGRESSING AS EXPECTED       VSS. Pain managed per mar. Pt weaned to 1L NC. Repositioning pt q2h to help prevent further skin breakdown.

## 2021-02-16 NOTE — CARE PLAN
Problem: Pain Management  Goal: Pain level will decrease to patient's comfort goal  Outcome: PROGRESSING AS EXPECTED  Note: Taught pt 0-10 pain scale and  non pharmacological method of pain mgt, encouraged to verbalize when in pain. Administered PRN pain med as needed.      Problem: Respiratory:  Goal: Respiratory status will improve  Outcome: PROGRESSING AS EXPECTED  Note: Encouraged on deep breathing exercise and the use of incentive spirometer,  on 2L of O2, sat  97%      Problem: Skin Integrity  Goal: Risk for impaired skin integrity will decrease  Outcome: PROGRESSING AS EXPECTED  Note:   Turned & repositioned every 2 hours,kept dry and clean, mepilex, waffle mattress overlay, barrier paste and pillows on bony prominences to prevent skin breakdown

## 2021-02-16 NOTE — THERAPY
Speech Language Pathology  Daily Treatment     Patient Name: Lucita Babcock  Age:  85 y.o., Sex:  female  Medical Record #: [id number]  Today's Date: 2021     Precautions  Precautions: (P) Fall Risk, Swallow Precautions ( See Comments)  Comments: (P) hx of dementia    Assessment    Pt seen this date for dysphagia intervention with end of MM5/TN0 breakfast tray, pt consumed all liquids via straw. PO trials of soft and bite sized assessed in addition to breakfast tray. Pt's daughter present for session. Hyolaryngeal elevation palpated as complete, 2-3 seconds of oral holding appreciated prior to swallow initiation and vocal quality remained clear. Pt demonstrating subtle throat clearing before, during and after PO trials. Pt demonstrated mildly prolonged but functional mastication of MM5 trials. Prolonged mastication (>1 minute) and and partially un-masticated peach found in left side of oral cavity with trials of soft and bite sized. No other s/sx of aspiration appreciated with any other consistencies consumed.      Recommend continuation of MM5/TN0 diet with adherence to the followin:1 supervision and feeding assistance as needed, upright at 90* for PO, straws okay, small bites, hold PO with any difficulty or increased lethargy. SLP following.     Plan    Continue current treatment plan.    Discharge Recommendations: (P) Recommend post-acute placement for additional speech therapy services prior to discharge home    Subjective    Pt lethargic, closing eyes throughout session and required mod verbal and tactile cues to maintain alertness.      Objective       21 1039   Dysphagia    Dysphagia X   Positioning / Behavior Modification Self Monitoring;Modulate Rate or Bite Size   Other Treatments PO trials thins, MM5, SB6   Diet / Liquid Recommendation Thin (0);Minced & Moist (5) - (Dysphagia II)   Nutritional Liquid Intake Rating Scale Non thickened beverages   Nutritional Food Intake Rating Scale Total oral  "diet with multiple consistencies but requiring special preparation or compensations   Nursing Communication Swallow Precaution Sign Posted at Head of Bed   Skilled Intervention Compensatory Strategies;Verbal Cueing   Recommended Route of Medication Administration   Medication Administration  Whole with Liquid Wash;Crush all Medications in Puree  (as tolerated)   Patient / Family Goals   Patient / Family Goal #1 per daughter, \"to give her food she can eat safely\"   Goal #1 Outcome Progressing as expected   Short Term Goals   Short Term Goal # 1 Pt will consume MM5/TN0 diet without s/sx of aspiration, given 1:1 feeding.   Goal Outcome # 1 Progressing as expected   Short Term Goal # 2 Family/caregivers will verbalize understanding of the s/sx of aspiration and SLP recs, given min cueing.   Goal Outcome # 2  Progressing as expected         "

## 2021-02-16 NOTE — DISCHARGE PLANNING
[facility] Rehabilitation Transitional Care Coordination     Referral from:  Dr. Dunaway   Facesheet indicates: [facility]   Potential Rehab Diagnosis: Other Ortho    MRI -  lumbar notable known L1 compression fracture w/  new L5 compression fracture and severe T11 compression. Neurosurgery consulted.    Chart review indicates patient has on going medical management and may have therapy needs to possibly meet inpatient rehab facility criteria with the goal of returning to community.    D/C support:  PT resides at [facility] unit in [place].  PT was hospitalized in 12/20 and was [facility] to [facility].        Physiatry consultation pended per protocol pending work up/ therapy evals.      Last Covid test date:  2/12/21 Not detected.       Thank you for the referral. TCC will follow.

## 2021-02-16 NOTE — PROGRESS NOTES
Neurosurgery Progress Note    Subjective:  85-year-old female with dementia with known L1 compression fracture treated with TLSO approximately 6 weeks ago.  Noted to have increased low back pain and failure to thrive.  New MRI scan indicates T11 mild to moderate subacute insufficiency fracture, would be amenable to augmentation.  L1 compression fracture with substantial progression and retropulsion approximately 30 to 40%.  L5 mild superior endplate compression fracture without retropulsion  Complains of low back pain  Denies lower extremity radicular symptoms  Patient has advanced dementia, lives in assisted living facility    Exam:  Motor 5/5 bilateral lower extremities  Sensation intact  DTRs 1+    BP  Min: 128/71  Max: 154/76  Pulse  Av.3  Min: 76  Max: 89  Resp  Av.3  Min: 16  Max: 17  Temp  Av.8 °C (98.3 °F)  Min: 36.1 °C (96.9 °F)  Max: 37.6 °C (99.7 °F)  SpO2  Av.6 %  Min: 95 %  Max: 96 %    No data recorded    Recent Labs     21  0621  0347   WBC 8.1 8.3   RBC 4.38 4.24   HEMOGLOBIN 13.1 13.0   HEMATOCRIT 41.0 40.2   MCV 93.6 94.8   MCH 29.9 30.7   MCHC 32.0* 32.3*   RDW 47.6 47.2   PLATELETCT 285 291   MPV 9.5 9.3     Recent Labs     21  0626 21  0347   SODIUM 133* 133*   POTASSIUM 3.5* 3.3*   CHLORIDE 100 97   CO2 24 27   GLUCOSE 91 98   BUN 12 12   CREATININE 0.26* 0.24*   CALCIUM 9.3 9.9               Intake/Output       02/15/21 07 - 21 0659 21 07 - 21 0659       Total  Total       Intake    P.O.  --  240 240  --  -- --    P.O. -- 240 240 -- -- --    Total Intake -- 240 240 -- -- --       Output    Urine  --  150 150  --  -- --    Urine Void (mL) -- 150 150 -- -- --    Stool  --  -- --  --  -- --    Number of Times Stooled 5 x -- 5 x 3 x -- 3 x    Total Output -- 150 150 -- -- --       Net I/O     -- 90 90 -- -- --            Intake/Output Summary (Last 24 hours) at 2021 0822  Last data filed  at 2/16/2021 0523  Gross per 24 hour   Intake 240 ml   Output 150 ml   Net 90 ml            • phosphorus  500 mg TID   • cyanocobalamin  1,000 mcg DAILY   • folic acid  1 mg DAILY   • bethanechol  25 mg BID   • DILTIAZem  30 mg Q EVENING   • carboxymethylcellulose  2 Drop Q4HRS PRN   • sertraline  100 mg QAM   • ascorbic acid  500 mg DAILY   • vitamin D  2,000 Units QAM   • ondansetron  4 mg Q4HRS PRN   • hydrALAZINE  50 mg Q8HRS PRN   • LORazepam  0.5 mg Q8HRS PRN   • enoxaparin  40 mg DAILY   • acetaminophen  650 mg Q6HRS PRN   • senna-docusate  2 tablet BID    And   • polyethylene glycol/lytes  1 Packet QDAY PRN    And   • magnesium hydroxide  30 mL QDAY PRN    And   • bisacodyl  10 mg QDAY PRN   • guaiFENesin dextromethorphan  10 mL Q6HRS PRN   • labetalol  10 mg Q4HRS PRN   • ondansetron  4 mg Q4HRS PRN   • Pharmacy Consult Request  1 Each PHARMACY TO DOSE   • oxyCODONE immediate-release  2.5 mg Q3HRS PRN    Or   • oxyCODONE immediate-release  5 mg Q3HRS PRN    Or   • HYDROmorphone  0.25 mg Q3HRS PRN       Assessment and Plan:  Hospital day #5  Acute T11, L5 compression fractures, progressive L1 fracture with retropulsion  We will discuss with Dr. Keane and patient's family  Remains neuro intact  Aggressive treatment for metabolic bone disease  T11 and L5 fractures appear amenable to vertebroplasty, L1 fractures with retropulsion not likely amenable to kyphoplasty

## 2021-02-16 NOTE — CONSULTS
"Reason for PC Consult: Advance Care Planning    Consulted by: Dr. Perkins    Assessment:  General: Pt admitted 2/12/2021 for AMS, failure to thrive and back pain.  Per the patient's daughter the patient had worsening back pain following a recent L1 fracture from 12/20/2020.  Patient went to see neurosurgery on 2/12/2021 and was sent to the ER for an MRI and further work-up.  Patient's lumbar MRI  shows severe L1 compression fracture and also L5 and T11 compression fractures.  Patient has a neurosurgery consult pending.  Patient also with acute encephalopathy which has improved, acute cystitis, and  hypertension.  Patient with a history of dementia, failure to thrive with frequent hospitalizations for UTIs and back pain, patient also has frequent falls, urinary incontinence, vitamin D deficiency and osteopenia.  Social: Following patient's hospitalization in December patient transferred to advanced skilled nursing facility.  Patient then discharged to the [facility] assisted living [facility].  Daughter reports if patient needs more supervision and assistance the patient may be able to move in to the memory care side of the assisted living facility.  The patient has 2 daughters who provide assistance to the patient as needed.  Dyspnea: No-  1L NC  Last BM: 02/15/21-    Pain: No-  Pt reports neck pain this morning but resolved with medication.  Pt and daughter report pt is somewhat groggy from \"potent medications\"  Depression: Mood appropriate for situation-  Patient reports her goal is to return home.  Patient needs reminding by her daughter that she lives at the [facility] living [facility].  Dementia: Yes;  6, d    Spiritual:  Is Zoroastrianism or spirituality important for coping with this illness? Yes-  Patient is [employer]ism  Has a  or spiritual provider visit been requested? Yes Patient would like to see a  for spiritual visit patient also interested in a visit from a .  Will make " request    Palliative Performance Scale: 50%    Advance Directive: DPOA-  Patient has a DURABLE POWER OF  for healthcare executed 2/21/2020 patient name Elda Ribera her daughter as her agent, first alternate is Sarah Babcock second alternate is Casey Ribera.  Patient selected statement of desire #5.  DPOA:  -  Patient's healthcare agent is Elda Ribera  POLST: No-      Code Status: Full-  CODE STATUS discussed with patient and daughter Sarah.  Patient and daughter in agreement with changing CODE STATUS to DNR/DNI as per patient wishes and advance directive documents.    Outcome:  Introduced self and role of Palliative Care to patient and daughter Sarah who is at bedside.    Explored patient's values, beliefs, and preferences in order to identify goals of care and a plan of care.  Patient able to answer some questions but is sleepy and defers other questions to her daughter.  Patient's daughter reports patient has been more confused and less clear lately daughter believes some of the confusion is related to frequent UTIs and her need for pain medication.  Patient was diagnosed with dementia and the pt's daughter is unclear what her baseline is because of the UTIs and pain medication.  The patient's goal and the family's goal is for the patient to ultimately return to [facility] assisted living facility.  Patient and daughter are waiting on recommendations from neurosurgery in regards to the fractures.  Discussed the option of hospice if at any point there are no treatment options or patient declines further treatment and her dementia progresses.  Daughter open to hospice when appropriate likely at the [facility] assisted living facility.  Sarah does not live locally but Elda the patient's other daughter lives in [place].  Advanced directives reviewed.  CODE STATUS discussed.  Patient and family would not want patient to have CPR or be intubated.  Active listening, reflection, reminiscing,  validation, normalization, and empathic support utilized throughout this encounter.  All questions answered.  Palliative Care contact information given.     Updated: Dr. Villanueva    Plan: Continue to provide assistance with goals of care and plan of care discussions following determination of treatment options from medical team.  Complete a POLST form if patient and family wanting prior to discharge.    Thank you for allowing Palliative Care to participate in this patient's care. Please feel free to call [phone] with any questions or concerns.

## 2021-02-17 LAB
ANION GAP SERPL CALC-SCNC: 7 MMOL/L (ref 7–16)
BACTERIA BLD CULT: NORMAL
BACTERIA BLD CULT: NORMAL
BUN SERPL-MCNC: 7 MG/DL (ref 8–22)
CALCIUM SERPL-MCNC: 9.9 MG/DL (ref 8.5–10.5)
CHLORIDE SERPL-SCNC: 98 MMOL/L (ref 96–112)
CO2 SERPL-SCNC: 29 MMOL/L (ref 20–33)
CREAT SERPL-MCNC: 0.24 MG/DL (ref 0.5–1.4)
GLUCOSE SERPL-MCNC: 110 MG/DL (ref 65–99)
POTASSIUM SERPL-SCNC: 3.7 MMOL/L (ref 3.6–5.5)
SIGNIFICANT IND 70042: NORMAL
SIGNIFICANT IND 70042: NORMAL
SITE SITE: NORMAL
SITE SITE: NORMAL
SODIUM SERPL-SCNC: 134 MMOL/L (ref 135–145)
SOURCE SOURCE: NORMAL
SOURCE SOURCE: NORMAL

## 2021-02-17 PROCEDURE — 99232 SBSQ HOSP IP/OBS MODERATE 35: CPT | Performed by: INTERNAL MEDICINE

## 2021-02-17 PROCEDURE — L0464 TLSO 4MOD SACRO-SCAP PRE: HCPCS

## 2021-02-17 PROCEDURE — A9270 NON-COVERED ITEM OR SERVICE: HCPCS | Performed by: STUDENT IN AN ORGANIZED HEALTH CARE EDUCATION/TRAINING PROGRAM

## 2021-02-17 PROCEDURE — 36415 COLL VENOUS BLD VENIPUNCTURE: CPT

## 2021-02-17 PROCEDURE — 700111 HCHG RX REV CODE 636 W/ 250 OVERRIDE (IP): Performed by: STUDENT IN AN ORGANIZED HEALTH CARE EDUCATION/TRAINING PROGRAM

## 2021-02-17 PROCEDURE — 770001 HCHG ROOM/CARE - MED/SURG/GYN PRIV*

## 2021-02-17 PROCEDURE — 306637 HCHG MISC ORTHO ITEM RC 0274

## 2021-02-17 PROCEDURE — 80048 BASIC METABOLIC PNL TOTAL CA: CPT

## 2021-02-17 PROCEDURE — 700102 HCHG RX REV CODE 250 W/ 637 OVERRIDE(OP): Performed by: STUDENT IN AN ORGANIZED HEALTH CARE EDUCATION/TRAINING PROGRAM

## 2021-02-17 RX ADMIN — DILTIAZEM HYDROCHLORIDE 30 MG: 30 TABLET, FILM COATED ORAL at 17:33

## 2021-02-17 RX ADMIN — LORAZEPAM 0.5 MG: 0.5 TABLET ORAL at 15:20

## 2021-02-17 RX ADMIN — FOLIC ACID 1 MG: 1 TABLET ORAL at 05:48

## 2021-02-17 RX ADMIN — BETHANECHOL CHLORIDE 25 MG: 25 TABLET ORAL at 09:24

## 2021-02-17 RX ADMIN — Medication 2000 UNITS: at 05:48

## 2021-02-17 RX ADMIN — OXYCODONE 2.5 MG: 5 TABLET ORAL at 15:34

## 2021-02-17 RX ADMIN — SERTRALINE HYDROCHLORIDE 100 MG: 100 TABLET, FILM COATED ORAL at 05:48

## 2021-02-17 RX ADMIN — DIBASIC SODIUM PHOSPHATE, MONOBASIC POTASSIUM PHOSPHATE AND MONOBASIC SODIUM PHOSPHATE 500 MG: 852; 155; 130 TABLET ORAL at 05:48

## 2021-02-17 RX ADMIN — OXYCODONE HYDROCHLORIDE AND ACETAMINOPHEN 500 MG: 500 TABLET ORAL at 05:48

## 2021-02-17 RX ADMIN — ENOXAPARIN SODIUM 40 MG: 40 INJECTION SUBCUTANEOUS at 05:49

## 2021-02-17 RX ADMIN — CYANOCOBALAMIN TAB 500 MCG 1000 MCG: 500 TAB at 05:48

## 2021-02-17 RX ADMIN — LORAZEPAM 0.5 MG: 0.5 TABLET ORAL at 01:39

## 2021-02-17 RX ADMIN — DOCUSATE SODIUM 50 MG AND SENNOSIDES 8.6 MG 2 TABLET: 8.6; 5 TABLET, FILM COATED ORAL at 05:48

## 2021-02-17 ASSESSMENT — PAIN SCALES - PAIN ASSESSMENT IN ADVANCED DEMENTIA (PAINAD)
TOTALSCORE: 0
BREATHING: NORMAL
FACIALEXPRESSION: SMILING OR INEXPRESSIVE
CONSOLABILITY: NO NEED TO CONSOLE
BODYLANGUAGE: RELAXED

## 2021-02-17 ASSESSMENT — ENCOUNTER SYMPTOMS
BACK PAIN: 1
FALLS: 1
SHORTNESS OF BREATH: 0
PALPITATIONS: 0
NECK PAIN: 1
COUGH: 0

## 2021-02-17 ASSESSMENT — PAIN DESCRIPTION - PAIN TYPE
TYPE: ACUTE PAIN
TYPE: ACUTE PAIN

## 2021-02-17 NOTE — THERAPY
PT consult received. Evaluation deferred until approriate TLSO has arrived. Thanks    Viridiana Davidson, PT, DPT Voalte [id number]

## 2021-02-17 NOTE — PROGRESS NOTES
Order for TLSO back support brace to be fitted to pt has been submitted to [facility]. If any questions you can contact orthto [employer] at ext # [phone].

## 2021-02-17 NOTE — PROGRESS NOTES
[facility] Medicine Daily Progress Note    Date of Service  2/16/2021    Chief Complaint  85 y.o. female admitted 2/12/2021 with AMS, failure to thrive, back pain    Hospital Course  85 y.o. demented female from [facility] who presented 2/12/2021 with AMS, failure to thrive, back pain. History obtained from daughter present at bedside as pt is poor historian. Per daughter, she has had worsening back pain, recent L1 fracture noted in 12/2020. They went to see neurosurgery Friday, 2/12 - MD at [facility] sent her to ER for MRI and further workup. She was found to have pyuria, due to prior ESBL - has been started on Meropenem by admitting provider.    Interval Problem Update  2/13: continue Meropenem, ID consulted for assistance with abx. Notable L1 compression fracture, 20% spinal stenosis. As patient is referred from outpt neurosurgery, MRI lumbar ordered.    2/14: MRI not completed yet. Ordered as needed morphine / Ativan for MRI. Patient was agitated overnight, unpleasant to nursing staff. Had long discussion with daughters (Sarah - present at bedside, Elda MORIN via phone) for goal of care - DNR status confirmed, pursue with MRI for now, did not want to discuss further end of life care at this time.    2/15: MRI lumbar notable known L1 compression fracture but also new L5 compression fracture and severe T11 compression. ?vertebroplasty/kyphoplasty. Consulted neurosurgery - ordered CT thoracic/lumbar. Await formal recommendations. Eventual d/c to SNF.    2/16: Dr. Khan has taken over care for patient's fractures per patient and daughter request.  No surgical intervention per his recommendations.  Orders placed for new, better fitting TLSO brace.  Appreciate palliative care assistance with goals of care discussion.  Discussed with patient and daughter at bedside, questions answered.    Consultants/Specialty  ID  Palliative care    Code Status  DNAR/DNI    Disposition  Await neurosurgery recommendations  Eventual  SNF  Explore PMR - referral sent    Review of Systems  Review of Systems   Constitutional: Positive for malaise/fatigue.   Respiratory: Negative for cough and shortness of breath.    Cardiovascular: Negative for chest pain and palpitations.   Musculoskeletal: Positive for back pain, falls, joint pain and neck pain.   All other systems reviewed and are negative.       Physical Exam  Temp:  [36.1 °C (96.9 °F)-37.6 °C (99.7 °F)] 36.2 °C (97.2 °F)  Pulse:  [76-85] 85  Resp:  [16-17] 16  BP: (128-154)/(71-81) 146/81  SpO2:  [95 %-96 %] 95 %    Physical Exam  Vitals and nursing note reviewed.   Constitutional:       Appearance: Normal appearance. She is normal weight.   HENT:      Head: Normocephalic and atraumatic.      Right Ear: External ear normal.      Left Ear: External ear normal.      Nose: Nose normal.      Mouth/Throat:      Mouth: Mucous membranes are dry.      Pharynx: Oropharynx is clear.   Eyes:      General: No scleral icterus.     Conjunctiva/sclera: Conjunctivae normal.   Cardiovascular:      Rate and Rhythm: Normal rate and regular rhythm.      Pulses: Normal pulses.   Pulmonary:      Effort: No respiratory distress.      Breath sounds: No wheezing.   Abdominal:      Palpations: Abdomen is soft.      Tenderness: There is no abdominal tenderness. There is no guarding or rebound.   Musculoskeletal:         General: Signs of injury present.      Cervical back: Neck supple. No tenderness.      Comments: Mildly tender in lumbar region  Trace LE edema   Skin:     General: Skin is warm and dry.      Capillary Refill: Capillary refill takes 2 to 3 seconds.   Neurological:      Mental Status: She is alert.      Comments: AAO to self and place  Follows some command  Minimally verbal   Psychiatric:         Cognition and Memory: Cognition is impaired.      Comments: Appears anxious         Fluids    Intake/Output Summary (Last 24 hours) at 2/16/2021 2010  Last data filed at 2/16/2021 0900  Gross per 24 hour   Intake  360 ml   Output 150 ml   Net 210 ml       Laboratory  Recent Labs     02/14/21  0626 02/16/21  0347   WBC 8.1 8.3   RBC 4.38 4.24   HEMOGLOBIN 13.1 13.0   HEMATOCRIT 41.0 40.2   MCV 93.6 94.8   MCH 29.9 30.7   MCHC 32.0* 32.3*   RDW 47.6 47.2   PLATELETCT 285 291   MPV 9.5 9.3     Recent Labs     02/14/21  0626 02/16/21  0347   SODIUM 133* 133*   POTASSIUM 3.5* 3.3*   CHLORIDE 100 97   CO2 24 27   GLUCOSE 91 98   BUN 12 12   CREATININE 0.26* 0.24*   CALCIUM 9.3 9.9                   Imaging  CT-LSPINE W/O PLUS RECONS   Final Result      1.  There is a severe burst fracture at the L1 level, significantly progressed since the prior CT and similar to the recent MRI from yesterday.   2.  There is slight concavity of the L5 superior endplate is noted as discussed on recent MRI.   3.  Multilevel degenerative disc disease and arthropathy is more fully discussed on the MRI.      CT-TSPINE W/O PLUS RECONS   Final Result         1.  Minimal interval loss of height of the central portion of the T11 vertebral body currently measuring 12.8 mm compared with 17.3 mm on 12/27/2020.      2.  No bony spinal canal compromise.      3.  No other thoracic vertebral body fracture. No posterior element fracture.      4.  No significant degenerative change.         MR-LUMBAR SPINE-W/O   Final Result      1.  T11 mild-moderate recent or subacute insufficiency compression fracture which was not present on CT studies from 12/27/2020. This appears amenable to vertebral augmentation.   2.  L1 marked compression fracture with substantial progression since CT studies from 12/27/2020. There is posterior cortical buckling/retropulsion. No associated epidural hematoma. This lesion appears amenable to vertebral augmentation.   3.  L5 mild superior endplate compression fracture with only slight superior endplate concavity. Bandlike marrow edema signal indicative of recent or subacute fracture. This fracture was not present on CT studies from  12/27/2020.      CT-PELVIS W/O   Final Result      1.  No acute fracture or dislocation. Cortical irregularity in the left suprapubic ramus noted on the recent radiograph is related to a nutrient vessel.   2.  Ill-defined sclerosis in the sacrum, related to chronic sacral insufficiency fracture.   3.  Colonic diverticulosis.      DX-HIP-COMPLETE - UNILATERAL 2+ LEFT   Final Result      1.  Subtle cortical irregularity along the superior aspect of the left superior pubic ramus may represent a subacute to chronic nondisplaced fracture.   2.  No proximal femoral fracture.           Assessment/Plan  * Acute low back pain- (present on admission)  Assessment & Plan  L1 compression fracture noted by CT 12/2020 - neurosurgery consulted at the time - TLSO brace but no intervention planned  Saw outpt neurosurgery 2/12 - who sent patient to hospital for MRI workup  MRI lumbar: severe L1 compression fracture compared to prior 12/2020, mild L5 compression fracture, DJD of T11    PT/OT  Suspect eventual SNF d/c  Pain control    Neurosurgery consulted, appreciate recommendations    Acute encephalopathy- (present on admission)  Assessment & Plan  IMPROVED  Toxic/metabolic etiology, back pain exacerbating  Does have underlying dementia  ESBL E.coli UTI -- adequately treated with abx per ID  HIV/syphilis -- negative  Negative acute CT head 12/27/2020  S/p trial of IV thiamine  Borderline low B12/folate -- on replacement therapy  Minimize sedatives    Dementia (HCC)- (present on admission)  Assessment & Plan  Patient assisted living facility patient has advanced dementia poor historian    Acute cystitis without hematuria- (present on admission)  Assessment & Plan  Adequately treated  UA pyuria  UCx 2/12: ESBL E.coli  Abx: s/p fosphomycin 3g x1 2/14 and Meropenem (2/12-2/14)    ID consulted - adequately treated    Essential hypertension- (present on admission)  Assessment & Plan  Cardiazem  PRN labetalol    Failure to thrive in  adult  Assessment & Plan  Frequent hospitalization for recurrent UTI, back pain  Discussed with daughters (including med MORIN) on 2/14 - DNR confirmed, pursue MRI lumbar, not ready to discuss end of life goal of care at this time    Palliative care consulted for advance care planning    Frequent falls- (present on admission)  Assessment & Plan  Fall precautions  PT/OT    Hip pain- (present on admission)  Assessment & Plan  ?radiculopathy  No hip fracture seen by Xray  L1 fracture by CT 12/2020 and severe burst fracture seen again 2/15/21    Physical deconditioning- (present on admission)  Assessment & Plan  PT/OT  Frequent falls, osteopenia  Recent compression lumbar spine fracture, wrist fracture    Urinary incontinence- (present on admission)  Assessment & Plan  Bethanechol  PRN bladder scan and straight cath    Vitamin D deficiency- (present on admission)  Assessment & Plan  supplement    Osteopenia- (present on admission)  Assessment & Plan  Supportive care  Fall precautions       VTE prophylaxis: Lovenox

## 2021-02-17 NOTE — CARE PLAN
Problem: Safety  Goal: Will remain free from injury  Outcome: PROGRESSING AS EXPECTED    Patient has bed in low and locked position with call light and belongings within reach. Patient hasSCD's on and frequent 1 hour rounding performed.     Problem: Infection  Goal: Will remain free from infection  Outcome: PROGRESSING AS EXPECTED     Patient Vitals taken per protocol and assessment of wounds. All VSS at this time.

## 2021-02-17 NOTE — PROGRESS NOTES
Neurosurgery Progress Note    Subjective:  Sent from office Friday for severe lbp and disorientation  Our service never got contacted pt was admitted  Family requesting Erin to take over for spine care        Exam:  Pt awake, alert  Oriented to self and place, confused to time  UE/LE str intact with coaching.     BP  Min: 146/81  Max: 171/81  Pulse  Av.8  Min: 75  Max: 87  Resp  Av.3  Min: 16  Max: 17  Temp  Av.6 °C (97.9 °F)  Min: 36.2 °C (97.2 °F)  Max: 36.9 °C (98.4 °F)  SpO2  Av.8 %  Min: 93 %  Max: 97 %    No data recorded    Recent Labs     21   WBC 8.3   RBC 4.24   HEMOGLOBIN 13.0   HEMATOCRIT 40.2   MCV 94.8   MCH 30.7   MCHC 32.3*   RDW 47.2   PLATELETCT 291   MPV 9.3     Recent Labs     21  0347 21  0651   SODIUM 133* 134*   POTASSIUM 3.3* 3.7   CHLORIDE 97 98   CO2 27 29   GLUCOSE 98 110*   BUN 12 7*   CREATININE 0.24* 0.24*   CALCIUM 9.9 9.9               Intake/Output       21 0700 - 21 0659 21 07 - 21 0659      1900-0659 Total 190059 Total       Intake    P.O.  120  -- 120  100  -- 100    P.O. 120 -- 120 100 -- 100    Total Intake 120 -- 120 100 -- 100       Output    Urine  --  -- --  900  -- 900    Number of Times Voided -- 2 x 2 x -- -- --    Output (mL) (External Urinary Catheter (Female Wick)) -- -- -- 900 -- 900    Stool  --  -- --  --  -- --    Number of Times Stooled 3 x -- 3 x -- -- --    Total Output -- -- -- 900 -- 900       Net I/O     120 -- 120 -800 -- -800            Intake/Output Summary (Last 24 hours) at 2021 0948  Last data filed at 2021 0900  Gross per 24 hour   Intake 100 ml   Output 900 ml   Net -800 ml            • cyanocobalamin  1,000 mcg DAILY   • folic acid  1 mg DAILY   • bethanechol  25 mg BID   • DILTIAZem  30 mg Q EVENING   • carboxymethylcellulose  2 Drop Q4HRS PRN   • sertraline  100 mg QAM   • ascorbic acid  500 mg DAILY   • vitamin D  2,000 Units QAM   •  ondansetron  4 mg Q4HRS PRN   • hydrALAZINE  50 mg Q8HRS PRN   • LORazepam  0.5 mg Q8HRS PRN   • enoxaparin  40 mg DAILY   • acetaminophen  650 mg Q6HRS PRN   • senna-docusate  2 tablet BID    And   • polyethylene glycol/lytes  1 Packet QDAY PRN    And   • magnesium hydroxide  30 mL QDAY PRN    And   • bisacodyl  10 mg QDAY PRN   • guaiFENesin dextromethorphan  10 mL Q6HRS PRN   • labetalol  10 mg Q4HRS PRN   • ondansetron  4 mg Q4HRS PRN   • Pharmacy Consult Request  1 Each PHARMACY TO DOSE   • oxyCODONE immediate-release  2.5 mg Q3HRS PRN    Or   • oxyCODONE immediate-release  5 mg Q3HRS PRN    Or   • HYDROmorphone  0.25 mg Q3HRS PRN       Assessment and Plan:  Hospital day # 5 L1 compression fx, T11, L5 minimal new compression fxs  POD #na   Prophylactic anticoagulation: yes           Pain control  PT/OT - TLSO don at edge of bed- needs correct size of brace- d/w RN  UTI - abx per ID  No surg plans  No nsaids/ asa   Dispo per IM- SNF

## 2021-02-17 NOTE — PROGRESS NOTES
Attention TLSO back support brace order. Confirmed with [facility] this morning that they will be delivering the brace today.

## 2021-02-17 NOTE — THERAPY
"   02/16/21 1619   Interdisciplinary Plan of Care Collaboration   Collaboration Comments Per neurosurgery, plan is to consult with ortho spine for possible surgical intervention. Recommendation is that pt \"does not ambulate until that decision is made.\" Continue to hold OT eval until definitive POC in place.      "

## 2021-02-17 NOTE — PROGRESS NOTES
Bedside report received.  Assessment complete.  A&O to self only  Bed alarm is on, pt does not call appropriately, will round frequently.   Patient has 5/10 pain. Pain managed with prescribed medications.  Patient denies SOB, on 2L [facility]  SCD's on.  Patient resting in bed  Review plan with of care with patient. Call light and personal belongings with in reach. Hourly rounding in place. All needs met at this time.

## 2021-02-17 NOTE — CONSULTS
DATE OF SERVICE:  02/16/2021     NEUROSURGICAL CONSULTATION     HISTORY OF PRESENT ILLNESS:  The patient is a very pleasant 85-year-old woman,   well known to me.  I performed an anterior cervical fusion on her several   years ago.  She then had a fall in December and was diagnosed with an L1 mild   compression fracture and seen by Dr. Keane, who recommended bracing.  The   patient had then called [facility][note 3] for a followup appointment and was told   that she was not a patient there, so then she and her daughter then sought   care back at my office for this.  My nurse practitioner saw her this past   Friday and the patient was delirious and in significant pain from her   fractures and we actually sent her back to the emergency room for further   workup given the severity of her symptoms.  The patient said that her pain is   much better controlled now.  She has not really ambulated in her braces at her   assisted living facility.  She does not have it here, but daughter is able to   go get it today.  She does not have any weakness, numbness or tingling in   either of her legs.     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     FAMILY HISTORY:  Noncontributory.     PAST SURGICAL HISTORY:  Anterior cervical diskectomy and fusion by me in 2016   and as above.     SOCIAL HISTORY:  She has been at assisted living.  No ETOH.  No illicits.     MEDICATIONS AT HOME:  Tylenol, vitamin C, Urecholine, Cardizem, Lidoderm,   Macrodantin, Nevaeh-Colace, Zoloft.     MEDICATIONS HERE IN THE HOSPITAL: Vitamin C, Urecholine, vitamin B12,   Cardizem, Lovenox 40 every day, Folvite, K-Phos, bowel protocol, Zoloft,   vitamin D, p.r.n. Tylenol, Robitussin, hydralazine, labetalol, Ativan, Zofran,   and OxyIR.     PHYSICAL EXAMINATION:  GENERAL:  Awake, alert and  oriented x3, no distress.  HEENT:  Pupils equal, round, reactive to light.  Extraocular muscles intact.    Tongue midline.  Face symmetric.  NEUROLOGIC:  Motor is 5/5 in all muscle groups in the upper and lower   extremities except for bilateral quads, which are 4/5 secondary to pain.    Sensory grossly intact to light touch.  She has no hyperreflexia.     LABORATORY DATA:  CBC within normal limits.  Basic metabolic panel within   normal limits except for sodium of 133, K of 3.3.  Coags have not been done.     IMAGING:  A CT scan of the lumbar spine on 02/15/2021 shows severe L1   compression fracture with some retropulsion into the canal.  There is a trace   compression fracture of superior endplate of L5.  A CT of the thoracic spine   shows a small thoracic 11 compression fracture.  MRI of the lumbar spine, MRI   on 02/14 redemonstrates thoracic 11, L1 and L5 fractures as previously   described.  At the L1 burst fracture, there is mild central canal stenosis at   the level of the conus.     PLAN:  The patient appears to be fairly well controlled with her pain right   now.  I would recommend aggressive pain control and TLSO brace and then out of   bed with her TLSO brace.  I do not believe she needs any surgical   intervention at this point given her age, known osteopenia, and normal   neurologic exam.  We will follow closely.  The patient has requested that I   take over care for her fractures at this point.  I have discussed with family   and have notified Dr. Burdick.        ______________________________  MD MONTSERRAT MILIAN/YOAV/TERESA    DD:  02/16/2021 13:29  DT:  02/16/2021 15:19    Job#:  [id number]

## 2021-02-18 ENCOUNTER — APPOINTMENT (OUTPATIENT)
Dept: RADIOLOGY | Facility: MEDICAL CENTER | Age: 86
DRG: 543 | End: 2021-02-18
Attending: NURSE PRACTITIONER
Payer: MEDICARE

## 2021-02-18 LAB
ANION GAP SERPL CALC-SCNC: 11 MMOL/L (ref 7–16)
BUN SERPL-MCNC: 7 MG/DL (ref 8–22)
CALCIUM SERPL-MCNC: 10.2 MG/DL (ref 8.5–10.5)
CHLORIDE SERPL-SCNC: 96 MMOL/L (ref 96–112)
CO2 SERPL-SCNC: 26 MMOL/L (ref 20–33)
CREAT SERPL-MCNC: 0.22 MG/DL (ref 0.5–1.4)
GLUCOSE SERPL-MCNC: 121 MG/DL (ref 65–99)
POTASSIUM SERPL-SCNC: 3.7 MMOL/L (ref 3.6–5.5)
SODIUM SERPL-SCNC: 133 MMOL/L (ref 135–145)

## 2021-02-18 PROCEDURE — 97535 SELF CARE MNGMENT TRAINING: CPT

## 2021-02-18 PROCEDURE — 97166 OT EVAL MOD COMPLEX 45 MIN: CPT

## 2021-02-18 PROCEDURE — 80048 BASIC METABOLIC PNL TOTAL CA: CPT

## 2021-02-18 PROCEDURE — A9270 NON-COVERED ITEM OR SERVICE: HCPCS | Performed by: STUDENT IN AN ORGANIZED HEALTH CARE EDUCATION/TRAINING PROGRAM

## 2021-02-18 PROCEDURE — 93971 EXTREMITY STUDY: CPT | Mod: 26 | Performed by: INTERNAL MEDICINE

## 2021-02-18 PROCEDURE — 700102 HCHG RX REV CODE 250 W/ 637 OVERRIDE(OP): Performed by: STUDENT IN AN ORGANIZED HEALTH CARE EDUCATION/TRAINING PROGRAM

## 2021-02-18 PROCEDURE — 97162 PT EVAL MOD COMPLEX 30 MIN: CPT

## 2021-02-18 PROCEDURE — 99232 SBSQ HOSP IP/OBS MODERATE 35: CPT | Performed by: STUDENT IN AN ORGANIZED HEALTH CARE EDUCATION/TRAINING PROGRAM

## 2021-02-18 PROCEDURE — 93971 EXTREMITY STUDY: CPT | Mod: RT

## 2021-02-18 PROCEDURE — 36415 COLL VENOUS BLD VENIPUNCTURE: CPT

## 2021-02-18 PROCEDURE — 770001 HCHG ROOM/CARE - MED/SURG/GYN PRIV*

## 2021-02-18 PROCEDURE — 700111 HCHG RX REV CODE 636 W/ 250 OVERRIDE (IP): Performed by: STUDENT IN AN ORGANIZED HEALTH CARE EDUCATION/TRAINING PROGRAM

## 2021-02-18 RX ORDER — DILTIAZEM HYDROCHLORIDE 60 MG/1
60 TABLET, FILM COATED ORAL EVERY EVENING
Status: DISCONTINUED | OUTPATIENT
Start: 2021-02-18 | End: 2021-02-22 | Stop reason: HOSPADM

## 2021-02-18 RX ADMIN — BETHANECHOL CHLORIDE 25 MG: 25 TABLET ORAL at 09:29

## 2021-02-18 RX ADMIN — Medication 2000 UNITS: at 06:22

## 2021-02-18 RX ADMIN — HYDRALAZINE HYDROCHLORIDE 50 MG: 50 TABLET, FILM COATED ORAL at 09:29

## 2021-02-18 RX ADMIN — ACETAMINOPHEN 650 MG: 325 TABLET ORAL at 11:54

## 2021-02-18 RX ADMIN — SERTRALINE HYDROCHLORIDE 100 MG: 100 TABLET, FILM COATED ORAL at 06:22

## 2021-02-18 RX ADMIN — OXYCODONE HYDROCHLORIDE AND ACETAMINOPHEN 500 MG: 500 TABLET ORAL at 06:22

## 2021-02-18 RX ADMIN — DOCUSATE SODIUM 50 MG AND SENNOSIDES 8.6 MG 2 TABLET: 8.6; 5 TABLET, FILM COATED ORAL at 06:21

## 2021-02-18 RX ADMIN — DOCUSATE SODIUM 50 MG AND SENNOSIDES 8.6 MG 2 TABLET: 8.6; 5 TABLET, FILM COATED ORAL at 17:06

## 2021-02-18 RX ADMIN — ENOXAPARIN SODIUM 40 MG: 40 INJECTION SUBCUTANEOUS at 06:21

## 2021-02-18 RX ADMIN — DILTIAZEM HYDROCHLORIDE 60 MG: 60 TABLET, FILM COATED ORAL at 18:00

## 2021-02-18 RX ADMIN — CYANOCOBALAMIN TAB 500 MCG 1000 MCG: 500 TAB at 06:22

## 2021-02-18 RX ADMIN — LORAZEPAM 0.5 MG: 0.5 TABLET ORAL at 18:08

## 2021-02-18 RX ADMIN — BETHANECHOL CHLORIDE 25 MG: 25 TABLET ORAL at 19:20

## 2021-02-18 RX ADMIN — FOLIC ACID 1 MG: 1 TABLET ORAL at 06:22

## 2021-02-18 ASSESSMENT — ENCOUNTER SYMPTOMS
FEVER: 0
COUGH: 0
NECK PAIN: 1
CHILLS: 0
SHORTNESS OF BREATH: 0
PALPITATIONS: 0
BACK PAIN: 1
FALLS: 1

## 2021-02-18 ASSESSMENT — COGNITIVE AND FUNCTIONAL STATUS - GENERAL
TURNING FROM BACK TO SIDE WHILE IN FLAT BAD: UNABLE
PERSONAL GROOMING: A LITTLE
HELP NEEDED FOR BATHING: A LOT
MOVING FROM LYING ON BACK TO SITTING ON SIDE OF FLAT BED: UNABLE
MOVING TO AND FROM BED TO CHAIR: UNABLE
DAILY ACTIVITIY SCORE: 14
SUGGESTED CMS G CODE MODIFIER MOBILITY: CN
TOILETING: TOTAL
EATING MEALS: A LITTLE
STANDING UP FROM CHAIR USING ARMS: TOTAL
DRESSING REGULAR UPPER BODY CLOTHING: A LITTLE
WALKING IN HOSPITAL ROOM: TOTAL
MOBILITY SCORE: 6
DRESSING REGULAR LOWER BODY CLOTHING: A LOT
SUGGESTED CMS G CODE MODIFIER DAILY ACTIVITY: CK
CLIMB 3 TO 5 STEPS WITH RAILING: TOTAL

## 2021-02-18 ASSESSMENT — ACTIVITIES OF DAILY LIVING (ADL): TOILETING: REQUIRES ASSIST

## 2021-02-18 ASSESSMENT — GAIT ASSESSMENTS
GAIT LEVEL OF ASSIST: UNABLE TO PARTICIPATE
DISTANCE (FEET): 0

## 2021-02-18 ASSESSMENT — PAIN DESCRIPTION - PAIN TYPE
TYPE: ACUTE PAIN
TYPE: ACUTE PAIN

## 2021-02-18 NOTE — PROGRESS NOTES
[facility] Medicine Daily Progress Note    Date of Service  2/18/2021    Chief Complaint  85 y.o. female admitted 2/12/2021 with AMS, failure to thrive, back pain    Hospital Course  85 y.o. demented female from [facility] who presented 2/12/2021 with AMS, failure to thrive, back pain. History obtained from daughter present at bedside as pt is poor historian. Per daughter, she has had worsening back pain, recent L1 fracture noted in 12/2020. They went to see neurosurgery Friday, 2/12 - MD at [facility] sent her to ER for MRI and further workup. She was found to have pyuria, due to prior ESBL - has been started on Meropenem by admitting provider.    Interval Problem Update  2/13: continue Meropenem, ID consulted for assistance with abx. Notable L1 compression fracture, 20% spinal stenosis. As patient is referred from outpt neurosurgery, MRI lumbar ordered.    2/14: MRI not completed yet. Ordered as needed morphine / Ativan for MRI. Patient was agitated overnight, unpleasant to nursing staff. Had long discussion with daughters (Sarah - present at bedside, Elda MORIN via phone) for goal of care - DNR status confirmed, pursue with MRI for now, did not want to discuss further end of life care at this time.    2/15: MRI lumbar notable known L1 compression fracture but also new L5 compression fracture and severe T11 compression. ?vertebroplasty/kyphoplasty. Consulted neurosurgery - ordered CT thoracic/lumbar. Await formal recommendations. Eventual d/c to SNF.    2/16: Dr. Khan has taken over care for patient's fractures per patient and daughter request.  No surgical intervention per his recommendations.  Orders placed for new, better fitting TLSO brace.  Appreciate palliative care assistance with goals of care discussion.  Discussed with patient and daughter at bedside, questions answered.    2/17: TLSO fitted today. Awaiting PT/OT evals for dispo recs. No acute complaints.    2/18: This morning patient noted to have right  leg swelling Doppler ultrasound was performed which did not show a DVT.  Neurology continue to recommend nonsurgical management.  Working on placement to skilled nursing facility.  Patient has no complaints today.  Blood pressure continues to be elevated will increase the Cardizem from 30 mg to 60 mg.    Consultants/Specialty  ID  Palliative care    Code Status  DNAR/DNI    Disposition  SNF vs HH pending goals of care    Review of Systems  Review of Systems   Constitutional: Positive for malaise/fatigue. Negative for chills and fever.   Respiratory: Negative for cough and shortness of breath.    Cardiovascular: Positive for leg swelling. Negative for chest pain and palpitations.   Musculoskeletal: Positive for back pain, falls, joint pain and neck pain.   All other systems reviewed and are negative.       Physical Exam  Temp:  [36.3 °C (97.4 °F)-36.9 °C (98.4 °F)] 36.8 °C (98.2 °F)  Pulse:  [72-86] 80  Resp:  [15-17] 15  BP: (156-185)/() 185/100  SpO2:  [91 %-99 %] 99 %    Physical Exam  Vitals and nursing note reviewed.   Constitutional:       General: She is not in acute distress.     Appearance: Normal appearance. She is normal weight.   HENT:      Head: Normocephalic and atraumatic.   Eyes:      General: No scleral icterus.     Conjunctiva/sclera: Conjunctivae normal.   Cardiovascular:      Rate and Rhythm: Normal rate and regular rhythm.      Heart sounds: Normal heart sounds. No murmur.   Pulmonary:      Effort: No respiratory distress.      Breath sounds: No wheezing.   Abdominal:      Palpations: Abdomen is soft.      Tenderness: There is no abdominal tenderness. There is no guarding or rebound.   Musculoskeletal:         General: Signs of injury present.      Cervical back: No tenderness.      Right lower leg: Edema present.      Left lower leg: No edema.      Comments: Mildly tender in lumbar region  Trace LE edema   Skin:     General: Skin is warm and dry.      Capillary Refill: Capillary refill  takes 2 to 3 seconds.   Neurological:      Mental Status: She is alert.      Comments: AAO to self and place  Follows some command  Minimally verbal   Psychiatric:         Mood and Affect: Mood normal.         Cognition and Memory: Cognition is impaired.      Comments: Appears anxious         Fluids    Intake/Output Summary (Last 24 hours) at 2/18/2021 1438  Last data filed at 2/18/2021 1228  Gross per 24 hour   Intake 240 ml   Output 1600 ml   Net -1360 ml       Laboratory  Recent Labs     02/16/21  0347   WBC 8.3   RBC 4.24   HEMOGLOBIN 13.0   HEMATOCRIT 40.2   MCV 94.8   MCH 30.7   MCHC 32.3*   RDW 47.2   PLATELETCT 291   MPV 9.3     Recent Labs     02/16/21  0347 02/17/21  0651 02/18/21  0437   SODIUM 133* 134* 133*   POTASSIUM 3.3* 3.7 3.7   CHLORIDE 97 98 96   CO2 27 29 26   GLUCOSE 98 110* 121*   BUN 12 7* 7*   CREATININE 0.24* 0.24* 0.22*   CALCIUM 9.9 9.9 10.2                   Imaging  US-EXTREMITY VENOUS LOWER UNILAT RIGHT   Final Result      CT-LSPINE W/O PLUS RECONS   Final Result      1.  There is a severe burst fracture at the L1 level, significantly progressed since the prior CT and similar to the recent MRI from yesterday.   2.  There is slight concavity of the L5 superior endplate is noted as discussed on recent MRI.   3.  Multilevel degenerative disc disease and arthropathy is more fully discussed on the MRI.      CT-TSPINE W/O PLUS RECONS   Final Result         1.  Minimal interval loss of height of the central portion of the T11 vertebral body currently measuring 12.8 mm compared with 17.3 mm on 12/27/2020.      2.  No bony spinal canal compromise.      3.  No other thoracic vertebral body fracture. No posterior element fracture.      4.  No significant degenerative change.         MR-LUMBAR SPINE-W/O   Final Result      1.  T11 mild-moderate recent or subacute insufficiency compression fracture which was not present on CT studies from 12/27/2020. This appears amenable to vertebral augmentation.    2.  L1 marked compression fracture with substantial progression since CT studies from 12/27/2020. There is posterior cortical buckling/retropulsion. No associated epidural hematoma. This lesion appears amenable to vertebral augmentation.   3.  L5 mild superior endplate compression fracture with only slight superior endplate concavity. Bandlike marrow edema signal indicative of recent or subacute fracture. This fracture was not present on CT studies from 12/27/2020.      CT-PELVIS W/O   Final Result      1.  No acute fracture or dislocation. Cortical irregularity in the left suprapubic ramus noted on the recent radiograph is related to a nutrient vessel.   2.  Ill-defined sclerosis in the sacrum, related to chronic sacral insufficiency fracture.   3.  Colonic diverticulosis.      DX-HIP-COMPLETE - UNILATERAL 2+ LEFT   Final Result      1.  Subtle cortical irregularity along the superior aspect of the left superior pubic ramus may represent a subacute to chronic nondisplaced fracture.   2.  No proximal femoral fracture.           Assessment/Plan  * Acute low back pain- (present on admission)  Assessment & Plan  L1 compression fracture noted by CT 12/2020 - neurosurgery consulted at the time - TLSO brace but no intervention planned  Saw outpt neurosurgery 2/12 - who sent patient to hospital for MRI workup  MRI lumbar: severe L1 compression fracture compared to prior 12/2020, mild L5 compression fracture, DJD of T11    PT/OT  Suspect eventual SNF d/c  Pain control    Neurosurgery consulted, appreciate recommendations    Acute encephalopathy- (present on admission)  Assessment & Plan  IMPROVED  Toxic/metabolic etiology, back pain exacerbating  Does have underlying dementia  ESBL E.coli UTI -- adequately treated with abx per ID  HIV/syphilis -- negative  Negative acute CT head 12/27/2020  S/p trial of IV thiamine  Borderline low B12/folate -- on replacement therapy  Minimize sedatives    Dementia (HCC)- (present on  admission)  Assessment & Plan  Patient assisted living facility patient has advanced dementia poor historian    Acute cystitis without hematuria- (present on admission)  Assessment & Plan  Adequately treated  UA pyuria  UCx 2/12: ESBL E.coli  Abx: s/p fosphomycin 3g x1 2/14 and Meropenem (2/12-2/14)    ID consulted - adequately treated  Resolved    Essential hypertension- (present on admission)  Assessment & Plan  Increase Cardizem from 30 mg to 60 mg.  PRN labetalol    Failure to thrive in adult  Assessment & Plan  Frequent hospitalization for recurrent UTI, back pain  Discussed with daughters (including LIDIARAINA med) on 2/14 - DNR confirmed, pursue MRI lumbar, not ready to discuss end of life goal of care at this time    Palliative care consulted for advance care planning, appreciate assistance.    Frequent falls- (present on admission)  Assessment & Plan  Fall precautions  PT/OT    Hip pain- (present on admission)  Assessment & Plan  ?radiculopathy  No hip fracture seen by Xray  L1 fracture by CT 12/2020 and severe burst fracture seen again 2/15/21    Physical deconditioning- (present on admission)  Assessment & Plan  PT/OT  Frequent falls, osteopenia  Recent compression lumbar spine fracture, wrist fracture    Urinary incontinence- (present on admission)  Assessment & Plan  Bethanechol  PRN bladder scan and straight cath    Vitamin D deficiency- (present on admission)  Assessment & Plan  supplement    Osteopenia- (present on admission)  Assessment & Plan  Supportive care  Fall precautions       VTE prophylaxis: Lovenox

## 2021-02-18 NOTE — THERAPY
Physical Therapy   Initial Evaluation     Patient Name: Lucita Babcock  Age:  85 y.o., Sex:  female  Medical Record #: [id number]  Today's Date: 2/18/2021     Precautions: Fall Risk, Swallow Precautions ( See Comments)    Assessment  Patient is 85 y.o. female admitted for R hip pain, AMS, failure to thrive, UTI and back pain. Pt with recent L1 compression fracture in December 2020, found to have acute T11 and L5 fractures. Neurosurgery was consulted and recommended TLSO, donned at EOB. Pt presents to physical therapy most limited by pain and reluctance to mobilize. Pt required Max A of 1-2 people to complete functional mobility. Once sitting EOB, pt demonstrated posterior lean requiring frequent cues to self correct posture. Total A to don TLSO at EOB and was noted to be ill fitting, Ortho Traction tech able to modify brace width but pt still reported discomfort. Performed partial sit to stand at EOB x4 trials with Max A x2 people and FWW support. Dtr present and aware of current functional limitations and barriers to returning to [facility] at this time. PT will continue to follow while in house.       Plan    Recommend Physical Therapy 4 times per week until therapy goals are met for the following treatments:  Bed Mobility, Equipment, Gait Training, Neuro Re-Education / Balance, Self Care/Home Evaluation, Stair Training, Therapeutic Activities and Therapeutic Exercises    DC Equipment Recommendations: Unable to determine at this time  Discharge Recommendations: Recommend post-acute placement for additional physical therapy services prior to discharge home       02/18/21 1520   Precautions   Precautions Fall Risk;Swallow Precautions ( See Comments)   Comments hx of dementia   Pain 0 - 10 Group   Therapist Pain Assessment During Activity;Nurse Notified  (pain during mobility, back and knees)   Prior Living Situation   Prior Services Intermittent Physical Support for ADL Per Service   Housing / Facility Assisted Living  Residence   Equipment Owned Front-Wheel Walker   Lives with - Patient's Self Care Capacity Attendant / Paid Care Giver   Comments pt has been at the [facility] since January following Dec 2020 admission and SNF stay   Prior Level of Functional Mobility   Bed Mobility Independent   Transfer Status Independent   Ambulation Independent   Distance Ambulation (Feet)   (household)   Assistive Devices Used Front-Wheel Walker   Cognition    Level of Consciousness Confused   Ability To Follow Commands 1 Step   Safety Awareness Impaired   Attention Impaired   Comments pt more agreeable to mobilize once dtr is present   Active ROM Lower Body    Active ROM Lower Body  WDL   Strength Lower Body   Lower Body Strength  X   Gross Strength Generalized Weakness, Equal Bilaterally   Balance Assessment   Sitting Balance (Static) Fair -   Sitting Balance (Dynamic) Poor +   Standing Balance (Static) Poor   Standing Balance (Dynamic) Poor -   Weight Shift Sitting Poor   Weight Shift Standing Poor   Comments w/ FWW in standing   Gait Analysis   Gait Level Of Assist Unable to Participate   Bed Mobility    Supine to Sit Maximal Assist   Sit to Supine Maximal Assist   Scooting Maximal Assist   Rolling Maximum Assist to Lt.;Maximal Assist to Rt.   Comments heavy prompting to assist with log rolling and scooting to EOB   Functional Mobility   Sit to Stand Maximal Assist  (x2 people, partial stands x3)   Bed, Chair, Wheelchair Transfer Unable to Participate   Comments unable to side step to HOB   Patient / Family Goals    Patient / Family Goal #1 to go home   Short Term Goals    Short Term Goal # 1 pt will perform supine <> sit with HOB flat, no railing and log roll with Min A in 6 visits   Short Term Goal # 2 pt will perform sit <> stand and functional transfers with FWW and Mod A in 6 vistis   Short Term Goal # 3 pt will ambulate > 10 ft with LRAD and Mod A to improve mobility in 6 visits   Short Term Goal # 4 pt will don/doff TLSO at EOB  with Min A in 6 visits   Problem List    Problems Pain;Impaired Bed Mobility;Impaired Transfers;Impaired Ambulation;Functional Strength Deficit;Impaired Balance;Decreased Activity Tolerance   Anticipated Discharge Equipment and Recommendations   DC Equipment Recommendations Unable to determine at this time   Discharge Recommendations Recommend post-acute placement for additional physical therapy services prior to discharge home

## 2021-02-18 NOTE — DISCHARGE PLANNING
"Anticipated Discharge Disposition: Dispo pending PT/OT notes- possibly SNF/[facility]    Action: LSW met w/ pt and dtr/PORAINA Schroeder at bedside and verified facesheet demographics.  Pt recently moved into [facility] as of January 2021 and is a \"Level 3\".  Pt uses a walker to mobilize, dtr says her need for assistance was increasing due to pain prior to this admission.    LSW discussed dc plan, Elda stated that she needed to speak to a doctor and get a medical update before she can make any decisions.  She is hesitant to agree to SNF at this time due to concerns of potential quarantine and not being able to see pt.      LSW informed medical team in IDT rounds of Elda's request to speak to a doctor.    Barriers to Discharge: dispo recs    Plan: CM w/ f/u w/ medical team for dc needs, pending PT/OT recs.    "

## 2021-02-18 NOTE — PROGRESS NOTES
[facility] Medicine Daily Progress Note    Date of Service  2/17/2021    Chief Complaint  85 y.o. female admitted 2/12/2021 with AMS, failure to thrive, back pain    Hospital Course  85 y.o. demented female from [facility] who presented 2/12/2021 with AMS, failure to thrive, back pain. History obtained from daughter present at bedside as pt is poor historian. Per daughter, she has had worsening back pain, recent L1 fracture noted in 12/2020. They went to see neurosurgery Friday, 2/12 - MD at [facility] sent her to ER for MRI and further workup. She was found to have pyuria, due to prior ESBL - has been started on Meropenem by admitting provider.    Interval Problem Update  2/13: continue Meropenem, ID consulted for assistance with abx. Notable L1 compression fracture, 20% spinal stenosis. As patient is referred from outpt neurosurgery, MRI lumbar ordered.    2/14: MRI not completed yet. Ordered as needed morphine / Ativan for MRI. Patient was agitated overnight, unpleasant to nursing staff. Had long discussion with daughters (Sarah - present at bedside, Elda MORIN via phone) for goal of care - DNR status confirmed, pursue with MRI for now, did not want to discuss further end of life care at this time.    2/15: MRI lumbar notable known L1 compression fracture but also new L5 compression fracture and severe T11 compression. ?vertebroplasty/kyphoplasty. Consulted neurosurgery - ordered CT thoracic/lumbar. Await formal recommendations. Eventual d/c to SNF.    2/16: Dr. Khan has taken over care for patient's fractures per patient and daughter request.  No surgical intervention per his recommendations.  Orders placed for new, better fitting TLSO brace.  Appreciate palliative care assistance with goals of care discussion.  Discussed with patient and daughter at bedside, questions answered.    2/17: TLSO fitted today. Awaiting PT/OT evals for dispo recs. No acute complaints.    Consultants/Specialty  ID  Palliative  care    Code Status  DNAR/DNI    Disposition  SNF vs HH pending goals of care    Review of Systems  Review of Systems   Constitutional: Positive for malaise/fatigue.   Respiratory: Negative for cough and shortness of breath.    Cardiovascular: Negative for chest pain and palpitations.   Musculoskeletal: Positive for back pain, falls, joint pain and neck pain.   All other systems reviewed and are negative.       Physical Exam  Temp:  [36.3 °C (97.4 °F)-36.9 °C (98.4 °F)] 36.3 °C (97.4 °F)  Pulse:  [75-86] 84  Resp:  [16-17] 17  BP: (151-171)/(77-81) 161/78  SpO2:  [91 %-97 %] 91 %    Physical Exam  Vitals and nursing note reviewed.   Constitutional:       Appearance: Normal appearance. She is normal weight.   HENT:      Head: Normocephalic and atraumatic.      Right Ear: External ear normal.      Left Ear: External ear normal.      Nose: Nose normal.      Mouth/Throat:      Mouth: Mucous membranes are dry.      Pharynx: Oropharynx is clear.   Eyes:      General: No scleral icterus.     Conjunctiva/sclera: Conjunctivae normal.   Cardiovascular:      Rate and Rhythm: Normal rate and regular rhythm.      Pulses: Normal pulses.   Pulmonary:      Effort: No respiratory distress.      Breath sounds: No wheezing.   Abdominal:      Palpations: Abdomen is soft.      Tenderness: There is no abdominal tenderness. There is no guarding or rebound.   Musculoskeletal:         General: Signs of injury present.      Cervical back: Neck supple. No tenderness.      Comments: Mildly tender in lumbar region  Trace LE edema   Skin:     General: Skin is warm and dry.      Capillary Refill: Capillary refill takes 2 to 3 seconds.   Neurological:      Mental Status: She is alert.      Comments: AAO to self and place  Follows some command  Minimally verbal   Psychiatric:         Cognition and Memory: Cognition is impaired.      Comments: Appears anxious         Fluids    Intake/Output Summary (Last 24 hours) at 2/17/2021 0584  Last data filed  at 2/17/2021 1400  Gross per 24 hour   Intake 220 ml   Output 900 ml   Net -680 ml       Laboratory  Recent Labs     02/16/21  0347   WBC 8.3   RBC 4.24   HEMOGLOBIN 13.0   HEMATOCRIT 40.2   MCV 94.8   MCH 30.7   MCHC 32.3*   RDW 47.2   PLATELETCT 291   MPV 9.3     Recent Labs     02/16/21  0347 02/17/21  0651   SODIUM 133* 134*   POTASSIUM 3.3* 3.7   CHLORIDE 97 98   CO2 27 29   GLUCOSE 98 110*   BUN 12 7*   CREATININE 0.24* 0.24*   CALCIUM 9.9 9.9                   Imaging  CT-LSPINE W/O PLUS RECONS   Final Result      1.  There is a severe burst fracture at the L1 level, significantly progressed since the prior CT and similar to the recent MRI from yesterday.   2.  There is slight concavity of the L5 superior endplate is noted as discussed on recent MRI.   3.  Multilevel degenerative disc disease and arthropathy is more fully discussed on the MRI.      CT-TSPINE W/O PLUS RECONS   Final Result         1.  Minimal interval loss of height of the central portion of the T11 vertebral body currently measuring 12.8 mm compared with 17.3 mm on 12/27/2020.      2.  No bony spinal canal compromise.      3.  No other thoracic vertebral body fracture. No posterior element fracture.      4.  No significant degenerative change.         MR-LUMBAR SPINE-W/O   Final Result      1.  T11 mild-moderate recent or subacute insufficiency compression fracture which was not present on CT studies from 12/27/2020. This appears amenable to vertebral augmentation.   2.  L1 marked compression fracture with substantial progression since CT studies from 12/27/2020. There is posterior cortical buckling/retropulsion. No associated epidural hematoma. This lesion appears amenable to vertebral augmentation.   3.  L5 mild superior endplate compression fracture with only slight superior endplate concavity. Bandlike marrow edema signal indicative of recent or subacute fracture. This fracture was not present on CT studies from 12/27/2020.      CT-PELVIS  W/O   Final Result      1.  No acute fracture or dislocation. Cortical irregularity in the left suprapubic ramus noted on the recent radiograph is related to a nutrient vessel.   2.  Ill-defined sclerosis in the sacrum, related to chronic sacral insufficiency fracture.   3.  Colonic diverticulosis.      DX-HIP-COMPLETE - UNILATERAL 2+ LEFT   Final Result      1.  Subtle cortical irregularity along the superior aspect of the left superior pubic ramus may represent a subacute to chronic nondisplaced fracture.   2.  No proximal femoral fracture.           Assessment/Plan  * Acute low back pain- (present on admission)  Assessment & Plan  L1 compression fracture noted by CT 12/2020 - neurosurgery consulted at the time - TLSO brace but no intervention planned  Saw outpt neurosurgery 2/12 - who sent patient to hospital for MRI workup  MRI lumbar: severe L1 compression fracture compared to prior 12/2020, mild L5 compression fracture, DJD of T11    PT/OT  Suspect eventual SNF d/c  Pain control    Neurosurgery consulted, appreciate recommendations    Acute encephalopathy- (present on admission)  Assessment & Plan  IMPROVED  Toxic/metabolic etiology, back pain exacerbating  Does have underlying dementia  ESBL E.coli UTI -- adequately treated with abx per ID  HIV/syphilis -- negative  Negative acute CT head 12/27/2020  S/p trial of IV thiamine  Borderline low B12/folate -- on replacement therapy  Minimize sedatives    Dementia (HCC)- (present on admission)  Assessment & Plan  Patient assisted living facility patient has advanced dementia poor historian    Acute cystitis without hematuria- (present on admission)  Assessment & Plan  Adequately treated  UA pyuria  UCx 2/12: ESBL E.coli  Abx: s/p fosphomycin 3g x1 2/14 and Meropenem (2/12-2/14)    ID consulted - adequately treated    Essential hypertension- (present on admission)  Assessment & Plan  Cardiazem  PRN labetalol    Failure to thrive in adult  Assessment & Plan  Frequent  hospitalization for recurrent UTI, back pain  Discussed with daughters (including med MORIN) on 2/14 - DNR confirmed, pursue MRI lumbar, not ready to discuss end of life goal of care at this time    Palliative care consulted for advance care planning, appreciate assistance.    Frequent falls- (present on admission)  Assessment & Plan  Fall precautions  PT/OT    Hip pain- (present on admission)  Assessment & Plan  ?radiculopathy  No hip fracture seen by Xray  L1 fracture by CT 12/2020 and severe burst fracture seen again 2/15/21    Physical deconditioning- (present on admission)  Assessment & Plan  PT/OT  Frequent falls, osteopenia  Recent compression lumbar spine fracture, wrist fracture    Urinary incontinence- (present on admission)  Assessment & Plan  Bethanechol  PRN bladder scan and straight cath    Vitamin D deficiency- (present on admission)  Assessment & Plan  supplement    Osteopenia- (present on admission)  Assessment & Plan  Supportive care  Fall precautions       VTE prophylaxis: Lovenox

## 2021-02-18 NOTE — CARE PLAN
Problem: Communication  Goal: The ability to communicate needs accurately and effectively will improve  Outcome: PROGRESSING AS EXPECTED     Problem: Safety  Goal: Will remain free from injury  Outcome: PROGRESSING AS EXPECTED  Goal: Will remain free from falls  Outcome: PROGRESSING AS EXPECTED     Problem: Infection  Goal: Will remain free from infection  Outcome: PROGRESSING AS EXPECTED     Problem: Venous Thromboembolism (VTW)/Deep Vein Thrombosis (DVT) Prevention:  Goal: Patient will participate in Venous Thrombosis (VTE)/Deep Vein Thrombosis (DVT)Prevention Measures  Outcome: PROGRESSING AS EXPECTED     Problem: Bowel/Gastric:  Goal: Normal bowel function is maintained or improved  Outcome: PROGRESSING AS EXPECTED  Goal: Will not experience complications related to bowel motility  Outcome: PROGRESSING AS EXPECTED     Problem: Knowledge Deficit  Goal: Knowledge of disease process/condition, treatment plan, diagnostic tests, and medications will improve  Outcome: PROGRESSING AS EXPECTED  Goal: Knowledge of the prescribed therapeutic regimen will improve  Outcome: PROGRESSING AS EXPECTED     Problem: Discharge Barriers/Planning  Goal: Patient's continuum of care needs will be met  Outcome: PROGRESSING AS EXPECTED     Problem: Pain Management  Goal: Pain level will decrease to patient's comfort goal  Outcome: PROGRESSING AS EXPECTED     Problem: Pain Management  Goal: Pain level will decrease to patient's comfort goal  Outcome: PROGRESSING AS EXPECTED     Problem: Discharge Barriers/Planning  Goal: Patient's continuum of care needs will be met  Outcome: PROGRESSING AS EXPECTED     Problem: Mobility  Goal: Risk for activity intolerance will decrease  Outcome: PROGRESSING AS EXPECTED     Problem: Respiratory:  Goal: Respiratory status will improve  Outcome: PROGRESSING AS EXPECTED     Problem: Psychosocial Needs:  Goal: Level of anxiety will decrease  Outcome: PROGRESSING AS EXPECTED     Problem: Skin  Integrity  Goal: Risk for impaired skin integrity will decrease  Outcome: PROGRESSING AS EXPECTED     Problem: Urinary Elimination:  Goal: Ability to reestablish a normal urinary elimination pattern will improve  Outcome: PROGRESSING AS EXPECTED

## 2021-02-18 NOTE — PROGRESS NOTES
Bedside report received from day nurse. Assumed care of patient. Pt resting comfortably without any sign of distress. A&Ox1, VSS 1L NC in place, no complaints at this time. POC reviewed and white board updated,  Call light within reach, Bed locked in lowest position. Pt is sleeping and resting comfortably. Bed alarm on, in lowest position, door open and near nurses station

## 2021-02-18 NOTE — PROGRESS NOTES
"Palliative Care follow-up  Pt feeling good today.  Intermittently falls asleep during conversation.  Pt's daughter Elda at bedside.  Pt's  new brace has been delivered.  Daughter reports pt has not been out of bed.  Pt with new pain and swelling RLE awaiting doppler study for possible DVT.  Discussed plan of care and discharge.  Pt will likely need SNF based on pending recommendations from therapies. Elda with some concerns about skilled nursing facility admission because of visitor restrictions.  Elda also concerned that following skilled nursing facility admit patient will require quarantine when returning to [facility].  Elda feels that the patient's lack of visitors contributed to increasing confusion.  Discussed with Elda conversation had with patient and her sister Sarah.  Discussed that at some point based on the progression of the patient's dementia hospice may be an appropriate option.  Education provided on hospice. Reviewed the option of completing a POLST form assuring that the pt's DNR  CODE STATUS and goals of care would be documented. POLST form completed for DNR, Selective Treatment and no feeding tube, signed by Elda and Dr. Collado original pink form to be discharged with the patient, copy scanned into the patient's medical record.  Support provided and all questions answered.      Updated: Dr. Collado, & Joe CORDON    Plan: POLST completed.  Elda provided Krames:  \"What is Hospice\" & \"When to Start Hospice\".  Will continue to follow to provide additional support as needed.    Thank you for allowing Palliative Care to support this patient and family. Contact [phone] for additional assistance, change in patient status, or with any questions/concerns.   "

## 2021-02-18 NOTE — PROGRESS NOTES
Neurosurgery Progress Note    Subjective:  Pt awake, alert  Daughter at bedside  No leg pain or n/t  C/o mild lbp  Has not been oob yet        Exam:  Pt awake, alert  Oriented x3 with prompting to year  LE str intact with coaching.   Rt LE leg swelling with some pain to palpation to calf    BP  Min: 156/89  Max: 185/100  Pulse  Av.5  Min: 72  Max: 86  Resp  Av  Min: 15  Max: 17  Temp  Av.7 °C (98 °F)  Min: 36.3 °C (97.4 °F)  Max: 36.9 °C (98.4 °F)  SpO2  Av %  Min: 91 %  Max: 99 %    No data recorded    Recent Labs     21  0347   WBC 8.3   RBC 4.24   HEMOGLOBIN 13.0   HEMATOCRIT 40.2   MCV 94.8   MCH 30.7   MCHC 32.3*   RDW 47.2   PLATELETCT 291   MPV 9.3     Recent Labs     21  0347 21  0651 21  0437   SODIUM 133* 134* 133*   POTASSIUM 3.3* 3.7 3.7   CHLORIDE 97 98 96   CO2 27 29 26   GLUCOSE 98 110* 121*   BUN 12 7* 7*   CREATININE 0.24* 0.24* 0.22*   CALCIUM 9.9 9.9 10.2               Intake/Output       21 0700 - 21 0659 21 07 - 21 0659      3204-3647 1356-1265 Total 0528-3727 7672-2610 Total       Intake    P.O.  220  -- 220  240  -- 240    P.O. 220 -- 220 240 -- 240    Total Intake 220 -- 220 240 -- 240       Output    Urine  900  600 1500  --  -- --    Number of Times Voided 2 x -- 2 x -- -- --    Output (mL) (External Urinary Catheter (Female Wick))  -- -- --    Stool  --  -- --  --  -- --    Number of Times Stooled 1 x -- 1 x -- -- --    Total Output  -- -- --       Net I/O     -680 -600 -1280 240 -- 240            Intake/Output Summary (Last 24 hours) at 2021 1049  Last data filed at 2021 1000  Gross per 24 hour   Intake 360 ml   Output 600 ml   Net -240 ml            • cyanocobalamin  1,000 mcg DAILY   • folic acid  1 mg DAILY   • bethanechol  25 mg BID   • DILTIAZem  30 mg Q EVENING   • carboxymethylcellulose  2 Drop Q4HRS PRN   • sertraline  100 mg QAM   • ascorbic acid  500 mg DAILY   • vitamin D   2,000 Units QAM   • ondansetron  4 mg Q4HRS PRN   • hydrALAZINE  50 mg Q8HRS PRN   • LORazepam  0.5 mg Q8HRS PRN   • enoxaparin  40 mg DAILY   • acetaminophen  650 mg Q6HRS PRN   • senna-docusate  2 tablet BID    And   • polyethylene glycol/lytes  1 Packet QDAY PRN    And   • magnesium hydroxide  30 mL QDAY PRN    And   • bisacodyl  10 mg QDAY PRN   • guaiFENesin dextromethorphan  10 mL Q6HRS PRN   • labetalol  10 mg Q4HRS PRN   • ondansetron  4 mg Q4HRS PRN   • Pharmacy Consult Request  1 Each PHARMACY TO DOSE   • oxyCODONE immediate-release  2.5 mg Q3HRS PRN    Or   • oxyCODONE immediate-release  5 mg Q3HRS PRN    Or   • HYDROmorphone  0.25 mg Q3HRS PRN       Assessment and Plan:  Hospital day # 6 L1 compression fx, T11, L5 minimal new compression fxs  POD #na   Prophylactic anticoagulation: yes         Rt LE venous duplex study STAT r/o dvt     Pain control -cont  PT/OT - TLSO don at edge of bed  UTI - abx per ID  No surg plans  No nsaids/ asa   Dispo per IM- SNF

## 2021-02-18 NOTE — DISCHARGE PLANNING
Anticipated Discharge Disposition: SNF    Action: LSW met w/ pt and dtr Elda at bedside.  Elda provided verbal consent for SNF choice: 1. [facility]  2. [facility].  She says that she really prefers [facility] as pt has been there before.  Choice faxed to [facility].    Barriers to Discharge: SNF acceptance, bed availability    Plan: CM to f/u on SNF referrals.

## 2021-02-18 NOTE — CARE PLAN
Problem: Nutritional:  Goal: Achieve adequate nutritional intake  Description: Patient will consume >/= 50% of meals/supplements  Outcome: PROGRESSING AS EXPECTED  Pt upgraded to minced and moist diet w/ thin liquids on 2/15. Per flowsheets PO intake 25-50% x3 meals and 25-50% x1, % x1 supplements since yesterday. This is improved from previous intake of <25% of meals.     RD following.

## 2021-02-19 LAB
ANION GAP SERPL CALC-SCNC: 12 MMOL/L (ref 7–16)
BUN SERPL-MCNC: 11 MG/DL (ref 8–22)
CALCIUM SERPL-MCNC: 10.9 MG/DL (ref 8.5–10.5)
CHLORIDE SERPL-SCNC: 96 MMOL/L (ref 96–112)
CO2 SERPL-SCNC: 25 MMOL/L (ref 20–33)
CREAT SERPL-MCNC: 0.28 MG/DL (ref 0.5–1.4)
GLUCOSE SERPL-MCNC: 122 MG/DL (ref 65–99)
POTASSIUM SERPL-SCNC: 4 MMOL/L (ref 3.6–5.5)
SODIUM SERPL-SCNC: 133 MMOL/L (ref 135–145)

## 2021-02-19 PROCEDURE — 700111 HCHG RX REV CODE 636 W/ 250 OVERRIDE (IP): Performed by: STUDENT IN AN ORGANIZED HEALTH CARE EDUCATION/TRAINING PROGRAM

## 2021-02-19 PROCEDURE — 770001 HCHG ROOM/CARE - MED/SURG/GYN PRIV*

## 2021-02-19 PROCEDURE — 700102 HCHG RX REV CODE 250 W/ 637 OVERRIDE(OP): Performed by: STUDENT IN AN ORGANIZED HEALTH CARE EDUCATION/TRAINING PROGRAM

## 2021-02-19 PROCEDURE — 99232 SBSQ HOSP IP/OBS MODERATE 35: CPT | Performed by: INTERNAL MEDICINE

## 2021-02-19 PROCEDURE — A9270 NON-COVERED ITEM OR SERVICE: HCPCS | Performed by: STUDENT IN AN ORGANIZED HEALTH CARE EDUCATION/TRAINING PROGRAM

## 2021-02-19 PROCEDURE — 36415 COLL VENOUS BLD VENIPUNCTURE: CPT

## 2021-02-19 PROCEDURE — 80048 BASIC METABOLIC PNL TOTAL CA: CPT

## 2021-02-19 RX ADMIN — BETHANECHOL CHLORIDE 25 MG: 25 TABLET ORAL at 09:00

## 2021-02-19 RX ADMIN — BETHANECHOL CHLORIDE 25 MG: 25 TABLET ORAL at 19:35

## 2021-02-19 RX ADMIN — FOLIC ACID 1 MG: 1 TABLET ORAL at 04:00

## 2021-02-19 RX ADMIN — ENOXAPARIN SODIUM 40 MG: 40 INJECTION SUBCUTANEOUS at 04:03

## 2021-02-19 RX ADMIN — HYDRALAZINE HYDROCHLORIDE 50 MG: 50 TABLET, FILM COATED ORAL at 03:57

## 2021-02-19 RX ADMIN — DOCUSATE SODIUM 50 MG AND SENNOSIDES 8.6 MG 2 TABLET: 8.6; 5 TABLET, FILM COATED ORAL at 04:00

## 2021-02-19 RX ADMIN — OXYCODONE 2.5 MG: 5 TABLET ORAL at 09:22

## 2021-02-19 RX ADMIN — DOCUSATE SODIUM 50 MG AND SENNOSIDES 8.6 MG 2 TABLET: 8.6; 5 TABLET, FILM COATED ORAL at 16:37

## 2021-02-19 RX ADMIN — OXYCODONE HYDROCHLORIDE AND ACETAMINOPHEN 500 MG: 500 TABLET ORAL at 04:01

## 2021-02-19 RX ADMIN — LORAZEPAM 0.5 MG: 0.5 TABLET ORAL at 19:35

## 2021-02-19 RX ADMIN — LORAZEPAM 0.5 MG: 0.5 TABLET ORAL at 03:59

## 2021-02-19 RX ADMIN — Medication 2000 UNITS: at 04:01

## 2021-02-19 RX ADMIN — OXYCODONE 2.5 MG: 5 TABLET ORAL at 16:37

## 2021-02-19 RX ADMIN — CYANOCOBALAMIN TAB 500 MCG 1000 MCG: 500 TAB at 04:00

## 2021-02-19 RX ADMIN — DILTIAZEM HYDROCHLORIDE 60 MG: 60 TABLET, FILM COATED ORAL at 16:37

## 2021-02-19 RX ADMIN — SERTRALINE HYDROCHLORIDE 100 MG: 100 TABLET, FILM COATED ORAL at 04:00

## 2021-02-19 ASSESSMENT — ENCOUNTER SYMPTOMS
SHORTNESS OF BREATH: 0
FEVER: 0
FALLS: 1
BACK PAIN: 1
CHILLS: 0
COUGH: 0
NECK PAIN: 1
PALPITATIONS: 0

## 2021-02-19 ASSESSMENT — PAIN DESCRIPTION - PAIN TYPE
TYPE: ACUTE PAIN
TYPE: CHRONIC PAIN
TYPE: ACUTE PAIN

## 2021-02-19 NOTE — DISCHARGE PLANNING
Follow up for rehab therapy notes reviewed anticipate skilled nursing for post acute services. Limited tolerance to therapy intervention, with limited improvement. No physiatry consult ordered per protocol.

## 2021-02-19 NOTE — CARE PLAN
Problem: Communication  Goal: The ability to communicate needs accurately and effectively will improve  Outcome: PROGRESSING AS EXPECTED    Patient informed about the plan for the evening. Patient aware, but confused so reinforcement needed.      Problem: Safety  Goal: Will remain free from injury  Outcome: PROGRESSING AS EXPECTED    Bed in low and locked position. Refused SCD's, Belongings and call light are within reach. Patient appears to be resting in bed comfortably.

## 2021-02-19 NOTE — THERAPY
Occupational Therapy   Initial Evaluation     Patient Name: Lucita Babcock  Age:  85 y.o., Sex:  female  Medical Record #: [id number]  Today's Date: 2/18/2021       Precautions: Fall Risk, TLSO (Thoracolumbosacral orthosis), Spinal / Back Precautions , Swallow Precautions  Comments: h/o dementia     Assessment    Patient is 85 y.o. female with h/o GLF in December with dx of L1 compression fx and BBFA fx requiring ORIF. Pt transferred to SNF, then family moved her into [facility]. Since then, daughter reports steady functional decline, but no noted falls. Pt admitted for FTT, found to have new L5 compression fx and DJD to T11. Neurosurgery consulted and recommend TLSO. Pt seen for OT eval. Pt has baseline dementia and required max encouragement to participate. Max/total A for bed mobility via log roll. Once seated, pt demos posterior lean. Attempted to apply TLSO, however brace too small at current settings. Pt unable to tolerate EOB any longer and assisted back to supine. Traction arrived and assisted with extending brace to wider setting. Daughter then arrived and requested 2nd attempt at mobility. Pt assisted back to EOB, brace applied (poor fit due to pt's kyphotic posture and protruding abdomen). Pt completed 3 stands at FWW with heavy 2-person assist; unable to achieve full upright despite heavy facilitation. Pt is limited by: baseline dementia with little to no potential for new learning, pain, balance impairment, generalized weakness. Will continue to benefit from acute OT with need for post-acute transitional care.     Plan    Recommend Occupational Therapy 3 times per week until therapy goals are met for the following treatments:  Adaptive Equipment, Neuro Re-Education / Balance, Self Care/Activities of Daily Living and Therapeutic Activities.    DC Equipment Recommendations: Unable to determine at this time  Discharge Recommendations: Recommend post-acute placement for additional occupational therapy services prior  "to discharge home     Subjective    \"I'd like to wait until my daughter is here.\"     Objective       02/18/21 1522   Prior Living Situation   Housing / Facility Assisted Living Residence   Bathroom Set up Walk In Shower;Grab Bars   Equipment Owned Front-Wheel Walker;Grab Bar(s) In Tub / Shower   Lives with - Patient's Self Care Capacity Attendant / Paid Care Giver   Comments Pt moves to [facility] in January following SNF stay for L1 fx    Prior Level of ADL Function   Self Feeding Independent   Grooming / Hygiene Requires Assist   Bathing Requires Assist   Dressing Requires Assist   Toileting Requires Assist   Prior Level of IADL Function   Medication Management Dependent   Laundry Dependent   Finances Dependent   Home Management Dependent   Shopping Dependent   Prior Level Of Mobility Supervision With Device in Home  (FWW at [facility])   Cognition    Level of Consciousness Confused   Ability To Follow Commands 1 Step   Safety Awareness Impaired   New Learning Impaired   Attention Impaired   Sequencing Impaired   Initiation Impaired   Comments Baseline dementia; impaired memory; no recall of having recently moved to [facility]   Active ROM Upper Body   Comments WNL except mild limitations to R wrist (BBFA fx 7 weeks prior    Strength Upper Body   Gross Strength Generalized Weakness, Equal Bilaterally.    Balance Assessment   Sitting Balance (Static) Fair -  (posterior lean )   Sitting Balance (Dynamic) Poor +   Standing Balance (Static) Poor   Standing Balance (Dynamic) Poor -   Weight Shift Sitting Poor   Weight Shift Standing Poor   Comments standing with FWW and 2-person assist    Bed Mobility    Supine to Sit Maximal Assist   Sit to Supine Total Assist   Scooting Maximal Assist  (seated)   Rolling Maximal Assist to Rt.;Maximum Assist to Lt.   Comments max cues for log roll    ADL Assessment   Grooming Minimal Assist;Seated  (hair brushing )   Upper Body Dressing Minimal Assist  (gown change )   Lower Body Dressing Total " Assist  (sock donning )   Toileting   (NT; Pure-Wik in place, no BM)   Functional Mobility   Sit to Stand Maximal Assist   Bed, Chair, Wheelchair Transfer Unable to Participate   Short Term Goals   Short Term Goal # 1 Pt will complete ADL transfers with min A   Short Term Goal # 2 Pt will complete LB dressing with min A using AE    Short Term Goal # 3 Pt will complete standing grooming with min A

## 2021-02-19 NOTE — PROGRESS NOTES
Neurosurgery Progress Note    Subjective:  EOB yest w/ assist  C/o right leg pain        Exam:  Pt awake, alert  Oriented x3 today  Strength 5/5 w/ coaching except right IP 3/5 -- appears to be d/t pain    BP  Min: 148/78  Max: 185/100  Pulse  Av.8  Min: 80  Max: 88  Resp  Av  Min: 15  Max: 18  Temp  Av.9 °C (98.4 °F)  Min: 36.8 °C (98.2 °F)  Max: 37 °C (98.6 °F)  SpO2  Av.6 %  Min: 94 %  Max: 99 %    No data recorded        Recent Labs     21  0651 21  0437   SODIUM 134* 133*   POTASSIUM 3.7 3.7   CHLORIDE 98 96   CO2 29 26   GLUCOSE 110* 121*   BUN 7* 7*   CREATININE 0.24* 0.22*   CALCIUM 9.9 10.2               Intake/Output       21 07 - 21 0659 21 07 - 21 0659      1727-5297 6550-0681 Total 5740-6974 5609-2850 Total       Intake    P.O.  240  -- 240  --  -- --    P.O. 240 -- 240 -- -- --    Total Intake 240 -- 240 -- -- --       Output    Urine  1000  700 1700  --  -- --    Output (mL) (External Urinary Catheter (Female Wick)) 1658 727 4420 -- -- --    Total Output 7175 202 9792 -- -- --       Net I/O     -760 -700 -1460 -- -- --            Intake/Output Summary (Last 24 hours) at 2021 0803  Last data filed at 2021 2200  Gross per 24 hour   Intake 240 ml   Output 1700 ml   Net -1460 ml            • DILTIAZem  60 mg Q EVENING   • cyanocobalamin  1,000 mcg DAILY   • folic acid  1 mg DAILY   • bethanechol  25 mg BID   • carboxymethylcellulose  2 Drop Q4HRS PRN   • sertraline  100 mg QAM   • ascorbic acid  500 mg DAILY   • vitamin D  2,000 Units QAM   • ondansetron  4 mg Q4HRS PRN   • hydrALAZINE  50 mg Q8HRS PRN   • LORazepam  0.5 mg Q8HRS PRN   • enoxaparin  40 mg DAILY   • acetaminophen  650 mg Q6HRS PRN   • senna-docusate  2 tablet BID    And   • polyethylene glycol/lytes  1 Packet QDAY PRN    And   • magnesium hydroxide  30 mL QDAY PRN    And   • bisacodyl  10 mg QDAY PRN   • guaiFENesin dextromethorphan  10 mL Q6HRS PRN   • labetalol  10 mg  Q4HRS PRN   • ondansetron  4 mg Q4HRS PRN   • Pharmacy Consult Request  1 Each PHARMACY TO DOSE   • oxyCODONE immediate-release  2.5 mg Q3HRS PRN    Or   • oxyCODONE immediate-release  5 mg Q3HRS PRN    Or   • HYDROmorphone  0.25 mg Q3HRS PRN       Assessment and Plan:  Hospital day # 7 L1 compression fx, T11, L5 minimal new compression fxs  POD #na   Prophylactic anticoagulation: yes           NM as above-- appears stable, diff to assess right IP d/t pain    Pain control -cont  PT/OT - TLSO don at edge of bed  UTI - abx per ID  No surg plans  No nsaids/ asa   Dispo per IM- SNF    ATTENDING ADDENDUM:  agree with above note

## 2021-02-19 NOTE — DISCHARGE PLANNING
Agency/Facility Name: [facility]  Spoke To: Heather  Outcome: Heather stated the the referral is still pending review and to f/u later

## 2021-02-20 LAB
ANION GAP SERPL CALC-SCNC: 11 MMOL/L (ref 7–16)
BUN SERPL-MCNC: 10 MG/DL (ref 8–22)
CALCIUM SERPL-MCNC: 10.4 MG/DL (ref 8.5–10.5)
CHLORIDE SERPL-SCNC: 97 MMOL/L (ref 96–112)
CO2 SERPL-SCNC: 25 MMOL/L (ref 20–33)
CREAT SERPL-MCNC: 0.33 MG/DL (ref 0.5–1.4)
GLUCOSE SERPL-MCNC: 108 MG/DL (ref 65–99)
POTASSIUM SERPL-SCNC: 3.9 MMOL/L (ref 3.6–5.5)
SODIUM SERPL-SCNC: 133 MMOL/L (ref 135–145)

## 2021-02-20 PROCEDURE — A9270 NON-COVERED ITEM OR SERVICE: HCPCS | Performed by: STUDENT IN AN ORGANIZED HEALTH CARE EDUCATION/TRAINING PROGRAM

## 2021-02-20 PROCEDURE — 770001 HCHG ROOM/CARE - MED/SURG/GYN PRIV*

## 2021-02-20 PROCEDURE — 700102 HCHG RX REV CODE 250 W/ 637 OVERRIDE(OP): Performed by: STUDENT IN AN ORGANIZED HEALTH CARE EDUCATION/TRAINING PROGRAM

## 2021-02-20 PROCEDURE — 700111 HCHG RX REV CODE 636 W/ 250 OVERRIDE (IP): Performed by: STUDENT IN AN ORGANIZED HEALTH CARE EDUCATION/TRAINING PROGRAM

## 2021-02-20 PROCEDURE — 80048 BASIC METABOLIC PNL TOTAL CA: CPT

## 2021-02-20 PROCEDURE — 99231 SBSQ HOSP IP/OBS SF/LOW 25: CPT | Performed by: INTERNAL MEDICINE

## 2021-02-20 PROCEDURE — 36415 COLL VENOUS BLD VENIPUNCTURE: CPT

## 2021-02-20 RX ADMIN — BETHANECHOL CHLORIDE 25 MG: 25 TABLET ORAL at 08:29

## 2021-02-20 RX ADMIN — CYANOCOBALAMIN TAB 500 MCG 1000 MCG: 500 TAB at 04:08

## 2021-02-20 RX ADMIN — ENOXAPARIN SODIUM 40 MG: 40 INJECTION SUBCUTANEOUS at 04:07

## 2021-02-20 RX ADMIN — LORAZEPAM 0.5 MG: 0.5 TABLET ORAL at 04:08

## 2021-02-20 RX ADMIN — BETHANECHOL CHLORIDE 25 MG: 25 TABLET ORAL at 21:44

## 2021-02-20 RX ADMIN — DOCUSATE SODIUM 50 MG AND SENNOSIDES 8.6 MG 2 TABLET: 8.6; 5 TABLET, FILM COATED ORAL at 04:08

## 2021-02-20 RX ADMIN — OXYCODONE 5 MG: 5 TABLET ORAL at 18:36

## 2021-02-20 RX ADMIN — FOLIC ACID 1 MG: 1 TABLET ORAL at 04:07

## 2021-02-20 RX ADMIN — DOCUSATE SODIUM 50 MG AND SENNOSIDES 8.6 MG 2 TABLET: 8.6; 5 TABLET, FILM COATED ORAL at 16:20

## 2021-02-20 RX ADMIN — DILTIAZEM HYDROCHLORIDE 60 MG: 60 TABLET, FILM COATED ORAL at 16:20

## 2021-02-20 RX ADMIN — OXYCODONE 5 MG: 5 TABLET ORAL at 14:42

## 2021-02-20 RX ADMIN — OXYCODONE 5 MG: 5 TABLET ORAL at 21:44

## 2021-02-20 RX ADMIN — SERTRALINE HYDROCHLORIDE 100 MG: 100 TABLET, FILM COATED ORAL at 04:08

## 2021-02-20 RX ADMIN — OXYCODONE HYDROCHLORIDE AND ACETAMINOPHEN 500 MG: 500 TABLET ORAL at 04:08

## 2021-02-20 RX ADMIN — OXYCODONE 2.5 MG: 5 TABLET ORAL at 04:51

## 2021-02-20 RX ADMIN — OXYCODONE 2.5 MG: 5 TABLET ORAL at 10:20

## 2021-02-20 RX ADMIN — Medication 2000 UNITS: at 04:08

## 2021-02-20 ASSESSMENT — PAIN DESCRIPTION - PAIN TYPE
TYPE: ACUTE PAIN

## 2021-02-20 ASSESSMENT — ENCOUNTER SYMPTOMS
BACK PAIN: 1
COUGH: 0
NECK PAIN: 1
FEVER: 0
FALLS: 1
CHILLS: 0
PALPITATIONS: 0
SHORTNESS OF BREATH: 0

## 2021-02-20 NOTE — PROGRESS NOTES
[facility] Medicine Daily Progress Note    Date of Service  2/19/2021    Chief Complaint  85 y.o. female admitted 2/12/2021 with AMS, failure to thrive, back pain    Hospital Course  85 y.o. demented female from [facility] who presented 2/12/2021 with AMS, failure to thrive, back pain. History obtained from daughter present at bedside as pt is poor historian. Per daughter, she has had worsening back pain, recent L1 fracture noted in 12/2020. They went to see neurosurgery Friday, 2/12 - MD at [facility] sent her to ER for MRI and further workup. She was found to have pyuria, due to prior ESBL - has been started on Meropenem by admitting provider.    Interval Problem Update  2/13: continue Meropenem, ID consulted for assistance with abx. Notable L1 compression fracture, 20% spinal stenosis. As patient is referred from outpt neurosurgery, MRI lumbar ordered.    2/14: MRI not completed yet. Ordered as needed morphine / Ativan for MRI. Patient was agitated overnight, unpleasant to nursing staff. Had long discussion with daughters (Sarah - present at bedside, Elda MORIN via phone) for goal of care - DNR status confirmed, pursue with MRI for now, did not want to discuss further end of life care at this time.    2/15: MRI lumbar notable known L1 compression fracture but also new L5 compression fracture and severe T11 compression. ?vertebroplasty/kyphoplasty. Consulted neurosurgery - ordered CT thoracic/lumbar. Await formal recommendations. Eventual d/c to SNF.    2/16: Dr. Khan has taken over care for patient's fractures per patient and daughter request.  No surgical intervention per his recommendations.  Orders placed for new, better fitting TLSO brace.  Appreciate palliative care assistance with goals of care discussion.  Discussed with patient and daughter at bedside, questions answered.    2/17: TLSO fitted today. Awaiting PT/OT evals for dispo recs. No acute complaints.    2/18: This morning patient noted to have right  leg swelling Doppler ultrasound was performed which did not show a DVT.  Neurology continue to recommend nonsurgical management.  Working on placement to skilled nursing facility.  Patient has no complaints today.  Blood pressure continues to be elevated will increase the Cardizem from 30 mg to 60 mg.    2/19:   No acute complaints. Care coordination in progress.  Consultants/Specialty  ID  Palliative care    Code Status  DNAR/DNI    Disposition  SNF vs HH pending goals of care    Review of Systems  Review of Systems   Constitutional: Positive for malaise/fatigue. Negative for chills and fever.   Respiratory: Negative for cough and shortness of breath.    Cardiovascular: Positive for leg swelling. Negative for chest pain and palpitations.   Musculoskeletal: Positive for back pain, falls, joint pain and neck pain.   All other systems reviewed and are negative.       Physical Exam  Temp:  [36.8 °C (98.2 °F)-37 °C (98.6 °F)] 36.8 °C (98.2 °F)  Pulse:  [79-84] 79  Resp:  [17-18] 18  BP: (143-181)/(66-91) 143/66  SpO2:  [92 %-96 %] 93 %    Physical Exam  Vitals and nursing note reviewed.   Constitutional:       General: She is not in acute distress.     Appearance: Normal appearance. She is normal weight.   HENT:      Head: Normocephalic and atraumatic.   Eyes:      General: No scleral icterus.     Conjunctiva/sclera: Conjunctivae normal.   Cardiovascular:      Rate and Rhythm: Normal rate and regular rhythm.      Heart sounds: Normal heart sounds. No murmur.   Pulmonary:      Effort: No respiratory distress.      Breath sounds: No wheezing.   Abdominal:      Palpations: Abdomen is soft.      Tenderness: There is no abdominal tenderness. There is no guarding or rebound.   Musculoskeletal:         General: Signs of injury present.      Cervical back: No tenderness.      Right lower leg: Edema present.      Left lower leg: No edema.      Comments: Mildly tender in lumbar region  Trace LE edema   Skin:     General: Skin is  warm and dry.      Capillary Refill: Capillary refill takes 2 to 3 seconds.   Neurological:      Mental Status: She is alert.      Comments: AAO to self and place  Follows some command  Minimally verbal   Psychiatric:         Mood and Affect: Mood normal.         Cognition and Memory: Cognition is impaired.      Comments: Appears anxious         Fluids  No intake or output data in the 24 hours ending 02/19/21 2241    Laboratory      Recent Labs     02/17/21  0651 02/18/21  0437 02/19/21  0718   SODIUM 134* 133* 133*   POTASSIUM 3.7 3.7 4.0   CHLORIDE 98 96 96   CO2 29 26 25   GLUCOSE 110* 121* 122*   BUN 7* 7* 11   CREATININE 0.24* 0.22* 0.28*   CALCIUM 9.9 10.2 10.9*                   Imaging  US-EXTREMITY VENOUS LOWER UNILAT RIGHT   Final Result      CT-LSPINE W/O PLUS RECONS   Final Result      1.  There is a severe burst fracture at the L1 level, significantly progressed since the prior CT and similar to the recent MRI from yesterday.   2.  There is slight concavity of the L5 superior endplate is noted as discussed on recent MRI.   3.  Multilevel degenerative disc disease and arthropathy is more fully discussed on the MRI.      CT-TSPINE W/O PLUS RECONS   Final Result         1.  Minimal interval loss of height of the central portion of the T11 vertebral body currently measuring 12.8 mm compared with 17.3 mm on 12/27/2020.      2.  No bony spinal canal compromise.      3.  No other thoracic vertebral body fracture. No posterior element fracture.      4.  No significant degenerative change.         MR-LUMBAR SPINE-W/O   Final Result      1.  T11 mild-moderate recent or subacute insufficiency compression fracture which was not present on CT studies from 12/27/2020. This appears amenable to vertebral augmentation.   2.  L1 marked compression fracture with substantial progression since CT studies from 12/27/2020. There is posterior cortical buckling/retropulsion. No associated epidural hematoma. This lesion appears  amenable to vertebral augmentation.   3.  L5 mild superior endplate compression fracture with only slight superior endplate concavity. Bandlike marrow edema signal indicative of recent or subacute fracture. This fracture was not present on CT studies from 12/27/2020.      CT-PELVIS W/O   Final Result      1.  No acute fracture or dislocation. Cortical irregularity in the left suprapubic ramus noted on the recent radiograph is related to a nutrient vessel.   2.  Ill-defined sclerosis in the sacrum, related to chronic sacral insufficiency fracture.   3.  Colonic diverticulosis.      DX-HIP-COMPLETE - UNILATERAL 2+ LEFT   Final Result      1.  Subtle cortical irregularity along the superior aspect of the left superior pubic ramus may represent a subacute to chronic nondisplaced fracture.   2.  No proximal femoral fracture.           Assessment/Plan  * Acute low back pain- (present on admission)  Assessment & Plan  L1 compression fracture noted by CT 12/2020 - neurosurgery consulted at the time - TLSO brace but no intervention planned  Saw outpt neurosurgery 2/12 - who sent patient to hospital for MRI workup  MRI lumbar: severe L1 compression fracture compared to prior 12/2020, mild L5 compression fracture, DJD of T11    PT/OT  Suspect eventual SNF d/c  Pain control    Neurosurgery consulted, appreciate recommendations    Acute encephalopathy- (present on admission)  Assessment & Plan  IMPROVED  Toxic/metabolic etiology, back pain exacerbating  Does have underlying dementia  ESBL E.coli UTI -- adequately treated with abx per ID  HIV/syphilis -- negative  Negative acute CT head 12/27/2020  S/p trial of IV thiamine  Borderline low B12/folate -- on replacement therapy  Minimize sedatives    Dementia (HCC)- (present on admission)  Assessment & Plan  Patient assisted living facility patient has advanced dementia poor historian    Acute cystitis without hematuria- (present on admission)  Assessment & Plan  Adequately  treated  UA pyuria  UCx 2/12: ESBL E.coli  Abx: s/p fosphomycin 3g x1 2/14 and Meropenem (2/12-2/14)    ID consulted - adequately treated  Resolved    Essential hypertension- (present on admission)  Assessment & Plan  Increase Cardizem from 30 mg to 60 mg.  PRN labetalol    Failure to thrive in adult  Assessment & Plan  Frequent hospitalization for recurrent UTI, back pain  Discussed with daughters (including [facility]) on 2/14 - DNR confirmed, pursue MRI lumbar, not ready to discuss end of life goal of care at this time    Palliative care consulted for advance care planning, appreciate assistance.    Frequent falls- (present on admission)  Assessment & Plan  Fall precautions  PT/OT    Hip pain- (present on admission)  Assessment & Plan  ?radiculopathy  No hip fracture seen by Xray  L1 fracture by CT 12/2020 and severe burst fracture seen again 2/15/21    Physical deconditioning- (present on admission)  Assessment & Plan  PT/OT  Frequent falls, osteopenia  Recent compression lumbar spine fracture, wrist fracture    Urinary incontinence- (present on admission)  Assessment & Plan  Bethanechol  PRN bladder scan and straight cath    Vitamin D deficiency- (present on admission)  Assessment & Plan  supplement    Osteopenia- (present on admission)  Assessment & Plan  Supportive care  Fall precautions       VTE prophylaxis: Lovenox

## 2021-02-20 NOTE — PROGRESS NOTES
2 RN skin check complete with CARLOS A Frances  Devices in place (yes)  Skin assessed under devices (yes).  Confirmed pressure ulcers found on coccyx.  New potential pressure ulcers noted: None. Wound consult placed (No).  The following interventions in place barrier cream, purewick, and frequent bedding changes.

## 2021-02-20 NOTE — PALLIATIVE CARE
To locate the AD/POLST, please hover the cursor over the patient's code status to find all linked ACP documents.  Please give to pt at time of discharge. Thank you. POLST signed and completed DNR, selective treatment and no artificial nutrition or feeding tube.

## 2021-02-20 NOTE — CARE PLAN
Problem: Communication  Goal: The ability to communicate needs accurately and effectively will improve  Outcome: PROGRESSING AS EXPECTED  Note: Discussed POC with patient; pain management, encouraging patient participation in skin break down prevention (turning), and discharge planning. Patient verbalized understanding, but requires frequent reorienting and reminding.. Encouraging pt to voice questions and concerns. Oriented pt to use of call light. Patient is capable of making needs known.      Problem: Safety  Goal: Will remain free from injury  Outcome: PROGRESSING AS EXPECTED  Note: Call light/belongings in reach, bed in low position and locked, front wheel walker out of sight, siderails up x 2, pt wearing non-slip footwear, adequate lighting, clutter free environment, bed alarm on, hourly rounding in place, Patients room near nursing station. Educated on level of fall risk, oriented to use of call light and encouraged pt to call before attempting to get out of bed.

## 2021-02-20 NOTE — CARE PLAN
"  Problem: Psychosocial Needs:  Goal: Level of anxiety will decrease  Outcome: PROGRESSING AS EXPECTED    Encourage patient to express concerns and thoughts. Patient given ativan this evening, due to slight agitation and worry.  \"I am scared.\"       Problem: Mobility  Goal: Risk for activity intolerance will decrease  Outcome: PROGRESSING AS EXPECTED    Help patient roll q2 hours and check her brief regularly. Patient unable to ambulate and get out of bed, at this time.      "

## 2021-02-21 PROCEDURE — 700102 HCHG RX REV CODE 250 W/ 637 OVERRIDE(OP): Performed by: STUDENT IN AN ORGANIZED HEALTH CARE EDUCATION/TRAINING PROGRAM

## 2021-02-21 PROCEDURE — 700101 HCHG RX REV CODE 250: Performed by: STUDENT IN AN ORGANIZED HEALTH CARE EDUCATION/TRAINING PROGRAM

## 2021-02-21 PROCEDURE — 700111 HCHG RX REV CODE 636 W/ 250 OVERRIDE (IP): Performed by: STUDENT IN AN ORGANIZED HEALTH CARE EDUCATION/TRAINING PROGRAM

## 2021-02-21 PROCEDURE — 770001 HCHG ROOM/CARE - MED/SURG/GYN PRIV*

## 2021-02-21 PROCEDURE — 99231 SBSQ HOSP IP/OBS SF/LOW 25: CPT | Performed by: INTERNAL MEDICINE

## 2021-02-21 PROCEDURE — A9270 NON-COVERED ITEM OR SERVICE: HCPCS | Performed by: STUDENT IN AN ORGANIZED HEALTH CARE EDUCATION/TRAINING PROGRAM

## 2021-02-21 RX ADMIN — BETHANECHOL CHLORIDE 25 MG: 25 TABLET ORAL at 07:44

## 2021-02-21 RX ADMIN — DOCUSATE SODIUM 50 MG AND SENNOSIDES 8.6 MG 2 TABLET: 8.6; 5 TABLET, FILM COATED ORAL at 16:42

## 2021-02-21 RX ADMIN — OXYCODONE 5 MG: 5 TABLET ORAL at 05:14

## 2021-02-21 RX ADMIN — CYANOCOBALAMIN TAB 500 MCG 1000 MCG: 500 TAB at 05:14

## 2021-02-21 RX ADMIN — ACETAMINOPHEN 650 MG: 325 TABLET ORAL at 07:44

## 2021-02-21 RX ADMIN — DILTIAZEM HYDROCHLORIDE 60 MG: 60 TABLET, FILM COATED ORAL at 17:18

## 2021-02-21 RX ADMIN — OXYCODONE HYDROCHLORIDE AND ACETAMINOPHEN 500 MG: 500 TABLET ORAL at 05:14

## 2021-02-21 RX ADMIN — POLYETHYLENE GLYCOL 3350 1 PACKET: 17 POWDER, FOR SOLUTION ORAL at 16:42

## 2021-02-21 RX ADMIN — OXYCODONE 5 MG: 5 TABLET ORAL at 13:11

## 2021-02-21 RX ADMIN — FOLIC ACID 1 MG: 1 TABLET ORAL at 05:15

## 2021-02-21 RX ADMIN — DOCUSATE SODIUM 50 MG AND SENNOSIDES 8.6 MG 2 TABLET: 8.6; 5 TABLET, FILM COATED ORAL at 05:14

## 2021-02-21 RX ADMIN — BETHANECHOL CHLORIDE 25 MG: 25 TABLET ORAL at 21:30

## 2021-02-21 RX ADMIN — OXYCODONE 5 MG: 5 TABLET ORAL at 09:30

## 2021-02-21 RX ADMIN — SERTRALINE HYDROCHLORIDE 100 MG: 100 TABLET, FILM COATED ORAL at 05:14

## 2021-02-21 RX ADMIN — ENOXAPARIN SODIUM 40 MG: 40 INJECTION SUBCUTANEOUS at 05:15

## 2021-02-21 RX ADMIN — OXYCODONE 5 MG: 5 TABLET ORAL at 16:42

## 2021-02-21 RX ADMIN — Medication 2000 UNITS: at 05:15

## 2021-02-21 ASSESSMENT — ENCOUNTER SYMPTOMS
NECK PAIN: 1
CHILLS: 0
FALLS: 1
BACK PAIN: 1
FEVER: 0
SHORTNESS OF BREATH: 0
PALPITATIONS: 0
COUGH: 0

## 2021-02-21 ASSESSMENT — PAIN DESCRIPTION - PAIN TYPE
TYPE: ACUTE PAIN
TYPE: CHRONIC PAIN;ACUTE PAIN
TYPE: ACUTE PAIN

## 2021-02-21 NOTE — PROGRESS NOTES
2 RN Skin Check    2 RN skin check completed with Smitha STRAUSS.   Devices in place: SCDs.  Skin assessed under devices: yes.  Confirmed pressure ulcers found on: None.    The following interventions in place Pillows, Mepilex, Barrier cream, Waffle bed overlay and [id number].    - Reddened nose from glasses, blanchable  - Sacrum reddened, slow to roberta

## 2021-02-21 NOTE — CARE PLAN
Problem: Pain Management  Goal: Pain level will decrease to patient's comfort goal  Outcome: PROGRESSING AS EXPECTED  Note: Pt reports pain is kept under control with PRN pain meds. Pt slept comfortably all night.      Problem: Psychosocial Needs:  Goal: Level of anxiety will decrease  Outcome: PROGRESSING AS EXPECTED  Note: Pt was calm and communicated effectively, did not display anxiety throughout the night

## 2021-02-21 NOTE — PROGRESS NOTES
"Pt reported having trouble breathing and feeling \"out of it.\" Pt was slouched down in bed, with CNA scooted her up in bed and sat her up straight. She reported that she could then breathe freely. Took a set of vitals, they were within pt's baseline. Auscultated breath sounds, no change from previous assessment. Pt denied chest pain, dizziness or lightheadedness. Pt resting comfortably now, will continue to monitor and pass on to day shift RN.  "

## 2021-02-21 NOTE — CARE PLAN
Problem: Communication  Goal: The ability to communicate needs accurately and effectively will improve  Outcome: PROGRESSING AS EXPECTED  Note: Discussed POC with patient; pain management, encouraging patient/family participation in skin break down measures (family/patient notifying staff when patient is moist/wet, encouraging patient to participate in turns, educating family/patient), and awaiting an open bed. Patient verbalized understanding and is agreeable to POC. Patient requires frequent reminding and reorienting. Encouraging pt to voice questions and concerns. Oriented pt to use of call light.      Problem: Safety  Goal: Will remain free from injury  Outcome: PROGRESSING AS EXPECTED  Note: Call light/belongings in reach, bed in low position and locked, front wheel walker out of sight, siderails up x 2, pt wearing non-slip footwear, adequate lighting, clutter free environment, bed alarm on, patients room near nursing station, charting outside of patients room, hourly rounding in place. Educated on level of fall risk, oriented to use of call light and encouraged pt to call before attempting to get out of bed.      Problem: Venous Thromboembolism (VTW)/Deep Vein Thrombosis (DVT) Prevention:  Goal: Patient will participate in Venous Thrombosis (VTE)/Deep Vein Thrombosis (DVT)Prevention Measures  Outcome: PROGRESSING AS EXPECTED  Flowsheets (Taken 2/21/2021 0748)  Mechanical Prophylaxis: SCDs, Sequential Compression Device  SCDs, Sequential Compression Device: On     Problem: Bowel/Gastric:  Goal: Normal bowel function is maintained or improved  Outcome: PROGRESSING AS EXPECTED  Flowsheets (Taken 2/21/2021 0748)  Last BM: 02/20/21     Problem: Pain Management  Goal: Pain level will decrease to patient's comfort goal  Outcome: PROGRESSING AS EXPECTED  Note: Administering pain mediations as ordered (see MAR). Providing patient with ice packs, and repositioning as needed.     Problem: Skin Integrity  Goal: Risk for  impaired skin integrity will decrease  Outcome: PROGRESSING AS EXPECTED  Note: Mepilex, waffle cushion, [id number], pillows for support, removing patients glasses while asleep, barrier paste to sacrum, containment device (purewick), and frequent bedding changes in place. Patients sacrum is reddened and slow to roberta.

## 2021-02-21 NOTE — PROGRESS NOTES
2 RN Skin Check    2 RN skin check completed with Medina STRAUSS.   Devices in place: SCDs, Purewick.  Skin assessed under devices: yes.  Confirmed pressure ulcers found on Coccyx.  New potential pressure ulcers noted on: None  Wound consult placed No.    The following interventions in place: Mepilex, waffle cushion, [id number], pillows for support, removing patients glasses while asleep, barrier paste to sacrum, containment device (purewick), and frequent bedding changes in place.

## 2021-02-21 NOTE — PROGRESS NOTES
[facility] Medicine Daily Progress Note    Date of Service  2/20/2021    Chief Complaint  85 y.o. female admitted 2/12/2021 with AMS, failure to thrive, back pain    Hospital Course  85 y.o. demented female from [facility] who presented 2/12/2021 with AMS, failure to thrive, back pain. History obtained from daughter present at bedside as pt is poor historian. Per daughter, she has had worsening back pain, recent L1 fracture noted in 12/2020. They went to see neurosurgery Friday, 2/12 - MD at [facility] sent her to ER for MRI and further workup. She was found to have pyuria, due to prior ESBL - has been started on Meropenem by admitting provider.    Interval Problem Update  2/13: continue Meropenem, ID consulted for assistance with abx. Notable L1 compression fracture, 20% spinal stenosis. As patient is referred from outpt neurosurgery, MRI lumbar ordered.    2/14: MRI not completed yet. Ordered as needed morphine / Ativan for MRI. Patient was agitated overnight, unpleasant to nursing staff. Had long discussion with daughters (Sarah - present at bedside, Elda MORIN via phone) for goal of care - DNR status confirmed, pursue with MRI for now, did not want to discuss further end of life care at this time.    2/15: MRI lumbar notable known L1 compression fracture but also new L5 compression fracture and severe T11 compression. ?vertebroplasty/kyphoplasty. Consulted neurosurgery - ordered CT thoracic/lumbar. Await formal recommendations. Eventual d/c to SNF.    2/16: Dr. Khan has taken over care for patient's fractures per patient and daughter request.  No surgical intervention per his recommendations.  Orders placed for new, better fitting TLSO brace.  Appreciate palliative care assistance with goals of care discussion.  Discussed with patient and daughter at bedside, questions answered.    2/17: TLSO fitted today. Awaiting PT/OT evals for dispo recs. No acute complaints.    2/18: This morning patient noted to have right  leg swelling Doppler ultrasound was performed which did not show a DVT.  Neurology continue to recommend nonsurgical management.  Working on placement to skilled nursing facility.  Patient has no complaints today.  Blood pressure continues to be elevated will increase the Cardizem from 30 mg to 60 mg.    2/19:   No acute complaints. Care coordination in progress.    2/20:  Plan of care discussed with patient and daughter at bedside. Questions answered.    Consultants/Specialty  ID  Palliative care    Code Status  DNAR/DNI    Disposition  SNF per goals of care    Review of Systems  Review of Systems   Constitutional: Positive for malaise/fatigue. Negative for chills and fever.   Respiratory: Negative for cough and shortness of breath.    Cardiovascular: Positive for leg swelling. Negative for chest pain and palpitations.   Musculoskeletal: Positive for back pain, falls, joint pain and neck pain.   All other systems reviewed and are negative.       Physical Exam  Temp:  [36.8 °C (98.2 °F)-37.2 °C (98.9 °F)] 37.1 °C (98.7 °F)  Pulse:  [81-83] 81  Resp:  [16-20] 17  BP: (147-162)/(75-83) 152/79  SpO2:  [94 %-98 %] 98 %    Physical Exam  Vitals and nursing note reviewed.   Constitutional:       General: She is not in acute distress.     Appearance: Normal appearance. She is normal weight.   HENT:      Head: Normocephalic and atraumatic.   Eyes:      General: No scleral icterus.     Conjunctiva/sclera: Conjunctivae normal.   Cardiovascular:      Rate and Rhythm: Normal rate and regular rhythm.      Heart sounds: Normal heart sounds. No murmur.   Pulmonary:      Effort: No respiratory distress.      Breath sounds: No wheezing.   Abdominal:      Palpations: Abdomen is soft.      Tenderness: There is no abdominal tenderness. There is no guarding or rebound.   Musculoskeletal:         General: Signs of injury present.      Cervical back: No tenderness.      Right lower leg: Edema present.      Left lower leg: No edema.       Comments: Mildly tender in lumbar region  Trace LE edema   Skin:     General: Skin is warm and dry.      Capillary Refill: Capillary refill takes 2 to 3 seconds.   Neurological:      Mental Status: She is alert.      Comments: AAO to self and place  Follows some command  Minimally verbal   Psychiatric:         Mood and Affect: Mood normal.         Cognition and Memory: Cognition is impaired.      Comments: Appears anxious         Fluids    Intake/Output Summary (Last 24 hours) at 2/20/2021 2130  Last data filed at 2/20/2021 0416  Gross per 24 hour   Intake --   Output 600 ml   Net -600 ml       Laboratory      Recent Labs     02/18/21  0437 02/19/21  0718 02/20/21  0453   SODIUM 133* 133* 133*   POTASSIUM 3.7 4.0 3.9   CHLORIDE 96 96 97   CO2 26 25 25   GLUCOSE 121* 122* 108*   BUN 7* 11 10   CREATININE 0.22* 0.28* 0.33*   CALCIUM 10.2 10.9* 10.4                   Imaging  US-EXTREMITY VENOUS LOWER UNILAT RIGHT   Final Result      CT-LSPINE W/O PLUS RECONS   Final Result      1.  There is a severe burst fracture at the L1 level, significantly progressed since the prior CT and similar to the recent MRI from yesterday.   2.  There is slight concavity of the L5 superior endplate is noted as discussed on recent MRI.   3.  Multilevel degenerative disc disease and arthropathy is more fully discussed on the MRI.      CT-TSPINE W/O PLUS RECONS   Final Result         1.  Minimal interval loss of height of the central portion of the T11 vertebral body currently measuring 12.8 mm compared with 17.3 mm on 12/27/2020.      2.  No bony spinal canal compromise.      3.  No other thoracic vertebral body fracture. No posterior element fracture.      4.  No significant degenerative change.         MR-LUMBAR SPINE-W/O   Final Result      1.  T11 mild-moderate recent or subacute insufficiency compression fracture which was not present on CT studies from 12/27/2020. This appears amenable to vertebral augmentation.   2.  L1 marked  compression fracture with substantial progression since CT studies from 12/27/2020. There is posterior cortical buckling/retropulsion. No associated epidural hematoma. This lesion appears amenable to vertebral augmentation.   3.  L5 mild superior endplate compression fracture with only slight superior endplate concavity. Bandlike marrow edema signal indicative of recent or subacute fracture. This fracture was not present on CT studies from 12/27/2020.      CT-PELVIS W/O   Final Result      1.  No acute fracture or dislocation. Cortical irregularity in the left suprapubic ramus noted on the recent radiograph is related to a nutrient vessel.   2.  Ill-defined sclerosis in the sacrum, related to chronic sacral insufficiency fracture.   3.  Colonic diverticulosis.      DX-HIP-COMPLETE - UNILATERAL 2+ LEFT   Final Result      1.  Subtle cortical irregularity along the superior aspect of the left superior pubic ramus may represent a subacute to chronic nondisplaced fracture.   2.  No proximal femoral fracture.           Assessment/Plan  * Acute low back pain- (present on admission)  Assessment & Plan  L1 compression fracture noted by CT 12/2020 - neurosurgery consulted at the time - TLSO brace but no intervention planned  Saw outpt neurosurgery 2/12 - who sent patient to hospital for MRI workup  MRI lumbar: severe L1 compression fracture compared to prior 12/2020, mild L5 compression fracture, DJD of T11  PT/OT  Suspect eventual SNF d/c  Pain control  Neurosurgery consulted, medical management recommended    Acute encephalopathy- (present on admission)  Assessment & Plan  IMPROVED  Toxic/metabolic etiology, back pain exacerbating  Does have underlying dementia  ESBL E.coli UTI -- adequately treated with abx per ID  HIV/syphilis -- negative  Negative acute CT head 12/27/2020  S/p trial of IV thiamine  Borderline low B12/folate -- on replacement therapy  Minimize sedatives    Dementia (HCC)- (present on  admission)  Assessment & Plan  Patient assisted living facility patient has advanced dementia poor historian    Acute cystitis without hematuria- (present on admission)  Assessment & Plan  Adequately treated  UA pyuria  UCx 2/12: ESBL E.coli  Abx: s/p fosphomycin 3g x1 2/14 and Meropenem (2/12-2/14)    ID consulted - adequately treated  Resolved    Essential hypertension- (present on admission)  Assessment & Plan  Increase Cardizem from 30 mg to 60 mg.  PRN labetalol    Failure to thrive in adult  Assessment & Plan  Frequent hospitalization for recurrent UTI, back pain  Discussed with daughters (including LIDIARAINA med) on 2/14 - DNR confirmed, pursue MRI lumbar, not ready to discuss end of life goal of care at this time    Palliative care consulted for advance care planning, appreciate assistance.    Frequent falls- (present on admission)  Assessment & Plan  Fall precautions  PT/OT    Hip pain- (present on admission)  Assessment & Plan  ?radiculopathy  No hip fracture seen by Xray  L1 fracture by CT 12/2020 and severe burst fracture seen again 2/15/21    Physical deconditioning- (present on admission)  Assessment & Plan  PT/OT  Frequent falls, osteopenia  Recent compression lumbar spine fracture, wrist fracture    Urinary incontinence- (present on admission)  Assessment & Plan  Bethanechol  PRN bladder scan and straight cath    Vitamin D deficiency- (present on admission)  Assessment & Plan  supplement    Osteopenia- (present on admission)  Assessment & Plan  Supportive care  Fall precautions       VTE prophylaxis: Lovenox

## 2021-02-21 NOTE — PROGRESS NOTES
2 RN Skin Check    2 RN skin check completed with CARLOS A Frances.  Devices in place: SCDs and Nasal Cannula.  Skin assessed under devices: yes.  Confirmed pressure ulcers found: none.    No new potential pressure ulcers noted. Sacrum red, slow to roberta, but intact. Barrier cream applied. Nose red from glasses blanchable. Glasses removed while sleeping.     The following interventions in place Pillows, Barrier cream and Waffle bed overlay.

## 2021-02-22 VITALS
HEART RATE: 81 BPM | OXYGEN SATURATION: 96 % | DIASTOLIC BLOOD PRESSURE: 63 MMHG | BODY MASS INDEX: 23.07 KG/M2 | WEIGHT: 143.52 LBS | SYSTOLIC BLOOD PRESSURE: 133 MMHG | RESPIRATION RATE: 16 BRPM | TEMPERATURE: 98.8 F | HEIGHT: 66 IN

## 2021-02-22 PROCEDURE — 92526 ORAL FUNCTION THERAPY: CPT

## 2021-02-22 PROCEDURE — 700111 HCHG RX REV CODE 636 W/ 250 OVERRIDE (IP): Performed by: STUDENT IN AN ORGANIZED HEALTH CARE EDUCATION/TRAINING PROGRAM

## 2021-02-22 PROCEDURE — A9270 NON-COVERED ITEM OR SERVICE: HCPCS | Performed by: STUDENT IN AN ORGANIZED HEALTH CARE EDUCATION/TRAINING PROGRAM

## 2021-02-22 PROCEDURE — 97110 THERAPEUTIC EXERCISES: CPT

## 2021-02-22 PROCEDURE — 700102 HCHG RX REV CODE 250 W/ 637 OVERRIDE(OP): Performed by: STUDENT IN AN ORGANIZED HEALTH CARE EDUCATION/TRAINING PROGRAM

## 2021-02-22 PROCEDURE — 97530 THERAPEUTIC ACTIVITIES: CPT

## 2021-02-22 PROCEDURE — 99239 HOSP IP/OBS DSCHRG MGMT >30: CPT | Performed by: INTERNAL MEDICINE

## 2021-02-22 PROCEDURE — 97535 SELF CARE MNGMENT TRAINING: CPT

## 2021-02-22 RX ORDER — ASCORBIC ACID 500 MG
500 TABLET ORAL DAILY
Status: SHIPPED
Start: 2021-02-22 | End: 2021-06-10

## 2021-02-22 RX ORDER — ACETAMINOPHEN 325 MG/1
650 TABLET ORAL SEE ADMIN INSTRUCTIONS
Qty: 30 TABLET | Status: SHIPPED
Start: 2021-02-22 | End: 2021-08-11

## 2021-02-22 RX ORDER — POLYETHYLENE GLYCOL 3350 17 G/17G
17 POWDER, FOR SOLUTION ORAL
Status: SHIPPED
Start: 2021-02-22 | End: 2021-06-10

## 2021-02-22 RX ORDER — CHOLECALCIFEROL (VITAMIN D3) 125 MCG
2000 CAPSULE ORAL EVERY MORNING
Status: SHIPPED
Start: 2021-02-22 | End: 2021-06-10

## 2021-02-22 RX ORDER — BISACODYL 10 MG
10 SUPPOSITORY, RECTAL RECTAL
Status: SHIPPED
Start: 2021-02-22 | End: 2021-06-10

## 2021-02-22 RX ORDER — HYDRALAZINE HYDROCHLORIDE 50 MG/1
50 TABLET, FILM COATED ORAL EVERY 8 HOURS PRN
Qty: 90 TABLET | Status: SHIPPED
Start: 2021-02-22 | End: 2021-06-10

## 2021-02-22 RX ORDER — OXYCODONE HYDROCHLORIDE 5 MG/1
5 TABLET ORAL EVERY 6 HOURS PRN
Qty: 20 TABLET | Refills: 0 | Status: SHIPPED | OUTPATIENT
Start: 2021-02-22 | End: 2021-02-27

## 2021-02-22 RX ORDER — LIDOCAINE 50 MG/G
1 PATCH TOPICAL DAILY
Status: SHIPPED
Start: 2021-02-22 | End: 2021-06-14

## 2021-02-22 RX ORDER — AMOXICILLIN 250 MG
2 CAPSULE ORAL 2 TIMES DAILY
Qty: 30 TABLET | Status: SHIPPED
Start: 2021-02-22 | End: 2021-06-10 | Stop reason: SDUPTHER

## 2021-02-22 RX ORDER — DILTIAZEM HYDROCHLORIDE 60 MG/1
60 TABLET, FILM COATED ORAL EVERY EVENING
Qty: 120 TABLET | Status: SHIPPED
Start: 2021-02-22 | End: 2021-11-01

## 2021-02-22 RX ORDER — FOLIC ACID 1 MG/1
1 TABLET ORAL DAILY
Qty: 30 TABLET | Status: SHIPPED
Start: 2021-02-23 | End: 2021-06-10

## 2021-02-22 RX ORDER — BETHANECHOL CHLORIDE 25 MG/1
25 TABLET ORAL
Qty: 90 TABLET | Status: SHIPPED
Start: 2021-02-22 | End: 2021-06-10

## 2021-02-22 RX ORDER — CARBOXYMETHYLCELLULOSE SODIUM 5 MG/ML
2 SOLUTION/ DROPS OPHTHALMIC EVERY 4 HOURS PRN
Status: SHIPPED
Start: 2021-02-22 | End: 2021-06-10

## 2021-02-22 RX ORDER — SERTRALINE HYDROCHLORIDE 100 MG/1
100 TABLET, FILM COATED ORAL EVERY MORNING
Qty: 30 TABLET | Status: SHIPPED
Start: 2021-02-23 | End: 2021-11-01

## 2021-02-22 RX ADMIN — DOCUSATE SODIUM 50 MG AND SENNOSIDES 8.6 MG 2 TABLET: 8.6; 5 TABLET, FILM COATED ORAL at 04:42

## 2021-02-22 RX ADMIN — OXYCODONE 5 MG: 5 TABLET ORAL at 04:15

## 2021-02-22 RX ADMIN — BETHANECHOL CHLORIDE 25 MG: 25 TABLET ORAL at 08:22

## 2021-02-22 RX ADMIN — DILTIAZEM HYDROCHLORIDE 60 MG: 60 TABLET, FILM COATED ORAL at 16:26

## 2021-02-22 RX ADMIN — Medication 2000 UNITS: at 04:42

## 2021-02-22 RX ADMIN — OXYCODONE 5 MG: 5 TABLET ORAL at 10:49

## 2021-02-22 RX ADMIN — OXYCODONE 5 MG: 5 TABLET ORAL at 14:17

## 2021-02-22 RX ADMIN — SERTRALINE HYDROCHLORIDE 100 MG: 100 TABLET, FILM COATED ORAL at 04:43

## 2021-02-22 RX ADMIN — CYANOCOBALAMIN TAB 500 MCG 1000 MCG: 500 TAB at 04:42

## 2021-02-22 RX ADMIN — OXYCODONE HYDROCHLORIDE AND ACETAMINOPHEN 500 MG: 500 TABLET ORAL at 04:42

## 2021-02-22 RX ADMIN — ENOXAPARIN SODIUM 40 MG: 40 INJECTION SUBCUTANEOUS at 04:42

## 2021-02-22 RX ADMIN — FOLIC ACID 1 MG: 1 TABLET ORAL at 04:42

## 2021-02-22 ASSESSMENT — COGNITIVE AND FUNCTIONAL STATUS - GENERAL
CLIMB 3 TO 5 STEPS WITH RAILING: TOTAL
MOBILITY SCORE: 6
TURNING FROM BACK TO SIDE WHILE IN FLAT BAD: UNABLE
SUGGESTED CMS G CODE MODIFIER MOBILITY: CN
SUGGESTED CMS G CODE MODIFIER DAILY ACTIVITY: CL
MOVING FROM LYING ON BACK TO SITTING ON SIDE OF FLAT BED: UNABLE
EATING MEALS: A LITTLE
PERSONAL GROOMING: A LOT
TOILETING: TOTAL
HELP NEEDED FOR BATHING: TOTAL
DAILY ACTIVITIY SCORE: 11
WALKING IN HOSPITAL ROOM: TOTAL
DRESSING REGULAR UPPER BODY CLOTHING: A LOT
STANDING UP FROM CHAIR USING ARMS: TOTAL
MOVING TO AND FROM BED TO CHAIR: UNABLE
DRESSING REGULAR LOWER BODY CLOTHING: A LOT

## 2021-02-22 ASSESSMENT — PAIN DESCRIPTION - PAIN TYPE
TYPE: ACUTE PAIN;CHRONIC PAIN
TYPE: CHRONIC PAIN;ACUTE PAIN

## 2021-02-22 ASSESSMENT — GAIT ASSESSMENTS
DISTANCE (FEET): 0
GAIT LEVEL OF ASSIST: UNABLE TO PARTICIPATE

## 2021-02-22 NOTE — PROGRESS NOTES
2 RN skin check: CARLOS A Be.   Devices in place: SCDs, Purewick.  Skin assessed under devices: yes.  Confirmed pressure ulcers found on Coccyx; blanching  Bruising on RUE (present on admit)  No new pressure points noted.     Turning pt qt2h. Barrier paste with each incontinence change.

## 2021-02-22 NOTE — CARE PLAN
Problem: Communication  Goal: The ability to communicate needs accurately and effectively will improve  Outcome: PROGRESSING AS EXPECTED     Problem: Safety  Goal: Will remain free from injury  Outcome: PROGRESSING AS EXPECTED     Problem: Knowledge Deficit  Goal: Knowledge of disease process/condition, treatment plan, diagnostic tests, and medications will improve  Outcome: PROGRESSING AS EXPECTED     Problem: Pain Management  Goal: Pain level will decrease to patient's comfort goal  Outcome: PROGRESSING AS EXPECTED     Problem: Psychosocial Needs:  Goal: Level of anxiety will decrease  Outcome: PROGRESSING AS EXPECTED     Problem: Skin Integrity  Goal: Risk for impaired skin integrity will decrease  Outcome: PROGRESSING AS EXPECTED       VSS. Turning pt q2h to help prevent further skin breakdown. Pain controlled per mar. [facility].

## 2021-02-22 NOTE — DISCHARGE PLANNING
Received Transport Form @ 2985  Spoke to Don Persaud  Transport is scheduled for 02/22/21 @4667 [facility] to [facility]

## 2021-02-22 NOTE — DISCHARGE SUMMARY
Discharge Summary    CHIEF COMPLAINT ON ADMISSION  Chief Complaint   Patient presents with   • Hip Pain       Reason for Admission  ems     CODE STATUS  DNAR/DNI    HPI & HOSPITAL COURSE  Lucita Babcock is an 85 y.o. female with history of dementia who presented 2/12/2021 from her assisted living facility with AMS, failure to thrive, back pain. History obtained from daughter present at bedside as pt is poor historian. Per daughter, she had worsening back pain, recent L1 fracture noted in 12/2020. They went to see neurosurgery Friday, 2/12 and MD at [facility] sent her to ER for MRI and further workup. She was found to have pyuria, due to prior ESBL and was treated with a course of IV antibiotics.   MRI spine redemonstrated known L1 compression fracture, but identified new L5 compression fracture and severe T11 compression.  The patient has severe progressive osteoporosis despite treatment.  Neurosurgery was consulted and recommended nonoperative management.  A new, better fitting TLSO brace was provided.  A goals of care discussion was held with the patient, daughter and palliative care team.  The decided on a DNR/DNI CODE STATUS but preferred to continue active nonoperative management for fractures.      Therefore, she is discharged in good and stable condition to skilled nursing facility.    The patient met 2-midnight criteria for an inpatient stay at the time of discharge.      FOLLOW UP ITEMS POST DISCHARGE  Spine fractures  Mobility  Dementia  Goals of care    DISCHARGE DIAGNOSES  Principal Problem:    Acute low back pain POA: Yes  Active Problems:    Acute cystitis without hematuria POA: Yes    Dementia (HCC) POA: Yes    Acute encephalopathy POA: Yes    Essential hypertension POA: Yes      Overview: Patient could not tolerate HCTZ due to side effects of hypercalcemia. She       also has history of angioedema with ACEIs and she reported that she was       told not to take antihypertensive medications ending  "with \" ol \" or \" il       \". She has chronic venous insufficiency and history of varicose vein       removed on both LE. She has leg swelling from taking amlodipine.      Adequate BP control.      Dr. Nick Dixon, Cardiology (sign off 7/15)          Vitamin D deficiency POA: Yes    Urinary incontinence POA: Yes    Physical deconditioning POA: Yes    Hip pain POA: Yes    Frequent falls POA: Yes    Failure to thrive in adult POA: Unknown    Osteopenia POA: Yes  Resolved Problems:    History of recurrent UTIs POA: Yes      Overview: Discussed details to prevent UTI. Counseling for postcoital voiding,       Genital hygiene, daily probiotics, oral hydrations, cranberry.       FOLLOW UP  [facility] [address] [id number] [phone]        Niles Khan M.D.  [address] [facility] [id number] [phone]            MEDICATIONS ON DISCHARGE     Medication List      START taking these medications      Instructions   bisacodyl 10 MG Supp  Commonly known as: DULCOLAX   Insert 1 Suppository into the rectum 1 time a day as needed (if magnesium hydroxide ineffective after 24 hours).  Dose: 10 mg     carboxymethylcellulose 0.5 % Soln  Commonly known as: REFRESH TEARS  Replaces: Restasis 0.05 % ophthalmic emulsion   Administer 2 Drops into both eyes every four hours as needed (dry eyes).  Dose: 2 Drop     cyanocobalamin 1000 MCG Tabs  Start taking on: February 23, 2021  Commonly known as: VITAMIN B12   Take 1 tablet by mouth every day.  Dose: 1,000 mcg     enoxaparin 40 MG/0.4ML Soln inj  Start taking on: February 23, 2021  Commonly known as: LOVENOX   Inject 40 mg under the skin every day.  Dose: 40 mg     folic acid 1 MG Tabs  Start taking on: February 23, 2021  Commonly known as: FOLVITE   Take 1 tablet by mouth every day.  Dose: 1 mg     hydrALAZINE 50 MG Tabs  Commonly known as: APRESOLINE   Take 1 tablet by mouth every 8 hours as needed (for SBP > 180).  Dose: 50 mg   "   magnesium hydroxide 400 MG/5ML Susp  Commonly known as: MILK OF MAGNESIA   Take 30 mL by mouth 1 time a day as needed (if polyethylene glycol ineffective after 24 hours).  Dose: 30 mL     oxyCODONE immediate-release 5 MG Tabs  Commonly known as: ROXICODONE   Take 1 tablet by mouth every 6 hours as needed for Severe Pain for up to 5 days.  Dose: 5 mg     polyethylene glycol/lytes 17 g Pack  Commonly known as: MIRALAX  Replaces: polyethylene glycol 3350 17 GM/SCOOP Powd   Take 1 Packet by mouth 1 time a day as needed (if sennosides and docusate ineffective after 24 hours).  Dose: 17 g        CHANGE how you take these medications      Instructions   ascorbic acid 500 MG Tabs  What changed: when to take this  Commonly known as: ascorbic acid   Take 1 tablet by mouth every day.  Dose: 500 mg     bethanechol 25 MG Tabs  What changed:   · medication strength  · additional instructions  · Another medication with the same name was removed. Continue taking this medication, and follow the directions you see here.  Commonly known as: URECHOLINE   Take 1 tablet by mouth 2 (two) times a day.  Dose: 25 mg     DILTIAZem 60 MG Tabs  What changed:   · medication strength  · how much to take  · when to take this  Commonly known as: CARDIZEM   Take 1 tablet by mouth every evening.  Dose: 60 mg     senna-docusate 8.6-50 MG Tabs  What changed:   · how much to take  · when to take this  · reasons to take this  Commonly known as: PERICOLACE or SENOKOT S   Take 2 Tablets by mouth 2 Times a Day.  Dose: 2 tablet        CONTINUE taking these medications      Instructions   acetaminophen 325 MG Tabs  Commonly known as: Tylenol   Take 2 Tablets by mouth see administration instructions. 2 tablets = 650 mg. Given 3 times per day at 0700, 1200, and 1700, AND every 4 hours AS NEEDED for pain. Not to exceed 3 gm in 24 hours.  Dose: 650 mg     lidocaine 5 % Ptch  Commonly known as: Lidoderm   Place 1 Patch on the skin every day. APPLY at 0700;  REMOVE at 1900.  Dose: 1 Patch     sertraline 100 MG Tabs  Start taking on: February 23, 2021  Commonly known as: Zoloft   Take 1 tablet by mouth every morning.  Dose: 100 mg     Vitamin D3 50 MCG (2000 UT) Tabs   Take 2,000 Units by mouth every morning.  Dose: 2,000 Units        STOP taking these medications    cephALEXin 250 MG Caps  Commonly known as: KEFLEX     D-Mannose 500 MG Caps     polyethylene glycol 3350 17 GM/SCOOP Powd  Commonly known as: MIRALAX  Replaced by: polyethylene glycol/lytes 17 g Pack     Restasis 0.05 % ophthalmic emulsion  Generic drug: cyclosporin  Replaced by: carboxymethylcellulose 0.5 % Soln     traMADol 50 MG Tabs  Commonly known as: ULTRAM            Allergies  Allergies   Allergen Reactions   • Ace Inhibitors Anaphylaxis   • Augmentin Unspecified     Flu like symptoms   • Lisinopril Anaphylaxis   • Oxycodone Vomiting   • Penicillins Hives     Tolerates cephalosporins   • Sulfa Drugs Hives   • Cephalosporins    • Epinephrine Unspecified     shaking   • Erythromycin Rash             DIET  Orders Placed This Encounter   Procedures   • Diet Order Diet: Level 5 - Minced and Moist; Liquid level: Level 0 - Thin; Tray Modifications (optional): SLP - 1:1 Supervision by Nursing, SLP - Deliver to Nursing Station     Standing Status:   Standing     Number of Occurrences:   1     Order Specific Question:   Diet:     Answer:   Level 5 - Minced and Moist [24]     Order Specific Question:   Liquid level     Answer:   Level 0 - Thin     Order Specific Question:   Tray Modifications (optional)     Answer:   SLP - 1:1 Supervision by Nursing     Order Specific Question:   Tray Modifications (optional)     Answer:   SLP - Deliver to Nursing Station       ACTIVITY  As tolerated.  Weight bearing as tolerated   Continue TLSO brace    LINES, DRAINS, AND WOUNDS  This is an automated list. Peripheral IVs will be removed prior to discharge.  Peripheral IV 02/14/21 22 G Left Forearm (Active)   Site Assessment  Clean;Dry;Intact 02/22/21 0822   Dressing Type Transparent 02/22/21 0822   Line Status Flushed;Scrubbed the hub prior to access;Saline locked 02/22/21 0822   Dressing Status Clean;Dry;Intact 02/22/21 0822   Dressing Intervention N/A 02/22/21 0822   Date Primary Tubing Changed 02/16/21 02/16/21 2100   Date Secondary Tubing Changed 02/14/21 02/14/21 1100   NEXT Primary Tubing Change  02/16/21 02/14/21 1100   NEXT Secondary Tubing Change  02/15/21 02/14/21 1100   Infiltration Grading ([facility] 0 02/22/21 0822   Phlebitis Scale ([facility] Only) 0 02/22/21 0822     External Urinary Catheter (Female Wick) (Active)   Collection Container Suction container 02/20/21 0416   Output (mL) 600 mL 02/20/21 0416      Wound 12/27/20 Incision Arm Right (Active)   Site Assessment ISABELLA 12/30/20 0830   Periwound Assessment Clean;Dry;Intact 02/22/21 0822   Margins ISABELLA 12/30/20 0830   Closure ISABELLA 12/30/20 0830   Drainage Amount ISABELLA 12/30/20 0830   Drainage Description ISABELLA 12/29/20 2138   Treatments Site care;Offloading 12/27/20 2120   Wound Cleansing Not Applicable 12/27/20 2120   Periwound Protectant Not Applicable 12/27/20 2120   Dressing Cleansing/Solutions Not Applicable 12/27/20 2120   Dressing Options Ace Wrap;Splint 12/29/20 2138   Dressing Changed Observed 02/19/21 0923   Dressing Status Clean;Dry;Intact 12/30/20 0830   Dressing Change/Treatment Frequency As Needed 12/30/20 0830       Peripheral IV 02/14/21 22 G Left Forearm (Active)   Site Assessment Clean;Dry;Intact 02/22/21 0822   Dressing Type Transparent 02/22/21 0822   Line Status Flushed;Scrubbed the hub prior to access;Saline locked 02/22/21 0822   Dressing Status Clean;Dry;Intact 02/22/21 0822   Dressing Intervention N/A 02/22/21 0822   Date Primary Tubing Changed 02/16/21 02/16/21 2100   Date Secondary Tubing Changed 02/14/21 02/14/21 1100   NEXT Primary Tubing Change  02/16/21 02/14/21 1100   NEXT Secondary Tubing Change  02/15/21 02/14/21 1100   Infiltration Grading  [place] 0 02/22/21 0822   Phlebitis Scale ([facility] Only) 0 02/22/21 0822               MENTAL STATUS ON TRANSFER  Level of Consciousness: Confused  Orientation : Disoriented to Time  Speech: Speech Clear    CONSULTATIONS  Neurosurgery  Palliative care    PROCEDURES  None    LABORATORY  Lab Results   Component Value Date    SODIUM 133 (L) 02/20/2021    POTASSIUM 3.9 02/20/2021    CHLORIDE 97 02/20/2021    CO2 25 02/20/2021    GLUCOSE 108 (H) 02/20/2021    BUN 10 02/20/2021    CREATININE 0.33 (L) 02/20/2021    CREATININE 0.7 04/04/2009        Lab Results   Component Value Date    WBC 8.3 02/16/2021    HEMOGLOBIN 13.0 02/16/2021    HEMATOCRIT 40.2 02/16/2021    PLATELETCT 291 02/16/2021      US-EXTREMITY VENOUS LOWER UNILAT RIGHT   Final Result      CT-LSPINE W/O PLUS RECONS   Final Result      1.  There is a severe burst fracture at the L1 level, significantly progressed since the prior CT and similar to the recent MRI from yesterday.   2.  There is slight concavity of the L5 superior endplate is noted as discussed on recent MRI.   3.  Multilevel degenerative disc disease and arthropathy is more fully discussed on the MRI.      CT-TSPINE W/O PLUS RECONS   Final Result         1.  Minimal interval loss of height of the central portion of the T11 vertebral body currently measuring 12.8 mm compared with 17.3 mm on 12/27/2020.      2.  No bony spinal canal compromise.      3.  No other thoracic vertebral body fracture. No posterior element fracture.      4.  No significant degenerative change.         MR-LUMBAR SPINE-W/O   Final Result      1.  T11 mild-moderate recent or subacute insufficiency compression fracture which was not present on CT studies from 12/27/2020. This appears amenable to vertebral augmentation.   2.  L1 marked compression fracture with substantial progression since CT studies from 12/27/2020. There is posterior cortical buckling/retropulsion. No associated epidural hematoma. This lesion appears  amenable to vertebral augmentation.   3.  L5 mild superior endplate compression fracture with only slight superior endplate concavity. Bandlike marrow edema signal indicative of recent or subacute fracture. This fracture was not present on CT studies from 12/27/2020.      CT-PELVIS W/O   Final Result      1.  No acute fracture or dislocation. Cortical irregularity in the left suprapubic ramus noted on the recent radiograph is related to a nutrient vessel.   2.  Ill-defined sclerosis in the sacrum, related to chronic sacral insufficiency fracture.   3.  Colonic diverticulosis.      DX-HIP-COMPLETE - UNILATERAL 2+ LEFT   Final Result      1.  Subtle cortical irregularity along the superior aspect of the left superior pubic ramus may represent a subacute to chronic nondisplaced fracture.   2.  No proximal femoral fracture.         Total time of the discharge process exceeds 45 minutes.

## 2021-02-22 NOTE — PROGRESS NOTES
Neurosurgery Progress Note    Subjective:  Tells me she is doing well, no pain currently  Has not mobilized        Exam:  Strength 5/5 w/ coaching except bilat IP 4-/5 -- appears to be d/t pain    BP  Min: 128/69  Max: 142/64  Pulse  Av.2  Min: 71  Max: 80  Resp  Av  Min: 16  Max: 16  Temp  Av.9 °C (98.5 °F)  Min: 36.6 °C (97.9 °F)  Max: 37.2 °C (99 °F)  SpO2  Av.8 %  Min: 94 %  Max: 97 %    No data recorded        Recent Labs     21  0453   SODIUM 133*   POTASSIUM 3.9   CHLORIDE 97   CO2 25   GLUCOSE 108*   BUN 10   CREATININE 0.33*   CALCIUM 10.4               Intake/Output       21 0700 - 21 0659 21 07 - 21 0659      [id number] 8735-9950 Total 0-0659 Total       Intake    Total Intake -- -- -- -- -- --       Output    Urine  --  400 400  --  -- --    Number of Times Voided -- 2 x 2 x -- -- --    Urine Void (mL) -- 400 400 -- -- --    Total Output -- 400 400 -- -- --       Net I/O     -- -400 -400 -- -- --            Intake/Output Summary (Last 24 hours) at 2021 0811  Last data filed at 2021 0341  Gross per 24 hour   Intake --   Output 400 ml   Net -400 ml            • DILTIAZem  60 mg Q EVENING   • cyanocobalamin  1,000 mcg DAILY   • folic acid  1 mg DAILY   • bethanechol  25 mg BID   • carboxymethylcellulose  2 Drop Q4HRS PRN   • sertraline  100 mg QAM   • ascorbic acid  500 mg DAILY   • vitamin D  2,000 Units QAM   • ondansetron  4 mg Q4HRS PRN   • hydrALAZINE  50 mg Q8HRS PRN   • LORazepam  0.5 mg Q8HRS PRN   • enoxaparin  40 mg DAILY   • acetaminophen  650 mg Q6HRS PRN   • senna-docusate  2 tablet BID    And   • polyethylene glycol/lytes  1 Packet QDAY PRN    And   • magnesium hydroxide  30 mL QDAY PRN    And   • bisacodyl  10 mg QDAY PRN   • guaiFENesin dextromethorphan  10 mL Q6HRS PRN   • labetalol  10 mg Q4HRS PRN   • ondansetron  4 mg Q4HRS PRN   • Pharmacy Consult Request  1 Each PHARMACY TO DOSE   • oxyCODONE immediate-release   2.5 mg Q3HRS PRN    Or   • oxyCODONE immediate-release  5 mg Q3HRS PRN    Or   • HYDROmorphone  0.25 mg Q3HRS PRN       Assessment and Plan:  Hospital day # 10 L1 compression fx, T11, L5 minimal new compression fxs  POD #na   Prophylactic anticoagulation: yes           NM as above-- appears stable   Pain control -cont  PT/OT - TLSO don at edge of bed  UTI - abx per ID  No surg plans  No nsaids/ asa   Dispo per IM- SNF    ATTENDING ADDENDUM:  agree with above note  Pt needs to be out of bed at least 3-4x/day  Pt needs to ambulate with PT

## 2021-02-22 NOTE — THERAPY
Speech Language Pathology  Daily Treatment     Patient Name: Lucita Babcock  Age:  85 y.o., Sex:  female  Medical Record #: [id number]  Today's Date: 2/22/2021     Precautions: Fall Risk, TLSO (Thoracolumbosacral orthosis), Spinal / Back Precautions , Swallow Precautions   Comments: h/o dementia    Assessment    Pt seen this date for dysphagia tx at breakfast, with MM5/TN0 breakfast tray.  Daughter present for meal and reports pt continues to pocket food sometimes, but is not having difficulty swallowing.  Pt was fed by this SLP, and consumed items on her meal tray (minced and moist solids, pureed solids and this via straw sip) without any overt s/sx of aspiration. Pt continues to demonstrate prolonged mastication with minced and moist solids, as well as pureed solids.  Pt was able to clear oral cavity with cueing to swallow multiple times, and at times needed a liquid wash.  Hypolaryngeal elevation palpated as complete and voice remained unchanged throughout meal.  Pt continues to need hand over hand assistance for self feeding.  Recommend to continue MM5/TN0 diet (minced and moist with thins) with 1:1 supervision and assistance with feeding.  Please hold PO with any difficulty or increased lethargy.     Plan    Continue current treatment plan.    Discharge Recommendations: Recommend post-acute placement for additional speech therapy services prior to discharge home       Objective     02/22/21 1008   Dysphagia    Dysphagia X   Positioning / Behavior Modification Self Monitoring;Modulate Rate or Bite Size   Other Treatments MM5/TN0 meal tray   Diet / Liquid Recommendation Thin (0);Minced & Moist (5) - (Dysphagia II)   Nutritional Liquid Intake Rating Scale Non thickened beverages   Nutritional Food Intake Rating Scale Total oral diet with multiple consistencies but requiring special preparation or compensations   Nursing Communication Swallow Precaution Sign Posted at Head of Bed   Skilled Intervention Compensatory  Strategies;Verbal Cueing   Recommended Route of Medication Administration   Medication Administration  Whole with Liquid Wash;Crush all Medications in Puree  (as tolerated)

## 2021-02-22 NOTE — DISCHARGE PLANNING
Agency/Facility Name: [facility]   Spoke To: Heather  Outcome: Potential bed available for patient 02/23

## 2021-02-22 NOTE — PROGRESS NOTES
[facility] Medicine Daily Progress Note    Date of Service  2/21/2021    Chief Complaint  85 y.o. female admitted 2/12/2021 with AMS, failure to thrive, back pain    Hospital Course  85 y.o. demented female from [facility] who presented 2/12/2021 with AMS, failure to thrive, back pain. History obtained from daughter present at bedside as pt is poor historian. Per daughter, she has had worsening back pain, recent L1 fracture noted in 12/2020. They went to see neurosurgery Friday, 2/12 - MD at [facility] sent her to ER for MRI and further workup. She was found to have pyuria, due to prior ESBL - has been started on Meropenem by admitting provider.    Interval Problem Update  2/13: continue Meropenem, ID consulted for assistance with abx. Notable L1 compression fracture, 20% spinal stenosis. As patient is referred from outpt neurosurgery, MRI lumbar ordered.    2/14: MRI not completed yet. Ordered as needed morphine / Ativan for MRI. Patient was agitated overnight, unpleasant to nursing staff. Had long discussion with daughters (Sarah - present at bedside, Elda MORIN via phone) for goal of care - DNR status confirmed, pursue with MRI for now, did not want to discuss further end of life care at this time.    2/15: MRI lumbar notable known L1 compression fracture but also new L5 compression fracture and severe T11 compression. ?vertebroplasty/kyphoplasty. Consulted neurosurgery - ordered CT thoracic/lumbar. Await formal recommendations. Eventual d/c to SNF.    2/16: Dr. Khan has taken over care for patient's fractures per patient and daughter request.  No surgical intervention per his recommendations.  Orders placed for new, better fitting TLSO brace.  Appreciate palliative care assistance with goals of care discussion.  Discussed with patient and daughter at bedside, questions answered.    2/17: TLSO fitted today. Awaiting PT/OT evals for dispo recs. No acute complaints.    2/18: This morning patient noted to have right  leg swelling Doppler ultrasound was performed which did not show a DVT.  Neurology continue to recommend nonsurgical management.  Working on placement to skilled nursing facility.  Patient has no complaints today.  Blood pressure continues to be elevated will increase the Cardizem from 30 mg to 60 mg.    2/19: No acute complaints. Care coordination in progress.    2/20: Plan of care discussed with patient and daughter at bedside. Questions answered.    2/21: No acute complaints. Medically cleared for placement.    Consultants/Specialty  ID  Palliative care    Code Status  DNAR/DNI    Disposition  Pending SNF placement per goals of care. Medically cleared.    Review of Systems  Review of Systems   Constitutional: Positive for malaise/fatigue. Negative for chills and fever.   Respiratory: Negative for cough and shortness of breath.    Cardiovascular: Positive for leg swelling. Negative for chest pain and palpitations.   Musculoskeletal: Positive for back pain, falls, joint pain and neck pain.   All other systems reviewed and are negative.       Physical Exam  Temp:  [36.7 °C (98 °F)-37.2 °C (99 °F)] 37.2 °C (99 °F)  Pulse:  [69-80] 71  Resp:  [14-16] 16  BP: (128-162)/(58-82) 130/58  SpO2:  [91 %-98 %] 96 %    Physical Exam  Vitals and nursing note reviewed.   Constitutional:       General: She is not in acute distress.     Appearance: Normal appearance. She is normal weight.   HENT:      Head: Normocephalic and atraumatic.   Eyes:      General: No scleral icterus.     Conjunctiva/sclera: Conjunctivae normal.   Cardiovascular:      Rate and Rhythm: Normal rate and regular rhythm.      Heart sounds: Normal heart sounds. No murmur.   Pulmonary:      Effort: No respiratory distress.      Breath sounds: No wheezing.   Abdominal:      Palpations: Abdomen is soft.      Tenderness: There is no abdominal tenderness. There is no guarding or rebound.   Musculoskeletal:         General: Signs of injury present.      Cervical  back: No tenderness.      Right lower leg: Edema present.      Left lower leg: No edema.      Comments: Mildly tender in lumbar region  Trace LE edema   Skin:     General: Skin is warm and dry.      Capillary Refill: Capillary refill takes 2 to 3 seconds.   Neurological:      Mental Status: She is alert.      Comments: AAO to self and place  Follows some command  Minimally verbal   Psychiatric:         Mood and Affect: Mood normal.         Cognition and Memory: Cognition is impaired.      Comments: Appears anxious         Fluids  No intake or output data in the 24 hours ending 02/21/21 2300    Laboratory      Recent Labs     02/19/21  0718 02/20/21  0453   SODIUM 133* 133*   POTASSIUM 4.0 3.9   CHLORIDE 96 97   CO2 25 25   GLUCOSE 122* 108*   BUN 11 10   CREATININE 0.28* 0.33*   CALCIUM 10.9* 10.4                   Imaging  US-EXTREMITY VENOUS LOWER UNILAT RIGHT   Final Result      CT-LSPINE W/O PLUS RECONS   Final Result      1.  There is a severe burst fracture at the L1 level, significantly progressed since the prior CT and similar to the recent MRI from yesterday.   2.  There is slight concavity of the L5 superior endplate is noted as discussed on recent MRI.   3.  Multilevel degenerative disc disease and arthropathy is more fully discussed on the MRI.      CT-TSPINE W/O PLUS RECONS   Final Result         1.  Minimal interval loss of height of the central portion of the T11 vertebral body currently measuring 12.8 mm compared with 17.3 mm on 12/27/2020.      2.  No bony spinal canal compromise.      3.  No other thoracic vertebral body fracture. No posterior element fracture.      4.  No significant degenerative change.         MR-LUMBAR SPINE-W/O   Final Result      1.  T11 mild-moderate recent or subacute insufficiency compression fracture which was not present on CT studies from 12/27/2020. This appears amenable to vertebral augmentation.   2.  L1 marked compression fracture with substantial progression since  CT studies from 12/27/2020. There is posterior cortical buckling/retropulsion. No associated epidural hematoma. This lesion appears amenable to vertebral augmentation.   3.  L5 mild superior endplate compression fracture with only slight superior endplate concavity. Bandlike marrow edema signal indicative of recent or subacute fracture. This fracture was not present on CT studies from 12/27/2020.      CT-PELVIS W/O   Final Result      1.  No acute fracture or dislocation. Cortical irregularity in the left suprapubic ramus noted on the recent radiograph is related to a nutrient vessel.   2.  Ill-defined sclerosis in the sacrum, related to chronic sacral insufficiency fracture.   3.  Colonic diverticulosis.      DX-HIP-COMPLETE - UNILATERAL 2+ LEFT   Final Result      1.  Subtle cortical irregularity along the superior aspect of the left superior pubic ramus may represent a subacute to chronic nondisplaced fracture.   2.  No proximal femoral fracture.           Assessment/Plan  * Acute low back pain- (present on admission)  Assessment & Plan  L1 compression fracture noted by CT 12/2020 - neurosurgery consulted at the time - TLSO brace but no intervention planned  Saw outpt neurosurgery 2/12 - who sent patient to hospital for MRI workup  MRI lumbar: severe L1 compression fracture compared to prior 12/2020, mild L5 compression fracture, DJD of T11  PT/OT  Pain control  Neurosurgery consulted, medical management recommended  Continue TLSO brace    Acute encephalopathy- (present on admission)  Assessment & Plan  IMPROVED  Toxic/metabolic etiology, back pain exacerbating  Does have underlying dementia  ESBL E.coli UTI -- adequately treated with abx per ID  HIV/syphilis -- negative  Negative acute CT head 12/27/2020  S/p trial of IV thiamine  Borderline low B12/folate -- on replacement therapy  Minimize sedatives    Dementia (HCC)- (present on admission)  Assessment & Plan  Patient assisted living facility patient has  advanced dementia poor historian    Acute cystitis without hematuria- (present on admission)  Assessment & Plan  Adequately treated  UA pyuria  UCx 2/12: ESBL E.coli  Abx: s/p fosphomycin 3g x1 2/14 and Meropenem (2/12-2/14)    ID consulted - adequately treated  Resolved    Essential hypertension- (present on admission)  Assessment & Plan  Increase Cardizem from 30 mg to 60 mg.  PRN labetalol    Failure to thrive in adult  Assessment & Plan  Frequent hospitalization for recurrent UTI, back pain  Discussed with daughters (including PATIENCE med) on 2/14 - DNR confirmed, pursue MRI lumbar, not ready to discuss end of life goal of care at this time    Palliative care consulted for advance care planning, appreciate assistance.    Frequent falls- (present on admission)  Assessment & Plan  Fall precautions  PT/OT    Hip pain- (present on admission)  Assessment & Plan  ?radiculopathy  No hip fracture seen by Xray  L1 fracture by CT 12/2020 and severe burst fracture seen again 2/15/21    Physical deconditioning- (present on admission)  Assessment & Plan  PT/OT  Frequent falls, osteopenia  Recent compression lumbar spine fracture, wrist fracture    Urinary incontinence- (present on admission)  Assessment & Plan  Bethanechol  PRN bladder scan and straight cath    Vitamin D deficiency- (present on admission)  Assessment & Plan  supplement    Osteopenia- (present on admission)  Assessment & Plan  Supportive care  Fall precautions       VTE prophylaxis: Lovenox

## 2021-02-22 NOTE — HOSPITAL COURSE
Lucita Babcock is an 85 y.o. female with history of dementia who presented 2/12/2021 from her assisted living facility with AMS, failure to thrive, back pain. History obtained from daughter present at bedside as pt is poor historian. Per daughter, she had worsening back pain, recent L1 fracture noted in 12/2020. They went to see neurosurgery Friday, 2/12 and MD at [facility] sent her to ER for MRI and further workup. She was found to have pyuria, due to prior ESBL and was treated with a course of IV antibiotics.   MRI spine redemonstrated known L1 compression fracture, but identified new L5 compression fracture and severe T11 compression.  The patient has severe progressive osteoporosis despite treatment.  Neurosurgery was consulted and recommended nonoperative management.  A new, better fitting TLSO brace was provided.  A goals of care discussion was held with the patient, daughter and palliative care team.  The decided on a DNR/DNI CODE STATUS but preferred to continue active nonoperative management for fractures.   [FreeTextEntry1] : I, Lorena Flanneryin, acted solely as a scribe for Dr. Wong on this date 02/28/2020.\par \par All medical record entries made by the Scribe were at my, Dr. Wong, direction and personally dictated by me on 02/28/2020. I have reviewed the chart and agree that the record accurately reflects my personal performance of the history, physical exam, assessment and plan.  I have also personally directed, reviewed and agreed with the chart.

## 2021-02-22 NOTE — DISCHARGE INSTRUCTIONS
Discharge Instructions    Discharged to [facility] by ambulance with escort. Discharged via ambulance, hospital escort: Yes.  Special equipment needed: TLSO    Be sure to schedule a follow-up appointment with your primary care doctor or any specialists as instructed.     Discharge Plan:   Diet Plan: Discussed  Activity Level: Discussed  Confirmed Follow up Appointment: Patient to Call and Schedule Appointment  Confirmed Symptoms Management: Discussed  Medication Reconciliation Updated: Yes  Influenza Vaccine Indication: Patient Refuses(Already received vaccine per chart.)    I understand that a diet low in cholesterol, fat, and sodium is recommended for good health. Unless I have been given specific instructions below for another diet, I accept this instruction as my diet prescription.   Other diet: Minced and moist with thin liquids    Special Instructions: None    · Is patient discharged on Warfarin / Coumadin?   No     Depression / Suicide Risk    As you are discharged from this [facility], it is important to learn how to keep safe from harming yourself.    Recognize the warning signs:  · Abrupt changes in personality, positive or negative- including increase in energy   · Giving away possessions  · Change in eating patterns- significant weight changes-  positive or negative  · Change in sleeping patterns- unable to sleep or sleeping all the time   · Unwillingness or inability to communicate  · Depression  · Unusual sadness, discouragement and loneliness  · Talk of wanting to die  · Neglect of personal appearance   · Rebelliousness- reckless behavior  · Withdrawal from people/activities they love  · Confusion- inability to concentrate     If you or a loved one observes any of these behaviors or has concerns about self-harm, here's what you can do:  · Talk about it- your feelings and reasons for harming yourself  · Remove any means that you might use to hurt yourself (examples: pills, rope, extension  cords, firearm)  · Get professional help from the community (Mental Health, Substance Abuse, psychological counseling)  · Do not be alone:Call your Safe Contact- someone whom you trust who will be there for you.  · Call your local CRISIS HOTLINE 369-8619 or 778-866-9382  · Call your local Children's Mobile Crisis Response Team [place] (188) 923-0661 or www.StudioNow  · Call the toll free National Suicide Prevention Hotlines   · National Suicide Prevention Lifeline 552-509-RJWO (0614)  · [place] Line Network 800-SUICIDE (046-0857)      Spinal Compression Fracture    A spinal compression fracture is a collapse of the bones that form the spine (vertebrae). With this type of fracture, the vertebrae become pushed (compressed) into a wedge shape. Most compression fractures happen in the middle or lower part of the spine.  What are the causes?  This condition may be caused by:  · Thinning and loss of density in the bones (osteoporosis). This is the most common cause.  · A fall.  · A car or motorcycle accident.  · Cancer.  · Trauma, such as a heavy, direct hit to the head or back.  What increases the risk?  You are more likely to develop this condition if:  · You are 60 years or older.  · You have osteoporosis.  · You have certain types of cancer, including:  ? Multiple myeloma.  ? Lymphoma.  ? Prostate cancer.  ? Lung cancer.  ? Breast cancer.  What are the signs or symptoms?  Symptoms of this condition include:  · Severe pain.  · Pain that gets worse over time.  · Pain that is worse when you stand, walk, sit, or bend.  · Sudden pain that is so bad that it is hard for you to move.  · Bending or humping of the spine.  · Gradual loss of height.  · Numbness, tingling, or weakness in the back and legs.  · Trouble walking.  Your symptoms will depend on the cause of the fracture and how quickly it develops.  How is this diagnosed?  This condition may be diagnosed based on symptoms, medical history, and a  physical exam. During the physical exam, your health care provider may tap along the length of your spine to check for tenderness. Tests may be done to confirm the diagnosis. They may include:  · A bone mineral density test to check for osteoporosis.  · Imaging tests, such as a spine X-ray, CT scan, or MRI.  How is this treated?  Treatment for this condition depends on the cause and severity of the condition. Some fractures may heal on their own with supportive care. Treatment may include:  · Pain medicine.  · Rest.  · A back brace.  · Physical therapy exercises.  · Medicine to strengthen bone.  · Calcium and vitamin D supplements.  Fractures that cause the back to become misshapen, cause nerve pain or weakness, or do not respond to other treatment may be treated with surgery. This may include:  · Vertebroplasty. Bone cement is injected into the collapsed vertebrae to stabilize them.  · Balloon kyphoplasty. The collapsed vertebrae are expanded with a balloon and then bone cement is injected into them.  · Spinal fusion. The collapsed vertebrae are connected (fused) to normal vertebrae.  Follow these instructions at home:  Medicines  · Take over-the-counter and prescription medicines only as told by your health care provider.  · Do not drive or operate heavy machinery while taking prescription pain medicine.  · If you are taking prescription pain medicine, take actions to prevent or treat constipation. Your health care provider may recommend that you:  ? Drink enough fluid to keep your urine pale yellow.  ? Eat foods that are high in fiber, such as fresh fruits and vegetables, whole grains, and beans.  ? Limit foods that are high in fat and processed sugars, such as fried or sweet foods.  ? Take an over-the-counter or prescription medicine for constipation.  If you have a brace:  · Wear the brace as told by your health care provider. Remove it only as told by your health care provider.  · Loosen the brace if your  fingers or toes tingle, become numb, or turn cold and blue.  · Keep the brace clean.  · If the brace is not waterproof:  ? Do not let it get wet.  ? Cover it with a watertight covering when you take a bath or a shower.  Managing pain, stiffness, and swelling    · If directed, apply ice to the injured area:  ? If you have a removable brace, remove it as told by your health care provider.  ? Put ice in a plastic bag.  ? Place a towel between your skin and the bag.  ? Leave the ice on for 30 minutes every two hours at first. Then apply the ice as needed.  Activity  · Rest as told by your health care provider.  ? Avoid sitting for a long time without moving. Get up to take short walks every 1-2 hours. This is important to improve blood flow and breathing. Ask for help if you feel weak or unsteady.  · Return to your normal activities as directed by your health care provider. Ask what activities are safe for you.  · Do exercises to improve motion and strength in your back (physical therapy), as recommended by your health care provider.  · Exercise regularly as directed by your health care provider.  General instructions    · Do not drink alcohol. Alcohol can interfere with your treatment.  · Do not use any products that contain nicotine or tobacco, such as cigarettes and e-cigarettes. These can delay bone healing. If you need help quitting, ask your health care provider.  · Keep all follow-up visits as told by your health care provider. This is important. It can help to prevent permanent injury, disability, and long-lasting (chronic) pain.  Contact a health care provider if:  · You have a fever.  · You develop a cough that makes your pain worse.  · Your pain medicine is not helping.  · Your pain does not get better over time.  · You cannot return to your normal activities as planned or expected.  Get help right away if:  · Your pain is very bad and it suddenly gets worse.  · You are unable to move any body part  (paralysis) that is below the level of your injury.  · You have numbness, tingling, or weakness in any body part that is below the level of your injury.  · You cannot control your bladder or bowels.  Summary  · A spinal compression fracture is a collapse of the bones that form the spine (vertebrae).  · With this type of fracture, the vertebrae become pushed (compressed) into a wedge shape.  · Your symptoms and treatment will depend on the cause and severity of the fracture and how quickly it develops.  · Some fractures may heal on their own with supportive care. Fractures that cause the back to become misshapen, cause nerve pain or weakness, or do not respond to other treatment may be treated with surgery.  This information is not intended to replace advice given to you by your health care provider. Make sure you discuss any questions you have with your health care provider.  Document Released: 12/18/2006 Document Revised: 02/13/2020 Document Reviewed: 01/29/2019  Elsevier Patient Education © 2020 [employer]

## 2021-02-22 NOTE — DISCHARGE PLANNING
Anticipated Discharge Disposition: [facility]    Action: LSW met w/ pt's dtr Elda at bedside, she provided verbal consent for pt to transfer to [facility] via [facility] today at 1630.    Hospitalist and BS RN informed.    [facility] #[id number]    Barriers to Discharge: N/A    Plan: Completed transfer packet to be placed in pt's chart.

## 2021-02-23 NOTE — THERAPY
"Occupational Therapy  Daily Treatment     Patient Name: Lucita Babcock  Age:  85 y.o., Sex:  female  Medical Record #: [id number]  Today's Date: 2/22/2021       Precautions: Fall Risk, TLSO (Thoracolumbosacral orthosis), Spinal / Back Precautions   Comments: don TLSO EOB; h/o dementia     Assessment    Pt seen for OT tx to include: bed mobility, sitting balance, LB dressing, seated grooming, TLSO management. Daughter present, providing support and encouragement. Pt required heavy assist for supine > EOB via log roll. Once sitting, demonstrated posterior lean and repeatedly expressing fear of falling. Total A to apply TLSO. Pt refused attempt at standing. Limited effort with seated hair brushing. Pt is limited by: pain, fear, baseline cognitive deficits, balance impairment, generalized weakness. Will continue to benefit from acute OT.     Plan    Continue current treatment plan.    DC Equipment Recommendations: Unable to determine at this time  Discharge Recommendations: Recommend post-acute placement for additional occupational therapy services prior to discharge home    Subjective    \"I'm too old for this!\"  \"I'm going to fall on the floor!\"     Objective       02/22/21 1605   Cognition    Level of Consciousness Confused   Ability To Follow Commands 1 Step   Safety Awareness Impaired   New Learning Impaired   Attention Impaired   Sequencing Impaired   Initiation Impaired   Comments Pt requires encouragement; dementia negatively impacts participation    Balance   Sitting Balance (Static) Fair -   Sitting Balance (Dynamic) Poor +   Weight Shift Sitting Poor   Comments posterion lean EOB   Bed Mobility    Supine to Sit Maximal Assist   Sit to Supine Maximal Assist   Scooting Maximal Assist  (seated)   Rolling Maximum Assist to Lt.   Skilled Intervention Compensatory Strategies;Postural Facilitation;Tactile Cuing;Verbal Cuing;Sequencing   Comments log roll to L    Activities of Daily Living   Grooming Maximal " Assist;Seated  (hair brushing; limited effort )   Lower Body Dressing Total Assist  (sock management )   Functional Mobility   Sit to Stand Refused   Mobility Supine > < EOB, sitting balance    Activity Tolerance   Comments OOB activity limited by pain, weakness, fear    Short Term Goals   Short Term Goal # 1 Pt will complete ADL transfers with min A   Goal Outcome # 1 Progressing slower than expected   Short Term Goal # 2 Pt will complete LB dressing with min A using AE    Goal Outcome # 2 Progressing slower than expected   Short Term Goal # 3 Pt will complete standing grooming with min A   Goal Outcome # 3 Progressing slower than expected

## 2021-02-23 NOTE — THERAPY
"Physical Therapy   Daily Treatment     Patient Name: Lucita Babcock  Age:  85 y.o., Sex:  female  Medical Record #: [id number]  Today's Date: 2/22/2021     Precautions: Fall Risk, TLSO (Thoracolumbosacral orthosis), Spinal / Back Precautions     Assessment    Pt seen for PT treatment session, Dtr present and encouraging throughout session. Pt initially reluctant due to anticipation of increased pain during mobility. Initiated PT session very slowly with active assisted exercises in supine. Pt able to tolerate 5-8 repetitions of ankle pumps, quad sets, heelslides and hip ABD/ADD; again required extra time and assist to complete exercises for pain management. Performed supine rolling x2 to L and R in preparation for log roll to EOB. Required Max A for rolling and completion of log roll to sit EOB. Once seated and positioned for balance (with Max A) pt was able to maintain seated balance and wash her face and brush teeth before requesting to return to supine. PT to follow while in house.     Plan    Continue current treatment plan.    DC Equipment Recommendations: Unable to determine at this time  Discharge Recommendations: Recommend post-acute placement for additional physical therapy services prior to discharge home       02/22/21 1144   Precautions   Precautions Fall Risk;TLSO (Thoracolumbosacral orthosis);Spinal / Back Precautions    Comments h/o dementia, TLSO don EOB   Vitals   O2 Delivery Device Nasal Cannula   Pain 0 - 10 Group   Therapist Pain Assessment During Activity;Nurse Notified  (pain during mobility, not rated)   Cognition    Level of Consciousness Alert  (but intermittently confused)   Ability To Follow Commands 1 Step   Attention Impaired   Initiation Impaired   Comments Dtr present for therapy session, encouraging and supportive. Cries out \"oh heaven help me\" frequently throughout session   Passive ROM Lower Body   Passive ROM Lower Body X   Comments B knees limited by arthritic pain due to immobility "   Active ROM Lower Body    Active ROM Lower Body  X   Comments B LE limited by pain.    Strength Lower Body   Lower Body Strength  X   Gross Strength Generalized Weakness, Equal Bilaterally   Comments limited by pain and apprehension of increased pain when movement initiated   Supine Lower Body Exercise   Supine Lower Body Exercises Yes   Hip Abduction   (x8 B LE supine, AAROM)   Hip Adduction    (x8 B LE supine, AAROM)   Heel Slide Bilateral  (x5 B LE AAROM, arthritic pain limiting)   Ankle Pumps 1 set of 10;Bilateral   Quadriceps Isometrics   (x5 B LE with heavy cueing)   Balance   Sitting Balance (Static) Fair   Sitting Balance (Dynamic) Fair -   Weight Shift Sitting Fair   Skilled Intervention Verbal Cuing;Sequencing;Compensatory Strategies   Comments improved seated balance at EOB, pt able to wash face and brush teeth while sitting EOB   Gait Analysis   Gait Level Of Assist Unable to Participate   Bed Mobility    Supine to Sit Maximal Assist   Sit to Supine Maximal Assist   Scooting Maximal Assist   Skilled Intervention Verbal Cuing;Compensatory Strategies   Functional Mobility   Sit to Stand Unable to Participate   Patient / Family Goals    Patient / Family Goal #1 to go home   Goal #1 Outcome Goal not met   Short Term Goals    Short Term Goal # 1 pt will perform supine <> sit with HOB flat, no railing and log roll with Min A in 6 visits   Goal Outcome # 1 goal not met   Short Term Goal # 2 pt will perform sit <> stand and functional transfers with FWW and Mod A in 6 vistis   Goal Outcome # 2 Goal not met   Short Term Goal # 3 pt will ambulate > 10 ft with LRAD and Mod A to improve mobility in 6 visits   Goal Outcome # 3 Goal not met   Short Term Goal # 4 pt will don/doff TLSO at EOB with Min A in 6 visits   Goal Outcome # 4 Goal not met   Anticipated Discharge Equipment and Recommendations   Discharge Recommendations Recommend post-acute placement for additional physical therapy services prior to discharge  home

## 2021-02-23 NOTE — PROGRESS NOTES
1600 Report given to CARLOS A Portillo from [facility].    1630 Discharge instructions discussed with pt and daughter Elda. Verbalized understanding. All pt belongings are with pt. PIV removed. Catheter intact. Pt discharged to [facility] via [facility].

## 2021-06-10 ENCOUNTER — HOSPICE ADMISSION (OUTPATIENT)
Dept: HOSPICE | Facility: HOSPICE | Age: 86
End: 2021-06-10
Payer: MEDICARE

## 2021-06-10 ENCOUNTER — HOME CARE VISIT (OUTPATIENT)
Dept: HOSPICE | Facility: HOSPICE | Age: 86
End: 2021-06-10
Payer: MEDICARE

## 2021-06-10 VITALS — HEART RATE: 78 BPM | DIASTOLIC BLOOD PRESSURE: 89 MMHG | SYSTOLIC BLOOD PRESSURE: 130 MMHG | RESPIRATION RATE: 16 BRPM

## 2021-06-10 DIAGNOSIS — I67.2 CEREBRAL ATHEROSCLEROSIS: ICD-10-CM

## 2021-06-10 DIAGNOSIS — R52 PAIN ASSOCIATED WITH TERMINAL ILLNESS: ICD-10-CM

## 2021-06-10 PROCEDURE — 665036 HSPC NOTICE OF ELECTION NOE

## 2021-06-10 PROCEDURE — S9126 HOSPICE CARE, IN THE HOME, P: HCPCS

## 2021-06-10 PROCEDURE — G0299 HHS/HOSPICE OF RN EA 15 MIN: HCPCS

## 2021-06-10 RX ORDER — MORPHINE SULFATE 100 MG/5ML
10 SOLUTION ORAL
Qty: 120 ML | Refills: 0 | Status: SHIPPED | OUTPATIENT
Start: 2021-06-10 | End: 2021-08-09

## 2021-06-10 RX ORDER — MORPHINE SULFATE 15 MG/1
15 TABLET, FILM COATED, EXTENDED RELEASE ORAL EVERY 8 HOURS
Qty: 180 TABLET | Refills: 0 | Status: SHIPPED | OUTPATIENT
Start: 2021-06-10 | End: 2021-08-09 | Stop reason: SDUPTHER

## 2021-06-10 RX ORDER — AMOXICILLIN 250 MG
2 CAPSULE ORAL DAILY
Qty: 30 TABLET | Refills: 0
Start: 2021-06-10 | End: 2021-06-14 | Stop reason: SDUPTHER

## 2021-06-10 ASSESSMENT — ACTIVITIES OF DAILY LIVING (ADL)
AMBULATION_REQUIRES_ASSISTANCE: 1
AMBULATION_REQUIRES_ASSISTANCE: 1
EATING_REQUIRES_ASSISTANCE: 1
MONEY MANAGEMENT (EXPENSES/BILLS): TOTALLY DEPENDENT
CONTINENCE_REQUIRES_ASSISTANCE: 1
CONTINENCE_REQUIRES_ASSISTANCE: 1
PHYSICAL_TRANSFER_REQUIRES_ASSISTANCE: 1
DRESSING_REQUIRES_ASSISTANCE: 1
BATHING_REQUIRES_ASSISTANCE: 1
EATING_REQUIRES_ASSISTANCE: 1
PHYSICAL_TRANSFER_REQUIRES_ASSISTANCE: 1
BATHING_REQUIRES_ASSISTANCE: 1
DRESSING_REQUIRES_ASSISTANCE: 1

## 2021-06-10 ASSESSMENT — ENCOUNTER SYMPTOMS
CONSTIPATION: 1
BOWEL INCONTINENCE: 1
DESCRIPTION OF MEMORY LOSS: SHORT TERM
FORGETFULNESS: 1
LAST BOWEL MOVEMENT: 65905
HYPERTENSION: 1
DESCRIPTION OF MEMORY LOSS: LONG TERM

## 2021-06-10 ASSESSMENT — PATIENT HEALTH QUESTIONNAIRE - PHQ9: CLINICAL INTERPRETATION OF PHQ2 SCORE: 0

## 2021-06-11 ENCOUNTER — HOME CARE VISIT (OUTPATIENT)
Dept: HOSPICE | Facility: HOSPICE | Age: 86
End: 2021-06-11
Payer: MEDICARE

## 2021-06-11 VITALS — HEART RATE: 72 BPM | SYSTOLIC BLOOD PRESSURE: 124 MMHG | RESPIRATION RATE: 16 BRPM | DIASTOLIC BLOOD PRESSURE: 64 MMHG

## 2021-06-11 PROCEDURE — S9126 HOSPICE CARE, IN THE HOME, P: HCPCS

## 2021-06-11 PROCEDURE — 6650990 HC HOSPICE AND HOME CARE RX REV CODE 0250

## 2021-06-11 PROCEDURE — G0155 HHCP-SVS OF CSW,EA 15 MIN: HCPCS

## 2021-06-11 PROCEDURE — G0299 HHS/HOSPICE OF RN EA 15 MIN: HCPCS

## 2021-06-12 PROCEDURE — S9126 HOSPICE CARE, IN THE HOME, P: HCPCS

## 2021-06-13 PROCEDURE — S9126 HOSPICE CARE, IN THE HOME, P: HCPCS

## 2021-06-14 ENCOUNTER — HOME CARE VISIT (OUTPATIENT)
Dept: HOSPICE | Facility: HOSPICE | Age: 86
End: 2021-06-14
Payer: MEDICARE

## 2021-06-14 VITALS — DIASTOLIC BLOOD PRESSURE: 66 MMHG | RESPIRATION RATE: 16 BRPM | HEART RATE: 80 BPM | SYSTOLIC BLOOD PRESSURE: 140 MMHG

## 2021-06-14 PROCEDURE — G0299 HHS/HOSPICE OF RN EA 15 MIN: HCPCS

## 2021-06-14 PROCEDURE — S9126 HOSPICE CARE, IN THE HOME, P: HCPCS

## 2021-06-14 RX ORDER — AMOXICILLIN 250 MG
2 CAPSULE ORAL 2 TIMES DAILY
Qty: 30 TABLET | Refills: 0
Start: 2021-06-14 | End: 2021-11-01

## 2021-06-14 SDOH — ECONOMIC STABILITY: HOUSING INSECURITY: HOME SAFETY: PT SITTING IN BED, AWAKE AND WATCHING TV.

## 2021-06-14 ASSESSMENT — ACTIVITIES OF DAILY LIVING (ADL): MONEY MANAGEMENT (EXPENSES/BILLS): TOTALLY DEPENDENT

## 2021-06-14 ASSESSMENT — ENCOUNTER SYMPTOMS
FORGETFULNESS: 1
DESCRIPTION OF MEMORY LOSS: SHORT TERM
SLEEP QUALITY: ADEQUATE
BOWEL INCONTINENCE: 1
DESCRIPTION OF MEMORY LOSS: LONG TERM
HYPERTENSION: 1
LAST BOWEL MOVEMENT: 65909
CONSTIPATION: 1

## 2021-06-14 ASSESSMENT — SOCIAL DETERMINANTS OF HEALTH (SDOH): ACTIVE STRESSOR - HEALTH CHANGES: 1

## 2021-06-15 ENCOUNTER — HOME CARE VISIT (OUTPATIENT)
Dept: HOSPICE | Facility: HOSPICE | Age: 86
End: 2021-06-15
Payer: MEDICARE

## 2021-06-15 PROCEDURE — 6650990 HC HOSPICE AND HOME CARE RX REV CODE 0250

## 2021-06-15 PROCEDURE — G0156 HHCP-SVS OF AIDE,EA 15 MIN: HCPCS

## 2021-06-15 PROCEDURE — S9126 HOSPICE CARE, IN THE HOME, P: HCPCS

## 2021-06-16 PROCEDURE — S9126 HOSPICE CARE, IN THE HOME, P: HCPCS

## 2021-06-17 PROCEDURE — S9126 HOSPICE CARE, IN THE HOME, P: HCPCS

## 2021-06-18 ENCOUNTER — HOME CARE VISIT (OUTPATIENT)
Dept: HOSPICE | Facility: HOSPICE | Age: 86
End: 2021-06-18
Payer: MEDICARE

## 2021-06-18 PROCEDURE — G0156 HHCP-SVS OF AIDE,EA 15 MIN: HCPCS

## 2021-06-18 PROCEDURE — S9126 HOSPICE CARE, IN THE HOME, P: HCPCS

## 2021-06-19 PROCEDURE — S9126 HOSPICE CARE, IN THE HOME, P: HCPCS

## 2021-06-20 PROCEDURE — S9126 HOSPICE CARE, IN THE HOME, P: HCPCS

## 2021-06-21 ENCOUNTER — HOME CARE VISIT (OUTPATIENT)
Dept: HOSPICE | Facility: HOSPICE | Age: 86
End: 2021-06-21
Payer: MEDICARE

## 2021-06-21 VITALS — DIASTOLIC BLOOD PRESSURE: 60 MMHG | SYSTOLIC BLOOD PRESSURE: 124 MMHG | RESPIRATION RATE: 16 BRPM | HEART RATE: 88 BPM

## 2021-06-21 PROCEDURE — S9126 HOSPICE CARE, IN THE HOME, P: HCPCS

## 2021-06-21 PROCEDURE — 6650990 HC HOSPICE AND HOME CARE RX REV CODE 0250

## 2021-06-21 PROCEDURE — G0299 HHS/HOSPICE OF RN EA 15 MIN: HCPCS

## 2021-06-21 ASSESSMENT — ENCOUNTER SYMPTOMS
DESCRIPTION OF MEMORY LOSS: LONG TERM
SLEEP QUALITY: ADEQUATE
FORGETFULNESS: 1
LAST BOWEL MOVEMENT: 65913
HYPERTENSION: 1
BOWEL INCONTINENCE: 1
CONSTIPATION: 1
DESCRIPTION OF MEMORY LOSS: SHORT TERM

## 2021-06-21 ASSESSMENT — SOCIAL DETERMINANTS OF HEALTH (SDOH): ACTIVE STRESSOR - HEALTH CHANGES: 1

## 2021-06-21 ASSESSMENT — ACTIVITIES OF DAILY LIVING (ADL): MONEY MANAGEMENT (EXPENSES/BILLS): TOTALLY DEPENDENT

## 2021-06-22 ENCOUNTER — HOME CARE VISIT (OUTPATIENT)
Dept: HOSPICE | Facility: HOSPICE | Age: 86
End: 2021-06-22
Payer: MEDICARE

## 2021-06-22 PROCEDURE — S9126 HOSPICE CARE, IN THE HOME, P: HCPCS

## 2021-06-23 ENCOUNTER — HOME CARE VISIT (OUTPATIENT)
Dept: HOSPICE | Facility: HOSPICE | Age: 86
End: 2021-06-23
Payer: MEDICARE

## 2021-06-23 PROCEDURE — S9126 HOSPICE CARE, IN THE HOME, P: HCPCS

## 2021-06-23 PROCEDURE — 6650990 HC HOSPICE AND HOME CARE RX REV CODE 0250

## 2021-06-23 PROCEDURE — G0156 HHCP-SVS OF AIDE,EA 15 MIN: HCPCS

## 2021-06-24 PROCEDURE — S9126 HOSPICE CARE, IN THE HOME, P: HCPCS

## 2021-06-25 ENCOUNTER — HOME CARE VISIT (OUTPATIENT)
Dept: HOSPICE | Facility: HOSPICE | Age: 86
End: 2021-06-25
Payer: MEDICARE

## 2021-06-25 PROCEDURE — G0156 HHCP-SVS OF AIDE,EA 15 MIN: HCPCS

## 2021-06-25 PROCEDURE — S9126 HOSPICE CARE, IN THE HOME, P: HCPCS

## 2021-06-26 PROCEDURE — S9126 HOSPICE CARE, IN THE HOME, P: HCPCS

## 2021-06-27 ENCOUNTER — HOME CARE VISIT (OUTPATIENT)
Dept: HOSPICE | Facility: HOSPICE | Age: 86
End: 2021-06-27
Payer: MEDICARE

## 2021-06-27 PROCEDURE — S9126 HOSPICE CARE, IN THE HOME, P: HCPCS

## 2021-06-28 ENCOUNTER — HOME CARE VISIT (OUTPATIENT)
Dept: HOSPICE | Facility: HOSPICE | Age: 86
End: 2021-06-28
Payer: MEDICARE

## 2021-06-28 VITALS — HEART RATE: 76 BPM | RESPIRATION RATE: 20 BRPM | SYSTOLIC BLOOD PRESSURE: 110 MMHG | DIASTOLIC BLOOD PRESSURE: 60 MMHG

## 2021-06-28 PROCEDURE — S9126 HOSPICE CARE, IN THE HOME, P: HCPCS

## 2021-06-28 PROCEDURE — G0299 HHS/HOSPICE OF RN EA 15 MIN: HCPCS

## 2021-06-28 PROCEDURE — 6650990 HC HOSPICE AND HOME CARE RX REV CODE 0250

## 2021-06-28 ASSESSMENT — ENCOUNTER SYMPTOMS
DESCRIPTION OF MEMORY LOSS: SHORT TERM
CONSTIPATION: 1
SLEEP QUALITY: ADEQUATE
LAST BOWEL MOVEMENT: 65921
FORGETFULNESS: 1
BOWEL INCONTINENCE: 1
DESCRIPTION OF MEMORY LOSS: LONG TERM

## 2021-06-28 ASSESSMENT — SOCIAL DETERMINANTS OF HEALTH (SDOH): ACTIVE STRESSOR - HEALTH CHANGES: 1

## 2021-06-28 ASSESSMENT — ACTIVITIES OF DAILY LIVING (ADL): MONEY MANAGEMENT (EXPENSES/BILLS): TOTALLY DEPENDENT

## 2021-06-29 ENCOUNTER — HOME CARE VISIT (OUTPATIENT)
Dept: HOSPICE | Facility: HOSPICE | Age: 86
End: 2021-06-29
Payer: MEDICARE

## 2021-06-29 PROCEDURE — S9126 HOSPICE CARE, IN THE HOME, P: HCPCS

## 2021-06-29 PROCEDURE — G0156 HHCP-SVS OF AIDE,EA 15 MIN: HCPCS

## 2021-06-29 PROCEDURE — 6650990 HC HOSPICE AND HOME CARE RX REV CODE 0250

## 2021-06-30 PROCEDURE — S9126 HOSPICE CARE, IN THE HOME, P: HCPCS

## 2021-07-01 ENCOUNTER — HOME CARE VISIT (OUTPATIENT)
Dept: HOSPICE | Facility: HOSPICE | Age: 86
End: 2021-07-01
Payer: MEDICARE

## 2021-07-01 ENCOUNTER — TELEPHONE (OUTPATIENT)
Dept: PHYSICAL THERAPY | Facility: REHABILITATION | Age: 86
End: 2021-07-01

## 2021-07-01 VITALS — SYSTOLIC BLOOD PRESSURE: 120 MMHG | HEART RATE: 72 BPM | RESPIRATION RATE: 16 BRPM | DIASTOLIC BLOOD PRESSURE: 78 MMHG

## 2021-07-01 PROCEDURE — G0299 HHS/HOSPICE OF RN EA 15 MIN: HCPCS

## 2021-07-01 PROCEDURE — S9126 HOSPICE CARE, IN THE HOME, P: HCPCS

## 2021-07-01 PROCEDURE — G0156 HHCP-SVS OF AIDE,EA 15 MIN: HCPCS

## 2021-07-01 ASSESSMENT — ACTIVITIES OF DAILY LIVING (ADL): MONEY MANAGEMENT (EXPENSES/BILLS): TOTALLY DEPENDENT

## 2021-07-01 ASSESSMENT — ENCOUNTER SYMPTOMS
DESCRIPTION OF MEMORY LOSS: SHORT TERM
DESCRIPTION OF MEMORY LOSS: LONG TERM
CONSTIPATION: 1
BOWEL INCONTINENCE: 1
FORGETFULNESS: 1
SLEEP QUALITY: ADEQUATE
LAST BOWEL MOVEMENT: 65921

## 2021-07-01 ASSESSMENT — SOCIAL DETERMINANTS OF HEALTH (SDOH): ACTIVE STRESSOR - HEALTH CHANGES: 1

## 2021-07-01 NOTE — OP THERAPY DISCHARGE SUMMARY
Outpatient Physical Therapy  DISCHARGE SUMMARY NOTE      [address]  [address]  [place] [address]  Phone:  [phone]  Fax:  [phone]    Date of Visit: 07/01/2021    Patient: Lucita Babcock  YOB: 1935  MRN: [id number]     Referring Provider: No referring provider defined for this encounter.   Referring Diagnosis No admission diagnoses are documented for this encounter.         Functional Assessment Used        Your patient is being discharged from Physical Therapy with the following comments:   · other    Comments:  Ms. Babcock was seen for 9 PT sessions treating her dizziness and imbalance through progressive VRT and balance training.  Throughout the course, fear of falling and limited carryover of cuing was a barrier, but her dtr was was participatory in assisting transfer to home environment.  We had plans to continue beyond that session, but pt was not seen after 10/26/21.  Will be d/c'd due to lapse in care. Requires new Rx if return is indicated.     Catalina Navarro, PT, DPT    Date: 7/1/2021

## 2021-07-02 PROCEDURE — S9126 HOSPICE CARE, IN THE HOME, P: HCPCS

## 2021-07-03 PROCEDURE — S9126 HOSPICE CARE, IN THE HOME, P: HCPCS

## 2021-07-04 PROCEDURE — S9126 HOSPICE CARE, IN THE HOME, P: HCPCS

## 2021-07-05 PROCEDURE — S9126 HOSPICE CARE, IN THE HOME, P: HCPCS

## 2021-07-06 ENCOUNTER — HOME CARE VISIT (OUTPATIENT)
Dept: HOSPICE | Facility: HOSPICE | Age: 86
End: 2021-07-06
Payer: MEDICARE

## 2021-07-06 VITALS
TEMPERATURE: 97.7 F | HEART RATE: 92 BPM | SYSTOLIC BLOOD PRESSURE: 150 MMHG | DIASTOLIC BLOOD PRESSURE: 68 MMHG | RESPIRATION RATE: 20 BRPM

## 2021-07-06 PROCEDURE — G0156 HHCP-SVS OF AIDE,EA 15 MIN: HCPCS

## 2021-07-06 PROCEDURE — G0299 HHS/HOSPICE OF RN EA 15 MIN: HCPCS

## 2021-07-06 PROCEDURE — S9126 HOSPICE CARE, IN THE HOME, P: HCPCS

## 2021-07-06 PROCEDURE — 6650990 HC HOSPICE AND HOME CARE RX REV CODE 0250

## 2021-07-06 ASSESSMENT — ACTIVITIES OF DAILY LIVING (ADL): MONEY MANAGEMENT (EXPENSES/BILLS): TOTALLY DEPENDENT

## 2021-07-06 ASSESSMENT — ENCOUNTER SYMPTOMS
DESCRIPTION OF MEMORY LOSS: SHORT TERM
FORGETFULNESS: 1
DESCRIPTION OF MEMORY LOSS: LONG TERM
BOWEL INCONTINENCE: 1
CONSTIPATION: 1
LOWER EXTREMITY EDEMA: 1
SLEEP QUALITY: ADEQUATE

## 2021-07-06 ASSESSMENT — SOCIAL DETERMINANTS OF HEALTH (SDOH): ACTIVE STRESSOR - HEALTH CHANGES: 1

## 2021-07-07 PROCEDURE — S9126 HOSPICE CARE, IN THE HOME, P: HCPCS

## 2021-07-08 ENCOUNTER — HOME CARE VISIT (OUTPATIENT)
Dept: HOSPICE | Facility: HOSPICE | Age: 86
End: 2021-07-08
Payer: MEDICARE

## 2021-07-08 PROCEDURE — G0156 HHCP-SVS OF AIDE,EA 15 MIN: HCPCS

## 2021-07-08 PROCEDURE — S9126 HOSPICE CARE, IN THE HOME, P: HCPCS

## 2021-07-09 ENCOUNTER — HOME CARE VISIT (OUTPATIENT)
Dept: HOSPICE | Facility: HOSPICE | Age: 86
End: 2021-07-09
Payer: MEDICARE

## 2021-07-09 VITALS — SYSTOLIC BLOOD PRESSURE: 130 MMHG | HEART RATE: 76 BPM | DIASTOLIC BLOOD PRESSURE: 78 MMHG | RESPIRATION RATE: 12 BRPM

## 2021-07-09 PROCEDURE — S9126 HOSPICE CARE, IN THE HOME, P: HCPCS

## 2021-07-09 PROCEDURE — 6650990 HC HOSPICE AND HOME CARE RX REV CODE 0250

## 2021-07-09 PROCEDURE — G0299 HHS/HOSPICE OF RN EA 15 MIN: HCPCS

## 2021-07-09 ASSESSMENT — ENCOUNTER SYMPTOMS
FORGETFULNESS: 1
DESCRIPTION OF MEMORY LOSS: SHORT TERM
LOWER EXTREMITY EDEMA: 1
LAST BOWEL MOVEMENT: 65934
SLEEP QUALITY: ADEQUATE
BOWEL INCONTINENCE: 1
DESCRIPTION OF MEMORY LOSS: LONG TERM
CONSTIPATION: 1

## 2021-07-09 ASSESSMENT — ACTIVITIES OF DAILY LIVING (ADL): MONEY MANAGEMENT (EXPENSES/BILLS): TOTALLY DEPENDENT

## 2021-07-09 ASSESSMENT — SOCIAL DETERMINANTS OF HEALTH (SDOH): ACTIVE STRESSOR - HEALTH CHANGES: 1

## 2021-07-10 PROCEDURE — S9126 HOSPICE CARE, IN THE HOME, P: HCPCS

## 2021-07-11 PROCEDURE — S9126 HOSPICE CARE, IN THE HOME, P: HCPCS

## 2021-07-12 PROCEDURE — S9126 HOSPICE CARE, IN THE HOME, P: HCPCS

## 2021-07-13 ENCOUNTER — HOME CARE VISIT (OUTPATIENT)
Dept: HOSPICE | Facility: HOSPICE | Age: 86
End: 2021-07-13
Payer: MEDICARE

## 2021-07-13 VITALS — SYSTOLIC BLOOD PRESSURE: 122 MMHG | DIASTOLIC BLOOD PRESSURE: 60 MMHG | HEART RATE: 80 BPM

## 2021-07-13 PROCEDURE — G0299 HHS/HOSPICE OF RN EA 15 MIN: HCPCS

## 2021-07-13 PROCEDURE — S9126 HOSPICE CARE, IN THE HOME, P: HCPCS

## 2021-07-13 ASSESSMENT — ENCOUNTER SYMPTOMS
SLEEP QUALITY: ADEQUATE
FORGETFULNESS: 1
DESCRIPTION OF MEMORY LOSS: SHORT TERM
CONSTIPATION: 1
DESCRIPTION OF MEMORY LOSS: LONG TERM
LOWER EXTREMITY EDEMA: 1
BOWEL INCONTINENCE: 1
LAST BOWEL MOVEMENT: 65934

## 2021-07-13 ASSESSMENT — ACTIVITIES OF DAILY LIVING (ADL): MONEY MANAGEMENT (EXPENSES/BILLS): TOTALLY DEPENDENT

## 2021-07-13 ASSESSMENT — SOCIAL DETERMINANTS OF HEALTH (SDOH): ACTIVE STRESSOR - HEALTH CHANGES: 1

## 2021-07-14 PROCEDURE — S9126 HOSPICE CARE, IN THE HOME, P: HCPCS

## 2021-07-15 ENCOUNTER — HOME CARE VISIT (OUTPATIENT)
Dept: HOSPICE | Facility: HOSPICE | Age: 86
End: 2021-07-15
Payer: MEDICARE

## 2021-07-15 PROCEDURE — S9126 HOSPICE CARE, IN THE HOME, P: HCPCS

## 2021-07-15 PROCEDURE — G0156 HHCP-SVS OF AIDE,EA 15 MIN: HCPCS

## 2021-07-16 ENCOUNTER — HOME CARE VISIT (OUTPATIENT)
Dept: HOSPICE | Facility: HOSPICE | Age: 86
End: 2021-07-16
Payer: MEDICARE

## 2021-07-16 VITALS — SYSTOLIC BLOOD PRESSURE: 140 MMHG | HEART RATE: 80 BPM | DIASTOLIC BLOOD PRESSURE: 68 MMHG | RESPIRATION RATE: 20 BRPM

## 2021-07-16 PROCEDURE — G0299 HHS/HOSPICE OF RN EA 15 MIN: HCPCS

## 2021-07-16 PROCEDURE — S9126 HOSPICE CARE, IN THE HOME, P: HCPCS

## 2021-07-16 ASSESSMENT — ENCOUNTER SYMPTOMS
DESCRIPTION OF MEMORY LOSS: LONG TERM
BOWEL INCONTINENCE: 1
DESCRIPTION OF MEMORY LOSS: SHORT TERM
LAST BOWEL MOVEMENT: 65940
LOWER EXTREMITY EDEMA: 1
SLEEP QUALITY: ADEQUATE
FORGETFULNESS: 1

## 2021-07-16 ASSESSMENT — SOCIAL DETERMINANTS OF HEALTH (SDOH): ACTIVE STRESSOR - HEALTH CHANGES: 1

## 2021-07-16 ASSESSMENT — ACTIVITIES OF DAILY LIVING (ADL): MONEY MANAGEMENT (EXPENSES/BILLS): TOTALLY DEPENDENT

## 2021-07-17 PROCEDURE — S9126 HOSPICE CARE, IN THE HOME, P: HCPCS

## 2021-07-18 PROCEDURE — S9126 HOSPICE CARE, IN THE HOME, P: HCPCS

## 2021-07-19 PROCEDURE — S9126 HOSPICE CARE, IN THE HOME, P: HCPCS

## 2021-07-20 ENCOUNTER — HOME CARE VISIT (OUTPATIENT)
Dept: HOSPICE | Facility: HOSPICE | Age: 86
End: 2021-07-20
Payer: MEDICARE

## 2021-07-20 VITALS — HEART RATE: 56 BPM | RESPIRATION RATE: 20 BRPM | DIASTOLIC BLOOD PRESSURE: 60 MMHG | SYSTOLIC BLOOD PRESSURE: 124 MMHG

## 2021-07-20 PROCEDURE — S9126 HOSPICE CARE, IN THE HOME, P: HCPCS

## 2021-07-20 PROCEDURE — G0299 HHS/HOSPICE OF RN EA 15 MIN: HCPCS

## 2021-07-20 PROCEDURE — G0156 HHCP-SVS OF AIDE,EA 15 MIN: HCPCS

## 2021-07-20 ASSESSMENT — SOCIAL DETERMINANTS OF HEALTH (SDOH): ACTIVE STRESSOR - HEALTH CHANGES: 1

## 2021-07-20 ASSESSMENT — ENCOUNTER SYMPTOMS
LOWER EXTREMITY EDEMA: 1
DESCRIPTION OF MEMORY LOSS: SHORT TERM
FORGETFULNESS: 1
DESCRIPTION OF MEMORY LOSS: LONG TERM
LAST BOWEL MOVEMENT: 65945
SLEEP QUALITY: ADEQUATE
BOWEL PATTERN NORMAL: 1

## 2021-07-20 ASSESSMENT — ACTIVITIES OF DAILY LIVING (ADL): MONEY MANAGEMENT (EXPENSES/BILLS): TOTALLY DEPENDENT

## 2021-07-21 PROCEDURE — 6650990 HC HOSPICE AND HOME CARE RX REV CODE 0250

## 2021-07-21 PROCEDURE — S9126 HOSPICE CARE, IN THE HOME, P: HCPCS

## 2021-07-22 ENCOUNTER — HOME CARE VISIT (OUTPATIENT)
Dept: HOSPICE | Facility: HOSPICE | Age: 86
End: 2021-07-22
Payer: MEDICARE

## 2021-07-22 PROCEDURE — G0156 HHCP-SVS OF AIDE,EA 15 MIN: HCPCS

## 2021-07-22 PROCEDURE — S9126 HOSPICE CARE, IN THE HOME, P: HCPCS

## 2021-07-23 ENCOUNTER — HOME CARE VISIT (OUTPATIENT)
Dept: HOSPICE | Facility: HOSPICE | Age: 86
End: 2021-07-23
Payer: MEDICARE

## 2021-07-23 PROCEDURE — G0299 HHS/HOSPICE OF RN EA 15 MIN: HCPCS

## 2021-07-23 PROCEDURE — S9126 HOSPICE CARE, IN THE HOME, P: HCPCS

## 2021-07-24 VITALS — RESPIRATION RATE: 12 BRPM | SYSTOLIC BLOOD PRESSURE: 120 MMHG | HEART RATE: 68 BPM | DIASTOLIC BLOOD PRESSURE: 66 MMHG

## 2021-07-24 PROCEDURE — S9126 HOSPICE CARE, IN THE HOME, P: HCPCS

## 2021-07-24 ASSESSMENT — ENCOUNTER SYMPTOMS
DESCRIPTION OF MEMORY LOSS: SHORT TERM
BOWEL PATTERN NORMAL: 1
LOWER EXTREMITY EDEMA: 1
SLEEP QUALITY: ADEQUATE
LAST BOWEL MOVEMENT: 65945
FORGETFULNESS: 1
DESCRIPTION OF MEMORY LOSS: LONG TERM

## 2021-07-24 ASSESSMENT — ACTIVITIES OF DAILY LIVING (ADL): MONEY MANAGEMENT (EXPENSES/BILLS): TOTALLY DEPENDENT

## 2021-07-24 ASSESSMENT — SOCIAL DETERMINANTS OF HEALTH (SDOH): ACTIVE STRESSOR - HEALTH CHANGES: 1

## 2021-07-25 PROCEDURE — S9126 HOSPICE CARE, IN THE HOME, P: HCPCS

## 2021-07-26 PROCEDURE — S9126 HOSPICE CARE, IN THE HOME, P: HCPCS

## 2021-07-27 ENCOUNTER — HOME CARE VISIT (OUTPATIENT)
Dept: HOSPICE | Facility: HOSPICE | Age: 86
End: 2021-07-27
Payer: MEDICARE

## 2021-07-27 ENCOUNTER — PATIENT MESSAGE (OUTPATIENT)
Dept: HEALTH INFORMATION MANAGEMENT | Facility: OTHER | Age: 86
End: 2021-07-27

## 2021-07-27 VITALS — RESPIRATION RATE: 12 BRPM | DIASTOLIC BLOOD PRESSURE: 60 MMHG | HEART RATE: 76 BPM | SYSTOLIC BLOOD PRESSURE: 118 MMHG

## 2021-07-27 PROCEDURE — G0299 HHS/HOSPICE OF RN EA 15 MIN: HCPCS

## 2021-07-27 PROCEDURE — S9126 HOSPICE CARE, IN THE HOME, P: HCPCS

## 2021-07-27 PROCEDURE — G0156 HHCP-SVS OF AIDE,EA 15 MIN: HCPCS

## 2021-07-27 SDOH — ECONOMIC STABILITY: GENERAL

## 2021-07-27 ASSESSMENT — ENCOUNTER SYMPTOMS
SLEEP QUALITY: ADEQUATE
DESCRIPTION OF MEMORY LOSS: LONG TERM
FORGETFULNESS: 1
LOWER EXTREMITY EDEMA: 1
BOWEL PATTERN NORMAL: 1
DESCRIPTION OF MEMORY LOSS: SHORT TERM
LAST BOWEL MOVEMENT: 65950

## 2021-07-27 ASSESSMENT — SOCIAL DETERMINANTS OF HEALTH (SDOH): ACTIVE STRESSOR - HEALTH CHANGES: 1

## 2021-07-27 ASSESSMENT — ACTIVITIES OF DAILY LIVING (ADL): MONEY MANAGEMENT (EXPENSES/BILLS): TOTALLY DEPENDENT

## 2021-07-28 PROCEDURE — S9126 HOSPICE CARE, IN THE HOME, P: HCPCS

## 2021-07-28 PROCEDURE — 6650990 HC HOSPICE AND HOME CARE RX REV CODE 0250

## 2021-07-29 ENCOUNTER — HOME CARE VISIT (OUTPATIENT)
Dept: HOSPICE | Facility: HOSPICE | Age: 86
End: 2021-07-29
Payer: MEDICARE

## 2021-07-29 PROCEDURE — S9126 HOSPICE CARE, IN THE HOME, P: HCPCS

## 2021-07-29 PROCEDURE — G0156 HHCP-SVS OF AIDE,EA 15 MIN: HCPCS

## 2021-07-30 ENCOUNTER — HOME CARE VISIT (OUTPATIENT)
Dept: HOSPICE | Facility: HOSPICE | Age: 86
End: 2021-07-30
Payer: MEDICARE

## 2021-07-30 VITALS — HEART RATE: 80 BPM | SYSTOLIC BLOOD PRESSURE: 140 MMHG | DIASTOLIC BLOOD PRESSURE: 76 MMHG | RESPIRATION RATE: 20 BRPM

## 2021-07-30 PROCEDURE — G0299 HHS/HOSPICE OF RN EA 15 MIN: HCPCS

## 2021-07-30 PROCEDURE — S9126 HOSPICE CARE, IN THE HOME, P: HCPCS

## 2021-07-30 SDOH — ECONOMIC STABILITY: GENERAL

## 2021-07-30 ASSESSMENT — SOCIAL DETERMINANTS OF HEALTH (SDOH): ACTIVE STRESSOR - HEALTH CHANGES: 1

## 2021-07-30 ASSESSMENT — ENCOUNTER SYMPTOMS
PERSON REPORTING PAIN: PATIENT
FORGETFULNESS: 1
LOWER EXTREMITY EDEMA: 1
DESCRIPTION OF MEMORY LOSS: LONG TERM
SLEEP QUALITY: ADEQUATE
BOWEL PATTERN NORMAL: 1
DESCRIPTION OF MEMORY LOSS: SHORT TERM
STOOL FREQUENCY: LESS THAN DAILY
LAST BOWEL MOVEMENT: 65954
DENIES PAIN: 1

## 2021-07-30 ASSESSMENT — ACTIVITIES OF DAILY LIVING (ADL): MONEY MANAGEMENT (EXPENSES/BILLS): TOTALLY DEPENDENT

## 2021-07-31 PROCEDURE — S9126 HOSPICE CARE, IN THE HOME, P: HCPCS

## 2021-08-01 PROCEDURE — S9126 HOSPICE CARE, IN THE HOME, P: HCPCS

## 2021-08-02 ENCOUNTER — HOME CARE VISIT (OUTPATIENT)
Dept: HOSPICE | Facility: HOSPICE | Age: 86
End: 2021-08-02
Payer: MEDICARE

## 2021-08-02 PROCEDURE — S9126 HOSPICE CARE, IN THE HOME, P: HCPCS

## 2021-08-03 ENCOUNTER — HOME CARE VISIT (OUTPATIENT)
Dept: HOSPICE | Facility: HOSPICE | Age: 86
End: 2021-08-03
Payer: MEDICARE

## 2021-08-03 VITALS — HEART RATE: 76 BPM | RESPIRATION RATE: 20 BRPM | SYSTOLIC BLOOD PRESSURE: 130 MMHG | DIASTOLIC BLOOD PRESSURE: 60 MMHG

## 2021-08-03 PROCEDURE — G0299 HHS/HOSPICE OF RN EA 15 MIN: HCPCS

## 2021-08-03 PROCEDURE — G0156 HHCP-SVS OF AIDE,EA 15 MIN: HCPCS

## 2021-08-03 PROCEDURE — S9126 HOSPICE CARE, IN THE HOME, P: HCPCS

## 2021-08-03 SDOH — ECONOMIC STABILITY: GENERAL

## 2021-08-03 ASSESSMENT — ENCOUNTER SYMPTOMS
DESCRIPTION OF MEMORY LOSS: LONG TERM
LAST BOWEL MOVEMENT: 65954
BOWEL PATTERN NORMAL: 1
DESCRIPTION OF MEMORY LOSS: SHORT TERM
PAIN LOCATION: RIGHT FOOT
LOWER EXTREMITY EDEMA: 1
PAIN: 1
SLEEP QUALITY: ADEQUATE
HIGHEST PAIN SEVERITY IN PAST 24 HOURS: 2/10
PAIN LOCATION - PAIN SEVERITY: 2/10
FORGETFULNESS: 1
STOOL FREQUENCY: LESS THAN DAILY
SUBJECTIVE PAIN PROGRESSION: WAXING AND WANING

## 2021-08-03 ASSESSMENT — SOCIAL DETERMINANTS OF HEALTH (SDOH)
ACTIVE STRESSOR - HEALTH CHANGES: 1
ACTIVE STRESSOR - EXPRESSED EMOTIONAL NEED: 1

## 2021-08-03 ASSESSMENT — ACTIVITIES OF DAILY LIVING (ADL): MONEY MANAGEMENT (EXPENSES/BILLS): TOTALLY DEPENDENT

## 2021-08-04 ENCOUNTER — HOME CARE VISIT (OUTPATIENT)
Dept: HOSPICE | Facility: HOSPICE | Age: 86
End: 2021-08-04
Payer: MEDICARE

## 2021-08-04 VITALS — DIASTOLIC BLOOD PRESSURE: 68 MMHG | RESPIRATION RATE: 16 BRPM | HEART RATE: 80 BPM | SYSTOLIC BLOOD PRESSURE: 122 MMHG

## 2021-08-04 PROCEDURE — S9126 HOSPICE CARE, IN THE HOME, P: HCPCS

## 2021-08-04 PROCEDURE — G0299 HHS/HOSPICE OF RN EA 15 MIN: HCPCS

## 2021-08-04 SDOH — ECONOMIC STABILITY: GENERAL

## 2021-08-04 ASSESSMENT — ENCOUNTER SYMPTOMS
BOWEL PATTERN NORMAL: 1
PAIN LOCATION - PAIN SEVERITY: 4/10
PAIN LOCATION - EXACERBATING FACTORS: MOVEMENT
PAIN LOCATION - PAIN SEVERITY: 4/10
HIGHEST PAIN SEVERITY IN PAST 24 HOURS: 5/10
PAIN LOCATION - PAIN FREQUENCY: INTERMITTENT
PAIN SEVERITY GOAL: 0/10
PAIN LOCATION - PAIN FREQUENCY: INTERMITTENT
PAIN LOCATION: RIGHT HIP
SUBJECTIVE PAIN PROGRESSION: WAXING AND WANING
DESCRIPTION OF MEMORY LOSS: SHORT TERM
PAIN LOCATION: RIGHT KNEE
SLEEP QUALITY: ADEQUATE
FORGETFULNESS: 1
LAST BOWEL MOVEMENT: 65960
STOOL FREQUENCY: LESS THAN DAILY
PAIN: 1
DESCRIPTION OF MEMORY LOSS: LONG TERM
PAIN LOCATION - EXACERBATING FACTORS: MOVEMENT
LOWEST PAIN SEVERITY IN PAST 24 HOURS: 2/10
LOWER EXTREMITY EDEMA: 1

## 2021-08-04 ASSESSMENT — SOCIAL DETERMINANTS OF HEALTH (SDOH): ACTIVE STRESSOR - HEALTH CHANGES: 1

## 2021-08-04 ASSESSMENT — ACTIVITIES OF DAILY LIVING (ADL): MONEY MANAGEMENT (EXPENSES/BILLS): TOTALLY DEPENDENT

## 2021-08-05 ENCOUNTER — HOME CARE VISIT (OUTPATIENT)
Dept: HOSPICE | Facility: HOSPICE | Age: 86
End: 2021-08-05
Payer: MEDICARE

## 2021-08-05 PROCEDURE — S9126 HOSPICE CARE, IN THE HOME, P: HCPCS

## 2021-08-05 PROCEDURE — G0156 HHCP-SVS OF AIDE,EA 15 MIN: HCPCS

## 2021-08-06 ENCOUNTER — HOME CARE VISIT (OUTPATIENT)
Dept: HOSPICE | Facility: HOSPICE | Age: 86
End: 2021-08-06
Payer: MEDICARE

## 2021-08-06 VITALS — RESPIRATION RATE: 20 BRPM | DIASTOLIC BLOOD PRESSURE: 60 MMHG | SYSTOLIC BLOOD PRESSURE: 110 MMHG | HEART RATE: 68 BPM

## 2021-08-06 PROCEDURE — S9126 HOSPICE CARE, IN THE HOME, P: HCPCS

## 2021-08-06 PROCEDURE — 6650990 HC HOSPICE AND HOME CARE RX REV CODE 0250

## 2021-08-06 PROCEDURE — G0299 HHS/HOSPICE OF RN EA 15 MIN: HCPCS

## 2021-08-06 SDOH — ECONOMIC STABILITY: GENERAL

## 2021-08-06 ASSESSMENT — ENCOUNTER SYMPTOMS
LOWER EXTREMITY EDEMA: 1
STOOL FREQUENCY: LESS THAN DAILY
BOWEL PATTERN NORMAL: 1
FORGETFULNESS: 1
SLEEP QUALITY: ADEQUATE
DENIES PAIN: 1
LAST BOWEL MOVEMENT: 65960
DESCRIPTION OF MEMORY LOSS: LONG TERM
DESCRIPTION OF MEMORY LOSS: SHORT TERM

## 2021-08-06 ASSESSMENT — SOCIAL DETERMINANTS OF HEALTH (SDOH): ACTIVE STRESSOR - HEALTH CHANGES: 1

## 2021-08-06 ASSESSMENT — ACTIVITIES OF DAILY LIVING (ADL): MONEY MANAGEMENT (EXPENSES/BILLS): TOTALLY DEPENDENT

## 2021-08-07 PROCEDURE — S9126 HOSPICE CARE, IN THE HOME, P: HCPCS

## 2021-08-08 PROCEDURE — S9126 HOSPICE CARE, IN THE HOME, P: HCPCS

## 2021-08-09 DIAGNOSIS — I67.2 CEREBRAL ATHEROSCLEROSIS: ICD-10-CM

## 2021-08-09 DIAGNOSIS — R52 PAIN ASSOCIATED WITH TERMINAL ILLNESS: ICD-10-CM

## 2021-08-09 PROCEDURE — 6650990 HC HOSPICE AND HOME CARE RX REV CODE 0250

## 2021-08-09 PROCEDURE — S9126 HOSPICE CARE, IN THE HOME, P: HCPCS

## 2021-08-09 RX ORDER — MORPHINE SULFATE 15 MG/1
15 TABLET, FILM COATED, EXTENDED RELEASE ORAL EVERY 8 HOURS
Qty: 180 TABLET | Refills: 0 | Status: SHIPPED | OUTPATIENT
Start: 2021-08-09 | End: 2021-09-14 | Stop reason: SDUPTHER

## 2021-08-10 ENCOUNTER — HOME CARE VISIT (OUTPATIENT)
Dept: HOSPICE | Facility: HOSPICE | Age: 86
End: 2021-08-10
Payer: MEDICARE

## 2021-08-10 VITALS — RESPIRATION RATE: 20 BRPM | DIASTOLIC BLOOD PRESSURE: 60 MMHG | HEART RATE: 68 BPM | SYSTOLIC BLOOD PRESSURE: 100 MMHG

## 2021-08-10 PROCEDURE — 6650990 HC HOSPICE AND HOME CARE RX REV CODE 0250

## 2021-08-10 PROCEDURE — S9126 HOSPICE CARE, IN THE HOME, P: HCPCS

## 2021-08-10 PROCEDURE — G0299 HHS/HOSPICE OF RN EA 15 MIN: HCPCS

## 2021-08-10 PROCEDURE — G0156 HHCP-SVS OF AIDE,EA 15 MIN: HCPCS

## 2021-08-10 SDOH — ECONOMIC STABILITY: GENERAL

## 2021-08-10 ASSESSMENT — SOCIAL DETERMINANTS OF HEALTH (SDOH): ACTIVE STRESSOR - HEALTH CHANGES: 1

## 2021-08-10 ASSESSMENT — PAIN SCALES - PAIN ASSESSMENT IN ADVANCED DEMENTIA (PAINAD)
BODYLANGUAGE: 0 - RELAXED.
FACIALEXPRESSION: 0 - SMILING OR INEXPRESSIVE.
CONSOLABILITY: 0 - NO NEED TO CONSOLE.
NEGVOCALIZATION: 0 - NONE.
TOTALSCORE: 0

## 2021-08-10 ASSESSMENT — ENCOUNTER SYMPTOMS
SLEEP QUALITY: ADEQUATE
STOOL FREQUENCY: LESS THAN DAILY
LOWER EXTREMITY EDEMA: 1
DESCRIPTION OF MEMORY LOSS: SHORT TERM
DENIES PAIN: 1
BOWEL PATTERN NORMAL: 1
DESCRIPTION OF MEMORY LOSS: LONG TERM
FORGETFULNESS: 1

## 2021-08-10 ASSESSMENT — ACTIVITIES OF DAILY LIVING (ADL): MONEY MANAGEMENT (EXPENSES/BILLS): TOTALLY DEPENDENT

## 2021-08-11 PROCEDURE — S9126 HOSPICE CARE, IN THE HOME, P: HCPCS

## 2021-08-12 ENCOUNTER — HOME CARE VISIT (OUTPATIENT)
Dept: HOSPICE | Facility: HOSPICE | Age: 86
End: 2021-08-12
Payer: MEDICARE

## 2021-08-12 PROCEDURE — G0156 HHCP-SVS OF AIDE,EA 15 MIN: HCPCS

## 2021-08-12 PROCEDURE — S9126 HOSPICE CARE, IN THE HOME, P: HCPCS

## 2021-08-13 ENCOUNTER — HOME CARE VISIT (OUTPATIENT)
Dept: HOSPICE | Facility: HOSPICE | Age: 86
End: 2021-08-13
Payer: MEDICARE

## 2021-08-13 VITALS — SYSTOLIC BLOOD PRESSURE: 120 MMHG | RESPIRATION RATE: 20 BRPM | HEART RATE: 80 BPM | DIASTOLIC BLOOD PRESSURE: 70 MMHG

## 2021-08-13 PROCEDURE — G0299 HHS/HOSPICE OF RN EA 15 MIN: HCPCS

## 2021-08-13 PROCEDURE — S9126 HOSPICE CARE, IN THE HOME, P: HCPCS

## 2021-08-13 SDOH — ECONOMIC STABILITY: GENERAL

## 2021-08-13 ASSESSMENT — ENCOUNTER SYMPTOMS
DENIES PAIN: 1
STOOL FREQUENCY: LESS THAN DAILY
BOWEL PATTERN NORMAL: 1
SLEEP QUALITY: ADEQUATE
LOWER EXTREMITY EDEMA: 1
PERSON REPORTING PAIN: PATIENT
DESCRIPTION OF MEMORY LOSS: LONG TERM
DESCRIPTION OF MEMORY LOSS: SHORT TERM
FORGETFULNESS: 1

## 2021-08-13 ASSESSMENT — ACTIVITIES OF DAILY LIVING (ADL): MONEY MANAGEMENT (EXPENSES/BILLS): TOTALLY DEPENDENT

## 2021-08-13 ASSESSMENT — SOCIAL DETERMINANTS OF HEALTH (SDOH): ACTIVE STRESSOR - HEALTH CHANGES: 1

## 2021-08-14 ENCOUNTER — HOME CARE VISIT (OUTPATIENT)
Dept: HOSPICE | Facility: HOSPICE | Age: 86
End: 2021-08-14
Payer: MEDICARE

## 2021-08-14 PROCEDURE — S9126 HOSPICE CARE, IN THE HOME, P: HCPCS

## 2021-08-15 PROCEDURE — S9126 HOSPICE CARE, IN THE HOME, P: HCPCS

## 2021-08-16 PROCEDURE — S9126 HOSPICE CARE, IN THE HOME, P: HCPCS

## 2021-08-17 ENCOUNTER — HOME CARE VISIT (OUTPATIENT)
Dept: HOSPICE | Facility: HOSPICE | Age: 86
End: 2021-08-17
Payer: MEDICARE

## 2021-08-17 VITALS — HEART RATE: 76 BPM | DIASTOLIC BLOOD PRESSURE: 58 MMHG | SYSTOLIC BLOOD PRESSURE: 120 MMHG | RESPIRATION RATE: 20 BRPM

## 2021-08-17 PROCEDURE — G0299 HHS/HOSPICE OF RN EA 15 MIN: HCPCS

## 2021-08-17 PROCEDURE — G0156 HHCP-SVS OF AIDE,EA 15 MIN: HCPCS

## 2021-08-17 PROCEDURE — 6650990 HC HOSPICE AND HOME CARE RX REV CODE 0250

## 2021-08-17 PROCEDURE — S9126 HOSPICE CARE, IN THE HOME, P: HCPCS

## 2021-08-17 SDOH — ECONOMIC STABILITY: GENERAL

## 2021-08-17 ASSESSMENT — ENCOUNTER SYMPTOMS
DENIES PAIN: 1
PERSON REPORTING PAIN: PATIENT
BOWEL PATTERN NORMAL: 1
SLEEP QUALITY: ADEQUATE
LOWER EXTREMITY EDEMA: 1
DESCRIPTION OF MEMORY LOSS: LONG TERM
LAST BOWEL MOVEMENT: 65973
STOOL FREQUENCY: LESS THAN DAILY
FORGETFULNESS: 1
DESCRIPTION OF MEMORY LOSS: SHORT TERM

## 2021-08-17 ASSESSMENT — ACTIVITIES OF DAILY LIVING (ADL): MONEY MANAGEMENT (EXPENSES/BILLS): TOTALLY DEPENDENT

## 2021-08-17 ASSESSMENT — SOCIAL DETERMINANTS OF HEALTH (SDOH): ACTIVE STRESSOR - HEALTH CHANGES: 1

## 2021-08-18 PROCEDURE — 6650990 HC HOSPICE AND HOME CARE RX REV CODE 0250

## 2021-08-18 PROCEDURE — S9126 HOSPICE CARE, IN THE HOME, P: HCPCS

## 2021-08-19 ENCOUNTER — HOME CARE VISIT (OUTPATIENT)
Dept: HOSPICE | Facility: HOSPICE | Age: 86
End: 2021-08-19
Payer: MEDICARE

## 2021-08-19 VITALS — SYSTOLIC BLOOD PRESSURE: 130 MMHG | DIASTOLIC BLOOD PRESSURE: 60 MMHG | HEART RATE: 76 BPM | RESPIRATION RATE: 16 BRPM

## 2021-08-19 PROCEDURE — G0156 HHCP-SVS OF AIDE,EA 15 MIN: HCPCS

## 2021-08-19 PROCEDURE — S9126 HOSPICE CARE, IN THE HOME, P: HCPCS

## 2021-08-19 PROCEDURE — G0299 HHS/HOSPICE OF RN EA 15 MIN: HCPCS

## 2021-08-19 SDOH — ECONOMIC STABILITY: GENERAL

## 2021-08-19 ASSESSMENT — ENCOUNTER SYMPTOMS
STOOL FREQUENCY: LESS THAN DAILY
DESCRIPTION OF MEMORY LOSS: SHORT TERM
LOWER EXTREMITY EDEMA: 1
FORGETFULNESS: 1
BOWEL PATTERN NORMAL: 1
LAST BOWEL MOVEMENT: 65973
SLEEP QUALITY: ADEQUATE
DESCRIPTION OF MEMORY LOSS: LONG TERM
PERSON REPORTING PAIN: PATIENT
DENIES PAIN: 1

## 2021-08-19 ASSESSMENT — ACTIVITIES OF DAILY LIVING (ADL): MONEY MANAGEMENT (EXPENSES/BILLS): TOTALLY DEPENDENT

## 2021-08-19 ASSESSMENT — SOCIAL DETERMINANTS OF HEALTH (SDOH): ACTIVE STRESSOR - HEALTH CHANGES: 1

## 2021-08-20 PROCEDURE — S9126 HOSPICE CARE, IN THE HOME, P: HCPCS

## 2021-08-20 PROCEDURE — 6650990 HC HOSPICE AND HOME CARE RX REV CODE 0250

## 2021-08-21 PROCEDURE — S9126 HOSPICE CARE, IN THE HOME, P: HCPCS

## 2021-08-22 PROCEDURE — S9126 HOSPICE CARE, IN THE HOME, P: HCPCS

## 2021-08-23 PROCEDURE — S9126 HOSPICE CARE, IN THE HOME, P: HCPCS

## 2021-08-24 ENCOUNTER — HOME CARE VISIT (OUTPATIENT)
Dept: HOSPICE | Facility: HOSPICE | Age: 86
End: 2021-08-24
Payer: MEDICARE

## 2021-08-24 VITALS — HEART RATE: 76 BPM | RESPIRATION RATE: 20 BRPM | DIASTOLIC BLOOD PRESSURE: 60 MMHG | SYSTOLIC BLOOD PRESSURE: 128 MMHG

## 2021-08-24 PROCEDURE — G0299 HHS/HOSPICE OF RN EA 15 MIN: HCPCS

## 2021-08-24 PROCEDURE — S9126 HOSPICE CARE, IN THE HOME, P: HCPCS

## 2021-08-24 PROCEDURE — G0156 HHCP-SVS OF AIDE,EA 15 MIN: HCPCS

## 2021-08-24 SDOH — ECONOMIC STABILITY: GENERAL

## 2021-08-24 ASSESSMENT — ENCOUNTER SYMPTOMS
LOWER EXTREMITY EDEMA: 1
STOOL FREQUENCY: LESS THAN DAILY
SLEEP QUALITY: FAIR
FORGETFULNESS: 1
DESCRIPTION OF MEMORY LOSS: LONG TERM
DESCRIPTION OF MEMORY LOSS: SHORT TERM
DENIES PAIN: 1
SLEEP QUALITY: ADEQUATE
PERSON REPORTING PAIN: PATIENT
BOWEL PATTERN NORMAL: 1

## 2021-08-24 ASSESSMENT — ACTIVITIES OF DAILY LIVING (ADL): MONEY MANAGEMENT (EXPENSES/BILLS): TOTALLY DEPENDENT

## 2021-08-24 ASSESSMENT — SOCIAL DETERMINANTS OF HEALTH (SDOH)
ACTIVE STRESSOR - HEALTH CHANGES: 1
ACTIVE STRESSOR - NO STRESS FACTORS: 1

## 2021-08-25 PROCEDURE — S9126 HOSPICE CARE, IN THE HOME, P: HCPCS

## 2021-08-26 ENCOUNTER — HOME CARE VISIT (OUTPATIENT)
Dept: HOSPICE | Facility: HOSPICE | Age: 86
End: 2021-08-26
Payer: MEDICARE

## 2021-08-26 PROCEDURE — G0156 HHCP-SVS OF AIDE,EA 15 MIN: HCPCS

## 2021-08-26 PROCEDURE — S9126 HOSPICE CARE, IN THE HOME, P: HCPCS

## 2021-08-27 ENCOUNTER — HOME CARE VISIT (OUTPATIENT)
Dept: HOSPICE | Facility: HOSPICE | Age: 86
End: 2021-08-27
Payer: MEDICARE

## 2021-08-27 VITALS
HEART RATE: 76 BPM | BODY MASS INDEX: 17.84 KG/M2 | SYSTOLIC BLOOD PRESSURE: 112 MMHG | DIASTOLIC BLOOD PRESSURE: 64 MMHG | WEIGHT: 110.5 LBS | RESPIRATION RATE: 16 BRPM

## 2021-08-27 PROCEDURE — G0299 HHS/HOSPICE OF RN EA 15 MIN: HCPCS

## 2021-08-27 PROCEDURE — 6650990 HC HOSPICE AND HOME CARE RX REV CODE 0250

## 2021-08-27 PROCEDURE — S9126 HOSPICE CARE, IN THE HOME, P: HCPCS

## 2021-08-27 SDOH — ECONOMIC STABILITY: GENERAL

## 2021-08-27 ASSESSMENT — ENCOUNTER SYMPTOMS
LOWER EXTREMITY EDEMA: 1
SLEEP QUALITY: FAIR
DENIES PAIN: 1
DESCRIPTION OF MEMORY LOSS: LONG TERM
LAST BOWEL MOVEMENT: 65983
PERSON REPORTING PAIN: PATIENT
STOOL FREQUENCY: LESS THAN DAILY
DESCRIPTION OF MEMORY LOSS: SHORT TERM
BOWEL PATTERN NORMAL: 1
FORGETFULNESS: 1

## 2021-08-27 ASSESSMENT — ACTIVITIES OF DAILY LIVING (ADL): MONEY MANAGEMENT (EXPENSES/BILLS): TOTALLY DEPENDENT

## 2021-08-27 ASSESSMENT — FIBROSIS 4 INDEX: FIB4 SCORE: 2.35

## 2021-08-27 ASSESSMENT — SOCIAL DETERMINANTS OF HEALTH (SDOH): ACTIVE STRESSOR - NO STRESS FACTORS: 1

## 2021-08-28 PROCEDURE — S9126 HOSPICE CARE, IN THE HOME, P: HCPCS

## 2021-08-29 PROCEDURE — S9126 HOSPICE CARE, IN THE HOME, P: HCPCS

## 2021-08-30 ENCOUNTER — HOME CARE VISIT (OUTPATIENT)
Dept: HOSPICE | Facility: HOSPICE | Age: 86
End: 2021-08-30
Payer: MEDICARE

## 2021-08-30 VITALS — DIASTOLIC BLOOD PRESSURE: 66 MMHG | SYSTOLIC BLOOD PRESSURE: 122 MMHG | HEART RATE: 76 BPM | RESPIRATION RATE: 20 BRPM

## 2021-08-30 PROCEDURE — S9126 HOSPICE CARE, IN THE HOME, P: HCPCS

## 2021-08-30 PROCEDURE — G0299 HHS/HOSPICE OF RN EA 15 MIN: HCPCS

## 2021-08-30 PROCEDURE — 6650990 HC HOSPICE AND HOME CARE RX REV CODE 0250

## 2021-08-30 SDOH — ECONOMIC STABILITY: GENERAL

## 2021-08-30 ASSESSMENT — ENCOUNTER SYMPTOMS
FORGETFULNESS: 1
BOWEL PATTERN NORMAL: 1
DESCRIPTION OF MEMORY LOSS: SHORT TERM
PERSON REPORTING PAIN: PATIENT
DENIES PAIN: 1
LAST BOWEL MOVEMENT: 65986
SLEEP QUALITY: FAIR
FATIGUE: 1
LOWER EXTREMITY EDEMA: 1
STOOL FREQUENCY: LESS THAN DAILY
DESCRIPTION OF MEMORY LOSS: LONG TERM
SOMNOLENCE: 1

## 2021-08-30 ASSESSMENT — ACTIVITIES OF DAILY LIVING (ADL): MONEY MANAGEMENT (EXPENSES/BILLS): TOTALLY DEPENDENT

## 2021-08-30 ASSESSMENT — SOCIAL DETERMINANTS OF HEALTH (SDOH): ACTIVE STRESSOR - NO STRESS FACTORS: 1

## 2021-08-31 ENCOUNTER — HOME CARE VISIT (OUTPATIENT)
Dept: HOSPICE | Facility: HOSPICE | Age: 86
End: 2021-08-31
Payer: MEDICARE

## 2021-08-31 PROCEDURE — S9126 HOSPICE CARE, IN THE HOME, P: HCPCS

## 2021-09-01 PROCEDURE — S9126 HOSPICE CARE, IN THE HOME, P: HCPCS

## 2021-09-02 ENCOUNTER — HOME CARE VISIT (OUTPATIENT)
Dept: HOSPICE | Facility: HOSPICE | Age: 86
End: 2021-09-02
Payer: MEDICARE

## 2021-09-02 VITALS — RESPIRATION RATE: 16 BRPM | DIASTOLIC BLOOD PRESSURE: 70 MMHG | SYSTOLIC BLOOD PRESSURE: 120 MMHG | HEART RATE: 80 BPM

## 2021-09-02 PROCEDURE — G0299 HHS/HOSPICE OF RN EA 15 MIN: HCPCS

## 2021-09-02 PROCEDURE — G0156 HHCP-SVS OF AIDE,EA 15 MIN: HCPCS

## 2021-09-02 PROCEDURE — S9126 HOSPICE CARE, IN THE HOME, P: HCPCS

## 2021-09-02 RX ADMIN — MORPHINE SULFATE 10 MG: 20 SOLUTION ORAL at 12:28

## 2021-09-02 SDOH — ECONOMIC STABILITY: GENERAL

## 2021-09-02 ASSESSMENT — PAIN SCALES - PAIN ASSESSMENT IN ADVANCED DEMENTIA (PAINAD)
CONSOLABILITY: 1 - DISTRACTED OR REASSURED BY VOICE OR TOUCH.
NEGVOCALIZATION: 2 - REPEATED TROUBLE CALLING OUT. LOUD MOANING OR GROANING. CRYING.
BODYLANGUAGE: 1 - TENSE. DISTRESSED PACING. FIDGETING.
FACIALEXPRESSION: 1 - SAD. FRIGHTENED. FROWN.
TOTALSCORE: 6

## 2021-09-02 ASSESSMENT — ENCOUNTER SYMPTOMS
PAIN LOCATION: RIGHT LEG
DESCRIPTION OF MEMORY LOSS: LONG TERM
SOMNOLENCE: 1
STOOL FREQUENCY: LESS THAN DAILY
SLEEP QUALITY: FAIR
FATIGUE: 1
LAST BOWEL MOVEMENT: 65986
PAIN: 1
BOWEL PATTERN NORMAL: 1
FORGETFULNESS: 1
PERSON REPORTING PAIN: PATIENT
LOWER EXTREMITY EDEMA: 1
SUBJECTIVE PAIN PROGRESSION: WAXING AND WANING
DESCRIPTION OF MEMORY LOSS: SHORT TERM

## 2021-09-02 ASSESSMENT — ACTIVITIES OF DAILY LIVING (ADL): MONEY MANAGEMENT (EXPENSES/BILLS): TOTALLY DEPENDENT

## 2021-09-02 ASSESSMENT — SOCIAL DETERMINANTS OF HEALTH (SDOH): ACTIVE STRESSOR - NO STRESS FACTORS: 1

## 2021-09-03 PROCEDURE — 6650990 HC HOSPICE AND HOME CARE RX REV CODE 0250

## 2021-09-03 PROCEDURE — S9126 HOSPICE CARE, IN THE HOME, P: HCPCS

## 2021-09-04 ENCOUNTER — HOME CARE VISIT (OUTPATIENT)
Dept: HOSPICE | Facility: HOSPICE | Age: 86
End: 2021-09-04
Payer: MEDICARE

## 2021-09-04 PROCEDURE — S9126 HOSPICE CARE, IN THE HOME, P: HCPCS

## 2021-09-05 PROCEDURE — S9126 HOSPICE CARE, IN THE HOME, P: HCPCS

## 2021-09-06 ENCOUNTER — HOME CARE VISIT (OUTPATIENT)
Dept: HOSPICE | Facility: HOSPICE | Age: 86
End: 2021-09-06
Payer: MEDICARE

## 2021-09-06 PROCEDURE — S9126 HOSPICE CARE, IN THE HOME, P: HCPCS

## 2021-09-07 ENCOUNTER — HOME CARE VISIT (OUTPATIENT)
Dept: HOSPICE | Facility: HOSPICE | Age: 86
End: 2021-09-07
Payer: MEDICARE

## 2021-09-07 VITALS — HEART RATE: 72 BPM | DIASTOLIC BLOOD PRESSURE: 60 MMHG | SYSTOLIC BLOOD PRESSURE: 124 MMHG | RESPIRATION RATE: 20 BRPM

## 2021-09-07 PROCEDURE — G0299 HHS/HOSPICE OF RN EA 15 MIN: HCPCS

## 2021-09-07 PROCEDURE — G0156 HHCP-SVS OF AIDE,EA 15 MIN: HCPCS

## 2021-09-07 PROCEDURE — 6650990 HC HOSPICE AND HOME CARE RX REV CODE 0250

## 2021-09-07 PROCEDURE — S9126 HOSPICE CARE, IN THE HOME, P: HCPCS

## 2021-09-07 SDOH — ECONOMIC STABILITY: GENERAL

## 2021-09-07 ASSESSMENT — ENCOUNTER SYMPTOMS
SOMNOLENCE: 1
FORGETFULNESS: 1
STOOL FREQUENCY: LESS THAN DAILY
DESCRIPTION OF MEMORY LOSS: SHORT TERM
SLEEP QUALITY: FAIR
LOWER EXTREMITY EDEMA: 1
DENIES PAIN: 1
PERSON REPORTING PAIN: PATIENT
DESCRIPTION OF MEMORY LOSS: LONG TERM
FATIGUE: 1
BOWEL PATTERN NORMAL: 1

## 2021-09-07 ASSESSMENT — ACTIVITIES OF DAILY LIVING (ADL): MONEY MANAGEMENT (EXPENSES/BILLS): TOTALLY DEPENDENT

## 2021-09-07 ASSESSMENT — SOCIAL DETERMINANTS OF HEALTH (SDOH): ACTIVE STRESSOR - NO STRESS FACTORS: 1

## 2021-09-08 PROCEDURE — S9126 HOSPICE CARE, IN THE HOME, P: HCPCS

## 2021-09-09 ENCOUNTER — HOME CARE VISIT (OUTPATIENT)
Dept: HOSPICE | Facility: HOSPICE | Age: 86
End: 2021-09-09
Payer: MEDICARE

## 2021-09-09 PROCEDURE — S9126 HOSPICE CARE, IN THE HOME, P: HCPCS

## 2021-09-09 PROCEDURE — G0156 HHCP-SVS OF AIDE,EA 15 MIN: HCPCS

## 2021-09-10 ENCOUNTER — HOME CARE VISIT (OUTPATIENT)
Dept: HOSPICE | Facility: HOSPICE | Age: 86
End: 2021-09-10
Payer: MEDICARE

## 2021-09-10 PROCEDURE — S9126 HOSPICE CARE, IN THE HOME, P: HCPCS

## 2021-09-10 PROCEDURE — G0155 HHCP-SVS OF CSW,EA 15 MIN: HCPCS

## 2021-09-10 ASSESSMENT — SOCIAL DETERMINANTS OF HEALTH (SDOH): ACTIVE STRESSOR - NO STRESS FACTORS: 1

## 2021-09-10 ASSESSMENT — ENCOUNTER SYMPTOMS: SLEEP QUALITY: FAIR

## 2021-09-11 ENCOUNTER — HOME CARE VISIT (OUTPATIENT)
Dept: HOSPICE | Facility: HOSPICE | Age: 86
End: 2021-09-11
Payer: MEDICARE

## 2021-09-11 PROCEDURE — G0299 HHS/HOSPICE OF RN EA 15 MIN: HCPCS

## 2021-09-11 PROCEDURE — S9126 HOSPICE CARE, IN THE HOME, P: HCPCS

## 2021-09-11 ASSESSMENT — PAIN SCALES - PAIN ASSESSMENT IN ADVANCED DEMENTIA (PAINAD)
CONSOLABILITY: 0 - NO NEED TO CONSOLE.
BODYLANGUAGE: 0 - RELAXED.
NEGVOCALIZATION: 0 - NONE.
TOTALSCORE: 0
FACIALEXPRESSION: 0 - SMILING OR INEXPRESSIVE.

## 2021-09-11 ASSESSMENT — ENCOUNTER SYMPTOMS
PERSON REPORTING PAIN: DIRECT OBSERVATION
SUBJECTIVE PAIN PROGRESSION: RESOLVED
DENIES PAIN: 1

## 2021-09-12 PROCEDURE — S9126 HOSPICE CARE, IN THE HOME, P: HCPCS

## 2021-09-13 PROCEDURE — S9126 HOSPICE CARE, IN THE HOME, P: HCPCS

## 2021-09-14 ENCOUNTER — HOME CARE VISIT (OUTPATIENT)
Dept: HOSPICE | Facility: HOSPICE | Age: 86
End: 2021-09-14
Payer: MEDICARE

## 2021-09-14 VITALS
SYSTOLIC BLOOD PRESSURE: 105 MMHG | DIASTOLIC BLOOD PRESSURE: 62 MMHG | RESPIRATION RATE: 20 BRPM | OXYGEN SATURATION: 90 % | HEART RATE: 75 BPM

## 2021-09-14 DIAGNOSIS — R52 PAIN ASSOCIATED WITH TERMINAL ILLNESS: ICD-10-CM

## 2021-09-14 DIAGNOSIS — I67.2 CEREBRAL ATHEROSCLEROSIS: ICD-10-CM

## 2021-09-14 PROCEDURE — G0299 HHS/HOSPICE OF RN EA 15 MIN: HCPCS

## 2021-09-14 PROCEDURE — 6650990 HC HOSPICE AND HOME CARE RX REV CODE 0250

## 2021-09-14 PROCEDURE — S9126 HOSPICE CARE, IN THE HOME, P: HCPCS

## 2021-09-14 PROCEDURE — G0156 HHCP-SVS OF AIDE,EA 15 MIN: HCPCS

## 2021-09-14 RX ORDER — MORPHINE SULFATE 15 MG/1
15 TABLET, FILM COATED, EXTENDED RELEASE ORAL EVERY 8 HOURS
Qty: 180 TABLET | Refills: 0 | Status: SHIPPED | OUTPATIENT
Start: 2021-09-14 | End: 2021-10-11 | Stop reason: SDUPTHER

## 2021-09-14 ASSESSMENT — ENCOUNTER SYMPTOMS
PAIN: 1
SOMNOLENCE: 1
FATIGUE: 1
SLEEP QUALITY: FAIR
HIGHEST PAIN SEVERITY IN PAST 24 HOURS: 10/10
DESCRIPTION OF MEMORY LOSS: LONG TERM
LAST BOWEL MOVEMENT: 66001
LOWER EXTREMITY EDEMA: 1
DRY SKIN: 1
FATIGUES EASILY: 1
PAIN LOCATION: RIGHT LEG
FORGETFULNESS: 1
DESCRIPTION OF MEMORY LOSS: SHORT TERM
BOWEL PATTERN NORMAL: 1
SUBJECTIVE PAIN PROGRESSION: WAXING AND WANING
LOWEST PAIN SEVERITY IN PAST 24 HOURS: 1/10
STOOL FREQUENCY: LESS THAN DAILY

## 2021-09-14 ASSESSMENT — PAIN SCALES - PAIN ASSESSMENT IN ADVANCED DEMENTIA (PAINAD)
NEGVOCALIZATION: 1 - OCCASIONAL MOAN OR GROAN. LOW-LEVEL SPEECH WITH A NEGATIVE OR DISAPPROVING QUALITY.
CONSOLABILITY: 1 - DISTRACTED OR REASSURED BY VOICE OR TOUCH.
FACIALEXPRESSION: 1 - SAD. FRIGHTENED. FROWN.
TOTALSCORE: 4
BODYLANGUAGE: 1 - TENSE. DISTRESSED PACING. FIDGETING.

## 2021-09-14 ASSESSMENT — SOCIAL DETERMINANTS OF HEALTH (SDOH): ACTIVE STRESSOR - NO STRESS FACTORS: 1

## 2021-09-15 DIAGNOSIS — R52 PAIN ASSOCIATED WITH TERMINAL ILLNESS: Primary | ICD-10-CM

## 2021-09-15 PROCEDURE — S9126 HOSPICE CARE, IN THE HOME, P: HCPCS

## 2021-09-15 PROCEDURE — 6650990 HC HOSPICE AND HOME CARE RX REV CODE 0250

## 2021-09-15 RX ORDER — MORPHINE SULFATE 20 MG/ML
10 SOLUTION ORAL
Qty: 30 ML | Refills: 0 | Status: SHIPPED | OUTPATIENT
Start: 2021-09-15 | End: 2022-01-01 | Stop reason: SDUPTHER

## 2021-09-16 ENCOUNTER — HOME CARE VISIT (OUTPATIENT)
Dept: HOSPICE | Facility: HOSPICE | Age: 86
End: 2021-09-16
Payer: MEDICARE

## 2021-09-16 PROCEDURE — S9126 HOSPICE CARE, IN THE HOME, P: HCPCS

## 2021-09-16 PROCEDURE — G0156 HHCP-SVS OF AIDE,EA 15 MIN: HCPCS

## 2021-09-17 ENCOUNTER — HOME CARE VISIT (OUTPATIENT)
Dept: HOSPICE | Facility: HOSPICE | Age: 86
End: 2021-09-17
Payer: MEDICARE

## 2021-09-17 VITALS
SYSTOLIC BLOOD PRESSURE: 140 MMHG | HEART RATE: 96 BPM | TEMPERATURE: 99.1 F | RESPIRATION RATE: 22 BRPM | DIASTOLIC BLOOD PRESSURE: 72 MMHG

## 2021-09-17 PROCEDURE — S9126 HOSPICE CARE, IN THE HOME, P: HCPCS

## 2021-09-17 PROCEDURE — G0299 HHS/HOSPICE OF RN EA 15 MIN: HCPCS

## 2021-09-17 PROCEDURE — 6650990 HC HOSPICE AND HOME CARE RX REV CODE 0250

## 2021-09-17 ASSESSMENT — ENCOUNTER SYMPTOMS
SLEEP QUALITY: FAIR
BOWEL PATTERN NORMAL: 1
LAST BOWEL MOVEMENT: 66003
STOOL FREQUENCY: LESS THAN DAILY

## 2021-09-17 ASSESSMENT — PAIN SCALES - PAIN ASSESSMENT IN ADVANCED DEMENTIA (PAINAD)
CONSOLABILITY: 0 - NO NEED TO CONSOLE.
NEGVOCALIZATION: 1 - OCCASIONAL MOAN OR GROAN. LOW-LEVEL SPEECH WITH A NEGATIVE OR DISAPPROVING QUALITY.
TOTALSCORE: 1
FACIALEXPRESSION: 0 - SMILING OR INEXPRESSIVE.
BODYLANGUAGE: 0 - RELAXED.

## 2021-09-17 ASSESSMENT — SOCIAL DETERMINANTS OF HEALTH (SDOH): ACTIVE STRESSOR - NO STRESS FACTORS: 1

## 2021-09-18 PROCEDURE — S9126 HOSPICE CARE, IN THE HOME, P: HCPCS

## 2021-09-19 PROCEDURE — S9126 HOSPICE CARE, IN THE HOME, P: HCPCS

## 2021-09-20 PROCEDURE — S9126 HOSPICE CARE, IN THE HOME, P: HCPCS

## 2021-09-21 ENCOUNTER — HOME CARE VISIT (OUTPATIENT)
Dept: HOSPICE | Facility: HOSPICE | Age: 86
End: 2021-09-21
Payer: MEDICARE

## 2021-09-21 VITALS — DIASTOLIC BLOOD PRESSURE: 70 MMHG | SYSTOLIC BLOOD PRESSURE: 140 MMHG | HEART RATE: 80 BPM | RESPIRATION RATE: 12 BRPM

## 2021-09-21 PROCEDURE — S9126 HOSPICE CARE, IN THE HOME, P: HCPCS

## 2021-09-21 PROCEDURE — G0156 HHCP-SVS OF AIDE,EA 15 MIN: HCPCS

## 2021-09-21 PROCEDURE — G0299 HHS/HOSPICE OF RN EA 15 MIN: HCPCS

## 2021-09-21 SDOH — ECONOMIC STABILITY: GENERAL

## 2021-09-21 ASSESSMENT — ENCOUNTER SYMPTOMS
FORGETFULNESS: 1
FATIGUE: 1
FATIGUES EASILY: 1
DENIES PAIN: 1
BOWEL PATTERN NORMAL: 1
SOMNOLENCE: 1
SLEEP QUALITY: FAIR
LOWER EXTREMITY EDEMA: 1
DESCRIPTION OF MEMORY LOSS: LONG TERM
DESCRIPTION OF MEMORY LOSS: SHORT TERM
PERSON REPORTING PAIN: PATIENT
DRY SKIN: 1
STOOL FREQUENCY: LESS THAN DAILY

## 2021-09-21 ASSESSMENT — ACTIVITIES OF DAILY LIVING (ADL): MONEY MANAGEMENT (EXPENSES/BILLS): TOTALLY DEPENDENT

## 2021-09-21 ASSESSMENT — SOCIAL DETERMINANTS OF HEALTH (SDOH): ACTIVE STRESSOR - NO STRESS FACTORS: 1

## 2021-09-22 PROCEDURE — S9126 HOSPICE CARE, IN THE HOME, P: HCPCS

## 2021-09-23 ENCOUNTER — HOME CARE VISIT (OUTPATIENT)
Dept: HOSPICE | Facility: HOSPICE | Age: 86
End: 2021-09-23
Payer: MEDICARE

## 2021-09-23 PROCEDURE — G0299 HHS/HOSPICE OF RN EA 15 MIN: HCPCS

## 2021-09-23 PROCEDURE — 6650990 HC HOSPICE AND HOME CARE RX REV CODE 0250

## 2021-09-23 PROCEDURE — S9126 HOSPICE CARE, IN THE HOME, P: HCPCS

## 2021-09-23 PROCEDURE — G0156 HHCP-SVS OF AIDE,EA 15 MIN: HCPCS

## 2021-09-24 VITALS — DIASTOLIC BLOOD PRESSURE: 66 MMHG | SYSTOLIC BLOOD PRESSURE: 118 MMHG | HEART RATE: 80 BPM | RESPIRATION RATE: 20 BRPM

## 2021-09-24 PROCEDURE — S9126 HOSPICE CARE, IN THE HOME, P: HCPCS

## 2021-09-24 SDOH — ECONOMIC STABILITY: GENERAL

## 2021-09-24 ASSESSMENT — ENCOUNTER SYMPTOMS
FATIGUES EASILY: 1
LAST BOWEL MOVEMENT: 66010
DRY SKIN: 1
DENIES PAIN: 1
DESCRIPTION OF MEMORY LOSS: SHORT TERM
PERSON REPORTING PAIN: PATIENT
FATIGUE: 1
CONSTIPATION: 1
STOOL FREQUENCY: TWICE DAILY
LOWER EXTREMITY EDEMA: 1
SLEEP QUALITY: FAIR
FORGETFULNESS: 1
DESCRIPTION OF MEMORY LOSS: LONG TERM
SOMNOLENCE: 1

## 2021-09-24 ASSESSMENT — ACTIVITIES OF DAILY LIVING (ADL): MONEY MANAGEMENT (EXPENSES/BILLS): TOTALLY DEPENDENT

## 2021-09-24 ASSESSMENT — SOCIAL DETERMINANTS OF HEALTH (SDOH): ACTIVE STRESSOR - NO STRESS FACTORS: 1

## 2021-09-25 PROCEDURE — S9126 HOSPICE CARE, IN THE HOME, P: HCPCS

## 2021-09-26 PROCEDURE — S9126 HOSPICE CARE, IN THE HOME, P: HCPCS

## 2021-09-27 ENCOUNTER — HOME CARE VISIT (OUTPATIENT)
Dept: HOSPICE | Facility: HOSPICE | Age: 86
End: 2021-09-27
Payer: MEDICARE

## 2021-09-27 VITALS — SYSTOLIC BLOOD PRESSURE: 140 MMHG | RESPIRATION RATE: 20 BRPM | HEART RATE: 64 BPM | DIASTOLIC BLOOD PRESSURE: 64 MMHG

## 2021-09-27 PROCEDURE — S9126 HOSPICE CARE, IN THE HOME, P: HCPCS

## 2021-09-27 PROCEDURE — 6650990 HC HOSPICE AND HOME CARE RX REV CODE 0250

## 2021-09-27 PROCEDURE — G0299 HHS/HOSPICE OF RN EA 15 MIN: HCPCS

## 2021-09-27 SDOH — ECONOMIC STABILITY: GENERAL

## 2021-09-27 ASSESSMENT — ENCOUNTER SYMPTOMS
DRY SKIN: 1
SOMNOLENCE: 1
STOOL FREQUENCY: TWICE DAILY
CONSTIPATION: 1
DENIES PAIN: 1
FORGETFULNESS: 1
SLEEP QUALITY: FAIR
DESCRIPTION OF MEMORY LOSS: SHORT TERM
LOWER EXTREMITY EDEMA: 1
FATIGUE: 1
FATIGUES EASILY: 1
DESCRIPTION OF MEMORY LOSS: LONG TERM
PERSON REPORTING PAIN: PATIENT
LAST BOWEL MOVEMENT: 66013

## 2021-09-27 ASSESSMENT — ACTIVITIES OF DAILY LIVING (ADL): MONEY MANAGEMENT (EXPENSES/BILLS): TOTALLY DEPENDENT

## 2021-09-27 ASSESSMENT — PAIN SCALES - PAIN ASSESSMENT IN ADVANCED DEMENTIA (PAINAD)
FACIALEXPRESSION: 0 - SMILING OR INEXPRESSIVE.
NEGVOCALIZATION: 0 - NONE.
BODYLANGUAGE: 0 - RELAXED.
CONSOLABILITY: 0 - NO NEED TO CONSOLE.
TOTALSCORE: 0

## 2021-09-27 ASSESSMENT — SOCIAL DETERMINANTS OF HEALTH (SDOH): ACTIVE STRESSOR - NO STRESS FACTORS: 1

## 2021-09-28 ENCOUNTER — HOME CARE VISIT (OUTPATIENT)
Dept: HOSPICE | Facility: HOSPICE | Age: 86
End: 2021-09-28
Payer: MEDICARE

## 2021-09-28 PROCEDURE — S9126 HOSPICE CARE, IN THE HOME, P: HCPCS

## 2021-09-29 PROCEDURE — S9126 HOSPICE CARE, IN THE HOME, P: HCPCS

## 2021-09-30 ENCOUNTER — HOME CARE VISIT (OUTPATIENT)
Dept: HOSPICE | Facility: HOSPICE | Age: 86
End: 2021-09-30
Payer: MEDICARE

## 2021-09-30 VITALS — SYSTOLIC BLOOD PRESSURE: 114 MMHG | HEART RATE: 76 BPM | RESPIRATION RATE: 16 BRPM | DIASTOLIC BLOOD PRESSURE: 60 MMHG

## 2021-09-30 PROCEDURE — G0299 HHS/HOSPICE OF RN EA 15 MIN: HCPCS

## 2021-09-30 PROCEDURE — 6650990 HC HOSPICE AND HOME CARE RX REV CODE 0250

## 2021-09-30 PROCEDURE — S9126 HOSPICE CARE, IN THE HOME, P: HCPCS

## 2021-09-30 SDOH — ECONOMIC STABILITY: GENERAL

## 2021-09-30 ASSESSMENT — ENCOUNTER SYMPTOMS
LAST BOWEL MOVEMENT: 66015
CONSTIPATION: 1
STOOL FREQUENCY: TWICE DAILY
SLEEP QUALITY: FAIR

## 2021-09-30 ASSESSMENT — SOCIAL DETERMINANTS OF HEALTH (SDOH): ACTIVE STRESSOR - NO STRESS FACTORS: 1

## 2021-09-30 ASSESSMENT — PAIN SCALES - PAIN ASSESSMENT IN ADVANCED DEMENTIA (PAINAD)
BODYLANGUAGE: 0 - RELAXED.
FACIALEXPRESSION: 0 - SMILING OR INEXPRESSIVE.
TOTALSCORE: 0
CONSOLABILITY: 0 - NO NEED TO CONSOLE.
NEGVOCALIZATION: 0 - NONE.

## 2021-09-30 ASSESSMENT — ACTIVITIES OF DAILY LIVING (ADL): MONEY MANAGEMENT (EXPENSES/BILLS): TOTALLY DEPENDENT

## 2021-10-01 PROCEDURE — S9126 HOSPICE CARE, IN THE HOME, P: HCPCS

## 2021-10-02 PROCEDURE — S9126 HOSPICE CARE, IN THE HOME, P: HCPCS

## 2021-10-03 PROCEDURE — S9126 HOSPICE CARE, IN THE HOME, P: HCPCS

## 2021-10-04 ENCOUNTER — HOME CARE VISIT (OUTPATIENT)
Dept: HOSPICE | Facility: HOSPICE | Age: 86
End: 2021-10-04
Payer: MEDICARE

## 2021-10-04 VITALS — HEART RATE: 76 BPM | SYSTOLIC BLOOD PRESSURE: 118 MMHG | DIASTOLIC BLOOD PRESSURE: 68 MMHG

## 2021-10-04 PROCEDURE — G0299 HHS/HOSPICE OF RN EA 15 MIN: HCPCS

## 2021-10-04 PROCEDURE — S9126 HOSPICE CARE, IN THE HOME, P: HCPCS

## 2021-10-04 SDOH — ECONOMIC STABILITY: GENERAL

## 2021-10-04 ASSESSMENT — ENCOUNTER SYMPTOMS
LOWER EXTREMITY EDEMA: 1
PAIN: 1
SOMNOLENCE: 1
DESCRIPTION OF MEMORY LOSS: SHORT TERM
SUBJECTIVE PAIN PROGRESSION: WAXING AND WANING
FORGETFULNESS: 1
HIGHEST PAIN SEVERITY IN PAST 24 HOURS: 4/10
FATIGUES EASILY: 1
SLEEP QUALITY: FAIR
DESCRIPTION OF MEMORY LOSS: LONG TERM
FATIGUE: 1
PERSON REPORTING PAIN: PATIENT
DRY SKIN: 1
CONSTIPATION: 1
LAST BOWEL MOVEMENT: 66021
STOOL FREQUENCY: TWICE DAILY

## 2021-10-04 ASSESSMENT — PAIN SCALES - PAIN ASSESSMENT IN ADVANCED DEMENTIA (PAINAD)
TOTALSCORE: 2
CONSOLABILITY: 1 - DISTRACTED OR REASSURED BY VOICE OR TOUCH.
BODYLANGUAGE: 0 - RELAXED.
FACIALEXPRESSION: 1 - SAD. FRIGHTENED. FROWN.
NEGVOCALIZATION: 0 - NONE.

## 2021-10-04 ASSESSMENT — SOCIAL DETERMINANTS OF HEALTH (SDOH): ACTIVE STRESSOR - HEALTH CHANGES: 1

## 2021-10-04 ASSESSMENT — ACTIVITIES OF DAILY LIVING (ADL): MONEY MANAGEMENT (EXPENSES/BILLS): TOTALLY DEPENDENT

## 2021-10-05 ENCOUNTER — HOME CARE VISIT (OUTPATIENT)
Dept: HOSPICE | Facility: HOSPICE | Age: 86
End: 2021-10-05
Payer: MEDICARE

## 2021-10-05 PROCEDURE — S9126 HOSPICE CARE, IN THE HOME, P: HCPCS

## 2021-10-05 PROCEDURE — G0156 HHCP-SVS OF AIDE,EA 15 MIN: HCPCS

## 2021-10-06 ENCOUNTER — HOME CARE VISIT (OUTPATIENT)
Dept: HOSPICE | Facility: HOSPICE | Age: 86
End: 2021-10-06
Payer: MEDICARE

## 2021-10-06 PROCEDURE — S9126 HOSPICE CARE, IN THE HOME, P: HCPCS

## 2021-10-06 PROCEDURE — G0299 HHS/HOSPICE OF RN EA 15 MIN: HCPCS

## 2021-10-06 SDOH — ECONOMIC STABILITY: GENERAL

## 2021-10-06 ASSESSMENT — ENCOUNTER SYMPTOMS
PERSON REPORTING PAIN: PATIENT
DENIES PAIN: 1
DRY SKIN: 1
SLEEP QUALITY: FAIR

## 2021-10-06 ASSESSMENT — ACTIVITIES OF DAILY LIVING (ADL): MONEY MANAGEMENT (EXPENSES/BILLS): TOTALLY DEPENDENT

## 2021-10-06 ASSESSMENT — SOCIAL DETERMINANTS OF HEALTH (SDOH): ACTIVE STRESSOR - HEALTH CHANGES: 1

## 2021-10-07 ENCOUNTER — HOME CARE VISIT (OUTPATIENT)
Dept: HOSPICE | Facility: HOSPICE | Age: 86
End: 2021-10-07
Payer: MEDICARE

## 2021-10-07 PROCEDURE — G0156 HHCP-SVS OF AIDE,EA 15 MIN: HCPCS

## 2021-10-07 PROCEDURE — G0299 HHS/HOSPICE OF RN EA 15 MIN: HCPCS

## 2021-10-07 PROCEDURE — S9126 HOSPICE CARE, IN THE HOME, P: HCPCS

## 2021-10-07 ASSESSMENT — PAIN SCALES - PAIN ASSESSMENT IN ADVANCED DEMENTIA (PAINAD)
BODYLANGUAGE: 0 - RELAXED.
CONSOLABILITY: 0 - NO NEED TO CONSOLE.
TOTALSCORE: 1
NEGVOCALIZATION: 1 - OCCASIONAL MOAN OR GROAN. LOW-LEVEL SPEECH WITH A NEGATIVE OR DISAPPROVING QUALITY.
FACIALEXPRESSION: 0 - SMILING OR INEXPRESSIVE.

## 2021-10-07 ASSESSMENT — ENCOUNTER SYMPTOMS
CONSTIPATION: 1
LAST BOWEL MOVEMENT: 66024
SLEEP QUALITY: FAIR
PAIN LOCATION: NECK
PAIN: 1
STOOL FREQUENCY: TWICE DAILY
DRY SKIN: 1
SUBJECTIVE PAIN PROGRESSION: RESOLVED
PERSON REPORTING PAIN: PATIENT

## 2021-10-07 ASSESSMENT — SOCIAL DETERMINANTS OF HEALTH (SDOH): ACTIVE STRESSOR - HEALTH CHANGES: 1

## 2021-10-08 PROCEDURE — S9126 HOSPICE CARE, IN THE HOME, P: HCPCS

## 2021-10-09 PROCEDURE — S9126 HOSPICE CARE, IN THE HOME, P: HCPCS

## 2021-10-10 PROCEDURE — S9126 HOSPICE CARE, IN THE HOME, P: HCPCS

## 2021-10-11 ENCOUNTER — HOME CARE VISIT (OUTPATIENT)
Dept: HOSPICE | Facility: HOSPICE | Age: 86
End: 2021-10-11
Payer: MEDICARE

## 2021-10-11 VITALS
DIASTOLIC BLOOD PRESSURE: 61 MMHG | OXYGEN SATURATION: 84 % | RESPIRATION RATE: 16 BRPM | SYSTOLIC BLOOD PRESSURE: 110 MMHG | HEART RATE: 46 BPM

## 2021-10-11 DIAGNOSIS — R52 PAIN ASSOCIATED WITH TERMINAL ILLNESS: ICD-10-CM

## 2021-10-11 DIAGNOSIS — I67.2 CEREBRAL ATHEROSCLEROSIS: ICD-10-CM

## 2021-10-11 PROCEDURE — G0299 HHS/HOSPICE OF RN EA 15 MIN: HCPCS

## 2021-10-11 PROCEDURE — S9126 HOSPICE CARE, IN THE HOME, P: HCPCS

## 2021-10-11 RX ORDER — MORPHINE SULFATE 15 MG/1
15 TABLET, FILM COATED, EXTENDED RELEASE ORAL EVERY 8 HOURS
Qty: 180 TABLET | Refills: 0 | Status: SHIPPED | OUTPATIENT
Start: 2021-10-11 | End: 2021-11-01

## 2021-10-11 ASSESSMENT — ENCOUNTER SYMPTOMS
FATIGUES EASILY: 1
SLEEP QUALITY: ADEQUATE
DESCRIPTION OF MEMORY LOSS: SHORT TERM
STOOL FREQUENCY: LESS THAN DAILY
DRY SKIN: 1
HYPOTENSION: 1
SOMNOLENCE: 1
FATIGUE: 1
DESCRIPTION OF MEMORY LOSS: LONG TERM
FORGETFULNESS: 1
LAST BOWEL MOVEMENT: 66001
BOWEL PATTERN NORMAL: 1

## 2021-10-11 ASSESSMENT — SOCIAL DETERMINANTS OF HEALTH (SDOH): ACTIVE STRESSOR - NO STRESS FACTORS: 1

## 2021-10-12 ENCOUNTER — HOME CARE VISIT (OUTPATIENT)
Dept: HOSPICE | Facility: HOSPICE | Age: 86
End: 2021-10-12
Payer: MEDICARE

## 2021-10-12 PROCEDURE — G0156 HHCP-SVS OF AIDE,EA 15 MIN: HCPCS

## 2021-10-12 PROCEDURE — S9126 HOSPICE CARE, IN THE HOME, P: HCPCS

## 2021-10-13 PROCEDURE — S9126 HOSPICE CARE, IN THE HOME, P: HCPCS

## 2021-10-14 ENCOUNTER — HOME CARE VISIT (OUTPATIENT)
Dept: HOSPICE | Facility: HOSPICE | Age: 86
End: 2021-10-14
Payer: MEDICARE

## 2021-10-14 VITALS — SYSTOLIC BLOOD PRESSURE: 128 MMHG | HEART RATE: 68 BPM | DIASTOLIC BLOOD PRESSURE: 70 MMHG | RESPIRATION RATE: 12 BRPM

## 2021-10-14 PROCEDURE — G0299 HHS/HOSPICE OF RN EA 15 MIN: HCPCS

## 2021-10-14 PROCEDURE — S9126 HOSPICE CARE, IN THE HOME, P: HCPCS

## 2021-10-14 PROCEDURE — G0156 HHCP-SVS OF AIDE,EA 15 MIN: HCPCS

## 2021-10-14 SDOH — ECONOMIC STABILITY: GENERAL

## 2021-10-14 ASSESSMENT — ENCOUNTER SYMPTOMS
SUBJECTIVE PAIN PROGRESSION: WAXING AND WANING
FATIGUES EASILY: 1
SOMNOLENCE: 1
HIGHEST PAIN SEVERITY IN PAST 24 HOURS: 2/10
LAST BOWEL MOVEMENT: 66030
PAIN LOCATION: RIGHT FOOT
PAIN: 1
HYPOTENSION: 1
DRY SKIN: 1
FATIGUE: 1
SLEEP QUALITY: ADEQUATE
LOWEST PAIN SEVERITY IN PAST 24 HOURS: 0/10
STOOL FREQUENCY: LESS THAN DAILY
PERSON REPORTING PAIN: PATIENT
BOWEL PATTERN NORMAL: 1
FORGETFULNESS: 1
DESCRIPTION OF MEMORY LOSS: SHORT TERM
DESCRIPTION OF MEMORY LOSS: LONG TERM

## 2021-10-14 ASSESSMENT — ACTIVITIES OF DAILY LIVING (ADL): MONEY MANAGEMENT (EXPENSES/BILLS): TOTALLY DEPENDENT

## 2021-10-14 ASSESSMENT — SOCIAL DETERMINANTS OF HEALTH (SDOH): ACTIVE STRESSOR - NO STRESS FACTORS: 1

## 2021-10-15 PROCEDURE — S9126 HOSPICE CARE, IN THE HOME, P: HCPCS

## 2021-10-16 PROCEDURE — S9126 HOSPICE CARE, IN THE HOME, P: HCPCS

## 2021-10-17 PROCEDURE — S9126 HOSPICE CARE, IN THE HOME, P: HCPCS

## 2021-10-18 PROCEDURE — S9126 HOSPICE CARE, IN THE HOME, P: HCPCS

## 2021-10-19 ENCOUNTER — HOME CARE VISIT (OUTPATIENT)
Dept: HOSPICE | Facility: HOSPICE | Age: 86
End: 2021-10-19
Payer: MEDICARE

## 2021-10-19 VITALS — RESPIRATION RATE: 20 BRPM | HEART RATE: 60 BPM | DIASTOLIC BLOOD PRESSURE: 70 MMHG | SYSTOLIC BLOOD PRESSURE: 130 MMHG

## 2021-10-19 PROCEDURE — G0156 HHCP-SVS OF AIDE,EA 15 MIN: HCPCS

## 2021-10-19 PROCEDURE — G0299 HHS/HOSPICE OF RN EA 15 MIN: HCPCS

## 2021-10-19 PROCEDURE — S9126 HOSPICE CARE, IN THE HOME, P: HCPCS

## 2021-10-19 SDOH — ECONOMIC STABILITY: GENERAL

## 2021-10-19 ASSESSMENT — ENCOUNTER SYMPTOMS
FATIGUE: 1
DRY SKIN: 1
SLEEP QUALITY: ADEQUATE
FORGETFULNESS: 1
DESCRIPTION OF MEMORY LOSS: LONG TERM
SOMNOLENCE: 1
PERSON REPORTING PAIN: PATIENT
STOOL FREQUENCY: LESS THAN DAILY
DENIES PAIN: 1
LAST BOWEL MOVEMENT: 66035
CONSTIPATION: 1
HYPOTENSION: 1
DESCRIPTION OF MEMORY LOSS: SHORT TERM
FATIGUES EASILY: 1

## 2021-10-19 ASSESSMENT — SOCIAL DETERMINANTS OF HEALTH (SDOH): ACTIVE STRESSOR - NO STRESS FACTORS: 1

## 2021-10-19 ASSESSMENT — ACTIVITIES OF DAILY LIVING (ADL): MONEY MANAGEMENT (EXPENSES/BILLS): TOTALLY DEPENDENT

## 2021-10-20 PROCEDURE — S9126 HOSPICE CARE, IN THE HOME, P: HCPCS

## 2021-10-21 ENCOUNTER — HOME CARE VISIT (OUTPATIENT)
Dept: HOSPICE | Facility: HOSPICE | Age: 86
End: 2021-10-21
Payer: MEDICARE

## 2021-10-21 PROCEDURE — S9126 HOSPICE CARE, IN THE HOME, P: HCPCS

## 2021-10-21 PROCEDURE — G0299 HHS/HOSPICE OF RN EA 15 MIN: HCPCS

## 2021-10-21 PROCEDURE — G0156 HHCP-SVS OF AIDE,EA 15 MIN: HCPCS

## 2021-10-22 ENCOUNTER — HOME CARE VISIT (OUTPATIENT)
Dept: HOSPICE | Facility: HOSPICE | Age: 86
End: 2021-10-22
Payer: MEDICARE

## 2021-10-22 VITALS — RESPIRATION RATE: 20 BRPM | SYSTOLIC BLOOD PRESSURE: 120 MMHG | DIASTOLIC BLOOD PRESSURE: 66 MMHG | HEART RATE: 72 BPM

## 2021-10-22 PROCEDURE — G0299 HHS/HOSPICE OF RN EA 15 MIN: HCPCS

## 2021-10-22 PROCEDURE — S9126 HOSPICE CARE, IN THE HOME, P: HCPCS

## 2021-10-22 SDOH — ECONOMIC STABILITY: GENERAL

## 2021-10-22 ASSESSMENT — ENCOUNTER SYMPTOMS
SOMNOLENCE: 1
DENIES PAIN: 1
DRY SKIN: 1
CONSTIPATION: 1
FORGETFULNESS: 1
HYPOTENSION: 1
LAST BOWEL MOVEMENT: 66035
FATIGUE: 1
DESCRIPTION OF MEMORY LOSS: SHORT TERM
STOOL FREQUENCY: LESS THAN DAILY
DESCRIPTION OF MEMORY LOSS: LONG TERM
SLEEP QUALITY: ADEQUATE
PERSON REPORTING PAIN: PATIENT
FATIGUES EASILY: 1

## 2021-10-22 ASSESSMENT — ACTIVITIES OF DAILY LIVING (ADL): MONEY MANAGEMENT (EXPENSES/BILLS): TOTALLY DEPENDENT

## 2021-10-22 ASSESSMENT — PAIN SCALES - PAIN ASSESSMENT IN ADVANCED DEMENTIA (PAINAD)
CONSOLABILITY: 0 - NO NEED TO CONSOLE.
NEGVOCALIZATION: 0 - NONE.
BODYLANGUAGE: 0 - RELAXED.
FACIALEXPRESSION: 0 - SMILING OR INEXPRESSIVE.
TOTALSCORE: 0

## 2021-10-22 ASSESSMENT — SOCIAL DETERMINANTS OF HEALTH (SDOH): ACTIVE STRESSOR - NO STRESS FACTORS: 1

## 2021-10-23 PROCEDURE — S9126 HOSPICE CARE, IN THE HOME, P: HCPCS

## 2021-10-24 PROCEDURE — S9126 HOSPICE CARE, IN THE HOME, P: HCPCS

## 2021-10-25 ENCOUNTER — HOME CARE VISIT (OUTPATIENT)
Dept: HOSPICE | Facility: HOSPICE | Age: 86
End: 2021-10-25
Payer: MEDICARE

## 2021-10-25 PROCEDURE — S9126 HOSPICE CARE, IN THE HOME, P: HCPCS

## 2021-10-26 ENCOUNTER — HOME CARE VISIT (OUTPATIENT)
Dept: HOSPICE | Facility: HOSPICE | Age: 86
End: 2021-10-26
Payer: MEDICARE

## 2021-10-26 VITALS — RESPIRATION RATE: 24 BRPM | SYSTOLIC BLOOD PRESSURE: 106 MMHG | HEART RATE: 72 BPM | DIASTOLIC BLOOD PRESSURE: 54 MMHG

## 2021-10-26 PROCEDURE — G0299 HHS/HOSPICE OF RN EA 15 MIN: HCPCS

## 2021-10-26 PROCEDURE — G0156 HHCP-SVS OF AIDE,EA 15 MIN: HCPCS

## 2021-10-26 PROCEDURE — S9126 HOSPICE CARE, IN THE HOME, P: HCPCS

## 2021-10-26 SDOH — ECONOMIC STABILITY: GENERAL

## 2021-10-26 ASSESSMENT — ENCOUNTER SYMPTOMS
PAIN LOCATION - RELIEVING FACTORS: MEDS
SOMNOLENCE: 1
FORGETFULNESS: 1
HYPOTENSION: 1
CONSTIPATION: 1
PAIN LOCATION - PAIN FREQUENCY: INTERMITTENT
HIGHEST PAIN SEVERITY IN PAST 24 HOURS: 8/10
LAST BOWEL MOVEMENT: 66042
FATIGUES EASILY: 1
PAIN LOCATION - PAIN SEVERITY: 8/10
DESCRIPTION OF MEMORY LOSS: SHORT TERM
PAIN: 1
SLEEP QUALITY: ADEQUATE
DRY SKIN: 1
FATIGUE: 1
PERSON REPORTING PAIN: PATIENT
STOOL FREQUENCY: LESS THAN DAILY
PAIN LOCATION: RIGHT KNEE
DESCRIPTION OF MEMORY LOSS: LONG TERM
PAIN LOCATION - EXACERBATING FACTORS: MOVEMENT

## 2021-10-26 ASSESSMENT — SOCIAL DETERMINANTS OF HEALTH (SDOH): ACTIVE STRESSOR - HEALTH CHANGES: 1

## 2021-10-26 ASSESSMENT — ACTIVITIES OF DAILY LIVING (ADL): MONEY MANAGEMENT (EXPENSES/BILLS): TOTALLY DEPENDENT

## 2021-10-27 PROCEDURE — S9126 HOSPICE CARE, IN THE HOME, P: HCPCS

## 2021-10-28 ENCOUNTER — HOME CARE VISIT (OUTPATIENT)
Dept: HOSPICE | Facility: HOSPICE | Age: 86
End: 2021-10-28
Payer: MEDICARE

## 2021-10-28 PROCEDURE — S9126 HOSPICE CARE, IN THE HOME, P: HCPCS

## 2021-10-29 ENCOUNTER — HOME CARE VISIT (OUTPATIENT)
Dept: HOSPICE | Facility: HOSPICE | Age: 86
End: 2021-10-29
Payer: MEDICARE

## 2021-10-29 VITALS — DIASTOLIC BLOOD PRESSURE: 60 MMHG | SYSTOLIC BLOOD PRESSURE: 128 MMHG | HEART RATE: 72 BPM | RESPIRATION RATE: 16 BRPM

## 2021-10-29 PROCEDURE — S9126 HOSPICE CARE, IN THE HOME, P: HCPCS

## 2021-10-29 PROCEDURE — G0299 HHS/HOSPICE OF RN EA 15 MIN: HCPCS

## 2021-10-29 SDOH — ECONOMIC STABILITY: GENERAL

## 2021-10-29 ASSESSMENT — ENCOUNTER SYMPTOMS
CONSTIPATION: 1
DESCRIPTION OF MEMORY LOSS: LONG TERM
FATIGUE: 1
DENIES PAIN: 1
SLEEP QUALITY: ADEQUATE
HYPOTENSION: 1
SOMNOLENCE: 1
PERSON REPORTING PAIN: PATIENT
STOOL FREQUENCY: LESS THAN DAILY
DRY SKIN: 1
LAST BOWEL MOVEMENT: 66046
FORGETFULNESS: 1
DESCRIPTION OF MEMORY LOSS: SHORT TERM
FATIGUES EASILY: 1

## 2021-10-29 ASSESSMENT — SOCIAL DETERMINANTS OF HEALTH (SDOH): ACTIVE STRESSOR - HEALTH CHANGES: 1

## 2021-10-29 ASSESSMENT — ACTIVITIES OF DAILY LIVING (ADL): MONEY MANAGEMENT (EXPENSES/BILLS): TOTALLY DEPENDENT

## 2021-10-30 PROCEDURE — S9126 HOSPICE CARE, IN THE HOME, P: HCPCS

## 2021-10-31 PROCEDURE — S9126 HOSPICE CARE, IN THE HOME, P: HCPCS

## 2021-11-01 ENCOUNTER — HOME CARE VISIT (OUTPATIENT)
Dept: HOSPICE | Facility: HOSPICE | Age: 86
End: 2021-11-01
Payer: MEDICARE

## 2021-11-01 VITALS — SYSTOLIC BLOOD PRESSURE: 110 MMHG | RESPIRATION RATE: 16 BRPM | HEART RATE: 64 BPM | DIASTOLIC BLOOD PRESSURE: 60 MMHG

## 2021-11-01 PROCEDURE — G0299 HHS/HOSPICE OF RN EA 15 MIN: HCPCS

## 2021-11-01 PROCEDURE — G0156 HHCP-SVS OF AIDE,EA 15 MIN: HCPCS

## 2021-11-01 PROCEDURE — S9126 HOSPICE CARE, IN THE HOME, P: HCPCS

## 2021-11-01 SDOH — ECONOMIC STABILITY: GENERAL

## 2021-11-01 ASSESSMENT — ENCOUNTER SYMPTOMS
HYPOTENSION: 1
FORGETFULNESS: 1
PERSON REPORTING PAIN: PATIENT
FATIGUE: 1
DRY SKIN: 1
CONSTIPATION: 1
STOOL FREQUENCY: LESS THAN DAILY
DESCRIPTION OF MEMORY LOSS: LONG TERM
SLEEP QUALITY: ADEQUATE
FATIGUES EASILY: 1
DENIES PAIN: 1
SOMNOLENCE: 1
DESCRIPTION OF MEMORY LOSS: SHORT TERM
LAST BOWEL MOVEMENT: 66049

## 2021-11-01 ASSESSMENT — SOCIAL DETERMINANTS OF HEALTH (SDOH): ACTIVE STRESSOR - HEALTH CHANGES: 1

## 2021-11-01 ASSESSMENT — ACTIVITIES OF DAILY LIVING (ADL): MONEY MANAGEMENT (EXPENSES/BILLS): TOTALLY DEPENDENT

## 2021-11-02 PROCEDURE — S9126 HOSPICE CARE, IN THE HOME, P: HCPCS

## 2021-11-03 ENCOUNTER — HOME CARE VISIT (OUTPATIENT)
Dept: HOSPICE | Facility: HOSPICE | Age: 86
End: 2021-11-03
Payer: MEDICARE

## 2021-11-03 VITALS — DIASTOLIC BLOOD PRESSURE: 62 MMHG | SYSTOLIC BLOOD PRESSURE: 140 MMHG | HEART RATE: 76 BPM | RESPIRATION RATE: 20 BRPM

## 2021-11-03 PROCEDURE — G0299 HHS/HOSPICE OF RN EA 15 MIN: HCPCS

## 2021-11-03 PROCEDURE — S9126 HOSPICE CARE, IN THE HOME, P: HCPCS

## 2021-11-03 SDOH — ECONOMIC STABILITY: GENERAL

## 2021-11-03 ASSESSMENT — ENCOUNTER SYMPTOMS
SOMNOLENCE: 1
FATIGUE: 1
DESCRIPTION OF MEMORY LOSS: SHORT TERM
HYPOTENSION: 1
LAST BOWEL MOVEMENT: 66051
DESCRIPTION OF MEMORY LOSS: LONG TERM
DENIES PAIN: 1
DRY SKIN: 1
FATIGUES EASILY: 1
SLEEP QUALITY: ADEQUATE
PERSON REPORTING PAIN: PATIENT
STOOL FREQUENCY: LESS THAN DAILY
CONSTIPATION: 1
FORGETFULNESS: 1

## 2021-11-03 ASSESSMENT — ACTIVITIES OF DAILY LIVING (ADL): MONEY MANAGEMENT (EXPENSES/BILLS): TOTALLY DEPENDENT

## 2021-11-03 ASSESSMENT — SOCIAL DETERMINANTS OF HEALTH (SDOH): ACTIVE STRESSOR - HEALTH CHANGES: 1

## 2022-01-01 ENCOUNTER — HOME CARE VISIT (OUTPATIENT)
Dept: HOSPICE | Facility: HOSPICE | Age: 87
End: 2022-01-01
Payer: MEDICARE

## 2022-01-01 ENCOUNTER — HOSPITAL ENCOUNTER (OUTPATIENT)
Facility: MEDICAL CENTER | Age: 87
End: 2022-03-10
Attending: NURSE PRACTITIONER
Payer: COMMERCIAL

## 2022-01-01 ENCOUNTER — PATIENT MESSAGE (OUTPATIENT)
Dept: HEALTH INFORMATION MANAGEMENT | Facility: OTHER | Age: 87
End: 2022-01-01
Payer: MEDICARE

## 2022-01-01 VITALS — DIASTOLIC BLOOD PRESSURE: 56 MMHG | SYSTOLIC BLOOD PRESSURE: 112 MMHG | HEART RATE: 80 BPM | RESPIRATION RATE: 16 BRPM

## 2022-01-01 VITALS
HEART RATE: 80 BPM | RESPIRATION RATE: 24 BRPM | SYSTOLIC BLOOD PRESSURE: 150 MMHG | RESPIRATION RATE: 20 BRPM | DIASTOLIC BLOOD PRESSURE: 78 MMHG | DIASTOLIC BLOOD PRESSURE: 60 MMHG | SYSTOLIC BLOOD PRESSURE: 120 MMHG | HEART RATE: 72 BPM

## 2022-01-01 VITALS — RESPIRATION RATE: 28 BRPM | SYSTOLIC BLOOD PRESSURE: 110 MMHG | HEART RATE: 76 BPM | DIASTOLIC BLOOD PRESSURE: 58 MMHG

## 2022-01-01 VITALS
SYSTOLIC BLOOD PRESSURE: 128 MMHG | DIASTOLIC BLOOD PRESSURE: 70 MMHG | RESPIRATION RATE: 16 BRPM | HEART RATE: 68 BPM | SYSTOLIC BLOOD PRESSURE: 130 MMHG | RESPIRATION RATE: 12 BRPM | DIASTOLIC BLOOD PRESSURE: 50 MMHG | HEART RATE: 76 BPM

## 2022-01-01 VITALS — SYSTOLIC BLOOD PRESSURE: 126 MMHG | DIASTOLIC BLOOD PRESSURE: 62 MMHG | HEART RATE: 64 BPM | RESPIRATION RATE: 12 BRPM

## 2022-01-01 VITALS — DIASTOLIC BLOOD PRESSURE: 68 MMHG | SYSTOLIC BLOOD PRESSURE: 124 MMHG | HEART RATE: 84 BPM | RESPIRATION RATE: 24 BRPM

## 2022-01-01 VITALS — DIASTOLIC BLOOD PRESSURE: 70 MMHG | RESPIRATION RATE: 20 BRPM | SYSTOLIC BLOOD PRESSURE: 134 MMHG | HEART RATE: 80 BPM

## 2022-01-01 VITALS — DIASTOLIC BLOOD PRESSURE: 60 MMHG | HEART RATE: 72 BPM | SYSTOLIC BLOOD PRESSURE: 130 MMHG | RESPIRATION RATE: 20 BRPM

## 2022-01-01 VITALS — SYSTOLIC BLOOD PRESSURE: 132 MMHG | RESPIRATION RATE: 16 BRPM | DIASTOLIC BLOOD PRESSURE: 60 MMHG | HEART RATE: 76 BPM

## 2022-01-01 VITALS — HEART RATE: 64 BPM | SYSTOLIC BLOOD PRESSURE: 120 MMHG | DIASTOLIC BLOOD PRESSURE: 52 MMHG | RESPIRATION RATE: 20 BRPM

## 2022-01-01 VITALS
DIASTOLIC BLOOD PRESSURE: 60 MMHG | HEART RATE: 84 BPM | SYSTOLIC BLOOD PRESSURE: 116 MMHG | RESPIRATION RATE: 24 BRPM | RESPIRATION RATE: 16 BRPM | SYSTOLIC BLOOD PRESSURE: 98 MMHG | DIASTOLIC BLOOD PRESSURE: 62 MMHG | HEART RATE: 76 BPM

## 2022-01-01 VITALS — DIASTOLIC BLOOD PRESSURE: 70 MMHG | RESPIRATION RATE: 24 BRPM | SYSTOLIC BLOOD PRESSURE: 120 MMHG | HEART RATE: 80 BPM

## 2022-01-01 VITALS — RESPIRATION RATE: 20 BRPM | SYSTOLIC BLOOD PRESSURE: 126 MMHG | HEART RATE: 72 BPM | DIASTOLIC BLOOD PRESSURE: 60 MMHG

## 2022-01-01 VITALS
SYSTOLIC BLOOD PRESSURE: 130 MMHG | SYSTOLIC BLOOD PRESSURE: 122 MMHG | DIASTOLIC BLOOD PRESSURE: 56 MMHG | RESPIRATION RATE: 20 BRPM | HEART RATE: 68 BPM | RESPIRATION RATE: 20 BRPM | HEART RATE: 60 BPM | DIASTOLIC BLOOD PRESSURE: 60 MMHG

## 2022-01-01 VITALS — SYSTOLIC BLOOD PRESSURE: 112 MMHG | HEART RATE: 64 BPM | RESPIRATION RATE: 10 BRPM | DIASTOLIC BLOOD PRESSURE: 48 MMHG

## 2022-01-01 VITALS — DIASTOLIC BLOOD PRESSURE: 50 MMHG | HEART RATE: 64 BPM | SYSTOLIC BLOOD PRESSURE: 110 MMHG | RESPIRATION RATE: 20 BRPM

## 2022-01-01 VITALS
RESPIRATION RATE: 22 BRPM | HEART RATE: 72 BPM | SYSTOLIC BLOOD PRESSURE: 122 MMHG | SYSTOLIC BLOOD PRESSURE: 130 MMHG | DIASTOLIC BLOOD PRESSURE: 60 MMHG | RESPIRATION RATE: 20 BRPM | DIASTOLIC BLOOD PRESSURE: 56 MMHG | HEART RATE: 80 BPM

## 2022-01-01 VITALS — DIASTOLIC BLOOD PRESSURE: 62 MMHG | RESPIRATION RATE: 24 BRPM | SYSTOLIC BLOOD PRESSURE: 120 MMHG | HEART RATE: 60 BPM

## 2022-01-01 VITALS — DIASTOLIC BLOOD PRESSURE: 60 MMHG | SYSTOLIC BLOOD PRESSURE: 130 MMHG | RESPIRATION RATE: 24 BRPM

## 2022-01-01 VITALS — DIASTOLIC BLOOD PRESSURE: 66 MMHG | HEART RATE: 68 BPM | SYSTOLIC BLOOD PRESSURE: 130 MMHG | RESPIRATION RATE: 16 BRPM

## 2022-01-01 VITALS — RESPIRATION RATE: 24 BRPM | HEART RATE: 72 BPM

## 2022-01-01 VITALS — HEART RATE: 80 BPM | SYSTOLIC BLOOD PRESSURE: 118 MMHG | RESPIRATION RATE: 20 BRPM | DIASTOLIC BLOOD PRESSURE: 64 MMHG

## 2022-01-01 VITALS — SYSTOLIC BLOOD PRESSURE: 120 MMHG | DIASTOLIC BLOOD PRESSURE: 52 MMHG | HEART RATE: 72 BPM | RESPIRATION RATE: 20 BRPM

## 2022-01-01 VITALS — DIASTOLIC BLOOD PRESSURE: 62 MMHG | RESPIRATION RATE: 20 BRPM | HEART RATE: 68 BPM | SYSTOLIC BLOOD PRESSURE: 126 MMHG

## 2022-01-01 VITALS — RESPIRATION RATE: 20 BRPM | HEART RATE: 72 BPM | DIASTOLIC BLOOD PRESSURE: 60 MMHG | SYSTOLIC BLOOD PRESSURE: 130 MMHG

## 2022-01-01 VITALS
SYSTOLIC BLOOD PRESSURE: 114 MMHG | DIASTOLIC BLOOD PRESSURE: 78 MMHG | SYSTOLIC BLOOD PRESSURE: 120 MMHG | RESPIRATION RATE: 20 BRPM | HEART RATE: 76 BPM | DIASTOLIC BLOOD PRESSURE: 50 MMHG | RESPIRATION RATE: 20 BRPM | HEART RATE: 64 BPM

## 2022-01-01 VITALS
DIASTOLIC BLOOD PRESSURE: 66 MMHG | RESPIRATION RATE: 16 BRPM | SYSTOLIC BLOOD PRESSURE: 120 MMHG | OXYGEN SATURATION: 98 % | HEART RATE: 72 BPM

## 2022-01-01 VITALS
HEART RATE: 76 BPM | HEART RATE: 68 BPM | DIASTOLIC BLOOD PRESSURE: 62 MMHG | RESPIRATION RATE: 16 BRPM | SYSTOLIC BLOOD PRESSURE: 128 MMHG | RESPIRATION RATE: 28 BRPM | DIASTOLIC BLOOD PRESSURE: 78 MMHG | SYSTOLIC BLOOD PRESSURE: 140 MMHG

## 2022-01-01 VITALS
DIASTOLIC BLOOD PRESSURE: 60 MMHG | RESPIRATION RATE: 12 BRPM | HEART RATE: 68 BPM | RESPIRATION RATE: 24 BRPM | DIASTOLIC BLOOD PRESSURE: 78 MMHG | SYSTOLIC BLOOD PRESSURE: 130 MMHG | HEART RATE: 72 BPM | RESPIRATION RATE: 16 BRPM | SYSTOLIC BLOOD PRESSURE: 112 MMHG | HEART RATE: 72 BPM

## 2022-01-01 VITALS — DIASTOLIC BLOOD PRESSURE: 86 MMHG | HEART RATE: 90 BPM | SYSTOLIC BLOOD PRESSURE: 140 MMHG | RESPIRATION RATE: 18 BRPM

## 2022-01-01 VITALS — RESPIRATION RATE: 24 BRPM | SYSTOLIC BLOOD PRESSURE: 108 MMHG | DIASTOLIC BLOOD PRESSURE: 58 MMHG | HEART RATE: 68 BPM

## 2022-01-01 VITALS — HEART RATE: 60 BPM | SYSTOLIC BLOOD PRESSURE: 146 MMHG | DIASTOLIC BLOOD PRESSURE: 78 MMHG | RESPIRATION RATE: 28 BRPM

## 2022-01-01 VITALS — DIASTOLIC BLOOD PRESSURE: 50 MMHG | RESPIRATION RATE: 20 BRPM | SYSTOLIC BLOOD PRESSURE: 130 MMHG | HEART RATE: 76 BPM

## 2022-01-01 VITALS — RESPIRATION RATE: 16 BRPM | HEART RATE: 68 BPM | DIASTOLIC BLOOD PRESSURE: 58 MMHG | SYSTOLIC BLOOD PRESSURE: 118 MMHG

## 2022-01-01 VITALS
DIASTOLIC BLOOD PRESSURE: 50 MMHG | HEART RATE: 72 BPM | HEART RATE: 68 BPM | DIASTOLIC BLOOD PRESSURE: 70 MMHG | RESPIRATION RATE: 20 BRPM | SYSTOLIC BLOOD PRESSURE: 110 MMHG | RESPIRATION RATE: 24 BRPM | SYSTOLIC BLOOD PRESSURE: 110 MMHG

## 2022-01-01 VITALS — DIASTOLIC BLOOD PRESSURE: 68 MMHG | RESPIRATION RATE: 20 BRPM | SYSTOLIC BLOOD PRESSURE: 140 MMHG | HEART RATE: 84 BPM

## 2022-01-01 VITALS — HEART RATE: 76 BPM | DIASTOLIC BLOOD PRESSURE: 74 MMHG | RESPIRATION RATE: 12 BRPM | SYSTOLIC BLOOD PRESSURE: 130 MMHG

## 2022-01-01 VITALS — DIASTOLIC BLOOD PRESSURE: 60 MMHG | RESPIRATION RATE: 12 BRPM | HEART RATE: 76 BPM | SYSTOLIC BLOOD PRESSURE: 120 MMHG

## 2022-01-01 VITALS — DIASTOLIC BLOOD PRESSURE: 60 MMHG | HEART RATE: 72 BPM | RESPIRATION RATE: 20 BRPM | SYSTOLIC BLOOD PRESSURE: 142 MMHG

## 2022-01-01 VITALS — SYSTOLIC BLOOD PRESSURE: 132 MMHG | HEART RATE: 76 BPM | RESPIRATION RATE: 20 BRPM | DIASTOLIC BLOOD PRESSURE: 60 MMHG

## 2022-01-01 VITALS — RESPIRATION RATE: 20 BRPM | DIASTOLIC BLOOD PRESSURE: 42 MMHG | HEART RATE: 60 BPM | SYSTOLIC BLOOD PRESSURE: 90 MMHG

## 2022-01-01 VITALS — RESPIRATION RATE: 20 BRPM | SYSTOLIC BLOOD PRESSURE: 118 MMHG | HEART RATE: 76 BPM | DIASTOLIC BLOOD PRESSURE: 64 MMHG

## 2022-01-01 VITALS — RESPIRATION RATE: 16 BRPM | DIASTOLIC BLOOD PRESSURE: 74 MMHG | HEART RATE: 84 BPM | SYSTOLIC BLOOD PRESSURE: 132 MMHG

## 2022-01-01 VITALS — RESPIRATION RATE: 24 BRPM | DIASTOLIC BLOOD PRESSURE: 50 MMHG | HEART RATE: 64 BPM | SYSTOLIC BLOOD PRESSURE: 104 MMHG

## 2022-01-01 VITALS — RESPIRATION RATE: 20 BRPM | HEART RATE: 68 BPM | DIASTOLIC BLOOD PRESSURE: 70 MMHG | SYSTOLIC BLOOD PRESSURE: 128 MMHG

## 2022-01-01 VITALS — SYSTOLIC BLOOD PRESSURE: 110 MMHG | RESPIRATION RATE: 24 BRPM | HEART RATE: 72 BPM | DIASTOLIC BLOOD PRESSURE: 70 MMHG

## 2022-01-01 VITALS
RESPIRATION RATE: 20 BRPM | DIASTOLIC BLOOD PRESSURE: 60 MMHG | HEART RATE: 64 BPM | DIASTOLIC BLOOD PRESSURE: 52 MMHG | RESPIRATION RATE: 20 BRPM | SYSTOLIC BLOOD PRESSURE: 128 MMHG | HEART RATE: 68 BPM | SYSTOLIC BLOOD PRESSURE: 110 MMHG

## 2022-01-01 VITALS
HEART RATE: 72 BPM | DIASTOLIC BLOOD PRESSURE: 60 MMHG | HEART RATE: 64 BPM | RESPIRATION RATE: 16 BRPM | SYSTOLIC BLOOD PRESSURE: 120 MMHG | SYSTOLIC BLOOD PRESSURE: 128 MMHG | DIASTOLIC BLOOD PRESSURE: 60 MMHG | RESPIRATION RATE: 12 BRPM

## 2022-01-01 VITALS — RESPIRATION RATE: 16 BRPM | SYSTOLIC BLOOD PRESSURE: 130 MMHG | DIASTOLIC BLOOD PRESSURE: 60 MMHG | HEART RATE: 64 BPM

## 2022-01-01 VITALS — RESPIRATION RATE: 24 BRPM | DIASTOLIC BLOOD PRESSURE: 60 MMHG | HEART RATE: 76 BPM | SYSTOLIC BLOOD PRESSURE: 120 MMHG

## 2022-01-01 VITALS — HEART RATE: 72 BPM | RESPIRATION RATE: 20 BRPM | DIASTOLIC BLOOD PRESSURE: 52 MMHG | SYSTOLIC BLOOD PRESSURE: 122 MMHG

## 2022-01-01 VITALS
RESPIRATION RATE: 16 BRPM | RESPIRATION RATE: 24 BRPM | HEART RATE: 80 BPM | HEART RATE: 76 BPM | DIASTOLIC BLOOD PRESSURE: 60 MMHG | SYSTOLIC BLOOD PRESSURE: 110 MMHG | DIASTOLIC BLOOD PRESSURE: 62 MMHG | SYSTOLIC BLOOD PRESSURE: 130 MMHG

## 2022-01-01 VITALS — HEART RATE: 80 BPM | RESPIRATION RATE: 24 BRPM | DIASTOLIC BLOOD PRESSURE: 60 MMHG | SYSTOLIC BLOOD PRESSURE: 110 MMHG

## 2022-01-01 VITALS — SYSTOLIC BLOOD PRESSURE: 126 MMHG | RESPIRATION RATE: 20 BRPM | HEART RATE: 72 BPM | DIASTOLIC BLOOD PRESSURE: 60 MMHG

## 2022-01-01 VITALS — HEART RATE: 82 BPM | RESPIRATION RATE: 20 BRPM | DIASTOLIC BLOOD PRESSURE: 62 MMHG | SYSTOLIC BLOOD PRESSURE: 128 MMHG

## 2022-01-01 VITALS — SYSTOLIC BLOOD PRESSURE: 140 MMHG | DIASTOLIC BLOOD PRESSURE: 60 MMHG | RESPIRATION RATE: 16 BRPM

## 2022-01-01 VITALS — DIASTOLIC BLOOD PRESSURE: 60 MMHG | SYSTOLIC BLOOD PRESSURE: 140 MMHG | RESPIRATION RATE: 16 BRPM | HEART RATE: 68 BPM

## 2022-01-01 VITALS — DIASTOLIC BLOOD PRESSURE: 76 MMHG | SYSTOLIC BLOOD PRESSURE: 142 MMHG | HEART RATE: 80 BPM

## 2022-01-01 VITALS — RESPIRATION RATE: 36 BRPM | HEART RATE: 104 BPM

## 2022-01-01 VITALS
SYSTOLIC BLOOD PRESSURE: 118 MMHG | SYSTOLIC BLOOD PRESSURE: 140 MMHG | HEART RATE: 80 BPM | RESPIRATION RATE: 24 BRPM | RESPIRATION RATE: 28 BRPM | DIASTOLIC BLOOD PRESSURE: 50 MMHG | HEART RATE: 80 BPM | DIASTOLIC BLOOD PRESSURE: 58 MMHG

## 2022-01-01 VITALS — SYSTOLIC BLOOD PRESSURE: 110 MMHG | RESPIRATION RATE: 24 BRPM | DIASTOLIC BLOOD PRESSURE: 58 MMHG | HEART RATE: 80 BPM

## 2022-01-01 VITALS
DIASTOLIC BLOOD PRESSURE: 60 MMHG | HEART RATE: 76 BPM | RESPIRATION RATE: 20 BRPM | SYSTOLIC BLOOD PRESSURE: 122 MMHG | RESPIRATION RATE: 16 BRPM | DIASTOLIC BLOOD PRESSURE: 60 MMHG | SYSTOLIC BLOOD PRESSURE: 118 MMHG | HEART RATE: 68 BPM

## 2022-01-01 VITALS — HEART RATE: 72 BPM | RESPIRATION RATE: 20 BRPM | DIASTOLIC BLOOD PRESSURE: 60 MMHG | SYSTOLIC BLOOD PRESSURE: 110 MMHG

## 2022-01-01 VITALS — RESPIRATION RATE: 28 BRPM | HEART RATE: 76 BPM | DIASTOLIC BLOOD PRESSURE: 50 MMHG | SYSTOLIC BLOOD PRESSURE: 110 MMHG

## 2022-01-01 VITALS — DIASTOLIC BLOOD PRESSURE: 68 MMHG | RESPIRATION RATE: 16 BRPM | HEART RATE: 72 BPM | SYSTOLIC BLOOD PRESSURE: 140 MMHG

## 2022-01-01 VITALS
HEART RATE: 64 BPM | HEART RATE: 68 BPM | DIASTOLIC BLOOD PRESSURE: 78 MMHG | SYSTOLIC BLOOD PRESSURE: 140 MMHG | RESPIRATION RATE: 20 BRPM | RESPIRATION RATE: 24 BRPM | DIASTOLIC BLOOD PRESSURE: 60 MMHG | SYSTOLIC BLOOD PRESSURE: 102 MMHG

## 2022-01-01 VITALS — HEART RATE: 72 BPM | DIASTOLIC BLOOD PRESSURE: 62 MMHG | SYSTOLIC BLOOD PRESSURE: 120 MMHG | RESPIRATION RATE: 20 BRPM

## 2022-01-01 VITALS — SYSTOLIC BLOOD PRESSURE: 102 MMHG | RESPIRATION RATE: 20 BRPM | DIASTOLIC BLOOD PRESSURE: 50 MMHG | HEART RATE: 78 BPM

## 2022-01-01 VITALS — HEART RATE: 76 BPM | RESPIRATION RATE: 20 BRPM | SYSTOLIC BLOOD PRESSURE: 148 MMHG | DIASTOLIC BLOOD PRESSURE: 78 MMHG

## 2022-01-01 VITALS
HEART RATE: 84 BPM | SYSTOLIC BLOOD PRESSURE: 142 MMHG | RESPIRATION RATE: 20 BRPM | HEART RATE: 68 BPM | DIASTOLIC BLOOD PRESSURE: 70 MMHG | SYSTOLIC BLOOD PRESSURE: 124 MMHG | RESPIRATION RATE: 16 BRPM | DIASTOLIC BLOOD PRESSURE: 62 MMHG

## 2022-01-01 VITALS — DIASTOLIC BLOOD PRESSURE: 50 MMHG | HEART RATE: 72 BPM | RESPIRATION RATE: 20 BRPM | SYSTOLIC BLOOD PRESSURE: 110 MMHG

## 2022-01-01 VITALS — HEART RATE: 72 BPM | SYSTOLIC BLOOD PRESSURE: 120 MMHG | RESPIRATION RATE: 16 BRPM | DIASTOLIC BLOOD PRESSURE: 50 MMHG

## 2022-01-01 VITALS — HEART RATE: 60 BPM | DIASTOLIC BLOOD PRESSURE: 60 MMHG | RESPIRATION RATE: 20 BRPM | SYSTOLIC BLOOD PRESSURE: 110 MMHG

## 2022-01-01 VITALS
SYSTOLIC BLOOD PRESSURE: 140 MMHG | RESPIRATION RATE: 20 BRPM | HEART RATE: 72 BPM | DIASTOLIC BLOOD PRESSURE: 60 MMHG | HEART RATE: 76 BPM | RESPIRATION RATE: 16 BRPM

## 2022-01-01 VITALS — HEART RATE: 68 BPM | DIASTOLIC BLOOD PRESSURE: 50 MMHG | RESPIRATION RATE: 20 BRPM | SYSTOLIC BLOOD PRESSURE: 128 MMHG

## 2022-01-01 VITALS
HEART RATE: 68 BPM | SYSTOLIC BLOOD PRESSURE: 118 MMHG | RESPIRATION RATE: 20 BRPM | DIASTOLIC BLOOD PRESSURE: 54 MMHG | SYSTOLIC BLOOD PRESSURE: 120 MMHG | RESPIRATION RATE: 20 BRPM | HEART RATE: 68 BPM | DIASTOLIC BLOOD PRESSURE: 60 MMHG

## 2022-01-01 VITALS — HEART RATE: 72 BPM | DIASTOLIC BLOOD PRESSURE: 60 MMHG | SYSTOLIC BLOOD PRESSURE: 140 MMHG | RESPIRATION RATE: 20 BRPM

## 2022-01-01 VITALS — DIASTOLIC BLOOD PRESSURE: 76 MMHG | SYSTOLIC BLOOD PRESSURE: 120 MMHG | RESPIRATION RATE: 16 BRPM | HEART RATE: 88 BPM

## 2022-01-01 VITALS — HEART RATE: 80 BPM | SYSTOLIC BLOOD PRESSURE: 128 MMHG | DIASTOLIC BLOOD PRESSURE: 70 MMHG | RESPIRATION RATE: 28 BRPM

## 2022-01-01 VITALS — HEART RATE: 72 BPM | SYSTOLIC BLOOD PRESSURE: 140 MMHG | RESPIRATION RATE: 20 BRPM | DIASTOLIC BLOOD PRESSURE: 65 MMHG

## 2022-01-01 VITALS — SYSTOLIC BLOOD PRESSURE: 110 MMHG | HEART RATE: 64 BPM | RESPIRATION RATE: 24 BRPM | DIASTOLIC BLOOD PRESSURE: 56 MMHG

## 2022-01-01 VITALS — RESPIRATION RATE: 18 BRPM

## 2022-01-01 VITALS — DIASTOLIC BLOOD PRESSURE: 80 MMHG | SYSTOLIC BLOOD PRESSURE: 160 MMHG | HEART RATE: 84 BPM | RESPIRATION RATE: 36 BRPM

## 2022-01-01 VITALS — RESPIRATION RATE: 20 BRPM | DIASTOLIC BLOOD PRESSURE: 70 MMHG | HEART RATE: 68 BPM | SYSTOLIC BLOOD PRESSURE: 110 MMHG

## 2022-01-01 VITALS — HEART RATE: 72 BPM | SYSTOLIC BLOOD PRESSURE: 120 MMHG | RESPIRATION RATE: 24 BRPM | DIASTOLIC BLOOD PRESSURE: 60 MMHG

## 2022-01-01 VITALS — RESPIRATION RATE: 16 BRPM

## 2022-01-01 VITALS — RESPIRATION RATE: 20 BRPM | HEART RATE: 76 BPM | SYSTOLIC BLOOD PRESSURE: 116 MMHG | DIASTOLIC BLOOD PRESSURE: 56 MMHG

## 2022-01-01 VITALS — RESPIRATION RATE: 24 BRPM | HEART RATE: 76 BPM | DIASTOLIC BLOOD PRESSURE: 70 MMHG | SYSTOLIC BLOOD PRESSURE: 140 MMHG

## 2022-01-01 VITALS — HEART RATE: 84 BPM | SYSTOLIC BLOOD PRESSURE: 128 MMHG | DIASTOLIC BLOOD PRESSURE: 68 MMHG | RESPIRATION RATE: 16 BRPM

## 2022-01-01 VITALS — RESPIRATION RATE: 20 BRPM | DIASTOLIC BLOOD PRESSURE: 50 MMHG | HEART RATE: 68 BPM | SYSTOLIC BLOOD PRESSURE: 98 MMHG

## 2022-01-01 VITALS — DIASTOLIC BLOOD PRESSURE: 62 MMHG | RESPIRATION RATE: 20 BRPM | SYSTOLIC BLOOD PRESSURE: 110 MMHG | HEART RATE: 76 BPM

## 2022-01-01 VITALS — HEART RATE: 88 BPM | RESPIRATION RATE: 16 BRPM

## 2022-01-01 VITALS — RESPIRATION RATE: 12 BRPM | DIASTOLIC BLOOD PRESSURE: 60 MMHG | SYSTOLIC BLOOD PRESSURE: 126 MMHG | HEART RATE: 60 BPM

## 2022-01-01 VITALS — RESPIRATION RATE: 28 BRPM | DIASTOLIC BLOOD PRESSURE: 84 MMHG | SYSTOLIC BLOOD PRESSURE: 130 MMHG | HEART RATE: 88 BPM

## 2022-01-01 VITALS — HEART RATE: 68 BPM | SYSTOLIC BLOOD PRESSURE: 120 MMHG | DIASTOLIC BLOOD PRESSURE: 56 MMHG | RESPIRATION RATE: 20 BRPM

## 2022-01-01 VITALS
TEMPERATURE: 97.3 F | HEART RATE: 84 BPM | RESPIRATION RATE: 24 BRPM | DIASTOLIC BLOOD PRESSURE: 76 MMHG | SYSTOLIC BLOOD PRESSURE: 140 MMHG

## 2022-01-01 VITALS — RESPIRATION RATE: 20 BRPM | DIASTOLIC BLOOD PRESSURE: 80 MMHG | HEART RATE: 68 BPM | SYSTOLIC BLOOD PRESSURE: 120 MMHG

## 2022-01-01 VITALS — RESPIRATION RATE: 12 BRPM | SYSTOLIC BLOOD PRESSURE: 108 MMHG | DIASTOLIC BLOOD PRESSURE: 56 MMHG | HEART RATE: 76 BPM

## 2022-01-01 VITALS — HEART RATE: 68 BPM | RESPIRATION RATE: 20 BRPM | SYSTOLIC BLOOD PRESSURE: 130 MMHG | DIASTOLIC BLOOD PRESSURE: 54 MMHG

## 2022-01-01 VITALS — HEART RATE: 72 BPM | SYSTOLIC BLOOD PRESSURE: 130 MMHG | DIASTOLIC BLOOD PRESSURE: 50 MMHG | RESPIRATION RATE: 20 BRPM

## 2022-01-01 VITALS — DIASTOLIC BLOOD PRESSURE: 62 MMHG | RESPIRATION RATE: 12 BRPM | SYSTOLIC BLOOD PRESSURE: 126 MMHG | HEART RATE: 68 BPM

## 2022-01-01 VITALS — HEART RATE: 76 BPM | RESPIRATION RATE: 20 BRPM

## 2022-01-01 DIAGNOSIS — Z51.5 HOSPICE CARE PATIENT: ICD-10-CM

## 2022-01-01 DIAGNOSIS — I67.2 CEREBRAL ATHEROSCLEROSIS: ICD-10-CM

## 2022-01-01 DIAGNOSIS — F01.C0 SEVERE VASCULAR DEMENTIA, UNSPECIFIED WHETHER BEHAVIORAL, PSYCHOTIC, OR MOOD DISTURBANCE OR ANXIETY (HCC): ICD-10-CM

## 2022-01-01 DIAGNOSIS — F01.C18 SEVERE VASCULAR DEMENTIA WITH OTHER BEHAVIORAL DISTURBANCE (HCC): ICD-10-CM

## 2022-01-01 DIAGNOSIS — R52 PAIN ASSOCIATED WITH TERMINAL ILLNESS: ICD-10-CM

## 2022-01-01 LAB
APPEARANCE UR: ABNORMAL
BACTERIA #/AREA URNS HPF: ABNORMAL /HPF
BILIRUB UR QL STRIP.AUTO: NEGATIVE
COLOR UR: YELLOW
EPI CELLS #/AREA URNS HPF: ABNORMAL /HPF
GLUCOSE UR STRIP.AUTO-MCNC: NEGATIVE MG/DL
HYALINE CASTS #/AREA URNS LPF: ABNORMAL /LPF
KETONES UR STRIP.AUTO-MCNC: NEGATIVE MG/DL
LEUKOCYTE ESTERASE UR QL STRIP.AUTO: ABNORMAL
MICRO URNS: ABNORMAL
NITRITE UR QL STRIP.AUTO: NEGATIVE
PH UR STRIP.AUTO: 8 [PH] (ref 5–8)
PROT UR QL STRIP: 30 MG/DL
RBC # URNS HPF: ABNORMAL /HPF
RBC UR QL AUTO: NEGATIVE
SP GR UR STRIP.AUTO: 1.01
UROBILINOGEN UR STRIP.AUTO-MCNC: 0.2 MG/DL
WBC #/AREA URNS HPF: ABNORMAL /HPF

## 2022-01-01 PROCEDURE — S9126 HOSPICE CARE, IN THE HOME, P: HCPCS

## 2022-01-01 PROCEDURE — G0156 HHCP-SVS OF AIDE,EA 15 MIN: HCPCS

## 2022-01-01 PROCEDURE — G0299 HHS/HOSPICE OF RN EA 15 MIN: HCPCS

## 2022-01-01 PROCEDURE — G0155 HHCP-SVS OF CSW,EA 15 MIN: HCPCS

## 2022-01-01 PROCEDURE — G0300 HHS/HOSPICE OF LPN EA 15 MIN: HCPCS

## 2022-01-01 PROCEDURE — 81001 URINALYSIS AUTO W/SCOPE: CPT

## 2022-01-01 RX ORDER — MORPHINE SULFATE 15 MG/1
15 TABLET, FILM COATED, EXTENDED RELEASE ORAL EVERY 8 HOURS
Qty: 84 TABLET | Refills: 0 | Status: SHIPPED | OUTPATIENT
Start: 2022-01-01 | End: 2022-01-01

## 2022-01-01 RX ORDER — MORPHINE SULFATE 15 MG/1
15 TABLET, FILM COATED, EXTENDED RELEASE ORAL EVERY 8 HOURS
Qty: 126 TABLET | Refills: 0 | Status: SHIPPED | OUTPATIENT
Start: 2022-01-01 | End: 2022-01-01 | Stop reason: SDUPTHER

## 2022-01-01 RX ORDER — MORPHINE SULFATE 20 MG/ML
5 SOLUTION ORAL
Qty: 120 ML | Refills: 0 | Status: SHIPPED | OUTPATIENT
Start: 2022-01-01 | End: 2023-10-05

## 2022-01-01 RX ORDER — MORPHINE SULFATE 15 MG/1
15 TABLET, FILM COATED, EXTENDED RELEASE ORAL EVERY 8 HOURS
Qty: 180 TABLET | Refills: 0 | Status: SHIPPED | OUTPATIENT
Start: 2022-01-01 | End: 2022-01-01 | Stop reason: SDUPTHER

## 2022-01-01 RX ORDER — GENTAMICIN SULFATE 3 MG/ML
1 SOLUTION/ DROPS OPHTHALMIC 3 TIMES DAILY
Qty: 5 ML | Refills: 1 | Status: SHIPPED | OUTPATIENT
Start: 2022-01-01 | End: 2022-01-01

## 2022-01-01 RX ORDER — MORPHINE SULFATE 20 MG/ML
10 SOLUTION ORAL
Qty: 120 ML | Refills: 0 | Status: SHIPPED | OUTPATIENT
Start: 2022-01-01 | End: 2022-01-01 | Stop reason: SDUPTHER

## 2022-01-01 RX ORDER — DIPHENHYDRAMINE HCL 25 MG
25 TABLET ORAL EVERY 6 HOURS PRN
Qty: 30 TABLET | Refills: 3 | Status: SHIPPED | OUTPATIENT
Start: 2022-01-01 | End: 2022-01-01

## 2022-01-01 RX ORDER — MORPHINE SULFATE 100 MG/5ML
5 SOLUTION ORAL EVERY 4 HOURS
Qty: 120 ML | Refills: 0 | Status: SHIPPED | OUTPATIENT
Start: 2022-01-01 | End: 2023-10-26

## 2022-01-01 RX ORDER — MORPHINE SULFATE 15 MG/1
15 TABLET, FILM COATED, EXTENDED RELEASE ORAL EVERY 8 HOURS
Qty: 84 TABLET | Refills: 0 | Status: SHIPPED | OUTPATIENT
Start: 2022-01-01 | End: 2022-01-01 | Stop reason: SDUPTHER

## 2022-01-01 RX ORDER — MORPHINE SULFATE 15 MG/1
15 TABLET, FILM COATED, EXTENDED RELEASE ORAL EVERY 8 HOURS
Qty: 180 TABLET | Refills: 0 | Status: SHIPPED | OUTPATIENT
Start: 2022-01-01 | End: 2022-01-01

## 2022-01-01 RX ORDER — LACTULOSE 10 G/15ML
20 SOLUTION ORAL 2 TIMES DAILY
Qty: 473 ML | Refills: 2 | Status: SHIPPED | OUTPATIENT
Start: 2022-01-01

## 2022-01-01 RX ORDER — ACETAMINOPHEN 500 MG
500 TABLET ORAL 3 TIMES DAILY PRN
Qty: 42 TABLET | Refills: 6 | Status: CANCELLED | OUTPATIENT
Start: 2022-01-01

## 2022-01-01 RX ORDER — LIDOCAINE 50 MG/G
1 PATCH TOPICAL EVERY 12 HOURS
Qty: 30 PATCH | Refills: 7 | OUTPATIENT
Start: 2022-01-01

## 2022-01-01 RX ORDER — QUETIAPINE FUMARATE 50 MG/1
50 TABLET, FILM COATED ORAL 2 TIMES DAILY
Qty: 60 TABLET | Refills: 7 | OUTPATIENT
Start: 2022-01-01

## 2022-01-01 RX ORDER — LORAZEPAM 2 MG/ML
0.5 CONCENTRATE ORAL EVERY 4 HOURS PRN
Qty: 360 ML | Refills: 0 | Status: SHIPPED | OUTPATIENT
Start: 2022-01-01 | End: 2022-01-01

## 2022-01-01 RX ORDER — MORPHINE SULFATE 100 MG/5ML
10 SOLUTION ORAL EVERY 6 HOURS
Qty: 120 ML | Refills: 0 | Status: SHIPPED | OUTPATIENT
Start: 2022-01-01 | End: 2023-10-31

## 2022-01-01 RX ORDER — DOCUSATE SODIUM 100 MG/1
100 CAPSULE, LIQUID FILLED ORAL 2 TIMES DAILY
Qty: 60 CAPSULE | Refills: 2 | Status: SHIPPED | OUTPATIENT
Start: 2022-01-01 | End: 2022-01-01

## 2022-01-01 RX ORDER — CEFDINIR 300 MG/1
300 CAPSULE ORAL 2 TIMES DAILY
Qty: 10 CAPSULE | Refills: 0 | Status: SHIPPED | OUTPATIENT
Start: 2022-01-01 | End: 2022-01-01

## 2022-01-01 RX ORDER — ACETAMINOPHEN 500 MG
500 TABLET ORAL
Qty: 42 TABLET | Refills: 6
Start: 2022-01-01 | End: 2022-01-01

## 2022-01-01 RX ORDER — FLUCONAZOLE 150 MG/1
150 TABLET ORAL DAILY
Qty: 3 TABLET | Refills: 0 | Status: SHIPPED | OUTPATIENT
Start: 2022-01-01 | End: 2022-01-01 | Stop reason: SDUPTHER

## 2022-01-01 RX ADMIN — METRONIDAZOLE 500 MG: 500 TABLET ORAL at 11:40

## 2022-01-01 RX ADMIN — LORAZEPAM 0.5 MG: 2 CONCENTRATE ORAL at 14:36

## 2022-01-01 RX ADMIN — Medication 10 MG: at 12:25

## 2022-01-01 RX ADMIN — Medication 10 MG: at 13:05

## 2022-01-01 RX ADMIN — METRONIDAZOLE 500 MG: 500 TABLET ORAL at 10:01

## 2022-01-01 RX ADMIN — MORPHINE SULFATE 10 MG: 20 SOLUTION ORAL at 10:30

## 2022-01-01 RX ADMIN — NYSTATIN 1 APPLICATION: 100000 OINTMENT TOPICAL at 13:30

## 2022-01-01 RX ADMIN — METRONIDAZOLE 500 MG: 500 TABLET ORAL at 11:50

## 2022-01-01 SDOH — ECONOMIC STABILITY: GENERAL

## 2022-01-01 SDOH — ECONOMIC STABILITY: HOUSING INSECURITY: EVIDENCE OF SMOKING MATERIAL: 0

## 2022-01-01 ASSESSMENT — ENCOUNTER SYMPTOMS
LOWER EXTREMITY EDEMA: 1
AGITATION: 1
DESCRIPTION OF MEMORY LOSS: SHORT TERM
FATIGUE: 1
STOOL FREQUENCY: LESS THAN DAILY
DENIES PAIN: 1
LIMITED RANGE OF MOTION: 1
NAUSEA: 1
DESCRIPTION OF MEMORY LOSS: IMMEDIATE
MUSCLE WEAKNESS: 1
DESCRIPTION OF MEMORY LOSS: SHORT TERM
FORGETFULNESS: 1
LIMITED RANGE OF MOTION: 1
CONSTIPATION: 1
FORGETFULNESS: 1
CONSTIPATION: 1
NAUSEA: 1
LIMITED RANGE OF MOTION: 1
FATIGUE: 1
DESCRIPTION OF MEMORY LOSS: SHORT TERM
CONSTIPATION: 1
LAST BOWEL MOVEMENT: 66252
STOOL FREQUENCY: LESS THAN DAILY
PERSON REPORTING PAIN: PATIENT
LAST BOWEL MOVEMENT: 66296
FATIGUE: 1
DESCRIPTION OF MEMORY LOSS: LONG TERM
BOWEL INCONTINENCE: 1
NAUSEA: 1
FATIGUES EASILY: 1
FATIGUE: 1
DRY SKIN: 1
SLEEP QUALITY: ADEQUATE
COUGH CHARACTERISTICS: NON-PRODUCTIVE
DESCRIPTION OF MEMORY LOSS: LONG TERM
LOWER EXTREMITY EDEMA: 1
BOWEL INCONTINENCE: 1
STOOL FREQUENCY: LESS THAN DAILY
FORGETFULNESS: 1
LAST BOWEL MOVEMENT: 66308
MUSCLE WEAKNESS: 1
LOWER EXTREMITY EDEMA: 1
DRY SKIN: 1
STOOL FREQUENCY: LESS THAN DAILY
PERSON REPORTING PAIN: PATIENT
DESCRIPTION OF MEMORY LOSS: LONG TERM
DENIES PAIN: 1
STOOL FREQUENCY: LESS THAN DAILY
STOOL FREQUENCY: LESS THAN DAILY
DRY SKIN: 1
DESCRIPTION OF MEMORY LOSS: LONG TERM
STOOL FREQUENCY: LESS THAN DAILY
PERSON REPORTING PAIN: PATIENT
DESCRIPTION OF MEMORY LOSS: LONG TERM
SLEEP QUALITY: ADEQUATE
FATIGUE: 1
DRY SKIN: 1
STOOL FREQUENCY: LESS THAN DAILY
FATIGUE: 1
SLEEP QUALITY: ADEQUATE
LAST BOWEL MOVEMENT: 66308
DESCRIPTION OF MEMORY LOSS: SHORT TERM
FATIGUE: 1
FATIGUE: 1
FATIGUES EASILY: 1
CONSTIPATION: 1
LOWER EXTREMITY EDEMA: 1
LOWER EXTREMITY EDEMA: 1
DESCRIPTION OF MEMORY LOSS: SHORT TERM
SLEEP QUALITY: ADEQUATE
LAST BOWEL MOVEMENT: 66203
COUGH CHARACTERISTICS: MOIST
DRY SKIN: 1
SLEEP QUALITY: ADEQUATE
LAST BOWEL MOVEMENT: 66244
FORGETFULNESS: 1
DRY SKIN: 1
LAST BOWEL MOVEMENT: 66142
FORGETFULNESS: 1
DESCRIPTION OF MEMORY LOSS: LONG TERM
DESCRIPTION OF MEMORY LOSS: LONG TERM
LOWER EXTREMITY EDEMA: 1
LOWER EXTREMITY EDEMA: 1
LAST BOWEL MOVEMENT: 66387
PERSON REPORTING PAIN: PATIENT
FATIGUE: 1
BOWEL INCONTINENCE: 1
DESCRIPTION OF MEMORY LOSS: SHORT TERM
DESCRIPTION OF MEMORY LOSS: LONG TERM
FATIGUE: 1
DRY SKIN: 1
DESCRIPTION OF MEMORY LOSS: IMMEDIATE
DESCRIPTION OF MEMORY LOSS: LONG TERM
STOOL FREQUENCY: LESS THAN DAILY
FATIGUE: 1
DESCRIPTION OF MEMORY LOSS: IMMEDIATE
DESCRIPTION OF MEMORY LOSS: SHORT TERM
FATIGUES EASILY: 1
DESCRIPTION OF MEMORY LOSS: LONG TERM
LIMITED RANGE OF MOTION: 1
LOWER EXTREMITY EDEMA: 1
PERSON REPORTING PAIN: PATIENT
DESCRIPTION OF MEMORY LOSS: IMMEDIATE
AGITATION: 1
LIMITED RANGE OF MOTION: 1
FORGETFULNESS: 1
SLEEP QUALITY: ADEQUATE
CONSTIPATION: 1
PAIN: 1
FATIGUES EASILY: 1
DRY SKIN: 1
LOWER EXTREMITY EDEMA: 1
DESCRIPTION OF MEMORY LOSS: SHORT TERM
DESCRIPTION OF MEMORY LOSS: SHORT TERM
DESCRIPTION OF MEMORY LOSS: LONG TERM
DESCRIPTION OF MEMORY LOSS: SHORT TERM
MUSCLE WEAKNESS: 1
PAIN: 1
DESCRIPTION OF MEMORY LOSS: LONG TERM
FATIGUE: 1
DESCRIPTION OF MEMORY LOSS: LONG TERM
BOWEL INCONTINENCE: 1
STOOL FREQUENCY: LESS THAN DAILY
DENIES PAIN: 1
FATIGUE: 1
DESCRIPTION OF MEMORY LOSS: SHORT TERM
DRY SKIN: 1
PERSON REPORTING PAIN: PATIENT
LOWER EXTREMITY EDEMA: 1
DENIES PAIN: 1
DESCRIPTION OF MEMORY LOSS: IMMEDIATE
FATIGUES EASILY: 1
DRY SKIN: 1
DRY SKIN: 1
MUSCLE WEAKNESS: 1
LIMITED RANGE OF MOTION: 1
LIMITED RANGE OF MOTION: 1
SLEEP QUALITY: ADEQUATE
DESCRIPTION OF MEMORY LOSS: LONG TERM
COUGH CHARACTERISTICS: NON-PRODUCTIVE
LOWER EXTREMITY EDEMA: 1
PERSON REPORTING PAIN: PATIENT
FATIGUES EASILY: 1
DESCRIPTION OF MEMORY LOSS: LONG TERM
LAST BOWEL MOVEMENT: 66177
DRY SKIN: 1
MUSCLE WEAKNESS: 1
STOOL FREQUENCY: LESS THAN DAILY
LAST BOWEL MOVEMENT: 66119
FORGETFULNESS: 1
FORGETFULNESS: 1
DESCRIPTION OF MEMORY LOSS: LONG TERM
DRY SKIN: 1
FORGETFULNESS: 1
STOOL FREQUENCY: LESS THAN DAILY
FORGETFULNESS: 1
CONSTIPATION: 1
LIMITED RANGE OF MOTION: 1
DESCRIPTION OF MEMORY LOSS: LONG TERM
DENIES PAIN: 1
DESCRIPTION OF MEMORY LOSS: LONG TERM
DESCRIPTION OF MEMORY LOSS: IMMEDIATE
FATIGUES EASILY: 1
PAIN: 1
LAST BOWEL MOVEMENT: 66140
FORGETFULNESS: 1
MUSCLE WEAKNESS: 1
VOMITING: 1
COUGH CHARACTERISTICS: NON-PRODUCTIVE
DENIES PAIN: 1
SLEEP QUALITY: ADEQUATE
STOOL FREQUENCY: LESS THAN DAILY
STOOL FREQUENCY: LESS THAN DAILY
DESCRIPTION OF MEMORY LOSS: SHORT TERM
STOOL FREQUENCY: LESS THAN DAILY
LAST BOWEL MOVEMENT: 66171
FORGETFULNESS: 1
FATIGUES EASILY: 1
FATIGUE: 1
DRY SKIN: 1
CONSTIPATION: 1
SLEEP QUALITY: ADEQUATE
AGITATION: 1
DRY SKIN: 1
DRY SKIN: 1
DENIES PAIN: 1
DENIES PAIN: 1
PERSON REPORTING PAIN: PATIENT
LAST BOWEL MOVEMENT: 66407
DENIES PAIN: 1
DESCRIPTION OF MEMORY LOSS: LONG TERM
LOWER EXTREMITY EDEMA: 1
FATIGUES EASILY: 1
DESCRIPTION OF MEMORY LOSS: LONG TERM
STOOL FREQUENCY: LESS THAN DAILY
DRY SKIN: 1
STOOL FREQUENCY: LESS THAN DAILY
LOWER EXTREMITY EDEMA: 1
PERSON REPORTING PAIN: PATIENT
STOOL FREQUENCY: LESS THAN DAILY
MUSCLE WEAKNESS: 1
LOWER EXTREMITY EDEMA: 1
FATIGUE: 1
DESCRIPTION OF MEMORY LOSS: LONG TERM
COUGH CHARACTERISTICS: MOIST
CONSTIPATION: 1
STOOL FREQUENCY: LESS THAN DAILY
FORGETFULNESS: 1
LAST BOWEL MOVEMENT: 66223
LOWER EXTREMITY EDEMA: 1
FATIGUES EASILY: 1
FATIGUES EASILY: 1
DENIES PAIN: 1
STOOL FREQUENCY: LESS THAN DAILY
FATIGUE: 1
FATIGUES EASILY: 1
STOOL FREQUENCY: LESS THAN DAILY
FATIGUE: 1
SLEEP QUALITY: ADEQUATE
DESCRIPTION OF MEMORY LOSS: LONG TERM
LAST BOWEL MOVEMENT: 66233
FORGETFULNESS: 1
STOOL FREQUENCY: LESS THAN DAILY
BOWEL INCONTINENCE: 1
DESCRIPTION OF MEMORY LOSS: SHORT TERM
PERSON REPORTING PAIN: PATIENT
FATIGUES EASILY: 1
LIMITED RANGE OF MOTION: 1
LAST BOWEL MOVEMENT: 66394
FATIGUE: 1
FORGETFULNESS: 1
HIGHEST PAIN SEVERITY IN PAST 24 HOURS: 0/10
DRY SKIN: 1
DESCRIPTION OF MEMORY LOSS: LONG TERM
DESCRIPTION OF MEMORY LOSS: LONG TERM
DESCRIPTION OF MEMORY LOSS: SHORT TERM
FORGETFULNESS: 1
DESCRIPTION OF MEMORY LOSS: LONG TERM
STOOL FREQUENCY: LESS THAN DAILY
SLEEP QUALITY: ADEQUATE
FATIGUES EASILY: 1
DRY SKIN: 1
FATIGUE: 1
CONSTIPATION: 1
FATIGUE: 1
DESCRIPTION OF MEMORY LOSS: LONG TERM
PAIN: 1
STOOL FREQUENCY: LESS THAN DAILY
DESCRIPTION OF MEMORY LOSS: IMMEDIATE
STOOL FREQUENCY: LESS THAN DAILY
CONSTIPATION: 1
PAIN: 1
DRY SKIN: 1
FATIGUE: 1
DRY SKIN: 1
SLEEP QUALITY: ADEQUATE
DESCRIPTION OF MEMORY LOSS: LONG TERM
CONSTIPATION: 1
SLEEP QUALITY: ADEQUATE
SLEEP QUALITY: ADEQUATE
LAST BOWEL MOVEMENT: 66280
DRY SKIN: 1
LAST BOWEL MOVEMENT: 66176
FATIGUE: 1
DENIES PAIN: 1
FATIGUES EASILY: 1
PERSON REPORTING PAIN: PATIENT
DENIES PAIN: 1
FATIGUE: 1
SLEEP QUALITY: ADEQUATE
DESCRIPTION OF MEMORY LOSS: SHORT TERM
DESCRIPTION OF MEMORY LOSS: SHORT TERM
FATIGUE: 1
STOOL FREQUENCY: LESS THAN DAILY
DESCRIPTION OF MEMORY LOSS: LONG TERM
STOOL FREQUENCY: LESS THAN DAILY
DENIES PAIN: 1
LOWER EXTREMITY EDEMA: 1
FATIGUES EASILY: 1
LAST BOWEL MOVEMENT: 66189
VOMITING: 1
DESCRIPTION OF MEMORY LOSS: SHORT TERM
LAST BOWEL MOVEMENT: 66273
FATIGUE: 1
LAST BOWEL MOVEMENT: 66153
DESCRIPTION OF MEMORY LOSS: LONG TERM
CONSTIPATION: 1
DRY SKIN: 1
LIMITED RANGE OF MOTION: 1
DRY SKIN: 1
CONSTIPATION: 1
FATIGUE: 1
DESCRIPTION OF MEMORY LOSS: IMMEDIATE
FATIGUES EASILY: 1
DESCRIPTION OF MEMORY LOSS: SHORT TERM
FORGETFULNESS: 1
DESCRIPTION OF MEMORY LOSS: LONG TERM
LAST BOWEL MOVEMENT: 66270
STOOL FREQUENCY: LESS THAN DAILY
DESCRIPTION OF MEMORY LOSS: IMMEDIATE
DESCRIPTION OF MEMORY LOSS: SHORT TERM
STOOL FREQUENCY: LESS THAN DAILY
SLEEP QUALITY: ADEQUATE
STOOL FREQUENCY: LESS THAN DAILY
DENIES PAIN: 1
FATIGUE: 1
LIMITED RANGE OF MOTION: 1
FATIGUES EASILY: 1
PERSON REPORTING PAIN: PATIENT
DRY SKIN: 1
PERSON REPORTING PAIN: PATIENT
LAST BOWEL MOVEMENT: 66113
LAST BOWEL MOVEMENT: 66407
FORGETFULNESS: 1
DESCRIPTION OF MEMORY LOSS: LONG TERM
LIMITED RANGE OF MOTION: 1
CONSTIPATION: 1
CONSTIPATION: 1
DENIES PAIN: 1
FATIGUES EASILY: 1
FATIGUE: 1
FATIGUES EASILY: 1
DESCRIPTION OF MEMORY LOSS: IMMEDIATE
BOWEL INCONTINENCE: 1
DENIES PAIN: 1
DENIES PAIN: 1
DESCRIPTION OF MEMORY LOSS: LONG TERM
PERSON REPORTING PAIN: PATIENT
LAST BOWEL MOVEMENT: 66333
LAST BOWEL MOVEMENT: 66356
FORGETFULNESS: 1
LAST BOWEL MOVEMENT: 66227
LOWER EXTREMITY EDEMA: 1
LIMITED RANGE OF MOTION: 1
DESCRIPTION OF MEMORY LOSS: SHORT TERM
DENIES PAIN: 1
DENIES PAIN: 1
MUSCLE WEAKNESS: 1
STOOL FREQUENCY: LESS THAN DAILY
DESCRIPTION OF MEMORY LOSS: IMMEDIATE
DENIES PAIN: 1
DESCRIPTION OF MEMORY LOSS: LONG TERM
DESCRIPTION OF MEMORY LOSS: SHORT TERM
FATIGUE: 1
SUBJECTIVE PAIN PROGRESSION: WAXING AND WANING
LAST BOWEL MOVEMENT: 66163
MUSCLE WEAKNESS: 1
COUGH CHARACTERISTICS: NON-PRODUCTIVE
LAST BOWEL MOVEMENT: 66177
CONSTIPATION: 1
DRY SKIN: 1
MUSCLE WEAKNESS: 1
MUSCLE WEAKNESS: 1
FORGETFULNESS: 1
LOWER EXTREMITY EDEMA: 1
FATIGUES EASILY: 1
FATIGUES EASILY: 1
LAST BOWEL MOVEMENT: 66301
PERSON REPORTING PAIN: PATIENT
FORGETFULNESS: 1
DESCRIPTION OF MEMORY LOSS: SHORT TERM
DESCRIPTION OF MEMORY LOSS: SHORT TERM
SLEEP QUALITY: ADEQUATE
FATIGUE: 1
LIMITED RANGE OF MOTION: 1
STOOL FREQUENCY: LESS THAN DAILY
FORGETFULNESS: 1
PERSON REPORTING PAIN: DIRECT OBSERVATION
FATIGUES EASILY: 1
DESCRIPTION OF MEMORY LOSS: SHORT TERM
LAST BOWEL MOVEMENT: 66171
FATIGUES EASILY: 1
SLEEP QUALITY: ADEQUATE
FATIGUE: 1
VOMITING: 1
FORGETFULNESS: 1
VOMITING: 1
MUSCLE WEAKNESS: 1
FATIGUES EASILY: 1
CONSTIPATION: 1
LOWER EXTREMITY EDEMA: 1
PERSON REPORTING PAIN: PATIENT
DESCRIPTION OF MEMORY LOSS: LONG TERM
DESCRIPTION OF MEMORY LOSS: SHORT TERM
SLEEP QUALITY: ADEQUATE
VOMITING: 1
FORGETFULNESS: 1
PERSON REPORTING PAIN: PATIENT
LAST BOWEL MOVEMENT: 66252
SLEEP QUALITY: ADEQUATE
MUSCLE WEAKNESS: 1
SLEEP QUALITY: ADEQUATE
DENIES PAIN: 1
FORGETFULNESS: 1
FORGETFULNESS: 1
CONSTIPATION: 1
PERSON REPORTING PAIN: PATIENT
STOOL FREQUENCY: LESS THAN DAILY
DESCRIPTION OF MEMORY LOSS: IMMEDIATE
LOWER EXTREMITY EDEMA: 1
DESCRIPTION OF MEMORY LOSS: IMMEDIATE
DRY SKIN: 1
LAST BOWEL MOVEMENT: 66129
CONSTIPATION: 1
FATIGUES EASILY: 1
FATIGUE: 1
LAST BOWEL MOVEMENT: 66212
FORGETFULNESS: 1
SLEEP QUALITY: ADEQUATE
DESCRIPTION OF MEMORY LOSS: LONG TERM
STOOL FREQUENCY: LESS THAN DAILY
FORGETFULNESS: 1
FATIGUE: 1
LIMITED RANGE OF MOTION: 1
DENIES PAIN: 1
MUSCLE WEAKNESS: 1
FATIGUES EASILY: 1
VOMITING: 1
DESCRIPTION OF MEMORY LOSS: SHORT TERM
AGITATION: 1
PERSON REPORTING PAIN: PATIENT
MUSCLE WEAKNESS: 1
FORGETFULNESS: 1
SLEEP QUALITY: ADEQUATE
FATIGUES EASILY: 1
NAUSEA: 1
FATIGUES EASILY: 1
DESCRIPTION OF MEMORY LOSS: SHORT TERM
CONSTIPATION: 1
CONSTIPATION: 1
DESCRIPTION OF MEMORY LOSS: IMMEDIATE
CONSTIPATION: 1
LIMITED RANGE OF MOTION: 1
LOWER EXTREMITY EDEMA: 1
FATIGUE: 1
DRY SKIN: 1
DESCRIPTION OF MEMORY LOSS: SHORT TERM
MUSCLE WEAKNESS: 1
DRY SKIN: 1
LIMITED RANGE OF MOTION: 1
MUSCLE WEAKNESS: 1
PERSON REPORTING PAIN: PATIENT
FATIGUES EASILY: 1
DENIES PAIN: 1
MUSCLE WEAKNESS: 1
STOOL FREQUENCY: LESS THAN DAILY
LOWER EXTREMITY EDEMA: 1
DRY SKIN: 1
DRY SKIN: 1
DENIES PAIN: 1
DESCRIPTION OF MEMORY LOSS: SHORT TERM
SUBJECTIVE PAIN PROGRESSION: RESOLVED
FATIGUES EASILY: 1
SLEEP QUALITY: ADEQUATE
LIMITED RANGE OF MOTION: 1
PERSON REPORTING PAIN: PATIENT
LAST BOWEL MOVEMENT: 66382
DESCRIPTION OF MEMORY LOSS: SHORT TERM
DESCRIPTION OF MEMORY LOSS: SHORT TERM
FATIGUES EASILY: 1
DESCRIPTION OF MEMORY LOSS: SHORT TERM
UNABLE TO COMMUNICATE PAIN: 1
DESCRIPTION OF MEMORY LOSS: LONG TERM
STOOL FREQUENCY: LESS THAN DAILY
AGITATION: 1
DRY SKIN: 1
DESCRIPTION OF MEMORY LOSS: IMMEDIATE
DESCRIPTION OF MEMORY LOSS: IMMEDIATE
COUGH CHARACTERISTICS: NON-PRODUCTIVE
DENIES PAIN: 1
DESCRIPTION OF MEMORY LOSS: SHORT TERM
PAIN LOCATION - PAIN FREQUENCY: CONSTANT
DESCRIPTION OF MEMORY LOSS: LONG TERM
FATIGUE: 1
DESCRIPTION OF MEMORY LOSS: SHORT TERM
LAST BOWEL MOVEMENT: 66365
FATIGUES EASILY: 1
AGITATION: 1
LOWER EXTREMITY EDEMA: 1
FATIGUE: 1
DESCRIPTION OF MEMORY LOSS: SHORT TERM
DESCRIPTION OF MEMORY LOSS: SHORT TERM
SLEEP QUALITY: ADEQUATE
FATIGUE: 1
PERSON REPORTING PAIN: PATIENT
LIMITED RANGE OF MOTION: 1
DESCRIPTION OF MEMORY LOSS: LONG TERM
NAUSEA: 1
LOWER EXTREMITY EDEMA: 1
HIGHEST PAIN SEVERITY IN PAST 24 HOURS: 6/10
LOWER EXTREMITY EDEMA: 1
FATIGUES EASILY: 1
FATIGUE: 1
PERSON REPORTING PAIN: PATIENT
MUSCLE WEAKNESS: 1
LIMITED RANGE OF MOTION: 1
DESCRIPTION OF MEMORY LOSS: SHORT TERM
DRY SKIN: 1
CONSTIPATION: 1
DESCRIPTION OF MEMORY LOSS: IMMEDIATE
CONSTIPATION: 1
CONSTIPATION: 1
LIMITED RANGE OF MOTION: 1
CONSTIPATION: 1
FORGETFULNESS: 1
LAST BOWEL MOVEMENT: 66345
NAUSEA: 1
SLEEP QUALITY: ADEQUATE
FATIGUES EASILY: 1
NAUSEA: 1
STOOL FREQUENCY: LESS THAN DAILY
DENIES PAIN: 1
FATIGUES EASILY: 1
CONSTIPATION: 1
PAIN LOCATION: RIGHT LEG
DESCRIPTION OF MEMORY LOSS: IMMEDIATE
DESCRIPTION OF MEMORY LOSS: IMMEDIATE
DESCRIPTION OF MEMORY LOSS: SHORT TERM
STOOL FREQUENCY: LESS THAN DAILY
DRY SKIN: 1
FATIGUE: 1
AGITATION: 1
DENIES PAIN: 1
FATIGUES EASILY: 1
LIMITED RANGE OF MOTION: 1
LAST BOWEL MOVEMENT: 66275
CONSTIPATION: 1
FORGETFULNESS: 1
SLEEP QUALITY: ADEQUATE
AGITATION: 1
SLEEP QUALITY: ADEQUATE
LAST BOWEL MOVEMENT: 66333
SLEEP QUALITY: ADEQUATE
FORGETFULNESS: 1
STOOL FREQUENCY: LESS THAN DAILY
FATIGUES EASILY: 1
CONSTIPATION: 1
CONSTIPATION: 1
DRY SKIN: 1
DRY SKIN: 1
DENIES PAIN: 1
CONSTIPATION: 1
DESCRIPTION OF MEMORY LOSS: LONG TERM
PERSON REPORTING PAIN: PATIENT
SLEEP QUALITY: ADEQUATE
LOWER EXTREMITY EDEMA: 1
FORGETFULNESS: 1
FATIGUE: 1
SLEEP QUALITY: ADEQUATE
MUSCLE WEAKNESS: 1
FATIGUES EASILY: 1
LIMITED RANGE OF MOTION: 1
PERSON REPORTING PAIN: PATIENT
MUSCLE WEAKNESS: 1
LAST BOWEL MOVEMENT: 66227
LIMITED RANGE OF MOTION: 1
DENIES PAIN: 1
PAIN LOCATION - PAIN QUALITY: ACHE
STOOL FREQUENCY: LESS THAN DAILY
COUGH CHARACTERISTICS: MOIST
STOOL FREQUENCY: LESS THAN DAILY
AGITATION: 1
FORGETFULNESS: 1
CONSTIPATION: 1
DESCRIPTION OF MEMORY LOSS: IMMEDIATE
FORGETFULNESS: 1
DESCRIPTION OF MEMORY LOSS: SHORT TERM
LAST BOWEL MOVEMENT: 66189
FATIGUES EASILY: 1
MUSCLE WEAKNESS: 1
DESCRIPTION OF MEMORY LOSS: SHORT TERM
UNABLE TO COMMUNICATE PAIN: 1
CONSTIPATION: 1
LAST BOWEL MOVEMENT: 66382
FATIGUES EASILY: 1
LIMITED RANGE OF MOTION: 1
STOOL FREQUENCY: LESS THAN DAILY
MUSCLE WEAKNESS: 1
DESCRIPTION OF MEMORY LOSS: SHORT TERM
FATIGUE: 1
DESCRIPTION OF MEMORY LOSS: LONG TERM
DRY SKIN: 1
PERSON REPORTING PAIN: PATIENT
FATIGUES EASILY: 1
DESCRIPTION OF MEMORY LOSS: LONG TERM
PERSON REPORTING PAIN: PATIENT
VOMITING: 1
DRY SKIN: 1
FATIGUES EASILY: 1
FATIGUE: 1
SLEEP QUALITY: ADEQUATE
LAST BOWEL MOVEMENT: 66399
DRY SKIN: 1
DENIES PAIN: 1
FATIGUES EASILY: 1
FORGETFULNESS: 1
DESCRIPTION OF MEMORY LOSS: IMMEDIATE
DESCRIPTION OF MEMORY LOSS: LONG TERM
PERSON REPORTING PAIN: PATIENT
AGITATION: 1
CONSTIPATION: 1
FATIGUE: 1
SLEEP QUALITY: ADEQUATE
NAUSEA: 1
DESCRIPTION OF MEMORY LOSS: LONG TERM
LOWER EXTREMITY EDEMA: 1
DESCRIPTION OF MEMORY LOSS: LONG TERM
PERSON REPORTING PAIN: DIRECT OBSERVATION
FORGETFULNESS: 1
DESCRIPTION OF MEMORY LOSS: SHORT TERM
STOOL FREQUENCY: LESS THAN DAILY
SLEEP QUALITY: ADEQUATE
SLEEP QUALITY: ADEQUATE
DESCRIPTION OF MEMORY LOSS: LONG TERM
FATIGUE: 1
LOWER EXTREMITY EDEMA: 1
PERSON REPORTING PAIN: PATIENT
MUSCLE WEAKNESS: 1
FATIGUES EASILY: 1
PERSON REPORTING PAIN: PATIENT
DENIES PAIN: 1
FATIGUE: 1
SLEEP QUALITY: ADEQUATE
SLEEP QUALITY: ADEQUATE
DESCRIPTION OF MEMORY LOSS: IMMEDIATE
CONSTIPATION: 1
FORGETFULNESS: 1
FORGETFULNESS: 1
DRY SKIN: 1
PERSON REPORTING PAIN: DIRECT OBSERVATION
SLEEP QUALITY: ADEQUATE
CONSTIPATION: 1
LOWER EXTREMITY EDEMA: 1
PERSON REPORTING PAIN: PATIENT
PERSON REPORTING PAIN: PATIENT
SLEEP QUALITY: ADEQUATE
DENIES PAIN: 1
PERSON REPORTING PAIN: PATIENT
FORGETFULNESS: 1
FATIGUES EASILY: 1
DESCRIPTION OF MEMORY LOSS: IMMEDIATE
DRY SKIN: 1
PERSON REPORTING PAIN: PATIENT
AGITATION: 1
FATIGUES EASILY: 1
DRY SKIN: 1
DRY SKIN: 1
COUGH CHARACTERISTICS: MOIST
LAST BOWEL MOVEMENT: 66147
SLEEP QUALITY: ADEQUATE
PERSON REPORTING PAIN: PATIENT
DENIES PAIN: 1
LAST BOWEL MOVEMENT: 66268
DENIES PAIN: 1
FATIGUES EASILY: 1
LAST BOWEL MOVEMENT: 66340
FATIGUE: 1
LAST BOWEL MOVEMENT: 66216
DESCRIPTION OF MEMORY LOSS: IMMEDIATE
FATIGUES EASILY: 1
COUGH CHARACTERISTICS: NON-PRODUCTIVE
FATIGUES EASILY: 1
COUGH: 1
LIMITED RANGE OF MOTION: 1
LIMITED RANGE OF MOTION: 1
PERSON REPORTING PAIN: DIRECT OBSERVATION
HIGHEST PAIN SEVERITY IN PAST 24 HOURS: 4/10
DESCRIPTION OF MEMORY LOSS: IMMEDIATE
SLEEP QUALITY: ADEQUATE
FATIGUES EASILY: 1
LOWER EXTREMITY EDEMA: 1
DRY SKIN: 1
LOWER EXTREMITY EDEMA: 1
DESCRIPTION OF MEMORY LOSS: LONG TERM
SLEEP QUALITY: ADEQUATE
DENIES PAIN: 1
FORGETFULNESS: 1
DENIES PAIN: 1
MUSCLE WEAKNESS: 1
DESCRIPTION OF MEMORY LOSS: IMMEDIATE
FATIGUE: 1
PERSON REPORTING PAIN: DIRECT OBSERVATION
DRY SKIN: 1
DENIES PAIN: 1
LIMITED RANGE OF MOTION: 1
NAUSEA: 1
AGITATION: 1
DRY SKIN: 1
DESCRIPTION OF MEMORY LOSS: SHORT TERM
DRY SKIN: 1
DESCRIPTION OF MEMORY LOSS: IMMEDIATE
STOOL FREQUENCY: LESS THAN DAILY
BOWEL INCONTINENCE: 1
DRY SKIN: 1
FATIGUES EASILY: 1
FATIGUES EASILY: 1
LOWER EXTREMITY EDEMA: 1
MUSCLE WEAKNESS: 1
DESCRIPTION OF MEMORY LOSS: LONG TERM
DENIES PAIN: 1
MUSCLE WEAKNESS: 1
PAIN LOCATION - EXACERBATING FACTORS: WITH MOVEMENT
STOOL FREQUENCY: LESS THAN DAILY
DESCRIPTION OF MEMORY LOSS: LONG TERM
CONSTIPATION: 1
FORGETFULNESS: 1
SLEEP QUALITY: ADEQUATE
DESCRIPTION OF MEMORY LOSS: LONG TERM
LOWER EXTREMITY EDEMA: 1
SLEEP QUALITY: ADEQUATE
BOWEL INCONTINENCE: 1
LOWER EXTREMITY EDEMA: 1
FATIGUE: 1
STOOL FREQUENCY: LESS THAN DAILY
FATIGUE: 1
PAIN LOCATION - EXACERBATING FACTORS: MOVEMENT
DESCRIPTION OF MEMORY LOSS: LONG TERM
FORGETFULNESS: 1
DESCRIPTION OF MEMORY LOSS: SHORT TERM
LAST BOWEL MOVEMENT: 66160
PERSON REPORTING PAIN: DIRECT OBSERVATION
DENIES PAIN: 1
FATIGUE: 1
STOOL FREQUENCY: LESS THAN DAILY
SLEEP QUALITY: ADEQUATE
STOOL FREQUENCY: LESS THAN DAILY
DESCRIPTION OF MEMORY LOSS: LONG TERM
DRY SKIN: 1
DRY SKIN: 1
DESCRIPTION OF MEMORY LOSS: IMMEDIATE
PERSON REPORTING PAIN: DIRECT OBSERVATION
LIMITED RANGE OF MOTION: 1
STOOL FREQUENCY: LESS THAN DAILY
NAUSEA: 1
SUBJECTIVE PAIN PROGRESSION: RESOLVED
VOMITING: 1
PERSON REPORTING PAIN: PATIENT
FORGETFULNESS: 1
LOWER EXTREMITY EDEMA: 1
LAST BOWEL MOVEMENT: 66294
DESCRIPTION OF MEMORY LOSS: SHORT TERM
PERSON REPORTING PAIN: PATIENT
LOWER EXTREMITY EDEMA: 1
STOOL FREQUENCY: LESS THAN DAILY
VOMITING: 1
FORGETFULNESS: 1
DESCRIPTION OF MEMORY LOSS: LONG TERM
PAIN: 1
LOWER EXTREMITY EDEMA: 1
DESCRIPTION OF MEMORY LOSS: SHORT TERM
FORGETFULNESS: 1
LIMITED RANGE OF MOTION: 1
LOWER EXTREMITY EDEMA: 1
FATIGUE: 1
BOWEL PATTERN NORMAL: 1
FATIGUE: 1
FORGETFULNESS: 1
DESCRIPTION OF MEMORY LOSS: IMMEDIATE
DESCRIPTION OF MEMORY LOSS: IMMEDIATE
FATIGUES EASILY: 1
LOWER EXTREMITY EDEMA: 1
DESCRIPTION OF MEMORY LOSS: LONG TERM
FORGETFULNESS: 1
DRY SKIN: 1
VOMITING: 1
PERSON REPORTING PAIN: PATIENT
PERSON REPORTING PAIN: DIRECT OBSERVATION
FORGETFULNESS: 1
PERSON REPORTING PAIN: PATIENT
STOOL FREQUENCY: LESS THAN DAILY
DENIES PAIN: 1
LAST BOWEL MOVEMENT: 66406
SLEEP QUALITY: ADEQUATE
FORGETFULNESS: 1
FATIGUES EASILY: 1
FATIGUE: 1
LAST BOWEL MOVEMENT: 66238
STOOL FREQUENCY: LESS THAN DAILY
STOOL FREQUENCY: LESS THAN DAILY
DESCRIPTION OF MEMORY LOSS: IMMEDIATE
FORGETFULNESS: 1
FORGETFULNESS: 1
SLEEP QUALITY: ADEQUATE
DESCRIPTION OF MEMORY LOSS: LONG TERM
LAST BOWEL MOVEMENT: 66166
DESCRIPTION OF MEMORY LOSS: SHORT TERM
DRY SKIN: 1
FORGETFULNESS: 1
CONSTIPATION: 1
LOWER EXTREMITY EDEMA: 1
CONSTIPATION: 1
LAST BOWEL MOVEMENT: 66378
LOWER EXTREMITY EDEMA: 1
CONSTIPATION: 1
DENIES PAIN: 1
DENIES PAIN: 1
COUGH: 1
FATIGUES EASILY: 1
FORGETFULNESS: 1
DRY SKIN: 1
DENIES PAIN: 1
LOWER EXTREMITY EDEMA: 1
SLEEP QUALITY: ADEQUATE
DESCRIPTION OF MEMORY LOSS: IMMEDIATE
DESCRIPTION OF MEMORY LOSS: SHORT TERM
DRY SKIN: 1
CONSTIPATION: 1
FATIGUE: 1
DESCRIPTION OF MEMORY LOSS: SHORT TERM
AGITATION: 1
NAUSEA: 1
FATIGUE: 1
FORGETFULNESS: 1
LOWER EXTREMITY EDEMA: 1
LIMITED RANGE OF MOTION: 1
DENIES PAIN: 1
STOOL FREQUENCY: LESS THAN DAILY
FATIGUE: 1
STOOL FREQUENCY: LESS THAN DAILY
VOMITING: 1
DESCRIPTION OF MEMORY LOSS: IMMEDIATE
DESCRIPTION OF MEMORY LOSS: SHORT TERM
DRY SKIN: 1
DESCRIPTION OF MEMORY LOSS: IMMEDIATE
DESCRIPTION OF MEMORY LOSS: LONG TERM
STOOL FREQUENCY: LESS THAN DAILY
DESCRIPTION OF MEMORY LOSS: IMMEDIATE
DESCRIPTION OF MEMORY LOSS: SHORT TERM
PERSON REPORTING PAIN: PATIENT
LAST BOWEL MOVEMENT: 66160
LOWER EXTREMITY EDEMA: 1
CONSTIPATION: 1
CONSTIPATION: 1
DENIES PAIN: 1
DENIES PAIN: 1
FORGETFULNESS: 1
MUSCLE WEAKNESS: 1
DESCRIPTION OF MEMORY LOSS: SHORT TERM
FATIGUE: 1
DESCRIPTION OF MEMORY LOSS: SHORT TERM
CONSTIPATION: 1
DESCRIPTION OF MEMORY LOSS: SHORT TERM
FATIGUE: 1
DESCRIPTION OF MEMORY LOSS: SHORT TERM
LOWER EXTREMITY EDEMA: 1
CONSTIPATION: 1
LOWER EXTREMITY EDEMA: 1
LAST BOWEL MOVEMENT: 66189
AGITATION: 1
STOOL FREQUENCY: LESS THAN DAILY
DRY SKIN: 1
DESCRIPTION OF MEMORY LOSS: IMMEDIATE
PAIN: 1
CONSTIPATION: 1
LOWER EXTREMITY EDEMA: 1
PERSON REPORTING PAIN: DIRECT OBSERVATION
LOWER EXTREMITY EDEMA: 1
LAST BOWEL MOVEMENT: 66169
LAST BOWEL MOVEMENT: 66244
FATIGUES EASILY: 1
DENIES PAIN: 1
DENIES PAIN: 1
DESCRIPTION OF MEMORY LOSS: SHORT TERM
FORGETFULNESS: 1
STOOL FREQUENCY: LESS THAN DAILY
PERSON REPORTING PAIN: DIRECT OBSERVATION
DRY SKIN: 1
DRY SKIN: 1
FORGETFULNESS: 1
DRY SKIN: 1
DESCRIPTION OF MEMORY LOSS: LONG TERM
DENIES PAIN: 1
FATIGUES EASILY: 1
LAST BOWEL MOVEMENT: 66365
SLEEP QUALITY: ADEQUATE
NAUSEA: 1
LIMITED RANGE OF MOTION: 1
DRY SKIN: 1
DESCRIPTION OF MEMORY LOSS: SHORT TERM
DRY SKIN: 1
LAST BOWEL MOVEMENT: 66373
FATIGUE: 1
SLEEP QUALITY: ADEQUATE
PERSON REPORTING PAIN: DIRECT OBSERVATION
DESCRIPTION OF MEMORY LOSS: LONG TERM
PERSON REPORTING PAIN: PATIENT
AGITATION: 1
DESCRIPTION OF MEMORY LOSS: IMMEDIATE
MUSCLE WEAKNESS: 1
DRY SKIN: 1
MUSCLE WEAKNESS: 1
LOWER EXTREMITY EDEMA: 1
STOOL FREQUENCY: LESS THAN DAILY
FATIGUE: 1
LAST BOWEL MOVEMENT: 66407
FATIGUE: 1
COUGH: 1
LIMITED RANGE OF MOTION: 1
DENIES PAIN: 1
FATIGUES EASILY: 1
SLEEP QUALITY: ADEQUATE
DESCRIPTION OF MEMORY LOSS: IMMEDIATE
SLEEP QUALITY: ADEQUATE
DESCRIPTION OF MEMORY LOSS: SHORT TERM
STOOL FREQUENCY: LESS THAN DAILY
VOMITING: 1
PAIN: 1
FORGETFULNESS: 1
LAST BOWEL MOVEMENT: 66403
DESCRIPTION OF MEMORY LOSS: SHORT TERM
CONSTIPATION: 1
NAUSEA: 1
DESCRIPTION OF MEMORY LOSS: SHORT TERM
LAST BOWEL MOVEMENT: 66350
LOWER EXTREMITY EDEMA: 1
DENIES PAIN: 1
LAST BOWEL MOVEMENT: 66350
FATIGUE: 1
PERSON REPORTING PAIN: PATIENT
DRY SKIN: 1
DESCRIPTION OF MEMORY LOSS: LONG TERM
DESCRIPTION OF MEMORY LOSS: SHORT TERM
DESCRIPTION OF MEMORY LOSS: IMMEDIATE
FATIGUES EASILY: 1
DESCRIPTION OF MEMORY LOSS: SHORT TERM
LAST BOWEL MOVEMENT: 66112
LOWER EXTREMITY EDEMA: 1
FATIGUE: 1
DESCRIPTION OF MEMORY LOSS: LONG TERM
DESCRIPTION OF MEMORY LOSS: SHORT TERM
PERSON REPORTING PAIN: PATIENT
PERSON REPORTING PAIN: PATIENT
SLEEP QUALITY: ADEQUATE
PAIN LOCATION - PAIN SEVERITY: 8/10
SLEEP QUALITY: ADEQUATE
PERSON REPORTING PAIN: PATIENT
DIARRHEA: 1
STOOL FREQUENCY: LESS THAN DAILY
DENIES PAIN: 1
SLEEP QUALITY: ADEQUATE
SLEEP QUALITY: ADEQUATE
DENIES PAIN: 1
DESCRIPTION OF MEMORY LOSS: SHORT TERM
DENIES PAIN: 1
STOOL FREQUENCY: LESS THAN DAILY
FORGETFULNESS: 1
PERSON REPORTING PAIN: PATIENT
DESCRIPTION OF MEMORY LOSS: LONG TERM
PAIN: 1
BOWEL INCONTINENCE: 1
DESCRIPTION OF MEMORY LOSS: LONG TERM
DESCRIPTION OF MEMORY LOSS: SHORT TERM
DRY SKIN: 1
LOWER EXTREMITY EDEMA: 1
SLEEP QUALITY: ADEQUATE
DESCRIPTION OF MEMORY LOSS: IMMEDIATE
DRY SKIN: 1
CONSTIPATION: 1
NAUSEA: 1
DESCRIPTION OF MEMORY LOSS: SHORT TERM
BOWEL INCONTINENCE: 1
LOWER EXTREMITY EDEMA: 1
MUSCLE WEAKNESS: 1
DENIES PAIN: 1
SLEEP QUALITY: POOR
DESCRIPTION OF MEMORY LOSS: LONG TERM
LAST BOWEL MOVEMENT: 66284
DESCRIPTION OF MEMORY LOSS: LONG TERM
DESCRIPTION OF MEMORY LOSS: LONG TERM
STOOL FREQUENCY: LESS THAN DAILY
DENIES PAIN: 1
FATIGUES EASILY: 1
FORGETFULNESS: 1
LIMITED RANGE OF MOTION: 1
FATIGUE: 1
CONSTIPATION: 1
FATIGUE: 1
CONSTIPATION: 1
LAST BOWEL MOVEMENT: 66241
DESCRIPTION OF MEMORY LOSS: IMMEDIATE
FATIGUE: 1
FATIGUES EASILY: 1
DRY SKIN: 1
FORGETFULNESS: 1
SLEEP QUALITY: ADEQUATE
FORGETFULNESS: 1
SLEEP QUALITY: ADEQUATE
STOOL FREQUENCY: LESS THAN DAILY
DESCRIPTION OF MEMORY LOSS: SHORT TERM
LAST BOWEL MOVEMENT: 66119
PERSON REPORTING PAIN: DIRECT OBSERVATION
LAST BOWEL MOVEMENT: 66129
NAUSEA: 1
DESCRIPTION OF MEMORY LOSS: SHORT TERM
SUBJECTIVE PAIN PROGRESSION: GRADUALLY IMPROVING
NAUSEA: 1
MUSCLE WEAKNESS: 1
DESCRIPTION OF MEMORY LOSS: SHORT TERM
SLEEP QUALITY: ADEQUATE
LIMITED RANGE OF MOTION: 1
SLEEP QUALITY: ADEQUATE
DESCRIPTION OF MEMORY LOSS: LONG TERM
FATIGUES EASILY: 1
DESCRIPTION OF MEMORY LOSS: IMMEDIATE
SLEEP QUALITY: ADEQUATE
CONSTIPATION: 1
COUGH CHARACTERISTICS: NON-PRODUCTIVE
DENIES PAIN: 1
DESCRIPTION OF MEMORY LOSS: SHORT TERM
LIMITED RANGE OF MOTION: 1
DESCRIPTION OF MEMORY LOSS: SHORT TERM
LOWER EXTREMITY EDEMA: 1
PERSON REPORTING PAIN: PATIENT
PERSON REPORTING PAIN: PATIENT
LOWER EXTREMITY EDEMA: 1
FORGETFULNESS: 1
STOOL FREQUENCY: LESS THAN DAILY
FORGETFULNESS: 1
DENIES PAIN: 1
CONSTIPATION: 1
LOWER EXTREMITY EDEMA: 1
LOWER EXTREMITY EDEMA: 1
SLEEP QUALITY: ADEQUATE
DRY SKIN: 1
LOWER EXTREMITY EDEMA: 1
CONSTIPATION: 1
LAST BOWEL MOVEMENT: 66385
CONSTIPATION: 1
CONSTIPATION: 1
LOWEST PAIN SEVERITY IN PAST 24 HOURS: 0/10
FATIGUES EASILY: 1
DESCRIPTION OF MEMORY LOSS: SHORT TERM
LAST BOWEL MOVEMENT: 66140
DESCRIPTION OF MEMORY LOSS: SHORT TERM
FATIGUE: 1
DESCRIPTION OF MEMORY LOSS: LONG TERM
SLEEP QUALITY: ADEQUATE
DESCRIPTION OF MEMORY LOSS: LONG TERM
SLEEP QUALITY: ADEQUATE
AGITATION: 1
VOMITING: 1
FORGETFULNESS: 1
DENIES PAIN: 1
MUSCLE WEAKNESS: 1
SLEEP QUALITY: ADEQUATE
SLEEP QUALITY: ADEQUATE
FATIGUES EASILY: 1
DENIES PAIN: 1
PAIN LOCATION - RELIEVING FACTORS: REST
FORGETFULNESS: 1
STOOL FREQUENCY: LESS THAN DAILY
STOOL FREQUENCY: LESS THAN DAILY
SLEEP QUALITY: ADEQUATE
PERSON REPORTING PAIN: PATIENT
DESCRIPTION OF MEMORY LOSS: LONG TERM
FATIGUES EASILY: 1
STOOL FREQUENCY: LESS THAN DAILY
SLEEP QUALITY: ADEQUATE
FATIGUE: 1
DESCRIPTION OF MEMORY LOSS: IMMEDIATE
DRY SKIN: 1
FORGETFULNESS: 1
SLEEP QUALITY: ADEQUATE
DESCRIPTION OF MEMORY LOSS: SHORT TERM
LAST BOWEL MOVEMENT: 66181
MUSCLE WEAKNESS: 1
LAST BOWEL MOVEMENT: 66181
PERSON REPORTING PAIN: PATIENT
CONSTIPATION: 1
DRY SKIN: 1
STOOL FREQUENCY: LESS THAN DAILY
LOWER EXTREMITY EDEMA: 1
VOMITING: 1
VOMITING: 1
FATIGUES EASILY: 1
DESCRIPTION OF MEMORY LOSS: IMMEDIATE
DESCRIPTION OF MEMORY LOSS: SHORT TERM
CONSTIPATION: 1
LOWER EXTREMITY EDEMA: 1
LOWER EXTREMITY EDEMA: 1
STOOL FREQUENCY: LESS THAN DAILY
DESCRIPTION OF MEMORY LOSS: IMMEDIATE
DENIES PAIN: 1
DESCRIPTION OF MEMORY LOSS: IMMEDIATE
PAIN LOCATION - PAIN FREQUENCY: FREQUENT
SLEEP QUALITY: ADEQUATE
CONSTIPATION: 1
SLEEP QUALITY: ADEQUATE
DRY SKIN: 1
LIMITED RANGE OF MOTION: 1
DENIES PAIN: 1
DENIES PAIN: 1
STOOL FREQUENCY: LESS THAN DAILY
DENIES PAIN: 1
SLEEP QUALITY: ADEQUATE
FATIGUE: 1
DESCRIPTION OF MEMORY LOSS: LONG TERM
FATIGUE: 1
STOOL FREQUENCY: LESS THAN DAILY
FORGETFULNESS: 1
FATIGUES EASILY: 1
DESCRIPTION OF MEMORY LOSS: LONG TERM
MUSCLE WEAKNESS: 1
PERSON REPORTING PAIN: DIRECT OBSERVATION
PERSON REPORTING PAIN: PATIENT
FATIGUE: 1
STOOL FREQUENCY: LESS THAN DAILY
SLEEP QUALITY: ADEQUATE
FATIGUES EASILY: 1
VOMITING: 1
STOOL FREQUENCY: LESS THAN DAILY
PERSON REPORTING PAIN: PATIENT
DRY SKIN: 1
DENIES PAIN: 1
MUSCLE WEAKNESS: 1
DRY SKIN: 1
CONSTIPATION: 1
DIARRHEA: 1
FATIGUES EASILY: 1
DENIES PAIN: 1
PERSON REPORTING PAIN: PATIENT
FATIGUE: 1
SLEEP QUALITY: ADEQUATE
DRY SKIN: 1
FORGETFULNESS: 1
STOOL FREQUENCY: LESS THAN DAILY
DESCRIPTION OF MEMORY LOSS: SHORT TERM
LOWER EXTREMITY EDEMA: 1
DRY SKIN: 1
DRY SKIN: 1
DESCRIPTION OF MEMORY LOSS: LONG TERM
DESCRIPTION OF MEMORY LOSS: IMMEDIATE
DESCRIPTION OF MEMORY LOSS: SHORT TERM
PERSON REPORTING PAIN: PATIENT
AGITATION: 1
DESCRIPTION OF MEMORY LOSS: LONG TERM
SLEEP QUALITY: ADEQUATE
DRY SKIN: 1
PERSON REPORTING PAIN: DIRECT OBSERVATION
AGITATION: 1
CONSTIPATION: 1
DESCRIPTION OF MEMORY LOSS: SHORT TERM
COUGH CHARACTERISTICS: NON-PRODUCTIVE
DRY SKIN: 1
DESCRIPTION OF MEMORY LOSS: SHORT TERM
DESCRIPTION OF MEMORY LOSS: SHORT TERM
DRY SKIN: 1
FATIGUES EASILY: 1
DESCRIPTION OF MEMORY LOSS: LONG TERM
CONSTIPATION: 1
FATIGUE: 1
SLEEP QUALITY: ADEQUATE
FORGETFULNESS: 1
CONSTIPATION: 1
LOWER EXTREMITY EDEMA: 1
MUSCLE WEAKNESS: 1
FORGETFULNESS: 1
COUGH: 1
CONSTIPATION: 1
DENIES PAIN: 1
FATIGUE: 1
SLEEP QUALITY: ADEQUATE
BOWEL INCONTINENCE: 1
DESCRIPTION OF MEMORY LOSS: IMMEDIATE
DESCRIPTION OF MEMORY LOSS: SHORT TERM
DESCRIPTION OF MEMORY LOSS: SHORT TERM
FORGETFULNESS: 1
NAUSEA: 1
CONSTIPATION: 1
PERSON REPORTING PAIN: PATIENT
LAST BOWEL MOVEMENT: 66286
FATIGUE: 1
LAST BOWEL MOVEMENT: 66268
FATIGUES EASILY: 1
LAST BOWEL MOVEMENT: 66216
FATIGUES EASILY: 1
LIMITED RANGE OF MOTION: 1
LIMITED RANGE OF MOTION: 1
FATIGUE: 1
FORGETFULNESS: 1
STOOL FREQUENCY: LESS THAN DAILY
LOWER EXTREMITY EDEMA: 1
PERSON REPORTING PAIN: DIRECT OBSERVATION
PERSON REPORTING PAIN: PATIENT
SLEEP QUALITY: ADEQUATE
DESCRIPTION OF MEMORY LOSS: SHORT TERM
SLEEP QUALITY: ADEQUATE
PERSON REPORTING PAIN: PATIENT
LOWER EXTREMITY EDEMA: 1
FORGETFULNESS: 1
VOMITING: 1
CONSTIPATION: 1
LIMITED RANGE OF MOTION: 1
MUSCLE WEAKNESS: 1
MUSCLE WEAKNESS: 1
PERSON REPORTING PAIN: DIRECT OBSERVATION
COUGH CHARACTERISTICS: MOIST
FATIGUE: 1
MUSCLE WEAKNESS: 1
DRY SKIN: 1
DESCRIPTION OF MEMORY LOSS: SHORT TERM
STOOL FREQUENCY: LESS THAN DAILY
DESCRIPTION OF MEMORY LOSS: IMMEDIATE
FATIGUE: 1
STOOL FREQUENCY: LESS THAN DAILY
PERSON REPORTING PAIN: DIRECT OBSERVATION
AGITATION: 1
DRY SKIN: 1
DESCRIPTION OF MEMORY LOSS: LONG TERM
FATIGUES EASILY: 1
LOWER EXTREMITY EDEMA: 1
CONSTIPATION: 1
DRY SKIN: 1
FATIGUES EASILY: 1
DESCRIPTION OF MEMORY LOSS: LONG TERM
LAST BOWEL MOVEMENT: 66345
LIMITED RANGE OF MOTION: 1
DESCRIPTION OF MEMORY LOSS: LONG TERM
DENIES PAIN: 1
DENIES PAIN: 1
FATIGUES EASILY: 1
LAST BOWEL MOVEMENT: 66315
SLEEP QUALITY: ADEQUATE
FATIGUE: 1
DENIES PAIN: 1
FATIGUE: 1
STOOL FREQUENCY: LESS THAN DAILY
MUSCLE WEAKNESS: 1
PERSON REPORTING PAIN: PATIENT
DESCRIPTION OF MEMORY LOSS: LONG TERM
CONSTIPATION: 1
DESCRIPTION OF MEMORY LOSS: SHORT TERM
DRY SKIN: 1
DRY SKIN: 1
SLEEP QUALITY: ADEQUATE
SLEEP QUALITY: ADEQUATE
DENIES PAIN: 1
DESCRIPTION OF MEMORY LOSS: LONG TERM
DENIES PAIN: 1
FATIGUE: 1
DRY SKIN: 1
PERSON REPORTING PAIN: PATIENT
DENIES PAIN: 1
STOOL FREQUENCY: LESS THAN DAILY
FORGETFULNESS: 1
FATIGUES EASILY: 1
FATIGUES EASILY: 1
PERSON REPORTING PAIN: PATIENT
STOOL FREQUENCY: LESS THAN DAILY
CONSTIPATION: 1
SLEEP QUALITY: ADEQUATE
PERSON REPORTING PAIN: PATIENT
DRY SKIN: 1
SLEEP QUALITY: ADEQUATE
LIMITED RANGE OF MOTION: 1
DESCRIPTION OF MEMORY LOSS: LONG TERM
SLEEP QUALITY: ADEQUATE
LOWER EXTREMITY EDEMA: 1
SLEEP QUALITY: ADEQUATE
CONSTIPATION: 1
DESCRIPTION OF MEMORY LOSS: LONG TERM
COUGH: 1
PERSON REPORTING PAIN: DIRECT OBSERVATION
LOWER EXTREMITY EDEMA: 1
DESCRIPTION OF MEMORY LOSS: IMMEDIATE
LOWER EXTREMITY EDEMA: 1
VOMITING: 1
DRY SKIN: 1
STOOL FREQUENCY: LESS THAN DAILY
LOWER EXTREMITY EDEMA: 1
MUSCLE WEAKNESS: 1
DRY SKIN: 1
FATIGUE: 1
FATIGUE: 1
DESCRIPTION OF MEMORY LOSS: LONG TERM
MUSCLE WEAKNESS: 1
DRY SKIN: 1
LOWER EXTREMITY EDEMA: 1
DESCRIPTION OF MEMORY LOSS: IMMEDIATE
FATIGUE: 1
FATIGUE: 1
FATIGUES EASILY: 1
LAST BOWEL MOVEMENT: 66181
FORGETFULNESS: 1
LAST BOWEL MOVEMENT: 66359
CONSTIPATION: 1
DRY SKIN: 1
DESCRIPTION OF MEMORY LOSS: SHORT TERM
COUGH: 1
BOWEL INCONTINENCE: 1
FORGETFULNESS: 1
DESCRIPTION OF MEMORY LOSS: LONG TERM
MUSCLE WEAKNESS: 1
LOWER EXTREMITY EDEMA: 1
FATIGUE: 1
DESCRIPTION OF MEMORY LOSS: IMMEDIATE
NAUSEA: 1
NAUSEA: 1
DESCRIPTION OF MEMORY LOSS: LONG TERM
DENIES PAIN: 1
DESCRIPTION OF MEMORY LOSS: LONG TERM
DESCRIPTION OF MEMORY LOSS: LONG TERM
SLEEP QUALITY: ADEQUATE
PERSON REPORTING PAIN: DIRECT OBSERVATION
LIMITED RANGE OF MOTION: 1
CONSTIPATION: 1
LAST BOWEL MOVEMENT: 66280
SUBJECTIVE PAIN PROGRESSION: RESOLVED
COUGH CHARACTERISTICS: NON-PRODUCTIVE
HIGHEST PAIN SEVERITY IN PAST 24 HOURS: 0/10
DENIES PAIN: 1
PERSON REPORTING PAIN: DIRECT OBSERVATION
DESCRIPTION OF MEMORY LOSS: SHORT TERM
FATIGUES EASILY: 1
CONSTIPATION: 1
LOWER EXTREMITY EDEMA: 1
SLEEP QUALITY: ADEQUATE
STOOL FREQUENCY: LESS THAN DAILY
DRY SKIN: 1
DESCRIPTION OF MEMORY LOSS: LONG TERM
LOWER EXTREMITY EDEMA: 1
LOWER EXTREMITY EDEMA: 1
DESCRIPTION OF MEMORY LOSS: IMMEDIATE
SLEEP QUALITY: ADEQUATE
PAIN: 1
DESCRIPTION OF MEMORY LOSS: LONG TERM
FORGETFULNESS: 1
COUGH CHARACTERISTICS: NON-PRODUCTIVE
FATIGUE: 1
PERSON REPORTING PAIN: PATIENT
PERSON REPORTING PAIN: PATIENT
STOOL FREQUENCY: LESS THAN DAILY
PERSON REPORTING PAIN: PATIENT
FATIGUES EASILY: 1
DENIES PAIN: 1
STOOL FREQUENCY: LESS THAN DAILY
FATIGUES EASILY: 1
FATIGUES EASILY: 1
LOWER EXTREMITY EDEMA: 1
STOOL FREQUENCY: LESS THAN DAILY
DENIES PAIN: 1
FORGETFULNESS: 1
SLEEP QUALITY: ADEQUATE
MUSCLE WEAKNESS: 1
DENIES PAIN: 1
SLEEP QUALITY: ADEQUATE
FATIGUE: 1
DRY SKIN: 1
LOWER EXTREMITY EDEMA: 1
DESCRIPTION OF MEMORY LOSS: SHORT TERM
PAIN: 1
VOMITING: 1
DESCRIPTION OF MEMORY LOSS: IMMEDIATE
MUSCLE WEAKNESS: 1
LOWER EXTREMITY EDEMA: 1
LOWER EXTREMITY EDEMA: 1
DESCRIPTION OF MEMORY LOSS: IMMEDIATE
LOWER EXTREMITY EDEMA: 1
DESCRIPTION OF MEMORY LOSS: SHORT TERM
CONSTIPATION: 1
DRY SKIN: 1
FATIGUES EASILY: 1
FATIGUE: 1
DESCRIPTION OF MEMORY LOSS: LONG TERM
BOWEL INCONTINENCE: 1
DENIES PAIN: 1
DRY SKIN: 1
COUGH CHARACTERISTICS: MOIST
PAIN: 1
STOOL FREQUENCY: LESS THAN DAILY
STOOL FREQUENCY: LESS THAN DAILY
DESCRIPTION OF MEMORY LOSS: IMMEDIATE
PERSON REPORTING PAIN: DIRECT OBSERVATION
DENIES PAIN: 1
DENIES PAIN: 1
SLEEP QUALITY: ADEQUATE
DENIES PAIN: 1
LAST BOWEL MOVEMENT: 66286
STOOL FREQUENCY: LESS THAN DAILY
CONSTIPATION: 1
DRY SKIN: 1
STOOL FREQUENCY: LESS THAN DAILY
NAUSEA: 1
DENIES PAIN: 1
DESCRIPTION OF MEMORY LOSS: IMMEDIATE
LAST BOWEL MOVEMENT: 66189
DESCRIPTION OF MEMORY LOSS: IMMEDIATE
PERSON REPORTING PAIN: PATIENT
LIMITED RANGE OF MOTION: 1
DESCRIPTION OF MEMORY LOSS: SHORT TERM
DESCRIPTION OF MEMORY LOSS: SHORT TERM
FATIGUE: 1
DENIES PAIN: 1
NAUSEA: 1
SLEEP QUALITY: ADEQUATE
CONSTIPATION: 1
STOOL FREQUENCY: LESS THAN DAILY
SLEEP QUALITY: ADEQUATE
FORGETFULNESS: 1
DESCRIPTION OF MEMORY LOSS: LONG TERM
DESCRIPTION OF MEMORY LOSS: LONG TERM
SLEEP QUALITY: ADEQUATE
DESCRIPTION OF MEMORY LOSS: SHORT TERM
LAST BOWEL MOVEMENT: 66144
LOWER EXTREMITY EDEMA: 1
SLEEP QUALITY: ADEQUATE
DESCRIPTION OF MEMORY LOSS: SHORT TERM
DESCRIPTION OF MEMORY LOSS: SHORT TERM
FORGETFULNESS: 1
DRY SKIN: 1
DESCRIPTION OF MEMORY LOSS: SHORT TERM
DESCRIPTION OF MEMORY LOSS: LONG TERM
LAST BOWEL MOVEMENT: 66227
DENIES PAIN: 1
FATIGUES EASILY: 1
LAST BOWEL MOVEMENT: 66315
AGITATION: 1
FORGETFULNESS: 1
LIMITED RANGE OF MOTION: 1
FATIGUE: 1
STOOL FREQUENCY: DAILY
PERSON REPORTING PAIN: DIRECT OBSERVATION
DESCRIPTION OF MEMORY LOSS: LONG TERM
LOWER EXTREMITY EDEMA: 1
STOOL FREQUENCY: LESS THAN DAILY
DESCRIPTION OF MEMORY LOSS: SHORT TERM
LOWER EXTREMITY EDEMA: 1
FATIGUES EASILY: 1
DESCRIPTION OF MEMORY LOSS: LONG TERM
FATIGUES EASILY: 1
DRY SKIN: 1
FORGETFULNESS: 1
PERSON REPORTING PAIN: PATIENT
FATIGUES EASILY: 1
FATIGUES EASILY: 1
PERSON REPORTING PAIN: DIRECT OBSERVATION
PERSON REPORTING PAIN: PATIENT
FATIGUE: 1
DESCRIPTION OF MEMORY LOSS: LONG TERM
LAST BOWEL MOVEMENT: 66407
PERSON REPORTING PAIN: PATIENT
DESCRIPTION OF MEMORY LOSS: SHORT TERM
DENIES PAIN: 1
FATIGUE: 1
PERSON REPORTING PAIN: PATIENT
LOWER EXTREMITY EDEMA: 1
LIMITED RANGE OF MOTION: 1
NAUSEA: 1
STOOL FREQUENCY: LESS THAN DAILY
LOWER EXTREMITY EDEMA: 1
LAST BOWEL MOVEMENT: 66259
DESCRIPTION OF MEMORY LOSS: SHORT TERM
CONSTIPATION: 1
DENIES PAIN: 1
PAIN: 1
PERSON REPORTING PAIN: PATIENT
SLEEP QUALITY: ADEQUATE
LAST BOWEL MOVEMENT: 66223
DENIES PAIN: 1
STOOL FREQUENCY: LESS THAN DAILY
FORGETFULNESS: 1
FORGETFULNESS: 1
SLEEP QUALITY: ADEQUATE
SLEEP QUALITY: ADEQUATE
FATIGUE: 1
BOWEL INCONTINENCE: 1
DENIES PAIN: 1
FATIGUES EASILY: 1
DESCRIPTION OF MEMORY LOSS: LONG TERM
SLEEP QUALITY: ADEQUATE
DRY SKIN: 1
FATIGUES EASILY: 1
LIMITED RANGE OF MOTION: 1
SLEEP QUALITY: ADEQUATE
CONSTIPATION: 1
PAIN: 1
STOOL FREQUENCY: LESS THAN DAILY
FORGETFULNESS: 1
STOOL FREQUENCY: LESS THAN DAILY
LOWER EXTREMITY EDEMA: 1
MUSCLE WEAKNESS: 1
DENIES PAIN: 1
SLEEP QUALITY: ADEQUATE
COUGH CHARACTERISTICS: MOIST
FATIGUES EASILY: 1
HIGHEST PAIN SEVERITY IN PAST 24 HOURS: 8/10
FATIGUE: 1
FATIGUES EASILY: 1
FORGETFULNESS: 1
LIMITED RANGE OF MOTION: 1
STOOL FREQUENCY: LESS THAN DAILY
FORGETFULNESS: 1
SLEEP QUALITY: ADEQUATE
SLEEP QUALITY: ADEQUATE
MUSCLE WEAKNESS: 1
LAST BOWEL MOVEMENT: 66135
DRY SKIN: 1
LIMITED RANGE OF MOTION: 1
LAST BOWEL MOVEMENT: 66286
VOMITING: 1
PAIN LOCATION - RELIEVING FACTORS: REST
DESCRIPTION OF MEMORY LOSS: SHORT TERM
DESCRIPTION OF MEMORY LOSS: LONG TERM
LAST BOWEL MOVEMENT: 66312
DENIES PAIN: 1
DESCRIPTION OF MEMORY LOSS: SHORT TERM
LOWER EXTREMITY EDEMA: 1
DESCRIPTION OF MEMORY LOSS: SHORT TERM
CONSTIPATION: 1
DESCRIPTION OF MEMORY LOSS: IMMEDIATE
CONSTIPATION: 1
LIMITED RANGE OF MOTION: 1
LAST BOWEL MOVEMENT: 66181
LAST BOWEL MOVEMENT: 66301
DESCRIPTION OF MEMORY LOSS: LONG TERM
MUSCLE WEAKNESS: 1
DIARRHEA: 1
MUSCLE WEAKNESS: 1
FORGETFULNESS: 1
MUSCLE WEAKNESS: 1
DRY SKIN: 1
CONSTIPATION: 1
PERSON REPORTING PAIN: DIRECT OBSERVATION
DENIES PAIN: 1
DRY SKIN: 1
CONSTIPATION: 1
COUGH CHARACTERISTICS: MOIST
DESCRIPTION OF MEMORY LOSS: LONG TERM
FATIGUES EASILY: 1
MUSCLE WEAKNESS: 1
LIMITED RANGE OF MOTION: 1
CONSTIPATION: 1
DENIES PAIN: 1
DENIES PAIN: 1
DESCRIPTION OF MEMORY LOSS: LONG TERM
CONSTIPATION: 1
DENIES PAIN: 1
MUSCLE WEAKNESS: 1
CONSTIPATION: 1
DENIES PAIN: 1
DESCRIPTION OF MEMORY LOSS: SHORT TERM
LIMITED RANGE OF MOTION: 1
DESCRIPTION OF MEMORY LOSS: SHORT TERM
PERSON REPORTING PAIN: DIRECT OBSERVATION
FATIGUE: 1
FORGETFULNESS: 1
LIMITED RANGE OF MOTION: 1
LAST BOWEL MOVEMENT: 66153
FORGETFULNESS: 1
CONSTIPATION: 1
MUSCLE WEAKNESS: 1
FATIGUE: 1
FORGETFULNESS: 1
CONSTIPATION: 1
SLEEP QUALITY: ADEQUATE
DENIES PAIN: 1
DESCRIPTION OF MEMORY LOSS: SHORT TERM
STOOL FREQUENCY: LESS THAN DAILY
DESCRIPTION OF MEMORY LOSS: SHORT TERM
SLEEP QUALITY: ADEQUATE
FATIGUE: 1
FORGETFULNESS: 1
COUGH: 1
LOWER EXTREMITY EDEMA: 1
STOOL FREQUENCY: LESS THAN DAILY
DRY SKIN: 1
FORGETFULNESS: 1
LOWER EXTREMITY EDEMA: 1
FORGETFULNESS: 1
SLEEP QUALITY: ADEQUATE
LAST BOWEL MOVEMENT: 66189
PERSON REPORTING PAIN: DIRECT OBSERVATION
CONSTIPATION: 1
DESCRIPTION OF MEMORY LOSS: IMMEDIATE
FORGETFULNESS: 1
CONSTIPATION: 1
FATIGUE: 1
STOOL FREQUENCY: LESS THAN DAILY
PERSON REPORTING PAIN: PATIENT
SLEEP QUALITY: ADEQUATE
DESCRIPTION OF MEMORY LOSS: LONG TERM
PERSON REPORTING PAIN: PATIENT
DRY SKIN: 1
DESCRIPTION OF MEMORY LOSS: SHORT TERM
DESCRIPTION OF MEMORY LOSS: LONG TERM
MUSCLE WEAKNESS: 1
FORGETFULNESS: 1
CONSTIPATION: 1
DESCRIPTION OF MEMORY LOSS: LONG TERM
PERSON REPORTING PAIN: PATIENT
VOMITING: 1
BOWEL INCONTINENCE: 1
PERSON REPORTING PAIN: PATIENT
SLEEP QUALITY: ADEQUATE
LIMITED RANGE OF MOTION: 1
LOWER EXTREMITY EDEMA: 1
DENIES PAIN: 1
CONSTIPATION: 1
DENIES PAIN: 1
SLEEP QUALITY: ADEQUATE
FATIGUE: 1
DESCRIPTION OF MEMORY LOSS: LONG TERM
CONSTIPATION: 1
FATIGUE: 1
FATIGUE: 1
PERSON REPORTING PAIN: PATIENT
MUSCLE WEAKNESS: 1
CONSTIPATION: 1
PAIN LOCATION: LEGS
PAIN LOCATION - RELIEVING FACTORS: COLD CLOTH
PERSON REPORTING PAIN: PATIENT
PERSON REPORTING PAIN: PATIENT
VOMITING: 1
FORGETFULNESS: 1
MUSCLE WEAKNESS: 1
FATIGUE: 1
DESCRIPTION OF MEMORY LOSS: IMMEDIATE
SLEEP QUALITY: ADEQUATE
DESCRIPTION OF MEMORY LOSS: SHORT TERM
DESCRIPTION OF MEMORY LOSS: SHORT TERM
COUGH CHARACTERISTICS: MOIST
LAST BOWEL MOVEMENT: 66394
MUSCLE WEAKNESS: 1
LIMITED RANGE OF MOTION: 1
DESCRIPTION OF MEMORY LOSS: SHORT TERM
STOOL FREQUENCY: LESS THAN DAILY
STOOL FREQUENCY: LESS THAN DAILY
DRY SKIN: 1
LAST BOWEL MOVEMENT: 66210
FORGETFULNESS: 1
PERSON REPORTING PAIN: PATIENT
AGITATION: 1
DESCRIPTION OF MEMORY LOSS: SHORT TERM
DESCRIPTION OF MEMORY LOSS: LONG TERM
FATIGUES EASILY: 1
DESCRIPTION OF MEMORY LOSS: SHORT TERM
DENIES PAIN: 1
LAST BOWEL MOVEMENT: 66219
PERSON REPORTING PAIN: PATIENT
DESCRIPTION OF MEMORY LOSS: LONG TERM
DRY SKIN: 1
STOOL FREQUENCY: LESS THAN DAILY
FATIGUES EASILY: 1
STOOL FREQUENCY: LESS THAN DAILY
COUGH: 1
LIMITED RANGE OF MOTION: 1
DESCRIPTION OF MEMORY LOSS: LONG TERM
BOWEL INCONTINENCE: 1
DENIES PAIN: 1
PERSON REPORTING PAIN: PATIENT
STOOL FREQUENCY: LESS THAN DAILY
DRY SKIN: 1
DESCRIPTION OF MEMORY LOSS: SHORT TERM
DENIES PAIN: 1
FORGETFULNESS: 1
DRY SKIN: 1
PAIN: 1
FATIGUE: 1
LOWER EXTREMITY EDEMA: 1
FORGETFULNESS: 1
FATIGUES EASILY: 1
CONSTIPATION: 1
LOWER EXTREMITY EDEMA: 1
PERSON REPORTING PAIN: PATIENT
FORGETFULNESS: 1
SLEEP QUALITY: ADEQUATE
DESCRIPTION OF MEMORY LOSS: LONG TERM
BOWEL INCONTINENCE: 1
CONSTIPATION: 1
DESCRIPTION OF MEMORY LOSS: LONG TERM
LAST BOWEL MOVEMENT: 66304
FATIGUES EASILY: 1
MUSCLE WEAKNESS: 1
DESCRIPTION OF MEMORY LOSS: SHORT TERM
STOOL FREQUENCY: LESS THAN DAILY
PAIN: 1
FATIGUE: 1
DESCRIPTION OF MEMORY LOSS: SHORT TERM
LOWER EXTREMITY EDEMA: 1
PERSON REPORTING PAIN: PATIENT
STOOL FREQUENCY: LESS THAN DAILY
STOOL FREQUENCY: LESS THAN DAILY
CONSTIPATION: 1
SLEEP QUALITY: ADEQUATE
FORGETFULNESS: 1
DESCRIPTION OF MEMORY LOSS: IMMEDIATE
STOOL FREQUENCY: LESS THAN DAILY
DESCRIPTION OF MEMORY LOSS: LONG TERM
LIMITED RANGE OF MOTION: 1
DENIES PAIN: 1
DENIES PAIN: 1
VOMITING: 1
DESCRIPTION OF MEMORY LOSS: SHORT TERM
DESCRIPTION OF MEMORY LOSS: LONG TERM
COUGH: 1
PAIN: 1
LOWER EXTREMITY EDEMA: 1
MUSCLE WEAKNESS: 1
NAUSEA: 1
LOWER EXTREMITY EDEMA: 1
FATIGUE: 1
LAST BOWEL MOVEMENT: 66378
DENIES PAIN: 1
STOOL FREQUENCY: LESS THAN DAILY
FATIGUE: 1
PERSON REPORTING PAIN: PATIENT
LOWER EXTREMITY EDEMA: 1
CONSTIPATION: 1
FATIGUE: 1
COUGH CHARACTERISTICS: MOIST
PERSON REPORTING PAIN: PATIENT
PAIN: 1
LAST BOWEL MOVEMENT: 66266
DRY SKIN: 1
DESCRIPTION OF MEMORY LOSS: LONG TERM
LOWER EXTREMITY EDEMA: 1
STOOL FREQUENCY: LESS THAN DAILY
CONSTIPATION: 1
LOWER EXTREMITY EDEMA: 1
STOOL FREQUENCY: LESS THAN DAILY
PERSON REPORTING PAIN: PATIENT
FORGETFULNESS: 1
FATIGUES EASILY: 1
PAIN LOCATION: RIGHT LEG
DENIES PAIN: 1
LOWER EXTREMITY EDEMA: 1
LAST BOWEL MOVEMENT: 66120
DRY SKIN: 1
SLEEP QUALITY: ADEQUATE
FATIGUE: 1
DESCRIPTION OF MEMORY LOSS: IMMEDIATE
DRY SKIN: 1
LIMITED RANGE OF MOTION: 1
PERSON REPORTING PAIN: PATIENT
DENIES PAIN: 1
DENIES PAIN: 1
DESCRIPTION OF MEMORY LOSS: IMMEDIATE
FATIGUES EASILY: 1
PERSON REPORTING PAIN: PATIENT
DRY SKIN: 1
LOWER EXTREMITY EDEMA: 1
LOWER EXTREMITY EDEMA: 1
CONSTIPATION: 1
FATIGUE: 1
CONSTIPATION: 1
DRY SKIN: 1
PERSON REPORTING PAIN: DIRECT OBSERVATION
CONSTIPATION: 1
FORGETFULNESS: 1
LAST BOWEL MOVEMENT: 66247
FORGETFULNESS: 1
VOMITING: 1
FATIGUES EASILY: 1
CONSTIPATION: 1
DESCRIPTION OF MEMORY LOSS: IMMEDIATE
DESCRIPTION OF MEMORY LOSS: LONG TERM
DESCRIPTION OF MEMORY LOSS: SHORT TERM
DESCRIPTION OF MEMORY LOSS: SHORT TERM
STOOL FREQUENCY: LESS THAN DAILY
FORGETFULNESS: 1
DESCRIPTION OF MEMORY LOSS: LONG TERM
FATIGUE: 1
CONSTIPATION: 1
LAST BOWEL MOVEMENT: 66114
STOOL FREQUENCY: LESS THAN DAILY
DRY SKIN: 1
FATIGUES EASILY: 1
LIMITED RANGE OF MOTION: 1
FORGETFULNESS: 1
DENIES PAIN: 1
AGITATION: 1
FATIGUE: 1
VOMITING: 1
DENIES PAIN: 1
LOWER EXTREMITY EDEMA: 1
CONSTIPATION: 1
LIMITED RANGE OF MOTION: 1
DRY SKIN: 1
MUSCLE WEAKNESS: 1
FORGETFULNESS: 1
DESCRIPTION OF MEMORY LOSS: SHORT TERM
FORGETFULNESS: 1
DRY SKIN: 1
FORGETFULNESS: 1
STOOL FREQUENCY: LESS THAN DAILY
PERSON REPORTING PAIN: PATIENT
FATIGUES EASILY: 1
DESCRIPTION OF MEMORY LOSS: LONG TERM
AGITATION: 1
LOWEST PAIN SEVERITY IN PAST 24 HOURS: 0/10
CONSTIPATION: 1
DRY SKIN: 1
FATIGUE: 1
FATIGUE: 1
SLEEP QUALITY: ADEQUATE
CONSTIPATION: 1
NAUSEA: 1
FATIGUES EASILY: 1
MUSCLE WEAKNESS: 1
MUSCLE WEAKNESS: 1
STOOL FREQUENCY: LESS THAN DAILY
SLEEP QUALITY: ADEQUATE
DESCRIPTION OF MEMORY LOSS: SHORT TERM
DENIES PAIN: 1
FORGETFULNESS: 1
DESCRIPTION OF MEMORY LOSS: LONG TERM
FORGETFULNESS: 1
BOWEL INCONTINENCE: 1
CONSTIPATION: 1
FATIGUES EASILY: 1
SLEEP QUALITY: ADEQUATE
FATIGUES EASILY: 1
DRY SKIN: 1
DENIES PAIN: 1
VOMITING: 1
DESCRIPTION OF MEMORY LOSS: LONG TERM
PERSON REPORTING PAIN: DIRECT OBSERVATION
LAST BOWEL MOVEMENT: 66180
LIMITED RANGE OF MOTION: 1
FATIGUES EASILY: 1
FATIGUES EASILY: 1
LOWER EXTREMITY EDEMA: 1
SLEEP QUALITY: ADEQUATE
PERSON REPORTING PAIN: PATIENT
FATIGUE: 1
STOOL FREQUENCY: LESS THAN DAILY
DENIES PAIN: 1
LAST BOWEL MOVEMENT: 66196
SLEEP QUALITY: ADEQUATE
LOWER EXTREMITY EDEMA: 1
FATIGUES EASILY: 1
DRY SKIN: 1
LIMITED RANGE OF MOTION: 1
FORGETFULNESS: 1
CONSTIPATION: 1
CONSTIPATION: 1
FATIGUES EASILY: 1
DESCRIPTION OF MEMORY LOSS: IMMEDIATE
STOOL FREQUENCY: LESS THAN DAILY
SLEEP QUALITY: ADEQUATE
SLEEP QUALITY: ADEQUATE
VOMITING: 1
LOWER EXTREMITY EDEMA: 1
FATIGUE: 1
FATIGUE: 1
CONSTIPATION: 1
DRY SKIN: 1
DRY SKIN: 1
FATIGUE: 1
DRY SKIN: 1
NAUSEA: 1
LOWER EXTREMITY EDEMA: 1
LOWER EXTREMITY EDEMA: 1
PERSON REPORTING PAIN: DIRECT OBSERVATION
FORGETFULNESS: 1
DESCRIPTION OF MEMORY LOSS: SHORT TERM
LIMITED RANGE OF MOTION: 1
DESCRIPTION OF MEMORY LOSS: LONG TERM
STOOL FREQUENCY: LESS THAN DAILY
FORGETFULNESS: 1
LIMITED RANGE OF MOTION: 1
LAST BOWEL MOVEMENT: 66345
CONSTIPATION: 1
LOWER EXTREMITY EDEMA: 1
SLEEP QUALITY: ADEQUATE
PERSON REPORTING PAIN: PATIENT
COUGH: 1
MUSCLE WEAKNESS: 1
FORGETFULNESS: 1
FORGETFULNESS: 1
LAST BOWEL MOVEMENT: 66240
FORGETFULNESS: 1
STOOL FREQUENCY: LESS THAN DAILY
SLEEP QUALITY: ADEQUATE
PAIN LOCATION - PAIN DURATION: TODAY
MUSCLE WEAKNESS: 1
DESCRIPTION OF MEMORY LOSS: LONG TERM
FATIGUE: 1
DESCRIPTION OF MEMORY LOSS: SHORT TERM
SLEEP QUALITY: ADEQUATE
LIMITED RANGE OF MOTION: 1
DRY SKIN: 1
PERSON REPORTING PAIN: DIRECT OBSERVATION
DRY SKIN: 1
DENIES PAIN: 1
NAUSEA: 1
FATIGUE: 1
FORGETFULNESS: 1

## 2022-01-01 ASSESSMENT — SOCIAL DETERMINANTS OF HEALTH (SDOH)
ACTIVE STRESSOR - HEALTH CHANGES: 1
ACTIVE STRESSOR - EXPRESSED EMOTIONAL NEED: 1
ACTIVE STRESSOR - HEALTH CHANGES: 1
ACTIVE STRESSOR - STRAINED FAMILY RELATIONSHIP: 1
ACTIVE STRESSOR - HEALTH CHANGES: 1
ACTIVE STRESSOR - EXPRESSED EMOTIONAL NEED: 1
ACTIVE STRESSOR - HEALTH CHANGES: 1
ACTIVE STRESSOR - STRAINED FAMILY RELATIONSHIP: 1
ACTIVE STRESSOR - HEALTH CHANGES: 1
ACTIVE STRESSOR - HEALTH CHANGES: 1
ACTIVE STRESSOR - STRAINED FAMILY RELATIONSHIP: 1
ACTIVE STRESSOR - HEALTH CHANGES: 1

## 2022-01-01 ASSESSMENT — ACTIVITIES OF DAILY LIVING (ADL)
MONEY MANAGEMENT (EXPENSES/BILLS): TOTALLY DEPENDENT
AMBULATION ASSISTANCE: NON-AMBULATORY
MONEY MANAGEMENT (EXPENSES/BILLS): TOTALLY DEPENDENT
AMBULATION ASSISTANCE: NON-AMBULATORY
AMBULATION ASSISTANCE: NON-AMBULATORY
MONEY MANAGEMENT (EXPENSES/BILLS): TOTALLY DEPENDENT
MONEY MANAGEMENT (EXPENSES/BILLS): TOTALLY DEPENDENT
AMBULATION ASSISTANCE: NON-AMBULATORY
MONEY MANAGEMENT (EXPENSES/BILLS): TOTALLY DEPENDENT
AMBULATION ASSISTANCE: NON-AMBULATORY
MONEY MANAGEMENT (EXPENSES/BILLS): TOTALLY DEPENDENT
MONEY MANAGEMENT (EXPENSES/BILLS): TOTALLY DEPENDENT
AMBULATION ASSISTANCE: NON-AMBULATORY
MONEY MANAGEMENT (EXPENSES/BILLS): TOTALLY DEPENDENT
AMBULATION ASSISTANCE: NON-AMBULATORY
MONEY MANAGEMENT (EXPENSES/BILLS): TOTALLY DEPENDENT
AMBULATION ASSISTANCE: NON-AMBULATORY
MONEY MANAGEMENT (EXPENSES/BILLS): TOTALLY DEPENDENT
MONEY MANAGEMENT (EXPENSES/BILLS): TOTALLY DEPENDENT
AMBULATION ASSISTANCE: NON-AMBULATORY
MONEY MANAGEMENT (EXPENSES/BILLS): TOTALLY DEPENDENT
AMBULATION ASSISTANCE: NON-AMBULATORY
AMBULATION ASSISTANCE: NON-AMBULATORY
MONEY MANAGEMENT (EXPENSES/BILLS): TOTALLY DEPENDENT
AMBULATION ASSISTANCE: NON-AMBULATORY
MONEY MANAGEMENT (EXPENSES/BILLS): TOTALLY DEPENDENT
AMBULATION ASSISTANCE: NON-AMBULATORY
MONEY MANAGEMENT (EXPENSES/BILLS): TOTALLY DEPENDENT
AMBULATION ASSISTANCE: NON-AMBULATORY
MONEY MANAGEMENT (EXPENSES/BILLS): TOTALLY DEPENDENT
AMBULATION ASSISTANCE: NON-AMBULATORY
MONEY MANAGEMENT (EXPENSES/BILLS): TOTALLY DEPENDENT
AMBULATION ASSISTANCE: NON-AMBULATORY
MONEY MANAGEMENT (EXPENSES/BILLS): TOTALLY DEPENDENT
AMBULATION ASSISTANCE: NON-AMBULATORY
AMBULATION ASSISTANCE: NON-AMBULATORY
MONEY MANAGEMENT (EXPENSES/BILLS): TOTALLY DEPENDENT
AMBULATION ASSISTANCE: NON-AMBULATORY
MONEY MANAGEMENT (EXPENSES/BILLS): TOTALLY DEPENDENT
AMBULATION ASSISTANCE: NON-AMBULATORY
MONEY MANAGEMENT (EXPENSES/BILLS): TOTALLY DEPENDENT
AMBULATION ASSISTANCE: NON-AMBULATORY
MONEY MANAGEMENT (EXPENSES/BILLS): TOTALLY DEPENDENT
AMBULATION ASSISTANCE: NON-AMBULATORY
MONEY MANAGEMENT (EXPENSES/BILLS): TOTALLY DEPENDENT
MONEY MANAGEMENT (EXPENSES/BILLS): TOTALLY DEPENDENT
AMBULATION ASSISTANCE: NON-AMBULATORY
MONEY MANAGEMENT (EXPENSES/BILLS): TOTALLY DEPENDENT
AMBULATION ASSISTANCE: NON-AMBULATORY
MONEY MANAGEMENT (EXPENSES/BILLS): TOTALLY DEPENDENT
MONEY MANAGEMENT (EXPENSES/BILLS): TOTALLY DEPENDENT
AMBULATION ASSISTANCE: NON-AMBULATORY
AMBULATION ASSISTANCE: NON-AMBULATORY
MONEY MANAGEMENT (EXPENSES/BILLS): TOTALLY DEPENDENT
AMBULATION ASSISTANCE: NON-AMBULATORY
MONEY MANAGEMENT (EXPENSES/BILLS): TOTALLY DEPENDENT
AMBULATION ASSISTANCE: NON-AMBULATORY
MONEY MANAGEMENT (EXPENSES/BILLS): TOTALLY DEPENDENT
AMBULATION ASSISTANCE: NON-AMBULATORY
AMBULATION ASSISTANCE: NON-AMBULATORY
MONEY MANAGEMENT (EXPENSES/BILLS): TOTALLY DEPENDENT
AMBULATION ASSISTANCE: NON-AMBULATORY
MONEY MANAGEMENT (EXPENSES/BILLS): TOTALLY DEPENDENT
AMBULATION ASSISTANCE: NON-AMBULATORY
MONEY MANAGEMENT (EXPENSES/BILLS): TOTALLY DEPENDENT
AMBULATION ASSISTANCE: NON-AMBULATORY
AMBULATION ASSISTANCE: NON-AMBULATORY
MONEY MANAGEMENT (EXPENSES/BILLS): TOTALLY DEPENDENT
AMBULATION ASSISTANCE: NON-AMBULATORY
MONEY MANAGEMENT (EXPENSES/BILLS): TOTALLY DEPENDENT
AMBULATION ASSISTANCE: NON-AMBULATORY
MONEY MANAGEMENT (EXPENSES/BILLS): TOTALLY DEPENDENT
AMBULATION ASSISTANCE: NON-AMBULATORY
MONEY MANAGEMENT (EXPENSES/BILLS): TOTALLY DEPENDENT
AMBULATION ASSISTANCE: NON-AMBULATORY
MONEY MANAGEMENT (EXPENSES/BILLS): TOTALLY DEPENDENT
MONEY MANAGEMENT (EXPENSES/BILLS): TOTALLY DEPENDENT
AMBULATION ASSISTANCE: NON-AMBULATORY
MONEY MANAGEMENT (EXPENSES/BILLS): TOTALLY DEPENDENT
AMBULATION ASSISTANCE: NON-AMBULATORY
MONEY MANAGEMENT (EXPENSES/BILLS): TOTALLY DEPENDENT
MONEY MANAGEMENT (EXPENSES/BILLS): TOTALLY DEPENDENT
AMBULATION ASSISTANCE: NON-AMBULATORY
AMBULATION ASSISTANCE: NON-AMBULATORY
MONEY MANAGEMENT (EXPENSES/BILLS): TOTALLY DEPENDENT
AMBULATION ASSISTANCE: NON-AMBULATORY
MONEY MANAGEMENT (EXPENSES/BILLS): TOTALLY DEPENDENT
MONEY MANAGEMENT (EXPENSES/BILLS): TOTALLY DEPENDENT
AMBULATION ASSISTANCE: NON-AMBULATORY
MONEY MANAGEMENT (EXPENSES/BILLS): TOTALLY DEPENDENT
AMBULATION ASSISTANCE: NON-AMBULATORY
MONEY MANAGEMENT (EXPENSES/BILLS): TOTALLY DEPENDENT
AMBULATION ASSISTANCE: NON-AMBULATORY
MONEY MANAGEMENT (EXPENSES/BILLS): TOTALLY DEPENDENT
AMBULATION ASSISTANCE: NON-AMBULATORY
MONEY MANAGEMENT (EXPENSES/BILLS): TOTALLY DEPENDENT
AMBULATION ASSISTANCE: NON-AMBULATORY
MONEY MANAGEMENT (EXPENSES/BILLS): TOTALLY DEPENDENT
AMBULATION ASSISTANCE: NON-AMBULATORY
MONEY MANAGEMENT (EXPENSES/BILLS): TOTALLY DEPENDENT
AMBULATION ASSISTANCE: NON-AMBULATORY
MONEY MANAGEMENT (EXPENSES/BILLS): TOTALLY DEPENDENT

## 2022-01-01 ASSESSMENT — PAIN SCALES - PAIN ASSESSMENT IN ADVANCED DEMENTIA (PAINAD)
BODYLANGUAGE: 0 - RELAXED.
NEGVOCALIZATION: 0 - NONE.
NEGVOCALIZATION: 0 - NONE.
TOTALSCORE: 0
BODYLANGUAGE: 0 - RELAXED.
FACIALEXPRESSION: 0 - SMILING OR INEXPRESSIVE.
FACIALEXPRESSION: 1 - SAD. FRIGHTENED. FROWN.
BODYLANGUAGE: 0 - RELAXED.
BODYLANGUAGE: 0 - RELAXED.
TOTALSCORE: 0
NEGVOCALIZATION: 1 - OCCASIONAL MOAN OR GROAN. LOW-LEVEL SPEECH WITH A NEGATIVE OR DISAPPROVING QUALITY.
NEGVOCALIZATION: 0 - NONE.
CONSOLABILITY: 1 - DISTRACTED OR REASSURED BY VOICE OR TOUCH.
FACIALEXPRESSION: 0 - SMILING OR INEXPRESSIVE.
BODYLANGUAGE: 0 - RELAXED.
BODYLANGUAGE: 0 - RELAXED.
NEGVOCALIZATION: 1 - OCCASIONAL MOAN OR GROAN. LOW-LEVEL SPEECH WITH A NEGATIVE OR DISAPPROVING QUALITY.
BODYLANGUAGE: 1 - TENSE. DISTRESSED PACING. FIDGETING.
CONSOLABILITY: 0 - NO NEED TO CONSOLE.
BODYLANGUAGE: 0 - RELAXED.
CONSOLABILITY: 0 - NO NEED TO CONSOLE.
BODYLANGUAGE: 0 - RELAXED.
CONSOLABILITY: 0 - NO NEED TO CONSOLE.
TOTALSCORE: 0
NEGVOCALIZATION: 1 - OCCASIONAL MOAN OR GROAN. LOW-LEVEL SPEECH WITH A NEGATIVE OR DISAPPROVING QUALITY.
FACIALEXPRESSION: 0 - SMILING OR INEXPRESSIVE.
CONSOLABILITY: 1 - DISTRACTED OR REASSURED BY VOICE OR TOUCH.
BODYLANGUAGE: 1 - TENSE. DISTRESSED PACING. FIDGETING.
BODYLANGUAGE: 1 - TENSE. DISTRESSED PACING. FIDGETING.
FACIALEXPRESSION: 0 - SMILING OR INEXPRESSIVE.
CONSOLABILITY: 0 - NO NEED TO CONSOLE.
NEGVOCALIZATION: 1 - OCCASIONAL MOAN OR GROAN. LOW-LEVEL SPEECH WITH A NEGATIVE OR DISAPPROVING QUALITY.
NEGVOCALIZATION: 1 - OCCASIONAL MOAN OR GROAN. LOW-LEVEL SPEECH WITH A NEGATIVE OR DISAPPROVING QUALITY.
FACIALEXPRESSION: 0 - SMILING OR INEXPRESSIVE.
FACIALEXPRESSION: 1 - SAD. FRIGHTENED. FROWN.
CONSOLABILITY: 0 - NO NEED TO CONSOLE.
CONSOLABILITY: 1 - DISTRACTED OR REASSURED BY VOICE OR TOUCH.
TOTALSCORE: 0
BODYLANGUAGE: 1 - TENSE. DISTRESSED PACING. FIDGETING.
TOTALSCORE: 5
CONSOLABILITY: 1 - DISTRACTED OR REASSURED BY VOICE OR TOUCH.
TOTALSCORE: 0
CONSOLABILITY: 0 - NO NEED TO CONSOLE.
NEGVOCALIZATION: 1 - OCCASIONAL MOAN OR GROAN. LOW-LEVEL SPEECH WITH A NEGATIVE OR DISAPPROVING QUALITY.
NEGVOCALIZATION: 1 - OCCASIONAL MOAN OR GROAN. LOW-LEVEL SPEECH WITH A NEGATIVE OR DISAPPROVING QUALITY.
BODYLANGUAGE: 0 - RELAXED.
FACIALEXPRESSION: 0 - SMILING OR INEXPRESSIVE.
CONSOLABILITY: 1 - DISTRACTED OR REASSURED BY VOICE OR TOUCH.
BODYLANGUAGE: 0 - RELAXED.
TOTALSCORE: 0
NEGVOCALIZATION: 0 - NONE.
BODYLANGUAGE: 0 - RELAXED.
FACIALEXPRESSION: 0 - SMILING OR INEXPRESSIVE.
CONSOLABILITY: 0 - NO NEED TO CONSOLE.
NEGVOCALIZATION: 0 - NONE.
CONSOLABILITY: 0 - NO NEED TO CONSOLE.
TOTALSCORE: 7
NEGVOCALIZATION: 0 - NONE.
CONSOLABILITY: 0 - NO NEED TO CONSOLE.
NEGVOCALIZATION: 0 - NONE.
FACIALEXPRESSION: 1 - SAD. FRIGHTENED. FROWN.
CONSOLABILITY: 1 - DISTRACTED OR REASSURED BY VOICE OR TOUCH.
FACIALEXPRESSION: 1 - SAD. FRIGHTENED. FROWN.
TOTALSCORE: 1
FACIALEXPRESSION: 2 - FACIAL GRIMACING.
NEGVOCALIZATION: 1 - OCCASIONAL MOAN OR GROAN. LOW-LEVEL SPEECH WITH A NEGATIVE OR DISAPPROVING QUALITY.
NEGVOCALIZATION: 1 - OCCASIONAL MOAN OR GROAN. LOW-LEVEL SPEECH WITH A NEGATIVE OR DISAPPROVING QUALITY.
NEGVOCALIZATION: 0 - NONE.
NEGVOCALIZATION: 1 - OCCASIONAL MOAN OR GROAN. LOW-LEVEL SPEECH WITH A NEGATIVE OR DISAPPROVING QUALITY.
FACIALEXPRESSION: 1 - SAD. FRIGHTENED. FROWN.
CONSOLABILITY: 1 - DISTRACTED OR REASSURED BY VOICE OR TOUCH.
FACIALEXPRESSION: 0 - SMILING OR INEXPRESSIVE.
FACIALEXPRESSION: 0 - SMILING OR INEXPRESSIVE.
BODYLANGUAGE: 0 - RELAXED.
BODYLANGUAGE: 0 - RELAXED.
TOTALSCORE: 4
BODYLANGUAGE: 0 - RELAXED.
FACIALEXPRESSION: 0 - SMILING OR INEXPRESSIVE.
FACIALEXPRESSION: 0 - SMILING OR INEXPRESSIVE.
CONSOLABILITY: 1 - DISTRACTED OR REASSURED BY VOICE OR TOUCH.
BODYLANGUAGE: 0 - RELAXED.
TOTALSCORE: 0
TOTALSCORE: 4
NEGVOCALIZATION: 2 - REPEATED TROUBLE CALLING OUT. LOUD MOANING OR GROANING. CRYING.
BODYLANGUAGE: 0 - RELAXED.
FACIALEXPRESSION: 0 - SMILING OR INEXPRESSIVE.
NEGVOCALIZATION: 0 - NONE.
BODYLANGUAGE: 0 - RELAXED.
TOTALSCORE: 2
CONSOLABILITY: 0 - NO NEED TO CONSOLE.
FACIALEXPRESSION: 0 - SMILING OR INEXPRESSIVE.
FACIALEXPRESSION: 0 - SMILING OR INEXPRESSIVE.
BODYLANGUAGE: 0 - RELAXED.
TOTALSCORE: 2
TOTALSCORE: 1
NEGVOCALIZATION: 0 - NONE.
NEGVOCALIZATION: 0 - NONE.
BODYLANGUAGE: 1 - TENSE. DISTRESSED PACING. FIDGETING.
BODYLANGUAGE: 1 - TENSE. DISTRESSED PACING. FIDGETING.
CONSOLABILITY: 0 - NO NEED TO CONSOLE.
TOTALSCORE: 1
TOTALSCORE: 0
TOTALSCORE: 0
FACIALEXPRESSION: 0 - SMILING OR INEXPRESSIVE.
TOTALSCORE: 0
BODYLANGUAGE: 2 - RIGID. FISTS CLENCHED. KNEES PULLED UP. PULLING OR PUSHING AWAY. STRIKING OUT.
TOTALSCORE: 4
CONSOLABILITY: 1 - DISTRACTED OR REASSURED BY VOICE OR TOUCH.
FACIALEXPRESSION: 0 - SMILING OR INEXPRESSIVE.
TOTALSCORE: 0
TOTALSCORE: 1
NEGVOCALIZATION: 0 - NONE.
TOTALSCORE: 0
CONSOLABILITY: 0 - NO NEED TO CONSOLE.
FACIALEXPRESSION: 0 - SMILING OR INEXPRESSIVE.
NEGVOCALIZATION: 0 - NONE.
TOTALSCORE: 3
CONSOLABILITY: 0 - NO NEED TO CONSOLE.
FACIALEXPRESSION: 0 - SMILING OR INEXPRESSIVE.
FACIALEXPRESSION: 0 - SMILING OR INEXPRESSIVE.

## 2022-01-06 NOTE — PROGRESS NOTES
Pt aaox1, pt uncooperative this AM, but then calm and cooperative when daughter came in at 1000. ESTEBAN 4/5. Denies N/T. Pt c/o back pain, Oxy given with +results. +BM, loose. Purewick in place. Reviewed poc with pt& daugher-verbalized understanding. MRI pending. Bed alarm in use. Call light in reach. On waffle mattress, Q 2 hour turns done.   Saucerization Depth: dermis and superficial adipose tissue

## 2022-03-10 NOTE — PROGRESS NOTES
"TC from RNMERRICK, Zulma. Patient has drastic change in behaviors as well as new s/s \"feeling down\" per her daughter. Recent self DC of goldstein as well as kinked tubing last pm per nurse report. Suspect she has UTI. Is also c/o rectal pain. Nurse to obtain UA and then will start Cefdinir 300mg po q12 x5days. Change pending culture.   "

## 2022-04-20 NOTE — PROGRESS NOTES
Reviewed with RNCM, Zulma BLANKENSHIP. Patient was seen by this provider on 4/18 and was noted to have redness and some crusting to left eye only. RNCM now reports is in both eyes with increased redness and drainage. Gentamicin drops ordered TID x7 days and re eval for improvement.

## 2022-05-04 NOTE — PROGRESS NOTES
"Per RNCM, Zulma, patient continues to have drainage from eyes. Previous drops of gentamicin ineffective in treating. She was placed on Claritin and was found to be \"too sleepy\" per her family. Will try benadryl for symptom management.   "

## 2022-05-05 NOTE — PROGRESS NOTES
New order for Lorazepam per discussion with Zulma Perla RN. Discontinue haldol d/t possible adverse effect of EPS when taking haldol and reglan.

## 2022-11-09 ENCOUNTER — DOCUMENTATION (OUTPATIENT)
Dept: HEALTH INFORMATION MANAGEMENT | Facility: OTHER | Age: 87
End: 2022-11-09
Payer: MEDICARE

## 2023-02-16 NOTE — TELEPHONE ENCOUNTER
Results are not in yet.  
VOICEMAIL  1. Caller Name: Lucita Babcock                        Call Back Number: [phone] (home)       2. Message: Pt would like to know the results of her recent thyroid biopsy. (procedure has not been resulted yet)    3. Patient approves office to leave a detailed voicemail/MyChart message: yes        
Yes

## 2023-02-21 NOTE — OP THERAPY DAILY TREATMENT
"  Outpatient Occupational Therapy  DAILY TREATMENT     [facility] Occupational [address].  [address]  [place] [address]  Phone:  [phone]  Fax:  [phone]    Date: 04/15/2019    Patient: Lucita Babcock  YOB: 1935  MRN: [id number]     Time Calculation  Start time: 1030  Stop time: 1130 Time Calculation (min): 60 minutes     Chief Complaint: Loss Of Balance (vertigo)    Visit #: 7    SUBJECTIVE: \"I had a little dizziness when I was feeling anxious the other day\"    OBJECTIVE:  Current objective measures:   BUE AROM WNL, strength 5/5   strength:  Right: 42lbs; Left: 40lbs  Balance: sitting static/dynamic: Good/Good    Standing static/dynamic: Good/Good  Romberg EO/EC: negative for LOB,  Manjarrez-Darvalentin exercises: mildly positive for reports of spinning sensation upon initial side-lying position left and right, upon second cycle in each position there were no further reports of spinning.  Desi-Hallpike: negative bilaterally for observable nystagmus or subjective reports of spinning sensation.  ADLs/IADLs: Modified independent ADLs, light cleaning, meal prep, placing and removing dishes from .  Hired help for remainer of homemaking.  Able to transport objects from bottom to top shelves in gym without reports of dizziness  Functional mobility: able to walk down alicea without AD incorporating horizontal head movements with minimal postural adjustments, mod cues to increase arm swing  Anxiety: mild to moderate during session.  HEP: pt/caregiver independent with habituation exercises for BPPV via Loki-Alexander and disuse dysequilibrium exercises via Cawthorne-Cooksey.    OT Functional Assessment Tool Used: UEFI  OT Functional Assessment Score: 72/80     Therapeutic Treatments and Modalities:    1. Therapeutic Activities (CPT 99592)    Therapeutic Treatments and Modalities Summary: Re-tested all areas noted above.  Performed functional activities and mobility described " above.  BP: Left arm: 163/77; Right: 146/76.  Pt anxious following treatment and verbalized wanting to self-medicate. Caregiver present, aware of BP's, and medication concerns.  Caregiver left with pt, escorting her to the car.      Time-based treatments/modalities:  Therapeutic activity minutes (CPT 66974): 60 minutes      ASSESSMENT:   Response to treatment:  Pt has actively participated in skilled OT program and has made good progress to fully meet the LTG's set in her initial POC. Pt was primarily treated for recurrent left posterior semi-circular canal BPPV, canalitiasis type and 1 episode of right P-SCC.  Pt was also treated for disuse dysequilibrium, which was further affected by her baseline high level of anxiety.  While pt's intermittent vertigo is in part due to cervical spine limitations, she has responded very well to both CRM's performed by therapist and Manjarrez-Daroff habituation exercises.  Pt/caregiver are independent with her HEP and she is safe for d/c.    PLAN/RECOMMENDATIONS:   Plan for treatment: discharge patient due to accomplished goals.  Planned interventions for next visit: D/C to HEP.  D/C OT.       Right AROM shoulder flexion 3/4 range, elbow/wrist/hand WFL; Left AROM shoulder flexion 3/4 range, elbow/wrist/hand WFL/deficits as listed below

## 2024-12-18 NOTE — TELEPHONE ENCOUNTER
Sounds like BP is much better, which is good.  Typically, she symptoms she describes are not typical side effects of Diltiazem. Suggest referral to GI for evaluation - something else might be going on.  Thanks, AB   Opioid Pregnancy And Lactation Text: These medications can lead to premature delivery and should be avoided during pregnancy. These medications are also present in breast milk in small amounts. Elidel Pregnancy And Lactation Text: This medication is Pregnancy Category C. It is unknown if this medication is excreted in breast milk. Cyclophosphamide Counseling:  I discussed with the patient the risks of cyclophosphamide including but not limited to hair loss, hormonal abnormalities, decreased fertility, abdominal pain, diarrhea, nausea and vomiting, bone marrow suppression and infection. The patient understands that monitoring is required while taking this medication. Metronidazole Pregnancy And Lactation Text: This medication is Pregnancy Category B and considered safe during pregnancy.  It is also excreted in breast milk. Libtayo Counseling- I discussed with the patient the risks of Libtayo including but not limited to nausea, vomiting, diarrhea, and bone or muscle pain.  The patient verbalized understanding of the proper use and possible adverse effects of Libtayo.  All of the patient's questions and concerns were addressed. High Dose Vitamin A Pregnancy And Lactation Text: High dose vitamin A therapy is contraindicated during pregnancy and breast feeding. Stelara Counseling:  I discussed with the patient the risks of ustekinumab including but not limited to immunosuppression, malignancy, posterior leukoencephalopathy syndrome, and serious infections.  The patient understands that monitoring is required including a PPD at baseline and must alert us or the primary physician if symptoms of infection or other concerning signs are noted. Spironolactone Counseling: Patient advised regarding risks of diarrhea, abdominal pain, hyperkalemia, birth defects (for female patients), liver toxicity and renal toxicity. The patient may need blood work to monitor liver and kidney function and potassium levels while on therapy. The patient verbalized understanding of the proper use and possible adverse effects of spironolactone.  All of the patient's questions and concerns were addressed. Solaraze Counseling:  I discussed with the patient the risks of Solaraze including but not limited to erythema, scaling, itching, weeping, crusting, and pain. Metronidazole Counseling:  I discussed with the patient the risks of metronidazole including but not limited to seizures, nausea/vomiting, a metallic taste in the mouth, nausea/vomiting and severe allergy. Clofazimine Pregnancy And Lactation Text: This medication is Pregnancy Category C and isn't considered safe during pregnancy. It is excreted in breast milk. Elidel Counseling: Patient may experience a mild burning sensation during topical application. Elidel is not approved in children less than 2 years of age. There have been case reports of hematologic and skin malignancies in patients using topical calcineurin inhibitors although causality is questionable. Opioid Counseling: I discussed with the patient the potential side effects of opioids including but not limited to addiction, altered mental status, and depression. I stressed avoiding alcohol, benzodiazepines, muscle relaxants and sleep aids unless specifically okayed by a physician. The patient verbalized understanding of the proper use and possible adverse effects of opioids. All of the patient's questions and concerns were addressed. They were instructed to flush the remaining pills down the toilet if they did not need them for pain. Libtayo Pregnancy And Lactation Text: This medication is contraindicated in pregnancy and when breast feeding. Rhofade Pregnancy And Lactation Text: This medication has not been assigned a Pregnancy Risk Category. It is unknown if the medication is excreted in breast milk. High Dose Vitamin A Counseling: Side effects reviewed, pt to contact office should one occur. Skyrizi Pregnancy And Lactation Text: The risk during pregnancy and breastfeeding is uncertain with this medication. Spironolactone Pregnancy And Lactation Text: This medication can cause feminization of the male fetus and should be avoided during pregnancy. The active metabolite is also found in breast milk. Hydroxyzine Counseling: Patient advised that the medication is sedating and not to drive a car after taking this medication.  Patient informed of potential adverse effects including but not limited to dry mouth, urinary retention, and blurry vision.  The patient verbalized understanding of the proper use and possible adverse effects of hydroxyzine.  All of the patient's questions and concerns were addressed. Enbrel Counseling:  I discussed with the patient the risks of etanercept including but not limited to myelosuppression, immunosuppression, autoimmune hepatitis, demyelinating diseases, lymphoma, and infections.  The patient understands that monitoring is required including a PPD at baseline and must alert us or the primary physician if symptoms of infection or other concerning signs are noted. Clofazimine Counseling:  I discussed with the patient the risks of clofazimine including but not limited to skin and eye pigmentation, liver damage, nausea/vomiting, gastrointestinal bleeding and allergy. Arava Counseling:  Patient counseled regarding adverse effects of Arava including but not limited to nausea, vomiting, abnormalities in liver function tests. Patients may develop mouth sores, rash, diarrhea, and abnormalities in blood counts. The patient understands that monitoring is required including LFTs and blood counts.  There is a rare possibility of scarring of the liver and lung problems that can occur when taking methotrexate. Persistent nausea, loss of appetite, pale stools, dark urine, cough, and shortness of breath should be reported immediately. Patient advised to discontinue Arava treatment and consult with a physician prior to attempting conception. The patient will have to undergo a treatment to eliminate Arava from the body prior to conception. Minocycline Pregnancy And Lactation Text: This medication is Pregnancy Category D and not consider safe during pregnancy. It is also excreted in breast milk. Eucrisa Pregnancy And Lactation Text: This medication has not been assigned a Pregnancy Risk Category but animal studies failed to show danger with the topical medication. It is unknown if the medication is excreted in breast milk. Cyclosporine Counseling:  I discussed with the patient the risks of cyclosporine including but not limited to hypertension, gingival hyperplasia,myelosuppression, immunosuppression, liver damage, kidney damage, neurotoxicity, lymphoma, and serious infections. The patient understands that monitoring is required including baseline blood pressure, CBC, CMP, lipid panel and uric acid, and then 1-2 times monthly CMP and blood pressure. Niacinamide Counseling: I recommended taking niacin or niacinamide, also know as vitamin B3, twice daily. Recent evidence suggests that taking vitamin B3 (500 mg twice daily) can reduce the risk of actinic keratoses and non-melanoma skin cancers. Side effects of vitamin B3 include flushing and headache. Topical Retinoid counseling:  Patient advised to apply a pea-sized amount only at bedtime and wait 30 minutes after washing their face before applying.  If too drying, patient may add a non-comedogenic moisturizer. The patient verbalized understanding of the proper use and possible adverse effects of retinoids.  All of the patient's questions and concerns were addressed. Taltz Counseling: I discussed with the patient the risks of ixekizumab including but not limited to immunosuppression, serious infections, worsening of inflammatory bowel disease and drug reactions.  The patient understands that monitoring is required including a PPD at baseline and must alert us or the primary physician if symptoms of infection or other concerning signs are noted. SSKI Counseling:  I discussed with the patient the risks of SSKI including but not limited to thyroid abnormalities, metallic taste, GI upset, fever, headache, acne, arthralgias, paraesthesias, lymphadenopathy, easy bleeding, arrhythmias, and allergic reaction. Hydroquinone Counseling:  Patient advised that medication may result in skin irritation, lightening (hypopigmentation), dryness, and burning.  In the event of skin irritation, the patient was advised to reduce the amount of the drug applied or use it less frequently.  Rarely, spots that are treated with hydroquinone can become darker (pseudoochronosis).  Should this occur, patient instructed to stop medication and call the office. The patient verbalized understanding of the proper use and possible adverse effects of hydroquinone.  All of the patient's questions and concerns were addressed. Minocycline Counseling: Patient advised regarding possible photosensitivity and discoloration of the teeth, skin, lips, tongue and gums.  Patient instructed to avoid sunlight, if possible.  When exposed to sunlight, patients should wear protective clothing, sunglasses, and sunscreen.  The patient was instructed to call the office immediately if the following severe adverse effects occur:  hearing changes, easy bruising/bleeding, severe headache, or vision changes.  The patient verbalized understanding of the proper use and possible adverse effects of minocycline.  All of the patient's questions and concerns were addressed. Hydroxyzine Pregnancy And Lactation Text: This medication is not safe during pregnancy and should not be taken. It is also excreted in breast milk and breast feeding isn't recommended. Enbrel Pregnancy And Lactation Text: This medication is Pregnancy Category B and is considered safe during pregnancy. It is unknown if this medication is excreted in breast milk. Eucrisa Counseling: Patient may experience a mild burning sensation during topical application. Eucrisa is not approved in children less than 2 years of age. Griseofulvin Counseling:  I discussed with the patient the risks of griseofulvin including but not limited to photosensitivity, cytopenia, liver damage, nausea/vomiting and severe allergy.  The patient understands that this medication is best absorbed when taken with a fatty meal (e.g., ice cream or french fries). Cyclophosphamide Pregnancy And Lactation Text: This medication is Pregnancy Category D and it isn't considered safe during pregnancy. This medication is excreted in breast milk. Niacinamide Pregnancy And Lactation Text: These medications are considered safe during pregnancy. Azithromycin Counseling:  I discussed with the patient the risks of azithromycin including but not limited to GI upset, allergic reaction, drug rash, diarrhea, and yeast infections. Solaraze Pregnancy And Lactation Text: This medication is Pregnancy Category B and is considered safe. There is some data to suggest avoiding during the third trimester. It is unknown if this medication is excreted in breast milk. Humira Counseling:  I discussed with the patient the risks of adalimumab including but not limited to myelosuppression, immunosuppression, autoimmune hepatitis, demyelinating diseases, lymphoma, and serious infections.  The patient understands that monitoring is required including a PPD at baseline and must alert us or the primary physician if symptoms of infection or other concerning signs are noted. Colchicine Counseling:  Patient counseled regarding adverse effects including but not limited to stomach upset (nausea, vomiting, stomach pain, or diarrhea).  Patient instructed to limit alcohol consumption while taking this medication.  Colchicine may reduce blood counts especially with prolonged use.  The patient understands that monitoring of kidney function and blood counts may be required, especially at baseline. The patient verbalized understanding of the proper use and possible adverse effects of colchicine.  All of the patient's questions and concerns were addressed. Sski Pregnancy And Lactation Text: This medication is Pregnancy Category D and isn't considered safe during pregnancy. It is excreted in breast milk. Quinolones Pregnancy And Lactation Text: This medication is Pregnancy Category C and it isn't know if it is safe during pregnancy. It is also excreted in breast milk. Albendazole Pregnancy And Lactation Text: This medication is Pregnancy Category C and it isn't known if it is safe during pregnancy. It is also excreted in breast milk. Methotrexate Counseling:  Patient counseled regarding adverse effects of methotrexate including but not limited to nausea, vomiting, abnormalities in liver function tests. Patients may develop mouth sores, rash, diarrhea, and abnormalities in blood counts. The patient understands that monitoring is required including LFT's and blood counts.  There is a rare possibility of scarring of the liver and lung problems that can occur when taking methotrexate. Persistent nausea, loss of appetite, pale stools, dark urine, cough, and shortness of breath should be reported immediately. Patient advised to discontinue methotrexate treatment at least three months before attempting to become pregnant.  I discussed the need for folate supplements while taking methotrexate.  These supplements can decrease side effects during methotrexate treatment. The patient verbalized understanding of the proper use and possible adverse effects of methotrexate.  All of the patient's questions and concerns were addressed. Dapsone Counseling: I discussed with the patient the risks of dapsone including but not limited to hemolytic anemia, agranulocytosis, rashes, methemoglobinemia, kidney failure, peripheral neuropathy, headaches, GI upset, and liver toxicity.  Patients who start dapsone require monitoring including baseline LFTs and weekly CBCs for the first month, then every month thereafter.  The patient verbalized understanding of the proper use and possible adverse effects of dapsone.  All of the patient's questions and concerns were addressed. Azithromycin Pregnancy And Lactation Text: This medication is considered safe during pregnancy and is also secreted in breast milk. Griseofulvin Pregnancy And Lactation Text: This medication is Pregnancy Category X and is known to cause serious birth defects. It is unknown if this medication is excreted in breast milk but breast feeding should be avoided. Tazorac Counseling:  Patient advised that medication is irritating and drying.  Patient may need to apply sparingly and wash off after an hour before eventually leaving it on overnight.  The patient verbalized understanding of the proper use and possible adverse effects of tazorac.  All of the patient's questions and concerns were addressed. Nsaids Counseling: NSAID Counseling: I discussed with the patient that NSAIDs should be taken with food. Prolonged use of NSAIDs can result in the development of stomach ulcers.  Patient advised to stop taking NSAIDs if abdominal pain occurs.  The patient verbalized understanding of the proper use and possible adverse effects of NSAIDs.  All of the patient's questions and concerns were addressed. Tremfya Counseling: I discussed with the patient the risks of guselkumab including but not limited to immunosuppression, serious infections, worsening of inflammatory bowel disease and drug reactions.  The patient understands that monitoring is required including a PPD at baseline and must alert us or the primary physician if symptoms of infection or other concerning signs are noted. Thalidomide Counseling: I discussed with the patient the risks of thalidomide including but not limited to birth defects, anxiety, weakness, chest pain, dizziness, cough and severe allergy. Albendazole Counseling:  I discussed with the patient the risks of albendazole including but not limited to cytopenia, kidney damage, nausea/vomiting and severe allergy.  The patient understands that this medication is being used in an off-label manner. Quinolones Counseling:  I discussed with the patient the risks of fluoroquinolones including but not limited to GI upset, allergic reaction, drug rash, diarrhea, dizziness, photosensitivity, yeast infections, liver function test abnormalities, tendonitis/tendon rupture. Imiquimod Counseling:  I discussed with the patient the risks of imiquimod including but not limited to erythema, scaling, itching, weeping, crusting, and pain.  Patient understands that the inflammatory response to imiquimod is variable from person to person and was educated regarded proper titration schedule.  If flu-like symptoms develop, patient knows to discontinue the medication and contact us. Itraconazole Counseling:  I discussed with the patient the risks of itraconazole including but not limited to liver damage, nausea/vomiting, neuropathy, and severe allergy.  The patient understands that this medication is best absorbed when taken with acidic beverages such as non-diet cola or ginger ale.  The patient understands that monitoring is required including baseline LFTs and repeat LFTs at intervals.  The patient understands that they are to contact us or the primary physician if concerning signs are noted. Nsaids Pregnancy And Lactation Text: These medications are considered safe up to 30 weeks gestation. It is excreted in breast milk. Cyclosporine Pregnancy And Lactation Text: This medication is Pregnancy Category C and it isn't know if it is safe during pregnancy. This medication is excreted in breast milk. Arava Pregnancy And Lactation Text: This medication is Pregnancy Category X and is absolutely contraindicated during pregnancy. It is unknown if it is excreted in breast milk. Bactrim Counseling:  I discussed with the patient the risks of sulfa antibiotics including but not limited to GI upset, allergic reaction, drug rash, diarrhea, dizziness, photosensitivity, and yeast infections.  Rarely, more serious reactions can occur including but not limited to aplastic anemia, agranulocytosis, methemoglobinemia, blood dyscrasias, liver or kidney failure, lung infiltrates or desquamative/blistering drug rashes. Ilumya Counseling: I discussed with the patient the risks of tildrakizumab including but not limited to immunosuppression, malignancy, posterior leukoencephalopathy syndrome, and serious infections.  The patient understands that monitoring is required including a PPD at baseline and must alert us or the primary physician if symptoms of infection or other concerning signs are noted. Erivedge Counseling- I discussed with the patient the risks of Erivedge including but not limited to nausea, vomiting, diarrhea, constipation, weight loss, changes in the sense of taste, decreased appetite, muscle spasms, and hair loss.  The patient verbalized understanding of the proper use and possible adverse effects of Erivedge.  All of the patient's questions and concerns were addressed. Tranexamic Acid Pregnancy And Lactation Text: It is unknown if this medication is safe during pregnancy or breast feeding. Benzoyl Peroxide Pregnancy And Lactation Text: This medication is Pregnancy Category C. It is unknown if benzoyl peroxide is excreted in breast milk. Prednisone Counseling:  I discussed with the patient the risks of prolonged use of prednisone including but not limited to weight gain, insomnia, osteoporosis, mood changes, diabetes, susceptibility to infection, glaucoma and high blood pressure.  In cases where prednisone use is prolonged, patients should be monitored with blood pressure checks, serum glucose levels and an eye exam.  Additionally, the patient may need to be placed on GI prophylaxis, PCP prophylaxis, and calcium and vitamin D supplementation and/or a bisphosphonate.  The patient verbalized understanding of the proper use and the possible adverse effects of prednisone.  All of the patient's questions and concerns were addressed. Bactrim Pregnancy And Lactation Text: This medication is Pregnancy Category D and is known to cause fetal risk.  It is also excreted in breast milk. Topical Clindamycin Counseling: Patient counseled that this medication may cause skin irritation or allergic reactions.  In the event of skin irritation, the patient was advised to reduce the amount of the drug applied or use it less frequently.   The patient verbalized understanding of the proper use and possible adverse effects of clindamycin.  All of the patient's questions and concerns were addressed. Odomzo Counseling- I discussed with the patient the risks of Odomzo including but not limited to nausea, vomiting, diarrhea, constipation, weight loss, changes in the sense of taste, decreased appetite, muscle spasms, and hair loss.  The patient verbalized understanding of the proper use and possible adverse effects of Odomzo.  All of the patient's questions and concerns were addressed. Xeljanz Counseling: I discussed with the patient the risks of Xeljanz therapy including increased risk of infection, liver issues, headache, diarrhea, or cold symptoms. Live vaccines should be avoided. They were instructed to call if they have any problems. Rifampin Counseling: I discussed with the patient the risks of rifampin including but not limited to liver damage, kidney damage, red-orange body fluids, nausea/vomiting and severe allergy. Ivermectin Counseling:  Patient instructed to take medication on an empty stomach with a full glass of water.  Patient informed of potential adverse effects including but not limited to nausea, diarrhea, dizziness, itching, and swelling of the extremities or lymph nodes.  The patient verbalized understanding of the proper use and possible adverse effects of ivermectin.  All of the patient's questions and concerns were addressed. Dapsone Pregnancy And Lactation Text: This medication is Pregnancy Category C and is not considered safe during pregnancy or breast feeding. Minoxidil Counseling: Minoxidil is a topical medication which can increase blood flow where it is applied. It is uncertain how this medication increases hair growth. Side effects are uncommon and include stinging and allergic reactions. Tranexamic Acid Counseling:  Patient advised of the small risk of bleeding problems with tranexamic acid. They were also instructed to call if they developed any nausea, vomiting or diarrhea. All of the patient's questions and concerns were addressed. Ketoconazole Counseling:   Patient counseled regarding improving absorption with orange juice.  Adverse effects include but are not limited to breast enlargement, headache, diarrhea, nausea, upset stomach, liver function test abnormalities, taste disturbance, and stomach pain.  There is a rare possibility of liver failure that can occur when taking ketoconazole. The patient understands that monitoring of LFTs may be required, especially at baseline. The patient verbalized understanding of the proper use and possible adverse effects of ketoconazole.  All of the patient's questions and concerns were addressed. Tazorac Pregnancy And Lactation Text: This medication is not safe during pregnancy. It is unknown if this medication is excreted in breast milk. Benzoyl Peroxide Counseling: Patient counseled that medicine may cause skin irritation and bleach clothing.  In the event of skin irritation, the patient was advised to reduce the amount of the drug applied or use it less frequently.   The patient verbalized understanding of the proper use and possible adverse effects of benzoyl peroxide.  All of the patient's questions and concerns were addressed. Methotrexate Pregnancy And Lactation Text: This medication is Pregnancy Category X and is known to cause fetal harm. This medication is excreted in breast milk. Cephalexin Counseling: I counseled the patient regarding use of cephalexin as an antibiotic for prophylactic and/or therapeutic purposes. Cephalexin (commonly prescribed under brand name Keflex) is a cephalosporin antibiotic which is active against numerous classes of bacteria, including most skin bacteria. Side effects may include nausea, diarrhea, gastrointestinal upset, rash, hives, yeast infections, and in rare cases, hepatitis, kidney disease, seizures, fever, confusion, neurologic symptoms, and others. Patients with severe allergies to penicillin medications are cautioned that there is about a 10% incidence of cross-reactivity with cephalosporins. When possible, patients with penicillin allergies should use alternatives to cephalosporins for antibiotic therapy. Rifampin Pregnancy And Lactation Text: This medication is Pregnancy Category C and it isn't know if it is safe during pregnancy. It is also excreted in breast milk and should not be used if you are breast feeding. Infliximab Counseling:  I discussed with the patient the risks of infliximab including but not limited to myelosuppression, immunosuppression, autoimmune hepatitis, demyelinating diseases, lymphoma, and serious infections.  The patient understands that monitoring is required including a PPD at baseline and must alert us or the primary physician if symptoms of infection or other concerning signs are noted. Finasteride Male Counseling: Finasteride Counseling:  I discussed with the patient the risks of use of finasteride including but not limited to decreased libido, decreased ejaculate volume, gynecomastia, and depression. Women should not handle medication.  All of the patient's questions and concerns were addressed. Valtrex Pregnancy And Lactation Text: this medication is Pregnancy Category B and is considered safe during pregnancy. This medication is not directly found in breast milk but it's metabolite acyclovir is present. Carac Pregnancy And Lactation Text: This medication is Pregnancy Category X and contraindicated in pregnancy and in women who may become pregnant. It is unknown if this medication is excreted in breast milk. Cephalexin Pregnancy And Lactation Text: This medication is Pregnancy Category B and considered safe during pregnancy.  It is also excreted in breast milk but can be used safely for shorter doses. Otezla Counseling: The side effects of Otezla were discussed with the patient, including but not limited to worsening or new depression, weight loss, diarrhea, nausea, upper respiratory tract infection, and headache. Patient instructed to call the office should any adverse effect occur.  The patient verbalized understanding of the proper use and possible adverse effects of Otezla.  All the patient's questions and concerns were addressed. Ketoconazole Pregnancy And Lactation Text: This medication is Pregnancy Category C and it isn't know if it is safe during pregnancy. It is also excreted in breast milk and breast feeding isn't recommended. Topical Sulfur Applications Counseling: Topical Sulfur Counseling: Patient counseled that this medication may cause skin irritation or allergic reactions.  In the event of skin irritation, the patient was advised to reduce the amount of the drug applied or use it less frequently.   The patient verbalized understanding of the proper use and possible adverse effects of topical sulfur application.  All of the patient's questions and concerns were addressed. Xolair Counseling:  Patient informed of potential adverse effects including but not limited to fever, muscle aches, rash and allergic reactions.  The patient verbalized understanding of the proper use and possible adverse effects of Xolair.  All of the patient's questions and concerns were addressed. Sarecycline Counseling: Patient advised regarding possible photosensitivity and discoloration of the teeth, skin, lips, tongue and gums.  Patient instructed to avoid sunlight, if possible.  When exposed to sunlight, patients should wear protective clothing, sunglasses, and sunscreen.  The patient was instructed to call the office immediately if the following severe adverse effects occur:  hearing changes, easy bruising/bleeding, severe headache, or vision changes.  The patient verbalized understanding of the proper use and possible adverse effects of sarecycline.  All of the patient's questions and concerns were addressed. Valtrex Counseling: I discussed with the patient the risks of valacyclovir including but not limited to kidney damage, nausea, vomiting and severe allergy.  The patient understands that if the infection seems to be worsening or is not improving, they are to call. Finasteride Pregnancy And Lactation Text: This medication is absolutely contraindicated during pregnancy. It is unknown if it is excreted in breast milk. Clindamycin Counseling: I counseled the patient regarding use of clindamycin as an antibiotic for prophylactic and/or therapeutic purposes. Clindamycin is active against numerous classes of bacteria, including skin bacteria. Side effects may include nausea, diarrhea, gastrointestinal upset, rash, hives, yeast infections, and in rare cases, colitis. Terbinafine Counseling: Patient counseling regarding adverse effects of terbinafine including but not limited to headache, diarrhea, rash, upset stomach, liver function test abnormalities, itching, taste/smell disturbance, nausea, abdominal pain, and flatulence.  There is a rare possibility of liver failure that can occur when taking terbinafine.  The patient understands that a baseline LFT and kidney function test may be required. The patient verbalized understanding of the proper use and possible adverse effects of terbinafine.  All of the patient's questions and concerns were addressed. Carac Counseling:  I discussed with the patient the risks of Carac including but not limited to erythema, scaling, itching, weeping, crusting, and pain. Topical Clindamycin Pregnancy And Lactation Text: This medication is Pregnancy Category B and is considered safe during pregnancy. It is unknown if it is excreted in breast milk. Rituxan Counseling:  I discussed with the patient the risks of Rituxan infusions. Side effects can include infusion reactions, severe drug rashes including mucocutaneous reactions, reactivation of latent hepatitis and other infections and rarely progressive multifocal leukoencephalopathy.  All of the patient's questions and concerns were addressed. Otezla Pregnancy And Lactation Text: This medication is Pregnancy Category C and it isn't known if it is safe during pregnancy. It is unknown if it is excreted in breast milk. Xelnadiraz Pregnancy And Lactation Text: This medication is Pregnancy Category D and is not considered safe during pregnancy.  The risk during breast feeding is also uncertain. Gabapentin Counseling: I discussed with the patient the risks of gabapentin including but not limited to dizziness, somnolence, fatigue and ataxia. Clindamycin Pregnancy And Lactation Text: This medication can be used in pregnancy if certain situations. Clindamycin is also present in breast milk. Wartpeel Counseling:  I discussed with the patient the risks of Wartpeel including but not limited to erythema, scaling, itching, weeping, crusting, and pain. Calcipotriene Pregnancy And Lactation Text: This medication has not been proven safe during pregnancy. It is unknown if this medication is excreted in breast milk. Acitretin Pregnancy And Lactation Text: This medication is Pregnancy Category X and should not be given to women who are pregnant or may become pregnant in the future. This medication is excreted in breast milk. Rituxan Pregnancy And Lactation Text: This medication is Pregnancy Category C and it isn't know if it is safe during pregnancy. It is unknown if this medication is excreted in breast milk but similar antibodies are known to be excreted. Oxybutynin Counseling:  I discussed with the patient the risks of oxybutynin including but not limited to skin rash, drowsiness, dry mouth, difficulty urinating, and blurred vision. Picato Counseling:  I discussed with the patient the risks of Picato including but not limited to erythema, scaling, itching, weeping, crusting, and pain. Tetracycline Counseling: Patient counseled regarding possible photosensitivity and increased risk for sunburn.  Patient instructed to avoid sunlight, if possible.  When exposed to sunlight, patients should wear protective clothing, sunglasses, and sunscreen.  The patient was instructed to call the office immediately if the following severe adverse effects occur:  hearing changes, easy bruising/bleeding, severe headache, or vision changes.  The patient verbalized understanding of the proper use and possible adverse effects of tetracycline.  All of the patient's questions and concerns were addressed. Patient understands to avoid pregnancy while on therapy due to potential birth defects. Use Enhanced Medication Counseling?: No Mirvaso Counseling: Mirvaso is a topical medication which can decrease superficial blood flow where applied. Side effects are uncommon and include stinging, redness and allergic reactions. Doxycycline Counseling:  Patient counseled regarding possible photosensitivity and increased risk for sunburn.  Patient instructed to avoid sunlight, if possible.  When exposed to sunlight, patients should wear protective clothing, sunglasses, and sunscreen.  The patient was instructed to call the office immediately if the following severe adverse effects occur:  hearing changes, easy bruising/bleeding, severe headache, or vision changes.  The patient verbalized understanding of the proper use and possible adverse effects of doxycycline.  All of the patient's questions and concerns were addressed. Cimzia Pregnancy And Lactation Text: This medication crosses the placenta but can be considered safe in certain situations. Cimzia may be excreted in breast milk. Calcipotriene Counseling:  I discussed with the patient the risks of calcipotriene including but not limited to erythema, scaling, itching, and irritation. Topical Sulfur Applications Pregnancy And Lactation Text: This medication is Pregnancy Category C and has an unknown safety profile during pregnancy. It is unknown if this topical medication is excreted in breast milk. Acitretin Counseling:  I discussed with the patient the risks of acitretin including but not limited to hair loss, dry lips/skin/eyes, liver damage, hyperlipidemia, depression/suicidal ideation, photosensitivity.  Serious rare side effects can include but are not limited to pancreatitis, pseudotumor cerebri, bony changes, clot formation/stroke/heart attack.  Patient understands that alcohol is contraindicated since it can result in liver toxicity and significantly prolong the elimination of the drug by many years. Siliq Counseling:  I discussed with the patient the risks of Siliq including but not limited to new or worsening depression, suicidal thoughts and behavior, immunosuppression, malignancy, posterior leukoencephalopathy syndrome, and serious infections.  The patient understands that monitoring is required including a PPD at baseline and must alert us or the primary physician if symptoms of infection or other concerning signs are noted. There is also a special program designed to monitor depression which is required with Siliq. Terbinafine Pregnancy And Lactation Text: This medication is Pregnancy Category B and is considered safe during pregnancy. It is also excreted in breast milk and breast feeding isn't recommended. Cimzia Counseling:  I discussed with the patient the risks of Cimzia including but not limited to immunosuppression, allergic reactions and infections.  The patient understands that monitoring is required including a PPD at baseline and must alert us or the primary physician if symptoms of infection or other concerning signs are noted. Xolair Pregnancy And Lactation Text: This medication is Pregnancy Category B and is considered safe during pregnancy. This medication is excreted in breast milk. Azathioprine Counseling:  I discussed with the patient the risks of azathioprine including but not limited to myelosuppression, immunosuppression, hepatotoxicity, lymphoma, and infections.  The patient understands that monitoring is required including baseline LFTs, Creatinine, possible TPMP genotyping and weekly CBCs for the first month and then every 2 weeks thereafter.  The patient verbalized understanding of the proper use and possible adverse effects of azathioprine.  All of the patient's questions and concerns were addressed. Glycopyrrolate Counseling:  I discussed with the patient the risks of glycopyrrolate including but not limited to skin rash, drowsiness, dry mouth, difficulty urinating, and blurred vision. Doxycycline Pregnancy And Lactation Text: This medication is Pregnancy Category D and not consider safe during pregnancy. It is also excreted in breast milk but is considered safe for shorter treatment courses. Cimetidine Counseling:  I discussed with the patient the risks of Cimetidine including but not limited to gynecomastia, headache, diarrhea, nausea, drowsiness, arrhythmias, pancreatitis, skin rashes, psychosis, bone marrow suppression and kidney toxicity. Zyclara Counseling:  I discussed with the patient the risks of imiquimod including but not limited to erythema, scaling, itching, weeping, crusting, and pain.  Patient understands that the inflammatory response to imiquimod is variable from person to person and was educated regarded proper titration schedule.  If flu-like symptoms develop, patient knows to discontinue the medication and contact us. Bexarotene Pregnancy And Lactation Text: This medication is Pregnancy Category X and should not be given to women who are pregnant or may become pregnant. This medication should not be used if you are breast feeding. Simponi Counseling:  I discussed with the patient the risks of golimumab including but not limited to myelosuppression, immunosuppression, autoimmune hepatitis, demyelinating diseases, lymphoma, and serious infections.  The patient understands that monitoring is required including a PPD at baseline and must alert us or the primary physician if symptoms of infection or other concerning signs are noted. Protopic Counseling: Patient may experience a mild burning sensation during topical application. Protopic is not approved in children less than 2 years of age. There have been case reports of hematologic and skin malignancies in patients using topical calcineurin inhibitors although causality is questionable. Azathioprine Pregnancy And Lactation Text: This medication is Pregnancy Category D and isn't considered safe during pregnancy. It is unknown if this medication is excreted in breast milk. Propranolol Counseling:  I discussed with the patient the risks of propranolol including but not limited to low heart rate, low blood pressure, low blood sugar, restlessness and increased cold sensitivity. They should call the office if they experience any of these side effects. Detail Level: Simple Erythromycin Counseling:  I discussed with the patient the risks of erythromycin including but not limited to GI upset, allergic reaction, drug rash, diarrhea, increase in liver enzymes, and yeast infections. 5-Fu Counseling: 5-Fluorouracil Counseling:  I discussed with the patient the risks of 5-fluorouracil including but not limited to erythema, scaling, itching, weeping, crusting, and pain. Glycopyrrolate Pregnancy And Lactation Text: This medication is Pregnancy Category B and is considered safe during pregnancy. It is unknown if it is excreted breast milk. Fluconazole Counseling:  Patient counseled regarding adverse effects of fluconazole including but not limited to headache, diarrhea, nausea, upset stomach, liver function test abnormalities, taste disturbance, and stomach pain.  There is a rare possibility of liver failure that can occur when taking fluconazole.  The patient understands that monitoring of LFTs and kidney function test may be required, especially at baseline. The patient verbalized understanding of the proper use and possible adverse effects of fluconazole.  All of the patient's questions and concerns were addressed. Bexarotene Counseling:  I discussed with the patient the risks of bexarotene including but not limited to hair loss, dry lips/skin/eyes, liver abnormalities, hyperlipidemia, pancreatitis, depression/suicidal ideation, photosensitivity, drug rash/allergic reactions, hypothyroidism, anemia, leukopenia, infection, cataracts, and teratogenicity.  Patient understands that they will need regular blood tests to check lipid profile, liver function tests, white blood cell count, thyroid function tests and pregnancy test if applicable. Propranolol Pregnancy And Lactation Text: This medication is Pregnancy Category C and it isn't known if it is safe during pregnancy. It is excreted in breast milk. Cosentyx Counseling:  I discussed with the patient the risks of Cosentyx including but not limited to worsening of Crohn's disease, immunosuppression, allergic reactions and infections.  The patient understands that monitoring is required including a PPD at baseline and must alert us or the primary physician if symptoms of infection or other concerning signs are noted. Cellcept Counseling:  I discussed with the patient the risks of mycophenolate mofetil including but not limited to infection/immunosuppression, GI upset, hypokalemia, hypercholesterolemia, bone marrow suppression, lymphoproliferative disorders, malignancy, GI ulceration/bleed/perforation, colitis, interstitial lung disease, kidney failure, progressive multifocal leukoencephalopathy, and birth defects.  The patient understands that monitoring is required including a baseline creatinine and regular CBC testing. In addition, patient must alert us immediately if symptoms of infection or other concerning signs are noted. Doxepin Counseling:  Patient advised that the medication is sedating and not to drive a car after taking this medication. Patient informed of potential adverse effects including but not limited to dry mouth, urinary retention, and blurry vision.  The patient verbalized understanding of the proper use and possible adverse effects of doxepin.  All of the patient's questions and concerns were addressed. Erythromycin Pregnancy And Lactation Text: This medication is Pregnancy Category B and is considered safe during pregnancy. It is also excreted in breast milk. Hydroxychloroquine Counseling:  I discussed with the patient that a baseline ophthalmologic exam is needed at the start of therapy and every year thereafter while on therapy. A CBC may also be warranted for monitoring.  The side effects of this medication were discussed with the patient, including but not limited to agranulocytosis, aplastic anemia, seizures, rashes, retinopathy, and liver toxicity. Patient instructed to call the office should any adverse effect occur.  The patient verbalized understanding of the proper use and possible adverse effects of Plaquenil.  All the patient's questions and concerns were addressed. Isotretinoin Pregnancy And Lactation Text: This medication is Pregnancy Category X and is considered extremely dangerous during pregnancy. It is unknown if it is excreted in breast milk. Skyrizi Counseling: I discussed with the patient the risks of risankizumab-rzaa including but not limited to immunosuppression, and serious infections.  The patient understands that monitoring is required including a PPD at baseline and must alert us or the primary physician if symptoms of infection or other concerning signs are noted. Birth Control Pills Counseling: Birth Control Pill Counseling: I discussed with the patient the potential side effects of OCPs including but not limited to increased risk of stroke, heart attack, thrombophlebitis, deep venous thrombosis, hepatic adenomas, breast changes, GI upset, headaches, and depression.  The patient verbalized understanding of the proper use and possible adverse effects of OCPs. All of the patient's questions and concerns were addressed. Rhofade Counseling: Rhofade is a topical medication which can decrease superficial blood flow where applied. Side effects are uncommon and include stinging, redness and allergic reactions. Doxepin Pregnancy And Lactation Text: This medication is Pregnancy Category C and it isn't known if it is safe during pregnancy. It is also excreted in breast milk and breast feeding isn't recommended. Dupixent Pregnancy And Lactation Text: This medication likely crosses the placenta but the risk for the fetus is uncertain. This medication is excreted in breast milk. Drysol Counseling:  I discussed with the patient the risks of drysol/aluminum chloride including but not limited to skin rash, itching, irritation, burning. Hydroxychloroquine Pregnancy And Lactation Text: This medication has been shown to cause fetal harm but it isn't assigned a Pregnancy Risk Category. There are small amounts excreted in breast milk. Birth Control Pills Pregnancy And Lactation Text: This medication should be avoided if pregnant and for the first 30 days post-partum. Isotretinoin Counseling: Patient should get monthly blood tests, not donate blood, not drive at night if vision affected, not share medication, and not undergo elective surgery for 6 months after tx completed. Side effects reviewed, pt to contact office should one occur. Protopic Pregnancy And Lactation Text: This medication is Pregnancy Category C. It is unknown if this medication is excreted in breast milk when applied topically. Dupixent Counseling: I discussed with the patient the risks of dupilumab including but not limited to eye infection and irritation, cold sores, injection site reactions, worsening of asthma, allergic reactions and increased risk of parasitic infection.  Live vaccines should be avoided while taking dupilumab. Dupilumab will also interact with certain medications such as warfarin and cyclosporine. The patient understands that monitoring is required and they must alert us or the primary physician if symptoms of infection or other concerning signs are noted. Drysol Pregnancy And Lactation Text: This medication is considered safe during pregnancy and breast feeding. Winlevi Counseling:  I discussed with the patient the risks of topical clascoterone including but not limited to erythema, scaling, itching, and stinging. Patient voiced their understanding. Aklief Pregnancy And Lactation Text: It is unknown if this medication is safe to use during pregnancy.  It is unknown if this medication is excreted in breast milk.  Breastfeeding women should use the topical cream on the smallest area of the skin for the shortest time needed while breastfeeding.  Do not apply to nipple and areola. Oral Minoxidil Counseling- I discussed with the patient the risks of oral minoxidil including but not limited to shortness of breath, swelling of the feet or ankles, dizziness, lightheadedness, unwanted hair growth and allergic reaction.  The patient verbalized understanding of the proper use and possible adverse effects of oral minoxidil.  All of the patient's questions and concerns were addressed. Adbry Pregnancy And Lactation Text: It is unknown if this medication will adversely affect pregnancy or breast feeding. Dutasteride Male Counseling: Dustasteride Counseling:  I discussed with the patient the risks of use of dutasteride including but not limited to decreased libido, decreased ejaculate volume, and gynecomastia. Women who can become pregnant should not handle medication.  All of the patient's questions and concerns were addressed. Olumiant Pregnancy And Lactation Text: Based on animal studies, Olumiant may cause embryo-fetal harm when administered to pregnant women.  The medication should not be used in pregnancy.  Breastfeeding is not recommended during treatment. Cibinqo Pregnancy And Lactation Text: It is unknown if this medication will adversely affect pregnancy or breast feeding.  You should not take this medication if you are currently pregnant or planning a pregnancy or while breastfeeding. Rinvoq Pregnancy And Lactation Text: Based on animal studies, Rinvoq may cause embryo-fetal harm when administered to pregnant women.  The medication should not be used in pregnancy.  Breastfeeding is not recommended during treatment and for 6 days after the last dose. Cantharidin Pregnancy And Lactation Text: The use of this medication during pregnancy or lactation is not recommended as there is insufficient data. Opzelura Pregnancy And Lactation Text: There is insufficient data to evaluate drug-associated risk for major birth defects, miscarriage, or other adverse maternal or fetal outcomes.  There is a pregnancy registry that monitors pregnancy outcomes in pregnant persons exposed to the medication during pregnancy.  It is unknown if this medication is excreted in breast milk.  Do not breastfeed during treatment and for about 4 weeks after the last dose. Adbry Counseling: I discussed with the patient the risks of tralokinumab including but not limited to eye infection and irritation, cold sores, injection site reactions, worsening of asthma, allergic reactions and increased risk of parasitic infection.  Live vaccines should be avoided while taking tralokinumab. The patient understands that monitoring is required and they must alert us or the primary physician if symptoms of infection or other concerning signs are noted. Klisyri Pregnancy And Lactation Text: It is unknown if this medication can harm a developing fetus or if it is excreted in breast milk. Qbrexza Pregnancy And Lactation Text: There is no available data on Qbrexza use in pregnant women.  There is no available data on Qbrexza use in lactation. Olumiant Counseling: I discussed with the patient the risks of Olumiant therapy including but not limited to upper respiratory tract infections, shingles, cold sores, and nausea. Live vaccines should be avoided.  This medication has been linked to serious infections; higher rate of mortality; malignancy and lymphoproliferative disorders; major adverse cardiovascular events; thrombosis; gastrointestinal perforations; neutropenia; lymphopenia; anemia; liver enzyme elevations; and lipid elevations. Cibinqo Counseling: I discussed with the patient the risks of Cibinqo therapy including but not limited to common cold, nausea, headache, cold sores, increased blood CPK levels, dizziness, UTIs, fatigue, acne, and vomitting. Live vaccines should be avoided.  This medication has been linked to serious infections; higher rate of mortality; malignancy and lymphoproliferative disorders; major adverse cardiovascular events; thrombosis; thrombocytopenia and lymphopenia; lipid elevations; and retinal detachment. Oral Minoxidil Pregnancy And Lactation Text: This medication should only be used when clearly needed if you are pregnant, attempting to become pregnant or breast feeding. Aklief counseling:  Patient advised to apply a pea-sized amount only at bedtime and wait 30 minutes after washing their face before applying.  If too drying, patient may add a non-comedogenic moisturizer.  The most commonly reported side effects including irritation, redness, scaling, dryness, stinging, burning, itching, and increased risk of sunburn.  The patient verbalized understanding of the proper use and possible adverse effects of retinoids.  All of the patient's questions and concerns were addressed. Azelaic Acid Counseling: Patient counseled that medicine may cause skin irritation and to avoid applying near the eyes.  In the event of skin irritation, the patient was advised to reduce the amount of the drug applied or use it less frequently.   The patient verbalized understanding of the proper use and possible adverse effects of azelaic acid.  All of the patient's questions and concerns were addressed. Rinvoq Counseling: I discussed with the patient the risks of Rinvoq therapy including but not limited to upper respiratory tract infections, shingles, cold sores, bronchitis, nausea, cough, fever, acne, and headache. Live vaccines should be avoided.  This medication has been linked to serious infections; higher rate of mortality; malignancy and lymphoproliferative disorders; major adverse cardiovascular events; thrombosis; thrombocytopenia, anemia, and neutropenia; lipid elevations; liver enzyme elevations; and gastrointestinal perforations. Winlevi Pregnancy And Lactation Text: This medication is considered safe during pregnancy and breastfeeding. Dutasteride Pregnancy And Lactation Text: This medication is absolutely contraindicated in women, especially during pregnancy and breast feeding. Feminization of male fetuses is possible if taking while pregnant. Klisyri Counseling:  I discussed with the patient the risks of Klisyri including but not limited to erythema, scaling, itching, weeping, crusting, and pain. Opzelura Counseling:  I discussed with the patient the risks of Opzelura including but not limited to nasopharngitis, bronchitis, ear infection, eosinophila, hives, diarrhea, folliculitis, tonsillitis, and rhinorrhea.  Taken orally, this medication has been linked to serious infections; higher rate of mortality; malignancy and lymphoproliferative disorders; major adverse cardiovascular events; thrombosis; thrombocytopenia, anemia, and neutropenia; and lipid elevations. Topical Ketoconazole Counseling: Patient counseled that this medication may cause skin irritation or allergic reactions.  In the event of skin irritation, the patient was advised to reduce the amount of the drug applied or use it less frequently.   The patient verbalized understanding of the proper use and possible adverse effects of ketoconazole.  All of the patient's questions and concerns were addressed. Qbrexza Counseling:  I discussed with the patient the risks of Qbrexza including but not limited to headache, mydriasis, blurred vision, dry eyes, nasal dryness, dry mouth, dry throat, dry skin, urinary hesitation, and constipation.  Local skin reactions including erythema, burning, stinging, and itching can also occur. Low Dose Naltrexone Counseling- I discussed with the patient the potential risks and side effects of low dose naltrexone including but not limited to: more vivid dreams, headaches, nausea, vomiting, abdominal pain, fatigue, dizziness, and anxiety. VTAMA Counseling: I discussed with the patient that VTAMA is not for use in the eyes, mouth or mouth. They should call the office if they develop any signs of allergic reactions to VTAMA. The patient verbalized understanding of the proper use and possible adverse effects of VTAMA.  All of the patient's questions and concerns were addressed. Low Dose Naltrexone Pregnancy And Lactation Text: Naltrexone is pregnancy category C.  There have been no adequate and well-controlled studies in pregnant women.  It should be used in pregnancy only if the potential benefit justifies the potential risk to the fetus.   Limited data indicates that naltrexone is minimally excreted into breastmilk. Vtama Pregnancy And Lactation Text: It is unknown if this medication can cause problems during pregnancy and breastfeeding. Sotyktu Counseling:  I discussed the most common side effects of Sotyktu including: common cold, sore throat, sinus infections, cold sores, canker sores, folliculitis, and acne.  I also discussed more serious side effects of Sotyktu including but not limited to: serious allergic reactions; increased risk for infections such as TB; cancers such as lymphomas; rhabdomyolysis and elevated CPK; and elevated triglycerides and liver enzymes.  Zoryve Counseling:  I discussed with the patient that Zoryve is not for use in the eyes, mouth or vagina. The most commonly reported side effects include diarrhea, headache, insomnia, application site pain, upper respiratory tract infections, and urinary tract infections.  All of the patient's questions and concerns were addressed. Sotyktu Pregnancy And Lactation Text: There is insufficient data to evaluate whether or not Sotyktu is safe to use during pregnancy.   It is not known if Sotyktu passes into breast milk and whether or not it is safe to use when breastfeeding.   Olanzapine Counseling- I discussed with the patient the common side effects of olanzapine including but are not limited to: lack of energy, dry mouth, increased appetite, sleepiness, tremor, constipation, dizziness, changes in behavior, or restlessness.  Explained that teenagers are more likely to experience headaches, abdominal pain, pain in the arms or legs, tiredness, and sleepiness.  Serious side effects include but are not limited: increased risk of death in elderly patients who are confused, have memory loss, or dementia-related psychosis; hyperglycemia; increased cholesterol and triglycerides; and weight gain. Olanzapine Pregnancy And Lactation Text: This medication is pregnancy category C.   There are no adequate and well controlled trials with olanzapine in pregnant females.  Olanzapine should be used during pregnancy only if the potential benefit justifies the potential risk to the fetus.   In a study in lactating healthy women, olanzapine was excreted in breast milk.  It is recommended that women taking olanzapine should not breast feed.

## (undated) DEVICE — SET LEADWIRE 5 LEAD BEDSIDE DISPOSABLE ECG (1SET OF 5/EA)

## (undated) DEVICE — NEPTUNE 4 PORT MANIFOLD - (20/PK)

## (undated) DEVICE — DRAPE 36X28IN RAD CARM BND BG - (25/CA) O

## (undated) DEVICE — SET EXTENSION WITH 2 PORTS (48EA/CA) ***PART #2C8610 IS A SUBSTITUTE*****

## (undated) DEVICE — KIT ANESTHESIA W/CIRCUIT & 3/LT BAG W/FILTER (20EA/CA)

## (undated) DEVICE — GLOVE BIOGEL INDICATOR SZ 6.5 SURGICAL PF LTX - (50PR/BX 4BX/CA)

## (undated) DEVICE — SUTURE GENERAL

## (undated) DEVICE — GLOVE BIOGEL SZ 6.5 SURGICAL PF LTX (50PR/BX 4BX/CA)

## (undated) DEVICE — TUBING CLEARLINK DUO-VENT - C-FLO (48EA/CA)

## (undated) DEVICE — SLEEVE, VASO, THIGH, MED

## (undated) DEVICE — SPLINT PLASTER 5 IN X 30 IN - (50EA/BX 6BX/CA)

## (undated) DEVICE — SUTURE 2-0 VICRYL PLUS CT-1 - 8 X 18 INCH(12/BX)

## (undated) DEVICE — DRAPE STRLE REG TOWEL 18X24 - (10/BX 4BX/CA)"

## (undated) DEVICE — DRAPE MAGNETIC (INSTRA-MAG) - (30/CA)

## (undated) DEVICE — PACK MINOR BASIN - (2EA/CA)

## (undated) DEVICE — PADDING CAST 4 IN STERILE - 4 X 4 YDS (24/CA)

## (undated) DEVICE — SODIUM CHL IRRIGATION 0.9% 1000ML (12EA/CA)

## (undated) DEVICE — CANISTER SUCTION 3000ML MECHANICAL FILTER AUTO SHUTOFF MEDI-VAC NONSTERILE LF DISP  (40EA/CA)

## (undated) DEVICE — NEEDLE SHARP EZ CLEAN 2.0 CM (12EA/BX)

## (undated) DEVICE — DRILL BIT 1.8MMX80MM CALIBRATED (4TX2=8)

## (undated) DEVICE — ELECTRODE 850 FOAM ADHESIVE - HYDROGEL RADIOTRNSPRNT (50/PK)

## (undated) DEVICE — LACTATED RINGERS INJ 1000 ML - (14EA/CA 60CA/PF)

## (undated) DEVICE — PACK UPPER EXTREMITY (2EA/CA)

## (undated) DEVICE — GOWN WARMING STANDARD FLEX - (30/CA)

## (undated) DEVICE — PAD LAP STERILE 18 X 18 - (5/PK 40PK/CA)

## (undated) DEVICE — GLOVE BIOGEL SZ 7.5 SURGICAL PF LTX - (50PR/BX 4BX/CA)

## (undated) DEVICE — SHEET THYROID - (10EA/CA)

## (undated) DEVICE — SUTURE 3-0 VICRYL PLUS SH - 27 INCH (36/BX)

## (undated) DEVICE — MASK ANESTHESIA ADULT  - (100/CA)

## (undated) DEVICE — SUCTION INSTRUMENT YANKAUER BULBOUS TIP W/O VENT (50EA/CA)

## (undated) DEVICE — HEAD HOLDER JUNIOR/ADULT

## (undated) DEVICE — SENSOR SPO2 NEO LNCS ADHESIVE (20/BX) SEE USER NOTES

## (undated) DEVICE — PROBE PRASS STAND STIMULATING (5EA/PK)

## (undated) DEVICE — CATHETER IV 20 GA X 1-1/4 ---SURG.& SDS ONLY--- (50EA/BX)

## (undated) DEVICE — SHEAR HS FOCUS 9CM CVD - (6/BX)

## (undated) DEVICE — DRAPE C-ARM LARGE 41IN X 74 IN - (10/BX 2BX/CA)

## (undated) DEVICE — SUTURE  5-0 SILK CV22 5 X 18 - (24PK/CA)

## (undated) DEVICE — SUTURE 3-0 VICRYL PLUS RB-1 - 8 X 18 INCH (12/BX)

## (undated) DEVICE — WATER IRRIGATION STERILE 1000ML (12EA/CA)

## (undated) DEVICE — DRILL BIT 3.5X110 QC OIC - (10TX2=20)

## (undated) DEVICE — BANDAGE ELASTIC 4 HONEYCOMB - 4"X5YD LF (20/CA)"

## (undated) DEVICE — CANISTER SUCTION RIGID RED 1500CC (40EA/CA)

## (undated) DEVICE — PROTECTOR ULNA NERVE - (36PR/CA)

## (undated) DEVICE — CHLORAPREP 26 ML APPLICATOR - ORANGE TINT(25/CA)

## (undated) DEVICE — SUTURE 3-0 SILK 12 X 18 IN - (36/BX)

## (undated) DEVICE — GLOVE BIOGEL INDICATOR SZ 8 SURGICAL PF LTX - (50/BX 4BX/CA)

## (undated) DEVICE — KIT  I.V. START (100EA/CA)

## (undated) DEVICE — SPONGE PEANUT - (5/PK 50PK/CA)

## (undated) DEVICE — RETRACTOR LIGHTED RADIALUX

## (undated) DEVICE — TUBE CONNECTING SUCTION - CLEAR PLASTIC STERILE 72 IN (50EA/CA)

## (undated) DEVICE — GOWN SURGEONS LARGE - (32/CA)

## (undated) DEVICE — ELECTRODE DUAL RETURN W/ CORD - (50/PK)

## (undated) DEVICE — FIBRILLAR SURGICEL 4X4 - 10/CA

## (undated) DEVICE — TUBE EMG NIM TRIVANTAGE 6MM (3EA/PK)

## (undated) DEVICE — SUTURE 3-0 VICRYL PLUS SH - 8X 18 INCH (12/BX)